# Patient Record
Sex: MALE | Race: WHITE | Employment: UNEMPLOYED | URBAN - METROPOLITAN AREA
[De-identification: names, ages, dates, MRNs, and addresses within clinical notes are randomized per-mention and may not be internally consistent; named-entity substitution may affect disease eponyms.]

---

## 2021-03-25 PROBLEM — G04.90 MENINGOENCEPHALITIS: Status: ACTIVE | Noted: 2021-03-25

## 2021-03-29 PROBLEM — F14.10 DRUG ABUSE, COCAINE TYPE (HCC): Status: ACTIVE | Noted: 2021-03-29

## 2021-03-29 PROBLEM — B95.61 ENDOCARDITIS DUE TO METHICILLIN SUSCEPTIBLE STAPHYLOCOCCUS AUREUS (MSSA): Status: ACTIVE | Noted: 2021-03-25

## 2021-03-29 PROBLEM — I33.0 ENDOCARDITIS DUE TO METHICILLIN SUSCEPTIBLE STAPHYLOCOCCUS AUREUS (MSSA): Status: ACTIVE | Noted: 2021-03-25

## 2021-03-29 PROBLEM — I21.A1 TYPE 2 MYOCARDIAL INFARCTION (HCC): Status: ACTIVE | Noted: 2021-03-24

## 2021-03-30 PROBLEM — G06.0: Status: ACTIVE | Noted: 2021-03-30

## 2021-05-06 ENCOUNTER — APPOINTMENT (OUTPATIENT)
Dept: CT IMAGING | Age: 26
DRG: 161 | End: 2021-05-06
Attending: EMERGENCY MEDICINE
Payer: MEDICAID

## 2021-05-06 ENCOUNTER — APPOINTMENT (OUTPATIENT)
Dept: CT IMAGING | Age: 26
DRG: 161 | End: 2021-05-06
Attending: NURSE PRACTITIONER
Payer: MEDICAID

## 2021-05-06 ENCOUNTER — APPOINTMENT (OUTPATIENT)
Dept: GENERAL RADIOLOGY | Age: 26
DRG: 161 | End: 2021-05-06
Attending: EMERGENCY MEDICINE
Payer: MEDICAID

## 2021-05-06 ENCOUNTER — HOSPITAL ENCOUNTER (INPATIENT)
Age: 26
LOS: 33 days | Discharge: NURSING, REHAB OR LONG-TERM CARE | DRG: 161 | End: 2021-06-08
Attending: EMERGENCY MEDICINE | Admitting: ANESTHESIOLOGY
Payer: MEDICAID

## 2021-05-06 DIAGNOSIS — I63.89 ACUTE ARTERIAL ISCHEMIC STROKE, MULTIFOCAL, MULTIPLE VASCULAR TERRITORIES (HCC): ICD-10-CM

## 2021-05-06 DIAGNOSIS — Z87.898 HISTORY OF SEIZURES: ICD-10-CM

## 2021-05-06 DIAGNOSIS — R09.02 HYPOXIA: ICD-10-CM

## 2021-05-06 DIAGNOSIS — I46.9 CARDIAC ARREST (HCC): Primary | ICD-10-CM

## 2021-05-06 DIAGNOSIS — I63.89 ACUTE ISCHEMIC MULTIFOCAL MULTIPLE VASCULAR TERRITORIES STROKE (HCC): ICD-10-CM

## 2021-05-06 DIAGNOSIS — S20.212S CONTUSION OF LEFT CHEST WALL, SEQUELA: ICD-10-CM

## 2021-05-06 DIAGNOSIS — I42.9 CARDIOMYOPATHY, UNSPECIFIED TYPE (HCC): ICD-10-CM

## 2021-05-06 LAB
ALBUMIN SERPL-MCNC: 2.6 G/DL (ref 3.5–5)
ALBUMIN/GLOB SERPL: 0.6 {RATIO} (ref 1.1–2.2)
ALP SERPL-CCNC: 127 U/L (ref 45–117)
ALT SERPL-CCNC: 36 U/L (ref 12–78)
AMORPH CRY URNS QL MICRO: ABNORMAL
AMPHET UR QL SCN: NEGATIVE
AMPHET UR QL SCN: NEGATIVE
ANION GAP SERPL CALC-SCNC: 11 MMOL/L (ref 5–15)
APPEARANCE UR: CLEAR
APTT PPP: 28 SEC (ref 22.1–31)
ARTERIAL PATENCY WRIST A: NO
ARTERIAL PATENCY WRIST A: YES
AST SERPL-CCNC: 39 U/L (ref 15–37)
BACTERIA URNS QL MICRO: NEGATIVE /HPF
BARBITURATES UR QL SCN: NEGATIVE
BARBITURATES UR QL SCN: NEGATIVE
BASE DEFICIT BLD-SCNC: 2 MMOL/L
BASE EXCESS BLD CALC-SCNC: 0 MMOL/L
BASOPHILS # BLD: 0 K/UL (ref 0–0.1)
BASOPHILS NFR BLD: 0 % (ref 0–1)
BDY SITE: ABNORMAL
BDY SITE: ABNORMAL
BENZODIAZ UR QL: POSITIVE
BENZODIAZ UR QL: POSITIVE
BILIRUB SERPL-MCNC: 0.7 MG/DL (ref 0.2–1)
BILIRUB UR QL: NEGATIVE
BNP SERPL-MCNC: ABNORMAL PG/ML
BUN SERPL-MCNC: 18 MG/DL (ref 6–20)
BUN/CREAT SERPL: 24 (ref 12–20)
CA-I BLD-SCNC: 1.19 MMOL/L (ref 1.12–1.32)
CA-I BLD-SCNC: 1.27 MMOL/L (ref 1.12–1.32)
CALCIUM SERPL-MCNC: 9.5 MG/DL (ref 8.5–10.1)
CANNABINOIDS UR QL SCN: NEGATIVE
CANNABINOIDS UR QL SCN: NEGATIVE
CHLORIDE SERPL-SCNC: 103 MMOL/L (ref 97–108)
CO2 SERPL-SCNC: 24 MMOL/L (ref 21–32)
COCAINE UR QL SCN: NEGATIVE
COCAINE UR QL SCN: NEGATIVE
COLOR UR: ABNORMAL
COMMENT, HOLDF: NORMAL
COMMENT, HOLDF: NORMAL
COVID-19 RAPID TEST, COVR: NOT DETECTED
CREAT SERPL-MCNC: 0.76 MG/DL (ref 0.7–1.3)
D DIMER PPP FEU-MCNC: 14.03 MG/L FEU (ref 0–0.65)
DIFFERENTIAL METHOD BLD: ABNORMAL
DRUG SCRN COMMENT,DRGCM: ABNORMAL
DRUG SCRN COMMENT,DRGCM: ABNORMAL
EOSINOPHIL # BLD: 0 K/UL (ref 0–0.4)
EOSINOPHIL NFR BLD: 0 % (ref 0–7)
EPITH CASTS URNS QL MICRO: ABNORMAL /LPF
ERYTHROCYTE [DISTWIDTH] IN BLOOD BY AUTOMATED COUNT: 15.7 % (ref 11.5–14.5)
GAS FLOW.O2 O2 DELIVERY SYS: ABNORMAL L/MIN
GAS FLOW.O2 O2 DELIVERY SYS: ABNORMAL L/MIN
GAS FLOW.O2 SETTING OXYMISER: 16 BPM
GAS FLOW.O2 SETTING OXYMISER: 16 BPM
GLOBULIN SER CALC-MCNC: 4.6 G/DL (ref 2–4)
GLUCOSE SERPL-MCNC: 135 MG/DL (ref 65–100)
GLUCOSE UR STRIP.AUTO-MCNC: NEGATIVE MG/DL
HCO3 BLD-SCNC: 23 MMOL/L (ref 22–26)
HCO3 BLD-SCNC: 26 MMOL/L (ref 22–26)
HCT VFR BLD AUTO: 34.5 % (ref 36.6–50.3)
HGB BLD-MCNC: 10.6 G/DL (ref 12.1–17)
HGB UR QL STRIP: NEGATIVE
IMM GRANULOCYTES # BLD AUTO: 0.3 K/UL (ref 0–0.04)
IMM GRANULOCYTES NFR BLD AUTO: 1 % (ref 0–0.5)
INR PPP: 1.3 (ref 0.9–1.1)
KETONES UR QL STRIP.AUTO: NEGATIVE MG/DL
LACTATE SERPL-SCNC: 3.4 MMOL/L (ref 0.4–2)
LEUKOCYTE ESTERASE UR QL STRIP.AUTO: NEGATIVE
LYMPHOCYTES # BLD: 0.6 K/UL (ref 0.8–3.5)
LYMPHOCYTES NFR BLD: 2 % (ref 12–49)
MCH RBC QN AUTO: 27.3 PG (ref 26–34)
MCHC RBC AUTO-ENTMCNC: 30.7 G/DL (ref 30–36.5)
MCV RBC AUTO: 88.9 FL (ref 80–99)
METHADONE UR QL: NEGATIVE
METHADONE UR QL: NEGATIVE
MONOCYTES # BLD: 1.6 K/UL (ref 0–1)
MONOCYTES NFR BLD: 5 % (ref 5–13)
NEUTS SEG # BLD: 29.6 K/UL (ref 1.8–8)
NEUTS SEG NFR BLD: 92 % (ref 32–75)
NITRITE UR QL STRIP.AUTO: NEGATIVE
NRBC # BLD: 0 K/UL (ref 0–0.01)
NRBC BLD-RTO: 0 PER 100 WBC
O2/TOTAL GAS SETTING VFR VENT: 100 %
O2/TOTAL GAS SETTING VFR VENT: 100 %
OPIATES UR QL: NEGATIVE
OPIATES UR QL: NEGATIVE
PCO2 BLD: 36.1 MMHG (ref 35–45)
PCO2 BLD: 51.8 MMHG (ref 35–45)
PCP UR QL: NEGATIVE
PCP UR QL: NEGATIVE
PEEP RESPIRATORY: 10 CMH2O
PEEP RESPIRATORY: 10 CMH2O
PH BLD: 7.31 [PH] (ref 7.35–7.45)
PH BLD: 7.41 [PH] (ref 7.35–7.45)
PH UR STRIP: 5 [PH] (ref 5–8)
PLATELET # BLD AUTO: 490 K/UL (ref 150–400)
PMV BLD AUTO: 10.5 FL (ref 8.9–12.9)
PO2 BLD: 44 MMHG (ref 80–100)
PO2 BLD: 78 MMHG (ref 80–100)
POTASSIUM SERPL-SCNC: 4.8 MMOL/L (ref 3.5–5.1)
PROT SERPL-MCNC: 7.2 G/DL (ref 6.4–8.2)
PROT UR STRIP-MCNC: 30 MG/DL
PROTHROMBIN TIME: 13.3 SEC (ref 9–11.1)
RBC # BLD AUTO: 3.88 M/UL (ref 4.1–5.7)
RBC #/AREA URNS HPF: ABNORMAL /HPF (ref 0–5)
RBC MORPH BLD: ABNORMAL
SAMPLES BEING HELD,HOLD: NORMAL
SAMPLES BEING HELD,HOLD: NORMAL
SAO2 % BLD: 75 % (ref 92–97)
SAO2 % BLD: 96 % (ref 92–97)
SODIUM SERPL-SCNC: 138 MMOL/L (ref 136–145)
SOURCE, COVRS: NORMAL
SP GR UR REFRACTOMETRY: 1.01 (ref 1–1.03)
SPECIMEN TYPE: ABNORMAL
SPECIMEN TYPE: ABNORMAL
THERAPEUTIC RANGE,PTTT: NORMAL SECS (ref 58–77)
TROPONIN I SERPL-MCNC: <0.05 NG/ML
UR CULT HOLD, URHOLD: NORMAL
UROBILINOGEN UR QL STRIP.AUTO: 0.2 EU/DL (ref 0.2–1)
VENTILATION MODE VENT: ABNORMAL
VENTILATION MODE VENT: ABNORMAL
VT SETTING VENT: 450 ML
VT SETTING VENT: 450 ML
WBC # BLD AUTO: 32.1 K/UL (ref 4.1–11.1)
WBC URNS QL MICRO: ABNORMAL /HPF (ref 0–4)

## 2021-05-06 PROCEDURE — 36600 WITHDRAWAL OF ARTERIAL BLOOD: CPT

## 2021-05-06 PROCEDURE — 96375 TX/PRO/DX INJ NEW DRUG ADDON: CPT

## 2021-05-06 PROCEDURE — 81001 URINALYSIS AUTO W/SCOPE: CPT

## 2021-05-06 PROCEDURE — 74011250637 HC RX REV CODE- 250/637: Performed by: EMERGENCY MEDICINE

## 2021-05-06 PROCEDURE — 74011250636 HC RX REV CODE- 250/636

## 2021-05-06 PROCEDURE — 74011000258 HC RX REV CODE- 258: Performed by: EMERGENCY MEDICINE

## 2021-05-06 PROCEDURE — 74011000250 HC RX REV CODE- 250: Performed by: NURSE PRACTITIONER

## 2021-05-06 PROCEDURE — 71275 CT ANGIOGRAPHY CHEST: CPT

## 2021-05-06 PROCEDURE — 80177 DRUG SCRN QUAN LEVETIRACETAM: CPT

## 2021-05-06 PROCEDURE — 36415 COLL VENOUS BLD VENIPUNCTURE: CPT

## 2021-05-06 PROCEDURE — 71045 X-RAY EXAM CHEST 1 VIEW: CPT

## 2021-05-06 PROCEDURE — 74011250636 HC RX REV CODE- 250/636: Performed by: ANESTHESIOLOGY

## 2021-05-06 PROCEDURE — 84484 ASSAY OF TROPONIN QUANT: CPT

## 2021-05-06 PROCEDURE — 83880 ASSAY OF NATRIURETIC PEPTIDE: CPT

## 2021-05-06 PROCEDURE — 74011000250 HC RX REV CODE- 250: Performed by: EMERGENCY MEDICINE

## 2021-05-06 PROCEDURE — 87635 SARS-COV-2 COVID-19 AMP PRB: CPT

## 2021-05-06 PROCEDURE — 80164 ASSAY DIPROPYLACETIC ACD TOT: CPT

## 2021-05-06 PROCEDURE — 83605 ASSAY OF LACTIC ACID: CPT

## 2021-05-06 PROCEDURE — 70450 CT HEAD/BRAIN W/O DYE: CPT

## 2021-05-06 PROCEDURE — 99285 EMERGENCY DEPT VISIT HI MDM: CPT

## 2021-05-06 PROCEDURE — 74011000636 HC RX REV CODE- 636: Performed by: RADIOLOGY

## 2021-05-06 PROCEDURE — 85730 THROMBOPLASTIN TIME PARTIAL: CPT

## 2021-05-06 PROCEDURE — 74011250636 HC RX REV CODE- 250/636: Performed by: NURSE PRACTITIONER

## 2021-05-06 PROCEDURE — 85379 FIBRIN DEGRADATION QUANT: CPT

## 2021-05-06 PROCEDURE — 85025 COMPLETE CBC W/AUTO DIFF WBC: CPT

## 2021-05-06 PROCEDURE — 94760 N-INVAS EAR/PLS OXIMETRY 1: CPT

## 2021-05-06 PROCEDURE — 93005 ELECTROCARDIOGRAM TRACING: CPT

## 2021-05-06 PROCEDURE — 94640 AIRWAY INHALATION TREATMENT: CPT

## 2021-05-06 PROCEDURE — 82803 BLOOD GASES ANY COMBINATION: CPT

## 2021-05-06 PROCEDURE — 75810000455 HC PLCMT CENT VENOUS CATH LVL 2 5182

## 2021-05-06 PROCEDURE — 74011250636 HC RX REV CODE- 250/636: Performed by: EMERGENCY MEDICINE

## 2021-05-06 PROCEDURE — 80307 DRUG TEST PRSMV CHEM ANLYZR: CPT

## 2021-05-06 PROCEDURE — 99291 CRITICAL CARE FIRST HOUR: CPT

## 2021-05-06 PROCEDURE — 87040 BLOOD CULTURE FOR BACTERIA: CPT

## 2021-05-06 PROCEDURE — 0202U NFCT DS 22 TRGT SARS-COV-2: CPT

## 2021-05-06 PROCEDURE — C1751 CATH, INF, PER/CENT/MIDLINE: HCPCS

## 2021-05-06 PROCEDURE — 94002 VENT MGMT INPAT INIT DAY: CPT

## 2021-05-06 PROCEDURE — 80053 COMPREHEN METABOLIC PANEL: CPT

## 2021-05-06 PROCEDURE — 51701 INSERT BLADDER CATHETER: CPT

## 2021-05-06 PROCEDURE — 85610 PROTHROMBIN TIME: CPT

## 2021-05-06 PROCEDURE — 96365 THER/PROPH/DIAG IV INF INIT: CPT

## 2021-05-06 PROCEDURE — 65610000006 HC RM INTENSIVE CARE

## 2021-05-06 RX ORDER — DIVALPROEX SODIUM 500 MG/1
500 TABLET, DELAYED RELEASE ORAL 3 TIMES DAILY
Status: DISCONTINUED | OUTPATIENT
Start: 2021-05-07 | End: 2021-05-06

## 2021-05-06 RX ORDER — PROPOFOL 10 MG/ML
0-50 VIAL (ML) INTRAVENOUS
Status: DISCONTINUED | OUTPATIENT
Start: 2021-05-06 | End: 2021-05-08

## 2021-05-06 RX ORDER — INSULIN LISPRO 100 [IU]/ML
INJECTION, SOLUTION INTRAVENOUS; SUBCUTANEOUS EVERY 6 HOURS
Status: DISCONTINUED | OUTPATIENT
Start: 2021-05-07 | End: 2021-05-11

## 2021-05-06 RX ORDER — VALPROIC ACID 250 MG/5ML
500 SOLUTION ORAL EVERY 8 HOURS
Status: DISCONTINUED | OUTPATIENT
Start: 2021-05-06 | End: 2021-05-12

## 2021-05-06 RX ORDER — VANCOMYCIN HYDROCHLORIDE
1250 EVERY 8 HOURS
Status: DISCONTINUED | OUTPATIENT
Start: 2021-05-07 | End: 2021-05-08 | Stop reason: DRUGHIGH

## 2021-05-06 RX ORDER — VALPROIC ACID 250 MG/5ML
500 SOLUTION ORAL 3 TIMES DAILY
Status: DISCONTINUED | OUTPATIENT
Start: 2021-05-07 | End: 2021-05-06

## 2021-05-06 RX ORDER — ENOXAPARIN SODIUM 100 MG/ML
40 INJECTION SUBCUTANEOUS DAILY
Status: DISCONTINUED | OUTPATIENT
Start: 2021-05-07 | End: 2021-05-09

## 2021-05-06 RX ORDER — ONDANSETRON 2 MG/ML
4 INJECTION INTRAMUSCULAR; INTRAVENOUS
Status: DISCONTINUED | OUTPATIENT
Start: 2021-05-06 | End: 2021-06-08 | Stop reason: HOSPADM

## 2021-05-06 RX ORDER — DEXTROSE 50 % IN WATER (D50W) INTRAVENOUS SYRINGE
25-50 AS NEEDED
Status: DISCONTINUED | OUTPATIENT
Start: 2021-05-06 | End: 2021-06-08 | Stop reason: HOSPADM

## 2021-05-06 RX ORDER — ACETAMINOPHEN 650 MG/1
650 SUPPOSITORY RECTAL
Status: COMPLETED | OUTPATIENT
Start: 2021-05-06 | End: 2021-05-06

## 2021-05-06 RX ORDER — LEVOFLOXACIN 5 MG/ML
750 INJECTION, SOLUTION INTRAVENOUS
Status: COMPLETED | OUTPATIENT
Start: 2021-05-06 | End: 2021-05-06

## 2021-05-06 RX ORDER — CHLORHEXIDINE GLUCONATE 0.12 MG/ML
15 RINSE ORAL EVERY 12 HOURS
Status: DISCONTINUED | OUTPATIENT
Start: 2021-05-06 | End: 2021-05-17 | Stop reason: ALTCHOICE

## 2021-05-06 RX ORDER — MAGNESIUM SULFATE 100 %
4 CRYSTALS MISCELLANEOUS AS NEEDED
Status: DISCONTINUED | OUTPATIENT
Start: 2021-05-06 | End: 2021-06-08 | Stop reason: HOSPADM

## 2021-05-06 RX ORDER — FENTANYL CITRATE 50 UG/ML
INJECTION, SOLUTION INTRAMUSCULAR; INTRAVENOUS
Status: COMPLETED
Start: 2021-05-06 | End: 2021-05-06

## 2021-05-06 RX ORDER — SODIUM CHLORIDE 0.9 % (FLUSH) 0.9 %
5-40 SYRINGE (ML) INJECTION AS NEEDED
Status: DISCONTINUED | OUTPATIENT
Start: 2021-05-06 | End: 2021-06-08 | Stop reason: HOSPADM

## 2021-05-06 RX ORDER — SODIUM CHLORIDE, SODIUM LACTATE, POTASSIUM CHLORIDE, CALCIUM CHLORIDE 600; 310; 30; 20 MG/100ML; MG/100ML; MG/100ML; MG/100ML
1000 INJECTION, SOLUTION INTRAVENOUS ONCE
Status: COMPLETED | OUTPATIENT
Start: 2021-05-06 | End: 2021-05-06

## 2021-05-06 RX ORDER — FENTANYL CITRATE 50 UG/ML
100 INJECTION, SOLUTION INTRAMUSCULAR; INTRAVENOUS ONCE
Status: COMPLETED | OUTPATIENT
Start: 2021-05-06 | End: 2021-05-06

## 2021-05-06 RX ORDER — SODIUM CHLORIDE 0.9 % (FLUSH) 0.9 %
5-40 SYRINGE (ML) INJECTION EVERY 8 HOURS
Status: DISCONTINUED | OUTPATIENT
Start: 2021-05-06 | End: 2021-06-08 | Stop reason: HOSPADM

## 2021-05-06 RX ORDER — ACETAMINOPHEN 325 MG/1
650 TABLET ORAL
Status: DISCONTINUED | OUTPATIENT
Start: 2021-05-06 | End: 2021-06-08 | Stop reason: HOSPADM

## 2021-05-06 RX ORDER — POLYETHYLENE GLYCOL 3350 17 G/17G
17 POWDER, FOR SOLUTION ORAL DAILY PRN
Status: DISCONTINUED | OUTPATIENT
Start: 2021-05-06 | End: 2021-06-08 | Stop reason: HOSPADM

## 2021-05-06 RX ORDER — FENTANYL CITRATE 50 UG/ML
25 INJECTION, SOLUTION INTRAMUSCULAR; INTRAVENOUS
Status: DISCONTINUED | OUTPATIENT
Start: 2021-05-06 | End: 2021-05-15

## 2021-05-06 RX ORDER — VANCOMYCIN/0.9 % SOD CHLORIDE 1.5G/250ML
1500 PLASTIC BAG, INJECTION (ML) INTRAVENOUS ONCE
Status: COMPLETED | OUTPATIENT
Start: 2021-05-06 | End: 2021-05-07

## 2021-05-06 RX ORDER — ACETAMINOPHEN 650 MG/1
650 SUPPOSITORY RECTAL
Status: DISCONTINUED | OUTPATIENT
Start: 2021-05-06 | End: 2021-06-08 | Stop reason: HOSPADM

## 2021-05-06 RX ADMIN — FAMOTIDINE 20 MG: 10 INJECTION, SOLUTION INTRAVENOUS at 22:18

## 2021-05-06 RX ADMIN — FENTANYL CITRATE 100 MCG: 50 INJECTION, SOLUTION INTRAMUSCULAR; INTRAVENOUS at 22:00

## 2021-05-06 RX ADMIN — PROPOFOL 20 MCG/KG/MIN: 10 INJECTION, EMULSION INTRAVENOUS at 19:14

## 2021-05-06 RX ADMIN — IOPAMIDOL 65 ML: 755 INJECTION, SOLUTION INTRAVENOUS at 21:10

## 2021-05-06 RX ADMIN — LEVOFLOXACIN 750 MG: 5 INJECTION, SOLUTION INTRAVENOUS at 18:52

## 2021-05-06 RX ADMIN — CHLORHEXIDINE GLUCONATE 15 ML: 0.12 RINSE ORAL at 22:18

## 2021-05-06 RX ADMIN — Medication 10 ML: at 22:18

## 2021-05-06 RX ADMIN — SODIUM CHLORIDE 1000 ML: 9 INJECTION, SOLUTION INTRAVENOUS at 18:53

## 2021-05-06 RX ADMIN — PIPERACILLIN AND TAZOBACTAM 3.38 G: 3; .375 INJECTION, POWDER, LYOPHILIZED, FOR SOLUTION INTRAVENOUS at 18:16

## 2021-05-06 RX ADMIN — VANCOMYCIN HYDROCHLORIDE 1500 MG: 10 INJECTION, POWDER, LYOPHILIZED, FOR SOLUTION INTRAVENOUS at 22:18

## 2021-05-06 RX ADMIN — Medication 100 MCG/HR: at 21:51

## 2021-05-06 RX ADMIN — PROPOFOL 50 MCG/KG/MIN: 10 INJECTION, EMULSION INTRAVENOUS at 23:57

## 2021-05-06 RX ADMIN — ACETAMINOPHEN 650 MG: 650 SUPPOSITORY RECTAL at 18:53

## 2021-05-06 RX ADMIN — SODIUM CHLORIDE, POTASSIUM CHLORIDE, SODIUM LACTATE AND CALCIUM CHLORIDE 1000 ML: 600; 310; 30; 20 INJECTION, SOLUTION INTRAVENOUS at 22:17

## 2021-05-06 RX ADMIN — EPINEPHRINE 1 MCG/MIN: 1 INJECTION INTRAMUSCULAR; INTRAVENOUS; SUBCUTANEOUS at 20:30

## 2021-05-06 RX ADMIN — SODIUM CHLORIDE 1000 ML: 9 INJECTION, SOLUTION INTRAVENOUS at 20:13

## 2021-05-06 RX ADMIN — ALBUTEROL SULFATE: 2.5 SOLUTION RESPIRATORY (INHALATION) at 17:50

## 2021-05-06 NOTE — Clinical Note
Dressed using bacteriostatic and topical skin adhesive dressing. Site: clean, dry, & intact, no bleeding and no hematoma.

## 2021-05-06 NOTE — Clinical Note
Left chest clipped prepped with ChloraPrep Wet prep solution applied at: 728. Wet prep solution dried at: 731. Wet prep elapsed drying time: 3 mins.

## 2021-05-06 NOTE — ED NOTES
Arrival from [facility]. There to be weaned off of trach/vent. Recent aortic surgery. Went into cardiac arrest @ 1500. Two rounds of meds/compressions. Defibrillated. ROSC achieved but desating. AMS.

## 2021-05-06 NOTE — ED NOTES
Difficulty obtaining blood cultures. Patient's PICC line not drawing back sufficiently. MD Norris aware of need for better access and blood cultures prior to abx. Plan for central line.

## 2021-05-06 NOTE — Clinical Note
A Bovie was used. Blend setting: pure. Mode: bipolar. Coagulation Settin.  Cut Settin. Site (pad location): lateral thigh. Laterality: left.

## 2021-05-06 NOTE — ED NOTES
Central line confirmed by xray per MD Brian Huston. Second set of blood cultures collected and zosyn restarted.

## 2021-05-06 NOTE — Clinical Note
Left chest and draped. Wet prep solution applied at: 735. Wet prep solution dried at: 738. Wet prep elapsed drying time: 3 mins.

## 2021-05-06 NOTE — ED PROVIDER NOTES
The history is provided by the EMS personnel and the nursing home. The history is limited by the condition of the patient. Cardiac arrest   This is a new problem. The current episode started less than 1 hour ago. The problem has been resolved. The problem occurs rarely. Past Medical History:   Diagnosis Date    Anxiety     Seizure [facility])        Past Surgical History:   Procedure Laterality Date    HX TONSILLECTOMY           No family history on file.     Social History     Socioeconomic History    Marital status: SINGLE     Spouse name: Not on file    Number of children: Not on file    Years of education: Not on file    Highest education level: Not on file   Occupational History    Not on file   Social Needs    Financial resource strain: Not on file    Food insecurity     Worry: Not on file     Inability: Not on file    Transportation needs     Medical: Not on file     Non-medical: Not on file   Tobacco Use    Smoking status: Current Every Day Smoker     Packs/day: 1.00    Smokeless tobacco: Never Used   Substance and Sexual Activity    Alcohol use: Yes     Comment: socially    Drug use: Not Currently     Comment: denies drug use    Sexual activity: Not on file   Lifestyle    Physical activity     Days per week: Not on file     Minutes per session: Not on file    Stress: Not on file   Relationships    Social connections     Talks on phone: Not on file     Gets together: Not on file     Attends [employer] service: Not on file     Active member of club or organization: Not on file     Attends meetings of clubs or organizations: Not on file     Relationship status: Not on file    Intimate partner violence     Fear of current or ex partner: Not on file     Emotionally abused: Not on file     Physically abused: Not on file     Forced sexual activity: Not on file   Other Topics Concern    Not on file   Social History Narrative    Not on file         ALLERGIES: Codeine    Review of Systems Unable to perform ROS: Patient unresponsive       Vitals:    05/06/21 1825 05/06/21 1826 05/06/21 1827 05/06/21 1835   BP: 117/67  (!) 110/30 113/75   Pulse: (!) 124 (!) 124 (!) 123 (!) 122   Resp: 30 27 (!) 31 26   Temp:       SpO2: (!) 79% (!) 79% (!) 77% (!) 73%   Weight:                Physical Exam  Vitals signs and nursing note reviewed. Constitutional:       General: He is in acute distress. Appearance: He is toxic-appearing and diaphoretic. Comments: Vented via trach   Eyes:      Pupils:      Right eye: Pupil is not reactive. Left eye: Pupil is not reactive. Cardiovascular:      Rate and Rhythm: Tachycardia present. Pulmonary:      Effort: Tachypnea and respiratory distress present. Breath sounds: Decreased breath sounds present. Chest:       Abdominal:      General: Abdomen is flat. Palpations: Abdomen is soft. Skin:     General: Skin is warm. Findings: No rash. Neurological:      Mental Status: He is unresponsive. GCS: GCS eye subscore is 1. GCS verbal subscore is 1. GCS motor subscore is 1. MDM     This is a 49-year-old male with past medical history, review of systems, physical exam as above, presenting via EMS status post cardiac arrest.  Per report, patient found down in cardiac arrest at his LTAC, history notable for history of substance abuse, status post aortic vegetation, replacement via median sternotomy, history of MRSA sepsis, and embolic CVA. Per report was noted to be agitated earlier in the day, before being discovered in arrest, unknown downtime. Per report EMS performed CPR, defibrillated the patient after administration of epinephrine, he presents unconscious and unresponsive, in obvious distress, diaphoretic, tachypneic and tachycardic. Saturations on 100% oxygen via vent initially in the 50s, improving to 60s only with diminished breath sounds throughout.   He has soft abdomen, warm diaphoretic skin, pupils are fixed, unreactive to light. Bedside echocardiogram, without obvious cardiogenic dysfunction. Differential includes metabolic abnormality leading to his cardiac arrest, stroke, pulmonary findings favor pulmonary edema following cardiac arrest given acute presentation however infectious process remains on the differential.  Plan to proceed with resuscitation, including suctioning, stacked nebulizers, obtain CMP, CBC, chest x-ray, likely central line placement. Will consult with the ICU for admission. Bedside US    Date/Time: 5/6/2021 6:00 PM  Performed by: Soheila Moffett MD  Authorized by: Soheila Moffett MD     Written consent obtained: No    Verbal consent obtained: No    Performed by: Attending  Type of procedure: Focused cardiac (Echo)  Left leg: Indications:  Cardiac arrest    Parasternal long axis:  Adequate    Parasternal short axis:  Adequate    Pericardial effusion:  Absent    Global Ventricular Function:  Hyperdynamic    Right Ventricular Size:  Normal    Interpretation:  No sonographic evidence of significant cardiac dysfunction    Confirmation study:  A confirmatory study was obtained while the patient was in the Emergency Department. Central Line    Date/Time: 5/6/2021 6:00 PM  Performed by: Soheila Moffett MD  Authorized by: Soheila Moffett MD     Consent:     Consent obtained:  Emergent situation    Alternatives discussed:  No treatment  Pre-procedure details:     Hand hygiene: Hand hygiene performed prior to insertion      Sterile barrier technique: All elements of maximal sterile technique followed      Skin preparation:  2% chlorhexidine  Anesthesia (see MAR for exact dosages): Anesthesia method:  None  Procedure details:     Location:  R internal jugular    Patient position:  Flat    Procedural supplies: quad.     Catheter size:  7.5 Fr    Landmarks identified: yes      Ultrasound guidance: yes      Sterile ultrasound techniques: Sterile gel and sterile probe covers were used      Number of attempts:  1    Successful placement: yes    Post-procedure details:     Post-procedure:  Dressing applied and line sutured    Assessment:  Blood return through all ports, free fluid flow, no pneumothorax on x-ray and placement verified by x-ray    Patient tolerance of procedure: Tolerated well, no immediate complications  Critical Care  Performed by: Jane Roth MD  Authorized by: Jane Roth MD     Critical care provider statement:     Critical care time (minutes):  72    Critical care was necessary to treat or prevent imminent or life-threatening deterioration of the following conditions:  Cardiac failure, circulatory failure and shock    Critical care was time spent personally by me on the following activities:  Development of treatment plan with patient or surrogate, discussions with consultants, evaluation of patient's response to treatment, examination of patient, ordering and performing treatments and interventions, ordering and review of laboratory studies, ordering and review of radiographic studies, pulse oximetry, re-evaluation of patient's condition, review of old charts, vascular access procedures and ventilator management          6:49 PM  Patient discussed with Dr. Fariha Hernandez (cardiology), who will expedite bedside echo. Patient discussed with Dr. Jossue Arguelles (cardiothoracic surgery), who when discussing ECMO, deferred to intensivist for further evaluation. Patient discussed with Dr. uGero Oliva (Intensivist), who will discuss possibility of ECMO with Dr. Jossue Arguelles, and make arrangements for admission. 7:11 PM  Patient d/w his Mother Nicki Choi [phone]).

## 2021-05-06 NOTE — PROGRESS NOTES
ICU Progress Note (Brief)    Consult received. Discussed ECMO possibility with ER attending (Daniel Watkins) & CTS attending (Robin Ricci) - patient not a candidate given prior (CVA, embolic history) & current (fixed, dilated pupils) neurological history. Full note to follow.     Funmilayo Canseco DO   Staff Intensivist/Anesthesiologist  Critical Care Medicine

## 2021-05-07 ENCOUNTER — APPOINTMENT (OUTPATIENT)
Dept: GENERAL RADIOLOGY | Age: 26
DRG: 161 | End: 2021-05-07
Attending: EMERGENCY MEDICINE
Payer: MEDICAID

## 2021-05-07 ENCOUNTER — APPOINTMENT (OUTPATIENT)
Dept: GENERAL RADIOLOGY | Age: 26
DRG: 161 | End: 2021-05-07
Payer: MEDICAID

## 2021-05-07 ENCOUNTER — APPOINTMENT (OUTPATIENT)
Dept: NON INVASIVE DIAGNOSTICS | Age: 26
DRG: 161 | End: 2021-05-07
Attending: EMERGENCY MEDICINE
Payer: MEDICAID

## 2021-05-07 LAB
ALBUMIN SERPL-MCNC: 2.2 G/DL (ref 3.5–5)
ALBUMIN/GLOB SERPL: 0.6 {RATIO} (ref 1.1–2.2)
ALP SERPL-CCNC: 107 U/L (ref 45–117)
ALT SERPL-CCNC: 29 U/L (ref 12–78)
ANION GAP SERPL CALC-SCNC: 6 MMOL/L (ref 5–15)
ANION GAP SERPL CALC-SCNC: 8 MMOL/L (ref 5–15)
ARTERIAL PATENCY WRIST A: ABNORMAL
ARTERIAL PATENCY WRIST A: YES
AST SERPL-CCNC: 28 U/L (ref 15–37)
ATRIAL RATE: 118 BPM
B PERT DNA SPEC QL NAA+PROBE: NOT DETECTED
BASE DEFICIT BLDA-SCNC: 0.2 MMOL/L
BASE DEFICIT BLDA-SCNC: 3.3 MMOL/L
BASE DEFICIT BLDA-SCNC: 3.7 MMOL/L
BASE DEFICIT BLDA-SCNC: 4 MMOL/L
BASOPHILS # BLD: 0 K/UL (ref 0–0.1)
BASOPHILS NFR BLD: 0 % (ref 0–1)
BDY SITE: ABNORMAL
BILIRUB SERPL-MCNC: 0.6 MG/DL (ref 0.2–1)
BORDETELLA PARAPERTUSSIS PCR, BORPAR: NOT DETECTED
BUN SERPL-MCNC: 21 MG/DL (ref 6–20)
BUN SERPL-MCNC: 21 MG/DL (ref 6–20)
BUN/CREAT SERPL: 30 (ref 12–20)
BUN/CREAT SERPL: 50 (ref 12–20)
C PNEUM DNA SPEC QL NAA+PROBE: NOT DETECTED
CA-I BLD-SCNC: 1.21 MMOL/L (ref 1.13–1.32)
CALCIUM SERPL-MCNC: 9.1 MG/DL (ref 8.5–10.1)
CALCIUM SERPL-MCNC: 9.4 MG/DL (ref 8.5–10.1)
CALCULATED P AXIS, ECG09: 55 DEGREES
CALCULATED R AXIS, ECG10: 117 DEGREES
CALCULATED T AXIS, ECG11: 25 DEGREES
CHLORIDE SERPL-SCNC: 107 MMOL/L (ref 97–108)
CHLORIDE SERPL-SCNC: 109 MMOL/L (ref 97–108)
CO2 SERPL-SCNC: 24 MMOL/L (ref 21–32)
CO2 SERPL-SCNC: 24 MMOL/L (ref 21–32)
COHGB MFR BLD: 0.6 % (ref 1–2)
CREAT SERPL-MCNC: 0.42 MG/DL (ref 0.7–1.3)
CREAT SERPL-MCNC: 0.71 MG/DL (ref 0.7–1.3)
DIAGNOSIS, 93000: NORMAL
DIFFERENTIAL METHOD BLD: ABNORMAL
ECHO AO ROOT DIAM: 2.62 CM
ECHO AV AREA PEAK VELOCITY: 0.88 CM2
ECHO AV AREA VTI: 0.9 CM2
ECHO AV AREA/BSA PEAK VELOCITY: 0.5 CM2/M2
ECHO AV AREA/BSA VTI: 0.5 CM2/M2
ECHO AV MEAN GRADIENT: 17.87 MMHG
ECHO AV PEAK GRADIENT: 26.84 MMHG
ECHO AV PEAK VELOCITY: 259.04 CM/S
ECHO AV VTI: 40.01 CM
ECHO EST RA PRESSURE: 15 MMHG
ECHO LA AREA 4C: 25.7 CM2
ECHO LA VOL 2C: 111.67 ML (ref 18–58)
ECHO LA VOL 4C: 75.88 ML (ref 18–58)
ECHO LA VOL BP: 99.4 ML (ref 18–58)
ECHO LA VOL/BSA BIPLANE: 56.6 ML/M2 (ref 16–28)
ECHO LA VOLUME INDEX A2C: 63.59 ML/M2 (ref 16–28)
ECHO LA VOLUME INDEX A4C: 43.21 ML/M2 (ref 16–28)
ECHO LV EDV A2C: 229.74 ML
ECHO LV EDV A4C: 172.82 ML
ECHO LV EDV BP: 203.6 ML (ref 67–155)
ECHO LV EDV INDEX A4C: 98.4 ML/M2
ECHO LV EDV INDEX BP: 115.9 ML/M2
ECHO LV EDV NDEX A2C: 130.8 ML/M2
ECHO LV EJECTION FRACTION A2C: 20 PERCENT
ECHO LV EJECTION FRACTION A4C: 25 PERCENT
ECHO LV EJECTION FRACTION BIPLANE: 23.8 PERCENT (ref 55–100)
ECHO LV ESV A2C: 183.12 ML
ECHO LV ESV A4C: 130.04 ML
ECHO LV ESV BP: 155.1 ML (ref 22–58)
ECHO LV ESV INDEX A2C: 104.3 ML/M2
ECHO LV ESV INDEX A4C: 74.1 ML/M2
ECHO LV ESV INDEX BP: 88.3 ML/M2
ECHO LV INTERNAL DIMENSION DIASTOLIC: 6.42 CM (ref 4.2–5.9)
ECHO LV INTERNAL DIMENSION SYSTOLIC: 5.72 CM
ECHO LV IVSD: 0.65 CM (ref 0.6–1)
ECHO LV MASS 2D: 212.9 G (ref 88–224)
ECHO LV MASS INDEX 2D: 121.2 G/M2 (ref 49–115)
ECHO LV POSTERIOR WALL DIASTOLIC: 0.97 CM (ref 0.6–1)
ECHO LVOT DIAM: 2.04 CM
ECHO LVOT PEAK GRADIENT: 1.96 MMHG
ECHO LVOT PEAK VELOCITY: 69.93 CM/S
ECHO LVOT SV: 35.8 ML
ECHO LVOT VTI: 10.94 CM
ECHO MV A VELOCITY: 38.67 CM/S
ECHO MV AREA PHT: 6.88 CM2
ECHO MV E DECELERATION TIME (DT): 110.23 MS
ECHO MV E VELOCITY: 106.18 CM/S
ECHO MV E/A RATIO: 2.75
ECHO MV PRESSURE HALF TIME (PHT): 31.97 MS
ECHO PV PEAK INSTANTANEOUS GRADIENT SYSTOLIC: 18.41 MMHG
ECHO RIGHT VENTRICULAR SYSTOLIC PRESSURE (RVSP): 53.5 MMHG
ECHO RV INTERNAL DIMENSION: 4.6 CM
ECHO RV TAPSE: 1.23 CM (ref 1.5–2)
ECHO TV REGURGITANT MAX VELOCITY: 310.24 CM/S
ECHO TV REGURGITANT PEAK GRADIENT: 38.5 MMHG
EOSINOPHIL # BLD: 0 K/UL (ref 0–0.4)
EOSINOPHIL NFR BLD: 0 % (ref 0–7)
ERYTHROCYTE [DISTWIDTH] IN BLOOD BY AUTOMATED COUNT: 15.9 % (ref 11.5–14.5)
ERYTHROCYTE [DISTWIDTH] IN BLOOD BY AUTOMATED COUNT: 16 % (ref 11.5–14.5)
EST. AVERAGE GLUCOSE BLD GHB EST-MCNC: 108 MG/DL
FIO2 ON VENT: 45 %
FLUAV H1 2009 PAND RNA SPEC QL NAA+PROBE: NOT DETECTED
FLUAV H1 RNA SPEC QL NAA+PROBE: NOT DETECTED
FLUAV H3 RNA SPEC QL NAA+PROBE: NOT DETECTED
FLUAV SUBTYP SPEC NAA+PROBE: NOT DETECTED
FLUBV RNA SPEC QL NAA+PROBE: NOT DETECTED
GLOBULIN SER CALC-MCNC: 4 G/DL (ref 2–4)
GLUCOSE BLD STRIP.AUTO-MCNC: 129 MG/DL (ref 65–100)
GLUCOSE BLD STRIP.AUTO-MCNC: 131 MG/DL (ref 65–100)
GLUCOSE BLD STRIP.AUTO-MCNC: 155 MG/DL (ref 65–100)
GLUCOSE BLD STRIP.AUTO-MCNC: 168 MG/DL (ref 65–100)
GLUCOSE BLD STRIP.AUTO-MCNC: 226 MG/DL (ref 65–100)
GLUCOSE SERPL-MCNC: 160 MG/DL (ref 65–100)
GLUCOSE SERPL-MCNC: 231 MG/DL (ref 65–100)
HADV DNA SPEC QL NAA+PROBE: NOT DETECTED
HBA1C MFR BLD: 5.4 % (ref 4–5.6)
HCO3 BLDA-SCNC: 22 MMOL/L (ref 22–26)
HCO3 BLDA-SCNC: 23 MMOL/L (ref 22–26)
HCOV 229E RNA SPEC QL NAA+PROBE: NOT DETECTED
HCOV HKU1 RNA SPEC QL NAA+PROBE: NOT DETECTED
HCOV NL63 RNA SPEC QL NAA+PROBE: NOT DETECTED
HCOV OC43 RNA SPEC QL NAA+PROBE: NOT DETECTED
HCT VFR BLD AUTO: 27 % (ref 36.6–50.3)
HCT VFR BLD AUTO: 28.2 % (ref 36.6–50.3)
HGB BLD OXIMETRY-MCNC: 9.7 G/DL (ref 14–17)
HGB BLD-MCNC: 8.1 G/DL (ref 12.1–17)
HGB BLD-MCNC: 8.3 G/DL (ref 12.1–17)
HMPV RNA SPEC QL NAA+PROBE: NOT DETECTED
HPIV1 RNA SPEC QL NAA+PROBE: NOT DETECTED
HPIV2 RNA SPEC QL NAA+PROBE: NOT DETECTED
HPIV3 RNA SPEC QL NAA+PROBE: NOT DETECTED
HPIV4 RNA SPEC QL NAA+PROBE: NOT DETECTED
IMM GRANULOCYTES # BLD AUTO: 0.3 K/UL (ref 0–0.04)
IMM GRANULOCYTES NFR BLD AUTO: 1 % (ref 0–0.5)
INR PPP: 1.4 (ref 0.9–1.1)
LACTATE SERPL-SCNC: 1.5 MMOL/L (ref 0.4–2)
LACTATE SERPL-SCNC: 2.3 MMOL/L (ref 0.4–2)
LYMPHOCYTES # BLD: 1 K/UL (ref 0.8–3.5)
LYMPHOCYTES NFR BLD: 4 % (ref 12–49)
M PNEUMO DNA SPEC QL NAA+PROBE: NOT DETECTED
MAGNESIUM SERPL-MCNC: 1.9 MG/DL (ref 1.6–2.4)
MCH RBC QN AUTO: 27.1 PG (ref 26–34)
MCH RBC QN AUTO: 27.5 PG (ref 26–34)
MCHC RBC AUTO-ENTMCNC: 29.4 G/DL (ref 30–36.5)
MCHC RBC AUTO-ENTMCNC: 30 G/DL (ref 30–36.5)
MCV RBC AUTO: 91.5 FL (ref 80–99)
MCV RBC AUTO: 92.2 FL (ref 80–99)
METHGB MFR BLD: 0.5 % (ref 0–1.4)
MONOCYTES # BLD: 1.8 K/UL (ref 0–1)
MONOCYTES NFR BLD: 7 % (ref 5–13)
NEUTS SEG # BLD: 22.7 K/UL (ref 1.8–8)
NEUTS SEG NFR BLD: 88 % (ref 32–75)
NRBC # BLD: 0 K/UL (ref 0–0.01)
NRBC # BLD: 0 K/UL (ref 0–0.01)
NRBC BLD-RTO: 0 PER 100 WBC
NRBC BLD-RTO: 0 PER 100 WBC
OXYHGB MFR BLD: 98.3 % (ref 94–97)
P-R INTERVAL, ECG05: 166 MS
PCO2 BLDA: 35 MMHG (ref 35–45)
PCO2 BLDA: 44 MMHG (ref 35–45)
PCO2 BLDA: 46 MMHG (ref 35–45)
PCO2 BLDA: 50 MMHG (ref 35–45)
PH BLDA: 7.29 [PH] (ref 7.35–7.45)
PH BLDA: 7.32 [PH] (ref 7.35–7.45)
PH BLDA: 7.32 [PH] (ref 7.35–7.45)
PH BLDA: 7.44 [PH] (ref 7.35–7.45)
PHOSPHATE SERPL-MCNC: 4.6 MG/DL (ref 2.6–4.7)
PLATELET # BLD AUTO: 373 K/UL (ref 150–400)
PLATELET # BLD AUTO: 432 K/UL (ref 150–400)
PMV BLD AUTO: 10.5 FL (ref 8.9–12.9)
PMV BLD AUTO: 11 FL (ref 8.9–12.9)
PO2 BLDA: 112 MMHG (ref 80–100)
PO2 BLDA: 117 MMHG (ref 80–100)
PO2 BLDA: 129 MMHG (ref 80–100)
PO2 BLDA: 215 MMHG (ref 80–100)
POTASSIUM SERPL-SCNC: 3.9 MMOL/L (ref 3.5–5.1)
POTASSIUM SERPL-SCNC: 4.7 MMOL/L (ref 3.5–5.1)
PROCALCITONIN SERPL-MCNC: 14.31 NG/ML
PROT SERPL-MCNC: 6.2 G/DL (ref 6.4–8.2)
PROTHROMBIN TIME: 14 SEC (ref 9–11.1)
Q-T INTERVAL, ECG07: 320 MS
QRS DURATION, ECG06: 126 MS
QTC CALCULATION (BEZET), ECG08: 448 MS
RBC # BLD AUTO: 2.95 M/UL (ref 4.1–5.7)
RBC # BLD AUTO: 3.06 M/UL (ref 4.1–5.7)
RBC MORPH BLD: ABNORMAL
RSV RNA SPEC QL NAA+PROBE: NOT DETECTED
RV+EV RNA SPEC QL NAA+PROBE: NOT DETECTED
SAO2 % BLD: 98 % (ref 92–97)
SAO2 % BLD: 99 % (ref 95–99)
SAO2% DEVICE SAO2% SENSOR NAME: ABNORMAL
SARS-COV-2 PCR, COVPCR: NOT DETECTED
SERVICE CMNT-IMP: ABNORMAL
SODIUM SERPL-SCNC: 139 MMOL/L (ref 136–145)
SODIUM SERPL-SCNC: 139 MMOL/L (ref 136–145)
SPECIMEN SITE: ABNORMAL
VALPROATE SERPL-MCNC: 25 UG/ML (ref 50–100)
VALPROATE SERPL-MCNC: <3 UG/ML (ref 50–100)
VENTRICULAR RATE, ECG03: 118 BPM
WBC # BLD AUTO: 23.5 K/UL (ref 4.1–11.1)
WBC # BLD AUTO: 25.8 K/UL (ref 4.1–11.1)

## 2021-05-07 PROCEDURE — 80053 COMPREHEN METABOLIC PANEL: CPT

## 2021-05-07 PROCEDURE — 94003 VENT MGMT INPAT SUBQ DAY: CPT

## 2021-05-07 PROCEDURE — 84100 ASSAY OF PHOSPHORUS: CPT

## 2021-05-07 PROCEDURE — 82803 BLOOD GASES ANY COMBINATION: CPT

## 2021-05-07 PROCEDURE — 74011250636 HC RX REV CODE- 250/636: Performed by: NURSE PRACTITIONER

## 2021-05-07 PROCEDURE — 71045 X-RAY EXAM CHEST 1 VIEW: CPT

## 2021-05-07 PROCEDURE — 77030040361 HC SLV COMPR DVT MDII -B

## 2021-05-07 PROCEDURE — 77030005402 HC CATH RAD ART LN KT TELE -B

## 2021-05-07 PROCEDURE — 87070 CULTURE OTHR SPECIMN AEROBIC: CPT

## 2021-05-07 PROCEDURE — 36591 DRAW BLOOD OFF VENOUS DEVICE: CPT

## 2021-05-07 PROCEDURE — 77030013797 HC KT TRNSDUC PRSSR EDWD -A

## 2021-05-07 PROCEDURE — 87186 SC STD MICRODIL/AGAR DIL: CPT

## 2021-05-07 PROCEDURE — 74011000258 HC RX REV CODE- 258: Performed by: ANESTHESIOLOGY

## 2021-05-07 PROCEDURE — 74011250636 HC RX REV CODE- 250/636: Performed by: ANESTHESIOLOGY

## 2021-05-07 PROCEDURE — 93306 TTE W/DOPPLER COMPLETE: CPT

## 2021-05-07 PROCEDURE — 65610000006 HC RM INTENSIVE CARE

## 2021-05-07 PROCEDURE — 83036 HEMOGLOBIN GLYCOSYLATED A1C: CPT

## 2021-05-07 PROCEDURE — 74011000250 HC RX REV CODE- 250: Performed by: ANESTHESIOLOGY

## 2021-05-07 PROCEDURE — 36592 COLLECT BLOOD FROM PICC: CPT

## 2021-05-07 PROCEDURE — 83605 ASSAY OF LACTIC ACID: CPT

## 2021-05-07 PROCEDURE — 85610 PROTHROMBIN TIME: CPT

## 2021-05-07 PROCEDURE — 77030018861

## 2021-05-07 PROCEDURE — 84145 PROCALCITONIN (PCT): CPT

## 2021-05-07 PROCEDURE — 77030037878 HC DRSG MEPILEX >48IN BORD MOLN -B

## 2021-05-07 PROCEDURE — 87077 CULTURE AEROBIC IDENTIFY: CPT

## 2021-05-07 PROCEDURE — 87205 SMEAR GRAM STAIN: CPT

## 2021-05-07 PROCEDURE — 85025 COMPLETE CBC W/AUTO DIFF WBC: CPT

## 2021-05-07 PROCEDURE — 74011250637 HC RX REV CODE- 250/637: Performed by: NURSE PRACTITIONER

## 2021-05-07 PROCEDURE — 74011000250 HC RX REV CODE- 250: Performed by: NURSE PRACTITIONER

## 2021-05-07 PROCEDURE — 74011000258 HC RX REV CODE- 258: Performed by: NURSE PRACTITIONER

## 2021-05-07 PROCEDURE — 11730 AVULSION NAIL PLATE SIMPLE 1: CPT | Performed by: PODIATRIST

## 2021-05-07 PROCEDURE — 87449 NOS EACH ORGANISM AG IA: CPT

## 2021-05-07 PROCEDURE — P9047 ALBUMIN (HUMAN), 25%, 50ML: HCPCS | Performed by: ANESTHESIOLOGY

## 2021-05-07 PROCEDURE — 99223 1ST HOSP IP/OBS HIGH 75: CPT | Performed by: PSYCHIATRY & NEUROLOGY

## 2021-05-07 PROCEDURE — 74018 RADEX ABDOMEN 1 VIEW: CPT

## 2021-05-07 PROCEDURE — 74011250636 HC RX REV CODE- 250/636: Performed by: INTERNAL MEDICINE

## 2021-05-07 PROCEDURE — 85027 COMPLETE CBC AUTOMATED: CPT

## 2021-05-07 PROCEDURE — 80164 ASSAY DIPROPYLACETIC ACD TOT: CPT

## 2021-05-07 PROCEDURE — 74011636637 HC RX REV CODE- 636/637: Performed by: NURSE PRACTITIONER

## 2021-05-07 PROCEDURE — 80051 ELECTROLYTE PANEL: CPT

## 2021-05-07 PROCEDURE — 82962 GLUCOSE BLOOD TEST: CPT

## 2021-05-07 PROCEDURE — 77030006998

## 2021-05-07 PROCEDURE — 74011250637 HC RX REV CODE- 250/637: Performed by: PSYCHIATRY & NEUROLOGY

## 2021-05-07 PROCEDURE — 36600 WITHDRAWAL OF ARTERIAL BLOOD: CPT

## 2021-05-07 PROCEDURE — 99222 1ST HOSP IP/OBS MODERATE 55: CPT | Performed by: PODIATRIST

## 2021-05-07 PROCEDURE — 73610000005 HC INO THERAPY INITIAL

## 2021-05-07 PROCEDURE — 2709999900 HC NON-CHARGEABLE SUPPLY

## 2021-05-07 PROCEDURE — 83735 ASSAY OF MAGNESIUM: CPT

## 2021-05-07 PROCEDURE — 73610000026 HC INO THERAPY EACH HOUR

## 2021-05-07 RX ORDER — BALSAM PERU/CASTOR OIL
OINTMENT (GRAM) TOPICAL 2 TIMES DAILY
Status: DISCONTINUED | OUTPATIENT
Start: 2021-05-07 | End: 2021-05-28

## 2021-05-07 RX ORDER — HYDROMORPHONE HYDROCHLORIDE 2 MG/ML
2 INJECTION, SOLUTION INTRAMUSCULAR; INTRAVENOUS; SUBCUTANEOUS ONCE
Status: COMPLETED | OUTPATIENT
Start: 2021-05-07 | End: 2021-05-07

## 2021-05-07 RX ORDER — NOREPINEPHRINE BITARTRATE/D5W 8 MG/250ML
.5-3 PLASTIC BAG, INJECTION (ML) INTRAVENOUS
Status: DISCONTINUED | OUTPATIENT
Start: 2021-05-07 | End: 2021-05-08

## 2021-05-07 RX ORDER — POTASSIUM CHLORIDE 29.8 MG/ML
20 INJECTION INTRAVENOUS ONCE
Status: COMPLETED | OUTPATIENT
Start: 2021-05-07 | End: 2021-05-07

## 2021-05-07 RX ORDER — MIDAZOLAM HYDROCHLORIDE 1 MG/ML
2 INJECTION, SOLUTION INTRAMUSCULAR; INTRAVENOUS ONCE
Status: DISPENSED | OUTPATIENT
Start: 2021-05-07 | End: 2021-05-07

## 2021-05-07 RX ORDER — DOBUTAMINE HYDROCHLORIDE 200 MG/100ML
0-10 INJECTION INTRAVENOUS
Status: DISCONTINUED | OUTPATIENT
Start: 2021-05-07 | End: 2021-05-10

## 2021-05-07 RX ORDER — HYDROCORTISONE SODIUM SUCCINATE 100 MG/2ML
100 INJECTION, POWDER, FOR SOLUTION INTRAMUSCULAR; INTRAVENOUS EVERY 8 HOURS
Status: DISCONTINUED | OUTPATIENT
Start: 2021-05-07 | End: 2021-05-08

## 2021-05-07 RX ORDER — MAGNESIUM SULFATE 1 G/100ML
1 INJECTION INTRAVENOUS ONCE
Status: COMPLETED | OUTPATIENT
Start: 2021-05-07 | End: 2021-05-07

## 2021-05-07 RX ORDER — ALBUMIN HUMAN 250 G/1000ML
12.5 SOLUTION INTRAVENOUS EVERY 6 HOURS
Status: DISCONTINUED | OUTPATIENT
Start: 2021-05-07 | End: 2021-05-09

## 2021-05-07 RX ORDER — FUROSEMIDE 10 MG/ML
40 INJECTION INTRAMUSCULAR; INTRAVENOUS ONCE
Status: COMPLETED | OUTPATIENT
Start: 2021-05-07 | End: 2021-05-07

## 2021-05-07 RX ADMIN — EPINEPHRINE 10 MCG/MIN: 1 INJECTION INTRAMUSCULAR; INTRAVENOUS; SUBCUTANEOUS at 05:47

## 2021-05-07 RX ADMIN — Medication 10 ML: at 05:55

## 2021-05-07 RX ADMIN — FUROSEMIDE 40 MG: 10 INJECTION, SOLUTION INTRAMUSCULAR; INTRAVENOUS at 16:30

## 2021-05-07 RX ADMIN — PROPOFOL 50 MCG/KG/MIN: 10 INJECTION, EMULSION INTRAVENOUS at 18:22

## 2021-05-07 RX ADMIN — VALPROIC ACID 500 MG: 250 SOLUTION ORAL at 20:29

## 2021-05-07 RX ADMIN — Medication 200 MCG/HR: at 13:25

## 2021-05-07 RX ADMIN — Medication 10 ML: at 23:48

## 2021-05-07 RX ADMIN — INSULIN LISPRO 3 UNITS: 100 INJECTION, SOLUTION INTRAVENOUS; SUBCUTANEOUS at 05:55

## 2021-05-07 RX ADMIN — VANCOMYCIN HYDROCHLORIDE 1250 MG: 10 INJECTION, POWDER, LYOPHILIZED, FOR SOLUTION INTRAVENOUS at 06:25

## 2021-05-07 RX ADMIN — VANCOMYCIN HYDROCHLORIDE 1250 MG: 10 INJECTION, POWDER, LYOPHILIZED, FOR SOLUTION INTRAVENOUS at 14:07

## 2021-05-07 RX ADMIN — PROPOFOL 50 MCG/KG/MIN: 10 INJECTION, EMULSION INTRAVENOUS at 23:48

## 2021-05-07 RX ADMIN — Medication 200 MCG/HR: at 05:35

## 2021-05-07 RX ADMIN — FAMOTIDINE 20 MG: 10 INJECTION, SOLUTION INTRAVENOUS at 20:45

## 2021-05-07 RX ADMIN — LEVETIRACETAM 1000 MG: 100 SOLUTION ORAL at 16:31

## 2021-05-07 RX ADMIN — DOBUTAMINE HYDROCHLORIDE 2 MCG/KG/MIN: 200 INJECTION INTRAVENOUS at 14:09

## 2021-05-07 RX ADMIN — MAGNESIUM SULFATE HEPTAHYDRATE 1 G: 1 INJECTION, SOLUTION INTRAVENOUS at 06:42

## 2021-05-07 RX ADMIN — ALBUMIN (HUMAN) 12.5 G: 0.25 INJECTION, SOLUTION INTRAVENOUS at 22:03

## 2021-05-07 RX ADMIN — CHLORHEXIDINE GLUCONATE 15 ML: 0.12 RINSE ORAL at 08:46

## 2021-05-07 RX ADMIN — VASOPRESSIN 0.04 UNITS/MIN: 20 INJECTION INTRAVENOUS at 17:11

## 2021-05-07 RX ADMIN — VASOPRESSIN 0.04 UNITS/MIN: 20 INJECTION INTRAVENOUS at 19:53

## 2021-05-07 RX ADMIN — HYDROCORTISONE SODIUM SUCCINATE 100 MG: 100 INJECTION, POWDER, FOR SOLUTION INTRAMUSCULAR; INTRAVENOUS at 15:09

## 2021-05-07 RX ADMIN — ENOXAPARIN SODIUM 40 MG: 40 INJECTION SUBCUTANEOUS at 08:46

## 2021-05-07 RX ADMIN — VASOPRESSIN 0.04 UNITS/MIN: 20 INJECTION INTRAVENOUS at 01:41

## 2021-05-07 RX ADMIN — HYDROCORTISONE SODIUM SUCCINATE 100 MG: 100 INJECTION, POWDER, FOR SOLUTION INTRAMUSCULAR; INTRAVENOUS at 08:46

## 2021-05-07 RX ADMIN — INSULIN LISPRO 2 UNITS: 100 INJECTION, SOLUTION INTRAVENOUS; SUBCUTANEOUS at 11:36

## 2021-05-07 RX ADMIN — PIPERACILLIN AND TAZOBACTAM 3.38 G: 3; .375 INJECTION, POWDER, LYOPHILIZED, FOR SOLUTION INTRAVENOUS at 16:31

## 2021-05-07 RX ADMIN — CASTOR OIL AND BALSAM, PERU: 788; 87 OINTMENT TOPICAL at 17:56

## 2021-05-07 RX ADMIN — HYDROMORPHONE HYDROCHLORIDE 2 MG: 2 INJECTION INTRAMUSCULAR; INTRAVENOUS; SUBCUTANEOUS at 10:10

## 2021-05-07 RX ADMIN — CHLORHEXIDINE GLUCONATE 15 ML: 0.12 RINSE ORAL at 20:30

## 2021-05-07 RX ADMIN — PIPERACILLIN AND TAZOBACTAM 3.38 G: 3; .375 INJECTION, POWDER, LYOPHILIZED, FOR SOLUTION INTRAVENOUS at 00:55

## 2021-05-07 RX ADMIN — ALBUMIN (HUMAN) 12.5 G: 0.25 INJECTION, SOLUTION INTRAVENOUS at 16:30

## 2021-05-07 RX ADMIN — POTASSIUM CHLORIDE 20 MEQ: 400 INJECTION, SOLUTION INTRAVENOUS at 07:54

## 2021-05-07 RX ADMIN — Medication 10 ML: at 14:09

## 2021-05-07 RX ADMIN — LEVETIRACETAM 750 MG: 100 SOLUTION ORAL at 04:28

## 2021-05-07 RX ADMIN — PROPOFOL 50 MCG/KG/MIN: 10 INJECTION, EMULSION INTRAVENOUS at 09:28

## 2021-05-07 RX ADMIN — INSULIN LISPRO 2 UNITS: 100 INJECTION, SOLUTION INTRAVENOUS; SUBCUTANEOUS at 00:17

## 2021-05-07 RX ADMIN — Medication 200 MCG/HR: at 21:18

## 2021-05-07 RX ADMIN — PIPERACILLIN AND TAZOBACTAM 3.38 G: 3; .375 INJECTION, POWDER, LYOPHILIZED, FOR SOLUTION INTRAVENOUS at 08:45

## 2021-05-07 RX ADMIN — FAMOTIDINE 20 MG: 10 INJECTION, SOLUTION INTRAVENOUS at 08:46

## 2021-05-07 RX ADMIN — PROPOFOL 50 MCG/KG/MIN: 10 INJECTION, EMULSION INTRAVENOUS at 04:59

## 2021-05-07 RX ADMIN — VANCOMYCIN HYDROCHLORIDE 1250 MG: 10 INJECTION, POWDER, LYOPHILIZED, FOR SOLUTION INTRAVENOUS at 22:03

## 2021-05-07 RX ADMIN — PROPOFOL 50 MCG/KG/MIN: 10 INJECTION, EMULSION INTRAVENOUS at 13:58

## 2021-05-07 RX ADMIN — VALPROIC ACID 500 MG: 250 SOLUTION ORAL at 14:07

## 2021-05-07 RX ADMIN — VASOPRESSIN 0.04 UNITS/MIN: 20 INJECTION INTRAVENOUS at 08:35

## 2021-05-07 RX ADMIN — VALPROIC ACID 500 MG: 250 SOLUTION ORAL at 04:28

## 2021-05-07 RX ADMIN — NOREPINEPHRINE BITARTRATE 4 MCG/MIN: 1 INJECTION, SOLUTION, CONCENTRATE INTRAVENOUS at 09:35

## 2021-05-07 RX ADMIN — HYDROCORTISONE SODIUM SUCCINATE 100 MG: 100 INJECTION, POWDER, FOR SOLUTION INTRAMUSCULAR; INTRAVENOUS at 23:49

## 2021-05-07 RX ADMIN — CASTOR OIL AND BALSAM, PERU: 788; 87 OINTMENT TOPICAL at 08:45

## 2021-05-07 NOTE — PROGRESS NOTES
Bedside shift change report given to Summer Rosas RN (oncoming nurse) by Stephanie Abdul RN (offgoing nurse). Report included the following information SBAR, Kardex, ED Summary, Procedure Summary, Intake/Output, MAR, Recent Results, Med Rec Status, Cardiac Rhythm NSR, Alarm Parameters , Quality Measures and Dual Neuro Assessment. 0830: Dr. Federico Fernandez at bedside, orders received to start norepinephrine and attempt to wean epinephrine to 5 mcg/min as tolerated. ICU multidisciplinary rounds lead by Dr. Federico Fernandez (Intensivist): The following were reviewed and discussed: current labs,  recent imaging, lines/drains, review of body systems, nutrition, cultures, mobility, length of ICU stay. The plan of care for the day is as follows cardiology and podiatry consults. Pressor requirements and decreased urine output discussed, plan for arterial line placement today and to keep pt's sedation at current rate to maintain O2 saturations. Plan to wean nitric as tolerated per RT.     1900: Spoke with Dr. Federico Fernandez regarding pt's BP, orders received to keep dobutamine running and if pt's BP increases overnight to add milrinone for BP control but do not wean dobutamine. Bedside shift change report given to Tuan Roman (oncoming nurse) by Summer Rosas RN (offgoing nurse). Report included the following information SBAR, Kardex, ED Summary, Procedure Summary, Intake/Output, MAR, Recent Results, Med Rec Status, Cardiac Rhythm NSR, Alarm Parameters , Quality Measures and Dual Neuro Assessment.

## 2021-05-07 NOTE — H&P
[facility]    ICU TEAM History and Physical    Name: Morgan Soto   : 1995   MRN: [id number]   Date: 2021      Assessment:     ICU Problems:  1. Cardiac arrest, unknown time to ROSC. (2 rounds of meds and compressions, Defibrilated x 1)  2. Acute on chronic respiratory failure, Hypoxia - S/P Tracheostomy PTA  3. Shock, Likely cardiogenic vs septic  4. Leukocytosis  5. Right RV failure on cardiac POCUS - pending CTA chest/TTE. 6. Large territory acute infarctions of the right. occipital lobe and right frontal lobe. 7. Chronic Seizure D/o.  8. Hx Systolic heart failure. 9. 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 10. Hx Drug abuse, cocaine type  11. Recent Aortic Surgery at [address] [place] [address] on 2021    ICU Comprehensive Plan of Care:     Plans for this Shift:     1. Admit to ICU  2. Start on Epi gtt for MAP > 70 mmHg  3. Stat CTA chest and CT head  4. Echo in am   5. Consult cardiology. 6. Acute infarcts on CT Head - consult neurology  7. May need MRI head  8. Restart Keppra/  9. CTA chest pending. 10. COVID rapid negative  11. Send respiratory viral panel with COVID PCR -Place on Droplet plus precautions  12. SBP Goal of: > 100 mmHg  13. MAP Goal of: > 70 mmHg  14. Epinephrine - For above SBP/MAP goals. 15. IVFs: 1 bag LR @ 75 ml/hr after CTA then none. 16. Transfusion Trigger (Hgb): <7 g/dL  17. Respiratory Goals:  a. Chlorhexidine   b. Optimize PEEP/Ventilation/Oxygenation  c. Goal Tidal Volume 6 cc/kg based on IBW  d. Aim for lung protective ventilation  e. Head of bed > 30 degrees  18. Pulmonary toilet: Albuterol   19. SpO2 Goal: > 92%   20. Keep K>4; Mg>2   21. PT/OT: NA    22. Discussed Plan of Care/Code Status: Full Code  23. Appreciate Consultants Input Cardiology  24. Discussed Care Plan with Bedside RN  25. Documentation of Current Medications  26.  Rest of Plan Below:    F - Feeding:  Pending   A - Analgesia: Fentanyl   S - Sedation: Propofol  T - DVT Prophylaxis: Lovenox   H - Head of Bed: > 30 Degrees  U - Ulcer Prophylaxis: Pepcid (famotidine)   G - Glycemic Control: Insulin SSI Q 6hr  S - Spontaneous Breathing Trial: No  B - Bowel Regimen: MiraLax  I - Indwelling Catheter:   Tubes: ETT and Orogastric Tube  Lines: Central Line  Drains: Howe Catheter  D - De-escalation of Antibiotics: Vancomycin  Zosyn    Subjective:   Progress Note: 5/7/2021      Reason for ICU Admission: Post cardiac arrest    HPI:  Tad Dan is a 22 y.o. male with h/o Seizures and anxiety who presented to [facility] ED after a cardiac arrest at [facility] around 1500 today. Hx from chart and speaking with patient's sister via phone. Patient had recent hospitalization at the [facility] in March of this year. Initial hospitalization admission was at [facility] on 3/24 with AMS in setting of polysubstance and IVD use (cocaine, heroine, methamphetamines). He was found to have septic MRSA bacteremia, MSSA endocarditis. Imaging and MRI also found R PCA infarct and several abscesses and right temporal and parietal lobes. Infarcts thought to be due to septic emboli and patient had left sided residual weakness. Patient was transferred to [facility] on 4/1/2021 for evaluation for valve surgery. After transfer he was found to have a New R MCA infarct Also had a type 2 NSTEMI and tamponade with pericardiocentesis done prior to surgery. Did have cardiac arrest on day of surgery. Had a bioprosthetic Aortic Valve Replacement and Aortic Root Abscess Debridement done on 4/16/2021. Trached and sent to [facility] on 4/29/2021. Was undergoing PT and vent wean. Sister stated that patient was able to be off the vent for several hours over the last few days. He was sitting up and able to talk with valve over trach and could follow commands. Stated that he had severe weakness of the left upper ext, but was starting to gain some mobility in the lower left ext.  Patient's mother visited patient today prior to arrest. She said the staff told her that the patient was getting very anxious earlier in the day around 1 pm and had to be placed back on the ventilator, and then they had to sedate him while he was on the ventilator. The mother stated \" he did not look good\" and \" his eye were rolled back in his head\". About 30 minutes after she left she got call about arrest. Per ED note patient was found unresponsive around the 1500 hour and was pulseless. Two rounds of CPR and meds were given and patient was defibrillated x 1 before ROSC. Patient was hypoxic post arrest. Unknown down time prior to arrest. Patient was then sent to [facility] ED. Awaiting CTA chest and CT head at this time. Critical care consulted for admit to ICU. Overnight Events:   5/7/2021      POD:  * No surgery found *    S/P:       Active Problem List:     Problem List  Never Reviewed          Codes Class    Cardiac arrest [facility]) ICD-10-CM: I46.9  ICD-9-CM: 427.5         Cerebral abscess (embolic) BOY-26-LG: U74.7  ICD-9-CM: 324.0     Overview Signed 3/30/2021  4:51 PM by Beatriz Land MD     CT and MRI revealed evidence of a right posterior cerebellar artery infarct, 9 x 2.6 cm. He also had several areas of cerebral abscesses in the right temporal and parietal lobes measuring 2 x 1 cm, 1.3 x 0.9 cm and 0.6 x 5.5 mm with associated 3 mm midline shift. CSF showed no organisms to date, but elevated WBC.     3/24/21 CT Normal noncontrast head CT  3/29/21 MRI Extensive multiple acute infarcts throughout the bilateral cerebral hemispheres with a large hemorrhagic right PCA territory infarction measuring up to 7 cm. Some of the additional smaller infarcts also demonstrated hemorrhage. Findings are highly suspicious for septic emboli  3/30/21 CTA Occlusion of the right P2/P3. Infarction within the right occipital and temporal lobes.  Edema related to the right-sided small abscesses, better appreciated on MRI             Drug abuse, cocaine type [facility]) ICD-10-CM: F14.10  ICD-9-CM: 305.60     Overview Signed 3/29/2021  8:34 PM by Grupo Lucas MD     3/24/21 UDS + methamphetamines and cocaine             Endocarditis due to methicillin susceptible Staphylococcus aureus (MSSA) ICD-10-CM: I33.0, B95.61  ICD-9-CM: 421.0, 041.11     Overview Addendum 3/30/2021  4:51 PM by Grupo Lucas MD     3/24/21 ER Patient with a history of drug use anxiety and seizure not on medications for the past 4 months according to the sister presents for evaluation of altered mental status;  BC x2 + MRSA, WBC 11.6 P-67%, K 4.0 BUN 45, Creat 1.1, Trop I 3.89-> 13.60-> 10.52; UDS + cocaine and amphetamines; Lactic acid 4.2  3/24/21 Intubated      CT head negative      CT chest, neg PE, ? LV mass  3/25/21 ECHO EF 36%, LV 44/45, S/PW 8/9,  Mod AR  3/2/621 RAISSA EF 55%, Vegetations on AV 0.9cm LCC, small perforation 0.4cm LCC. No feg on MV/TV/PV, No thrombus in MELLY             Type 2 myocardial infarction [facility]) ICD-10-CM: I21. A1  ICD-9-CM: 410.90     Overview Signed 3/29/2021  8:37 PM by Grupo Lucas MD     Trop I 3.89-> 13.60-> 10.52; Past Medical History:      has a past medical history of Anxiety and Seizure ([facility] [address] Past Surgical History:      has a past surgical history that includes hx tonsillectomy. Home Medications:     Prior to Admission medications    Medication Sig Start Date End Date Taking? Authorizing Provider   divalproex DR (Depakote) 500 mg tablet Take 500 mg by mouth three (3) times daily. Other, MD Maddy   levETIRAcetam (KEPPRA) 750 mg tablet Take 1 Tab by mouth two (2) times a day. 11/7/18   Astrid Easton PA-C       Allergies/Social/Family History:      Allergies   Allergen Reactions    Codeine Unknown (comments)     Pt denies       Social History     Tobacco Use    Smoking status: Current Every Day Smoker     Packs/day: 1.00    Smokeless tobacco: Never Used   Substance Use Topics    Alcohol use: Yes     Comment: socially No family history on file. Review of Systems:     Review of systems not obtained due to patient factors. Objective:   Vital Signs:  Visit Vitals  BP (!) 87/46   Pulse 99   Temp 98.2 °F (36.8 °C)   Resp 21   Wt 56.8 kg (125 lb 3.5 oz)   SpO2 100%   BMI 17.46 kg/m²      O2 Device: Tracheostomy, Ventilator Temp (24hrs), Av.5 °F (37.5 °C), Min:98.1 °F (36.7 °C), Max:101.6 °F (38.7 °C)           Intake/Output:     Intake/Output Summary (Last 24 hours) at 2021 0116  Last data filed at 2021 0100  Gross per 24 hour   Intake 1250 ml   Output 510 ml   Net 740 ml       Physical Exam:  General: mild distress, appears stated age,Trached on vent and sedated  Eye:  conjunctivae/corneas clear. Pupils are round and reactive equally  Neurologic: Limited, withdraws to pain in upper and lower right ext, does not withdraw in upper left ext. Weakness in lower left ext. + cough/gag. Lymphatic:  Cervical, supraclavicular, and axillary nodes normal.   Neck:  normal and no erythema or exudates noted. Lungs:  Coarse lung sounds bilaterally, irregular breathing on vent. Heart:  Tachycardiac rate and rhythm, S1, S2  Abdomen:  soft, non-tender.  Bowel sounds normal. No masses,  no organomegaly  Cardiovascular:  S1S2 present, pedal pulses normal and no edema  Skin:  Normal. and no rash or abnormalities    LABS AND  DATA: Personally reviewed  Recent Labs     21  1800   WBC 32.1*   HGB 10.6*   HCT 34.5*   *     Recent Labs     21  1800      K 4.8      CO2 24   BUN 18   CREA 0.76   *   CA 9.5     Recent Labs     21  1800   *   TP 7.2   ALB 2.6*   GLOB 4.6*     Recent Labs     21  1800   INR 1.3*   PTP 13.3*   APTT 28.0      Recent Labs     21  1738   PHI 7.41 7.31*   PCO2I 36.1 51.8*   PO2I 78* 44*   FIO2I 100 100     Recent Labs     21  1800   TROIQ <0.05       Hemodynamics:   PAP:   CO:     Wedge:   CI:     CVP:    SVR:       PVR: Ventilator Settings:  Mode Rate Tidal Volume Pressure FiO2 PEEP   Volume control, Assist control   450 ml    100 % 10 cm H20     Peak airway pressure: 34 cm H2O    Minute ventilation: 18.9 l/min        MEDS: Reviewed    Chest X-Ray:  CXR Results  (Last 48 hours)               05/06/21 1846  XR CHEST PORT Final result    Impression:  Right internal jugular central venous catheter appears to be in   satisfactory position. No evidence of placement related complication. Otherwise   stable appearance of the chest.           Narrative:  INDICATION: Central line placement       COMPARISON: 5/6/2021       FINDINGS: Single AP portable view of the chest obtained at 6:40 PM demonstrates   a new right internal jugular central venous catheter, which has its tip at the   level of the cavoatrial junction. There is otherwise no change in position of   the lines and tubes. The cardiomediastinal silhouette is stable. There is   unchanged diffuse bilateral airspace disease. No pneumothorax is seen. There is   no evidence of pleural effusion. 05/06/21 1739  XR CHEST PORT Final result    Impression:  Diffuse bilateral airspace disease, compatible with pneumonia. Narrative:  INDICATION: Hypoxia       FINDINGS: AP portable imaging of the chest performed at 5:37 PM demonstrates   heart size at the upper limits of normal. The patient is status post median   sternotomy and aortic valve replacement. Tracheostomy tube terminates at the   level of the clavicular heads. Left arm PICC line tip overlies the superior vena   cava. There is diffuse bilateral airspace disease. . No significant osseous   abnormalities are seen. CT Results  (Last 48 hours)               05/06/21 2123  CT HEAD WO CONT Final result    Impression:      1. Large territory acute infarctions of the right occipital lobe and right   frontal lobe. 2. No intracranial hemorrhage. 3. No hydrocephalus.                Narrative:  EXAM: CT HEAD WO CONT       INDICATION: unresponsive post arrest       COMPARISON: None. CONTRAST: None. TECHNIQUE: Unenhanced CT of the head was performed using 5 mm images. Brain and   bone windows were generated. Coronal and sagittal reformats. CT dose reduction   was achieved through use of a standardized protocol tailored for this   examination and automatic exposure control for dose modulation. FINDINGS:   There is no hydrocephalus. There are multiple acute infarctions throughout the   right cerebral hemisphere, involving much of the occipital lobe, as well as much   of the frontal lobe. There is an additional superior right frontoparietal   infarct. There is no intracranial hemorrhage or midline shift. The basilar   cisterns are open. The bone windows demonstrate no abnormalities. The visualized portions of the   paranasal sinuses and mastoid air cells are clear. 05/06/21 2123  CTA [facility] CONT Final result    Impression:  1. No evidence of pulmonary embolus. 2.  Severe multilevel consolidation throughout both lungs, consistent with   severe multilobar pneumonia. 3. Cardiomegaly with small pericardial effusion. 4. Moderate bilateral pleural effusions. Narrative:  INDICATION:   cardiac arrest eval for PE        COMPARISON:  None       TECHNIQUE:  Following the uneventful intravenous administration of 100 cc Isovue   897, thin helical axial images were obtained through the chest. Postprocessing   was performed. 3D image postprocessing was performed. CT dose reduction was achieved through the use of a standardized protocol   tailored for this examination and automatic exposure control for dose   modulation. FINDINGS: Study is limited by patient motion. A tracheostomy tube is in   satisfactory position. There are lines present in appropriate position. There is mild mediastinal lymphadenopathy. The heart is enlarged.   There is a   small pericardial effusion. No filling defect is seen within the pulmonary arterial system to suggest   pulmonary embolus. The aorta is normal in caliber. There is severe multilevel groundglass opacification throughout both lungs, as   well as severe multilobar bilateral consolidation. There is no pneumothorax. There are moderate bilateral pleural effusions. Limited evaluation of the upper abdomen demonstrates no abnormality. There are   median sternotomy wires. ECHO: Pending    Multidisciplinary Rounds Completed:  No    ABCDEF Bundle/Checklist Completed:  Yes    SPECIAL EQUIPMENT  Ventilator    DISPOSITION  Stay in ICU    CRITICAL CARE CONSULTANT NOTE  I had a face to face encounter with the patient, reviewed and interpreted patient data including clinical events, labs, images, vital signs, I/O's, and examined patient. I have discussed the case and the plan and management of the patient's care with the consulting services, the bedside nurses and the respiratory therapist.      NOTE OF PERSONAL INVOLVEMENT IN CARE   This patient has a high probability of imminent, clinically significant deterioration, which requires the highest level of preparedness to intervene urgently. I participated in the decision-making and personally managed or directed the management of the following life and organ supporting interventions that required my frequent assessment to treat or prevent imminent deterioration. I personally spent 90 minutes of critical care time. This is time spent at this critically ill patient's bedside actively involved in patient care as well as the coordination of care and discussions with the patient's family. This does not include any procedural time which has been billed separately.     Tiffanie Payton AGAP-BC     [address] [facility]

## 2021-05-07 NOTE — WOUND CARE
WOCN Note:  
 
New consult placed by RN for left great toe black Chart shows: 
Patient admitted on 5/6/21 with Cardiac Arrest 
 Past medical history of anxiety, seizures, polysubstance abuse, endocarditis, history of MRSA sepsis, and embolic CVA Admitted from [facility] Wound Culture obtained on 5/7/21 from left great toe WBC = 25.8 Hgb + 8.3 Assessment:  
Patient in ICU, sedated and on a ventilator. He has a tracheostomy Mobility: Toal assistance with repositioning. Continence: has a Howe Last Salvador Score: 10 
Surface: Alfa in-touch mattress Diet: NPO at this time Bilateral heel, buttocks, and sacral skin intact and without erythema Heels offloaded with pillows Lower Extremity Assessment: 
Pulses: Bilateral pedal and posterior pulses present 2+ Cap refill: <3 sec Temperature: warm Edema: none Skin color: appropriate for ethnicity 1. POA Left distal great toe with dark purple/black discoloration Peripheral vascular assessment to lower extremity/foot WDL. Under toenail is also with purple discoloration suggestive of trauma to area. There is yellow purulent drainage draining from under toenail. Medial toe discoration is firm to touch. No bogginess, erythema, or increased warmth Wound Recommendations:   
 
Cleanse left great toe wound (next to toenail) with normal saline, apply SIlvasorb gel, cover with curad oil emulsion non adherent dressing, cover with 2x2 gauze, and secure with roll gauze daily Recommend podiatry consult to eval area Venelex oint BID to sacrum Apply sacral foam border dressing to sacral area for pressure injury prevention. Change every 3 days and prn if becomes soiled. Lift dressing every shift to assess skin integrity and reapply same dressing. PI Prevention: 
Turn/reposition approximately every 2 hours Offload heels with pillows at all times in bed.  
 Hydraguard Barrier Cream to buttocks and sacrum  TID  and as needed with incontinence care Pad bony prominences Keep HOB 30 degrees or less to decrease shearing and pressure unless medically contraindicated. If HOB is to be over 30 degrees, raise knees first then [facility] to prevent sliding Minimize layers of linen/pads under patient to optimize support surface to one flat sheet and one incontinence pad Discussed with Dr. Femi Brady and RN, Caio Romero Transition of Care: Plan to follow weekly and as needed while admitted to hospital.   
 
Shelbie GODINEZ, RN, [facility] Certified Wound and Ostomy Nurse 
office [phone] Can be reached through [address] [place] [address] 
pager [phone] or call  to page

## 2021-05-07 NOTE — PROGRESS NOTES
Spiritual Care Assessment/Progress Note  [address]      NAME: Morgan Soto      MRN: [id number]  AGE: 22 y.o.  SEX: male  Mormon Affiliation: No preference   Language: English     5/7/2021     Total Time (in minutes): 9     Spiritual Assessment begun in [address] [facility] through conversation with:         []Patient        [] Family    [] Friend(s)        Reason for Consult: Initial/Spiritual assessment, critical care     Spiritual beliefs: (Please include comment if needed)     [] Identifies with a chloé tradition:         [] Supported by a chloé community:            [] Claims no spiritual orientation:           [] Seeking spiritual identity:                [] Adheres to an individual form of spirituality:           [x] Not able to assess:                           Identified resources for coping:      [] Prayer                               [] Music                  [] Guided Imagery     [] Family/friends                 [] Pet visits     [] Devotional reading                         [] Unknown     [] Other:                                               Interventions offered during this visit: (See comments for more details)    Patient Interventions: Initial visit, Other (comment)(Note left at bedside)     Family/Friend(s): Other (comment)(Note left at bedside)     Plan of Care:     [] Support spiritual and/or cultural needs    [] Support AMD and/or advance care planning process      [] Support grieving process   [] Coordinate Rites and/or Rituals    [] Coordination with community clergy   [] No spiritual needs identified at this time   [] Detailed Plan of Care below (See Comments)  [] Make referral to Music Therapy  [] Make referral to Pet Therapy     [] Make referral to Addiction services  [] Make referral to [facility]  [] Make referral to Spiritual Care Partner  [] No future visits requested        [] Follow up upon further referrals     Comments: Visited Mr Jenna Byers in [place] for initial spiritual assessment. Mr Caryle Hemming was lying quietly in bed with eyes closed and on vent support. No family was present. Updated by nurse and MD on patient status. Left note for patient and family assuring them of ongoing  availability for support. : Rev. Isaias Blue.  Desi Coburn; [facility], to contact [phone] call: [phone]

## 2021-05-07 NOTE — PROGRESS NOTES
Pharmacist Note - Vancomycin Dosing  Consult provided for this 22 y.o. male for indication of sepsis. -tx from [place]; COVID r/o; hx of MRSA  -s/p cardiac arrest    Antibiotic regimen(s): Vanc + Zosyn     Recent Labs     21  1800   WBC 32.1*   CREA 0.76   BUN 18     Frequency of BMP: daily x3  Height: 71 in  Weight: 63 kg  Est CrCl: >50 ml/min   Temp (24hrs), Av.1 °F (37.8 °C), Min:98.5 °F (36.9 °C), Max:101.6 °F (38.7 °C)    Goal trough = 15 - 20 mcg/mL    Therapy will be initiated with a loading dose of 1500 mg IV x 1 to be followed by a maintenance dose of 1250 mg IV every 8 hours. Pharmacy to follow patient daily and order levels / make dose adjustments as appropriate.

## 2021-05-07 NOTE — CONSULTS
CARDIOLOGY CONSULT                  Subjective:    Date of  Admission:   Admission type:Emergency    This is a 22 yom here after cardiac arrest. He had been at [facility] after a prolonged course at home in [place] and then at [facility]. He had a bioprosthetic AVR and root abscess debridement. He also had a pericardiocentesis due to tamponade. He had been making some improvement with intensive therapy but then seemed to be worsening yesterday and then had a cardiac arrest. He was found unresponsive and pulseless. He received CPR and was defibrillated. CT scan showed concerns for acute infarct. 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 A1    Cerebral abscess (embolic) C49.0    Cardiac arrest (HCC) I46.9        Past Medical History:   Diagnosis Date    Anxiety     Seizure [facility])       Past Surgical History:   Procedure Laterality Date    HX TONSILLECTOMY        [facility]    Social History     Socioeconomic History    Marital status: SINGLE     Spouse name: Not on file    Number of children: Not on file    Years of education: Not on file    Highest education level: Not on file   Occupational History    Not on file   Social Needs    Financial resource strain: Not on file    Food insecurity     Worry: Not on file     Inability: Not on file    Transportation needs     Medical: Not on file     Non-medical: Not on file   Tobacco Use    Smoking status: Current Every Day Smoker     Packs/day: 1.00    Smokeless tobacco: Never Used   Substance and Sexual Activity    Alcohol use: Yes     Comment: socially    Drug use: Not Currently     Comment: denies drug use    Sexual activity: Not on file   Lifestyle    Physical activity     Days per week: Not on file     Minutes per session: Not on file    Stress: Not on file Relationships    Social connections     Talks on phone: Not on file     Gets together: Not on file     Attends Taoist service: Not on file     Active member of club or organization: Not on file     Attends meetings of clubs or organizations: Not on file     Relationship status: Not on file    Intimate partner violence     Fear of current or ex partner: Not on file     Emotionally abused: Not on file     Physically abused: Not on file     Forced sexual activity: Not on file   Other Topics Concern    Not on file   Social History Narrative    Not on file        Current Facility-Administered Medications   Medication Dose Route Frequency    vasopressin (VASOSTRICT) 20 Units in 0.9% sodium chloride 100 mL infusion  0.04 Units/min IntraVENous CONTINUOUS    balsam peru-castor oiL (VENELEX) ointment   Topical BID    hydrocortisone Sod Succ (PF) (SOLU-CORTEF) injection 100 mg  100 mg IntraVENous Q8H    NOREPINephrine (LEVOPHED) 8 mg in 5% dextrose 250mL (32 mcg/mL) infusion  0.5-30 mcg/min IntraVENous TITRATE    [START ON 5/8/2021] Vancomycin Trough before 6am dose on 5/8   Other ONCE    levETIRAcetam (KEPPRA) oral solution 1,000 mg  1,000 mg Oral Q12H    DOBUTamine (DOBUTREX) 500 mg/250 mL (2,000 mcg/mL) infusion  0-10 mcg/kg/min IntraVENous TITRATE    propofol (DIPRIVAN) 10 mg/mL infusion  0-50 mcg/kg/min IntraVENous TITRATE    EPINEPHrine (ADRENALIN) 5 mg in 0.9% sodium chloride 250 mL infusion  1-10 mcg/min IntraVENous TITRATE    sodium chloride (NS) flush 5-40 mL  5-40 mL IntraVENous Q8H    sodium chloride (NS) flush 5-40 mL  5-40 mL IntraVENous PRN    acetaminophen (TYLENOL) tablet 650 mg  650 mg Oral Q6H PRN    Or    acetaminophen (TYLENOL) suppository 650 mg  650 mg Rectal Q6H PRN    polyethylene glycol (MIRALAX) packet 17 g  17 g Oral DAILY PRN    ondansetron (ZOFRAN) injection 4 mg  4 mg IntraVENous Q6H PRN    famotidine (PF) (PEPCID) 20 mg in 0.9% sodium chloride 10 mL injection  20 mg IntraVENous BID    enoxaparin (LOVENOX) injection 40 mg  40 mg SubCUTAneous DAILY    chlorhexidine (ORAL CARE KIT) 0.12 % mouthwash 15 mL  15 mL Oral Q12H    fentaNYL citrate (PF) injection 25 mcg  25 mcg IntraVENous Q4H PRN    piperacillin-tazobactam (ZOSYN) 3.375 g in 0.9% sodium chloride (MBP/ADV) 100 mL MBP  3.375 g IntraVENous Q8H    glucose chewable tablet 16 g  4 Tab Oral PRN    dextrose (D50W) injection syrg 12.5-25 g  25-50 mL IntraVENous PRN    glucagon (GLUCAGEN) injection 1 mg  1 mg IntraMUSCular PRN    insulin lispro (HUMALOG) injection   SubCUTAneous Q6H    fentaNYL (PF) 1,500 mcg/30 mL (50 mcg/mL) infusion  0-200 mcg/hr IntraVENous TITRATE    Vancomycin- pharmacy to dose   Other Rx Dosing/Monitoring    vancomycin (VANCOCIN) 1250 mg in  ml infusion  1,250 mg IntraVENous Q8H    valproic acid (as sodium salt) (DEPAKENE) 250 mg/5 mL (5 mL) oral solution 500 mg  500 mg Oral Q8H             Review of Symptoms:    Unable to obtain as unresponsive      Physical Exam    /81   Pulse 100   Temp 100 °F (37.8 °C)   Resp 20   Ht 5' 11\" (1.803 m)   Wt 59 kg (130 lb)   SpO2 94%   BMI 18.13 kg/m²      NAD, trached, sedated  Skin warm and dry  Nl conjunctiva  Oropharynx without exudate. Neck supple  Lungs coarse  Tachy  Abdomen soft and non tender  Pulses 2+ radials  No TOOTIE  Neuro:  Grossly intact  Appropriate       Cardiographics    Telemetry: normal sinus rhythm  ECG: normal sinus rhythm  Echocardiogram: reviewed        Assessment:     This is a 22 yom with MMP here with acute cardiac arrest. Echo obtained showed a decreased LVEF which is new from most previous study obtainable    Plan:    Echo showed decreased function, normal valve function  Start dobutamine to facilitate vasopressor wean  Add GDMT as able  No need to check further troponins  Daily labs    CCT 30-74 minutes

## 2021-05-07 NOTE — PROGRESS NOTES
TRANSFER - IN REPORT:    Verbal report received from [facility]) on Alok Burger  being received from ED(unit) for routine progression of care      Report consisted of patients Situation, Background, Assessment and   Recommendations(SBAR). Information from the following report(s) SBAR, Kardex, ED Summary, Intake/Output, MAR, Recent Results, Cardiac Rhythm ST and Alarm Parameters  was reviewed with the receiving nurse. Opportunity for questions and clarification was provided. Assessment completed upon patients arrival to unit and care assumed. 2135: Pt arrival on unit. Grimaces to pain, no withdraw or movement in extremities, on propofol 50mcg, O2 sats 60-70%. 100mg fentanyl given and fentanyl gtt started per Petty Chambers NP. O2 sats up to 90-93%, but pt desats to 80s with stimulation. Petty Chambers NP aware. Maxed on propofol and fentanyl gtt. Primary Nurse Josefina Mata RN performed a dual skin assessment on this patient Impairment noted- see wound doc flow sheet  Salvador score is 10    0045: Pt's BPs decreasing to SBPs in low 80s, MAPs in 50s. Epinephrine gtt titrated to 10 per MAR. Willy Eason MD notified. 0130: BPs continue to remain soft, MAPs in 50s and 60s. Petty Chambers NP notified. Vasopressin gtt ordered to maintain MAP >70.    0200: Dobhoff placed. Stat KUB ordered for confirmation. 0730: Bedside and Verbal shift change report given to Pattie Harmon RN (oncoming nurse) by Aruna Oviedo (offgoing nurse). Report included the following information SBAR, Kardex, ED Summary, Intake/Output, MAR, Recent Results, Cardiac Rhythm SR/ST, Alarm Parameters  and Dual Neuro Assessment.

## 2021-05-07 NOTE — PROGRESS NOTES
[facility]    ICU TEAM History and Physical    Name: Sheila Dobbs   : 1995   MRN: [id number]   Date: 2021      Assessment:     ICU Problems:  1. Cardiac arrest, unknown time to ROSC. (2 rounds of meds and compressions, Defibrilated x 1)  2. Acute on chronic respiratory failure, Hypoxia  3. S/p Tracheostomy PTA  4. Shock, Likely cardiogenic vs septic  5. Leukocytosis  6. Right RV failure on cardiac POCUS - pending CTA chest/TTE.  a. Formal ECHO pending  7. Large territory acute infarctions of the right. occipital lobe and right frontal lobe. 8. Chronic Seizure D/o.  9. Hx Systolic heart failure. 10. 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 11. Hx Drug abuse, cocaine type  12. Recent Aortic Surgery at [facility] on 2021    ICU Comprehensive Plan of Care:     Plans for this Shift:     1. NE/Vaso/Epi  2. Trend lactates  3. Trend Procal  4. Stress Steroids  5. Georges  6. Acute infarcts on CT Head - consult neurology  7. May need MRI head  8. Restart Keppra/  9. CTA chest pending. 10. COVID rapid negative  11. Send respiratory viral panel with COVID PCR -Place on Droplet plus precautions  12. SBP Goal of: > 100 mmHg  13. MAP Goal of: > 70 mmHg  14. Norepinephrine, Vasopressin and Epinephrine - For above SBP/MAP goals. 15. IVFs: 1 bag LR @ 75 ml/hr after CTA then none. 16. Transfusion Trigger (Hgb): <7 g/dL  17. Respiratory Goals:  a. Chlorhexidine   b. Optimize PEEP/Ventilation/Oxygenation  c. Goal Tidal Volume 6 cc/kg based on IBW  d. Aim for lung protective ventilation  e. Head of bed > 30 degrees  18. Pulmonary toilet: Albuterol   19. SpO2 Goal: > 92%   20. Keep K>4; Mg>2   21. PT/OT: NA    22. Discussed Plan of Care/Code Status: Full Code  23. Appreciate [facility]  24. Discussed Care Plan with Bedside RN  25. Documentation of Current Medications  26.  Rest of Plan Below:    F - Feeding:  Pending   A - Analgesia: Fentanyl   S - Sedation: Propofol  T - DVT Prophylaxis: Lovenox   H - Head of Bed: > 30 Degrees  U - Ulcer Prophylaxis: Pepcid (famotidine)   G - Glycemic Control: Insulin SSI Q 6hr  S - Spontaneous Breathing Trial: No  B - Bowel Regimen: MiraLax  I - Indwelling Catheter:   Tubes: ETT and Orogastric Tube  Lines: Central Line  Drains: Howe Catheter  D - De-escalation of Antibiotics: Vancomycin  Zosyn    Subjective:   Progress Note: 5/7/2021      Reason for ICU Admission: Post cardiac arrest    HPI:  Oralia Pavon is a 22 y.o. male with h/o Seizures and anxiety who presented to [facility] ED after a cardiac arrest at [facility] around 1500 today. Hx from chart and speaking with patient's sister via phone. Patient had recent hospitalization at the in [facility] in March of this year. Initial hospitalization admission was at [facility] on 3/24 with AMS in setting of polysubstance and IVD use (cocaine, heroine, methamphetamines). He was found to have septic MRSA bacteremia, MSSA endocarditis. Imaging and MRI also found R PCA infarct and several abscesses and right temporal and parietal lobes. Infarcts thought to be due to septic emboli and patient had left sided residual weakness. Patient was transferred to [facility] on 4/1/2021 for evaluation for valve surgery. After transfer he was found to have a New R MCA infarct Also had a type 2 NSTEMI and tamponade with pericardiocentesis done prior to surgery. Did have cardiac arrest on day of surgery. Had a bioprosthetic Aortic Valve Replacement and Aortic Root Abscess Debridement done on 4/16/2021. Trached and sent to [facility] on 4/29/2021. Was undergoing PT and vent wean. Sister stated that patient was able to be off the vent for several hours over the last few days. He was sitting up and able to talk with valve over trach and could follow commands. Stated that he had severe weakness of the left upper ext, but was starting to gain some mobility in the lower left ext.  Patient's mother visited patient today prior to arrest. She said the staff told her that the patient was getting very anxious earlier in the day around 1 pm and had to be placed back on the ventilator, and then they had to sedate him while he was on the ventilator. The mother stated \" he did not look good\" and \" his eye were rolled back in his head\". About 30 minutes after she left she got call about arrest. Per ED note patient was found unresponsive around the 1500 hour and was pulseless. Two rounds of CPR and meds were given and patient was defibrillated x 1 before ROSC. Patient was hypoxic post arrest. Unknown down time prior to arrest. Patient was then sent to [facility] ED. Awaiting CTA chest and CT head at this time. Critical care consulted for admit to ICU. Overnight Events:   5/7/21 -- Georges, EPI, MV      Active Problem List:     Problem List  Never Reviewed          Codes Class    Cardiac arrest [facility]) ICD-10-CM: I46.9  ICD-9-CM: 427.5         Cerebral abscess (embolic) AQS-88-FF: T25.0  ICD-9-CM: 324.0     Overview Signed 3/30/2021  4:51 PM by Yoly Pacheco MD     CT and MRI revealed evidence of a right posterior cerebellar artery infarct, 9 x 2.6 cm. He also had several areas of cerebral abscesses in the right temporal and parietal lobes measuring 2 x 1 cm, 1.3 x 0.9 cm and 0.6 x 5.5 mm with associated 3 mm midline shift. CSF showed no organisms to date, but elevated WBC.     3/24/21 CT Normal noncontrast head CT  3/29/21 MRI Extensive multiple acute infarcts throughout the bilateral cerebral hemispheres with a large hemorrhagic right PCA territory infarction measuring up to 7 cm. Some of the additional smaller infarcts also demonstrated hemorrhage. Findings are highly suspicious for septic emboli  3/30/21 CTA Occlusion of the right P2/P3. Infarction within the right occipital and temporal lobes.  Edema related to the right-sided small abscesses, better appreciated on MRI             Drug abuse, cocaine type [facility]) ICD-10-CM: F14.10  ICD-9-CM: 305.60     Overview Signed 3/29/2021  8:34 PM by Atiya Bradford MD     3/24/21 UDS + methamphetamines and cocaine             Endocarditis due to methicillin susceptible Staphylococcus aureus (MSSA) ICD-10-CM: I33.0, B95.61  ICD-9-CM: 421.0, 041.11     Overview Addendum 3/30/2021  4:51 PM by Atiya Bradford MD     3/24/21 ER Patient with a history of drug use anxiety and seizure not on medications for the past 4 months according to the sister presents for evaluation of altered mental status;  BC x2 + MRSA, WBC 11.6 P-67%, K 4.0 BUN 45, Creat 1.1, Trop I 3.89-> 13.60-> 10.52; UDS + cocaine and amphetamines; Lactic acid 4.2  3/24/21 Intubated      CT head negative      CT chest, neg PE, ? LV mass  3/25/21 ECHO EF 36%, LV 44/45, S/PW 8/9,  Mod AR  3/2/621 RAISSA EF 55%, Vegetations on AV 0.9cm LCC, small perforation 0.4cm LCC. No feg on MV/TV/PV, No thrombus in MELLY             Type 2 myocardial infarction [facility]) ICD-10-CM: I21. A1  ICD-9-CM: 410.90     Overview Signed 3/29/2021  8:37 PM by Atiya Bradford MD     Trop I 3.89-> 13.60-> 10.52; Past Medical History:      has a past medical history of Anxiety and Seizure ([facility] [address] Past Surgical History:      has a past surgical history that includes hx tonsillectomy. Home Medications:     Prior to Admission medications    Medication Sig Start Date End Date Taking? Authorizing Provider   divalproex DR (Depakote) 500 mg tablet Take 500 mg by mouth three (3) times daily. Other, MD Maddy   levETIRAcetam (KEPPRA) 750 mg tablet Take 1 Tab by mouth two (2) times a day. 11/7/18   Jacek Pearson PA-C       Allergies/Social/Family History:      Allergies   Allergen Reactions    Codeine Unknown (comments)     Pt denies       Social History     Tobacco Use    Smoking status: Current Every Day Smoker     Packs/day: 1.00    Smokeless tobacco: Never Used   Substance Use Topics    Alcohol use: Yes     Comment: socially No family history on file. Review of Systems:     Review of systems not obtained due to patient factors. Objective:   Vital Signs:  Visit Vitals  /71   Pulse 96   Temp 97.2 °F (36.2 °C)   Resp 20   Wt 59.3 kg (130 lb 11.7 oz)   SpO2 100%   BMI 18.23 kg/m²      O2 Device: Tracheostomy, Ventilator Temp (24hrs), Av.1 °F (37.3 °C), Min:97.2 °F (36.2 °C), Max:101.6 °F (38.7 °C)           Intake/Output:     Intake/Output Summary (Last 24 hours) at 2021 0181  Last data filed at 2021 0700  Gross per 24 hour   Intake 3804.35 ml   Output 705 ml   Net 3099.35 ml       Physical Exam:  General: mild distress, appears stated age,Trached on vent and sedated  Eye:  conjunctivae/corneas clear. Pupils are round and reactive equally  Neurologic: Limited, withdraws to pain in upper and lower right ext, does not withdraw in upper left ext. Weakness in lower left ext. + cough/gag. Lymphatic:  Cervical, supraclavicular, and axillary nodes normal.   Neck:  normal and no erythema or exudates noted. Lungs:  Coarse lung sounds bilaterally, irregular breathing on vent. Heart:  Tachycardiac rate and rhythm, S1, S2  Abdomen:  soft, non-tender.  Bowel sounds normal. No masses,  no organomegaly  Cardiovascular:  S1S2 present, pedal pulses normal and no edema  Skin:  Normal. and no rash or abnormalities    LABS AND  DATA: Personally reviewed  Recent Labs     21  0448 21  1800   WBC 25.8* 32.1*   HGB 8.3* 10.6*   HCT 28.2* 34.5*   * 490*     Recent Labs     21  0448 21  1800    138   K 3.9 4.8    103   CO2 24 24   BUN 21* 18   CREA 0.71 0.76   * 135*   CA 9.1 9.5   MG 1.9  --    PHOS 4.6  --      Recent Labs     21  0448 21  1800    127*   TP 6.2* 7.2   ALB 2.2* 2.6*   GLOB 4.0 4.6*     Recent Labs     21  0448 21  1800   INR 1.4* 1.3*   PTP 14.0* 13.3*   APTT  --  28.0      Recent Labs     21  1738   PHI 7.41 7.31* PCO2I 36.1 51.8*   PO2I 78* 44*   FIO2I 100 100     Recent Labs     05/06/21  1800   TROIQ <0.05       Hemodynamics:   PAP:   CO:     Wedge:   CI:     CVP:    SVR:       PVR:       Ventilator Settings:  Mode Rate Tidal Volume Pressure FiO2 PEEP   Assist control, Volume control   450 ml    60 % 10 cm H20     Peak airway pressure: 24 cm H2O    Minute ventilation: 8.27 l/min        MEDS: Reviewed    Chest X-Ray:  CXR Results  (Last 48 hours)               05/07/21 0236  XR CHEST PORT Final result    Impression:  ET tube in satisfactory position. No other significant change. Narrative:  INDICATION:    ETT eval        EXAMINATION:  AP CHEST, PORTABLE       COMPARISON: 5/6/2021       FINDINGS: Single AP portable view of the chest at 209 hours demonstrates a   tracheostomy tube in satisfactory position. NG tube enters the stomach. Right IJ   line is unchanged. There is severe diffuse bilateral airspace disease, similar   to prior study. There is no pneumothorax. The cardiomediastinal silhouette is   stable. There is no new airspace disease. 05/06/21 1846  XR CHEST PORT Final result    Impression:  Right internal jugular central venous catheter appears to be in   satisfactory position. No evidence of placement related complication. Otherwise   stable appearance of the chest.           Narrative:  INDICATION: Central line placement       COMPARISON: 5/6/2021       FINDINGS: Single AP portable view of the chest obtained at 6:40 PM demonstrates   a new right internal jugular central venous catheter, which has its tip at the   level of the cavoatrial junction. There is otherwise no change in position of   the lines and tubes. The cardiomediastinal silhouette is stable. There is   unchanged diffuse bilateral airspace disease. No pneumothorax is seen. There is   no evidence of pleural effusion.            05/06/21 1739  XR CHEST PORT Final result    Impression:  Diffuse bilateral airspace disease, compatible with pneumonia. Narrative:  INDICATION: Hypoxia       FINDINGS: AP portable imaging of the chest performed at 5:37 PM demonstrates   heart size at the upper limits of normal. The patient is status post median   sternotomy and aortic valve replacement. Tracheostomy tube terminates at the   level of the clavicular heads. Left arm PICC line tip overlies the superior vena   cava. There is diffuse bilateral airspace disease. . No significant osseous   abnormalities are seen. CT Results  (Last 48 hours)               05/06/21 2123  CT HEAD WO CONT Final result    Impression:      1. Large territory acute infarctions of the right occipital lobe and right   frontal lobe. 2. No intracranial hemorrhage. 3. No hydrocephalus. Narrative:  EXAM: CT HEAD WO CONT       INDICATION: unresponsive post arrest       COMPARISON: None. CONTRAST: None. TECHNIQUE: Unenhanced CT of the head was performed using 5 mm images. Brain and   bone windows were generated. Coronal and sagittal reformats. CT dose reduction   was achieved through use of a standardized protocol tailored for this   examination and automatic exposure control for dose modulation. FINDINGS:   There is no hydrocephalus. There are multiple acute infarctions throughout the   right cerebral hemisphere, involving much of the occipital lobe, as well as much   of the frontal lobe. There is an additional superior right frontoparietal   infarct. There is no intracranial hemorrhage or midline shift. The basilar   cisterns are open. The bone windows demonstrate no abnormalities. The visualized portions of the   paranasal sinuses and mastoid air cells are clear. 05/06/21 2123  CTA [facility] CONT Final result    Impression:  1. No evidence of pulmonary embolus. 2.  Severe multilevel consolidation throughout both lungs, consistent with   severe multilobar pneumonia.    3. Cardiomegaly with small pericardial effusion. 4. Moderate bilateral pleural effusions. Narrative:  INDICATION:   cardiac arrest eval for PE        COMPARISON:  None       TECHNIQUE:  Following the uneventful intravenous administration of 100 cc Isovue   800, thin helical axial images were obtained through the chest. Postprocessing   was performed. 3D image postprocessing was performed. CT dose reduction was achieved through the use of a standardized protocol   tailored for this examination and automatic exposure control for dose   modulation. FINDINGS: Study is limited by patient motion. A tracheostomy tube is in   satisfactory position. There are lines present in appropriate position. There is mild mediastinal lymphadenopathy. The heart is enlarged. There is a   small pericardial effusion. No filling defect is seen within the pulmonary arterial system to suggest   pulmonary embolus. The aorta is normal in caliber. There is severe multilevel groundglass opacification throughout both lungs, as   well as severe multilobar bilateral consolidation. There is no pneumothorax. There are moderate bilateral pleural effusions. Limited evaluation of the upper abdomen demonstrates no abnormality. There are   median sternotomy wires. ECHO: Pending    Multidisciplinary Rounds Completed:  No    ABCDEF Bundle/Checklist Completed:  Yes    SPECIAL EQUIPMENT  Ventilator    DISPOSITION  Stay in ICU    CRITICAL CARE CONSULTANT NOTE  I had a face to face encounter with the patient, reviewed and interpreted patient data including clinical events, labs, images, vital signs, I/O's, and examined patient.   I have discussed the case and the plan and management of the patient's care with the consulting services, the bedside nurses and the respiratory therapist.      NOTE OF PERSONAL INVOLVEMENT IN CARE   This patient has a high probability of imminent, clinically significant deterioration, which requires the highest level of preparedness to intervene urgently. I participated in the decision-making and personally managed or directed the management of the following life and organ supporting interventions that required my frequent assessment to treat or prevent imminent deterioration. I personally spent 115 minutes of critical care time. This is time spent at this critically ill patient's bedside actively involved in patient care as well as the coordination of care and discussions with the patient's family. This does not include any procedural time which has been billed separately.     [address], DO   Staff Intensivist/Anesthesiologist  Critical Care Medicine

## 2021-05-07 NOTE — ROUTINE PROCESS
TRANSFER - OUT REPORT: 
 
Verbal report given to Judie on Juana Cox  being transferred to [facility] (unit) for urgent transfer Report consisted of patients Situation, Background, Assessment and  
Recommendations(SBAR). Information from the following report(s) SBAR, Kardex, ED Summary, Intake/Output, MAR and Recent Results was reviewed with the receiving nurse. Lines: PICC Double Lumen 05/06/21 Left (Active) Quad Lumen 05/06/21 Right Internal jugular (Active) Opportunity for questions and clarification was provided. Patient transported with: 
 Monitor O2 @ ventilator with 100% fio2 liters Registered Nurse Respiratory.

## 2021-05-07 NOTE — CONSULTS
[place] [facility] CONSULT NOTE    Subjective:         Date of Consultation: May 7, 2021     Referring Physician: Tarun Ruth NP    Patient is a 22 y.o. male who is being seen for left big toe wound. Patient has a hx of seizures, anxiety and chronic drug abuse. Pt is currently admitted to the ICU intubated/sedated, thus he is unable to provide the HPI. Per the admit H&P, pt presented to the [facility] ED after a cardiac arrest at [facility] around 1500 on 05/06/2021. Patient had recent hospitalization at the [facility] in March of this year. Initial hospitalization admission was at [facility] on 3/24 with AMS in setting of polysubstance and IVD use (cocaine, heroine, methamphetamines). Podiatry was consulted for a purulent wound to the left hallux     Patient Active Problem List    Diagnosis Date Noted    Cardiac arrest 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) 03/24/2021     Past Medical History:   Diagnosis Date    Anxiety     Seizure [facility])       Past Surgical History:   Procedure Laterality Date    HX TONSILLECTOMY        No family history on file.    Social History     Tobacco Use    Smoking status: Current Every Day Smoker     Packs/day: 1.00    Smokeless tobacco: Never Used   Substance Use Topics    Alcohol use: Yes     Comment: socially     Current Facility-Administered Medications   Medication Dose Route Frequency Provider Last Rate Last Admin    balsam peru-castor oiL (VENELEX) ointment   Topical BID JASON Mcgill   Given at 05/07/21 0845    hydrocortisone Sod Succ (PF) (SOLU-CORTEF) injection 100 mg  100 mg IntraVENous Q8H Pravin Uribe DO   100 mg at 05/07/21 1509    NOREPINephrine (LEVOPHED) 8 mg in 5% dextrose 250mL (32 mcg/mL) infusion  0.5-30 mcg/min IntraVENous TITRATE Pravin Uribe DO   Stopped at 05/07/21 1640    [START ON 5/8/2021] Vancomycin Trough before 6am dose on 5/8   Other ONCE Pravin Uribe DO        levETIRAcetam (KEPPRA) oral solution 1,000 mg  1,000 mg Oral Q12H Doe Grajeda DO   1,000 mg at 05/07/21 1631    DOBUTamine (DOBUTREX) 500 mg/250 mL (2,000 mcg/mL) infusion  0-10 mcg/kg/min IntraVENous TITRATE Kiara Waldron MD 3.5 mL/hr at 05/07/21 1409 2 mcg/kg/min at 05/07/21 1409    albumin human 25% (BUMINATE) solution 12.5 g  12.5 g IntraVENous Q6H rPavin Uribe DO   12.5 g at 05/07/21 1630    vasopressin (VASOSTRICT) 20 Units in 0.9% sodium chloride 100 mL infusion  0.04 Units/min IntraVENous TITRATE Pravin Uribe DO 12 mL/hr at 05/07/21 1711 0.04 Units/min at 05/07/21 1711    propofol (DIPRIVAN) 10 mg/mL infusion  0-50 mcg/kg/min IntraVENous TITRATE Pravin Uribe DO 18.9 mL/hr at 05/07/21 1358 50 mcg/kg/min at 05/07/21 1358    EPINEPHrine (ADRENALIN) 5 mg in 0.9% sodium chloride 250 mL infusion  1-10 mcg/min IntraVENous TITRATE Sol Montgomery NP-C   Stopped at 05/07/21 1516    sodium chloride (NS) flush 5-40 mL  5-40 mL IntraVENous Q8H Harry Daisy R NP-C   10 mL at 05/07/21 1409    sodium chloride (NS) flush 5-40 mL  5-40 mL IntraVENous PRN Sol Montgomery NP-C        acetaminophen (TYLENOL) tablet 650 mg  650 mg Oral Q6H PRN Sol Montgomery NP-C        Or    acetaminophen (TYLENOL) suppository 650 mg  650 mg Rectal Q6H PRN Sol Montgomery, NP-C        polyethylene glycol (MIRALAX) packet 17 g  17 g Oral DAILY PRN JASON Schaefer        ondansetron [facility]) injection 4 mg  4 mg IntraVENous Q6H PRN JASON Schaefer        famotidine (PF) (PEPCID) 20 mg in 0.9% sodium chloride 10 mL injection  20 mg IntraVENous BID JASON Villareal   20 mg at 05/07/21 0846    enoxaparin (LOVENOX) injection 40 mg  40 mg SubCUTAneous DAILY JASON Schaefer 40 mg at 05/07/21 0846    chlorhexidine (ORAL CARE KIT) 0.12 % mouthwash 15 mL  15 mL Oral Q12H Anabel Redo R, NP-C   15 mL at 05/07/21 0846    fentaNYL citrate (PF) injection 25 mcg  25 mcg IntraVENous Q4H PRN Jimmy Echevarria NP-ASIYA        piperacillin-tazobactam (ZOSYN) 3.375 g in 0.9% sodium chloride (MBP/ADV) 100 mL MBP  3.375 g IntraVENous Q8H Anabel Redo R, NP-C 25 mL/hr at 05/07/21 1631 3.375 g at 05/07/21 1631    glucose chewable tablet 16 g  4 Tab Oral PRN JASON Hunt        dextrose (D50W) injection syrg 12.5-25 g  25-50 mL IntraVENous PRN Jimmy Echevarria NP-ASIYA        glucagon (GLUCAGEN) injection 1 mg  1 mg IntraMUSCular PRN Jimmy Echevarria NP-ASIYA        insulin lispro (HUMALOG) injection   SubCUTAneous Q6H Anabel Redo R, NP-C   2 Units at 05/07/21 1136    fentaNYL (PF) 1,500 mcg/30 mL (50 mcg/mL) infusion  0-200 mcg/hr IntraVENous TITRATE Anabel Redo R, NP-C 4 mL/hr at 05/07/21 1325 200 mcg/hr at 05/07/21 1325    Vancomycin- pharmacy to dose   Other Rx Dosing/Monitoring JASON Hunt        vancomycin (VANCOCIN) 1250 mg in  ml infusion  1,250 mg IntraVENous Q8H Anabel Redo R, NP-C 125 mL/hr at 05/07/21 1407 1,250 mg at 05/07/21 1407    valproic acid (as sodium salt) (DEPAKENE) 250 mg/5 mL (5 mL) oral solution 500 mg  500 mg Oral Q8H Anabel Redo R, NP-C   500 mg at 05/07/21 1407      Allergies   Allergen Reactions    Codeine Unknown (comments)     Pt denies         Review of Systems:    Unable to accurately obtain due to the acuity of pts medical condition    Objective:     Patient Vitals for the past 8 hrs:   BP Temp Pulse Resp SpO2   05/07/21 1658   96 20 96 %   05/07/21 1630 105/79  94 19 97 %   05/07/21 1615 97/69  84 20 96 %   05/07/21 1600 97/68 99.1 °F (37.3 °C) 87 20 96 %   05/07/21 1545 96/66  90 20 96 %   05/07/21 1530 97/63  91 20 96 %   05/07/21 1515 107/72  97 20 95 %   05/07/21 1500 111/76  100 23 95 %   05/07/21 [address]  94 %   21 1400 115/81  100 20 96 %   21 1330 115/81  100 20 96 %   21 1300 112/79  100 20 96 %   21 1230 113/79  100 20 96 %   21 1210   (!) 101 20 96 %   21 1200 112/78 100 °F (37.8 °C) (!) 101 20 96 %   21 1130 113/80  (!) 102 16 95 %   21 1100 115/82  99 20 95 %   21 1030 118/83  (!) 101 20 92 %   21 1000 114/84  98 20 94 %   21 0945 113/83  (!) 101 20 97 %   21 0930 107/77  98 20 97 %     Temp (24hrs), Av.2 °F (37.3 °C), Min:97.2 °F (36.2 °C), Max:101.6 °F (38.7 °C)      Physical Exam:    GEN: Pt is intubated/sedated and in NAD. Dressing noted to the left hallux is C/D/I. No family noted at [place]  DERM: Wound noted to the distal aspect of the left hallux with eschar and lifting of the toenail plate. No malodor noted. Serosanguinous drainage noted. No periwound erythema or proximal lymphatic streaking noted  VASC: Pedal pulses (DP/PT) palpable to B/L LE. CFT<3sec to all digits of B/L LE. Pedal hair growth noted to the level of the digits for B/L LE. Skin temp is warm to warm from proximal to distal for B/L LE. No varicosities or telangectasias noted to B/L LE.  NEURO: Protective and epicritic sensations unable to fully assess but pt does respond to painful stimuli  MSK: No gross deformities.  Good muscle tone and bulk noted to B/L LE.  PSYCH: Intubated/sedated    Lab Review:   Recent Results (from the past 24 hour(s))   EKG, 12 LEAD, INITIAL    Collection Time: 21  5:30 PM   Result Value Ref Range    Ventricular Rate 118 BPM    Atrial Rate 118 BPM    P-R Interval 166 ms    QRS Duration 126 ms    Q-T Interval 320 ms    QTC Calculation (Bezet) 448 ms    Calculated P Axis 55 degrees    Calculated R Axis 117 degrees    Calculated T Axis 25 degrees    Diagnosis       Sinus tachycardia  Right bundle branch block  No previous ECGs available  Confirmed by Remy Brenner ([id number]) on 2021 11:37:17 AM     LACTIC ACID Collection Time: 05/06/21  5:35 PM   Result Value Ref Range    Lactic acid 3.4 (HH) 0.4 - 2.0 MMOL/L   POC EG7    Collection Time: 05/06/21  5:38 PM   Result Value Ref Range    Calcium, ionized (POC) 1.27 1.12 - 1.32 mmol/L    FIO2 (POC) 100 %    pH (POC) 7.31 (L) 7.35 - 7.45      pCO2 (POC) 51.8 (H) 35.0 - 45.0 MMHG    pO2 (POC) 44 (LL) 80 - 100 MMHG    HCO3 (POC) 26.0 22 - 26 MMOL/L    Base excess (POC) 0 mmol/L    sO2 (POC) 75 (L) 92 - 97 %    Site LEFT RADIAL      Device: VENT      Mode ASSIST CONTROL      Tidal volume 450 ml    Set Rate 16 bpm    PEEP/CPAP (POC) 10 cmH2O    Allens test (POC) NO      Specimen type (POC) ARTERIAL     URINALYSIS W/MICROSCOPIC    Collection Time: 05/06/21  5:42 PM   Result Value Ref Range    Color YELLOW/STRAW      Appearance CLEAR CLEAR      Specific gravity 1.008 1.003 - 1.030      pH (UA) 5.0 5.0 - 8.0      Protein 30 (A) NEG mg/dL    Glucose Negative NEG mg/dL    Ketone Negative NEG mg/dL    Bilirubin Negative NEG      Blood Negative NEG      Urobilinogen 0.2 0.2 - 1.0 EU/dL    Nitrites Negative NEG      Leukocyte Esterase Negative NEG      WBC 0-4 0 - 4 /hpf    RBC 0-5 0 - 5 /hpf    Epithelial cells FEW FEW /lpf    Bacteria Negative NEG /hpf    Amorphous Crystals FEW (A) NEG     URINE CULTURE HOLD SAMPLE    Collection Time: 05/06/21  5:42 PM    Specimen: Serum; Urine   Result Value Ref Range    Urine culture hold        Urine on hold in Microbiology dept for 2 days. If unpreserved urine is submitted, it cannot be used for addtional testing after 24 hours, recollection will be required.    DRUG SCREEN, URINE    Collection Time: 05/06/21  5:42 PM   Result Value Ref Range    AMPHETAMINES Negative NEG      BARBITURATES Negative NEG      BENZODIAZEPINES Positive (A) NEG      COCAINE Negative NEG      METHADONE Negative NEG      OPIATES Negative NEG      PCP(PHENCYCLIDINE) Negative NEG      THC (TH-CANNABINOL) Negative NEG      Drug screen comment (NOTE)    SAMPLES BEING HELD Collection Time: 05/06/21  5:42 PM   Result Value Ref Range    SAMPLES BEING HELD 1 UA     COMMENT        Add-on orders for these samples will be processed based on acceptable specimen integrity and analyte stability, which may vary by analyte. DRUG SCREEN, URINE    Collection Time: 05/06/21  5:42 PM   Result Value Ref Range    AMPHETAMINES Negative NEG      BARBITURATES Negative NEG      BENZODIAZEPINES Positive (A) NEG      COCAINE Negative NEG      METHADONE Negative NEG      OPIATES Negative NEG      PCP(PHENCYCLIDINE) Negative NEG      THC (TH-CANNABINOL) Negative NEG      Drug screen comment (NOTE)    CBC WITH AUTOMATED DIFF    Collection Time: 05/06/21  6:00 PM   Result Value Ref Range    WBC 32.1 (H) 4.1 - 11.1 K/uL    RBC 3.88 (L) 4.10 - 5.70 M/uL    HGB 10.6 (L) 12.1 - 17.0 g/dL    HCT 34.5 (L) 36.6 - 50.3 %    MCV 88.9 80.0 - 99.0 FL    MCH 27.3 26.0 - 34.0 PG    MCHC 30.7 30.0 - 36.5 g/dL    RDW 15.7 (H) 11.5 - 14.5 %    PLATELET 635 (H) 150 - 400 K/uL    MPV 10.5 8.9 - 12.9 FL    NRBC 0.0 0  WBC    ABSOLUTE NRBC 0.00 0.00 - 0.01 K/uL    NEUTROPHILS 92 (H) 32 - 75 %    LYMPHOCYTES 2 (L) 12 - 49 %    MONOCYTES 5 5 - 13 %    EOSINOPHILS 0 0 - 7 %    BASOPHILS 0 0 - 1 %    IMMATURE GRANULOCYTES 1 (H) 0.0 - 0.5 %    ABS. NEUTROPHILS 29.6 (H) 1.8 - 8.0 K/UL    ABS. LYMPHOCYTES 0.6 (L) 0.8 - 3.5 K/UL    ABS. MONOCYTES 1.6 (H) 0.0 - 1.0 K/UL    ABS. EOSINOPHILS 0.0 0.0 - 0.4 K/UL    ABS. BASOPHILS 0.0 0.0 - 0.1 K/UL    ABS. IMM.  GRANS. 0.3 (H) 0.00 - 0.04 K/UL    DF SMEAR SCANNED      RBC COMMENTS ANISOCYTOSIS  1+       D DIMER    Collection Time: 05/06/21  6:00 PM   Result Value Ref Range    D-dimer 14.03 (H) 0.00 - 0.65 mg/L FEU   METABOLIC PANEL, COMPREHENSIVE    Collection Time: 05/06/21  6:00 PM   Result Value Ref Range    Sodium 138 136 - 145 mmol/L    Potassium 4.8 3.5 - 5.1 mmol/L    Chloride 103 97 - 108 mmol/L    CO2 24 21 - 32 mmol/L    Anion gap 11 5 - 15 mmol/L    Glucose 135 (H) 65 - 100 mg/dL    BUN 18 6 - 20 MG/DL    Creatinine 0.76 0.70 - 1.30 MG/DL    BUN/Creatinine ratio 24 (H) 12 - 20      GFR est AA >60 >60 ml/min/1.73m2    GFR est non-AA >60 >60 ml/min/1.73m2    Calcium 9.5 8.5 - 10.1 MG/DL    Bilirubin, total 0.7 0.2 - 1.0 MG/DL    ALT (SGPT) 36 12 - 78 U/L    AST (SGOT) 39 (H) 15 - 37 U/L    Alk. phosphatase 127 (H) 45 - 117 U/L    Protein, total 7.2 6.4 - 8.2 g/dL    Albumin 2.6 (L) 3.5 - 5.0 g/dL    Globulin 4.6 (H) 2.0 - 4.0 g/dL    A-G Ratio 0.6 (L) 1.1 - 2.2     NT-PRO BNP    Collection Time: 05/06/21  6:00 PM   Result Value Ref Range    NT pro-BNP >35,000 (H) <125 PG/ML   PTT    Collection Time: 05/06/21  6:00 PM   Result Value Ref Range    aPTT 28.0 22.1 - 31.0 sec    aPTT, therapeutic range     58.0 - 77.0 SECS   PROTHROMBIN TIME + INR    Collection Time: 05/06/21  6:00 PM   Result Value Ref Range    INR 1.3 (H) 0.9 - 1.1      Prothrombin time 13.3 (H) 9.0 - 11.1 sec   TROPONIN I    Collection Time: 05/06/21  6:00 PM   Result Value Ref Range    Troponin-I, Qt. <0.05 <0.05 ng/mL   SAMPLES BEING HELD    Collection Time: 05/06/21  6:00 PM   Result Value Ref Range    SAMPLES BEING HELD 1 RED, 1 MAGALYS     COMMENT        Add-on orders for these samples will be processed based on acceptable specimen integrity and analyte stability, which may vary by analyte.    VALPROIC ACID    Collection Time: 05/06/21  6:00 PM   Result Value Ref Range    Valproic acid <3 (L) 50 - 100 ug/ml   CULTURE, BLOOD, PAIRED    Collection Time: 05/06/21  6:46 PM    Specimen: Blood   Result Value Ref Range    Special Requests: NO SPECIAL REQUESTS      Culture result: NO GROWTH AFTER 8 HOURS     COVID-19 RAPID TEST    Collection Time: 05/06/21  7:09 PM   Result Value Ref Range    Specimen source Nasopharyngeal      COVID-19 rapid test Not detected NOTD     POC EG7    Collection Time: 05/06/21  8:51 PM   Result Value Ref Range    Calcium, ionized (POC) 1.19 1.12 - 1.32 mmol/L    FIO2 (POC) 100 %    pH (POC) 7.41 7.35 - 7.45      pCO2 (POC) 36.1 35.0 - 45.0 MMHG    pO2 (POC) 78 (L) 80 - 100 MMHG    HCO3 (POC) 23.0 22 - 26 MMOL/L    Base deficit (POC) 2 mmol/L    sO2 (POC) 96 92 - 97 %    Site RIGHT RADIAL      Device: VENT      Mode ASSIST CONTROL      Tidal volume 450 ml    Set Rate 16 bpm    PEEP/CPAP (POC) 10 cmH2O    Allens test (POC) YES      Specimen type (POC) ARTERIAL     RESPIRATORY VIRUS PANEL W/COVID-19, PCR    Collection Time: 05/06/21 10:38 PM    Specimen: Nasopharyngeal   Result Value Ref Range    Adenovirus Not detected NOTD      Coronavirus 229E Not detected NOTD      Coronavirus HKU1 Not detected NOTD      Coronavirus CVNL63 Not detected NOTD      Coronavirus OC43 Not detected NOTD      Metapneumovirus Not detected NOTD      Rhinovirus and Enterovirus Not detected NOTD      Influenza A Not detected NOTD      Influenza A, subtype H1 Not detected NOTD      Influenza A, subtype H3 Not detected NOTD      INFLUENZA A H1N1 PCR Not detected NOTD      Influenza B Not detected NOTD      Parainfluenza 1 Not detected NOTD      Parainfluenza 2 Not detected NOTD      Parainfluenza 3 Not detected NOTD      Parainfluenza virus 4 Not detected NOTD      RSV by PCR Not detected NOTD      B. parapertussis, PCR Not detected NOTD      Bordetella pertussis - PCR Not detected NOTD      Chlamydophila pneumoniae DNA, QL, PCR Not detected NOTD      Mycoplasma pneumoniae DNA, QL, PCR Not detected NOTD      SARS-CoV-2, PCR Not detected NOTD     GLUCOSE, POC    Collection Time: 05/07/21 12:01 AM   Result Value Ref Range    Glucose (POC) 168 (H) 65 - 100 mg/dL    Performed by [facility]    BLOOD GAS, ARTERIAL    Collection Time: 05/07/21 12:34 AM   Result Value Ref Range    pH 7.29 (L) 7.35 - 7.45      PCO2 50 (H) 35 - 45 mmHg    PO2 112 (H) 80 - 100 mmHg    O2 SAT 98 (H) 92 - 97 %    BICARBONATE 23 22 - 26 mmol/L    BASE DEFICIT 3.7 mmol/L    O2 METHOD VENT      Sample source ARTERIAL      SITE RIGHT RADIAL      CANDELARIA'S TEST YES     BLOOD GAS W/ COOX    Collection Time: 05/07/21  1:59 AM   Result Value Ref Range    pH 7.32 (L) 7.35 - 7.45      PCO2 46 (H) 35 - 45 mmHg    PO2 215 (H) 80 - 100 mmHg    BICARBONATE 23 22 - 26 mmol/L    BASE DEFICIT 3.3 mmol/L    Carboxy-Hgb 0.6 (L) 1 - 2 %    Methemoglobin 0.5 0 - 1.4 %    tHb 9.7 (L) 14 - 17 g/dL    Oxyhemoglobin 98.3 (H) 94 - 97 %    O2 SATURATION 99 95 - 99 %    O2 METHOD VENT      Sample source ARTERIAL      SITE RIGHT RADIAL      CANDELARIA'S TEST YES     BLOOD GAS, ARTERIAL    Collection Time: 05/07/21  3:53 AM   Result Value Ref Range    pH 7.32 (L) 7.35 - 7.45      PCO2 44 35 - 45 mmHg    PO2 129 (H) 80 - 100 mmHg    O2 SAT 98 (H) 92 - 97 %    BICARBONATE 22 22 - 26 mmol/L    BASE DEFICIT 4.0 mmol/L    O2 METHOD VENT      Sample source ARTERIAL      SITE RIGHT RADIAL      CANDELARIA'S TEST YES     METABOLIC PANEL, COMPREHENSIVE    Collection Time: 05/07/21  4:48 AM   Result Value Ref Range    Sodium 139 136 - 145 mmol/L    Potassium 3.9 3.5 - 5.1 mmol/L    Chloride 107 97 - 108 mmol/L    CO2 24 21 - 32 mmol/L    Anion gap 8 5 - 15 mmol/L    Glucose 231 (H) 65 - 100 mg/dL    BUN 21 (H) 6 - 20 MG/DL    Creatinine 0.71 0.70 - 1.30 MG/DL    BUN/Creatinine ratio 30 (H) 12 - 20      GFR est AA >60 >60 ml/min/1.73m2    GFR est non-AA >60 >60 ml/min/1.73m2    Calcium 9.1 8.5 - 10.1 MG/DL    Bilirubin, total 0.6 0.2 - 1.0 MG/DL    ALT (SGPT) 29 12 - 78 U/L    AST (SGOT) 28 15 - 37 U/L    Alk.  phosphatase 107 45 - 117 U/L    Protein, total 6.2 (L) 6.4 - 8.2 g/dL    Albumin 2.2 (L) 3.5 - 5.0 g/dL    Globulin 4.0 2.0 - 4.0 g/dL    A-G Ratio 0.6 (L) 1.1 - 2.2     CBC WITH AUTOMATED DIFF    Collection Time: 05/07/21  4:48 AM   Result Value Ref Range    WBC 25.8 (H) 4.1 - 11.1 K/uL    RBC 3.06 (L) 4.10 - 5.70 M/uL    HGB 8.3 (L) 12.1 - 17.0 g/dL    HCT 28.2 (L) 36.6 - 50.3 %    MCV 92.2 80.0 - 99.0 FL    MCH 27.1 26.0 - 34.0 PG    MCHC 29.4 (L) 30.0 - 36.5 g/dL    RDW 16.0 (H) 11.5 - 14.5 %    PLATELET 669 (H) 625 - 400 K/uL    MPV 11.0 8.9 - 12.9 FL    NRBC 0.0 0  WBC    ABSOLUTE NRBC 0.00 0.00 - 0.01 K/uL    NEUTROPHILS 88 (H) 32 - 75 %    LYMPHOCYTES 4 (L) 12 - 49 %    MONOCYTES 7 5 - 13 %    EOSINOPHILS 0 0 - 7 %    BASOPHILS 0 0 - 1 %    IMMATURE GRANULOCYTES 1 (H) 0.0 - 0.5 %    ABS. NEUTROPHILS 22.7 (H) 1.8 - 8.0 K/UL    ABS. LYMPHOCYTES 1.0 0.8 - 3.5 K/UL    ABS. MONOCYTES 1.8 (H) 0.0 - 1.0 K/UL    ABS. EOSINOPHILS 0.0 0.0 - 0.4 K/UL    ABS. BASOPHILS 0.0 0.0 - 0.1 K/UL    ABS. IMM.  GRANS. 0.3 (H) 0.00 - 0.04 K/UL    DF SMEAR SCANNED      RBC COMMENTS ANISOCYTOSIS  1+        RBC COMMENTS MICROCYTOSIS  1+        RBC COMMENTS SCHISTOCYTES  1+       PROTHROMBIN TIME + INR    Collection Time: 05/07/21  4:48 AM   Result Value Ref Range    INR 1.4 (H) 0.9 - 1.1      Prothrombin time 14.0 (H) 9.0 - 11.1 sec   MAGNESIUM    Collection Time: 05/07/21  4:48 AM   Result Value Ref Range    Magnesium 1.9 1.6 - 2.4 mg/dL   PHOSPHORUS    Collection Time: 05/07/21  4:48 AM   Result Value Ref Range    Phosphorus 4.6 2.6 - 4.7 MG/DL   HEMOGLOBIN A1C WITH EAG    Collection Time: 05/07/21  4:48 AM   Result Value Ref Range    Hemoglobin A1c 5.4 4.0 - 5.6 %    Est. average glucose 108 mg/dL   GLUCOSE, POC    Collection Time: 05/07/21  5:46 AM   Result Value Ref Range    Glucose (POC) 226 (H) 65 - 100 mg/dL    Performed by [facility]    CULTURE, RESPIRATORY/SPUTUM/BRONCH Aden Francis STAIN    Collection Time: 05/07/21  6:33 AM    Specimen: Sputum,ET Suction   Result Value Ref Range    Special Requests: NO SPECIAL REQUESTS      GRAM STAIN 1+ WBCS SEEN      GRAM STAIN FEW EPITHELIAL CELLS SEEN      GRAM STAIN OCCASIONAL BUDDING YEAST      Culture result: PENDING    LACTIC ACID    Collection Time: 05/07/21  8:27 AM   Result Value Ref Range    Lactic acid 2.3 (HH) 0.4 - 2.0 MMOL/L   PROCALCITONIN    Collection Time: 05/07/21  8:27 AM   Result Value Ref Range    Procalcitonin 14.31 ng/mL   ECHO ADULT COMPLETE    Collection Time: 05/07/21  9:32 AM Result Value Ref Range    IVSd 0.65 0.60 - 1.00 cm    LVIDd 6.42 (A) 4.20 - 5.90 cm    LVIDs 5.72 cm    LVOT d 2.04 cm    LVPWd 0.97 0.60 - 1.00 cm    BP EF 23.8 (A) 55.0 - 100.0 percent    LV Ejection Fraction MOD 2C 20 percent    LV Ejection Fraction MOD 4C 25 percent    LV ED Vol A2C 229.74 mL    LV ED Vol A4C 172.82 mL    LV ED Vol .60 (A) 67.0 - 155.0 mL    LV ES Vol A2C 183.12 mL    LV ES Vol A4C 130.04 mL    LV ES Vol .10 (A) 22.0 - 58.0 mL    LVOT Peak Gradient 1.96 mmHg    LVOT SV 35.8 mL    LVOT Peak Velocity 69.93 cm/s    LVOT VTI 10.94 cm    RVIDd 4.60 cm    RVSP 53.50 mmHg    LA Volume 99.40 18.0 - 58.0 mL    LA Area 4C 25.70 cm2    LA Vol 2C 111.67 (A) 18.00 - 58.00 mL    LA Vol 4C 75.88 (A) 18.00 - 58.00 mL    Est. RA Pressure 15.00 mmHg    Aortic Valve Area by Continuity of Peak Velocity 0.88 cm2    Aortic Valve Area by Continuity of VTI 0.90 cm2    AoV PG 26.84 mmHg    Aortic Valve Systolic Mean Gradient 85.80 mmHg    Aortic Valve Systolic Peak Velocity 194.99 cm/s    AoV VTI 40.01 cm    MV A Miquel 38.67 cm/s    Mitral Valve E Wave Deceleration Time 110.23 ms    MV E Miquel 106.18 cm/s    Mitral Valve Pressure Half-time 31.97 ms    MVA (PHT) 6.88 cm2    Pulmonic Valve Systolic Peak Instantaneous Gradient 18.41 mmHg    Tapse 1.23 (A) 1.50 - 2.00 cm    Triscuspid Valve Regurgitation Peak Gradient 38.50 mmHg    TR Max Velocity 310.24 cm/s    Ao Root D 2.62 cm    MV E/A 2.75     LV Mass .9 88.0 - 224.0 g    LV Mass AL Index 121.2 49.0 - 115.0 g/m2    LVES Vol Index BP 88.3 mL/m2    LVED Vol Index .9 mL/m2    LA Vol Index 56.60 16.00 - 28.00 ml/m2    LA Vol Index 63.59 16.00 - 28.00 ml/m2    LA Vol Index 43.21 16.00 - 28.00 ml/m2    LVED Vol Index A4C 98.4 mL/m2    LVED Vol Index A2C 130.8 mL/m2    LVES Vol Index A4C 74.1 mL/m2    LVES Vol Index A2C 104.3 mL/m2    TALA/BSA Pk Miquel 0.5 cm2/m2    TALA/BSA VTI 0.5 cm2/m2   CULTURE, WOUND W GRAM STAIN    Collection Time: 05/07/21 10:53 AM Specimen: Toe; Wound   Result Value Ref Range    Special Requests: NO SPECIAL REQUESTS      GRAM STAIN NO DEFINITE ORGANISM SEEN      Culture result: PENDING    GLUCOSE, POC    Collection Time: 05/07/21 11:33 AM   Result Value Ref Range    Glucose (POC) 155 (H) 65 - 100 mg/dL    Performed by Alessia Galarza    LACTIC ACID    Collection Time: 05/07/21  2:17 PM   Result Value Ref Range    Lactic acid 1.5 0.4 - 2.0 MMOL/L   CBC W/O DIFF    Collection Time: 05/07/21  2:17 PM   Result Value Ref Range    WBC 23.5 (H) 4.1 - 11.1 K/uL    RBC 2.95 (L) 4.10 - 5.70 M/uL    HGB 8.1 (L) 12.1 - 17.0 g/dL    HCT 27.0 (L) 36.6 - 50.3 %    MCV 91.5 80.0 - 99.0 FL    MCH 27.5 26.0 - 34.0 PG    MCHC 30.0 30.0 - 36.5 g/dL    RDW 15.9 (H) 11.5 - 14.5 %    PLATELET 193 114 - 994 K/uL    MPV 10.5 8.9 - 12.9 FL    NRBC 0.0 0  WBC    ABSOLUTE NRBC 0.00 0.00 - 0.08 K/uL   METABOLIC PANEL, BASIC    Collection Time: 05/07/21  2:17 PM   Result Value Ref Range    Sodium 139 136 - 145 mmol/L    Potassium 4.7 3.5 - 5.1 mmol/L    Chloride 109 (H) 97 - 108 mmol/L    CO2 24 21 - 32 mmol/L    Anion gap 6 5 - 15 mmol/L    Glucose 160 (H) 65 - 100 mg/dL    BUN 21 (H) 6 - 20 MG/DL    Creatinine 0.42 (L) 0.70 - 1.30 MG/DL    BUN/Creatinine ratio 50 (H) 12 - 20      GFR est AA >60 >60 ml/min/1.73m2    GFR est non-AA >60 >60 ml/min/1.73m2    Calcium 9.4 8.5 - 10.1 MG/DL   BLOOD GAS + IONIZED CALCIUM    Collection Time: 05/07/21  4:19 PM   Result Value Ref Range    pH 7.44 7.35 - 7.45      PCO2 35 35 - 45 mmHg    PO2 117 (H) 80 - 100 mmHg    BICARBONATE 23 22 - 26 mmol/L    BASE DEFICIT 0.2 mmol/L    O2 SAT 98 (H) 92 - 97 %    Calcium, ionized 1.21 1.13 - 1.32 mmol/L    O2 METHOD VENT      FIO2 45 %    Sample source ARTERIAL      SITE DRAWN FROM ARTERIAL LINE      CANDELARIA'S TEST NOT APPLICABLE         Impression:     Paronychia, Left Hallux    Recommendation:     Patient seen and evaluated at bedside  - Current labs personally reviewed  - All previous provider notation reviewed  - After assessing the left hallux and vascular status of the pt, determined that a total toenail avulsion was warranted. No anesthesia was needed. The left hallux was scrubbed and draped in sterile fashion and the entire toenail plate was avulsed without incident. Appropriate wound care was performed. Orders placed for daily wound care per nursing staff  - Strict pressure offloading for ulcer prevention  - Cont current pain/medical management  - I will follow periodically while pt still in house and update recs as needed    Thank you for the consult! George Delvalle DPM, [address] [place] [address] [facility]  [address] [place] [address]  Haris Cart:  [phone]  F:  [phone]  C:  [phone] Yes

## 2021-05-07 NOTE — PROCEDURES
[facility]      Procedure Note - Arterial Access:   Performed by Shi Blue NP. Immediately prior to the procedure, the patient was reevaluated and found suitable for the planned procedure and any planned medications. Immediately prior to the procedure a time out was called to verify the correct patient, procedure, equipment, staff, and marking as appropriate. Central line Bundle:  Full sterile barrier precautions used. 5 mL 1% Lidocaine placed at insertion site. Insertion Date: 5/7/2021 Time:1433  Procedure Location:  ICU. Condition: Emergency. Consent:  NO.     Method: Seldinger technique. Site Prep: ChloraPrep. Procedure: Arterial Catheter Insertion in Left, Brachial Artery   Catheter inserted into a new site. Number of Attempts:  1 Indication: Monitoring and Blood Drawing. There was bright red, pulsatile blood return. Femoral Site? no. If Yes, reason femoral site was chosen: N/A  Catheter secured. Biopatch in place? yes. Sterile Bio-occlusive dressing placed. Complication None. The procedure was tolerated well.     Shi Blue NP   Critical Care Medicine  [facility]

## 2021-05-07 NOTE — CONSULTS
Neurology Consult  Candance Croft, NP    Patient: Renetta Lopez MRN: [id number]  SSN: [id number]    YOB: 1995  Age: 22 y.o. Sex: male      Chief Complaint: Cardiac arrest    Subjective:      Renetta Lopez is a 22 y.o. male who presented to the [facility] ED after cardiac arrest at [facility] at approximately 1500 on 5/6/21. He has a past medical history of seizures, anxiety, polysubstance abuse with IVD use. He was admitted to [facility] on 3/24/21 with AMS in the setting of polysubstance abuse positive for cocaine, heroine, and methamphetamines. He was found to have septic MRSA bacteremia, MSSA endocarditis. Imaging and MRI found Right PCA infarct and several abscesses in the right temporal and parietal lobes. Infarcts were thought to be due to septic emboli. He had left sided residual weakness. He was transferred to [address] [place] [address] on 4/1 for aortic valve surgery. After transfer he was found to have a new right MCA infarct and also had a type 2 NSTEMI and tamponade with pericardiocentesis done prior to surgery. He had bioprosthetic Aortic Valve Replacement and Aortic Root Abscess Debridement done on 4/16/2021 and suffered cardiac arrest the day of the surgery. He was trached and sent to [facility] on 4/29/21 and has been weaning off of the vent. He has been speaking, following commands and has begun moving his left foot. Still with left arm weakness. His last known well time on 5/6 was just before 1300. He was doing well off of the vent but he was getting anxious and they put him back on and sedated him. His mother stated that around 1500 his eyes were rolled back in his head. She got a call soon after she left saying that he had arrested. Per ER note the patient arrested around 1500 and was pulseless. He had 2 rounds of CPR and meds. He was defibrillated x 1 before ROSC was achieved.  He was hypoxic post arrest. Patient was sent to [facility] ED and admitted to the ICU for further evaluation and treatment. He was sent down for [facility] and CTA chest. CTA chest showed no PE. There was severe multilevel consolidation throughout both lungs consistent with severe multilobar PNA. He has cardiomegaly with small pericardial effusion and moderate bilateral pleural effusions. His CTH showed large territory acute infarctions of the right occipital lobe and right frontal lobe. No ICH. No hydrocephalus. Results discussed with Radiologist Dr. Francis Whittington and Dr. Pilar Ruiz with [facility]. Dr. Pilar Ruiz felt the right frontal infarct was more chronic and the right PCA was late subacute 3-4 days. The basilar artery did not look dense nor the venous sinuses. Dr. Pilar Ruiz indicated that CTA H/N and CTP was not necessary at this time as patient would not be a candidate for thrombectomy due to his instability and state of his lungs. Past Medical History:   Diagnosis Date    Anxiety     Seizure [facility])      No family history on file. Social History     Tobacco Use    Smoking status: Current Every Day Smoker     Packs/day: 1.00    Smokeless tobacco: Never Used   Substance Use Topics    Alcohol use: Yes     Comment: socially      Prior to Admission Medications   Prescriptions Last Dose Informant Patient Reported? Taking?   divalproex DR (Depakote) 500 mg tablet   Yes No   Sig: Take 500 mg by mouth three (3) times daily. levETIRAcetam (KEPPRA) 750 mg tablet   No No   Sig: Take 1 Tab by mouth two (2) times a day. Facility-Administered Medications: None       Allergies   Allergen Reactions    Codeine Unknown (comments)     Pt denies        Review of Systems:  Review of systems not obtained due to patient factors.         Objective:     Vitals:    05/07/21 0200 05/07/21 0300 05/07/21 0400 05/07/21 0500   BP: 98/60 109/68 107/66 108/69   Pulse: 95 93 94 97   Resp: 20 19 18 22   Temp:   97.2 °F (36.2 °C)    SpO2: 98% 96% 100% 100%   Weight:   59.3 kg (130 lb 11.7 oz)       Physical Exam:  GENERAL: Labored breathing trached, vented,  SKIN: Warm, dry, color appropriate for ethnicity. Neurologic Exam:  Mental Status:  No command following or eye opening. Language:    Aphasic. Cranial Nerves:   Pupils 3 mm, equal, round and reactive to light. Hearing grossly intact bilaterally. Motor:    No spontaneous or involuntary movements noted. Bulk and tone normal.        Sensation:    Withdraws to noxious stimuli in RUE and BLE. Reflexes:    Positive Babinskis    Coordination & Gait: Not assessed due to condition. NIHSS:      1a-LOC:3    1b-Month/Age:2    1c-Open/Close Hand:2    2-Best Gaze:0    3-Visual Fields:3    4-Facial Palsy:0    5a-Left Arm:4    5b-Right Arm:3    6a-Left Leg:3    6b-Right Leg:3    7-Limb Ataxia:0    8-Sensory:0    9-Best Language:3    10-Dysarthria:2    11-Extinction/Inattention:2  TOTAL SCORE:30 (difficult exam as patient sedated on ventilator)  Labs:  Lab Results   Component Value Date/Time    WBC 32.1 (H) 05/06/2021 06:00 PM    HGB 10.6 (L) 05/06/2021 06:00 PM    HCT 34.5 (L) 05/06/2021 06:00 PM    PLATELET 173 (H) 69/24/6690 06:00 PM    MCV 88.9 05/06/2021 06:00 PM      Lab Results   Component Value Date/Time    Sodium 138 05/06/2021 06:00 PM    Potassium 4.8 05/06/2021 06:00 PM    Chloride 103 05/06/2021 06:00 PM    CO2 24 05/06/2021 06:00 PM    Anion gap 11 05/06/2021 06:00 PM    Glucose 135 (H) 05/06/2021 06:00 PM    BUN 18 05/06/2021 06:00 PM    Creatinine 0.76 05/06/2021 06:00 PM    BUN/Creatinine ratio 24 (H) 05/06/2021 06:00 PM    GFR est AA >60 05/06/2021 06:00 PM    GFR est non-AA >60 05/06/2021 06:00 PM    Calcium 9.5 05/06/2021 06:00 PM     Lab Results   Component Value Date/Time     (H) 03/24/2021 08:15 PM    Troponin-I 10.500 ([place]) 03/25/2021 06:25 AM    Troponin-I, Qt. <0.05 05/06/2021 06:00 PM       Imaging:  CT Results (maximum last 3):   Results from [place] encounter on 05/06/21   CTA CHEST W OR W WO CONT    Narrative INDICATION:   cardiac arrest eval for PE     COMPARISON: None    TECHNIQUE:  Following the uneventful intravenous administration of 100 cc Isovue  190, thin helical axial images were obtained through the chest. Postprocessing  was performed. 3D image postprocessing was performed. CT dose reduction was achieved through the use of a standardized protocol  tailored for this examination and automatic exposure control for dose  modulation. FINDINGS: Study is limited by patient motion. A tracheostomy tube is in  satisfactory position. There are lines present in appropriate position. There is mild mediastinal lymphadenopathy. The heart is enlarged. There is a  small pericardial effusion. No filling defect is seen within the pulmonary arterial system to suggest  pulmonary embolus. The aorta is normal in caliber. There is severe multilevel groundglass opacification throughout both lungs, as  well as severe multilobar bilateral consolidation. There is no pneumothorax. There are moderate bilateral pleural effusions. Limited evaluation of the upper abdomen demonstrates no abnormality. There are  median sternotomy wires. Impression 1. No evidence of pulmonary embolus. 2.  Severe multilevel consolidation throughout both lungs, consistent with  severe multilobar pneumonia. 3. Cardiomegaly with small pericardial effusion. 4. Moderate bilateral pleural effusions. CT HEAD WO CONT    Narrative EXAM: CT HEAD WO CONT    INDICATION: unresponsive post arrest    COMPARISON: None. CONTRAST: None. TECHNIQUE: Unenhanced CT of the head was performed using 5 mm images. Brain and  bone windows were generated. Coronal and sagittal reformats. CT dose reduction  was achieved through use of a standardized protocol tailored for this  examination and automatic exposure control for dose modulation. FINDINGS:  There is no hydrocephalus.  There are multiple acute infarctions throughout the  right cerebral hemisphere, involving much of the occipital lobe, as well as much  of the frontal lobe. There is an additional superior right frontoparietal  infarct. There is no intracranial hemorrhage or midline shift. The basilar  cisterns are open. The bone windows demonstrate no abnormalities. The visualized portions of the  paranasal sinuses and mastoid air cells are clear. Impression 1. Large territory acute infarctions of the right occipital lobe and right  frontal lobe. 2. No intracranial hemorrhage. 3. No hydrocephalus. Results from Hospital Encounter encounter on 03/24/21   CTA HEAD    Narrative CTA OF THE HEAD     INDICATION: septic emboli, evaluate for septic aneurysms    COMPARISON: No relevant studies. TECHNIQUE: Noncontrast CT the head performed followed by CTA of the head with   intravenous contrast. Multiplanar two-dimensional and three-dimensional maximum  intensity projection reformats performed and reviewed. All CT exams at this facility use one or more dose reduction techniques  including automatic exposure control, mA/kV adjustment per patient's size, or  iterative reconstruction technique. FINDINGS:    Noncontrast CT demonstrates edema related to the patient's right occipital lobe  infarction. Superimposed vasogenic edema related to patient's microabscesses  disease. There is effacement of the posterior horn of the right lateral  ventricle with asymmetric dilatation of the temporal horn, similar to prior MRI. Postcontrast images demonstrate diffuse leptomeningeal enhancement, better  appreciated on MRI. Multiple areas of early developing abscesses and cerebritis  better appreciated on MRI. There is occlusion of the P2 MP3 segments of the right posterior cerebral artery  seen best on the 3-D reconstructed images. Visualized portions of the left  posterior cerebral artery, basilar artery, and superior cerebellar arteries  appear patent.     Bilateral intracranial segments of the internal carotid arteries, bilateral  middle cerebral arteries, and bilateral anterior cerebral arteries are patent. Anterior communicating artery appears patent. Left posterior commuting artery  appears patent. There is no mycotic aneurysm. Hyperdensity along the right  anterior falx seen best on image 13 series 11 corresponds to calcification on  noncontrast study. Impression IMPRESSION:     1. No evidence of mycotic aneurysm. 2.  Occlusion of the right P2/P3. 3. Infarction within the right occipital and temporal lobes. Edema related to  the right-sided small abscesses, better appreciated on MRI. No definite new  areas of enhancement. 4.  Diffuse leptomeningeal enhancement compatible with patient's history of  meningitis. Assessment:     Hospital Problems  Never Reviewed          Codes Class Noted POA    Cardiac arrest [facility]) ICD-10-CM: I46.9  ICD-9-CM: 427.5  5/6/2021 Unknown            Plan:   1.) CVA. Large territory acute infarctions of the right occiptal lobe and right frontal lobe on CTH. Reviewed by Dr. Shantelle Husain with [facility] who felt right frontal infarct was more chronic and the right PCA was late subacute 3-4 days. Most likely caused by septic emboli.              - NIHSS of 30 on exam              - q1 neuro checks              - A1C and lipid panel pending, LDL goal <70              - Order MRI brain if COVID negative. - ECHO ordered              - PT/OT/SLP evals              - Stroke education              - SBP goal 100-160              - Home meds per hospitalist      3. )Hx of seizure disorder.    -Continue home doses of AEDs Keppra and Depakene.    -Seizure precautions    I have discussed the diagnosis and the intended plan as seen in the above orders with Dr. Zainab Chavez, Dr. Shantelle Husain, Dr. Loni Pennington NP and the primary RN. Further recommendations to follow. Thank you for this consult and participating in the care of this patient.   Signed By: Will Jimenez NP     May 7, 2021 ==============================================================    Neurology staff:    I examined the patient the bedside. I agree with the plans and documentation above from NP Boy Mullen. Matt Higuera is a 70-year-old gentleman who was transferred here from a long-term care. He is a very significant neurologic history consisting of embolic infarcts and intracranial abscesses in the setting of septic endocarditis presumably from drug abuse. He went from [facility] to [facility] where he underwent aortic valve replacement debridement. At the second hospital he unfortunately had a cardiac arrest. Ultimately he was discharged to long-term care from there with a reported level of function of being able to produce language and movement limited to the left leg. It sounds like yesterday he had an acute change manifesting as possible seizure activity and then he went into cardiac arrest requiring CPR and defibrillation. He was hypoxic. He was found to have a pneumonia. Neuroimaging showing new right-sided infarcts that may be subacute in anterior posterior circulation. He is now on anticonvulsants however Depakote level is essentially undetectable. He is in the ICU sedated. On exam he is sedated. Eyes closed. He does not follow commands. When I examined his eyes they seem to be disconjugate with the left eye up slightly out. Pupils are small right briskly reactive left side less so. No response to pain throughout. No abnormal movements. 70-year-old gentleman who has had a very complicated course of events since mid March who neurologically has suffered strokes in multiple territories. He has again suffered another cardiac arrest event. New subacute stroke seen. I am concerned he may have had anoxic injury as well. I think an MRI brain is appropriate to complete to look at the degree of acute or subacute ischemia were dealing with. Stay on Keppra and Depakote; I increase Keppra to 1 g, check Depakote level today. Wean from sedation as possible per intensivist.  Following, high risk to deteriorate.   [address] [facility],   Neurologist  Diplomate, American Board of Psychiatry and Neurology  Board Certified, Adult Neurology and Brain Injury Medicine

## 2021-05-07 NOTE — PROCEDURES
[facility]      Procedure Note - Arterial Access:   Performed by Robinson Arredondo DO. Immediately prior to the procedure, the patient was reevaluated and found suitable for the planned procedure and any planned medications. Immediately prior to the procedure a time out was called to verify the correct patient, procedure, equipment, staff, and marking as appropriate. Line Bundle:  Full sterile barrier precautions used. Procedure Location:  ICU. Condition: Emergency. Consent:  NO.     Method: Seldinger technique. Site Prep: ChloraPrep. Procedure: Arterial Catheter Insertion in Right, Femoral Artery   Catheter inserted into a new site. Number of Attempts:  1 Indication: Monitoring and Blood Drawing. There was bright red, pulsatile blood return. Femoral Site? yes. If Yes, reason femoral site was chosen: inability to cannulate brachial arteries  Catheter secured. Biopatch in place? yes. Sterile Bio-occlusive dressing placed. Complication None. The procedure was tolerated well.     Robinson Arredondo DO   Staff Anesthesiologist/Intensivist  Critical Care Medicine  [facility]

## 2021-05-08 ENCOUNTER — APPOINTMENT (OUTPATIENT)
Dept: GENERAL RADIOLOGY | Age: 26
DRG: 161 | End: 2021-05-08
Attending: EMERGENCY MEDICINE
Payer: MEDICAID

## 2021-05-08 LAB
ANION GAP SERPL CALC-SCNC: 6 MMOL/L (ref 5–15)
ARTERIAL PATENCY WRIST A: ABNORMAL
BACTERIA SPEC CULT: NORMAL
BACTERIA SPEC CULT: NORMAL
BASE EXCESS BLDA CALC-SCNC: 3.1 MMOL/L
BASOPHILS # BLD: 0 K/UL (ref 0–0.1)
BASOPHILS # BLD: 0 K/UL (ref 0–0.1)
BASOPHILS NFR BLD: 0 % (ref 0–1)
BASOPHILS NFR BLD: 0 % (ref 0–1)
BDY SITE: ABNORMAL
BNP SERPL-MCNC: ABNORMAL PG/ML
BUN SERPL-MCNC: 23 MG/DL (ref 6–20)
BUN/CREAT SERPL: 56 (ref 12–20)
CALCIUM SERPL-MCNC: 9.1 MG/DL (ref 8.5–10.1)
CHLORIDE SERPL-SCNC: 106 MMOL/L (ref 97–108)
CO2 SERPL-SCNC: 27 MMOL/L (ref 21–32)
CREAT SERPL-MCNC: 0.41 MG/DL (ref 0.7–1.3)
DATE LAST DOSE: ABNORMAL
DATE LAST DOSE: ABNORMAL
DIFFERENTIAL METHOD BLD: ABNORMAL
DIFFERENTIAL METHOD BLD: ABNORMAL
EOSINOPHIL # BLD: 0 K/UL (ref 0–0.4)
EOSINOPHIL # BLD: 0 K/UL (ref 0–0.4)
EOSINOPHIL NFR BLD: 0 % (ref 0–7)
EOSINOPHIL NFR BLD: 0 % (ref 0–7)
ERYTHROCYTE [DISTWIDTH] IN BLOOD BY AUTOMATED COUNT: 15.8 % (ref 11.5–14.5)
ERYTHROCYTE [DISTWIDTH] IN BLOOD BY AUTOMATED COUNT: 15.8 % (ref 11.5–14.5)
ERYTHROCYTE [DISTWIDTH] IN BLOOD BY AUTOMATED COUNT: 16.6 % (ref 11.5–14.5)
GLUCOSE BLD STRIP.AUTO-MCNC: 111 MG/DL (ref 65–100)
GLUCOSE BLD STRIP.AUTO-MCNC: 119 MG/DL (ref 65–100)
GLUCOSE BLD STRIP.AUTO-MCNC: 124 MG/DL (ref 65–100)
GLUCOSE SERPL-MCNC: 125 MG/DL (ref 65–100)
HCO3 BLDA-SCNC: 26 MMOL/L (ref 22–26)
HCT VFR BLD AUTO: 21.3 % (ref 36.6–50.3)
HCT VFR BLD AUTO: 21.7 % (ref 36.6–50.3)
HCT VFR BLD AUTO: 25.5 % (ref 36.6–50.3)
HGB BLD-MCNC: 6.3 G/DL (ref 12.1–17)
HGB BLD-MCNC: 6.4 G/DL (ref 12.1–17)
HGB BLD-MCNC: 7.8 G/DL (ref 12.1–17)
HISTORY CHECKED?,CKHIST: NORMAL
IMM GRANULOCYTES # BLD AUTO: 0.1 K/UL (ref 0–0.04)
IMM GRANULOCYTES # BLD AUTO: 0.1 K/UL (ref 0–0.04)
IMM GRANULOCYTES NFR BLD AUTO: 1 % (ref 0–0.5)
IMM GRANULOCYTES NFR BLD AUTO: 1 % (ref 0–0.5)
LACTATE SERPL-SCNC: 1 MMOL/L (ref 0.4–2)
LYMPHOCYTES # BLD: 0.7 K/UL (ref 0.8–3.5)
LYMPHOCYTES # BLD: 0.7 K/UL (ref 0.8–3.5)
LYMPHOCYTES NFR BLD: 8 % (ref 12–49)
LYMPHOCYTES NFR BLD: 8 % (ref 12–49)
MAGNESIUM SERPL-MCNC: 1.9 MG/DL (ref 1.6–2.4)
MCH RBC QN AUTO: 26.6 PG (ref 26–34)
MCH RBC QN AUTO: 27 PG (ref 26–34)
MCH RBC QN AUTO: 27.4 PG (ref 26–34)
MCHC RBC AUTO-ENTMCNC: 29 G/DL (ref 30–36.5)
MCHC RBC AUTO-ENTMCNC: 30 G/DL (ref 30–36.5)
MCHC RBC AUTO-ENTMCNC: 30.6 G/DL (ref 30–36.5)
MCV RBC AUTO: 88.2 FL (ref 80–99)
MCV RBC AUTO: 91 FL (ref 80–99)
MCV RBC AUTO: 91.6 FL (ref 80–99)
MONOCYTES # BLD: 0.3 K/UL (ref 0–1)
MONOCYTES # BLD: 0.4 K/UL (ref 0–1)
MONOCYTES NFR BLD: 4 % (ref 5–13)
MONOCYTES NFR BLD: 5 % (ref 5–13)
NEUTS SEG # BLD: 7.2 K/UL (ref 1.8–8)
NEUTS SEG # BLD: 7.2 K/UL (ref 1.8–8)
NEUTS SEG NFR BLD: 86 % (ref 32–75)
NEUTS SEG NFR BLD: 87 % (ref 32–75)
NRBC # BLD: 0 K/UL (ref 0–0.01)
NRBC BLD-RTO: 0 PER 100 WBC
PCO2 BLDA: 34 MMHG (ref 35–45)
PH BLDA: 7.5 [PH] (ref 7.35–7.45)
PHOSPHATE SERPL-MCNC: 2.8 MG/DL (ref 2.6–4.7)
PLATELET # BLD AUTO: 200 K/UL (ref 150–400)
PLATELET # BLD AUTO: 221 K/UL (ref 150–400)
PLATELET # BLD AUTO: 264 K/UL (ref 150–400)
PLATELET COMMENTS,PCOM: ABNORMAL
PMV BLD AUTO: 10.9 FL (ref 8.9–12.9)
PMV BLD AUTO: 11 FL (ref 8.9–12.9)
PMV BLD AUTO: 11 FL (ref 8.9–12.9)
PO2 BLDA: 116 MMHG (ref 80–100)
POTASSIUM SERPL-SCNC: 3.5 MMOL/L (ref 3.5–5.1)
PROCALCITONIN SERPL-MCNC: 11.14 NG/ML
RBC # BLD AUTO: 2.34 M/UL (ref 4.1–5.7)
RBC # BLD AUTO: 2.37 M/UL (ref 4.1–5.7)
RBC # BLD AUTO: 2.89 M/UL (ref 4.1–5.7)
RBC MORPH BLD: ABNORMAL
REPORTED DOSE,DOSE: ABNORMAL UNITS
REPORTED DOSE,DOSE: ABNORMAL UNITS
REPORTED DOSE/TIME,TMG: ABNORMAL
REPORTED DOSE/TIME,TMG: ABNORMAL
SAO2 % BLD: 99 % (ref 92–97)
SAO2% DEVICE SAO2% SENSOR NAME: ABNORMAL
SERVICE CMNT-IMP: ABNORMAL
SERVICE CMNT-IMP: NORMAL
SODIUM SERPL-SCNC: 139 MMOL/L (ref 136–145)
SPECIMEN SITE: ABNORMAL
VANCOMYCIN TROUGH SERPL-MCNC: 20.3 UG/ML (ref 5–10)
VANCOMYCIN TROUGH SERPL-MCNC: 27.5 UG/ML (ref 5–10)
WBC # BLD AUTO: 8.3 K/UL (ref 4.1–11.1)
WBC # BLD AUTO: 8.4 K/UL (ref 4.1–11.1)
WBC # BLD AUTO: 9.2 K/UL (ref 4.1–11.1)

## 2021-05-08 PROCEDURE — 74011250636 HC RX REV CODE- 250/636: Performed by: INTERNAL MEDICINE

## 2021-05-08 PROCEDURE — 74011250636 HC RX REV CODE- 250/636: Performed by: NURSE PRACTITIONER

## 2021-05-08 PROCEDURE — 80202 ASSAY OF VANCOMYCIN: CPT

## 2021-05-08 PROCEDURE — 84100 ASSAY OF PHOSPHORUS: CPT

## 2021-05-08 PROCEDURE — 83605 ASSAY OF LACTIC ACID: CPT

## 2021-05-08 PROCEDURE — 95822 EEG COMA OR SLEEP ONLY: CPT | Performed by: PSYCHIATRY & NEUROLOGY

## 2021-05-08 PROCEDURE — 95816 EEG AWAKE AND DROWSY: CPT | Performed by: NURSE PRACTITIONER

## 2021-05-08 PROCEDURE — 2709999900 HC NON-CHARGEABLE SUPPLY

## 2021-05-08 PROCEDURE — 36592 COLLECT BLOOD FROM PICC: CPT

## 2021-05-08 PROCEDURE — 65610000006 HC RM INTENSIVE CARE

## 2021-05-08 PROCEDURE — 84145 PROCALCITONIN (PCT): CPT

## 2021-05-08 PROCEDURE — 94003 VENT MGMT INPAT SUBQ DAY: CPT

## 2021-05-08 PROCEDURE — 94760 N-INVAS EAR/PLS OXIMETRY 1: CPT

## 2021-05-08 PROCEDURE — 85027 COMPLETE CBC AUTOMATED: CPT

## 2021-05-08 PROCEDURE — 80048 BASIC METABOLIC PNL TOTAL CA: CPT

## 2021-05-08 PROCEDURE — 83880 ASSAY OF NATRIURETIC PEPTIDE: CPT

## 2021-05-08 PROCEDURE — 85025 COMPLETE CBC W/AUTO DIFF WBC: CPT

## 2021-05-08 PROCEDURE — 74011000250 HC RX REV CODE- 250: Performed by: NURSE PRACTITIONER

## 2021-05-08 PROCEDURE — 71045 X-RAY EXAM CHEST 1 VIEW: CPT

## 2021-05-08 PROCEDURE — 74011250637 HC RX REV CODE- 250/637: Performed by: NURSE PRACTITIONER

## 2021-05-08 PROCEDURE — 99233 SBSQ HOSP IP/OBS HIGH 50: CPT | Performed by: PSYCHIATRY & NEUROLOGY

## 2021-05-08 PROCEDURE — P9047 ALBUMIN (HUMAN), 25%, 50ML: HCPCS | Performed by: ANESTHESIOLOGY

## 2021-05-08 PROCEDURE — 74011250636 HC RX REV CODE- 250/636: Performed by: ANESTHESIOLOGY

## 2021-05-08 PROCEDURE — 36430 TRANSFUSION BLD/BLD COMPNT: CPT

## 2021-05-08 PROCEDURE — 77030006998

## 2021-05-08 PROCEDURE — 83735 ASSAY OF MAGNESIUM: CPT

## 2021-05-08 PROCEDURE — 36600 WITHDRAWAL OF ARTERIAL BLOOD: CPT

## 2021-05-08 PROCEDURE — 74011250637 HC RX REV CODE- 250/637: Performed by: PSYCHIATRY & NEUROLOGY

## 2021-05-08 PROCEDURE — 82803 BLOOD GASES ANY COMBINATION: CPT

## 2021-05-08 PROCEDURE — 74011000258 HC RX REV CODE- 258: Performed by: NURSE PRACTITIONER

## 2021-05-08 PROCEDURE — 36415 COLL VENOUS BLD VENIPUNCTURE: CPT

## 2021-05-08 PROCEDURE — 86901 BLOOD TYPING SEROLOGIC RH(D): CPT

## 2021-05-08 PROCEDURE — 86923 COMPATIBILITY TEST ELECTRIC: CPT

## 2021-05-08 PROCEDURE — 82962 GLUCOSE BLOOD TEST: CPT

## 2021-05-08 PROCEDURE — P9016 RBC LEUKOCYTES REDUCED: HCPCS

## 2021-05-08 RX ORDER — POTASSIUM CHLORIDE 29.8 MG/ML
20 INJECTION INTRAVENOUS
Status: COMPLETED | OUTPATIENT
Start: 2021-05-08 | End: 2021-05-08

## 2021-05-08 RX ORDER — DEXMEDETOMIDINE HYDROCHLORIDE 4 UG/ML
.1-1.5 INJECTION, SOLUTION INTRAVENOUS
Status: DISCONTINUED | OUTPATIENT
Start: 2021-05-08 | End: 2021-05-10

## 2021-05-08 RX ORDER — HYDROCORTISONE SODIUM SUCCINATE 100 MG/2ML
50 INJECTION, POWDER, FOR SOLUTION INTRAMUSCULAR; INTRAVENOUS EVERY 8 HOURS
Status: DISCONTINUED | OUTPATIENT
Start: 2021-05-08 | End: 2021-05-09

## 2021-05-08 RX ORDER — FUROSEMIDE 10 MG/ML
40 INJECTION INTRAMUSCULAR; INTRAVENOUS ONCE
Status: COMPLETED | OUTPATIENT
Start: 2021-05-08 | End: 2021-05-08

## 2021-05-08 RX ORDER — SODIUM CHLORIDE 9 MG/ML
250 INJECTION, SOLUTION INTRAVENOUS AS NEEDED
Status: DISCONTINUED | OUTPATIENT
Start: 2021-05-08 | End: 2021-06-08 | Stop reason: HOSPADM

## 2021-05-08 RX ORDER — MAGNESIUM SULFATE 1 G/100ML
1 INJECTION INTRAVENOUS ONCE
Status: COMPLETED | OUTPATIENT
Start: 2021-05-08 | End: 2021-05-08

## 2021-05-08 RX ADMIN — FUROSEMIDE 40 MG: 10 INJECTION, SOLUTION INTRAMUSCULAR; INTRAVENOUS at 16:46

## 2021-05-08 RX ADMIN — DEXMEDETOMIDINE HYDROCHLORIDE 0.4 MCG/KG/HR: 400 INJECTION INTRAVENOUS at 22:40

## 2021-05-08 RX ADMIN — CASTOR OIL AND BALSAM, PERU: 788; 87 OINTMENT TOPICAL at 08:04

## 2021-05-08 RX ADMIN — PIPERACILLIN AND TAZOBACTAM 3.38 G: 3; .375 INJECTION, POWDER, LYOPHILIZED, FOR SOLUTION INTRAVENOUS at 00:41

## 2021-05-08 RX ADMIN — POTASSIUM CHLORIDE 20 MEQ: 400 INJECTION, SOLUTION INTRAVENOUS at 06:33

## 2021-05-08 RX ADMIN — MAGNESIUM SULFATE HEPTAHYDRATE 1 G: 1 INJECTION, SOLUTION INTRAVENOUS at 05:14

## 2021-05-08 RX ADMIN — Medication 175 MCG/HR: at 05:12

## 2021-05-08 RX ADMIN — ALBUMIN (HUMAN) 12.5 G: 0.25 INJECTION, SOLUTION INTRAVENOUS at 11:48

## 2021-05-08 RX ADMIN — VALPROIC ACID 500 MG: 250 SOLUTION ORAL at 21:14

## 2021-05-08 RX ADMIN — VALPROIC ACID 500 MG: 250 SOLUTION ORAL at 05:17

## 2021-05-08 RX ADMIN — Medication 10 ML: at 21:06

## 2021-05-08 RX ADMIN — CASTOR OIL AND BALSAM, PERU: 788; 87 OINTMENT TOPICAL at 17:42

## 2021-05-08 RX ADMIN — VANCOMYCIN HYDROCHLORIDE 1000 MG: 1 INJECTION, POWDER, LYOPHILIZED, FOR SOLUTION INTRAVENOUS at 20:54

## 2021-05-08 RX ADMIN — ALBUMIN (HUMAN) 12.5 G: 0.25 INJECTION, SOLUTION INTRAVENOUS at 04:12

## 2021-05-08 RX ADMIN — CHLORHEXIDINE GLUCONATE 15 ML: 0.12 RINSE ORAL at 21:06

## 2021-05-08 RX ADMIN — PROPOFOL 50 MCG/KG/MIN: 10 INJECTION, EMULSION INTRAVENOUS at 04:11

## 2021-05-08 RX ADMIN — LEVETIRACETAM 1000 MG: 100 SOLUTION ORAL at 16:34

## 2021-05-08 RX ADMIN — FENTANYL CITRATE 25 MCG: 50 INJECTION, SOLUTION INTRAMUSCULAR; INTRAVENOUS at 22:43

## 2021-05-08 RX ADMIN — ALBUMIN (HUMAN) 12.5 G: 0.25 INJECTION, SOLUTION INTRAVENOUS at 16:33

## 2021-05-08 RX ADMIN — FAMOTIDINE 20 MG: 10 INJECTION, SOLUTION INTRAVENOUS at 08:07

## 2021-05-08 RX ADMIN — Medication 10 ML: at 13:59

## 2021-05-08 RX ADMIN — PIPERACILLIN AND TAZOBACTAM 3.38 G: 3; .375 INJECTION, POWDER, LYOPHILIZED, FOR SOLUTION INTRAVENOUS at 08:06

## 2021-05-08 RX ADMIN — LEVETIRACETAM 1000 MG: 100 SOLUTION ORAL at 05:31

## 2021-05-08 RX ADMIN — PROPOFOL 20 MCG/KG/MIN: 10 INJECTION, EMULSION INTRAVENOUS at 15:15

## 2021-05-08 RX ADMIN — HYDROCORTISONE SODIUM SUCCINATE 50 MG: 100 INJECTION, POWDER, FOR SOLUTION INTRAMUSCULAR; INTRAVENOUS at 15:16

## 2021-05-08 RX ADMIN — VANCOMYCIN HYDROCHLORIDE 1250 MG: 10 INJECTION, POWDER, LYOPHILIZED, FOR SOLUTION INTRAVENOUS at 08:06

## 2021-05-08 RX ADMIN — CHLORHEXIDINE GLUCONATE 15 ML: 0.12 RINSE ORAL at 08:04

## 2021-05-08 RX ADMIN — Medication 10 ML: at 05:31

## 2021-05-08 RX ADMIN — FAMOTIDINE 20 MG: 10 INJECTION, SOLUTION INTRAVENOUS at 20:54

## 2021-05-08 RX ADMIN — Medication 50 MCG/HR: at 15:54

## 2021-05-08 RX ADMIN — POTASSIUM CHLORIDE 20 MEQ: 400 INJECTION, SOLUTION INTRAVENOUS at 05:18

## 2021-05-08 RX ADMIN — VALPROIC ACID 500 MG: 250 SOLUTION ORAL at 13:53

## 2021-05-08 RX ADMIN — PIPERACILLIN AND TAZOBACTAM 3.38 G: 3; .375 INJECTION, POWDER, LYOPHILIZED, FOR SOLUTION INTRAVENOUS at 16:34

## 2021-05-08 RX ADMIN — PROPOFOL 45 MCG/KG/MIN: 10 INJECTION, EMULSION INTRAVENOUS at 09:21

## 2021-05-08 RX ADMIN — HYDROCORTISONE SODIUM SUCCINATE 100 MG: 100 INJECTION, POWDER, FOR SOLUTION INTRAMUSCULAR; INTRAVENOUS at 08:08

## 2021-05-08 RX ADMIN — FUROSEMIDE 40 MG: 10 INJECTION, SOLUTION INTRAMUSCULAR; INTRAVENOUS at 05:17

## 2021-05-08 NOTE — CONSULTS
Neurology Progress Note  Daphne Dunn NP    Patient: Jessica Yu MRN: [id number]  SSN: [id number]    YOB: 1995  Age: 22 y.o. Sex: male      Chief Complaint: Cardiac arrest    HPI:  Jessica Yu is a 22 y.o. male who presented to the [facility] ED after cardiac arrest at [facility] at approximately 1500 on 5/6/21. He has a past medical history of seizures, anxiety, polysubstance abuse with IVD use. He was admitted to [facility] on 3/24/21 with AMS in the setting of polysubstance abuse positive for cocaine, heroine, and methamphetamines. He was found to have septic MRSA bacteremia, MSSA endocarditis. Imaging and MRI found Right PCA infarct and several abscesses in the right temporal and parietal lobes. Infarcts were thought to be due to septic emboli. He had left sided residual weakness. He was transferred to [facility] on 4/1 for aortic valve surgery. After transfer he was found to have a new right MCA infarct and also had a type 2 NSTEMI and tamponade with pericardiocentesis done prior to surgery. He had bioprosthetic Aortic Valve Replacement and Aortic Root Abscess Debridement done on 4/16/2021 and suffered cardiac arrest the day of the surgery. He was trached and sent to [facility] on 4/29/21 and has been weaning off of the vent. He has been speaking, following commands and has begun moving his left foot. Still with left arm weakness. His last known well time on 5/6 was just before 1300. He was doing well off of the vent but he was getting anxious and they put him back on and sedated him. His mother stated that around 1500 his eyes were rolled back in his head. She got a call soon after she left saying that he had arrested. Per ER note the patient arrested around 1500 and was pulseless. He had 2 rounds of CPR and meds. He was defibrillated x 1 before ROSC was achieved.  He was hypoxic post arrest. Patient was sent to [facility] ED and admitted to the ICU for further evaluation and treatment. He was sent down for [facility] and CTA chest. CTA chest showed no PE. There was severe multilevel consolidation throughout both lungs consistent with severe multilobar PNA. He has cardiomegaly with small pericardial effusion and moderate bilateral pleural effusions. His CTH showed large territory acute infarctions of the right occipital lobe and right frontal lobe. No ICH. No hydrocephalus. Results discussed with Radiologist Dr. Peter Ruiz and Dr. Aaliyah Jackson with [facility]. Dr. Aaliyah Jackson felt the right frontal infarct was more chronic and the right PCA was late subacute 3-4 days. The basilar artery did not look dense nor the venous sinuses. Dr. Aaliyah Jackson indicated that CTA H/N and CTP was not necessary at this time as patient would not be a candidate for thrombectomy due to his instability and state of his lungs. Subjective:   No acute changes overnight. Past Medical History:   Diagnosis Date    Anxiety     Seizure [facility])      No family history on file. Social History     Tobacco Use    Smoking status: Current Every Day Smoker     Packs/day: 1.00    Smokeless tobacco: Never Used   Substance Use Topics    Alcohol use: Yes     Comment: socially      Prior to Admission Medications   Prescriptions Last Dose Informant Patient Reported? Taking?   divalproex DR (Depakote) 500 mg tablet   Yes No   Sig: Take 500 mg by mouth three (3) times daily. levETIRAcetam (KEPPRA) 750 mg tablet   No No   Sig: Take 1 Tab by mouth two (2) times a day. Facility-Administered Medications: None       Allergies   Allergen Reactions    Codeine Unknown (comments)     Pt denies        Review of Systems:  Review of systems not obtained due to patient factors.         Objective:     Vitals:    05/07/21 2302 05/08/21 0000 05/08/21 0100 05/08/21 0200   BP:  109/75 108/75 107/74   Pulse: 89 90 89 85   Resp: 20 20 20 20   Temp:  98.9 °F (37.2 °C)     SpO2: 97% 97% 98% 97%   Weight:       Height:          Physical Exam:  GENERAL: Labored breathing trached, vented,  SKIN: Warm, dry, color appropriate for ethnicity. Neurologic Exam:  Mental Status:  No command following or eye opening. Language:    Aphasic. Cranial Nerves:   Pupils 3 mm, equal, round and reactive to light. Upward gaze           Motor:    No spontaneous or involuntary movements noted. Bulk and tone flaccid        Sensation:    Withdraws to noxious stimuli in RUE and BLE. No withdrawal in LUE. Reflexes:    Positive Babinskis    Coordination & Gait: Not assessed due to condition. Labs:  Lab Results   Component Value Date/Time    WBC 23.5 (H) 05/07/2021 02:17 PM    HGB 8.1 (L) 05/07/2021 02:17 PM    HCT 27.0 (L) 05/07/2021 02:17 PM    PLATELET 234 22/51/4001 02:17 PM    MCV 91.5 05/07/2021 02:17 PM      Lab Results   Component Value Date/Time    Sodium 139 05/07/2021 02:17 PM    Potassium 4.7 05/07/2021 02:17 PM    Chloride 109 (H) 05/07/2021 02:17 PM    CO2 24 05/07/2021 02:17 PM    Anion gap 6 05/07/2021 02:17 PM    Glucose 160 (H) 05/07/2021 02:17 PM    BUN 21 (H) 05/07/2021 02:17 PM    Creatinine 0.42 (L) 05/07/2021 02:17 PM    BUN/Creatinine ratio 50 (H) 05/07/2021 02:17 PM    GFR est AA >60 05/07/2021 02:17 PM    GFR est non-AA >60 05/07/2021 02:17 PM    Calcium 9.4 05/07/2021 02:17 PM     Lab Results   Component Value Date/Time     (H) 03/24/2021 08:15 PM    Troponin-I 10.500 ([place]) 03/25/2021 06:25 AM    Troponin-I, Qt. <0.05 05/06/2021 06:00 PM       Imaging:  CT Results (maximum last 3): Results from [place] encounter on 05/06/21   CTA CHEST W OR W WO CONT    Narrative INDICATION:   cardiac arrest eval for PE     COMPARISON:  None    TECHNIQUE:  Following the uneventful intravenous administration of 100 cc Isovue  897, thin helical axial images were obtained through the chest. Postprocessing  was performed. 3D image postprocessing was performed.     CT dose reduction was achieved through the use of a standardized protocol  tailored for this examination and automatic exposure control for dose  modulation. FINDINGS: Study is limited by patient motion. A tracheostomy tube is in  satisfactory position. There are lines present in appropriate position. There is mild mediastinal lymphadenopathy. The heart is enlarged. There is a  small pericardial effusion. No filling defect is seen within the pulmonary arterial system to suggest  pulmonary embolus. The aorta is normal in caliber. There is severe multilevel groundglass opacification throughout both lungs, as  well as severe multilobar bilateral consolidation. There is no pneumothorax. There are moderate bilateral pleural effusions. Limited evaluation of the upper abdomen demonstrates no abnormality. There are  median sternotomy wires. Impression 1. No evidence of pulmonary embolus. 2.  Severe multilevel consolidation throughout both lungs, consistent with  severe multilobar pneumonia. 3. Cardiomegaly with small pericardial effusion. 4. Moderate bilateral pleural effusions. CT HEAD WO CONT    Narrative EXAM: CT HEAD WO CONT    INDICATION: unresponsive post arrest    COMPARISON: None. CONTRAST: None. TECHNIQUE: Unenhanced CT of the head was performed using 5 mm images. Brain and  bone windows were generated. Coronal and sagittal reformats. CT dose reduction  was achieved through use of a standardized protocol tailored for this  examination and automatic exposure control for dose modulation. FINDINGS:  There is no hydrocephalus. There are multiple acute infarctions throughout the  right cerebral hemisphere, involving much of the occipital lobe, as well as much  of the frontal lobe. There is an additional superior right frontoparietal  infarct. There is no intracranial hemorrhage or midline shift. The basilar  cisterns are open. The bone windows demonstrate no abnormalities. The visualized portions of the  paranasal sinuses and mastoid air cells are clear. Impression 1.  Large territory acute infarctions of the right occipital lobe and right  frontal lobe. 2. No intracranial hemorrhage. 3. No hydrocephalus. Results from Hospital Encounter encounter on 03/24/21   CTA HEAD    Narrative CTA OF THE HEAD     INDICATION: septic emboli, evaluate for septic aneurysms    COMPARISON: No relevant studies. TECHNIQUE: Noncontrast CT the head performed followed by CTA of the head with   intravenous contrast. Multiplanar two-dimensional and three-dimensional maximum  intensity projection reformats performed and reviewed. All CT exams at this facility use one or more dose reduction techniques  including automatic exposure control, mA/kV adjustment per patient's size, or  iterative reconstruction technique. FINDINGS:    Noncontrast CT demonstrates edema related to the patient's right occipital lobe  infarction. Superimposed vasogenic edema related to patient's microabscesses  disease. There is effacement of the posterior horn of the right lateral  ventricle with asymmetric dilatation of the temporal horn, similar to prior MRI. Postcontrast images demonstrate diffuse leptomeningeal enhancement, better  appreciated on MRI. Multiple areas of early developing abscesses and cerebritis  better appreciated on MRI. There is occlusion of the P2 MP3 segments of the right posterior cerebral artery  seen best on the 3-D reconstructed images. Visualized portions of the left  posterior cerebral artery, basilar artery, and superior cerebellar arteries  appear patent. Bilateral intracranial segments of the internal carotid arteries, bilateral  middle cerebral arteries, and bilateral anterior cerebral arteries are patent. Anterior communicating artery appears patent. Left posterior commuting artery  appears patent. There is no mycotic aneurysm. Hyperdensity along the right  anterior falx seen best on image 13 series 11 corresponds to calcification on  noncontrast study. Impression IMPRESSION:     1. No evidence of mycotic aneurysm. 2.  Occlusion of the right P2/P3. 3. Infarction within the right occipital and temporal lobes. Edema related to  the right-sided small abscesses, better appreciated on MRI. No definite new  areas of enhancement. 4.  Diffuse leptomeningeal enhancement compatible with patient's history of  meningitis. Assessment:     Hospital Problems  Never Reviewed          Codes Class Noted POA    Cardiac arrest [facility]) ICD-10-CM: I46.9  ICD-9-CM: 427.5  5/6/2021 Unknown            Plan:   1.) CVA. Large territory subacute infarctions of the right occiptal lobe and right frontal lobe on CTH. Most likely caused by septic emboli.              - NIHSS of 30 on initial exam              - q1 neuro checks              - A1C and lipid panel pending, LDL goal <70              - MRI brain ordered              - ECHO LVEF 20-25%. Dilated left ventricle. Severely reduced systolic function. Mild tricuspid valve regurg. Mild to moderate pulmonary HTN,. Reduced systolic function. Mild to moderate mitral valve regurg and  non-specific thickening. Mild aortic stenosis. No evidence of pericardial effusion.               - PT/OT/SLP evals              - Stroke education              - SBP goal 100-160              - Home meds per hospitalist      2. )Hx of seizure disorder.    -Continue Keppra 1000 through tube bid. Depakene 500 mg q 8 hours. -Valproic acid level 25 on 5/7. Repeat level on 5/8.    -EEG to assess for non-convulsive status epilepticus    -Seizure precautions    3.) S/P cardiac arrest. Concern for anoxic injury.    -Wean sedation as possible. -Supportive care per Intensivist.    -Will perform MRI and EEG as above. I have discussed the diagnosis and the intended plan as seen in the above orders with Dr. Bridget Candelario, NP and the primary RN. Further recommendations to follow. Thank you for this consult and participating in the care of this patient.   Signed By: Calixto Parekh NP     May 8, 2021        ==============================================================    Neurology staff:    I examined the patient at the bedside. I discussed the case and agree with the plans and documentation above from SUNIL Richards. In brief, Karissa Meyer is a 41-year-old gentleman who has had a significant neurologic history consisting of infarcts, abscesses in the setting of endocarditis and NSTEMI and heart failure. Since last night no acute changes. He remains sedated. On exam he is sedated not following commands. Pupils small equal minimally reactive. Intubated. Atrophied limbs. Loose tone, no abnormal movements. 41-year-old gentleman with a complicated medical history who has had new subacute infarcts in the right anterior posterior circulation. MRI brain pending to evaluate extent of ischemia acute and chronic. EEG ordered to rule out nonconvulsive status today. Repeat Depakote level pending.   Following  [address], DO  Neurologist  Diplomate, American Board of Psychiatry and Neurology  Board Certified, Adult Neurology and Brain Injury Medicine

## 2021-05-08 NOTE — PROGRESS NOTES
Pharmacist Note - Vancomycin Dosing  Therapy day 3  Indication: Sepsis  Current regimen: 1250mg IV [id number]    Recent Labs     05/08/21  0328 05/08/21  0327 05/07/21  1417 05/07/21  0448   WBC 8.3  --  23.5* 25.8*   CREA  --  0.41* 0.42* 0.71   BUN  --  23* 21* 21*       A Trough Level resulted at 27.5 mcg/mL which was obtained ~5.5 hrs post-dose. The extrapolated \"true\" trough is approximately 19-20 mcg/mL based on the patient's known kinetics. Goal trough: 15 - 20 mcg/mL       Plan: Continue current regimen at this time. While the resulted level itself is high, it was drawn early and the true 8 hour trough would allow additional clearance. Recommend rechecking a trough in the next 24-36 hours to ensure a continued appropriately level of clearance and to help avoid accumulation. Pharmacy will continue to monitor this patient daily for changes in clinical status and renal function.

## 2021-05-08 NOTE — PROCEDURES
ELECTROENCEPHALOGRAM REPORT     Patient Name: Mariella Blas  : 1995  Age: 22 y.o. Ordering physician: SUNIL BONILLA  Date of EE2021  Interpreting physician: Mindy lAva DO      PROCEDURE: EEG. CLINICAL INDICATION: The patient is a 22 y.o. male who is being evaluated for baseline electro cerebral activities and to rule out seizure focus. DESCRIPTION OF THE RECORD:   \    Throughout the recording, there was global slowing and loss of voltage. There were no clear areas of focal spike or spike-and-wave discharges seen. Hyperventilation was not performed. Photic stimulation did not produce a minimal driving response in the posterior head regions. INTERPRETATION    This is an abnormal electroencephalogram showing diffuse slowing suggestive for global cortical dysfunction which is nonspecific and may be seen in underlying metabolic/infectious/inflammatory/structural processes. No clear focal abnormalities or epileptiform activity was seen. NO seizures. Clinical correlation recommended.       Clifford Garcia DO  Diplomate, American Board of Psychiatry & Neurology (Neurology)

## 2021-05-08 NOTE — PROGRESS NOTES
Cardiology Progress Note                                        Admit Date: 5/6/2021    Assessment/Plan:     1. Sp cardiac arrest post op 4/17/21,   cardiac arrest Vfib 5/6/21  ekg rbbb qt 460 msec. 2. Hx:  4/16/21 at [facility] bioprosthetic AVR and root abscess debridement. He also had a pericardiocentesis due to tamponade prior to surgery  3. Recent endocardititis 3/24/21 Staph  4. CM  Ef 25%   5.  right MCA infarct and several abscesses in the right temporal and parietal lobes. Infarcts were thought to be due to septic emboli. He had left sided residual weakness. 6.  severe multilevel consolidation throughout both lungs consistent with severe multilobar PNA. Continue supportive care   Neurology following         Faiza Corea is a 22 y.o. male with     PROBLEM LIST:  Patient Active Problem List    Diagnosis Date Noted    Cardiac arrest ([facility] 05/06/2021    Cerebral abscess (embolic) 40/33/7656    Drug abuse, cocaine type ([facility] 03/29/2021    Endocarditis due to methicillin susceptible Staphylococcus aureus (MSSA) 03/25/2021    Type 2 myocardial infarction ([facility] 03/24/2021         Subjective:     Faiza Corea does not respond. Visit Vitals  BP (!) 102/59 (BP 1 Location: Right upper arm, BP Patient Position: At rest)   Pulse 88   Temp 98.7 °F (37.1 °C)   Resp 18   Ht 5' 11\" (1.803 m)   Wt 59 kg (130 lb)   SpO2 99%   BMI 18.13 kg/m²       Intake/Output Summary (Last 24 hours) at 5/8/2021 0852  Last data filed at 5/8/2021 0806  Gross per 24 hour   Intake 2717.24 ml   Output 3549 ml   Net -831.76 ml       Objective:      Physical Exam:  HEENT: Perrla, EOMI  Neck: No JVD,  No thyroidmegaly  Resp: CTA bilaterally;  No wheezes or rales  CV: RRR s1s2 No murmur no s3  Abd:Soft, Nontender  Ext: No edema  Neuro: unresponsive on vent   Skin: Warm, Dry, Intact  Pulses: 2+ DP/PT/Rad      Telemetry: normal sinus rhythm    Current Facility-Administered Medications   Medication Dose Route Frequency    0.9% sodium chloride infusion 250 mL  250 mL IntraVENous PRN    balsam peru-castor oiL (VENELEX) ointment   Topical BID    hydrocortisone Sod Succ (PF) (SOLU-CORTEF) injection 100 mg  100 mg IntraVENous Q8H    levETIRAcetam (KEPPRA) oral solution 1,000 mg  1,000 mg Oral Q12H    DOBUTamine (DOBUTREX) 500 mg/250 mL (2,000 mcg/mL) infusion  0-10 mcg/kg/min IntraVENous TITRATE    albumin human 25% (BUMINATE) solution 12.5 g  12.5 g IntraVENous Q6H    vasopressin (VASOSTRICT) 20 Units in 0.9% sodium chloride 100 mL infusion  0-0.04 Units/min IntraVENous TITRATE    propofol (DIPRIVAN) 10 mg/mL infusion  0-50 mcg/kg/min IntraVENous TITRATE    sodium chloride (NS) flush 5-40 mL  5-40 mL IntraVENous Q8H    sodium chloride (NS) flush 5-40 mL  5-40 mL IntraVENous PRN    acetaminophen (TYLENOL) tablet 650 mg  650 mg Oral Q6H PRN    Or    acetaminophen (TYLENOL) suppository 650 mg  650 mg Rectal Q6H PRN    polyethylene glycol (MIRALAX) packet 17 g  17 g Oral DAILY PRN    ondansetron (ZOFRAN) injection 4 mg  4 mg IntraVENous Q6H PRN    famotidine (PF) (PEPCID) 20 mg in 0.9% sodium chloride 10 mL injection  20 mg IntraVENous BID    [Held by provider] enoxaparin (LOVENOX) injection 40 mg  40 mg SubCUTAneous DAILY    chlorhexidine (ORAL CARE KIT) 0.12 % mouthwash 15 mL  15 mL Oral Q12H    fentaNYL citrate (PF) injection 25 mcg  25 mcg IntraVENous Q4H PRN    piperacillin-tazobactam (ZOSYN) 3.375 g in 0.9% sodium chloride (MBP/ADV) 100 mL MBP  3.375 g IntraVENous Q8H    glucose chewable tablet 16 g  4 Tab Oral PRN    dextrose (D50W) injection syrg 12.5-25 g  25-50 mL IntraVENous PRN    glucagon (GLUCAGEN) injection 1 mg  1 mg IntraMUSCular PRN    insulin lispro (HUMALOG) injection   SubCUTAneous Q6H    fentaNYL (PF) 1,500 mcg/30 mL (50 mcg/mL) infusion  0-200 mcg/hr IntraVENous TITRATE    Vancomycin- pharmacy to dose   Other Rx Dosing/Monitoring    vancomycin (VANCOCIN) 1250 mg in  ml infusion  1,250 mg IntraVENous Q8H    valproic acid (as sodium salt) (DEPAKENE) 250 mg/5 mL (5 mL) oral solution 500 mg  500 mg Oral Q8H         Data Review:   Labs:    Recent Results (from the past 24 hour(s))   ECHO ADULT COMPLETE    Collection Time: 05/07/21  9:32 AM   Result Value Ref Range    IVSd 0.65 0.60 - 1.00 cm    LVIDd 6.42 (A) 4.20 - 5.90 cm    LVIDs 5.72 cm    LVOT d 2.04 cm    LVPWd 0.97 0.60 - 1.00 cm    BP EF 23.8 (A) 55.0 - 100.0 percent    LV Ejection Fraction MOD 2C 20 percent    LV Ejection Fraction MOD 4C 25 percent    LV ED Vol A2C 229.74 mL    LV ED Vol A4C 172.82 mL    LV ED Vol .60 (A) 67.0 - 155.0 mL    LV ES Vol A2C 183.12 mL    LV ES Vol A4C 130.04 mL    LV ES Vol .10 (A) 22.0 - 58.0 mL    LVOT Peak Gradient 1.96 mmHg    LVOT SV 35.8 mL    LVOT Peak Velocity 69.93 cm/s    LVOT VTI 10.94 cm    RVIDd 4.60 cm    RVSP 53.50 mmHg    LA Volume 99.40 18.0 - 58.0 mL    LA Area 4C 25.70 cm2    LA Vol 2C 111.67 (A) 18.00 - 58.00 mL    LA Vol 4C 75.88 (A) 18.00 - 58.00 mL    Est. RA Pressure 15.00 mmHg    Aortic Valve Area by Continuity of Peak Velocity 0.88 cm2    Aortic Valve Area by Continuity of VTI 0.90 cm2    AoV PG 26.84 mmHg    Aortic Valve Systolic Mean Gradient 02.09 mmHg    Aortic Valve Systolic Peak Velocity 080.97 cm/s    AoV VTI 40.01 cm    MV A Miquel 38.67 cm/s    Mitral Valve E Wave Deceleration Time 110.23 ms    MV E Miquel 106.18 cm/s    Mitral Valve Pressure Half-time 31.97 ms    MVA (PHT) 6.88 cm2    Pulmonic Valve Systolic Peak Instantaneous Gradient 18.41 mmHg    Tapse 1.23 (A) 1.50 - 2.00 cm    Triscuspid Valve Regurgitation Peak Gradient 38.50 mmHg    TR Max Velocity 310.24 cm/s    Ao Root D 2.62 cm    MV E/A 2.75     LV Mass .9 88.0 - 224.0 g    LV Mass AL Index 121.2 49.0 - 115.0 g/m2    LVES Vol Index BP 88.3 mL/m2    LVED Vol Index .9 mL/m2    LA Vol Index 56.60 16.00 - 28.00 ml/m2    LA Vol Index 63.59 16.00 - 28.00 ml/m2    LA Vol Index 43.21 16.00 - 28.00 ml/m2 LVED Vol Index A4C 98.4 mL/m2    LVED Vol Index A2C 130.8 mL/m2    LVES Vol Index A4C 74.1 mL/m2    LVES Vol Index A2C 104.3 mL/m2    TALA/BSA Pk Miquel 0.5 cm2/m2    TALA/BSA VTI 0.5 cm2/m2   VALPROIC ACID    Collection Time: 05/07/21 10:52 AM   Result Value Ref Range    Valproic acid 25 (L) 50 - 100 ug/ml   CULTURE, WOUND W GRAM STAIN    Collection Time: 05/07/21 10:53 AM    Specimen: Toe; Wound   Result Value Ref Range    Special Requests: NO SPECIAL REQUESTS      GRAM STAIN NO DEFINITE ORGANISM SEEN      Culture result: PENDING    GLUCOSE, POC    Collection Time: 05/07/21 11:33 AM   Result Value Ref Range    Glucose (POC) 155 (H) 65 - 100 mg/dL    Performed by Alida Cruz    LACTIC ACID    Collection Time: 05/07/21  2:17 PM   Result Value Ref Range    Lactic acid 1.5 0.4 - 2.0 MMOL/L   CBC W/O DIFF    Collection Time: 05/07/21  2:17 PM   Result Value Ref Range    WBC 23.5 (H) 4.1 - 11.1 K/uL    RBC 2.95 (L) 4.10 - 5.70 M/uL    HGB 8.1 (L) 12.1 - 17.0 g/dL    HCT 27.0 (L) 36.6 - 50.3 %    MCV 91.5 80.0 - 99.0 FL    MCH 27.5 26.0 - 34.0 PG    MCHC 30.0 30.0 - 36.5 g/dL    RDW 15.9 (H) 11.5 - 14.5 %    PLATELET 869 946 - 479 K/uL    MPV 10.5 8.9 - 12.9 FL    NRBC 0.0 0  WBC    ABSOLUTE NRBC 0.00 0.00 - 7.04 K/uL   METABOLIC PANEL, BASIC    Collection Time: 05/07/21  2:17 PM   Result Value Ref Range    Sodium 139 136 - 145 mmol/L    Potassium 4.7 3.5 - 5.1 mmol/L    Chloride 109 (H) 97 - 108 mmol/L    CO2 24 21 - 32 mmol/L    Anion gap 6 5 - 15 mmol/L    Glucose 160 (H) 65 - 100 mg/dL    BUN 21 (H) 6 - 20 MG/DL    Creatinine 0.42 (L) 0.70 - 1.30 MG/DL    BUN/Creatinine ratio 50 (H) 12 - 20      GFR est AA >60 >60 ml/min/1.73m2    GFR est non-AA >60 >60 ml/min/1.73m2    Calcium 9.4 8.5 - 10.1 MG/DL   BLOOD GAS + IONIZED CALCIUM    Collection Time: 05/07/21  4:19 PM   Result Value Ref Range    pH 7.44 7.35 - 7.45      PCO2 35 35 - 45 mmHg    PO2 117 (H) 80 - 100 mmHg    BICARBONATE 23 22 - 26 mmol/L    BASE DEFICIT 0.2 mmol/L    O2 SAT 98 (H) 92 - 97 %    Calcium, ionized 1.21 1.13 - 1.32 mmol/L    O2 METHOD VENT      FIO2 45 %    Sample source ARTERIAL      SITE DRAWN FROM ARTERIAL LINE      CANDELARIA'S TEST NOT APPLICABLE     GLUCOSE, POC    Collection Time: 05/07/21  6:01 PM   Result Value Ref Range    Glucose (POC) 129 (H) 65 - 100 mg/dL    Performed by [address] [place] [address], POC    Collection Time: 05/07/21 11:51 PM   Result Value Ref Range    Glucose (POC) 131 (H) 65 - 100 mg/dL    Performed by Maikel Elizondo    BLOOD GAS, ARTERIAL    Collection Time: 05/08/21  3:01 AM   Result Value Ref Range    pH 7.50 (H) 7.35 - 7.45      PCO2 34 (L) 35 - 45 mmHg    PO2 116 (H) 80 - 100 mmHg    O2 SAT 99 (H) 92 - 97 %    BICARBONATE 26 22 - 26 mmol/L    BASE EXCESS 3.1 mmol/L    O2 METHOD VENT      Sample source ARTERIAL      SITE DRAWN FROM ARTERIAL LINE      CANDELARIA'S TEST NOT APPLICABLE     LACTIC ACID    Collection Time: 05/08/21  3:27 AM   Result Value Ref Range    Lactic acid 1.0 0.4 - 2.0 MMOL/L   MAGNESIUM    Collection Time: 05/08/21  3:27 AM   Result Value Ref Range    Magnesium 1.9 1.6 - 2.4 mg/dL   PHOSPHORUS    Collection Time: 05/08/21  3:27 AM   Result Value Ref Range    Phosphorus 2.8 2.6 - 4.7 MG/DL   NT-PRO BNP    Collection Time: 05/08/21  3:27 AM   Result Value Ref Range    NT pro-BNP 20,709 (H) <706 PG/ML   METABOLIC PANEL, BASIC    Collection Time: 05/08/21  3:27 AM   Result Value Ref Range    Sodium 139 136 - 145 mmol/L    Potassium 3.5 3.5 - 5.1 mmol/L    Chloride 106 97 - 108 mmol/L    CO2 27 21 - 32 mmol/L    Anion gap 6 5 - 15 mmol/L    Glucose 125 (H) 65 - 100 mg/dL    BUN 23 (H) 6 - 20 MG/DL    Creatinine 0.41 (L) 0.70 - 1.30 MG/DL    BUN/Creatinine ratio 56 (H) 12 - 20      GFR est AA >60 >60 ml/min/1.73m2    GFR est non-AA >60 >60 ml/min/1.73m2    Calcium 9.1 8.5 - 10.1 MG/DL   PROCALCITONIN    Collection Time: 05/08/21  3:27 AM   Result Value Ref Range    Procalcitonin 11.14 ng/mL   VANCOMYCIN, TROUGH Collection Time: 05/08/21  3:27 AM   Result Value Ref Range    Vancomycin,trough 27.5 (HH) 5.0 - 10.0 ug/mL    Reported dose date NOT PROVIDED      Reported dose time: NOT PROVIDED      Reported dose: NOT PROVIDED UNITS   CBC WITH AUTOMATED DIFF    Collection Time: 05/08/21  3:28 AM   Result Value Ref Range    WBC 8.3 4.1 - 11.1 K/uL    RBC 2.34 (L) 4.10 - 5.70 M/uL    HGB 6.4 (L) 12.1 - 17.0 g/dL    HCT 21.3 (L) 36.6 - 50.3 %    MCV 91.0 80.0 - 99.0 FL    MCH 27.4 26.0 - 34.0 PG    MCHC 30.0 30.0 - 36.5 g/dL    RDW 15.8 (H) 11.5 - 14.5 %    PLATELET 980 677 - 861 K/uL    MPV 11.0 8.9 - 12.9 FL    NRBC 0.0 0  WBC    ABSOLUTE NRBC 0.00 0.00 - 0.01 K/uL    NEUTROPHILS 87 (H) 32 - 75 %    LYMPHOCYTES 8 (L) 12 - 49 %    MONOCYTES 4 (L) 5 - 13 %    EOSINOPHILS 0 0 - 7 %    BASOPHILS 0 0 - 1 %    IMMATURE GRANULOCYTES 1 (H) 0.0 - 0.5 %    ABS. NEUTROPHILS 7.2 1.8 - 8.0 K/UL    ABS. LYMPHOCYTES 0.7 (L) 0.8 - 3.5 K/UL    ABS. MONOCYTES 0.3 0.0 - 1.0 K/UL    ABS. EOSINOPHILS 0.0 0.0 - 0.4 K/UL    ABS. BASOPHILS 0.0 0.0 - 0.1 K/UL    ABS. IMM. GRANS. 0.1 (H) 0.00 - 0.04 K/UL    DF SMEAR SCANNED      PLATELET COMMENTS Large Platelets      RBC COMMENTS HYPOCHROMIA  1+        RBC COMMENTS TEARDROP CELLS  PRESENT        RBC COMMENTS ANISOCYTOSIS  1+       RBC, ALLOCATE    Collection Time: 05/08/21  5:00 AM   Result Value Ref Range    HISTORY CHECKED?  Historical check performed    CBC WITH AUTOMATED DIFF    Collection Time: 05/08/21  5:26 AM   Result Value Ref Range    WBC 8.4 4.1 - 11.1 K/uL    RBC 2.37 (L) 4.10 - 5.70 M/uL    HGB 6.3 (L) 12.1 - 17.0 g/dL    HCT 21.7 (L) 36.6 - 50.3 %    MCV 91.6 80.0 - 99.0 FL    MCH 26.6 26.0 - 34.0 PG    MCHC 29.0 (L) 30.0 - 36.5 g/dL    RDW 15.8 (H) 11.5 - 14.5 %    PLATELET 570 453 - 938 K/uL    MPV 11.0 8.9 - 12.9 FL    NRBC 0.0 0  WBC    ABSOLUTE NRBC 0.00 0.00 - 0.01 K/uL    NEUTROPHILS 86 (H) 32 - 75 %    LYMPHOCYTES 8 (L) 12 - 49 %    MONOCYTES 5 5 - 13 %    EOSINOPHILS 0 0 - 7 %    BASOPHILS 0 0 - 1 %    IMMATURE GRANULOCYTES 1 (H) 0.0 - 0.5 %    ABS. NEUTROPHILS 7.2 1.8 - 8.0 K/UL    ABS. LYMPHOCYTES 0.7 (L) 0.8 - 3.5 K/UL    ABS. MONOCYTES 0.4 0.0 - 1.0 K/UL    ABS. EOSINOPHILS 0.0 0.0 - 0.4 K/UL    ABS. BASOPHILS 0.0 0.0 - 0.1 K/UL    ABS. IMM.  GRANS. 0.1 (H) 0.00 - 0.04 K/UL    DF SMEAR SCANNED      RBC COMMENTS ANISOCYTOSIS  1+        RBC COMMENTS OVALOCYTES  1+        RBC COMMENTS POLYCHROMASIA  PRESENT        RBC COMMENTS MICROCYTOSIS  1+       TYPE & SCREEN    Collection Time: 05/08/21  5:26 AM   Result Value Ref Range    Crossmatch Expiration 05/11/2021,2359     ABO/Rh(D) A POSITIVE     Antibody screen NEG     Unit number [id number]     Blood component type RC LR     Unit division 00     Status of unit ISSUED     Crossmatch result Compatible    GLUCOSE, POC    Collection Time: 05/08/21  5:43 AM   Result Value Ref Range    Glucose (POC) 124 (H) 65 - 100 mg/dL    Performed by Kem Wong

## 2021-05-08 NOTE — PROGRESS NOTES
194: Bedside and Verbal shift change report given to Rm Colbert RN (oncoming nurse) by SONJA Rosales (offgoing nurse). Report included the following information SBAR, Kardex, ED Summary, Procedure Summary, Intake/Output, MAR, Recent Results, Cardiac Rhythm NSR/ST, Alarm Parameters , Quality Measures and Dual Neuro Assessment.    ~: Shift assessment completed; see flowsheet for details. Pt trached and sedated at this time opn ventilator on AC 20, 450 TV, 40% FiO2, and 10 PEEP. Weak withdraws noted in RUE and BLE. No movement noted in LUE. VSS stable on Vaso at 0.04 U and Dobutamine 2 mcg at this time. Afebrile. No signs of distress. Will continue to monitor. 2350Drchristal Sheppard NP at bedside to assess pt. Will continue to monitor. 261Symone Ferrer NP at bedside.  AC on vent to 18 due to recent ABG. 26: Rounds completed with Nadine Livingston NP. Hgb 6.4; 1 U RBCs ordered. NP ordered RN to give 40 mg lasix to increase UOP, decrease Fentanyl per order to 150 mcg and then start decreasing prop as tolerated. Continue to decrease pressors as tolerated. 18: RN called pharmacist to discuss vanc trough level of 27.5 and next vanc dose. No new orders received at this time. 2380: I U PRBCs started at this time. VSS. Will continue to monitor. 8568: Vaso paused. VSS.     0730: Bedside and Verbal shift change report given to SONJA Rosales (oncoming nurse) by Rm Colbert RN (offgoing nurse). Report included the following information SBAR, Kardex, ED Summary, Procedure Summary, Intake/Output, MAR, Recent Results, Cardiac Rhythm NSR/ST, Alarm Parameters , Quality Measures and Dual Neuro Assessment.

## 2021-05-08 NOTE — PROGRESS NOTES
Pharmacist Note - Vancomycin Dosing    A Trough Level resulted at 20.3 mcg/mL which was obtained 8 hrs post-dose. The extrapolated \"true\" trough is approximately 20.3 mcg/mL based on the patient's known kinetics. Goal trough: 15 - 20 mcg/mL     Plan: Change to 1000mgm q8h . Pharmacy will continue to monitor this patient daily for changes in clinical status and renal function.

## 2021-05-08 NOTE — PROGRESS NOTES
05/08/21 1725   Weaning Parameters   Spontaneous Breathing Trial Complete Yes  (Placed on SBT by Dr. Trip Huston.)   Resp Rate Observed 18   Ve 9.3      RSBI 38     Cuff deflated. Patient placed on Trach collar per Dr. Trip Huston order. 10L 60%. Suctioned copious gao secretions. MARIA LUISA Alanis at bedside.

## 2021-05-08 NOTE — PROGRESS NOTES
[facility]    ICU TEAM Progress Note    Name: Tad Dan   : 1995   MRN: [id number]   Date: 2021      I  Subjective:   Progress Note: 2021      Reason for ICU Admission: Transferred from [facility] post cardiac arrest on May 6    Interval history: From [facility]  Tad Dan is a 22 y.o. male with h/o Seizures and anxiety who presented to [facility] ED after a cardiac arrest at [facility] around 1500 today. Hx from chart and speaking with patient's sister via phone. Patient had recent hospitalization at the [facility] in March of this year. Initial hospitalization admission was at [facility] on 3/24 with AMS in setting of polysubstance and IVD use (cocaine, heroine, methamphetamines). He was found to have septic MRSA bacteremia, MSSA endocarditis. Imaging and MRI also found R PCA infarct and several abscesses and right temporal and parietal lobes. Infarcts thought to be due to septic emboli and patient had left sided residual weakness. Patient was transferred to [facility] on 2021 for evaluation for valve surgery. After transfer he was found to have a New R MCA infarct Also had a type 2 NSTEMI and tamponade with pericardiocentesis done prior to surgery. Did have cardiac arrest on day of surgery. Had a bioprosthetic Aortic Valve Replacement and Aortic Root Abscess Debridement done on 2021. Trached and sent to [facility] on 2021. Was undergoing PT and vent wean. Sister stated that patient was able to be off the vent for several hours over the last few days. He was sitting up and able to talk with valve over trach and could follow commands. Stated that he had severe weakness of the left upper ext, but was starting to gain some mobility in the lower left ext.  Patient's mother visited patient today prior to arrest. She said the staff told her that the patient was getting very anxious earlier in the day around 1 pm and had to be placed back on the ventilator, and then they had to sedate him while he was on the ventilator. The mother stated \" he did not look good\" and \" his eye were rolled back in his head\". About 30 minutes after she left she got call about arrest. Per ED note patient was found unresponsive around the 1500 hour and was pulseless. Two rounds of CPR and meds were given and patient was defibrillated x 1 before ROSC. Patient was hypoxic post arrest. Unknown down time prior to arrest. Patient was then sent to [facility] ED  Overnight Events:   5/8: Able to wean ventilator setting, oxygen and discontinue nitric oxide. Weaning sedation as tolerated. Pressors weaned off. On 2 mart dobutamine  5/7/21 -- Georges, EPI, MV  Active Problem List:     Problem List  Never Reviewed          Codes Class    Cardiac arrest ([facility] [address] ICD-10-CM: I46.9  ICD-9-CM: 427.5         Cerebral abscess (embolic) [facility]-XG: A11.1  ICD-9-CM: 324.0     Overview Signed 3/30/2021  4:51 PM by Francisco Golden MD     CT and MRI revealed evidence of a right posterior cerebellar artery infarct, 9 x 2.6 cm. He also had several areas of cerebral abscesses in the right temporal and parietal lobes measuring 2 x 1 cm, 1.3 x 0.9 cm and 0.6 x 5.5 mm with associated 3 mm midline shift. CSF showed no organisms to date, but elevated WBC.     3/24/21 CT Normal noncontrast head CT  3/29/21 MRI Extensive multiple acute infarcts throughout the bilateral cerebral hemispheres with a large hemorrhagic right PCA territory infarction measuring up to 7 cm. Some of the additional smaller infarcts also demonstrated hemorrhage. Findings are highly suspicious for septic emboli  3/30/21 CTA Occlusion of the right P2/P3. Infarction within the right occipital and temporal lobes.  Edema related to the right-sided small abscesses, better appreciated on MRI             Drug abuse, cocaine type [facility]) ICD-10-CM: F14.10  ICD-9-CM: 305.60     Overview Signed 3/29/2021  8:34 PM by Francisco Golden MD     3/24/21 UDS + methamphetamines and cocaine             Endocarditis due to methicillin susceptible Staphylococcus aureus (MSSA) ICD-10-CM: I33.0, B95.61  ICD-9-CM: 421.0, 041.11     Overview Addendum 3/30/2021  4:51 PM by Ciara Cuenca MD     3/24/21 ER Patient with a history of drug use anxiety and seizure not on medications for the past 4 months according to the sister presents for evaluation of altered mental status;  BC x2 + MRSA, WBC 11.6 P-67%, K 4.0 BUN 45, Creat 1.1, Trop I 3.89-> 13.60-> 10.52; UDS + cocaine and amphetamines; Lactic acid 4.2  3/24/21 Intubated      CT head negative      CT chest, neg PE, ? LV mass  3/25/21 ECHO EF 36%, LV 44/45, S/PW 8/9,  Mod AR  3/2/621 RAISSA EF 55%, Vegetations on AV 0.9cm LCC, small perforation 0.4cm LCC. No feg on MV/TV/PV, No thrombus in MELLY             Type 2 myocardial infarction [facility]) ICD-10-CM: I21. A1  ICD-9-CM: 410.90     Overview Signed 3/29/2021  8:37 PM by Ciara Cuenca MD     Trop I 3.89-> 13.60-> 10.52; Past Medical History:      has a past medical history of Anxiety and Seizure ([facility] [address] Past Surgical History:      has a past surgical history that includes hx tonsillectomy. Home Medications:     Prior to Admission medications    Medication Sig Start Date End Date Taking? Authorizing Provider   divalproex DR (Depakote) 500 mg tablet Take 500 mg by mouth three (3) times daily. Other, MD Maddy   levETIRAcetam (KEPPRA) 750 mg tablet Take 1 Tab by mouth two (2) times a day. 11/7/18   Codey Chavez PA-C       Allergies/Social/Family History: Allergies   Allergen Reactions    Codeine Unknown (comments)     Pt denies       Social History     Tobacco Use    Smoking status: Current Every Day Smoker     Packs/day: 1.00    Smokeless tobacco: Never Used   Substance Use Topics    Alcohol use: Yes     Comment: socially      No family history on file.     Review of Systems:     Not able to obtain due to patient medical condition    Objective: Vital Signs:  Visit Vitals  /74   Pulse 84   Temp 96.8 °F (36 °C)   Resp 21   Ht 5' 11\" (1.803 m)   Wt 59 kg (130 lb)   SpO2 98%   BMI 18.13 kg/m²      O2 Device: Ventilator, Tracheostomy Temp (24hrs), Av.5 °F (36.9 °C), Min:96.8 °F (36 °C), Max:99.1 °F (37.3 °C)           Intake/Output:     Intake/Output Summary (Last 24 hours) at 2021 1413  Last data filed at 2021 1352  Gross per 24 hour   Intake 2382.35 ml   Output 4042 ml   Net -1659.65 ml       Physical Exam:    General: No distress, appears stated age,Trached on vent and sedated  Eye:  conjunctivae/corneas clear. Pupils are round and reactive equally  Neurologic: Limited, withdraws to pain in upper and lower right ext, does not withdraw in upper left ext. Weakness in lower left ext. + cough/gag.,  Open his eyes to sternal rub  Lymphatic:  Cervical, supraclavicular, and axillary nodes normal.   Neck:  normal and no erythema or exudates noted. Lungs:  Coarse lung sounds bilaterally, irregular breathing on vent. Heart:  Tachycardiac rate and rhythm, S1, S2  Abdomen:  soft, non-tender.  Bowel sounds normal. No masses,  no organomegaly  Cardiovascular:  S1S2 present, pedal pulses normal and no edema  Skin:  Normal. and no rash or abnormalities    LABS AND  DATA: Personally reviewed  Recent Labs     21  1150 21  0526   WBC 9.2 8.4   HGB 7.8* 6.3*   HCT 25.5* 21.7*    221     Recent Labs     21  0327 21  1417 21  0448    139 139   K 3.5 4.7 3.9    109* 107   CO2 27 24 24   BUN 23* 21* 21*   CREA 0.41* 0.42* 0.71   * 160* 231*   CA 9.1 9.4 9.1   MG 1.9  --  1.9   PHOS 2.8  --  4.6     Recent Labs     21  0448 21  1800    127*   TP 6.2* 7.2   ALB 2.2* 2.6*   GLOB 4.0 4.6*     Recent Labs     21  0448 21  1800   INR 1.4* 1.3*   PTP 14.0* 13.3*   APTT  --  28.0      Recent Labs     21  1738   PHI 7.41 7.31*   PCO2I 36.1 51.8*   PO2I 78* 44* FIO2I 100 100     Recent Labs     05/06/21  1800   TROIQ <0.05       Hemodynamics:   PAP:   CO:     Wedge:   CI:     CVP:    SVR:       PVR:       Ventilator Settings:  Mode Rate Tidal Volume Pressure FiO2 PEEP   Assist control, Volume control   450 ml    40 % 10 cm H20     Peak airway pressure: 28 cm H2O    Minute ventilation: 10.8 l/min        MEDS: Reviewed    Chest X-Ray:  CXR Results  (Last 48 hours)               05/08/21 0822  XR CHEST PORT Final result    Impression:  Slight interval improvement of nonspecific diffuse bilateral   pulmonary opacification with other findings unchanged. Narrative:  EXAM: XR CHEST PORT       INDICATION: ett eval       COMPARISON: 5/7/2021       FINDINGS: A portable AP radiograph of the chest was obtained at 0812 hours. Tracheostomy tube in addition is at the thoracic inlet. Transesophageal tube   extends to stomach. Right IJ line extends to the cavoatrial junction. Median   sternotomy wires are again shown. An aortic valve artifact is again   demonstrated. Diffuse bilateral pulmonary opacification is slightly improved in the interval.   Small bilateral pleural effusions are shown. There is no pneumothorax. Cardiac   and mediastinal contours are stable. Rosalinda Abdul 05/07/21 0236  XR CHEST PORT Final result    Impression:  ET tube in satisfactory position. No other significant change. Narrative:  INDICATION:    ETT eval        EXAMINATION:  AP CHEST, PORTABLE       COMPARISON: 5/6/2021       FINDINGS: Single AP portable view of the chest at 209 hours demonstrates a   tracheostomy tube in satisfactory position. NG tube enters the stomach. Right IJ   line is unchanged. There is severe diffuse bilateral airspace disease, similar   to prior study. There is no pneumothorax. The cardiomediastinal silhouette is   stable. There is no new airspace disease.            05/06/21 1846  XR CHEST PORT Final result    Impression:  Right internal jugular central venous catheter appears to be in   satisfactory position. No evidence of placement related complication. Otherwise   stable appearance of the chest.           Narrative:  INDICATION: Central line placement       COMPARISON: 5/6/2021       FINDINGS: Single AP portable view of the chest obtained at 6:40 PM demonstrates   a new right internal jugular central venous catheter, which has its tip at the   level of the cavoatrial junction. There is otherwise no change in position of   the lines and tubes. The cardiomediastinal silhouette is stable. There is   unchanged diffuse bilateral airspace disease. No pneumothorax is seen. There is   no evidence of pleural effusion. 05/06/21 1739  XR CHEST PORT Final result    Impression:  Diffuse bilateral airspace disease, compatible with pneumonia. Narrative:  INDICATION: Hypoxia       FINDINGS: AP portable imaging of the chest performed at 5:37 PM demonstrates   heart size at the upper limits of normal. The patient is status post median   sternotomy and aortic valve replacement. Tracheostomy tube terminates at the   level of the clavicular heads. Left arm PICC line tip overlies the superior vena   cava. There is diffuse bilateral airspace disease. . No significant osseous   abnormalities are seen. · Echo May 7, 2021  · LV: Estimated LVEF is 20 - 25%. Normal wall thickness. Dilated left ventricle. Severely reduced systolic function. Inconclusive left ventricular diastolic function. · TV: Mild tricuspid valve regurgitation is present. · PA: Mild to moderate pulmonary hypertension. · RV: Reduced systolic function. · MV: Mitral valve non-specific thickening. Mild to moderate mitral valve regurgitation is present. Assessment and Plan:   1. Cardiac arrest, unknown time to ROSC. (2 rounds of meds and compressions, Defibrilated x 1)  2. Acute on chronic hypoxic respiratory failure,   3. Bilateral multilobar pneumonia  4. Cardiogenic shock:  On dobutamine  5. Acute anemia: No clear source of bleeding  6. Large territory sub-acute infarctions of the right. occipital lobe and right frontal lobe. 7. Chronic Seizure D/o.  8. 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 9. Hx Drug abuse, cocaine type  10. Recent Aortic Surgery at [facility] on April 16, 2021    Sedation vacation to assess neurological recovery. At baseline patient have left paraplegia. Wean mechanical ventilation as tolerated, significant improvement. Continue with broad-spectrum antibiotic. Start weaning stress dose steroid  On dobutamine drip. Appreciate cardiology. Keep Lovenox on hold. Transfer loops to keep hemoglobin above 7  Cruciate neurology. MRI ordered    SCD, GI prophylaxis and ventilator bundle  I met with the patient sister at bedside. I discussed the current condition with her. I answered all her questions to the best of my ability  DISPOSITION  Stay in ICU    CRITICAL CARE CONSULTANT NOTE  I had a face to face encounter with the patient, reviewed and interpreted patient data including clinical events, labs, images, vital signs, I/O's, and examined patient. I have discussed the case and the plan and management of the patient's care with the consulting services, the bedside nurses and the respiratory therapist.      NOTE OF PERSONAL INVOLVEMENT IN CARE   This patient has a high probability of imminent, clinically significant deterioration, which requires the highest level of preparedness to intervene urgently. I participated in the decision-making and personally managed or directed the management of the following life and organ supporting interventions that required my frequent assessment to treat or prevent imminent deterioration. I personally spent 90 minutes of critical care time. This is time spent at this critically ill patient's bedside actively involved in patient care as well as the coordination of care and discussions with the patient's family. This does not include any procedural time which has been billed separately. Storm Aamral M.D.   Staff Intensivist/Pulmonologist  [facility] Critical Care  5/8/2021

## 2021-05-09 ENCOUNTER — APPOINTMENT (OUTPATIENT)
Dept: MRI IMAGING | Age: 26
DRG: 161 | End: 2021-05-09
Attending: NURSE PRACTITIONER
Payer: MEDICAID

## 2021-05-09 LAB
ABO + RH BLD: NORMAL
ALBUMIN SERPL-MCNC: 2.7 G/DL (ref 3.5–5)
ALBUMIN/GLOB SERPL: 0.9 {RATIO} (ref 1.1–2.2)
ALP SERPL-CCNC: 66 U/L (ref 45–117)
ALT SERPL-CCNC: 19 U/L (ref 12–78)
ANION GAP SERPL CALC-SCNC: 3 MMOL/L (ref 5–15)
ANION GAP SERPL CALC-SCNC: 6 MMOL/L (ref 5–15)
ANION GAP SERPL CALC-SCNC: 7 MMOL/L (ref 5–15)
AST SERPL-CCNC: 14 U/L (ref 15–37)
BACTERIA SPEC CULT: NORMAL
BASOPHILS # BLD: 0 K/UL (ref 0–0.1)
BASOPHILS NFR BLD: 0 % (ref 0–1)
BILIRUB SERPL-MCNC: 0.8 MG/DL (ref 0.2–1)
BLD PROD TYP BPU: NORMAL
BLOOD GROUP ANTIBODIES SERPL: NORMAL
BPU ID: NORMAL
BUN SERPL-MCNC: 17 MG/DL (ref 6–20)
BUN SERPL-MCNC: 19 MG/DL (ref 6–20)
BUN SERPL-MCNC: 21 MG/DL (ref 6–20)
BUN/CREAT SERPL: 46 (ref 12–20)
BUN/CREAT SERPL: 49 (ref 12–20)
BUN/CREAT SERPL: 66 (ref 12–20)
CALCIUM SERPL-MCNC: 9.2 MG/DL (ref 8.5–10.1)
CALCIUM SERPL-MCNC: 9.3 MG/DL (ref 8.5–10.1)
CALCIUM SERPL-MCNC: 9.8 MG/DL (ref 8.5–10.1)
CHLORIDE SERPL-SCNC: 107 MMOL/L (ref 97–108)
CHLORIDE SERPL-SCNC: 110 MMOL/L (ref 97–108)
CHLORIDE SERPL-SCNC: 113 MMOL/L (ref 97–108)
CO2 SERPL-SCNC: 30 MMOL/L (ref 21–32)
CO2 SERPL-SCNC: 31 MMOL/L (ref 21–32)
CO2 SERPL-SCNC: 31 MMOL/L (ref 21–32)
CREAT SERPL-MCNC: 0.29 MG/DL (ref 0.7–1.3)
CREAT SERPL-MCNC: 0.35 MG/DL (ref 0.7–1.3)
CREAT SERPL-MCNC: 0.46 MG/DL (ref 0.7–1.3)
CROSSMATCH RESULT,%XM: NORMAL
DIFFERENTIAL METHOD BLD: ABNORMAL
EOSINOPHIL # BLD: 0 K/UL (ref 0–0.4)
EOSINOPHIL NFR BLD: 0 % (ref 0–7)
ERYTHROCYTE [DISTWIDTH] IN BLOOD BY AUTOMATED COUNT: 16.6 % (ref 11.5–14.5)
GLOBULIN SER CALC-MCNC: 3.1 G/DL (ref 2–4)
GLUCOSE BLD STRIP.AUTO-MCNC: 100 MG/DL (ref 65–100)
GLUCOSE BLD STRIP.AUTO-MCNC: 103 MG/DL (ref 65–100)
GLUCOSE BLD STRIP.AUTO-MCNC: 104 MG/DL (ref 65–100)
GLUCOSE BLD STRIP.AUTO-MCNC: 91 MG/DL (ref 65–100)
GLUCOSE BLD STRIP.AUTO-MCNC: 93 MG/DL (ref 65–100)
GLUCOSE SERPL-MCNC: 103 MG/DL (ref 65–100)
GLUCOSE SERPL-MCNC: 111 MG/DL (ref 65–100)
GLUCOSE SERPL-MCNC: 88 MG/DL (ref 65–100)
GRAM STN SPEC: NORMAL
HCT VFR BLD AUTO: 27.4 % (ref 36.6–50.3)
HGB BLD-MCNC: 8.3 G/DL (ref 12.1–17)
IMM GRANULOCYTES # BLD AUTO: 0.1 K/UL (ref 0–0.04)
IMM GRANULOCYTES NFR BLD AUTO: 1 % (ref 0–0.5)
LACTATE SERPL-SCNC: 0.8 MMOL/L (ref 0.4–2)
LYMPHOCYTES # BLD: 0.6 K/UL (ref 0.8–3.5)
LYMPHOCYTES NFR BLD: 5 % (ref 12–49)
MAGNESIUM SERPL-MCNC: 1.8 MG/DL (ref 1.6–2.4)
MAGNESIUM SERPL-MCNC: 1.9 MG/DL (ref 1.6–2.4)
MCH RBC QN AUTO: 26.5 PG (ref 26–34)
MCHC RBC AUTO-ENTMCNC: 30.3 G/DL (ref 30–36.5)
MCV RBC AUTO: 87.5 FL (ref 80–99)
MONOCYTES # BLD: 0.7 K/UL (ref 0–1)
MONOCYTES NFR BLD: 6 % (ref 5–13)
NEUTS SEG # BLD: 10.4 K/UL (ref 1.8–8)
NEUTS SEG NFR BLD: 88 % (ref 32–75)
NRBC # BLD: 0 K/UL (ref 0–0.01)
NRBC BLD-RTO: 0 PER 100 WBC
PHOSPHATE SERPL-MCNC: 1.9 MG/DL (ref 2.6–4.7)
PHOSPHATE SERPL-MCNC: 2.7 MG/DL (ref 2.6–4.7)
PLATELET # BLD AUTO: 271 K/UL (ref 150–400)
PMV BLD AUTO: 10.3 FL (ref 8.9–12.9)
POTASSIUM SERPL-SCNC: 2.2 MMOL/L (ref 3.5–5.1)
POTASSIUM SERPL-SCNC: 2.7 MMOL/L (ref 3.5–5.1)
POTASSIUM SERPL-SCNC: 3.3 MMOL/L (ref 3.5–5.1)
PROCALCITONIN SERPL-MCNC: 6.55 NG/ML
PROT SERPL-MCNC: 5.8 G/DL (ref 6.4–8.2)
RBC # BLD AUTO: 3.13 M/UL (ref 4.1–5.7)
RBC MORPH BLD: ABNORMAL
SERVICE CMNT-IMP: ABNORMAL
SERVICE CMNT-IMP: ABNORMAL
SERVICE CMNT-IMP: NORMAL
SODIUM SERPL-SCNC: 144 MMOL/L (ref 136–145)
SODIUM SERPL-SCNC: 147 MMOL/L (ref 136–145)
SODIUM SERPL-SCNC: 147 MMOL/L (ref 136–145)
SPECIMEN EXP DATE BLD: NORMAL
STATUS OF UNIT,%ST: NORMAL
UNIT DIVISION, %UDIV: 0
VALPROATE SERPL-MCNC: 82 UG/ML (ref 50–100)
WBC # BLD AUTO: 11.8 K/UL (ref 4.1–11.1)

## 2021-05-09 PROCEDURE — 83605 ASSAY OF LACTIC ACID: CPT

## 2021-05-09 PROCEDURE — 84100 ASSAY OF PHOSPHORUS: CPT

## 2021-05-09 PROCEDURE — 83735 ASSAY OF MAGNESIUM: CPT

## 2021-05-09 PROCEDURE — 82962 GLUCOSE BLOOD TEST: CPT

## 2021-05-09 PROCEDURE — 85025 COMPLETE CBC W/AUTO DIFF WBC: CPT

## 2021-05-09 PROCEDURE — 77030020291 HC FLEXSEAL FMS BMS -C

## 2021-05-09 PROCEDURE — 65610000006 HC RM INTENSIVE CARE

## 2021-05-09 PROCEDURE — 74011000258 HC RX REV CODE- 258: Performed by: NURSE PRACTITIONER

## 2021-05-09 PROCEDURE — 80164 ASSAY DIPROPYLACETIC ACD TOT: CPT

## 2021-05-09 PROCEDURE — 99232 SBSQ HOSP IP/OBS MODERATE 35: CPT | Performed by: PSYCHIATRY & NEUROLOGY

## 2021-05-09 PROCEDURE — 74011250637 HC RX REV CODE- 250/637: Performed by: PSYCHIATRY & NEUROLOGY

## 2021-05-09 PROCEDURE — 74011250636 HC RX REV CODE- 250/636: Performed by: ANESTHESIOLOGY

## 2021-05-09 PROCEDURE — 74011250636 HC RX REV CODE- 250/636

## 2021-05-09 PROCEDURE — 97161 PT EVAL LOW COMPLEX 20 MIN: CPT

## 2021-05-09 PROCEDURE — 74011250637 HC RX REV CODE- 250/637: Performed by: INTERNAL MEDICINE

## 2021-05-09 PROCEDURE — 84145 PROCALCITONIN (PCT): CPT

## 2021-05-09 PROCEDURE — 36592 COLLECT BLOOD FROM PICC: CPT

## 2021-05-09 PROCEDURE — 80053 COMPREHEN METABOLIC PANEL: CPT

## 2021-05-09 PROCEDURE — 80048 BASIC METABOLIC PNL TOTAL CA: CPT

## 2021-05-09 PROCEDURE — 74011000250 HC RX REV CODE- 250: Performed by: NURSE PRACTITIONER

## 2021-05-09 PROCEDURE — 94003 VENT MGMT INPAT SUBQ DAY: CPT

## 2021-05-09 PROCEDURE — 97530 THERAPEUTIC ACTIVITIES: CPT

## 2021-05-09 PROCEDURE — 77030018798 HC PMP KT ENTRL FED COVD -A

## 2021-05-09 PROCEDURE — 70551 MRI BRAIN STEM W/O DYE: CPT

## 2021-05-09 PROCEDURE — 74011250636 HC RX REV CODE- 250/636: Performed by: INTERNAL MEDICINE

## 2021-05-09 PROCEDURE — P9047 ALBUMIN (HUMAN), 25%, 50ML: HCPCS | Performed by: ANESTHESIOLOGY

## 2021-05-09 PROCEDURE — 36415 COLL VENOUS BLD VENIPUNCTURE: CPT

## 2021-05-09 PROCEDURE — 74011250636 HC RX REV CODE- 250/636: Performed by: NURSE PRACTITIONER

## 2021-05-09 PROCEDURE — 74011250637 HC RX REV CODE- 250/637: Performed by: NURSE PRACTITIONER

## 2021-05-09 RX ORDER — HYDRALAZINE HYDROCHLORIDE 20 MG/ML
10 INJECTION INTRAMUSCULAR; INTRAVENOUS
Status: DISCONTINUED | OUTPATIENT
Start: 2021-05-09 | End: 2021-06-08 | Stop reason: HOSPADM

## 2021-05-09 RX ORDER — MAGNESIUM SULFATE 1 G/100ML
1 INJECTION INTRAVENOUS ONCE
Status: COMPLETED | OUTPATIENT
Start: 2021-05-09 | End: 2021-05-09

## 2021-05-09 RX ORDER — MIDAZOLAM HYDROCHLORIDE 1 MG/ML
4 INJECTION, SOLUTION INTRAMUSCULAR; INTRAVENOUS ONCE
Status: COMPLETED | OUTPATIENT
Start: 2021-05-09 | End: 2021-05-09

## 2021-05-09 RX ORDER — LANOLIN ALCOHOL/MO/W.PET/CERES
3 CREAM (GRAM) TOPICAL
Status: DISCONTINUED | OUTPATIENT
Start: 2021-05-09 | End: 2021-06-08 | Stop reason: HOSPADM

## 2021-05-09 RX ORDER — POTASSIUM CHLORIDE 29.8 MG/ML
20 INJECTION INTRAVENOUS
Status: COMPLETED | OUTPATIENT
Start: 2021-05-10 | End: 2021-05-10

## 2021-05-09 RX ORDER — POTASSIUM CHLORIDE 29.8 MG/ML
20 INJECTION INTRAVENOUS
Status: COMPLETED | OUTPATIENT
Start: 2021-05-09 | End: 2021-05-09

## 2021-05-09 RX ORDER — MIDAZOLAM HYDROCHLORIDE 1 MG/ML
INJECTION, SOLUTION INTRAMUSCULAR; INTRAVENOUS
Status: COMPLETED
Start: 2021-05-09 | End: 2021-05-09

## 2021-05-09 RX ORDER — HEPARIN SODIUM 5000 [USP'U]/ML
5000 INJECTION, SOLUTION INTRAVENOUS; SUBCUTANEOUS EVERY 8 HOURS
Status: DISCONTINUED | OUTPATIENT
Start: 2021-05-09 | End: 2021-06-08 | Stop reason: HOSPADM

## 2021-05-09 RX ORDER — CARVEDILOL 6.25 MG/1
6.25 TABLET ORAL 2 TIMES DAILY WITH MEALS
Status: DISCONTINUED | OUTPATIENT
Start: 2021-05-09 | End: 2021-05-12

## 2021-05-09 RX ORDER — MAGNESIUM SULFATE 1 G/100ML
1 INJECTION INTRAVENOUS ONCE
Status: COMPLETED | OUTPATIENT
Start: 2021-05-10 | End: 2021-05-10

## 2021-05-09 RX ADMIN — POTASSIUM CHLORIDE 20 MEQ: 400 INJECTION, SOLUTION INTRAVENOUS at 18:32

## 2021-05-09 RX ADMIN — VANCOMYCIN HYDROCHLORIDE 1000 MG: 1 INJECTION, POWDER, LYOPHILIZED, FOR SOLUTION INTRAVENOUS at 03:27

## 2021-05-09 RX ADMIN — POTASSIUM CHLORIDE 20 MEQ: 400 INJECTION, SOLUTION INTRAVENOUS at 23:42

## 2021-05-09 RX ADMIN — LEVETIRACETAM 1000 MG: 100 SOLUTION ORAL at 16:17

## 2021-05-09 RX ADMIN — POTASSIUM CHLORIDE 20 MEQ: 400 INJECTION, SOLUTION INTRAVENOUS at 16:32

## 2021-05-09 RX ADMIN — VALPROIC ACID 500 MG: 250 SOLUTION ORAL at 12:36

## 2021-05-09 RX ADMIN — MAGNESIUM SULFATE HEPTAHYDRATE 1 G: 1 INJECTION, SOLUTION INTRAVENOUS at 23:42

## 2021-05-09 RX ADMIN — POTASSIUM CHLORIDE 20 MEQ: 400 INJECTION, SOLUTION INTRAVENOUS at 06:27

## 2021-05-09 RX ADMIN — Medication 10 ML: at 14:58

## 2021-05-09 RX ADMIN — Medication 10 ML: at 05:24

## 2021-05-09 RX ADMIN — HEPARIN SODIUM 5000 UNITS: 5000 INJECTION INTRAVENOUS; SUBCUTANEOUS at 12:37

## 2021-05-09 RX ADMIN — MIDAZOLAM 4 MG: 1 INJECTION INTRAMUSCULAR; INTRAVENOUS at 09:30

## 2021-05-09 RX ADMIN — POTASSIUM CHLORIDE 20 MEQ: 400 INJECTION, SOLUTION INTRAVENOUS at 05:18

## 2021-05-09 RX ADMIN — HEPARIN SODIUM 5000 UNITS: 5000 INJECTION INTRAVENOUS; SUBCUTANEOUS at 20:51

## 2021-05-09 RX ADMIN — POTASSIUM CHLORIDE 20 MEQ: 400 INJECTION, SOLUTION INTRAVENOUS at 19:32

## 2021-05-09 RX ADMIN — HYDROCORTISONE SODIUM SUCCINATE 50 MG: 100 INJECTION, POWDER, FOR SOLUTION INTRAMUSCULAR; INTRAVENOUS at 00:35

## 2021-05-09 RX ADMIN — SACUBITRIL AND VALSARTAN 1 TABLET: 24; 26 TABLET, FILM COATED ORAL at 12:37

## 2021-05-09 RX ADMIN — ONDANSETRON 4 MG: 2 INJECTION INTRAMUSCULAR; INTRAVENOUS at 23:42

## 2021-05-09 RX ADMIN — VALPROIC ACID 500 MG: 250 SOLUTION ORAL at 05:23

## 2021-05-09 RX ADMIN — POTASSIUM CHLORIDE 20 MEQ: 400 INJECTION, SOLUTION INTRAVENOUS at 17:36

## 2021-05-09 RX ADMIN — POTASSIUM PHOSPHATE, MONOBASIC AND POTASSIUM PHOSPHATE, DIBASIC: 224; 236 INJECTION, SOLUTION, CONCENTRATE INTRAVENOUS at 06:27

## 2021-05-09 RX ADMIN — VANCOMYCIN HYDROCHLORIDE 1000 MG: 1 INJECTION, POWDER, LYOPHILIZED, FOR SOLUTION INTRAVENOUS at 11:14

## 2021-05-09 RX ADMIN — PIPERACILLIN AND TAZOBACTAM 3.38 G: 3; .375 INJECTION, POWDER, LYOPHILIZED, FOR SOLUTION INTRAVENOUS at 00:35

## 2021-05-09 RX ADMIN — ACETAMINOPHEN 650 MG: 325 TABLET ORAL at 00:35

## 2021-05-09 RX ADMIN — ALBUMIN (HUMAN) 12.5 G: 0.25 INJECTION, SOLUTION INTRAVENOUS at 00:35

## 2021-05-09 RX ADMIN — HYDROCORTISONE SODIUM SUCCINATE 50 MG: 100 INJECTION, POWDER, FOR SOLUTION INTRAMUSCULAR; INTRAVENOUS at 08:12

## 2021-05-09 RX ADMIN — PIPERACILLIN AND TAZOBACTAM 3.38 G: 3; .375 INJECTION, POWDER, LYOPHILIZED, FOR SOLUTION INTRAVENOUS at 08:12

## 2021-05-09 RX ADMIN — SACUBITRIL AND VALSARTAN 1 TABLET: 24; 26 TABLET, FILM COATED ORAL at 22:53

## 2021-05-09 RX ADMIN — ALBUMIN (HUMAN) 12.5 G: 0.25 INJECTION, SOLUTION INTRAVENOUS at 10:34

## 2021-05-09 RX ADMIN — VANCOMYCIN HYDROCHLORIDE 1000 MG: 1 INJECTION, POWDER, LYOPHILIZED, FOR SOLUTION INTRAVENOUS at 20:51

## 2021-05-09 RX ADMIN — VALPROIC ACID 500 MG: 250 SOLUTION ORAL at 22:53

## 2021-05-09 RX ADMIN — MIDAZOLAM HYDROCHLORIDE 4 MG: 1 INJECTION, SOLUTION INTRAMUSCULAR; INTRAVENOUS at 09:30

## 2021-05-09 RX ADMIN — FAMOTIDINE 20 MG: 10 INJECTION, SOLUTION INTRAVENOUS at 08:12

## 2021-05-09 RX ADMIN — CHLORHEXIDINE GLUCONATE 15 ML: 0.12 RINSE ORAL at 22:55

## 2021-05-09 RX ADMIN — LEVETIRACETAM 1000 MG: 100 SOLUTION ORAL at 05:20

## 2021-05-09 RX ADMIN — MAGNESIUM SULFATE HEPTAHYDRATE 1 G: 1 INJECTION, SOLUTION INTRAVENOUS at 05:07

## 2021-05-09 RX ADMIN — FAMOTIDINE 20 MG: 10 INJECTION, SOLUTION INTRAVENOUS at 20:51

## 2021-05-09 RX ADMIN — ALBUMIN (HUMAN) 12.5 G: 0.25 INJECTION, SOLUTION INTRAVENOUS at 05:31

## 2021-05-09 RX ADMIN — CHLORHEXIDINE GLUCONATE 15 ML: 0.12 RINSE ORAL at 08:08

## 2021-05-09 RX ADMIN — PIPERACILLIN AND TAZOBACTAM 3.38 G: 3; .375 INJECTION, POWDER, LYOPHILIZED, FOR SOLUTION INTRAVENOUS at 16:17

## 2021-05-09 RX ADMIN — CARVEDILOL 6.25 MG: 6.25 TABLET, FILM COATED ORAL at 16:18

## 2021-05-09 RX ADMIN — CASTOR OIL AND BALSAM, PERU: 788; 87 OINTMENT TOPICAL at 17:08

## 2021-05-09 RX ADMIN — Medication 3 MG: at 21:07

## 2021-05-09 RX ADMIN — CASTOR OIL AND BALSAM, PERU: 788; 87 OINTMENT TOPICAL at 08:12

## 2021-05-09 RX ADMIN — POTASSIUM BICARBONATE 40 MEQ: 782 TABLET, EFFERVESCENT ORAL at 05:01

## 2021-05-09 NOTE — PROGRESS NOTES
Transition of Care Plan:       RUR: 28% GLOS:   2 LOS:  3   Core M  Disposition:  Return to [facility] - vent weaning   Transportation at Discharge:  ALS   IM Medicare letter:  N/A  Caregiver Contact: Bg Gomez [phone]     Shelbie Poole [phone]      3/24/21-4/1/21  [facility] Septic MRSA bacteremia, MSSA endocarditis, Right PCA Infarct, septic emboli    4/1/21-4/29/21  [facility]     Aortic valve surgery, developed tamponade, 4/16  Aortic Root Abscess Debridement, cardiac arrest, trached  4/29/21-5/6/21  [facility]     Vent weaning  5/6/21   [facility]     Reason for Admission:    Cardiac Arrest at LTAC                  RUR Score:    28% Moderate Risk                  **Followed by Physician/Treatment team at [facility]**    PCP: First and Last name:   None     Name of Practice:    Are you a current patient: Yes/No:    Approximate date of last visit:    Can you participate in a virtual visit if needed:     Do you (patient/family) have any concerns for transition/discharge? Mother, Bg Harkins asked that he not go back to [facility], explained that options are limited due to vent weaning. Straight Medicaid - no SNF benefit, Inpatient Rehab - would need firm disposition plan and insurance would need to approve. Plan for utilizing home health:   TBD    Current Advanced Directive/Advance Care Plan:  Full Code    Healthcare Decision Maker:   Click here to complete [address] including selection of the Healthcare Decision Maker Relationship (ie \"Primary\")            Primary Decision Maker: Cuauhtemoc Galicia - Mother - [phone]    Secondary Decision Maker: Farideh Alcarazgena - Sister - [phone]    Transition of Care Plan:          Abbe Joel is a 22year old male to [facility] ED from [facility] after cardiac arrest.  He has lengthy hospital course, [facility] from [facility] to [facility], discharged to [facility] 4/29 for vent weaning.   Mother reports he had been speaking and able to follow commands. CT of head - large territory acute infarctions of the right occipital lobe and right frontal lobe. Verified face sheet with mother, Breann Mo, she is tearful about his decline. Care Management Interventions  PCP Verified by CM: No  Transition of Care Consult (CM Consult): Other, Long Term Care([facility] sent from [place] [id number], cardiac arrest 5/6/21)  MyChart Signup: No  Discharge Durable Medical Equipment: No  Health Maintenance Reviewed: Yes  Physical Therapy Consult: Yes  Occupational Therapy Consult: Yes  Speech Therapy Consult: Yes  Current Support Network:  Other(Mother/Fiona Aguilar [phone], sister Dieter Paulino [phone], lived in [place] )    Tita Blanton, [address] [facility], [address] [place] [address]  ED Care Management  [phone]

## 2021-05-09 NOTE — PROGRESS NOTES
2245 Pt placed back on ventilator due to increasing resp distress    Vent settings:  Spontaneous   PS 8  PEEP 8  60%    Vitals:    RR 30  Sats 96%

## 2021-05-09 NOTE — PROGRESS NOTES
Problem: Mobility Impaired (Adult and Pediatric)  Goal: *Acute Goals and Plan of Care (Insert Text)  Description: FUNCTIONAL STATUS PRIOR TO ADMISSION: Patient was independent prior to March 2021. Pt has been hospitalized and recently at [facility]. Per mother, pt was ambulating with therapy at [facility]. HOME SUPPORT PRIOR TO ADMISSION: The patient lived alone prior to hospitalization. Physical Therapy Goals  Initiated 5/9/2021  1. Patient will move from supine to sit and sit to supine , scoot up and down, and roll side to side in bed with moderate assistance  within 7 day(s). 2.  Patient will tolerate sitting EOB with moderate assistance for approx 3-5 minutes within 7 day(s). 2.  Patient will transfer from bed to chair and chair to bed with maximal assistance using the least restrictive device within 7 day(s). 3.  Patient will perform sit to stand with maximal assistance within 7 day(s). 4.  Patient will ambulate with maximal assistance for 5 feet with the least restrictive device within 7 day(s). Outcome: Progressing Towards Goal   PHYSICAL THERAPY EVALUATION  Patient: Alessandra Patel (36 y.o. male)  Date: 5/9/2021  Primary Diagnosis: Cardiac arrest [facility]) [I46.9]        Precautions:   Fall      ASSESSMENT  Based on the objective data described below, the patient presents with decreased activity tolerance, balance deficits, and weakness. Pt with extensive h/o hospital admissions and admitted from [facility]. Pt with h/o R PCA infarct and recent R MCA infarct presenting with L sided weakness. Pt was weaned to trach collar at [facility] and currently on ventilator s/p cardiac arrest. Per chart, pt was on trach collar for several hours yesterday. Pt's mother present and supportive. Pt tolerated sitting EOB with max A 2/2 posterior trunk leaning toward R side. Pt able to rotate head yet tended to laterally flex toward R. Vitals stable throughout, pt assisted back to supine after approximately 3-5 minutes. Transitioned bed into modified chair position to promote upright sitting tolerance. Current Level of Function Impacting Discharge (mobility/balance): max A x2 for bed mobility    Functional Outcome Measure: The patient scored Total: 0/100 on the Barthel Index which is indicative of 100% impaired ability to care for basic self needs/dependency on others. Other factors to consider for discharge: fall risk, 2 assist, from [facility], independent prior to march      Patient will benefit from skilled therapy intervention to address the above noted impairments. PLAN :  Recommendations and Planned Interventions: bed mobility training, transfer training, gait training, therapeutic exercises, neuromuscular re-education, patient and family training/education, and therapeutic activities      Frequency/Duration: Patient will be followed by physical therapy:  5 times a week to address goals. Recommendation for discharge: (in order for the patient to meet his/her long term goals)  Therapy 3 hours per day 5-7 days per week pending progress. If not, then SNF    This discharge recommendation:  Has not yet been discussed the attending provider and/or case management    IF patient discharges home will need the following DME: hospital bed, mechanical lift, and wheelchair         SUBJECTIVE:   Patient did not verbally communicate. Pt able to shake head yes/no and alert during session.      OBJECTIVE DATA SUMMARY:   HISTORY:    Past Medical History:   Diagnosis Date    Anxiety     Seizure [facility])      Past Surgical History:   Procedure Laterality Date    HX TONSILLECTOMY         Personal factors and/or comorbidities impacting plan of care: PMH, h/o CVA    Home Situation  Home Environment: Other (comment)([place])    EXAMINATION/PRESENTATION/DECISION MAKING:   Critical Behavior:  Neurologic State: Eyes open spontaneously, Alert, Restless, Drowsy  Orientation Level: Unable to verbalize  Cognition: Decreased command following Hearing:     Skin:  intact  Edema: none noted  Range Of Motion:  AROM: Grossly decreased, non-functional           PROM: Grossly decreased, non-functional           Strength:    Strength: Grossly decreased, non-functional    Tone & Sensation:   Tone: Abnormal    Sensation: Impaired    Coordination:  Coordination: Grossly decreased, non-functional  Vision:      Functional Mobility:  Bed Mobility:     Supine to Sit: Maximum assistance;Assist x2  Sit to Supine: Maximum assistance;Assist x2     Balance:   Sitting: Impaired; With support  Sitting - Static: Poor (constant support)    Functional Measure:  Barthel Index:    Bathin  Bladder: 0  Bowels: 0  Groomin  Dressin  Feedin  Mobility: 0  Stairs: 0  Toilet Use: 0  Transfer (Bed to Chair and Back): 0  Total: 0/100       The Barthel ADL Index: Guidelines  1. The index should be used as a record of what a patient does, not as a record of what a patient could do. 2. The main aim is to establish degree of independence from any help, physical or verbal, however minor and for whatever reason. 3. The need for supervision renders the patient not independent. 4. A patient's performance should be established using the best available evidence. Asking the patient, friends/relatives and nurses are the usual sources, but direct observation and common sense are also important. However direct testing is not needed. 5. Usually the patient's performance over the preceding 24-48 hours is important, but occasionally longer periods will be relevant. 6. Middle categories imply that the patient supplies over 50 per cent of the effort. 7. Use of aids to be independent is allowed. Grzegorz Reza., Barthel, DMariettaW. (3894). Functional evaluation: the Barthel Index. [facility] (14)2. CHIQUIS Dukes, Devin Troy., Silvino Mar, [facility] ().  Measuring the change indisability after inpatient rehabilitation; comparison of the responsiveness of the Barthel Index and Functional [place] Measure. Journal of Neurology, Neurosurgery, and Psychiatry, 66(4), 141-363. [place] RAMILA Lovett, ALLIE Foley, & Berenice Pandey M.A. (2004.) Assessment of post-stroke quality of life in cost-effectiveness studies: The usefulness of the Barthel Index and the EuroQoL-5D. Quality of Life Research, 15, 360-81        Physical Therapy Evaluation Charge Determination   History Examination Presentation Decision-Making   HIGH Complexity :3+ comorbidities / personal factors will impact the outcome/ POC  LOW Complexity : 1-2 Standardized tests and measures addressing body structure, function, activity limitation and / or participation in recreation  MEDIUM Complexity : Evolving with changing characteristics  Other outcome measures barthel index  HIGH       Based on the above components, the patient evaluation is determined to be of the following complexity level: LOW     Activity Tolerance:   Fair   Pt on ventilator via trach (FiO2 60%, PEEP 5)  Vitals stable throughout    After treatment patient left in no apparent distress:   Supine in bed, Call bell within reach, and Side rails x 3    COMMUNICATION/EDUCATION:   The patients plan of care was discussed with: Registered nurse and Rehabilitation technician. Fall prevention education was provided and the patient/caregiver indicated understanding., Patient/family have participated as able in goal setting and plan of care. , and Patient/family agree to work toward stated goals and plan of care.     Thank you for this referral.  Julianne Madrid, PT, DPT   Time Calculation: 19 mins

## 2021-05-09 NOTE — PROGRESS NOTES
Cardiology Progress Note                                        Admit Date: 5/6/2021    Assessment/Plan:     1. Sp cardiac arrest post op 4/17/21,   cardiac arrest Vfib 5/6/21  ekg rbbb qt 460 msec. ICD rec for secondary prevention if life expectancy > 1yr.    2. Hx:  4/16/21 at [facility] bioprosthetic AVR and root abscess debridement. He also had a pericardiocentesis due to tamponade prior to surgery  3. Recent endocardititis 3/24/21 Staph  4. CM  Ef 25%   5.  right MCA infarct and several abscesses in the right temporal and parietal lobes. Infarcts were thought to be due to septic emboli. He had left sided residual weakness. cta 5/6/21:  multiple acute infarctions throughout the  right cerebral hemisphere, involving much of the occipital lobe, as well as much  of the frontal lobe. There is an additional superior right frontoparietal  infarct  6.  severe multilevel consolidation throughout both lungs consistent with severe multilobar PNA. 7. PNA:  Severe multilevel consolidation throughout both lungs, consistent with  severe multilobar pneumonia. Continue supportive care stop dobutamine  Will begin GDMT   State entresto and coreg  Add aldactone , add dapagliflozin on dc as not on formulary     Neurology following         Giancarlo Bolden is a 22 y.o. male with     PROBLEM LIST:  Patient Active Problem List    Diagnosis Date Noted    Cardiac arrest ([facility] 05/06/2021    Cerebral abscess (embolic) 36/50/8866    Drug abuse, cocaine type ([facility] 03/29/2021    Endocarditis due to methicillin susceptible Staphylococcus aureus (MSSA) 03/25/2021    [facility] 03/24/2021         Subjective:     Giancarlo Bolden does not respond.     Visit Vitals  BP (!) 149/97   Pulse 79   Temp 99.1 °F (37.3 °C)   Resp 22   Ht 5' 11\" (1.803 m)   Wt 59 kg (130 lb)   SpO2 100%   BMI 18.13 kg/m²       Intake/Output Summary (Last 24 hours) at 5/9/2021 1057  Last data filed at 5/9/2021 1000  Gross per 24 hour Intake 1962.1 ml   Output 2637 ml   Net -674.9 ml       Objective:      Physical Exam:  HEENT: Perrla, EOMI  Neck: No JVD,  No thyroidmegaly  Resp: CTA bilaterally;  No wheezes or rales  CV: RRR s1s2 No murmur no s3  Abd:Soft, Nontender  Ext: No edema  Neuro: unresponsive on vent   Skin: Warm, Dry, Intact  Pulses: 2+ DP/PT/Rad      Telemetry: normal sinus rhythm    Current Facility-Administered Medications   Medication Dose Route Frequency    hydrALAZINE (APRESOLINE) 20 mg/mL injection 10 mg  10 mg IntraVENous Q6H PRN    0.9% sodium chloride infusion 250 mL  250 mL IntraVENous PRN    hydrocortisone Sod Succ (PF) (SOLU-CORTEF) injection 50 mg  50 mg IntraVENous Q8H    vancomycin (VANCOCIN) 1,000 mg in 0.9% sodium chloride 250 mL (VIAL-MATE)  1,000 mg IntraVENous Q8H    dexmedeTOMidine in 0.9 % NaCl (PRECEDEX) 400 mcg/100 mL (4 mcg/mL) infusion soln  0.1-1.5 mcg/kg/hr IntraVENous TITRATE    balsam peru-castor oiL (VENELEX) ointment   Topical BID    levETIRAcetam (KEPPRA) oral solution 1,000 mg  1,000 mg Oral Q12H    DOBUTamine (DOBUTREX) 500 mg/250 mL (2,000 mcg/mL) infusion  0-10 mcg/kg/min IntraVENous TITRATE    albumin human 25% (BUMINATE) solution 12.5 g  12.5 g IntraVENous Q6H    sodium chloride (NS) flush 5-40 mL  5-40 mL IntraVENous Q8H    sodium chloride (NS) flush 5-40 mL  5-40 mL IntraVENous PRN    acetaminophen (TYLENOL) tablet 650 mg  650 mg Oral Q6H PRN    Or    acetaminophen (TYLENOL) suppository 650 mg  650 mg Rectal Q6H PRN    polyethylene glycol (MIRALAX) packet 17 g  17 g Oral DAILY PRN    ondansetron (ZOFRAN) injection 4 mg  4 mg IntraVENous Q6H PRN    famotidine (PF) (PEPCID) 20 mg in 0.9% sodium chloride 10 mL injection  20 mg IntraVENous BID    [Held by provider] enoxaparin (LOVENOX) injection 40 mg  40 mg SubCUTAneous DAILY    chlorhexidine (ORAL CARE KIT) 0.12 % mouthwash 15 mL  15 mL Oral Q12H    fentaNYL citrate (PF) injection 25 mcg  25 mcg IntraVENous Q4H PRN    piperacillin-tazobactam (ZOSYN) 3.375 g in 0.9% sodium chloride (MBP/ADV) 100 mL MBP  3.375 g IntraVENous Q8H    glucose chewable tablet 16 g  4 Tab Oral PRN    dextrose (D50W) injection syrg 12.5-25 g  25-50 mL IntraVENous PRN    glucagon (GLUCAGEN) injection 1 mg  1 mg IntraMUSCular PRN    insulin lispro (HUMALOG) injection   SubCUTAneous Q6H    Vancomycin- pharmacy to dose   Other Rx Dosing/Monitoring    valproic acid (as sodium salt) (DEPAKENE) 250 mg/5 mL (5 mL) oral solution 500 mg  500 mg Oral Q8H         Data Review:   Labs:    Recent Results (from the past 24 hour(s))   CBC W/O DIFF    Collection Time: 05/08/21 11:50 AM   Result Value Ref Range    WBC 9.2 4.1 - 11.1 K/uL    RBC 2.89 (L) 4.10 - 5.70 M/uL    HGB 7.8 (L) 12.1 - 17.0 g/dL    HCT 25.5 (L) 36.6 - 50.3 %    MCV 88.2 80.0 - 99.0 FL    MCH 27.0 26.0 - 34.0 PG    MCHC 30.6 30.0 - 36.5 g/dL    RDW 16.6 (H) 11.5 - 14.5 %    PLATELET 441 179 - 947 K/uL    MPV 10.9 8.9 - 12.9 FL    NRBC 0.0 0  WBC    ABSOLUTE NRBC 0.00 0.00 - 0.01 K/uL   GLUCOSE, POC    Collection Time: 05/08/21  1:57 PM   Result Value Ref Range    Glucose (POC) 111 (H) 65 - 100 mg/dL    Performed by Irma Almaguer, TROUGH    Collection Time: 05/08/21  4:35 PM   Result Value Ref Range    Vancomycin,trough 20.3 (HH) 5.0 - 10.0 ug/mL    Reported dose date NOT PROVIDED      Reported dose time: NOT PROVIDED      Reported dose: NOT PROVIDED UNITS   GLUCOSE, POC    Collection Time: 05/08/21  5:41 PM   Result Value Ref Range    Glucose (POC) 119 (H) 65 - 100 mg/dL    Performed by [address] [place] [address], POC    Collection Time: 05/09/21 12:44 AM   Result Value Ref Range    Glucose (POC) 93 65 - 100 mg/dL    Performed by Shane Baptiste    MAGNESIUM    Collection Time: 05/09/21  3:16 AM   Result Value Ref Range    Magnesium 1.8 1.6 - 2.4 mg/dL   PHOSPHORUS    Collection Time: 05/09/21  3:16 AM   Result Value Ref Range    Phosphorus 1.9 (L) 2.6 - 4.7 MG/DL   LACTIC ACID Collection Time: 05/09/21  3:16 AM   Result Value Ref Range    Lactic acid 0.8 0.4 - 2.0 MMOL/L   PROCALCITONIN    Collection Time: 05/09/21  3:16 AM   Result Value Ref Range    Procalcitonin 6.55 ng/mL   CBC WITH AUTOMATED DIFF    Collection Time: 05/09/21  3:16 AM   Result Value Ref Range    WBC 11.8 (H) 4.1 - 11.1 K/uL    RBC 3.13 (L) 4.10 - 5.70 M/uL    HGB 8.3 (L) 12.1 - 17.0 g/dL    HCT 27.4 (L) 36.6 - 50.3 %    MCV 87.5 80.0 - 99.0 FL    MCH 26.5 26.0 - 34.0 PG    MCHC 30.3 30.0 - 36.5 g/dL    RDW 16.6 (H) 11.5 - 14.5 %    PLATELET 170 432 - 101 K/uL    MPV 10.3 8.9 - 12.9 FL    NRBC 0.0 0  WBC    ABSOLUTE NRBC 0.00 0.00 - 0.01 K/uL    NEUTROPHILS 88 (H) 32 - 75 %    LYMPHOCYTES 5 (L) 12 - 49 %    MONOCYTES 6 5 - 13 %    EOSINOPHILS 0 0 - 7 %    BASOPHILS 0 0 - 1 %    IMMATURE GRANULOCYTES 1 (H) 0.0 - 0.5 %    ABS. NEUTROPHILS 10.4 (H) 1.8 - 8.0 K/UL    ABS. LYMPHOCYTES 0.6 (L) 0.8 - 3.5 K/UL    ABS. MONOCYTES 0.7 0.0 - 1.0 K/UL    ABS. EOSINOPHILS 0.0 0.0 - 0.4 K/UL    ABS. BASOPHILS 0.0 0.0 - 0.1 K/UL    ABS. IMM.  GRANS. 0.1 (H) 0.00 - 0.04 K/UL    DF SMEAR SCANNED      RBC COMMENTS ANISOCYTOSIS  1+       METABOLIC PANEL, BASIC    Collection Time: 05/09/21  3:16 AM   Result Value Ref Range    Sodium 144 136 - 145 mmol/L    Potassium 2.2 (LL) 3.5 - 5.1 mmol/L    Chloride 107 97 - 108 mmol/L    CO2 31 21 - 32 mmol/L    Anion gap 6 5 - 15 mmol/L    Glucose 103 (H) 65 - 100 mg/dL    BUN 21 (H) 6 - 20 MG/DL    Creatinine 0.46 (L) 0.70 - 1.30 MG/DL    BUN/Creatinine ratio 46 (H) 12 - 20      GFR est AA >60 >60 ml/min/1.73m2    GFR est non-AA >60 >60 ml/min/1.73m2    Calcium 9.8 8.5 - 10.1 MG/DL   VALPROIC ACID    Collection Time: 05/09/21  3:16 AM   Result Value Ref Range    Valproic acid 82 50 - 100 ug/ml   GLUCOSE, POC    Collection Time: 05/09/21  5:30 AM   Result Value Ref Range    Glucose (POC) 104 (H) 65 - 100 mg/dL    Performed by Charla Ortega

## 2021-05-09 NOTE — PROGRESS NOTES
Bedside shift change report given to Graylon Prader RN (oncoming nurse) by Jose Caldwell RN (offgoing nurse). Report included the following information SBAR, Kardex, ED Summary, Procedure Summary, Intake/Output, MAR, Recent Results, Med Rec Status, Cardiac Rhythm NSR, Alarm Parameters , Quality Measures and Dual Neuro Assessment. 2879: Pt taken to MRI with RN,  RT and transport. 1010: Pt returned to ICU 20 without complications. 1154: Arterial line removed per Dr. Naresh Calles. Bedside shift change report given to Isis Pena (oncoming nurse) by Graylon Prader RN (offgoing nurse). Report included the following information SBAR, Kardex, ED Summary, Procedure Summary, Intake/Output, MAR, Recent Results, Med Rec Status, Cardiac Rhythm NSR w/ BBB, Alarm Parameters , Quality Measures and Dual Neuro Assessment.

## 2021-05-09 NOTE — PROGRESS NOTES
[facility]    ICU TEAM Progress Note    Name: Chantal Presley   : 1995   MRN: [id number]   Date: 2021      I  Subjective:   Progress Note: 2021      Reason for ICU Admission: [facility] on May 6    Interval history:From [facility] a 22 y. o. male with h/o Seizures and anxiety who presented to [facility] ED after a cardiac arrest at [facility] around 1500 today. Hx from chart and speaking with patient's sister via phone. Patient had recent hospitalization at the [facility] in March of this year. Initial hospitalization admission was at [facility] on 3/24 with AMS in setting of polysubstance and IVD use (cocaine, heroine, methamphetamines). He was found to have septic MRSA bacteremia, MSSA endocarditis. Imaging and MRI also found R PCA infarct and several abscesses and right temporal and parietal lobes. Infarcts thought to be due to septic emboli and patient had left sided residual weakness. Patient was transferred to [facility] on 2021 for evaluation for valve surgery. After transfer he was found to have a New R MCA infarct Also had a type 2 NSTEMI and tamponade with pericardiocentesis done prior to surgery. Did have cardiac arrest on day of surgery. Had a bioprosthetic Aortic Valve Replacement and Aortic Root Abscess Debridement done on 2021.  Trached and sent to [facility] on 2021. Was undergoing PT and vent wean. Sister stated that patient was able to be off the vent for several hours over the last few days. He was sitting up and able to talk with valve over trach and could follow commands. Stated that he had severe weakness of the left upper ext, but was starting to gain some mobility in the lower left ext.  Patient's mother visited patient today prior to arrest. She said the staff told her that the patient was getting very anxious earlier in the day around 1 pm and had to be placed back on the ventilator, and then they had to sedate him while he was on the ventilator. The mother stated \" he did not look good\" and \" his eye were rolled back in his head\". About 30 minutes after she left she got call about arrest. Per ED note patient was found unresponsive around the 1500 hour and was pulseless. Two rounds of CPR and meds were given and patient was defibrillated x 1 before ROSC. Patient was hypoxic post arrest. Unknown down time prior to arrest. Patient was then sent to [facility] ED    Overnight Events:   5/9: Was able to be on trach collar for few hours yesterday, had to go back on ventilator due to increased work of breathing. Required Precedex for agitation. The morning of May 9 he went to MRI/MRA. Off pressors. Dobutamine challenge of the morning of 9 9.  5/8: Able to wean ventilator setting, oxygen and discontinue nitric oxide. Weaning sedation as tolerated. Pressors weaned off. On 2 mart dobutamine  5/7/21 -- Georges, EPI, MV  Active Problem List:     Problem List  Never Reviewed          Codes Class    Cardiac arrest ([facility] [address] ICD-10-CM: I46.9  ICD-9-CM: 427.5         Cerebral abscess (embolic) TSS-30-UY: S65.1  ICD-9-CM: 324.0     Overview Signed 3/30/2021  4:51 PM by Beatriz Land MD     CT and MRI revealed evidence of a right posterior cerebellar artery infarct, 9 x 2.6 cm. He also had several areas of cerebral abscesses in the right temporal and parietal lobes measuring 2 x 1 cm, 1.3 x 0.9 cm and 0.6 x 5.5 mm with associated 3 mm midline shift. CSF showed no organisms to date, but elevated WBC.     3/24/21 CT Normal noncontrast head CT  3/29/21 MRI Extensive multiple acute infarcts throughout the bilateral cerebral hemispheres with a large hemorrhagic right PCA territory infarction measuring up to 7 cm. Some of the additional smaller infarcts also demonstrated hemorrhage. Findings are highly suspicious for septic emboli  3/30/21 CTA Occlusion of the right P2/P3.   Infarction within the right occipital and temporal lobes. Edema related to the right-sided small abscesses, better appreciated on MRI             Drug abuse, cocaine type [facility]) ICD-10-CM: F14.10  ICD-9-CM: 305.60     Overview Signed 3/29/2021  8:34 PM by Isaac Bhatt MD     3/24/21 UDS + methamphetamines and cocaine             Endocarditis due to methicillin susceptible Staphylococcus aureus (MSSA) ICD-10-CM: I33.0, B95.61  ICD-9-CM: 421.0, 041.11     Overview Addendum 3/30/2021  4:51 PM by Isaac Bhatt MD     3/24/21 ER Patient with a history of drug use anxiety and seizure not on medications for the past 4 months according to the sister presents for evaluation of altered mental status;  BC x2 + MRSA, WBC 11.6 P-67%, K 4.0 BUN 45, Creat 1.1, Trop I 3.89-> 13.60-> 10.52; UDS + cocaine and amphetamines; Lactic acid 4.2  3/24/21 Intubated      CT head negative      CT chest, neg PE, ? LV mass  3/25/21 ECHO EF 36%, LV 44/45, S/PW 8/9,  Mod AR  3/2/621 RAISSA EF 55%, Vegetations on AV 0.9cm LCC, small perforation 0.4cm LCC. No feg on MV/TV/PV, No thrombus in MELLY             Type 2 myocardial infarction [facility]) ICD-10-CM: I21. A1  ICD-9-CM: 410.90     Overview Signed 3/29/2021  8:37 PM by Isaac Bhatt MD     Trop I 3.89-> 13.60-> 10.52; Past Medical History:      has a past medical history of Anxiety and Seizure ([facility] [address] Past Surgical History:      has a past surgical history that includes hx tonsillectomy. Home Medications:     Prior to Admission medications    Medication Sig Start Date End Date Taking? Authorizing Provider   divalproex DR (Depakote) 500 mg tablet Take 500 mg by mouth three (3) times daily. Other, MD Maddy   levETIRAcetam (KEPPRA) 750 mg tablet Take 1 Tab by mouth two (2) times a day. 11/7/18   Aloma Human, PA-C       Allergies/Social/Family History:      Allergies   Allergen Reactions    Codeine Unknown (comments)     Pt denies       Social History     Tobacco Use    Smoking status: Current Every Day Smoker     Packs/day: 1.00    Smokeless tobacco: Never Used   Substance Use Topics    Alcohol use: Yes     Comment: socially      No family history on file. Review of Systems:     Not able to obtain due to patient medical condition    Objective:   Vital Signs:  Visit Vitals  BP (!) 138/110   Pulse 60   Temp 99.1 °F (37.3 °C)   Resp 16   Ht 5' 11\" (1.803 m)   Wt 59 kg (130 lb)   SpO2 100%   BMI 18.13 kg/m²    O2 Flow Rate (L/min): 10 l/min O2 Device: Tracheostomy, Ventilator Temp (24hrs), Av.8 °F (37.1 °C), Min:96.8 °F (36 °C), Max:100 °F (37.8 °C)           Intake/Output:     Intake/Output Summary (Last 24 hours) at 2021 1150  Last data filed at 2021 1114  Gross per 24 hour   Intake 2278.75 ml   Output 2674 ml   Net -395.25 ml       Physical Exam:    General: No distress, appears stated age,Trached on vent and sedated  Eye:  conjunctivae/corneas clear. Pupils are round and reactive equally  Neurologic: Limited, withdraws to pain in upper and lower right and left ext, good strength on right side with purposeful movement at times, nod his head to command + cough/gag.,  Lymphatic:  Cervical, supraclavicular, and axillary nodes normal.   Neck:  normal and no erythema or exudates noted. Lungs:  Coarse lung sounds bilaterally, irregular breathing on vent. Heart:  Tachycardiac rate and rhythm, S1, S2  Abdomen:  soft, non-tender.  Bowel sounds normal. No masses,  no organomegaly  Cardiovascular:  S1S2 present, pedal pulses normal and no edema  Skin:  Normal. and no rash or abnormalities    LABS AND  DATA: Personally reviewed  Recent Labs     21  03121  1150   WBC 11.8* 9.2   HGB 8.3* 7.8*   HCT 27.4* 25.5*    264     Recent Labs     21  0316 21  0327    139   K 2.2* 3.5    106   CO2 31 27   BUN 21* 23*   CREA 0.46* 0.41*   * 125*   CA 9.8 9.1   MG 1.8 1.9   PHOS 1.9* 2.8     Recent Labs     21  0448 21  1800    127*   TP 6.2* 7.2 ALB 2.2* 2.6*   GLOB 4.0 4.6*     Recent Labs     05/07/21  0448 05/06/21  1800   INR 1.4* 1.3*   PTP 14.0* 13.3*   APTT  --  28.0      Recent Labs     05/06/21 2051 05/06/21  1738   PHI 7.41 7.31*   PCO2I 36.1 51.8*   PO2I 78* 44*   FIO2I 100 100     Recent Labs     05/06/21  1800   TROIQ <0.05       Hemodynamics:   PAP:   CO:     Wedge:   CI:     CVP:    SVR:       PVR:       Ventilator Settings:  Mode Rate Tidal Volume Pressure FiO2 PEEP   Assist control, Volume control   450 ml  8 cm H2O 60 % 5 cm H20     Peak airway pressure: 26 cm H2O    Minute ventilation: 8.91 l/min        MEDS: Reviewed    Chest X-Ray:  CXR Results  (Last 48 hours)               05/08/21 0822  XR CHEST PORT Final result    Impression:  Slight interval improvement of nonspecific diffuse bilateral   pulmonary opacification with other findings unchanged. Narrative:  EXAM: XR CHEST PORT       INDICATION: ett eval       COMPARISON: 5/7/2021       FINDINGS: A portable AP radiograph of the chest was obtained at 0812 hours. Tracheostomy tube in addition is at the thoracic inlet. Transesophageal tube   extends to stomach. Right IJ line extends to the cavoatrial junction. Median   sternotomy wires are again shown. An aortic valve artifact is again   demonstrated. Diffuse bilateral pulmonary opacification is slightly improved in the interval.   Small bilateral pleural effusions are shown. There is no pneumothorax. Cardiac   and mediastinal contours are stable. .                  Assessment and Plan:   1. Cardiac arrest, unknown time to ROSC. (2 rounds of meds and compressions, Defibrilated x 1)  2. Acute on chronic hypoxic respiratory failure,   3. Bilateral multilobar pneumonia  4. Cardiogenic shock: On dobutamine  5. Acute anemia: No clear source of bleeding  6. Large territory sub-acute infarctions of the right. occipital lobe and right frontal lobe.   7. Chronic Seizure D/o.  8. 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 9. Hx Drug abuse, cocaine type  10. Recent Aortic Surgery at [facility] on April 16, 2021     Wean sedation to the minimum. No on Precedex, showing good neurological recovery. Wean mechanical ventilation as tolerated, significant improvement. Continue with broad-spectrum antibiotic. Discontinue stress dose steroid on the ninth  Dobutamine off. Appreciate cardiology. No further of GI bleed, no further drop in hemoglobin. I will switch prophylactic Lovenox to heparin subcu. Cruciate neurology. Pending MRI results     SCD, GI prophylaxis and ventilator bundle      DISPOSITION  Stay in ICU    CRITICAL CARE CONSULTANT NOTE  I had a face to face encounter with the patient, reviewed and interpreted patient data including clinical events, labs, images, vital signs, I/O's, and examined patient. I have discussed the case and the plan and management of the patient's care with the consulting services, the bedside nurses and the respiratory therapist.      NOTE OF PERSONAL INVOLVEMENT IN CARE   This patient has a high probability of imminent, clinically significant deterioration, which requires the highest level of preparedness to intervene urgently. I participated in the decision-making and personally managed or directed the management of the following life and organ supporting interventions that required my frequent assessment to treat or prevent imminent deterioration. I personally spent 40 minutes of critical care time. This is time spent at this critically ill patient's bedside actively involved in patient care as well as the coordination of care and discussions with the patient's family. This does not include any procedural time which has been billed separately. Margarito Massey M.D.   Staff Intensivist/Pulmonologist  [facility]  5/9/2021

## 2021-05-09 NOTE — PROGRESS NOTES
1945: Bedside and Verbal shift change report given to Estee Sanches RN (oncoming nurse) by SONJA Cruz (offgoing nurse). Report included the following information SBAR, Kardex, ED Summary, Procedure Summary, Intake/Output, MAR, Recent Results, Cardiac Rhythm NSR, Alarm Parameters , Quality Measures and Dual Neuro Assessment.    ~2000: Shift assessment completed; pt alert in bed on tracheal collar on 10 L and 60% FiO2. VSS. Moves all extremities spon; decreased command following, nods appropriately, PERRLA. Currently on Dobutamine at 2 mcg. Afebrile. No signs of distress at this time. Will continue too monitor. 2225: Pt coughing up copious amts of secretions. Increased WOB; sats in the 80's. SUNIL Dumont notified; ordered RN to start precedex, give dose of PRN Fentanyl and place pt back on vent. RRT called. 2235: RRT at bedside. Pt placed on vent on 60% FiO2. Will continue to monitor. 0410Arty Oar, NP notified of decreased UOP and increasing BP. NP ordered RN to drop dobutamine to 1 mcg if SBP>160. Continue to monitor for now. 0730: Bedside and Verbal shift change report given to SONJA Cruz (oncoming nurse) by Estee Sanches RN (offgoing nurse).  Report included the following information SBAR, Kardex, ED Summary, Procedure Summary, Intake/Output, MAR, Recent Results, Cardiac Rhythm NSR, Alarm Parameters , Quality Measures and Dual Neuro Assessment. '

## 2021-05-09 NOTE — CONSULTS
Neurology Progress Note  Sana Talbert NP    Patient: Yeny Jacome MRN: [id number]  SSN: [id number]    YOB: 1995  Age: 22 y.o. Sex: male      Chief Complaint: Cardiac arrest    HPI:  Yeny Jacome is a 22 y.o. male who presented to the [facility] ED after cardiac arrest at [facility] at approximately 1500 on 5/6/21. He has a past medical history of seizures, anxiety, polysubstance abuse with IVD use. He was admitted to [facility] on 3/24/21 with AMS in the setting of polysubstance abuse positive for cocaine, heroine, and methamphetamines. He was found to have septic MRSA bacteremia, MSSA endocarditis. Imaging and MRI found Right PCA infarct and several abscesses in the right temporal and parietal lobes. Infarcts were thought to be due to septic emboli. He had left sided residual weakness. He was transferred to [address] [place] [address] on 4/1 for aortic valve surgery. After transfer he was found to have a new right MCA infarct and also had a type 2 NSTEMI and tamponade with pericardiocentesis done prior to surgery. He had bioprosthetic Aortic Valve Replacement and Aortic Root Abscess Debridement done on 4/16/2021 and suffered cardiac arrest the day of the surgery. He was trached and sent to [facility] on 4/29/21 and has been weaning off of the vent. He has been speaking, following commands and has begun moving his left foot. Still with left arm weakness. His last known well time on 5/6 was just before 1300. He was doing well off of the vent but he was getting anxious and they put him back on and sedated him. His mother stated that around 1500 his eyes were rolled back in his head. She got a call soon after she left saying that he had arrested. Per ER note the patient arrested around 1500 and was pulseless. He had 2 rounds of CPR and meds. He was defibrillated x 1 before ROSC was achieved.  He was hypoxic post arrest. Patient was sent to [facility] ED and admitted to the ICU for further evaluation and treatment. He was sent down for [facility] and CTA chest. CTA chest showed no PE. There was severe multilevel consolidation throughout both lungs consistent with severe multilobar PNA. He has cardiomegaly with small pericardial effusion and moderate bilateral pleural effusions. His CTH showed large territory acute infarctions of the right occipital lobe and right frontal lobe. No ICH. No hydrocephalus. Results discussed with Radiologist Dr. Jacki Martinez and Dr. Latoya Geller with [facility]. Dr. Latoya Geller felt the right frontal infarct was more chronic and the right PCA was late subacute 3-4 days. The basilar artery did not look dense nor the venous sinuses. Dr. Latoya Geller indicated that CTA H/N and CTP was not necessary at this time as patient would not be a candidate for thrombectomy due to his instability and state of his lungs. Subjective:   Patient now awake, alert and following commands. Past Medical History:   Diagnosis Date    Anxiety     Seizure [facility])      No family history on file. Social History     Tobacco Use    Smoking status: Current Every Day Smoker     Packs/day: 1.00    Smokeless tobacco: Never Used   Substance Use Topics    Alcohol use: Yes     Comment: socially      Prior to Admission Medications   Prescriptions Last Dose Informant Patient Reported? Taking?   divalproex DR (Depakote) 500 mg tablet   Yes No   Sig: Take 500 mg by mouth three (3) times daily. levETIRAcetam (KEPPRA) 750 mg tablet   No No   Sig: Take 1 Tab by mouth two (2) times a day. Facility-Administered Medications: None       Allergies   Allergen Reactions    Codeine Unknown (comments)     Pt denies        Review of Systems:  Review of systems not obtained due to patient factors.         Objective:     Vitals:    05/09/21 0000 05/09/21 0100 05/09/21 0200 05/09/21 0206   BP: 125/79 128/71 108/84    Pulse: 96 90 92 60   Resp: 24 30 29 19   Temp: 100 °F (37.8 °C)      SpO2: 99% 100% 100% 100%   Weight:       Height:          Physical Exam:  GENERAL: NAD, trached on vent. SKIN: Warm, dry, color appropriate for ethnicity. Bandage on L great toe noted. Neurologic Exam:  Mental Status:  Awake, alert, nodding yes/no,     Language:    Aphasic. Cranial Nerves:   Pupils 3 mm, equal, round and reactive to light. Blinks to threat bilaterally. Motor:    Spontaneous movement against gravity noted in RUE and RLE      Left arm flaccid. Generalized atrophy noted in limbs       Sensation:    Withdraws to noxious stimuli in RUE and BLE. No withdrawal in LUE. Reflexes:    Positive Babinskis    Coordination & Gait: Not assessed due to condition. Labs:  Lab Results   Component Value Date/Time    WBC 9.2 05/08/2021 11:50 AM    HGB 7.8 (L) 05/08/2021 11:50 AM    HCT 25.5 (L) 05/08/2021 11:50 AM    PLATELET 941 74/68/9571 11:50 AM    MCV 88.2 05/08/2021 11:50 AM      Lab Results   Component Value Date/Time    Sodium 139 05/08/2021 03:27 AM    Potassium 3.5 05/08/2021 03:27 AM    Chloride 106 05/08/2021 03:27 AM    CO2 27 05/08/2021 03:27 AM    Anion gap 6 05/08/2021 03:27 AM    Glucose 125 (H) 05/08/2021 03:27 AM    BUN 23 (H) 05/08/2021 03:27 AM    Creatinine 0.41 (L) 05/08/2021 03:27 AM    BUN/Creatinine ratio 56 (H) 05/08/2021 03:27 AM    GFR est AA >60 05/08/2021 03:27 AM    GFR est non-AA >60 05/08/2021 03:27 AM    Calcium 9.1 05/08/2021 03:27 AM     Lab Results   Component Value Date/Time     (H) 03/24/2021 08:15 PM    Troponin-I 10.500 ([facility]) 03/25/2021 06:25 AM    Troponin-I, Qt. <0.05 05/06/2021 06:00 PM       Imaging:  CT Results (maximum last 3): Results from [place] encounter on 05/06/21   CTA CHEST W OR W WO CONT    Narrative INDICATION:   cardiac arrest eval for PE     COMPARISON:  None    TECHNIQUE:  Following the uneventful intravenous administration of 100 cc Isovue  292, thin helical axial images were obtained through the chest. Postprocessing  was performed. 3D image postprocessing was performed.     CT dose reduction was achieved through the use of a standardized protocol  tailored for this examination and automatic exposure control for dose  modulation. FINDINGS: Study is limited by patient motion. A tracheostomy tube is in  satisfactory position. There are lines present in appropriate position. There is mild mediastinal lymphadenopathy. The heart is enlarged. There is a  small pericardial effusion. No filling defect is seen within the pulmonary arterial system to suggest  pulmonary embolus. The aorta is normal in caliber. There is severe multilevel groundglass opacification throughout both lungs, as  well as severe multilobar bilateral consolidation. There is no pneumothorax. There are moderate bilateral pleural effusions. Limited evaluation of the upper abdomen demonstrates no abnormality. There are  median sternotomy wires. Impression 1. No evidence of pulmonary embolus. 2.  Severe multilevel consolidation throughout both lungs, consistent with  severe multilobar pneumonia. 3. Cardiomegaly with small pericardial effusion. 4. Moderate bilateral pleural effusions. CT HEAD WO CONT    Narrative EXAM: CT HEAD WO CONT    INDICATION: unresponsive post arrest    COMPARISON: None. CONTRAST: None. TECHNIQUE: Unenhanced CT of the head was performed using 5 mm images. Brain and  bone windows were generated. Coronal and sagittal reformats. CT dose reduction  was achieved through use of a standardized protocol tailored for this  examination and automatic exposure control for dose modulation. FINDINGS:  There is no hydrocephalus. There are multiple acute infarctions throughout the  right cerebral hemisphere, involving much of the occipital lobe, as well as much  of the frontal lobe. There is an additional superior right frontoparietal  infarct. There is no intracranial hemorrhage or midline shift. The basilar  cisterns are open. The bone windows demonstrate no abnormalities.  The visualized portions of the  paranasal sinuses and mastoid air cells are clear. Impression 1. Large territory acute infarctions of the right occipital lobe and right  frontal lobe. 2. No intracranial hemorrhage. 3. No hydrocephalus. Results from Hospital Encounter encounter on 03/24/21   CTA HEAD    Narrative CTA OF THE HEAD     INDICATION: septic emboli, evaluate for septic aneurysms    COMPARISON: No relevant studies. TECHNIQUE: Noncontrast CT the head performed followed by CTA of the head with   intravenous contrast. Multiplanar two-dimensional and three-dimensional maximum  intensity projection reformats performed and reviewed. All CT exams at this facility use one or more dose reduction techniques  including automatic exposure control, mA/kV adjustment per patient's size, or  iterative reconstruction technique. FINDINGS:    Noncontrast CT demonstrates edema related to the patient's right occipital lobe  infarction. Superimposed vasogenic edema related to patient's microabscesses  disease. There is effacement of the posterior horn of the right lateral  ventricle with asymmetric dilatation of the temporal horn, similar to prior MRI. Postcontrast images demonstrate diffuse leptomeningeal enhancement, better  appreciated on MRI. Multiple areas of early developing abscesses and cerebritis  better appreciated on MRI. There is occlusion of the P2 MP3 segments of the right posterior cerebral artery  seen best on the 3-D reconstructed images. Visualized portions of the left  posterior cerebral artery, basilar artery, and superior cerebellar arteries  appear patent. Bilateral intracranial segments of the internal carotid arteries, bilateral  middle cerebral arteries, and bilateral anterior cerebral arteries are patent. Anterior communicating artery appears patent. Left posterior commuting artery  appears patent. There is no mycotic aneurysm.  Hyperdensity along the right  anterior falx seen best on image 13 series 11 corresponds to calcification on  noncontrast study. Impression IMPRESSION:     1. No evidence of mycotic aneurysm. 2.  Occlusion of the right P2/P3. 3. Infarction within the right occipital and temporal lobes. Edema related to  the right-sided small abscesses, better appreciated on MRI. No definite new  areas of enhancement. 4.  Diffuse leptomeningeal enhancement compatible with patient's history of  meningitis. Assessment:     Hospital Problems  Never Reviewed          Codes Class Noted POA    Cardiac arrest [facility]) ICD-10-CM: I46.9  ICD-9-CM: 427.5  5/6/2021 Unknown            Plan:   1.) CVA. Large territory subacute infarctions of the right occiptal lobe and right frontal lobe on CTH. Most likely caused by septic emboli.              - NIHSS of 30 on initial exam              - q1 neuro checks              - A1C and lipid panel pending, LDL goal <70              - MRI brain ordered              - ECHO LVEF 20-25%. Dilated left ventricle. Severely reduced systolic function. Mild tricuspid valve regurg. Mild to moderate pulmonary HTN,. Reduced systolic function. Mild to moderate mitral valve regurg and non-specific thickening. Mild aortic stenosis. No evidence of pericardial effusion.               - PT/OT/SLP evals              - Stroke education              - SBP goal 100-160              - Home meds per hospitalist      2. )Hx of seizure disorder.    -Continue Keppra 1000 through tube bid. Depakene 500 mg q 8 hours. -Valproic acid level 25 on 5/7. Repeat level on 5/9 pending. -EEG performed 5/8 showed no epileptiform activity or seizures.    -Seizure precautions    3.) S/P cardiac arrest.    -Sedation weaned and patient alert and following commands.    -Supportive care per Intensivist.        I have discussed the diagnosis and the intended plan as seen in the above orders with Dr. Genaro Bunn, NP and the primary RN. Further recommendations to follow.           Thank you for this consult and participating in the care of this patient. Signed By: Lula Graham NP     May 9, 2021        ==============================================================      Neurology staff:    I examined the patient at the bedside. I agree with the plans and documentation above from NP Mandeep Jiménez. As of yesterday, Hazel Flores is now following commands. Sedation has been weaned. EEG yesterday without seizures. MRI still pending. On exam he is awake and alert. He can follow commands squeezing with his right hand easily. He can move the right leg easily. Left side trace movement at best.  28-year-old gentleman who has had a very complicated neurologic history who has had new subacute right-sided infarcts. MRI to be done to see the extent of ischemic burden were dealing with. He probably needs anticoagulation for stroke prevention moving forward but need to see extent of ischemia first.  Aspirin TN is appropriate if no contraindications. Continue the current anticonvulsants. No seizures.   Following  [address], DO  Neurologist  Diplomate, American Board of Psychiatry and Neurology  Board Certified, Adult Neurology and Brain Injury Medicine

## 2021-05-10 ENCOUNTER — APPOINTMENT (OUTPATIENT)
Dept: GENERAL RADIOLOGY | Age: 26
DRG: 161 | End: 2021-05-10
Attending: INTERNAL MEDICINE
Payer: MEDICAID

## 2021-05-10 LAB
ANION GAP SERPL CALC-SCNC: 5 MMOL/L (ref 5–15)
BACTERIA SPEC CULT: ABNORMAL
BACTERIA SPEC CULT: ABNORMAL
BASOPHILS # BLD: 0 K/UL (ref 0–0.1)
BASOPHILS NFR BLD: 0 % (ref 0–1)
BUN SERPL-MCNC: 16 MG/DL (ref 6–20)
BUN/CREAT SERPL: 50 (ref 12–20)
CALCIUM SERPL-MCNC: 9 MG/DL (ref 8.5–10.1)
CHLORIDE SERPL-SCNC: 111 MMOL/L (ref 97–108)
CO2 SERPL-SCNC: 30 MMOL/L (ref 21–32)
CREAT SERPL-MCNC: 0.32 MG/DL (ref 0.7–1.3)
DATE LAST DOSE: ABNORMAL
DIFFERENTIAL METHOD BLD: ABNORMAL
EOSINOPHIL # BLD: 0 K/UL (ref 0–0.4)
EOSINOPHIL NFR BLD: 0 % (ref 0–7)
ERYTHROCYTE [DISTWIDTH] IN BLOOD BY AUTOMATED COUNT: 16 % (ref 11.5–14.5)
GLUCOSE BLD STRIP.AUTO-MCNC: 83 MG/DL (ref 65–100)
GLUCOSE BLD STRIP.AUTO-MCNC: 84 MG/DL (ref 65–100)
GLUCOSE BLD STRIP.AUTO-MCNC: 93 MG/DL (ref 65–100)
GLUCOSE SERPL-MCNC: 92 MG/DL (ref 65–100)
GRAM STN SPEC: ABNORMAL
HCT VFR BLD AUTO: 31.5 % (ref 36.6–50.3)
HGB BLD-MCNC: 9.5 G/DL (ref 12.1–17)
IMM GRANULOCYTES # BLD AUTO: 0 K/UL (ref 0–0.04)
IMM GRANULOCYTES NFR BLD AUTO: 1 % (ref 0–0.5)
L PNEUMO1 AG UR QL IA: NEGATIVE
LYMPHOCYTES # BLD: 0.8 K/UL (ref 0.8–3.5)
LYMPHOCYTES NFR BLD: 10 % (ref 12–49)
MAGNESIUM SERPL-MCNC: 2.1 MG/DL (ref 1.6–2.4)
MCH RBC QN AUTO: 26.8 PG (ref 26–34)
MCHC RBC AUTO-ENTMCNC: 30.2 G/DL (ref 30–36.5)
MCV RBC AUTO: 88.7 FL (ref 80–99)
MONOCYTES # BLD: 0.9 K/UL (ref 0–1)
MONOCYTES NFR BLD: 10 % (ref 5–13)
NEUTS SEG # BLD: 6.8 K/UL (ref 1.8–8)
NEUTS SEG NFR BLD: 79 % (ref 32–75)
NRBC # BLD: 0 K/UL (ref 0–0.01)
NRBC BLD-RTO: 0 PER 100 WBC
PHOSPHATE SERPL-MCNC: 2.2 MG/DL (ref 2.6–4.7)
PLATELET # BLD AUTO: 314 K/UL (ref 150–400)
PMV BLD AUTO: 10.7 FL (ref 8.9–12.9)
POTASSIUM SERPL-SCNC: 3.4 MMOL/L (ref 3.5–5.1)
PROCALCITONIN SERPL-MCNC: 3.1 NG/ML
RBC # BLD AUTO: 3.55 M/UL (ref 4.1–5.7)
REPORTED DOSE,DOSE: ABNORMAL UNITS
REPORTED DOSE/TIME,TMG: ABNORMAL
SERVICE CMNT-IMP: ABNORMAL
SERVICE CMNT-IMP: NORMAL
SODIUM SERPL-SCNC: 146 MMOL/L (ref 136–145)
SPECIMEN SOURCE: NORMAL
VANCOMYCIN TROUGH SERPL-MCNC: 11.8 UG/ML (ref 5–10)
WBC # BLD AUTO: 8.5 K/UL (ref 4.1–11.1)

## 2021-05-10 PROCEDURE — 84100 ASSAY OF PHOSPHORUS: CPT

## 2021-05-10 PROCEDURE — 83735 ASSAY OF MAGNESIUM: CPT

## 2021-05-10 PROCEDURE — 94003 VENT MGMT INPAT SUBQ DAY: CPT

## 2021-05-10 PROCEDURE — 65610000006 HC RM INTENSIVE CARE

## 2021-05-10 PROCEDURE — 99233 SBSQ HOSP IP/OBS HIGH 50: CPT | Performed by: PSYCHIATRY & NEUROLOGY

## 2021-05-10 PROCEDURE — 74011250636 HC RX REV CODE- 250/636: Performed by: PSYCHIATRY & NEUROLOGY

## 2021-05-10 PROCEDURE — 74011000258 HC RX REV CODE- 258: Performed by: NURSE PRACTITIONER

## 2021-05-10 PROCEDURE — 74011250637 HC RX REV CODE- 250/637: Performed by: INTERNAL MEDICINE

## 2021-05-10 PROCEDURE — 74011000258 HC RX REV CODE- 258: Performed by: PSYCHIATRY & NEUROLOGY

## 2021-05-10 PROCEDURE — 80202 ASSAY OF VANCOMYCIN: CPT

## 2021-05-10 PROCEDURE — 84145 PROCALCITONIN (PCT): CPT

## 2021-05-10 PROCEDURE — 85025 COMPLETE CBC W/AUTO DIFF WBC: CPT

## 2021-05-10 PROCEDURE — 82962 GLUCOSE BLOOD TEST: CPT

## 2021-05-10 PROCEDURE — 74018 RADEX ABDOMEN 1 VIEW: CPT

## 2021-05-10 PROCEDURE — 74011250636 HC RX REV CODE- 250/636: Performed by: INTERNAL MEDICINE

## 2021-05-10 PROCEDURE — 74011250636 HC RX REV CODE- 250/636: Performed by: NURSE PRACTITIONER

## 2021-05-10 PROCEDURE — 74011250637 HC RX REV CODE- 250/637: Performed by: NURSE PRACTITIONER

## 2021-05-10 PROCEDURE — 80048 BASIC METABOLIC PNL TOTAL CA: CPT

## 2021-05-10 PROCEDURE — 74011000250 HC RX REV CODE- 250: Performed by: NURSE PRACTITIONER

## 2021-05-10 PROCEDURE — 36415 COLL VENOUS BLD VENIPUNCTURE: CPT

## 2021-05-10 RX ORDER — POTASSIUM CHLORIDE 29.8 MG/ML
20 INJECTION INTRAVENOUS ONCE
Status: COMPLETED | OUTPATIENT
Start: 2021-05-10 | End: 2021-05-10

## 2021-05-10 RX ADMIN — CARVEDILOL 6.25 MG: 6.25 TABLET, FILM COATED ORAL at 09:14

## 2021-05-10 RX ADMIN — VALPROIC ACID 500 MG: 250 SOLUTION ORAL at 21:19

## 2021-05-10 RX ADMIN — HEPARIN SODIUM 5000 UNITS: 5000 INJECTION INTRAVENOUS; SUBCUTANEOUS at 04:47

## 2021-05-10 RX ADMIN — Medication 3 MG: at 21:02

## 2021-05-10 RX ADMIN — HEPARIN SODIUM 5000 UNITS: 5000 INJECTION INTRAVENOUS; SUBCUTANEOUS at 21:02

## 2021-05-10 RX ADMIN — CHLORHEXIDINE GLUCONATE 15 ML: 0.12 RINSE ORAL at 09:20

## 2021-05-10 RX ADMIN — FAMOTIDINE 20 MG: 10 INJECTION, SOLUTION INTRAVENOUS at 09:14

## 2021-05-10 RX ADMIN — SACUBITRIL AND VALSARTAN 1 TABLET: 24; 26 TABLET, FILM COATED ORAL at 09:14

## 2021-05-10 RX ADMIN — FAMOTIDINE 20 MG: 10 INJECTION, SOLUTION INTRAVENOUS at 21:01

## 2021-05-10 RX ADMIN — Medication 10 ML: at 15:11

## 2021-05-10 RX ADMIN — LEVETIRACETAM 1000 MG: 100 INJECTION, SOLUTION INTRAVENOUS at 05:17

## 2021-05-10 RX ADMIN — CARVEDILOL 6.25 MG: 6.25 TABLET, FILM COATED ORAL at 18:47

## 2021-05-10 RX ADMIN — CHLORHEXIDINE GLUCONATE 15 ML: 0.12 RINSE ORAL at 21:00

## 2021-05-10 RX ADMIN — Medication 1 CAPSULE: at 09:14

## 2021-05-10 RX ADMIN — POTASSIUM CHLORIDE 20 MEQ: 400 INJECTION, SOLUTION INTRAVENOUS at 01:13

## 2021-05-10 RX ADMIN — LEVETIRACETAM 1000 MG: 100 INJECTION, SOLUTION INTRAVENOUS at 18:48

## 2021-05-10 RX ADMIN — PIPERACILLIN AND TAZOBACTAM 3.38 G: 3; .375 INJECTION, POWDER, LYOPHILIZED, FOR SOLUTION INTRAVENOUS at 18:47

## 2021-05-10 RX ADMIN — SACUBITRIL AND VALSARTAN 1 TABLET: 24; 26 TABLET, FILM COATED ORAL at 21:19

## 2021-05-10 RX ADMIN — POTASSIUM PHOSPHATE, MONOBASIC AND POTASSIUM PHOSPHATE, DIBASIC: 224; 236 INJECTION, SOLUTION, CONCENTRATE INTRAVENOUS at 09:13

## 2021-05-10 RX ADMIN — Medication 10 ML: at 21:19

## 2021-05-10 RX ADMIN — PIPERACILLIN AND TAZOBACTAM 3.38 G: 3; .375 INJECTION, POWDER, LYOPHILIZED, FOR SOLUTION INTRAVENOUS at 01:13

## 2021-05-10 RX ADMIN — VANCOMYCIN HYDROCHLORIDE 1000 MG: 1 INJECTION, POWDER, LYOPHILIZED, FOR SOLUTION INTRAVENOUS at 15:08

## 2021-05-10 RX ADMIN — PROMETHAZINE HYDROCHLORIDE 12.5 MG: 25 INJECTION INTRAMUSCULAR; INTRAVENOUS at 02:29

## 2021-05-10 RX ADMIN — POTASSIUM CHLORIDE 20 MEQ: 400 INJECTION, SOLUTION INTRAVENOUS at 07:15

## 2021-05-10 RX ADMIN — VALPROIC ACID 500 MG: 250 SOLUTION ORAL at 15:11

## 2021-05-10 RX ADMIN — PIPERACILLIN AND TAZOBACTAM 3.38 G: 3; .375 INJECTION, POWDER, LYOPHILIZED, FOR SOLUTION INTRAVENOUS at 09:14

## 2021-05-10 RX ADMIN — HEPARIN SODIUM 5000 UNITS: 5000 INJECTION INTRAVENOUS; SUBCUTANEOUS at 14:08

## 2021-05-10 RX ADMIN — CASTOR OIL AND BALSAM, PERU: 788; 87 OINTMENT TOPICAL at 18:55

## 2021-05-10 RX ADMIN — CASTOR OIL AND BALSAM, PERU: 788; 87 OINTMENT TOPICAL at 09:20

## 2021-05-10 RX ADMIN — VANCOMYCIN HYDROCHLORIDE 1000 MG: 1 INJECTION, POWDER, LYOPHILIZED, FOR SOLUTION INTRAVENOUS at 23:19

## 2021-05-10 RX ADMIN — VANCOMYCIN HYDROCHLORIDE 1000 MG: 1 INJECTION, POWDER, LYOPHILIZED, FOR SOLUTION INTRAVENOUS at 04:47

## 2021-05-10 NOTE — PROGRESS NOTES
Neurology Progress Note  Spencer Silva NP    Admit Date: 2021   LOS: 4 days      Daily Progress Note: 5/10/2021    HPI: Thuy Armenta is a 22 y.o. male who presented to the [facility] ED after cardiac arrest at [facility] at approximately 1500 on 21. He has a past medical history of seizures, anxiety, polysubstance abuse with IVD use. He was admitted to [facility] on 3/24/21 with AMS in the setting of polysubstance abuse. He was found to have septic MRSA bacteremia, MSSA endocarditis. Imaging and MRI found Right PCA infarct and several abscesses in the right temporal and parietal lobes. Infarcts were thought to be due to septic emboli. He had left sided residual weakness. He was transferred to [facility] on  for aortic valve surgery. After transfer he was found to have a new right MCA infarct and also had a type 2 NSTEMI and tamponade with pericardiocentesis done prior to surgery. He had bioprosthetic aortic valve replacement and aortic root abscess debridement done on 2021, but suffered cardiac arrest the day of the surgery. He was trached and sent to [facility] on 21 and has undergone vent weaning since then. He has been speaking, following commands and has begun moving his left foot. Still with left arm weakness. On  he experienced another cardiac arrest at the facility with ROSC after 2 rounds of CPR. Patient was sent to [facility] ED and admitted to the ICU for further evaluation and treatment. Subjective:   No neuro events overnight. Some vomiting with subsequent malposition of NGT late last night. Continues to be alert with good command following. Allergies   Allergen Reactions    Codeine Unknown (comments)     Pt denies        Review of Systems:  Review of systems not obtained due to patient factors.     Objective:   Vital signs  Temp (24hrs), Av.8 °F (37.1 °C), Min:98.3 °F (36.8 °C), Max:99.1 °F (37.3 °C)    1901 - 05/10 0700  In: 920 [I.V.:600]  Out: 465 [Urine:290; Drains:175]  05/08 0701 - 05/09 1900  In: 3334.8 [I.V.:2764.8]  Out: 3951 [Urine:3477; Drains:25]  Visit Vitals  BP (!) 124/99   Pulse 73   Temp 98.6 °F (37 °C)   Resp 26   Ht 5' 11\" (1.803 m)   Wt 59 kg (130 lb)   SpO2 95%   BMI 18.13 kg/m²    O2 Flow Rate (L/min): 10 l/min O2 Device: Ventilator, Tracheostomy   Vitals:    05/10/21 0100 05/10/21 0200 05/10/21 0300 05/10/21 0324   BP: 120/78 (!) 123/95 (!) 124/99    Pulse: 70 82 89 73   Resp: 21 23 22 26   Temp:       SpO2: 97% 98% 94% 95%   Weight:       Height:            Physical Exam:  GENERAL: Calm, cooperative, NAD, trached and on vent   SKIN: Warm, dry, color appropriate for ethnicity. Neurologic Exam:  Mental Status:  Alert, nods yes/no. Unable to verbalize orientation. Language:    No speech. Cranial Nerves:   Pupils 3 mm, equal, round and reactive to light. Blinks to threat. Extraocular movements intact. Full facial strength, no asymmetry. Hearing grossly intact bilaterally. Motor:     Atrophy noted in limbs. 4+/5 power in RU/RLE, trace movement on GRETCHEN/LLE. No involuntary movements. Sensation:    Sensation intact throughout to light touch. Coordination & Gait: Deferred.       Labs:  Lab Results   Component Value Date/Time    WBC 11.8 (H) 05/09/2021 03:16 AM    HGB 8.3 (L) 05/09/2021 03:16 AM    HCT 27.4 (L) 05/09/2021 03:16 AM    PLATELET 028 48/82/8107 03:16 AM    MCV 87.5 05/09/2021 03:16 AM     Lab Results   Component Value Date/Time    Sodium 147 (H) 05/09/2021 09:05 PM    Potassium 3.3 (L) 05/09/2021 09:05 PM    Chloride 113 (H) 05/09/2021 09:05 PM    CO2 31 05/09/2021 09:05 PM    Anion gap 3 (L) 05/09/2021 09:05 PM    Glucose 88 05/09/2021 09:05 PM    BUN 17 05/09/2021 09:05 PM    Creatinine 0.35 (L) 05/09/2021 09:05 PM    BUN/Creatinine ratio 49 (H) 05/09/2021 09:05 PM    GFR est AA >60 05/09/2021 09:05 PM    GFR est non-AA >60 05/09/2021 09:05 PM    Calcium 9.3 05/09/2021 09:05 PM     Imaging:  CT Results (maximum last 3): Results from [place] encounter on 05/06/21   CTA CHEST W OR W WO CONT    Narrative INDICATION:   cardiac arrest eval for PE     COMPARISON:  None    TECHNIQUE:  Following the uneventful intravenous administration of 100 cc Isovue  025, thin helical axial images were obtained through the chest. Postprocessing  was performed. 3D image postprocessing was performed. CT dose reduction was achieved through the use of a standardized protocol  tailored for this examination and automatic exposure control for dose  modulation. FINDINGS: Study is limited by patient motion. A tracheostomy tube is in  satisfactory position. There are lines present in appropriate position. There is mild mediastinal lymphadenopathy. The heart is enlarged. There is a  small pericardial effusion. No filling defect is seen within the pulmonary arterial system to suggest  pulmonary embolus. The aorta is normal in caliber. There is severe multilevel groundglass opacification throughout both lungs, as  well as severe multilobar bilateral consolidation. There is no pneumothorax. There are moderate bilateral pleural effusions. Limited evaluation of the upper abdomen demonstrates no abnormality. There are  median sternotomy wires. Impression 1. No evidence of pulmonary embolus. 2.  Severe multilevel consolidation throughout both lungs, consistent with  severe multilobar pneumonia. 3. Cardiomegaly with small pericardial effusion. 4. Moderate bilateral pleural effusions. CT HEAD WO CONT    Narrative EXAM: CT HEAD WO CONT    INDICATION: unresponsive post arrest    COMPARISON: None. CONTRAST: None. TECHNIQUE: Unenhanced CT of the head was performed using 5 mm images. Brain and  bone windows were generated. Coronal and sagittal reformats.  CT dose reduction  was achieved through use of a standardized protocol tailored for this  examination and automatic exposure control for dose modulation. FINDINGS:  There is no hydrocephalus. There are multiple acute infarctions throughout the  right cerebral hemisphere, involving much of the occipital lobe, as well as much  of the frontal lobe. There is an additional superior right frontoparietal  infarct. There is no intracranial hemorrhage or midline shift. The basilar  cisterns are open. The bone windows demonstrate no abnormalities. The visualized portions of the  paranasal sinuses and mastoid air cells are clear. Impression 1. Large territory acute infarctions of the right occipital lobe and right  frontal lobe. 2. No intracranial hemorrhage. 3. No hydrocephalus. Results from Hospital Encounter encounter on 03/24/21   CTA HEAD    Narrative CTA OF THE HEAD     INDICATION: septic emboli, evaluate for septic aneurysms    COMPARISON: No relevant studies. TECHNIQUE: Noncontrast CT the head performed followed by CTA of the head with   intravenous contrast. Multiplanar two-dimensional and three-dimensional maximum  intensity projection reformats performed and reviewed. All CT exams at this facility use one or more dose reduction techniques  including automatic exposure control, mA/kV adjustment per patient's size, or  iterative reconstruction technique. FINDINGS:    Noncontrast CT demonstrates edema related to the patient's right occipital lobe  infarction. Superimposed vasogenic edema related to patient's microabscesses  disease. There is effacement of the posterior horn of the right lateral  ventricle with asymmetric dilatation of the temporal horn, similar to prior MRI. Postcontrast images demonstrate diffuse leptomeningeal enhancement, better  appreciated on MRI. Multiple areas of early developing abscesses and cerebritis  better appreciated on MRI. There is occlusion of the P2 MP3 segments of the right posterior cerebral artery  seen best on the 3-D reconstructed images.  Visualized portions of the left  posterior cerebral artery, basilar artery, and superior cerebellar arteries  appear patent. Bilateral intracranial segments of the internal carotid arteries, bilateral  middle cerebral arteries, and bilateral anterior cerebral arteries are patent. Anterior communicating artery appears patent. Left posterior commuting artery  appears patent. There is no mycotic aneurysm. Hyperdensity along the right  anterior falx seen best on image 13 series 11 corresponds to calcification on  noncontrast study. Impression IMPRESSION:     1. No evidence of mycotic aneurysm. 2.  Occlusion of the right P2/P3. 3. Infarction within the right occipital and temporal lobes. Edema related to  the right-sided small abscesses, better appreciated on MRI. No definite new  areas of enhancement. 4.  Diffuse leptomeningeal enhancement compatible with patient's history of  meningitis. Assessment:   Active Problems:    Cardiac arrest ([address] (5/6/2021)      Plan:   1.) CVA    - CTH 5/6 large territory subacute infarctions of the right occiptal lobe and right frontal lobe most likely caused by septic emboli.              - NIHSS of 30 on initial exam              - q1 neuro checks              - A1C ok at 5.4   - Lipid panel pending, LDL goal <70              - MRI brain 5/9 large subacute/chronic right PCA infarct. Moderate subacute/chronic right MCA infarct. Small chronic infarcts right cerebellum. Microhemorrhages throughout suspicious for septic emboli. Suspected cortical restricted diffusion in left hemisphere and right posterior hemisphere favored to represent postictal MR changes.             - ECHO LVEF 20-25%. Dilated left ventricle. Severely reduced systolic function. Mild tricuspid valve regurg. Mild to moderate pulmonary HTN,. Reduced systolic function. Mild to moderate mitral valve regurg and non-specific thickening. Mild aortic stenosis.  No evidence of pericardial effusion.               - PT/OT/SLP evals              - Stroke education              - SBP goal 100-160   - Will likely need AC in the future, but will hold at this time                    2.) Hx of seizure disorder.               - Continue Keppra 1000 BID   - Depakene 500 mg q 8 hours. - Valproic acid level on 5/9 therapeutic at 82               - EEG performed 5/8 showed no epileptiform activity or seizures. - Seizure precautions     3.) S/P cardiac arrest              - Sedation weaned and patient alert and following commands.               - Supportive care per Intensivist.       Plan discussed with Dr. Elva Blue, further recommendations to follow.      Ronit Salomon NP  Neurocritical Care Nurse Practitioner

## 2021-05-10 NOTE — PROGRESS NOTES
Pharmacist Note - Vancomycin Dosing  Therapy day 5  Indication: Possible sepsis of unclear etiology  - S/P cardiac arrest, acute on chronic respiratory failure  Current regimen:  1000 mg IV Q 8hr    Recent Labs     05/10/21  0512 05/09/21  2105 05/09/21  1441 05/09/21  0316 05/08/21  1150   WBC 8.5  --   --  11.8* 9.2   CREA 0.32* 0.35* 0.29* 0.46*  --    BUN 16 17 19 21*  --        A Trough Level resulted at 11.8 mcg/mL which was obtained 10.5 hrs post-dose. The extrapolated \"true\" trough is approximately 16.3 mcg/mL based on the patient's known kinetics. Goal trough: 15 - 20 mcg/mL     Plan: Continue current regimen. Pharmacy will continue to monitor this patient daily for changes in clinical status and renal function.

## 2021-05-10 NOTE — PROGRESS NOTES
0730: Bedside, Verbal and Written shift change report given to Yogesh Rivero RN (oncoming nurse) by Michaela Soria RN (offgoing nurse). Report included the following information SBAR, Kardex, Intake/Output, MAR, Med Rec Status, Cardiac Rhythm NSR and Dual Neuro Assessment. 0930: Pt belly breathing, Tachypneic at 42/min. O2 Sats 90%. RT and MD notified. No orders received. 1100: Pt O2 sat's in low 70's. Deep Trach suction performed w/o improvement. RT and MD notified. Pt placed back on Vent. 1930: Bedside, Verbal and Written shift change report given to Fatemeh Vance RN (oncoming nurse) by Yogesh Rivero RN (offgoing nurse). Report included the following information SBAR, Kardex, Intake/Output, MAR, Recent Results, Cardiac Rhythm NSR and Dual Neuro Assessment.

## 2021-05-10 NOTE — PROGRESS NOTES
Physical Therapy Note  05/10/2021    Chart reviewed in prep for PT tx session. Received pt supine in bed on trach collar, noted to be tachycardic in the 100s, tachypneic in the 40s, with SpO2 in the 70s and a good pleath. RN notified and in to provide deep suction with no improvement noted in sats. Will defer at this time and follow back up as able/appropriate.     Thank you,  Tramaine Montero, PT, DPT

## 2021-05-10 NOTE — PROGRESS NOTES
Transition of Care Plan   RUR-   Medium    DISPOSITION: LTAC   F/U with PCP/Specialist     Transport: Will need ALS transport  Patient presented to the ED s/p cardiac arrest. Patient remains vented via trach. Care manager met with patient's mother to discuss transitions of care. Per mother she would like information on additional LTAC's. CM provided her with the list of LTAC's, will follow up in a couple of days for choice. Dunia Crook RN,Care Management.

## 2021-05-10 NOTE — PROGRESS NOTES
Day #5 of Vancomycin  Indication: Possible sepsis of unclear etiology  - S/P cardiac arrest, acute on chronic respiratory failure  Current regimen:  1000 mg IV Q 8hr  Abx regimen: Vanc + Zosyn  ID Following ?: NO  Concomitant nephrotoxic drugs (requires more frequent monitoring): Vasopressors, Loop diuretics  Frequency of BMP?: Daily through     Recent Labs     05/10/21  0512 21  2105 21  1441 21  0316 21  1150   WBC 8.5  --   --  11.8* 9.2   CREA 0.32* 0.35* 0.29* 0.46*  --    BUN 16 17 19 21*  --      Est CrCl: >100 ml/min; UO: >1 ml/kg/hr  Temp (24hrs), Av.8 °F (37.1 °C), Min:98.3 °F (36.8 °C), Max:99.1 °F (37.3 °C)    Cultures:    Blood: NGTD   MRSA: (-)   Sputum: few GNRs + light yeast, pending   Wound [toe]: light possible CoNS, pending    Goal trough = 15 - 20 mcg/mL    Recent trough history (date/time/level/dose/action taken):   @ 0327 = 27.5 mcg/ml (drawn ~5.5 hrs post-dose, extrapolated to ~19-20 mcg/ml), no change   @ 1635 = 20.3 mcg/ml (drawn ~8 hrs post-dose), dose adjusted from 1.25 gm to 1 gm IV Q8h    Plan: Continue current regimen. Trough before noon dose today.

## 2021-05-10 NOTE — PROGRESS NOTES
1930: Bedside shift change report given to [facility] (oncoming nurse) by Kai Gerber (offgoing nurse). Report included the following information SBAR, Kardex, Intake/Output, MAR, Accordion, Recent Results, Med Rec Status, Cardiac Rhythm SR/BBB and Dual Neuro Assessment.        2335: PRN Zofran given for pt vomiting green emesis, pt also pulled out NGT at this time, TF held    0230: PT vomited again for third time this shift, NP notified, Phenergan ordered and given

## 2021-05-10 NOTE — PROGRESS NOTES
[facility]    ICU TEAM Progress Note    Name: Lester Motley   : 1995   MRN: [id number]   Date: 5/10/2021      I  Subjective:   Progress Note: 5/10/2021      Reason for ICU Admission: [facility]    Interval history:From [facility] a 22 y. o. male with h/o Seizures and anxiety who presented to [facility] ED after a cardiac arrest at [facility] around 1500 today. Hx from chart and speaking with patient's sister via phone. Patient had recent hospitalization at the [facility] in March of this year. Initial hospitalization admission was at [facility] on 3/24 with AMS in setting of polysubstance and IVD use (cocaine, heroine, methamphetamines). He was found to have septic MRSA bacteremia, MSSA endocarditis. Imaging and MRI also found R PCA infarct and several abscesses and right temporal and parietal lobes. Infarcts thought to be due to septic emboli and patient had left sided residual weakness. Patient was transferred to [address] [place] [address] on 2021 for evaluation for valve surgery. After transfer he was found to have a New R MCA infarct Also had a type 2 NSTEMI and tamponade with pericardiocentesis done prior to surgery. Did have cardiac arrest on day of surgery. Had a bioprosthetic Aortic Valve Replacement and Aortic Root Abscess Debridement done on 2021.  Trached and sent to [facility] on 2021. Was undergoing PT and vent wean. Sister stated that patient was able to be off the vent for several hours over the last few days. He was sitting up and able to talk with valve over trach and could follow commands. Stated that he had severe weakness of the left upper ext, but was starting to gain some mobility in the lower left ext.  Patient's mother visited patient today prior to arrest. She said the staff told her that the patient was getting very anxious earlier in the day around 1 pm and had to be placed back on the ventilator, and then they had to sedate him while he was on the ventilator. The mother stated \" he did not look good\" and \" his eye were rolled back in his head\". About 30 minutes after she left she got call about arrest. Per ED note patient was found unresponsive around the 1500 hour and was pulseless. Two rounds of CPR and meds were given and patient was defibrillated x 1 before ROSC. Patient was hypoxic post arrest. Unknown down time prior to arrest. Patient was then sent to [facility] ED    Overnight Events:   5/10: No acute event, failed spontaneous breathing trial on trach collar due to tachypnea and increased work of breathing after about 2 hours. More alert and cooperative following command. Pulled his Dobbhoff tube. 5/9: Was able to be on trach collar for few hours yesterday, had to go back on ventilator due to increased work of breathing. Required Precedex for agitation. The morning of May 9 he went to MRI/MRA. Off pressors. Dobutamine challenge of the morning of 9 9.  5/8: Able to wean ventilator setting, oxygen and discontinue nitric oxide.  Weaning sedation as tolerated.  Pressors weaned off.  On 2 mart dobutamine  5/7/21 -- Georges, EPI, MV  Active Problem List:     Problem List  Never Reviewed          Codes Class    Cardiac arrest ([facility] [address] ICD-10-CM: I46.9  ICD-9-CM: 427.5         Cerebral abscess (embolic) [id number]: V12.9  ICD-9-CM: 324.0     Overview Signed 3/30/2021  4:51 PM by Gil Pineda MD     CT and MRI revealed evidence of a right posterior cerebellar artery infarct, 9 x 2.6 cm. He also had several areas of cerebral abscesses in the right temporal and parietal lobes measuring 2 x 1 cm, 1.3 x 0.9 cm and 0.6 x 5.5 mm with associated 3 mm midline shift.  CSF showed no organisms to date, but elevated WBC.     3/24/21 CT Normal noncontrast head CT  3/29/21 MRI Extensive multiple acute infarcts throughout the bilateral cerebral hemispheres with a large hemorrhagic right PCA territory infarction measuring up to 7 cm. Some of the additional smaller infarcts also demonstrated hemorrhage. Findings are highly suspicious for septic emboli  3/30/21 CTA Occlusion of the right P2/P3. Infarction within the right occipital and temporal lobes. Edema related to the right-sided small abscesses, better appreciated on MRI             Drug abuse, cocaine type [facility]) ICD-10-CM: F14.10  ICD-9-CM: 305.60     Overview Signed 3/29/2021  8:34 PM by Zoe Arredondo MD     3/24/21 UDS + methamphetamines and cocaine             Endocarditis due to methicillin susceptible Staphylococcus aureus (MSSA) ICD-10-CM: I33.0, B95.61  ICD-9-CM: 421.0, 041.11     Overview Addendum 3/30/2021  4:51 PM by Zoe Arredondo MD     3/24/21 ER Patient with a history of drug use anxiety and seizure not on medications for the past 4 months according to the sister presents for evaluation of altered mental status;  BC x2 + MRSA, WBC 11.6 P-67%, K 4.0 BUN 45, Creat 1.1, Trop I 3.89-> 13.60-> 10.52; UDS + cocaine and amphetamines; Lactic acid 4.2  3/24/21 Intubated      CT head negative      CT chest, neg PE, ? LV mass  3/25/21 ECHO EF 36%, LV 44/45, S/PW 8/9,  Mod AR  3/2/621 RAISSA EF 55%, Vegetations on AV 0.9cm LCC, small perforation 0.4cm LCC. No feg on MV/TV/PV, No thrombus in MELLY             Type 2 myocardial infarction [facility]) ICD-10-CM: I21. A1  ICD-9-CM: 410.90     Overview Signed 3/29/2021  8:37 PM by Zoe Arredondo MD     Trop I 3.89-> 13.60-> 10.52; Past Medical History:      has a past medical history of Anxiety and Seizure ([facility] [address] Past Surgical History:      has a past surgical history that includes hx tonsillectomy. Home Medications:     Prior to Admission medications    Medication Sig Start Date End Date Taking? Authorizing Provider   divalproex DR (Depakote) 500 mg tablet Take 500 mg by mouth three (3) times daily. Other, MD Maddy   levETIRAcetam (KEPPRA) 750 mg tablet Take 1 Tab by mouth two (2) times a day. 18   Ga Connell PA-C       Allergies/Social/Family History: Allergies   Allergen Reactions    Codeine Unknown (comments)     Pt denies       Social History     Tobacco Use    Smoking status: Current Every Day Smoker     Packs/day: 1.00    Smokeless tobacco: Never Used   Substance Use Topics    Alcohol use: Yes     Comment: socially      No family history on file. Review of Systems:   Not able to obtain due to patient medical condition    Objective:   Vital Signs:  Visit Vitals  BP (!) 127/94 (BP 1 Location: Right upper arm, BP Patient Position: At rest)   Pulse 95   Temp 99.4 °F (37.4 °C)   Resp 20   Ht 5' 11\" (1.803 m)   Wt 59 kg (130 lb)   SpO2 97%   BMI 18.13 kg/m²    O2 Flow Rate (L/min): 10 l/min O2 Device: Tracheal collar Temp (24hrs), Av.9 °F (37.2 °C), Min:98.6 °F (37 °C), Max:99.4 °F (37.4 °C)           Intake/Output:     Intake/Output Summary (Last 24 hours) at 5/10/2021 1236  Last data filed at 5/10/2021 0900  Gross per 24 hour   Intake 1885.18 ml   Output 1386 ml   Net 499.18 ml       Physical Exam:    General: No distress, appears stated age,Trached on vent and sedated  Eye:  conjunctivae/corneas clear. Pupils are round and reactive equally  Neurologic:  withdraws to pain in upper and lower right and left ext, good strength on right side with purposeful movement , nod his head to command + cough/gag.,    Lymphatic:  Cervical, supraclavicular, and axillary nodes normal.   Neck:  normal and no erythema or exudates noted. Lungs:  Coarse lung sounds bilaterally, irregular breathing on vent. Heart:  Tachycardiac rate and rhythm, S1, S2  Abdomen:  soft, non-tender.  Bowel sounds normal. No masses,  no organomegaly  Cardiovascular:  S1S2 present, pedal pulses normal and no edema  Skin:  Normal. and no rash or abnormalities    LABS AND  DATA: Personally reviewed  Recent Labs     05/10/21  0512 21  0316   WBC 8.5 11.8*   HGB 9.5* 8.3*   HCT 31.5* 27.4*    271     Recent Labs     05/10/21  0512 05/09/21 2105 05/09/21  1441   * 147* 147*   K 3.4* 3.3* 2.7*   * 113* 110*   CO2 30 31 30   BUN 16 17 19   CREA 0.32* 0.35* 0.29*   GLU 92 88 111*   CA 9.0 9.3 9.2   MG 2.1 1.9  --    PHOS 2.2*  --  2.7     Recent Labs     05/09/21 2105   AP 66   TP 5.8*   ALB 2.7*   GLOB 3.1     No results for input(s): INR, PTP, APTT, INREXT in the last 72 hours. No results for input(s): PHI, PCO2I, PO2I, FIO2I in the last 72 hours. No results for input(s): CPK, CKMB, TROIQ, BNPP in the last 72 hours. Hemodynamics:   PAP:   CO:     Wedge:   CI:     CVP:    SVR:       PVR:       Ventilator Settings:  Mode Rate Tidal Volume Pressure FiO2 PEEP   Assist control   450 ml  0 cm H2O 60 % 5 cm H20     Peak airway pressure: 28 cm H2O    Minute ventilation: 10.5 l/min        MEDS: Reviewed    Chest X-Ray:  CXR Results  (Last 48 hours)    None          ECHO:  · LV: Estimated LVEF is 20 - 25%. Normal wall thickness. Dilated left ventricle. Severely reduced systolic function. Inconclusive left ventricular diastolic function. · TV: Mild tricuspid valve regurgitation is present. · PA: Mild to moderate pulmonary hypertension. · RV: Reduced systolic function. · MV: Mitral valve non-specific thickening. Mild to moderate mitral valve regurgitation is present. Assessment and Plan:     1. Cardiac arrest, unknown time to ROSC. (2 rounds of meds and compressions, Defibrilated x 1) with good neurological recovery  2. Acute on chronic hypoxic respiratory failure, sputum positive for ESBL Klebsiella  3. Bilateral multilobar pneumonia  4. Cardiogenic shock: On dobutamine  5. Acute anemia: No clear source of bleeding  6. Large territory sub-acute infarctions of the right. occipital lobe and right frontal lobe. 7. Chronic Seizure D/o.  8. 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 9. Hx Drug abuse, cocaine type  10.  Recent Aortic Surgery at [facility] on April 16, 2021     Off sedation, back to baseline mental status. Melatonin nightly  Wean mechanical ventilation as tolerated, significant improvement.  Continue with broad-spectrum antibiotic.  Sputum now growing ESBL Klebsiella. Procalcitonin trending down. Discontinue stress dose steroid on the ninth  Dobutamine off.  Was started on Entresto and beta-blocker. Appreciate cardiology input  No further of GI bleed, no further drop in hemoglobin. I will switch prophylactic Lovenox to heparin subcu. Appreciate neurology. MRI brain reviewed.     DVT prophylaxis, GI prophylaxis and ventilator bundle    DISPOSITION  Stay in ICU    CRITICAL CARE CONSULTANT NOTE  I had a face to face encounter with the patient, reviewed and interpreted patient data including clinical events, labs, images, vital signs, I/O's, and examined patient. I have discussed the case and the plan and management of the patient's care with the consulting services, the bedside nurses and the respiratory therapist.      NOTE OF PERSONAL INVOLVEMENT IN CARE   This patient has a high probability of imminent, clinically significant deterioration, which requires the highest level of preparedness to intervene urgently. I participated in the decision-making and personally managed or directed the management of the following life and organ supporting interventions that required my frequent assessment to treat or prevent imminent deterioration. I personally spent 40 minutes of critical care time. This is time spent at this critically ill patient's bedside actively involved in patient care as well as the coordination of care and discussions with the patient's family. This does not include any procedural time which has been billed separately. Margarito Massey M.D.   Staff Intensivist/Pulmonologist  [facility]  5/10/2021

## 2021-05-10 NOTE — PROGRESS NOTES
Cardiology Progress Note                                        Admit Date: 5/6/2021    Assessment/Plan:     1. Sp cardiac arrest post op 4/17/21,   cardiac arrest Vfib 5/6/21  ekg rbbb qt 460 msec. ICD rec for secondary prevention if life expectancy > 1yr.    2. Hx:  4/16/21 at [facility] bioprosthetic AVR and root abscess debridement. He also had a pericardiocentesis due to tamponade prior to surgery  3. Recent endocardititis 3/24/21 Staph  4. CM  Ef 25%   5.  right MCA infarct and several abscesses in the right temporal and parietal lobes. Infarcts were thought to be due to septic emboli. He had left sided residual weakness. cta 5/6/21:  multiple acute infarctions throughout the  right cerebral hemisphere, involving much of the occipital lobe, as well as much  of the frontal lobe. There is an additional superior right frontoparietal  infarct  6.  severe multilevel consolidation throughout both lungs consistent with severe multilobar PNA. 7. PNA:  Severe multilevel consolidation throughout both lungs, consistent with  severe multilobar pneumonia. Cont current regimen  Attempt to add spironolactone as BP tolerates, add dapagliflozin on dc as not on formulary       Giancarlo Bolden is a 22 y.o. male with     PROBLEM LIST:  Patient Active Problem List    Diagnosis Date Noted    Cardiac arrest ([facility] 05/06/2021    Cerebral abscess (embolic) 92/92/1649    Drug abuse, cocaine type ([facility] 03/29/2021    Endocarditis due to methicillin susceptible Staphylococcus aureus (MSSA) 03/25/2021    Type 2 myocardial infarction ([facility] 03/24/2021         Subjective:     Giancarlo Bolden does not respond.     Visit Vitals  /85 (BP 1 Location: Right upper arm, BP Patient Position: At rest)   Pulse 91   Temp 99 °F (37.2 °C)   Resp (!) 45   Ht 5' 11\" (1.803 m)   Wt 59 kg (130 lb)   SpO2 94%   BMI 18.13 kg/m²       Intake/Output Summary (Last 24 hours) at 5/10/2021 1023  Last data filed at 5/10/2021 0900  Gross per 24 hour   Intake 2225.33 ml   Output 1190 ml   Net 1035.33 ml       Objective:      Physical Exam:  HEENT: Perrla, EOMI  Neck: No JVD,  No thyroidmegaly  Resp: CTA bilaterally;  No wheezes or rales  CV: RRR s1s2 No murmur no s3  Abd:Soft, Nontender  Ext: No edema  Neuro: unresponsive on vent   Skin: Warm, Dry, Intact  Pulses: 2+ DP/PT/Rad      Telemetry: normal sinus rhythm    Current Facility-Administered Medications   Medication Dose Route Frequency    levETIRAcetam (KEPPRA) 1,000 mg in 0.9% sodium chloride 100 mL IVPB  1,000 mg IntraVENous Q12H    potassium phosphate 15 mmol in 0.9% sodium chloride 250 mL infusion   IntraVENous ONCE    Vancomycin trough 5/10 @ 1200   Other ONCE    hydrALAZINE (APRESOLINE) 20 mg/mL injection 10 mg  10 mg IntraVENous Q6H PRN    sacubitriL-valsartan (ENTRESTO) 24-26 mg tablet 1 Tab  1 Tab Oral Q12H    carvediloL (COREG) tablet 6.25 mg  6.25 mg Oral BID WITH MEALS    L.acidophilus-paracasei-S.thermophil-bifidobacter (RISAQUAD) 8 billion cell capsule  1 Cap Nasogastric DAILY    heparin (porcine) injection 5,000 Units  5,000 Units SubCUTAneous Q8H    melatonin tablet 3 mg  3 mg Oral QHS    0.9% sodium chloride infusion 250 mL  250 mL IntraVENous PRN    vancomycin (VANCOCIN) 1,000 mg in 0.9% sodium chloride 250 mL (VIAL-MATE)  1,000 mg IntraVENous Q8H    dexmedeTOMidine in 0.9 % NaCl (PRECEDEX) 400 mcg/100 mL (4 mcg/mL) infusion soln  0.1-1.5 mcg/kg/hr IntraVENous TITRATE    balsam peru-castor oiL (VENELEX) ointment   Topical BID    DOBUTamine (DOBUTREX) 500 mg/250 mL (2,000 mcg/mL) infusion  0-10 mcg/kg/min IntraVENous TITRATE    sodium chloride (NS) flush 5-40 mL  5-40 mL IntraVENous Q8H    sodium chloride (NS) flush 5-40 mL  5-40 mL IntraVENous PRN    acetaminophen (TYLENOL) tablet 650 mg  650 mg Oral Q6H PRN    Or    acetaminophen (TYLENOL) suppository 650 mg  650 mg Rectal Q6H PRN    polyethylene glycol (MIRALAX) packet 17 g  17 g Oral DAILY PRN    ondansetron [facility]) injection 4 mg  4 mg IntraVENous Q6H PRN    famotidine (PF) (PEPCID) 20 mg in 0.9% sodium chloride 10 mL injection  20 mg IntraVENous BID    chlorhexidine (ORAL CARE KIT) 0.12 % mouthwash 15 mL  15 mL Oral Q12H    fentaNYL citrate (PF) injection 25 mcg  25 mcg IntraVENous Q4H PRN    piperacillin-tazobactam (ZOSYN) 3.375 g in 0.9% sodium chloride (MBP/ADV) 100 mL MBP  3.375 g IntraVENous Q8H    glucose chewable tablet 16 g  4 Tab Oral PRN    dextrose (D50W) injection syrg 12.5-25 g  25-50 mL IntraVENous PRN    glucagon (GLUCAGEN) injection 1 mg  1 mg IntraMUSCular PRN    insulin lispro (HUMALOG) injection   SubCUTAneous Q6H    Vancomycin- pharmacy to dose   Other Rx Dosing/Monitoring    valproic acid (as sodium salt) (DEPAKENE) 250 mg/5 mL (5 mL) oral solution 500 mg  500 mg Oral Q8H         Data Review:   Labs:    Recent Results (from the past 24 hour(s))   GLUCOSE, POC    Collection Time: 05/09/21 12:05 PM   Result Value Ref Range    Glucose (POC) 103 (H) 65 - 100 mg/dL    Performed by [facility]    METABOLIC PANEL, BASIC    Collection Time: 05/09/21  2:41 PM   Result Value Ref Range    Sodium 147 (H) 136 - 145 mmol/L    Potassium 2.7 (LL) 3.5 - 5.1 mmol/L    Chloride 110 (H) 97 - 108 mmol/L    CO2 30 21 - 32 mmol/L    Anion gap 7 5 - 15 mmol/L    Glucose 111 (H) 65 - 100 mg/dL    BUN 19 6 - 20 MG/DL    Creatinine 0.29 (L) 0.70 - 1.30 MG/DL    BUN/Creatinine ratio 66 (H) 12 - 20      GFR est AA >60 >60 ml/min/1.73m2    GFR est non-AA >60 >60 ml/min/1.73m2    Calcium 9.2 8.5 - 10.1 MG/DL   PHOSPHORUS    Collection Time: 05/09/21  2:41 PM   Result Value Ref Range    Phosphorus 2.7 2.6 - 4.7 MG/DL   GLUCOSE, POC    Collection Time: 05/09/21  5:06 PM   Result Value Ref Range    Glucose (POC) 91 65 - 100 mg/dL    Performed by [facility]    METABOLIC PANEL, COMPREHENSIVE    Collection Time: 05/09/21  9:05 PM   Result Value Ref Range    Sodium 147 (H) 136 - 145 mmol/L    Potassium 3.3 (L) 3.5 - 5.1 mmol/L    Chloride 113 (H) 97 - 108 mmol/L    CO2 31 21 - 32 mmol/L    Anion gap 3 (L) 5 - 15 mmol/L    Glucose 88 65 - 100 mg/dL    BUN 17 6 - 20 MG/DL    Creatinine 0.35 (L) 0.70 - 1.30 MG/DL    BUN/Creatinine ratio 49 (H) 12 - 20      GFR est AA >60 >60 ml/min/1.73m2    GFR est non-AA >60 >60 ml/min/1.73m2    Calcium 9.3 8.5 - 10.1 MG/DL    Bilirubin, total 0.8 0.2 - 1.0 MG/DL    ALT (SGPT) 19 12 - 78 U/L    AST (SGOT) 14 (L) 15 - 37 U/L    Alk.  phosphatase 66 45 - 117 U/L    Protein, total 5.8 (L) 6.4 - 8.2 g/dL    Albumin 2.7 (L) 3.5 - 5.0 g/dL    Globulin 3.1 2.0 - 4.0 g/dL    A-G Ratio 0.9 (L) 1.1 - 2.2     MAGNESIUM    Collection Time: 05/09/21  9:05 PM   Result Value Ref Range    Magnesium 1.9 1.6 - 2.4 mg/dL   GLUCOSE, POC    Collection Time: 05/09/21 11:52 PM   Result Value Ref Range    Glucose (POC) 100 65 - 100 mg/dL    Performed by [address] [place] [address]    Collection Time: 05/10/21  5:12 AM   Result Value Ref Range    Magnesium 2.1 1.6 - 2.4 mg/dL   PHOSPHORUS    Collection Time: 05/10/21  5:12 AM   Result Value Ref Range    Phosphorus 2.2 (L) 2.6 - 4.7 MG/DL   PROCALCITONIN    Collection Time: 05/10/21  5:12 AM   Result Value Ref Range    Procalcitonin 2.97 ng/mL   METABOLIC PANEL, BASIC    Collection Time: 05/10/21  5:12 AM   Result Value Ref Range    Sodium 146 (H) 136 - 145 mmol/L    Potassium 3.4 (L) 3.5 - 5.1 mmol/L    Chloride 111 (H) 97 - 108 mmol/L    CO2 30 21 - 32 mmol/L    Anion gap 5 5 - 15 mmol/L    Glucose 92 65 - 100 mg/dL    BUN 16 6 - 20 MG/DL    Creatinine 0.32 (L) 0.70 - 1.30 MG/DL    BUN/Creatinine ratio 50 (H) 12 - 20      GFR est AA >60 >60 ml/min/1.73m2    GFR est non-AA >60 >60 ml/min/1.73m2    Calcium 9.0 8.5 - 10.1 MG/DL   CBC WITH AUTOMATED DIFF    Collection Time: 05/10/21  5:12 AM   Result Value Ref Range    WBC 8.5 4.1 - 11.1 K/uL    RBC 3.55 (L) 4.10 - 5.70 M/uL    HGB 9.5 (L) 12.1 - 17.0 g/dL    HCT 31.5 (L) 36.6 - 50.3 %    MCV 88.7 80.0 - 99.0 FL    MCH 26.8 26.0 - 34.0 PG    MCHC 30.2 30.0 - 36.5 g/dL    RDW 16.0 (H) 11.5 - 14.5 %    PLATELET 982 394 - 885 K/uL    MPV 10.7 8.9 - 12.9 FL    NRBC 0.0 0  WBC    ABSOLUTE NRBC 0.00 0.00 - 0.01 K/uL    NEUTROPHILS 79 (H) 32 - 75 %    LYMPHOCYTES 10 (L) 12 - 49 %    MONOCYTES 10 5 - 13 %    EOSINOPHILS 0 0 - 7 %    BASOPHILS 0 0 - 1 %    IMMATURE GRANULOCYTES 1 (H) 0.0 - 0.5 %    ABS. NEUTROPHILS 6.8 1.8 - 8.0 K/UL    ABS. LYMPHOCYTES 0.8 0.8 - 3.5 K/UL    ABS. MONOCYTES 0.9 0.0 - 1.0 K/UL    ABS. EOSINOPHILS 0.0 0.0 - 0.4 K/UL    ABS. BASOPHILS 0.0 0.0 - 0.1 K/UL    ABS. IMM.  GRANS. 0.0 0.00 - 0.04 K/UL    DF AUTOMATED     GLUCOSE, POC    Collection Time: 05/10/21  5:16 AM   Result Value Ref Range    Glucose (POC) 93 65 - 100 mg/dL    Performed by [facility] AND [facility]

## 2021-05-10 NOTE — ROUTINE PROCESS
Occupational Therapy 1147 -  
[phone] Orders acknowledged and chart reviewed in prep for OT. Patient on trach collar, however RR in the 40s and patient desatting with lowest reading seen at 79% with a good pleth - RN present to address. Will defer and f/u as able and appropriate. Thank you. Ventura Farah MS, OTR/L

## 2021-05-10 NOTE — PROGRESS NOTES
SLP Contact Note    Update 1152:  Patient on trach collar, however RR in the 40s and patient desatting with lowest reading seen at 79% with a good pleth. Pt placed back on ventilator. Update 3306: Noted patient on TCT. Will follow-up if patient tolerates. Noted pt on ventilator and therefore will hold SLP for now. Failed SBT this morning.        Thank you,  ANEUDY LiuEd, [address] [facility]  Speech-Language Pathologist

## 2021-05-11 PROBLEM — E43 SEVERE PROTEIN-CALORIE MALNUTRITION (HCC): Status: ACTIVE | Noted: 2021-05-11

## 2021-05-11 LAB
ANION GAP SERPL CALC-SCNC: 6 MMOL/L (ref 5–15)
BACTERIA SPEC CULT: NORMAL
BASOPHILS # BLD: 0 K/UL (ref 0–0.1)
BASOPHILS NFR BLD: 0 % (ref 0–1)
BUN SERPL-MCNC: 13 MG/DL (ref 6–20)
BUN/CREAT SERPL: 43 (ref 12–20)
CALCIUM SERPL-MCNC: 8.9 MG/DL (ref 8.5–10.1)
CHLORIDE SERPL-SCNC: 112 MMOL/L (ref 97–108)
CHOLEST SERPL-MCNC: 140 MG/DL
CO2 SERPL-SCNC: 28 MMOL/L (ref 21–32)
CREAT SERPL-MCNC: 0.3 MG/DL (ref 0.7–1.3)
DIFFERENTIAL METHOD BLD: ABNORMAL
EOSINOPHIL # BLD: 0 K/UL (ref 0–0.4)
EOSINOPHIL NFR BLD: 1 % (ref 0–7)
ERYTHROCYTE [DISTWIDTH] IN BLOOD BY AUTOMATED COUNT: 15.9 % (ref 11.5–14.5)
GLUCOSE BLD STRIP.AUTO-MCNC: 100 MG/DL (ref 65–117)
GLUCOSE BLD STRIP.AUTO-MCNC: 120 MG/DL (ref 65–117)
GLUCOSE BLD STRIP.AUTO-MCNC: 87 MG/DL (ref 65–117)
GLUCOSE SERPL-MCNC: 96 MG/DL (ref 65–100)
HCT VFR BLD AUTO: 32.6 % (ref 36.6–50.3)
HDLC SERPL-MCNC: 21 MG/DL
HDLC SERPL: 6.7 {RATIO} (ref 0–5)
HGB BLD-MCNC: 9.8 G/DL (ref 12.1–17)
IMM GRANULOCYTES # BLD AUTO: 0 K/UL (ref 0–0.04)
IMM GRANULOCYTES NFR BLD AUTO: 0 % (ref 0–0.5)
LDLC SERPL CALC-MCNC: 77.4 MG/DL (ref 0–100)
LEVETIRACETAM SERPL-MCNC: 20.3 UG/ML (ref 10–40)
LIPID PROFILE,FLP: ABNORMAL
LYMPHOCYTES # BLD: 0.8 K/UL (ref 0.8–3.5)
LYMPHOCYTES NFR BLD: 12 % (ref 12–49)
MAGNESIUM SERPL-MCNC: 2 MG/DL (ref 1.6–2.4)
MCH RBC QN AUTO: 26.8 PG (ref 26–34)
MCHC RBC AUTO-ENTMCNC: 30.1 G/DL (ref 30–36.5)
MCV RBC AUTO: 89.3 FL (ref 80–99)
MONOCYTES # BLD: 0.9 K/UL (ref 0–1)
MONOCYTES NFR BLD: 13 % (ref 5–13)
NEUTS SEG # BLD: 5.5 K/UL (ref 1.8–8)
NEUTS SEG NFR BLD: 74 % (ref 32–75)
NRBC # BLD: 0 K/UL (ref 0–0.01)
NRBC BLD-RTO: 0 PER 100 WBC
PHOSPHATE SERPL-MCNC: 2.8 MG/DL (ref 2.6–4.7)
PLATELET # BLD AUTO: 303 K/UL (ref 150–400)
PMV BLD AUTO: 10.7 FL (ref 8.9–12.9)
POTASSIUM SERPL-SCNC: 3.1 MMOL/L (ref 3.5–5.1)
RBC # BLD AUTO: 3.65 M/UL (ref 4.1–5.7)
SERVICE CMNT-IMP: ABNORMAL
SERVICE CMNT-IMP: NORMAL
SODIUM SERPL-SCNC: 146 MMOL/L (ref 136–145)
TRIGL SERPL-MCNC: 208 MG/DL (ref ?–150)
VLDLC SERPL CALC-MCNC: 41.6 MG/DL
WBC # BLD AUTO: 7.3 K/UL (ref 4.1–11.1)

## 2021-05-11 PROCEDURE — 74011250636 HC RX REV CODE- 250/636: Performed by: PSYCHIATRY & NEUROLOGY

## 2021-05-11 PROCEDURE — 84100 ASSAY OF PHOSPHORUS: CPT

## 2021-05-11 PROCEDURE — 74011250637 HC RX REV CODE- 250/637: Performed by: INTERNAL MEDICINE

## 2021-05-11 PROCEDURE — 97165 OT EVAL LOW COMPLEX 30 MIN: CPT

## 2021-05-11 PROCEDURE — 74011250636 HC RX REV CODE- 250/636: Performed by: NURSE PRACTITIONER

## 2021-05-11 PROCEDURE — 82962 GLUCOSE BLOOD TEST: CPT

## 2021-05-11 PROCEDURE — 92610 EVALUATE SWALLOWING FUNCTION: CPT

## 2021-05-11 PROCEDURE — 74011000258 HC RX REV CODE- 258: Performed by: NURSE PRACTITIONER

## 2021-05-11 PROCEDURE — 74011000250 HC RX REV CODE- 250: Performed by: NURSE PRACTITIONER

## 2021-05-11 PROCEDURE — 99232 SBSQ HOSP IP/OBS MODERATE 35: CPT | Performed by: PSYCHIATRY & NEUROLOGY

## 2021-05-11 PROCEDURE — 83735 ASSAY OF MAGNESIUM: CPT

## 2021-05-11 PROCEDURE — 74011250637 HC RX REV CODE- 250/637: Performed by: NURSE PRACTITIONER

## 2021-05-11 PROCEDURE — 65620000000 HC RM CCU GENERAL

## 2021-05-11 PROCEDURE — 80048 BASIC METABOLIC PNL TOTAL CA: CPT

## 2021-05-11 PROCEDURE — 80061 LIPID PANEL: CPT

## 2021-05-11 PROCEDURE — 74011250636 HC RX REV CODE- 250/636: Performed by: INTERNAL MEDICINE

## 2021-05-11 PROCEDURE — 92524 BEHAVRAL QUALIT ANALYS VOICE: CPT

## 2021-05-11 PROCEDURE — 94003 VENT MGMT INPAT SUBQ DAY: CPT

## 2021-05-11 PROCEDURE — 74011000258 HC RX REV CODE- 258: Performed by: PSYCHIATRY & NEUROLOGY

## 2021-05-11 PROCEDURE — 85025 COMPLETE CBC W/AUTO DIFF WBC: CPT

## 2021-05-11 PROCEDURE — 36415 COLL VENOUS BLD VENIPUNCTURE: CPT

## 2021-05-11 RX ORDER — SPIRONOLACTONE 25 MG/1
12.5 TABLET ORAL DAILY
Status: DISCONTINUED | OUTPATIENT
Start: 2021-05-11 | End: 2021-06-08 | Stop reason: HOSPADM

## 2021-05-11 RX ADMIN — VANCOMYCIN HYDROCHLORIDE 1000 MG: 1 INJECTION, POWDER, LYOPHILIZED, FOR SOLUTION INTRAVENOUS at 06:05

## 2021-05-11 RX ADMIN — VALPROIC ACID 500 MG: 250 SOLUTION ORAL at 12:05

## 2021-05-11 RX ADMIN — MEROPENEM 500 MG: 500 INJECTION, POWDER, FOR SOLUTION INTRAVENOUS at 14:00

## 2021-05-11 RX ADMIN — PIPERACILLIN AND TAZOBACTAM 3.38 G: 3; .375 INJECTION, POWDER, LYOPHILIZED, FOR SOLUTION INTRAVENOUS at 01:16

## 2021-05-11 RX ADMIN — Medication 1 CAPSULE: at 09:34

## 2021-05-11 RX ADMIN — Medication 10 ML: at 21:34

## 2021-05-11 RX ADMIN — FAMOTIDINE 20 MG: 10 INJECTION, SOLUTION INTRAVENOUS at 20:05

## 2021-05-11 RX ADMIN — CASTOR OIL AND BALSAM, PERU: 788; 87 OINTMENT TOPICAL at 17:16

## 2021-05-11 RX ADMIN — Medication 10 ML: at 05:48

## 2021-05-11 RX ADMIN — FENTANYL CITRATE 25 MCG: 50 INJECTION, SOLUTION INTRAMUSCULAR; INTRAVENOUS at 20:00

## 2021-05-11 RX ADMIN — Medication 3 MG: at 21:33

## 2021-05-11 RX ADMIN — VALPROIC ACID 500 MG: 250 SOLUTION ORAL at 20:06

## 2021-05-11 RX ADMIN — MEROPENEM 500 MG: 500 INJECTION, POWDER, FOR SOLUTION INTRAVENOUS at 09:35

## 2021-05-11 RX ADMIN — HEPARIN SODIUM 5000 UNITS: 5000 INJECTION INTRAVENOUS; SUBCUTANEOUS at 20:06

## 2021-05-11 RX ADMIN — HEPARIN SODIUM 5000 UNITS: 5000 INJECTION INTRAVENOUS; SUBCUTANEOUS at 12:03

## 2021-05-11 RX ADMIN — MEROPENEM 500 MG: 500 INJECTION, POWDER, FOR SOLUTION INTRAVENOUS at 20:05

## 2021-05-11 RX ADMIN — CARVEDILOL 6.25 MG: 6.25 TABLET, FILM COATED ORAL at 17:15

## 2021-05-11 RX ADMIN — LEVETIRACETAM 1000 MG: 100 INJECTION, SOLUTION INTRAVENOUS at 17:15

## 2021-05-11 RX ADMIN — SPIRONOLACTONE 12.5 MG: 25 TABLET ORAL at 10:30

## 2021-05-11 RX ADMIN — SACUBITRIL AND VALSARTAN 1 TABLET: 24; 26 TABLET, FILM COATED ORAL at 20:06

## 2021-05-11 RX ADMIN — SACUBITRIL AND VALSARTAN 1 TABLET: 24; 26 TABLET, FILM COATED ORAL at 09:34

## 2021-05-11 RX ADMIN — LEVETIRACETAM 1000 MG: 100 INJECTION, SOLUTION INTRAVENOUS at 05:46

## 2021-05-11 RX ADMIN — HEPARIN SODIUM 5000 UNITS: 5000 INJECTION INTRAVENOUS; SUBCUTANEOUS at 05:48

## 2021-05-11 RX ADMIN — CHLORHEXIDINE GLUCONATE 15 ML: 0.12 RINSE ORAL at 21:33

## 2021-05-11 RX ADMIN — FAMOTIDINE 20 MG: 10 INJECTION, SOLUTION INTRAVENOUS at 09:35

## 2021-05-11 RX ADMIN — VALPROIC ACID 500 MG: 250 SOLUTION ORAL at 05:48

## 2021-05-11 RX ADMIN — CARVEDILOL 6.25 MG: 6.25 TABLET, FILM COATED ORAL at 09:34

## 2021-05-11 RX ADMIN — CASTOR OIL AND BALSAM, PERU: 788; 87 OINTMENT TOPICAL at 09:35

## 2021-05-11 NOTE — PROGRESS NOTES
1600: Bedside and Verbal shift change report given to Cholo Aguirre RN (oncoming nurse) by Jennie RN (offgoing nurse). Report included the following information SBAR, Kardex, Intake/Output, MAR, Accordion, Recent Results, Med Rec Status, Cardiac Rhythm NSR, Alarm Parameters  and Dual Neuro Assessment.

## 2021-05-11 NOTE — PROGRESS NOTES
SLP Contact Note    SLP evaluation complete. Pt briefly able to tolerate PMV and vocalize before his respiratory rate chao to 39. Suspect size of trach is a factor. Therefore, would consider a trach downsize to a #6 (can be cuffed if concerned that he may still require vent support). Can place PMV briefly for communication. Additionally, recommend ice chips 5x/hr to further prevent swallow muscle atrophy. If pt remains on trach collar trials without difficulty, will plan to complete a Fiberoptic Endoscopic Evaluation of Swallow (FEES) at bedside tomorrow vs Thursday. Full note to follow.       Thank you,  ERNESTO Jay.Ed, [id number] [facility] [place], [facility]  Speech-Language Pathologist

## 2021-05-11 NOTE — PROGRESS NOTES
1930: Bedside and Verbal shift change report given to [address] (oncoming nurse) by Isabelle Pat RN (offgoing nurse). Report included the following information SBAR, Kardex, ED Summary, Procedure Summary, Intake/Output, MAR, Recent Results, Cardiac Rhythm NSR and Alarm Parameters . 0730: Bedside and Verbal shift change report given to Mary Alice GLASS (oncoming nurse) by Valere Mcburney RN (offgoing nurse). Report included the following information SBAR, Kardex, Intake/Output, MAR, Recent Results, Cardiac Rhythm NSR and Alarm Parameters .

## 2021-05-11 NOTE — PROGRESS NOTES
Problem: Self Care Deficits Care Plan (Adult)  Goal: *Acute Goals and Plan of Care (Insert Text)  Description:   FUNCTIONAL STATUS PRIOR TO ADMISSION: patient was IND prior to recent hospital admissions and has had a complicated medical course including NG tube and trach. Patient recently at [facility] for vent weaning. HOME SUPPORT: The patient lived alone with parents in area to provide assistance. Occupational Therapy Goals  Initiated 5/11/2021  1. Patient will perform 2 simple grooming tasks in supported sitting with minimal assistance/contact guard assist within 7 day(s). 2.  Patient will perform anterior neck to thigh bathing in supported sitting with moderate assistance within 7 day(s). 3.  Patient will tolerate sitting EOB in prep for ADLs with max A within 7 days. 4.  Patient will track to L of midline during ADLs/therapeutic activities with moderate cues within 7 days. 5.  Patient will participate in upper extremity therapeutic exercise/activities with moderate assistance  for 5 minutes within 7 day(s). Outcome: Not Met    OCCUPATIONAL THERAPY EVALUATION  Patient: Faiza Corea (78 y.o. male)  Date: 5/11/2021  Primary Diagnosis: Cardiac arrest [facility]) [I46.9]        Precautions:  Fall, Skin    ASSESSMENT  Based on the objective data described below, the patient presents with limited ADL performance s/p admission for cardiac arrest. Patient has had a complicated hospital course including intubation and CVA. Patient recently at [facility] for vent weaning. Imaging positive for \"large subacute to chronic right PCA territory infarct as above. Moderate size subacute to chronic right MCA territory infarct as above. Small chronic infarcts in the right cerebellum\". At baseline, prior to recent events, patient was IND. Today, patient received in bed on trach collar. Patient required cues to attend to L side of body and L visual field patient noted to have L hemiparesis.  Patient able to wash face with up to min A for thoroughness using RUE - VSS throughout session, however, noted RR increased to 37 with minimal exertion. Patient being moved to 4th floor, continued mobility/ADL performance deferred. Anticipate patient will need to go back to [facility] for vent weaning. Will continue to follow. Current Level of Function Impacting Discharge (ADLs/self-care): up to max A for ADLs    Functional Outcome Measure: The patient scored Total: 0/100 on the Barthel Index outcome measure which is indicative of 100% impaired ability to care for basic self needs/dependency on others; inferred 100% dependency on others for instrumental ADLs. Other factors to consider for discharge: was IND PTA, trach     Patient will benefit from skilled therapy intervention to address the above noted impairments. PLAN :  Recommendations and Planned Interventions: self care training, functional mobility training, therapeutic exercise, balance training, therapeutic activities, endurance activities, neuromuscular re-education, patient education, home safety training, and family training/education    Frequency/Duration: Patient will be followed by occupational therapy 5 times a week to address goals.     Recommendation for discharge: (in order for the patient to meet his/her long term goals)  To be determined: LTAc    This discharge recommendation:  Has been made in collaboration with the attending provider and/or case management    IF patient discharges home will need the following DME: bedside commode, hospital bed, mechanical lift, transfer bench, walker: rolling, and wheelchair       SUBJECTIVE:   Patient did not verbalize    OBJECTIVE DATA SUMMARY:   HISTORY:   Past Medical History:   Diagnosis Date    Anxiety     Seizure ([facility]      Past Surgical History:   Procedure Laterality Date    HX TONSILLECTOMY         Expanded or extensive additional review of patient history:     Home Situation  Home Environment: Other (comment)(LTAC)    Hand dominance: Right    EXAMINATION OF PERFORMANCE DEFICITS:  Cognitive/Behavioral Status:  Neurologic State: Alert  Orientation Level: Unable to verbalize  Cognition: Decreased attention/concentration  Perception: Cues to attend to left side of body;Cues to attend left visual field; Tactile;Verbal;Visual  Perseveration: No perseveration noted  Safety/Judgement: Decreased awareness of environment;Decreased awareness of need for assistance;Decreased awareness of need for safety;Decreased insight into deficits; Fall prevention    Skin: appears intact    Edema: none noted in BUEs    Hearing:       Vision/Perceptual:    Tracking: Requires cues, head turns, or add eye shifts to track                                Range of Motion:  In BUEs  AROM: Grossly decreased, non-functional                         Strength: In BUEs  Strength: Grossly decreased, non-functional                Coordination:  Coordination: Generally decreased, functional  Fine Motor Skills-Upper: Left Impaired;Right Impaired    Gross Motor Skills-Upper: Left Impaired;Right Impaired    Tone & Sensation:  In BUEs  Tone: Abnormal  Sensation: Impaired                      Balance:  Sitting: Impaired    Functional Mobility and Transfers for ADLs:  Bed Mobility:   NT this session    Transfers:   NT this session    ADL Assessment:  Feeding: Total assistance(NG tube)    Oral Facial Hygiene/Grooming: Moderate assistance    Bathing: Maximum assistance    Upper Body Dressing: Maximum assistance    Lower Body Dressing: Maximum assistance    Toileting: Maximum assistance    ADL Intervention and task modifications:  Grooming  Position Performed: Long sitting on bed  Washing Face: Minimum assistance  Cues: Verbal cues provided    Cognitive Retraining  Safety/Judgement: Decreased awareness of environment;Decreased awareness of need for assistance;Decreased awareness of need for safety;Decreased insight into deficits; Fall prevention    Functional Measure:  Barthel Index:    Bathin  Bladder: 0  Bowels: 0  Groomin  Dressin  Feedin  Mobility: 0  Stairs: 0  Toilet Use: 0  Transfer (Bed to Chair and Back): 0  Total: 0/100        The Barthel ADL Index: Guidelines  1. The index should be used as a record of what a patient does, not as a record of what a patient could do. 2. The main aim is to establish degree of independence from any help, physical or verbal, however minor and for whatever reason. 3. The need for supervision renders the patient not independent. 4. A patient's performance should be established using the best available evidence. Asking the patient, friends/relatives and nurses are the usual sources, but direct observation and common sense are also important. However direct testing is not needed. 5. Usually the patient's performance over the preceding 24-48 hours is important, but occasionally longer periods will be relevant. 6. Middle categories imply that the patient supplies over 50 per cent of the effort. 7. Use of aids to be independent is allowed. Min De Oliveira., Barthel, D.W. ([id number]). Functional evaluation: the Barthel Index. [address] [facility] (14)2. GIL HarperF, Issac Garcia., Sammi Lim., [place], [address] [place] [address] (). Measuring the change indisability after inpatient rehabilitation; comparison of the responsiveness of the Barthel Index and Functional [place] Measure. Journal of Neurology, Neurosurgery, and Psychiatry, 66(4), 568-264. Shree Miranda, N.J.A, ERNESTO FoleyJ.ERNESTO, & Marisol De Leon M.A. (2004.) Assessment of post-stroke quality of life in cost-effectiveness studies: The usefulness of the Barthel Index and the EuroQoL-5D.  Quality of Life Research, 15, 793-41     Occupational Therapy Evaluation Charge Determination   History Examination Decision-Making   LOW Complexity : Brief history review  HIGH Complexity : 5 or more performance deficits relating to physical, cognitive , or psychosocial skils that result in activity limitations and / or participation restrictions HIGH Complexity : Patient presents with comorbidities that affect occupational performance. Signifigant modification of tasks or assistance (eg, physical or verbal) with assessment (s) is necessary to enable patient to complete evaluation       Based on the above components, the patient evaluation is determined to be of the following complexity level: HIGH   Pain Rating:  Reporting pain in chest - Rn aware    Activity Tolerance:   Fair and Poor    After treatment patient left in no apparent distress:    Supine in bed, Call bell within reach, Side rails x 3, and Restraints    COMMUNICATION/EDUCATION:   The patients plan of care was discussed with: Physical therapist and Registered nurse. Home safety education was provided and the patient/caregiver indicated understanding. and Patient/family have participated as able in goal setting and plan of care. This patients plan of care is appropriate for delegation to [facility].     Thank you for this referral.  Donn Dueñas OT  Time Calculation: 8 mins

## 2021-05-11 NOTE — PROGRESS NOTES
[facility]    ICU TEAM Progress Note    Name: Tad Dan   : 1995   MRN: [id number]   Date: 2021      I  Subjective:   Progress Note: 2021      Reason for ICU Admission: Transferred from [facility] 134 post cardiac arrest on May 6    Interval history:From [facility]  Maverick Wallis [facility] a 22 y. o. male with h/o Seizures and anxiety who presented to [facility] ED after a cardiac arrest at [facility] around 1500 today. Hx from chart and speaking with patient's sister via phone. Patient had recent hospitalization at the in [facility] in March of this year. Initial hospitalization admission was at [facility] on 3/24 with AMS in setting of polysubstance and IVD use (cocaine, heroine, methamphetamines). He was found to have septic MRSA bacteremia, MSSA endocarditis. Imaging and MRI also found R PCA infarct and several abscesses and right temporal and parietal lobes. Infarcts thought to be due to septic emboli and patient had left sided residual weakness. Patient was transferred to [address] [place] [address] on 2021 for evaluation for valve surgery. After transfer he was found to have a New R MCA infarct Also had a type 2 NSTEMI and tamponade with pericardiocentesis done prior to surgery. Did have cardiac arrest on day of surgery. Had a bioprosthetic Aortic Valve Replacement and Aortic Root Abscess Debridement done on 2021.  Trached and sent to [facility] on 2021. Was undergoing PT and vent wean. Sister stated that patient was able to be off the vent for several hours over the last few days. He was sitting up and able to talk with valve over trach and could follow commands. Stated that he had severe weakness of the left upper ext, but was starting to gain some mobility in the lower left ext.  Patient's mother visited patient today prior to arrest. She said the staff told her that the patient was getting very anxious earlier in the day around 1 pm and had to be placed back on the ventilator, and then they had to sedate him while he was on the ventilator. The mother stated \" he did not look good\" and \" his eye were rolled back in his head\". About 30 minutes after she left she got call about arrest. Per ED note patient was found unresponsive around the 1500 hour and was pulseless. Two rounds of CPR and meds were given and patient was defibrillated x 1 before ROSC. Patient was hypoxic post arrest. Unknown down time prior to arrest. Patient was then sent to [facility] ED    Overnight Events:   5/11: Placed on trach collar this morning  5/10: No acute event, failed spontaneous breathing trial on trach collar due to tachypnea and increased work of breathing after about 2 hours. More alert and cooperative following command. Pulled his Dobbhoff tube. 5/9: Was able to be on trach collar for few hours yesterday, had to go back on ventilator due to increased work of breathing. Required Precedex for agitation. The morning of May 9 he went to MRI/MRA. Off pressors. Dobutamine challenge of the morning of 9 9.  5/8: Able to wean ventilator setting, oxygen and discontinue nitric oxide.  Weaning sedation as tolerated.  Pressors weaned off.  On 2 mart dobutamine  5/7/21 -- Georges, EPI, MV  Active Problem List:     Problem List  Never Reviewed          Codes Class    Cardiac arrest ([facility] [address] ICD-10-CM: I46.9  ICD-9-CM: 427.5         Cerebral abscess (embolic) AZA-13-ME: L70.8  ICD-9-CM: 324.0     Overview Signed 3/30/2021  4:51 PM by Jackeline Seay MD     CT and MRI revealed evidence of a right posterior cerebellar artery infarct, 9 x 2.6 cm. He also had several areas of cerebral abscesses in the right temporal and parietal lobes measuring 2 x 1 cm, 1.3 x 0.9 cm and 0.6 x 5.5 mm with associated 3 mm midline shift.  CSF showed no organisms to date, but elevated WBC.     3/24/21 CT Normal noncontrast head CT  3/29/21 MRI Extensive multiple acute infarcts throughout the bilateral cerebral hemispheres with a large hemorrhagic right PCA territory infarction measuring up to 7 cm. Some of the additional smaller infarcts also demonstrated hemorrhage. Findings are highly suspicious for septic emboli  3/30/21 CTA Occlusion of the right P2/P3. Infarction within the right occipital and temporal lobes. Edema related to the right-sided small abscesses, better appreciated on MRI             Drug abuse, cocaine type [facility]) ICD-10-CM: F14.10  ICD-9-CM: 305.60     Overview Signed 3/29/2021  8:34 PM by Ammon Cuenca MD     3/24/21 UDS + methamphetamines and cocaine             Endocarditis due to methicillin susceptible Staphylococcus aureus (MSSA) ICD-10-CM: I33.0, B95.61  ICD-9-CM: 421.0, 041.11     Overview Addendum 3/30/2021  4:51 PM by Ammon Cuenca MD     3/24/21 ER Patient with a history of drug use anxiety and seizure not on medications for the past 4 months according to the sister presents for evaluation of altered mental status;  BC x2 + MRSA, WBC 11.6 P-67%, K 4.0 BUN 45, Creat 1.1, Trop I 3.89-> 13.60-> 10.52; UDS + cocaine and amphetamines; Lactic acid 4.2  3/24/21 Intubated      CT head negative      CT chest, neg PE, ? LV mass  3/25/21 ECHO EF 36%, LV 44/45, S/PW 8/9,  Mod AR  3/2/621 RAISSA EF 55%, Vegetations on AV 0.9cm LCC, small perforation 0.4cm LCC. No feg on MV/TV/PV, No thrombus in MELLY             Type 2 myocardial infarction [facility]) ICD-10-CM: I21. A1  ICD-9-CM: 410.90     Overview Signed 3/29/2021  8:37 PM by Ammon Cuenca MD     Trop I 3.89-> 13.60-> 10.52;                    Objective:   Vital Signs:  Visit Vitals  /83   Pulse 80   Temp 99.3 °F (37.4 °C)   Resp 17   Ht 5' 11\" (1.803 m)   Wt 59 kg (130 lb)   SpO2 94%   BMI 18.13 kg/m²    O2 Flow Rate (L/min): 10 l/min O2 Device: Tracheal collar Temp (24hrs), Av.2 °F (37.3 °C), Min:98.9 °F (37.2 °C), Max:99.4 °F (37.4 °C)           Intake/Output:     Intake/Output Summary (Last 24 hours) at 2021 0846  Last data filed at 2021 0700  Gross per 24 hour   Intake 2635.18 ml   Output 1375 ml   Net 1260.18 ml       Physical Exam:    General: No distress, appears stated age,Trached on collar  Eye:  conjunctivae/corneas clear. Pupils are round and reactive equally  Neurologic:  Follows commands in BLE and RUE, LUE flaccid, nodding yes/no,  + cough/gag.,    Lymphatic:  Cervical, supraclavicular, and axillary nodes normal.   Neck:  normal and no erythema or exudates noted. Lungs:  Coarse lung sounds bilaterally,   Heart:  Regular rate and rhythm, S1, S2  Abdomen:  soft, non-tender. Bowel sounds normal. No masses,  no organomegaly  Cardiovascular:  S1S2 present, pedal pulses normal and no edema  Skin:  Normal. and no rash or abnormalities    LABS AND  DATA: Personally reviewed  Recent Labs     05/11/21  0435 05/10/21  0512   WBC 7.3 8.5   HGB 9.8* 9.5*   HCT 32.6* 31.5*    314     Recent Labs     05/11/21  0435 05/10/21  0512   * 146*   K 3.1* 3.4*   * 111*   CO2 28 30   BUN 13 16   CREA 0.30* 0.32*   GLU 96 92   CA 8.9 9.0   MG 2.0 2.1   PHOS 2.8 2.2*     Recent Labs     05/09/21  2105   AP 66   TP 5.8*   ALB 2.7*   GLOB 3.1     No results for input(s): INR, PTP, APTT, INREXT, INREXT in the last 72 hours. No results for input(s): PHI, PCO2I, PO2I, FIO2I in the last 72 hours. No results for input(s): CPK, CKMB, TROIQ, BNPP in the last 72 hours. Ventilator Settings:  Mode Rate Tidal Volume Pressure FiO2 PEEP   Volume control, Assist control   450 ml  0 cm H2O 100 % 5 cm H20     Peak airway pressure: 28 cm H2O    Minute ventilation: 11 l/min        MEDS: Reviewed    Chest X-Ray:  CXR Results  (Last 48 hours)    None          ECHO:  · LV: Estimated LVEF is 20 - 25%. Normal wall thickness. Dilated left ventricle. Severely reduced systolic function. Inconclusive left ventricular diastolic function. · TV: Mild tricuspid valve regurgitation is present. · PA: Mild to moderate pulmonary hypertension. · RV: Reduced systolic function.   · MV: Mitral valve non-specific thickening. Mild to moderate mitral valve regurgitation is present. Assessment and Plan:     1. Cardiac arrest, unknown time to ROSC. (2 rounds of meds and compressions, Defibrilated x 1) with good neurological recovery  2. Acute on chronic hypoxic respiratory failure, sputum positive for ESBL Klebsiella  3. Bilateral multilobar pneumonia  4. Cardiogenic shock: On dobutamine  5. Acute anemia: No clear source of bleeding  6. Large territory sub-acute infarctions of the right. occipital lobe and right frontal lobe. 7. Chronic Seizure D/o.  8. 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 9. Hx Drug abuse, cocaine type  10. Recent Aortic Surgery at [facility] on April 16, 2021     Off sedation, back to baseline mental status. Melatonin nightly  Wean mechanical ventilation as tolerated, significant improvement. Sputum now growing ESBL Klebsiella. Switched Zosyn to meropenem- will continue for 5 days. Procalcitonin trending down. Discontinued stress dose steroid on 5/9  Dobutamine off.  Was started on Entresto and Carvedilol- tolerating well. Appreciate cardiology input  No further of GI bleed, no further drop in hemoglobin. I will switch prophylactic Lovenox to heparin subcu. Appreciate neurology. MRI brain reviewed.     DVT prophylaxis, GI prophylaxis and ventilator bundle    DISPOSITION  Stay in ICU    CRITICAL CARE CONSULTANT NOTE  I had a face to face encounter with the patient, reviewed and interpreted patient data including clinical events, labs, images, vital signs, I/O's, and examined patient. I have discussed the case and the plan and management of the patient's care with the consulting services, the bedside nurses and the respiratory therapist.      NOTE OF PERSONAL INVOLVEMENT IN CARE   This patient has a high probability of imminent, clinically significant deterioration, which requires the highest level of preparedness to intervene urgently.  I participated in the decision-making and personally managed or directed the management of the following life and organ supporting interventions that required my frequent assessment to treat or prevent imminent deterioration. I personally spent 40 minutes of critical care time. This is time spent at this critically ill patient's bedside actively involved in patient care as well as the coordination of care and discussions with the patient's family. This does not include any procedural time which has been billed separately.     MARIO Ramos  [facility]  5/11/2021

## 2021-05-11 NOTE — PROGRESS NOTES
1145 TRANSFER - IN REPORT:    Verbal report received from Mary Alice RN(name) on Anabela Luo  being received from ICU(unit) for routine progression of care      Report consisted of patients Situation, Background, Assessment and   Recommendations(SBAR). Information from the following report(s) SBAR was reviewed with the receiving nurse. Opportunity for questions and clarification was provided. Assessment completed upon patients arrival to unit and care assumed. 3 Castro Lula at bedside. No orders received. 1500 Nutrition at bedside. TF orders changed. 1530 Bedside and Verbal shift change report given to 7 Ghada Sinha (oncoming nurse) by Jennie RN (offgoing nurse). Report included the following information SBAR.

## 2021-05-11 NOTE — PROGRESS NOTES
0730: Bedside and Verbal shift change report given to Mary Alice RN  (oncoming nurse) by Brandy Bolivar RN  (offgoing nurse). Report included the following information SBAR, Kardex, Intake/Output, MAR, Recent Results and Cardiac Rhythm NSR.     1145: TRANSFER - OUT REPORT:    Verbal report given to Cat RN (name) on Faiza Corea  being transferred to CCU (unit) for routine progression of care       Report consisted of patients Situation, Background, Assessment and   Recommendations(SBAR). Information from the following report(s) SBAR, Kardex, Intake/Output, MAR, Recent Results and Cardiac Rhythm NSR was reviewed with the receiving nurse. Lines:   Quad Lumen 05/06/21 Right Internal jugular (Active)   Central Line Being Utilized Yes 05/11/21 1145   Criteria for Appropriate Use Hemodynamically unstable, requiring monitoring lines, vasopressors, or volume resuscitation 05/11/21 1145   Site Assessment Clean, dry, & intact 05/11/21 1145   Infiltration Assessment 0 05/11/21 1145   Affected Extremity/Extremities Color distal to insertion site pink (or appropriate for race) 05/11/21 1145   Date of Last Dressing Change 05/07/21 05/11/21 1145   Dressing Status Clean, dry, & intact 05/11/21 1145   Dressing Type Disk with Chlorhexadine gluconate (CHG); Transparent 05/11/21 1145   Action Taken Open ports on tubing capped 05/11/21 1145   Proximal Hub Color/Line Status White; Infusing 05/11/21 1145   Positive Blood Return (Medial Site) Yes 05/11/21 1145   Medial 1 Hub Color/Line Status Gray;Flushed 05/11/21 1145   Positive Blood Return (Lateral Site) Yes 05/11/21 1145   Medial 2 Hub Color/Line Status Blue;Flushed 05/11/21 1145   Positive Blood Return (Site #3) Yes 05/11/21 1145   Distal Hub Color/Line Status Brown;Flushed 05/11/21 1145   Positive Blood Return (Site #4) Yes 05/11/21 1145   Alcohol Cap Used Yes 05/11/21 1145        Opportunity for questions and clarification was provided.       Patient transported with:   Monitor  O2 @ [facility] liters  Registered Nurse  [employer]

## 2021-05-11 NOTE — PROGRESS NOTES
Cardiology Progress Note                                        Admit Date: 5/6/2021    Assessment/Plan:     1. Sp cardiac arrest post op 4/17/21,   cardiac arrest Vfib 5/6/21  ekg rbbb qt 460 msec. ICD rec for secondary prevention if life expectancy > 1yr.    2. Hx:  4/16/21 at [facility] bioprosthetic AVR and root abscess debridement. He also had a pericardiocentesis due to tamponade prior to surgery  3. Recent endocardititis 3/24/21 Staph  4. CM  Ef 25%   5.  right MCA infarct and several abscesses in the right temporal and parietal lobes. Infarcts were thought to be due to septic emboli. He had left sided residual weakness. cta 5/6/21:  multiple acute infarctions throughout the  right cerebral hemisphere, involving much of the occipital lobe, as well as much  of the frontal lobe. There is an additional superior right frontoparietal  infarct  6.  severe multilevel consolidation throughout both lungs consistent with severe multilobar PNA. 7. PNA:  Severe multilevel consolidation throughout both lungs, consistent with  severe multilobar pneumonia. Added spironolactone  Add dapagliflozin on dc as not on formulary       Giancarlo Bolden is a 22 y.o. male with     PROBLEM LIST:  Patient Active Problem List    Diagnosis Date Noted    Cardiac arrest ([facility] 05/06/2021    Cerebral abscess (embolic) 26/55/5816    Drug abuse, cocaine type ([facility] [address] 03/29/2021    Endocarditis due to methicillin susceptible Staphylococcus aureus (MSSA) 03/25/2021    Type 2 myocardial infarction ([facility] 03/24/2021         Subjective:     Giancarlo Bolden does not respond.     Visit Vitals  /83   Pulse 80   Temp 97.8 °F (36.6 °C)   Resp 17   Ht 5' 11\" (1.803 m)   Wt 59 kg (130 lb)   SpO2 94%   BMI 18.13 kg/m²       Intake/Output Summary (Last 24 hours) at 5/11/2021 1004  Last data filed at 5/11/2021 0700  Gross per 24 hour   Intake 2480 ml   Output 1375 ml   Net 1105 ml       Objective:      Physical Exam:  HEENT: Lyric EOMI  Neck: No JVD,  No thyroidmegaly  Resp: CTA bilaterally;  No wheezes or rales  CV: RRR s1s2 No murmur no s3  Abd:Soft, Nontender  Ext: No edema  Neuro: unresponsive on vent   Skin: Warm, Dry, Intact  Pulses: 2+ DP/PT/Rad      Telemetry: normal sinus rhythm    Current Facility-Administered Medications   Medication Dose Route Frequency    meropenem (MERREM) 500 mg in 0.9% sodium chloride (MBP/ADV) 50 mL MBP  0.5 g IntraVENous Q6H    spironolactone (ALDACTONE) tablet 12.5 mg  12.5 mg Oral DAILY    levETIRAcetam (KEPPRA) 1,000 mg in 0.9% sodium chloride 100 mL IVPB  1,000 mg IntraVENous Q12H    hydrALAZINE (APRESOLINE) 20 mg/mL injection 10 mg  10 mg IntraVENous Q6H PRN    sacubitriL-valsartan (ENTRESTO) 24-26 mg tablet 1 Tab  1 Tab Oral Q12H    carvediloL (COREG) tablet 6.25 mg  6.25 mg Oral BID WITH MEALS    L.acidophilus-paracasei-S.thermophil-bifidobacter (RISAQUAD) 8 billion cell capsule  1 Cap Nasogastric DAILY    heparin (porcine) injection 5,000 Units  5,000 Units SubCUTAneous Q8H    melatonin tablet 3 mg  3 mg Oral QHS    0.9% sodium chloride infusion 250 mL  250 mL IntraVENous PRN    balsam peru-castor oiL (VENELEX) ointment   Topical BID    sodium chloride (NS) flush 5-40 mL  5-40 mL IntraVENous Q8H    sodium chloride (NS) flush 5-40 mL  5-40 mL IntraVENous PRN    acetaminophen (TYLENOL) tablet 650 mg  650 mg Oral Q6H PRN    Or    acetaminophen (TYLENOL) suppository 650 mg  650 mg Rectal Q6H PRN    polyethylene glycol (MIRALAX) packet 17 g  17 g Oral DAILY PRN    ondansetron (ZOFRAN) injection 4 mg  4 mg IntraVENous Q6H PRN    famotidine (PF) (PEPCID) 20 mg in 0.9% sodium chloride 10 mL injection  20 mg IntraVENous BID    chlorhexidine (ORAL CARE KIT) 0.12 % mouthwash 15 mL  15 mL Oral Q12H    fentaNYL citrate (PF) injection 25 mcg  25 mcg IntraVENous Q4H PRN    glucose chewable tablet 16 g  4 Tab Oral PRN    dextrose (D50W) injection syrg 12.5-25 g  25-50 mL IntraVENous PRN    glucagon (GLUCAGEN) injection 1 mg  1 mg IntraMUSCular PRN    insulin lispro (HUMALOG) injection   SubCUTAneous Q6H    valproic acid (as sodium salt) (DEPAKENE) 250 mg/5 mL (5 mL) oral solution 500 mg  500 mg Oral Q8H         Data Review:   Labs:    Recent Results (from the past 24 hour(s))   GLUCOSE, POC    Collection Time: 05/10/21  2:07 PM   Result Value Ref Range    Glucose (POC) 84 65 - 100 mg/dL    Performed by Lars Pate, TROUGH    Collection Time: 05/10/21  3:21 PM   Result Value Ref Range    Vancomycin,trough 11.8 (H) 5.0 - 10.0 ug/mL    Reported dose date NOT PROVIDED      Reported dose time: NOT PROVIDED      Reported dose: NOT PROVIDED UNITS   GLUCOSE, POC    Collection Time: 05/10/21  6:53 PM   Result Value Ref Range    Glucose (POC) 83 65 - 100 mg/dL    Performed by Kiara Ham    GLUCOSE, POC    Collection Time: 05/11/21 12:19 AM   Result Value Ref Range    Glucose (POC) 87 65 - 117 mg/dL    Performed by Mandy John    MAGNESIUM    Collection Time: 05/11/21  4:35 AM   Result Value Ref Range    Magnesium 2.0 1.6 - 2.4 mg/dL   PHOSPHORUS    Collection Time: 05/11/21  4:35 AM   Result Value Ref Range    Phosphorus 2.8 2.6 - 4.7 MG/DL   CBC WITH AUTOMATED DIFF    Collection Time: 05/11/21  4:35 AM   Result Value Ref Range    WBC 7.3 4.1 - 11.1 K/uL    RBC 3.65 (L) 4.10 - 5.70 M/uL    HGB 9.8 (L) 12.1 - 17.0 g/dL    HCT 32.6 (L) 36.6 - 50.3 %    MCV 89.3 80.0 - 99.0 FL    MCH 26.8 26.0 - 34.0 PG    MCHC 30.1 30.0 - 36.5 g/dL    RDW 15.9 (H) 11.5 - 14.5 %    PLATELET 784 671 - 912 K/uL    MPV 10.7 8.9 - 12.9 FL    NRBC 0.0 0  WBC    ABSOLUTE NRBC 0.00 0.00 - 0.01 K/uL    NEUTROPHILS 74 32 - 75 %    LYMPHOCYTES 12 12 - 49 %    MONOCYTES 13 5 - 13 %    EOSINOPHILS 1 0 - 7 %    BASOPHILS 0 0 - 1 %    IMMATURE GRANULOCYTES 0 0.0 - 0.5 %    ABS. NEUTROPHILS 5.5 1.8 - 8.0 K/UL    ABS. LYMPHOCYTES 0.8 0.8 - 3.5 K/UL    ABS. MONOCYTES 0.9 0.0 - 1.0 K/UL    ABS.  EOSINOPHILS 0.0 0.0 - 0.4 K/UL ABS. BASOPHILS 0.0 0.0 - 0.1 K/UL    ABS. IMM.  GRANS. 0.0 0.00 - 0.04 K/UL    DF AUTOMATED     LIPID PANEL    Collection Time: 05/11/21  4:35 AM   Result Value Ref Range    LIPID PROFILE          Cholesterol, total 140 <200 MG/DL    Triglyceride 208 (H) <150 MG/DL    HDL Cholesterol 21 MG/DL    LDL, calculated 77.4 0 - 100 MG/DL    VLDL, calculated 41.6 MG/DL    CHOL/HDL Ratio 6.7 (H) 0.0 - 5.0     METABOLIC PANEL, BASIC    Collection Time: 05/11/21  4:35 AM   Result Value Ref Range    Sodium 146 (H) 136 - 145 mmol/L    Potassium 3.1 (L) 3.5 - 5.1 mmol/L    Chloride 112 (H) 97 - 108 mmol/L    CO2 28 21 - 32 mmol/L    Anion gap 6 5 - 15 mmol/L    Glucose 96 65 - 100 mg/dL    BUN 13 6 - 20 MG/DL    Creatinine 0.30 (L) 0.70 - 1.30 MG/DL    BUN/Creatinine ratio 43 (H) 12 - 20      GFR est AA >60 >60 ml/min/1.73m2    GFR est non-AA >60 >60 ml/min/1.73m2    Calcium 8.9 8.5 - 10.1 MG/DL   GLUCOSE, POC    Collection Time: 05/11/21  6:04 AM   Result Value Ref Range    Glucose (POC) 100 65 - 117 mg/dL    Performed by Donna Zhong

## 2021-05-11 NOTE — PROGRESS NOTES
Comprehensive Nutrition Assessment    Type and Reason for Visit: Initial, Consult    Nutrition Recommendations/Plan:      Replace potassium    Modify tube feeding: Osmolite 1.5 @ 55 ml/hr; continue with 200 ml water flush q 4 hr    Advance diet per SLP     -Suggest transitioning to nocturnal tube feeding until adequacy of PO intake determined    Nutrition Assessment:    Pt admitted from [facility] d/t Cardiac arrest. PMHx: Polysubstance abuse,  MRSA bacteremia, MSSA endocarditis s/[p AVR and Aortic root abscess debridement, cerebral abscess (embolic), VDRF-s/p trach and PEG placement. B/L multilobar PNA. Cardiogenic shock on admisison-off pressors. CVA-results of MRI noted; neurology following. Now following commands, more alert. SLP following-for FEES 5/12 or 5/13. Pt meets criteria for severe malnutrition. Per weight trends in EHR, Mr James Ni has lost 35# in the past couple of months. Appears very thin with obvious loss of body fat and muscle mass. Potassium 3.1 this morning-may wish to replace. Sodium mildly elevated-FWF via NGT should help. Mr James Ni was eating PTA. NGT placed d/t pt being back on the vent. Pt vomited ON 5/9 and removed NGT. DHT placed yesterday and was bridled; tube feedings resumed in the evening. Today-tolerating enteral feeding. Also taking ice chips per SLP. Vital formula ordered since pt was on a Vital formula during initial hospitalization. Suspect a peptide based formula no longer indicated. Recommend the following: Osmolite 1.5 @ 55 ml/hr; continue with 200 ml water flush q 4 hr. This will provide 1320 ml, 1980 calories, 83 gm protein and 2200 ml free water (tube feeding/flush) per day to meet estimated needs. Adjust flush prn.     Malnutrition Assessment:  Malnutrition Status:  Severe malnutrition    Context:  Acute illness     Findings of the 6 clinical characteristics of malnutrition:   Energy Intake:  Mild decrease in energy intake (specify)  Weight Loss:  7.00 - Greater than 7.5% over 3 months     Body Fat Loss:  7 - Moderate body fat loss, Fat overlying ribs, Buccal region   Muscle Mass Loss:  7 - Moderate muscle mass loss, Thigh (quadraceps), Clavicles (pectoralis & deltoids), Calf  Fluid Accumulation:  No significant fluid accumulation,     Strength:  Not performed       Nutritionally Significant Medications:   Pepcid, Humalog, Risaquad, Aldactone, Miralax prn    Estimated Daily Nutrient Needs:  Energy (kcal): 9829-7614 (30-35 kcal/kg); Weight Used for Energy Requirements: Current(59 kg)  Protein (g): 77-89 (1.3-1.5g/kg);  Weight Used for Protein Requirements: Current(59 kg)  Fluid (ml/day):  ; Method Used for Fluid Requirements: 1 ml/kcal    Nutrition Related Findings:       BM: FMS 5/11  Edema: None  Wounds:  None       Current Nutrition Therapies:   Diet: NPO  Tube Feeding: Vital 1.5 @ 50 ml/hr with 200 ml water flush q 4 hr  Additional Caloric Sources:  None    Anthropometric Measures:  · Height:  5' 11\" (180.3 cm)  · Current Body Wt:  59 kg (130 lb 1.1 oz)   · Admission Body Wt:  125 lb 3.5 oz    · Usual Body Wt:    167#   · Ideal Body Wt:  172:  75.6 %   · BMI Categories:  Underweight (BMI less than 18.5)     Wt Readings from Last 10 Encounters:   05/07/21 59 kg (130 lb)   03/31/21 75 kg (165 lb 5.5 oz)   10/30/20 77.1 kg (169 lb 15.6 oz)   07/15/20 76.2 kg (168 lb)   08/18/19 81.6 kg (180 lb)   11/07/18 72.6 kg (160 lb)   08/04/18 72.6 kg (160 lb)   06/20/18 72.6 kg (160 lb)   11/20/17 72.6 kg (160 lb)       Nutrition Diagnosis:   · Inadequate protein-energy intake related to altered GI function as evidenced by NPO or clear liquid status due to medical condition, vomiting(temporary cessation of enteral feeding.)    Nutrition Interventions:   Food and/or Nutrient Delivery: Modify tube feeding  Nutrition Education and Counseling: No recommendations at this time  Coordination of Nutrition Care: Swallow evaluation, Interdisciplinary rounds, Continue to monitor while inpatient    Goals: Tolerate tube feeding at goal in next 1-2 days. Nutrition Monitoring and Evaluation:   Behavioral-Environmental Outcomes:    Food/Nutrient Intake Outcomes: Enteral nutrition intake/tolerance  Physical Signs/Symptoms Outcomes: Biochemical data, GI status, Hemodynamic status, Weight, Nutrition focused physical findings    Discharge Planning:     Too soon to determine     Camachosummer Alegre RD CNSC  Contact: [employer]

## 2021-05-11 NOTE — ROUTINE PROCESS
Orders received, chart reviewed and patient evaluated by occupational therapy. Pending progression with skilled acute occupational therapy, recommend: To be determined: likely LTAC for vent weaning Recommend with nursing ADLs with supervision/setup, bed to chair position 3x/day. Thank you for completing as able in order to maintain patient strength, endurance and independence. Full evaluation to follow.

## 2021-05-11 NOTE — PROGRESS NOTES
Problem: Dysphagia (Adult)  Goal: *Acute Goals and Plan of Care (Insert Text)  Description: Speech Therapy Goals  Initiated 5/11/2021    1. Patient will participate in swallow re-evaluation within 7 days. Outcome: Progressing Towards Goal     Problem: Voice Impaired (Adult)  Goal: *Acute Goals and Plan of Care (Insert Text)  Description: Speech Therapy Goals  Initiated 5/11/2021    1. Patient will tolerate PMV to communicate medical wants, needs, and opinions within 7 days. Outcome: Progressing Towards Goal     SPEECH LANGUAGE PATHOLOGY BEDSIDE SWALLOW/PMV EVALUATION  Patient: Arden Mirza (58 y.o. male)  Date: 5/11/2021  Primary Diagnosis: Cardiac arrest [facility]) [I46.9]        Precautions:   Fall, Skin    ASSESSMENT :  Based on the objective data described below, the patient presents with elevated risk for prandial aspiration given trach placement with open system likely impacting pharyngeal sensations and pressures, as well as hx of stroke and cardiac arrest.  Pt participated well with ice chip trials. Recommend pt continue ice chips when on TCT. Will plan to complete a FEES tomorrow vs Thursday if patient can remain on trach collar trial.    Additionally, pt able to tolerate PMV without any signs of back pressure/breath stacking nor changes in O2. However, pt respiratory rate increased to 42 during PMV trials and thus PMV doffed. Recommend PMV with SLP only. May need to consider a #6 trach downsize (can have it be cuffed if pt continues to require ventilator support. Patient will benefit from skilled intervention to address the above impairments.   Patients rehabilitation potential is considered to be Guarded     PLAN :  Recommendations and Planned Interventions:  --NPO with ice chips after oral care  --Ice chips only when pt on TCT  --FEES tomorrow vs Thursday pending respiratory status    Speaking Valve Placement:  SLP only    Frequency/Duration: Patient will be followed by speech-language pathology 4 times a week to address goals. Discharge Recommendations: To Be Determined     SUBJECTIVE:   Patient stated, [facility]! . OBJECTIVE:   Cognitive and Communication Status:  Neurologic State: Alert  Orientation Level: Unable to verbalize  Cognition: Decreased attention/concentration  Perception: Cues to attend left visual field, Cues to attend to left side of body  Perseveration: No perseveration noted  Safety/Judgement: Decreased awareness of environment  Tracheostomy:  #8 Shiley Cuffed Trach      Vital Signs Prior to [facility] Placement:  O2: 98  RR: 33-38      Vital Signs During PMV Placement  O2: 97-98  RR: 33-42       Airway Clearance  Suction: Trach  Suction Device: Inline suction catheter  Suction Catheter Size: 14 Fr  Sputum Method Obtained: Tracheal  Sputum Amount: Small  Sputum Color/Odor: White  Sputum Consistency: Thick     PMV Trial   Vocal Quality: Hoarse  Oxygen Therapy  O2 Sat (%): 99 %  Pulse via Oximetry: 84 beats per minute  O2 Device: Ventilator  Skin Assessment: Clean, dry, & intact  Skin Protection for O2 Device: Yes  Orientation: Anterior  Location: Neck  Interventions: Mouth Care  O2 Flow Rate (L/min): 10 l/min  FIO2 (%): 50 %  Airway Clearance  Suction: Trach  Suction Device: Inline suction catheter  Suction Catheter Size: 14 Fr  Sputum Method Obtained: Tracheal  Sputum Amount: Small  Sputum Color/Odor: White  Sputum Consistency: Thick  Oral Assessment:  Oral Assessment  Labial: (did not follow commands consistently)  P. O. Trials:  Patient Position: upright in bed  Vocal quality prior to P.O.: Hoarse  Consistency Presented: Ice chips  How Presented: SLP-fed/presented;Spoon     Bolus Acceptance: No impairment  Bolus Formation/Control: No impairment     Propulsion: No impairment  Oral Residue: None  Initiation of Swallow:  Other (comment)(seemed timely)  Laryngeal Elevation: Functional  Aspiration Signs/Symptoms: None                Oral Phase Severity: No impairment  Pharyngeal Phase Severity : (certainly at risk)    NOMS:  The NOMS functional outcome measure was used to quantify this patient's level of swallowing impairment. Based on the NOMS, the patient was determined to be at level 2 for swallow function         NOMS Swallowing Levels:  Level 1 (CN): NPO  Level 2 (CM): NPO but takes consistency in therapy  Level 3 (CL): Takes less than 50% of nutrition p.o. and continues with nonoral feedings; and/or safe with mod cues; and/or max diet restriction  Level 4 (CK): Safe swallow but needs mod cues; and/or mod diet restriction; and/or still requires some nonoral feeding/supplements  Level 5 (CJ): Safe swallow with min diet restriction; and/or needs min cues  Level 6 (CI): Independent with p.o.; rare cues; usually self cues; may need to avoid some foods or needs extra time  Level 7 ([address] [place] [address]): Independent for all p.o.  NORM. (2003). National Outcomes Measurement System (NOMS): Adult Speech-Language Pathology User's Guide. The NOMS functional outcome measure was used to quantify this patient's level of voice  impairment. Based on the NOMS, the patient was determined to be at level 3 for voice function. NOMS Voice:  Level 1 (CN): Unable to use voice to communicate. Needs AAC. Level 2 (CM): Voice not functional most of time. Level 3 (CL): Voice functional but distracting & interferes with communication. Participation in activities is limited most of time. Level 4 (CK): Voice is functional and sometimes distracting. High vocal demand consistently impacted. Level 5 (CJ): Voice occasionally sounds normal with self monitoring; high vocal demand occasionally impacted. Level 6 (CI): Voice sounds normal across most situations. High vocal demand rarely impacted. Level 7 ([address] [place] [address] Street): Voice is normal and self monitoring is effective but only occasionally needed. NORM. (2003). National Outcomes Measurement System (NOMS): Adult Speech-Language Pathology User's Guide.          Pain:  Pain Scale 1: Adult Nonverbal Pain Scale  Pain Intensity 1: 0       After treatment:   Call bell within reach and Nursing notified    COMMUNICATION/EDUCATION:     The patient's plan of care including recommendations, planned interventions, and recommended diet changes were discussed with: Registered nurse, RT. Education was provided to patient, family, and staff regarding speaking valve placement, wearing schedule, safety precautions including cuff deflation and removal when sleeping, and care/cleaning guidelines. Patient is unable to participate in goal setting and plan of care.     Thank you for this referral.  IMELDA Bowles  Time Calculation: 18 mins

## 2021-05-11 NOTE — PROGRESS NOTES
Physical Therapy Note: Abort  05/11/2021    Chart reviewed and attempted to see pt for PT tx session. Received pt on high flow O2 through trach. Initiated HOANG SAWANT and pt became tachypneic to the 40s but SpO2 did remain in the 90s. RN and RT arrived to transport pt to CCU (lateral tx) and session aborted. Will follow. Anticipate discharge back to LTAC setting for rehab and continued vent weaning.     Harrington Draft, PT, DPT  Time spent: 6min

## 2021-05-12 LAB
ANION GAP SERPL CALC-SCNC: 6 MMOL/L (ref 5–15)
BASOPHILS # BLD: 0 K/UL (ref 0–0.1)
BASOPHILS NFR BLD: 0 % (ref 0–1)
BUN SERPL-MCNC: 14 MG/DL (ref 6–20)
BUN/CREAT SERPL: 52 (ref 12–20)
CALCIUM SERPL-MCNC: 8.4 MG/DL (ref 8.5–10.1)
CHLORIDE SERPL-SCNC: 108 MMOL/L (ref 97–108)
CO2 SERPL-SCNC: 28 MMOL/L (ref 21–32)
CREAT SERPL-MCNC: 0.27 MG/DL (ref 0.7–1.3)
DIFFERENTIAL METHOD BLD: ABNORMAL
EOSINOPHIL # BLD: 0.1 K/UL (ref 0–0.4)
EOSINOPHIL NFR BLD: 2 % (ref 0–7)
ERYTHROCYTE [DISTWIDTH] IN BLOOD BY AUTOMATED COUNT: 16.1 % (ref 11.5–14.5)
GLUCOSE SERPL-MCNC: 115 MG/DL (ref 65–100)
HCT VFR BLD AUTO: 32.9 % (ref 36.6–50.3)
HGB BLD-MCNC: 10.1 G/DL (ref 12.1–17)
IMM GRANULOCYTES # BLD AUTO: 0.1 K/UL (ref 0–0.04)
IMM GRANULOCYTES NFR BLD AUTO: 1 % (ref 0–0.5)
LYMPHOCYTES # BLD: 1.3 K/UL (ref 0.8–3.5)
LYMPHOCYTES NFR BLD: 19 % (ref 12–49)
MAGNESIUM SERPL-MCNC: 1.9 MG/DL (ref 1.6–2.4)
MCH RBC QN AUTO: 26.9 PG (ref 26–34)
MCHC RBC AUTO-ENTMCNC: 30.7 G/DL (ref 30–36.5)
MCV RBC AUTO: 87.7 FL (ref 80–99)
MONOCYTES # BLD: 0.9 K/UL (ref 0–1)
MONOCYTES NFR BLD: 14 % (ref 5–13)
NEUTS SEG # BLD: 4.4 K/UL (ref 1.8–8)
NEUTS SEG NFR BLD: 64 % (ref 32–75)
NRBC # BLD: 0 K/UL (ref 0–0.01)
NRBC BLD-RTO: 0 PER 100 WBC
PHOSPHATE SERPL-MCNC: 3.1 MG/DL (ref 2.6–4.7)
PLATELET # BLD AUTO: 295 K/UL (ref 150–400)
PMV BLD AUTO: 10.4 FL (ref 8.9–12.9)
POTASSIUM SERPL-SCNC: 3.5 MMOL/L (ref 3.5–5.1)
PROCALCITONIN SERPL-MCNC: 0.72 NG/ML
RBC # BLD AUTO: 3.75 M/UL (ref 4.1–5.7)
SODIUM SERPL-SCNC: 142 MMOL/L (ref 136–145)
VALPROATE SERPL-MCNC: 28 UG/ML (ref 50–100)
WBC # BLD AUTO: 6.8 K/UL (ref 4.1–11.1)

## 2021-05-12 PROCEDURE — 65620000000 HC RM CCU GENERAL

## 2021-05-12 PROCEDURE — 74011250637 HC RX REV CODE- 250/637: Performed by: INTERNAL MEDICINE

## 2021-05-12 PROCEDURE — 74011000258 HC RX REV CODE- 258: Performed by: NURSE PRACTITIONER

## 2021-05-12 PROCEDURE — 74011250637 HC RX REV CODE- 250/637: Performed by: STUDENT IN AN ORGANIZED HEALTH CARE EDUCATION/TRAINING PROGRAM

## 2021-05-12 PROCEDURE — 84100 ASSAY OF PHOSPHORUS: CPT

## 2021-05-12 PROCEDURE — 74011250636 HC RX REV CODE- 250/636: Performed by: NURSE PRACTITIONER

## 2021-05-12 PROCEDURE — 97535 SELF CARE MNGMENT TRAINING: CPT

## 2021-05-12 PROCEDURE — 74011000250 HC RX REV CODE- 250: Performed by: NURSE PRACTITIONER

## 2021-05-12 PROCEDURE — 94003 VENT MGMT INPAT SUBQ DAY: CPT

## 2021-05-12 PROCEDURE — 77010033711 HC HIGH FLOW OXYGEN

## 2021-05-12 PROCEDURE — 97530 THERAPEUTIC ACTIVITIES: CPT

## 2021-05-12 PROCEDURE — 74011250636 HC RX REV CODE- 250/636: Performed by: INTERNAL MEDICINE

## 2021-05-12 PROCEDURE — 74011250636 HC RX REV CODE- 250/636: Performed by: PSYCHIATRY & NEUROLOGY

## 2021-05-12 PROCEDURE — 92507 TX SP LANG VOICE COMM INDIV: CPT

## 2021-05-12 PROCEDURE — 84145 PROCALCITONIN (PCT): CPT

## 2021-05-12 PROCEDURE — 36415 COLL VENOUS BLD VENIPUNCTURE: CPT

## 2021-05-12 PROCEDURE — 83735 ASSAY OF MAGNESIUM: CPT

## 2021-05-12 PROCEDURE — 92526 ORAL FUNCTION THERAPY: CPT

## 2021-05-12 PROCEDURE — 74011000258 HC RX REV CODE- 258: Performed by: PSYCHIATRY & NEUROLOGY

## 2021-05-12 PROCEDURE — 80164 ASSAY DIPROPYLACETIC ACD TOT: CPT

## 2021-05-12 PROCEDURE — 77030006998

## 2021-05-12 PROCEDURE — 74011250637 HC RX REV CODE- 250/637

## 2021-05-12 PROCEDURE — 85025 COMPLETE CBC W/AUTO DIFF WBC: CPT

## 2021-05-12 PROCEDURE — 74011250637 HC RX REV CODE- 250/637: Performed by: NURSE PRACTITIONER

## 2021-05-12 PROCEDURE — 80048 BASIC METABOLIC PNL TOTAL CA: CPT

## 2021-05-12 RX ORDER — IPRATROPIUM BROMIDE AND ALBUTEROL SULFATE 2.5; .5 MG/3ML; MG/3ML
3 SOLUTION RESPIRATORY (INHALATION)
Status: DISCONTINUED | OUTPATIENT
Start: 2021-05-12 | End: 2021-06-08 | Stop reason: HOSPADM

## 2021-05-12 RX ORDER — CARVEDILOL 3.12 MG/1
3.12 TABLET ORAL 2 TIMES DAILY WITH MEALS
Status: DISCONTINUED | OUTPATIENT
Start: 2021-05-12 | End: 2021-05-22

## 2021-05-12 RX ORDER — VALPROIC ACID 250 MG/5ML
750 SOLUTION ORAL EVERY 6 HOURS
Status: DISCONTINUED | OUTPATIENT
Start: 2021-05-12 | End: 2021-05-15

## 2021-05-12 RX ADMIN — FENTANYL CITRATE 25 MCG: 50 INJECTION, SOLUTION INTRAMUSCULAR; INTRAVENOUS at 12:44

## 2021-05-12 RX ADMIN — MEROPENEM 500 MG: 500 INJECTION, POWDER, FOR SOLUTION INTRAVENOUS at 03:18

## 2021-05-12 RX ADMIN — Medication 10 ML: at 22:06

## 2021-05-12 RX ADMIN — MEROPENEM 500 MG: 500 INJECTION, POWDER, FOR SOLUTION INTRAVENOUS at 22:19

## 2021-05-12 RX ADMIN — LEVETIRACETAM 1000 MG: 100 INJECTION, SOLUTION INTRAVENOUS at 17:41

## 2021-05-12 RX ADMIN — MEROPENEM 500 MG: 500 INJECTION, POWDER, FOR SOLUTION INTRAVENOUS at 08:35

## 2021-05-12 RX ADMIN — Medication 1 CAPSULE: at 08:35

## 2021-05-12 RX ADMIN — Medication 10 ML: at 05:11

## 2021-05-12 RX ADMIN — Medication 3 MG: at 22:06

## 2021-05-12 RX ADMIN — CHLORHEXIDINE GLUCONATE 15 ML: 0.12 RINSE ORAL at 20:45

## 2021-05-12 RX ADMIN — FAMOTIDINE 20 MG: 10 INJECTION, SOLUTION INTRAVENOUS at 20:41

## 2021-05-12 RX ADMIN — FENTANYL CITRATE 25 MCG: 50 INJECTION, SOLUTION INTRAMUSCULAR; INTRAVENOUS at 22:47

## 2021-05-12 RX ADMIN — FENTANYL CITRATE 25 MCG: 50 INJECTION, SOLUTION INTRAMUSCULAR; INTRAVENOUS at 05:28

## 2021-05-12 RX ADMIN — VALPROIC ACID 750 MG: 250 SOLUTION ORAL at 17:41

## 2021-05-12 RX ADMIN — HEPARIN SODIUM 5000 UNITS: 5000 INJECTION INTRAVENOUS; SUBCUTANEOUS at 12:44

## 2021-05-12 RX ADMIN — POTASSIUM BICARBONATE 40 MEQ: 782 TABLET, EFFERVESCENT ORAL at 05:10

## 2021-05-12 RX ADMIN — ACETAMINOPHEN 650 MG: 325 TABLET ORAL at 20:40

## 2021-05-12 RX ADMIN — SPIRONOLACTONE 12.5 MG: 25 TABLET ORAL at 08:36

## 2021-05-12 RX ADMIN — LEVETIRACETAM 1000 MG: 100 INJECTION, SOLUTION INTRAVENOUS at 05:10

## 2021-05-12 RX ADMIN — FAMOTIDINE 20 MG: 10 INJECTION, SOLUTION INTRAVENOUS at 08:35

## 2021-05-12 RX ADMIN — ACETAMINOPHEN 650 MG: 325 TABLET ORAL at 00:49

## 2021-05-12 RX ADMIN — CARVEDILOL 3.12 MG: 3.12 TABLET, FILM COATED ORAL at 17:41

## 2021-05-12 RX ADMIN — FENTANYL CITRATE 25 MCG: 50 INJECTION, SOLUTION INTRAMUSCULAR; INTRAVENOUS at 18:04

## 2021-05-12 RX ADMIN — VALPROIC ACID 750 MG: 250 SOLUTION ORAL at 23:50

## 2021-05-12 RX ADMIN — SACUBITRIL AND VALSARTAN 1 TABLET: 24; 26 TABLET, FILM COATED ORAL at 20:40

## 2021-05-12 RX ADMIN — CARVEDILOL 6.25 MG: 6.25 TABLET, FILM COATED ORAL at 08:35

## 2021-05-12 RX ADMIN — HEPARIN SODIUM 5000 UNITS: 5000 INJECTION INTRAVENOUS; SUBCUTANEOUS at 20:40

## 2021-05-12 RX ADMIN — MEROPENEM 500 MG: 500 INJECTION, POWDER, FOR SOLUTION INTRAVENOUS at 17:39

## 2021-05-12 RX ADMIN — VALPROIC ACID 750 MG: 250 SOLUTION ORAL at 12:44

## 2021-05-12 RX ADMIN — SACUBITRIL AND VALSARTAN 1 TABLET: 24; 26 TABLET, FILM COATED ORAL at 08:36

## 2021-05-12 RX ADMIN — CASTOR OIL AND BALSAM, PERU: 788; 87 OINTMENT TOPICAL at 08:36

## 2021-05-12 RX ADMIN — HEPARIN SODIUM 5000 UNITS: 5000 INJECTION INTRAVENOUS; SUBCUTANEOUS at 05:10

## 2021-05-12 RX ADMIN — VALPROIC ACID 500 MG: 250 SOLUTION ORAL at 05:10

## 2021-05-12 RX ADMIN — CASTOR OIL AND BALSAM, PERU: 788; 87 OINTMENT TOPICAL at 20:41

## 2021-05-12 RX ADMIN — CHLORHEXIDINE GLUCONATE 15 ML: 0.12 RINSE ORAL at 08:36

## 2021-05-12 NOTE — PROGRESS NOTES
0730 Bedside and Verbal shift change report given to Faith RN (oncoming nurse) by [facility], RN (offgoing nurse). Report included the following information SBAR, Kardex, Procedure Summary, Intake/Output, MAR, Recent Results and Cardiac Rhythm NSR.   0920 RT at bedside - pt placed on trach collar  0945 SpO2 85% RR 30's - RT notified  7334 RT at bedside - pt switched back to vent  1130 FMS pulled out - re-inserted  [address] [facility] care complete - pt tolerated well  1930 Bedside and Verbal shift change report given to [facility], RN (oncoming nurse) by Julianne Schafer RN (offgoing nurse). Report included the following information SBAR, Kardex, Procedure Summary, Intake/Output, MAR, Recent Results and Cardiac Rhythm NSR.

## 2021-05-12 NOTE — PROGRESS NOTES
SLP Contact Note    SLP treatment complete. Pt tolerating small amounts of ice chips without difficulty when on trach collar. Pt able to verbalize on PMV, however, O2 desatted during trials (appeared unrelated to PMV placement as pt has remained with O2 desaturation after PMV doffed). Recommend only placing PMV for brief periods of communication when O2 and RR is stable. Can do ice chips when pt is on trach collar trial.  However, would not provide when on the ventilator as cuff inflation increases likelihood of aspiration and changes swallow physiology. Full note to follow.       Thank you,  ERNESTO Granger.Ed, [address] [facility]  Speech-Language Pathologist

## 2021-05-12 NOTE — PROGRESS NOTES
[facility]    ICU TEAM Progress Note    Name: Alessandra Patel   : 1995   MRN: [id number]   Date: 2021      I  Subjective:   Progress Note: 2021      Reason for ICU Admission: [facility]    Interval history:From [facility] a 22 y. o. male with h/o Seizures and anxiety who presented to [facility] ED after a cardiac arrest at [facility]. Initial hospitalization was at [facility] on 3/24 with AMS in setting of polysubstance and IVD use (cocaine, heroine, methamphetamines). He had septic MRSA bacteremia, MSSA endocarditis, R PCA infarct and several abscesses and right temporal and parietal lobes. Patient was transferred to [address] [place] [address] on 2021. After transfer he was found to have a New R MCA infarct. Also had a type 2 NSTEMI and tamponade/cardiac arrest with pericardiocentesis prior to surgery. Had a bioprosthetic Aortic Valve Replacement and Aortic Root Abscess Debridement done on 2021.  Trached and sent to [facility] on 2021. Was undergoing PT and vent wean. Sister stated that patient was able to be off the vent for several hours over the last few days. Per ED note patient was found unresponsive. Two rounds of CPR and meds were given and patient was defibrillated x 1 before ROSC. Patient was hypoxic post arrest. Unknown down time prior to arrest. Patient was then sent to [facility] ED    Overnight Events:   : VELMA overnight. Back on rate overnight.   : Placed on trach collar this morning  5/10: No acute event, failed spontaneous breathing trial on trach collar due to tachypnea and increased work of breathing after about 2 hours. More alert and cooperative following command. Pulled his Dobbhoff tube. : Was able to be on trach collar for few hours yesterday, had to go back on ventilator due to increased work of breathing. Required Precedex for agitation. The morning of May 9 he went to MRI/MRA. Off pressors. Dobutamine challenge of the morning of 9 9.  5/8: Able to wean ventilator setting, oxygen and discontinue nitric oxide.  Weaning sedation as tolerated.  Pressors weaned off.  On 2 mart dobutamine  5/7/21 -- Georges, EPI, MV  Active Problem List:     Problem List  Never Reviewed          Codes Class    Severe protein-calorie malnutrition ([facility] [address] ICD-10-CM: E43  ICD-9-CM: 262         Cardiac arrest ([facility] [address] ICD-10-CM: I46.9  ICD-9-CM: 427.5         Cerebral abscess (embolic) [address]: Q81.8  ICD-9-CM: 324.0     Overview Signed 3/30/2021  4:51 PM by Ciara Cuenca MD     CT and MRI revealed evidence of a right posterior cerebellar artery infarct, 9 x 2.6 cm. He also had several areas of cerebral abscesses in the right temporal and parietal lobes measuring 2 x 1 cm, 1.3 x 0.9 cm and 0.6 x 5.5 mm with associated 3 mm midline shift. CSF showed no organisms to date, but elevated WBC.     3/24/21 CT Normal noncontrast head CT  3/29/21 MRI Extensive multiple acute infarcts throughout the bilateral cerebral hemispheres with a large hemorrhagic right PCA territory infarction measuring up to 7 cm. Some of the additional smaller infarcts also demonstrated hemorrhage. Findings are highly suspicious for septic emboli  3/30/21 CTA Occlusion of the right P2/P3. Infarction within the right occipital and temporal lobes.  Edema related to the right-sided small abscesses, better appreciated on MRI             Drug abuse, cocaine type [facility]) ICD-10-CM: F14.10  ICD-9-CM: 305.60     Overview Signed 3/29/2021  8:34 PM by Ciara Cuenca MD     3/24/21 UDS + methamphetamines and cocaine             Endocarditis due to methicillin susceptible Staphylococcus aureus (MSSA) ICD-10-CM: I33.0, B95.61  ICD-9-CM: 421.0, 041.11     Overview Addendum 3/30/2021  4:51 PM by Ciara Cuenca MD     3/24/21 ER Patient with a history of drug use anxiety and seizure not on medications for the past 4 months according to the sister presents for evaluation of altered mental status;  BC x2 + MRSA, WBC 11.6 P-67%, K 4.0 BUN 45, Creat 1.1, Trop I 3.89-> 13.60-> 10.52; UDS + cocaine and amphetamines; Lactic acid 4.2  3/24/21 Intubated      CT head negative      CT chest, neg PE, ? LV mass  3/25/21 ECHO EF 36%, LV 44/45, S/PW 8/9,  Mod AR  3/2/621 RAISSA EF 55%, Vegetations on AV 0.9cm LCC, small perforation 0.4cm LCC. No feg on MV/TV/PV, No thrombus in MELLY             Type 2 myocardial infarction [facility]) ICD-10-CM: I21. A1  ICD-9-CM: 410.90     Overview Signed 3/29/2021  8:37 PM by Jn Nielson MD     Trop I 3.89-> 13.60-> 10.52; Objective:   Visit Vitals  /84   Pulse 98   Temp 97.3 °F (36.3 °C)   Resp 29   Ht 5' 11\" (1.803 m)   Wt 59.1 kg (130 lb 4.7 oz)   SpO2 99%   BMI 18.17 kg/m²    O2 Flow Rate (L/min): 10 l/min O2 Device: Ventilator, Tracheostomy Temp (24hrs), Av.8 °F (36.6 °C), Min:97.3 °F (36.3 °C), Max:98.1 °F (36.7 °C)    Intake/Output:     Intake/Output Summary (Last 24 hours) at 2021 0750  Last data filed at 2021 0630  Gross per 24 hour   Intake 2965 ml   Output 1065 ml   Net 1900 ml     Ventilator Settings:  Mode Rate Tidal Volume Pressure FiO2 PEEP   Assist control, Volume control   450 ml  0 cm H2O 50 % 5 cm H20     Peak airway pressure: 27 cm H2O    Minute ventilation: 11.3 l/min      Physical Exam:  General: No distress, appears stated age,Trached on collar  Eye:  conjunctivae/corneas clear. Pupils are round and reactive equally  Neurologic:  Follows commands in BLE and RUE, LUE flaccid, nodding yes/no,  + cough/gag. Lymphatic:  Cervical, supraclavicular, and axillary nodes normal.   Neck:  normal and no erythema or exudates noted. Lungs:  Coarse lung sounds bilaterally,   Heart:  Regular rate and rhythm, S1, S2  Abdomen:  soft, non-tender.  Bowel sounds normal. No masses,  no organomegaly  Cardiovascular:  S1S2 present, pedal pulses normal and no edema  Skin:  Normal. and no rash or abnormalities    Labs/Data: Reviewed    MEDS: Reviewed    ECHO:  · LV: Estimated LVEF is 20 - 25%. Normal wall thickness. Dilated left ventricle. Severely reduced systolic function. Inconclusive left ventricular diastolic function. · TV: Mild tricuspid valve regurgitation is present. · PA: Mild to moderate pulmonary hypertension. · RV: Reduced systolic function. · MV: Mitral valve non-specific thickening. Mild to moderate mitral valve regurgitation is present. Assessment and Plan:     1. Cardiac arrest, unknown time to ROSC. (2 rounds of meds and compressions, Defibrilated x 1) with good neurological recovery  2. Acute on chronic hypoxic respiratory failure, sputum positive for ESBL Klebsiella  3. Bilateral multilobar pneumonia  4. Cardiogenic shock: On dobutamine  5. Acute anemia: No clear source of bleeding  6. Large territory sub-acute infarctions of the right. occipital lobe and right frontal lobe. 7. Chronic Seizure D/o.  8. 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 9. Hx Drug abuse, cocaine type  10. Recent Aortic Surgery at [facility] on April 16, 2021     -Off sedation, back to baseline mental status. Melatonin nightly  -Wean mechanical ventilation as tolerated, significant improvement. Put back on TC today. Sputum now growing ESBL Klebsiella. Meropenem - will continue for 5 days. Procalcitonin trending down.  -Dobutamine off.  Was started on Entresto and Carvedilol- tolerating well. Appreciate cardiology input  -No further of GI bleed, no further drop in hemoglobin. [address] [facility] neurology. MRI brain reviewed.     DVT prophylaxis, GI prophylaxis and ventilator bundle    DISPOSITION: Stay in ICU    I personally spent 40 minutes of critical care time. This is time spent at this critically ill patient's bedside actively involved in patient care as well as the coordination of care and discussions with the patient's family. This does not include any procedural time which has been billed separately.     [employer] Tyrese Celaya, [address] [facility]  5/12/2021

## 2021-05-12 NOTE — PROGRESS NOTES
Problem: Self Care Deficits Care Plan (Adult)  Goal: *Acute Goals and Plan of Care (Insert Text)  Description:   FUNCTIONAL STATUS PRIOR TO ADMISSION: patient was IND prior to recent hospital admissions and has had a complicated medical course including NG tube and trach. Patient recently at [facility] for vent weaning. HOME SUPPORT: The patient lived alone with parents in area to provide assistance. Occupational Therapy Goals  Initiated 5/11/2021  1. Patient will perform 2 simple grooming tasks in supported sitting with minimal assistance/contact guard assist within 7 day(s). 2.  Patient will perform anterior neck to thigh bathing in supported sitting with moderate assistance within 7 day(s). 3.  Patient will tolerate sitting EOB in prep for ADLs with max A within 7 days. 4.  Patient will track to L of midline during ADLs/therapeutic activities with moderate cues within 7 days. 5.  Patient will participate in upper extremity therapeutic exercise/activities with moderate assistance  for 5 minutes within 7 day(s). Outcome: Progressing Towards Goal   OCCUPATIONAL THERAPY TREATMENT  Patient: Letitia Jain (60 y.o. male)  Date: 5/12/2021  Diagnosis: Cardiac arrest ([facility] [address] [I46.9] <principal problem not specified>       Precautions: Fall, Skin  Chart, occupational therapy assessment, plan of care, and goals were reviewed. ASSESSMENT  Patient continues with skilled OT services and is slowly progressing towards goals. Patient received with FES removed from sacrum and required MAX A x 2-3 for pericare and clothing management rolling side to side in bed due to the following limitations: trach on vent (FiO2 50%), RR in 30s, HR in 90s, condom catheter, NG tube, IVs, L sided weakness, and with R wrist restraint.  Patient then required MAX A x 2-3 for sitting EOB however patient unable to maintain upright seated position due to c/o dizziness and diaphoretic (likely with orthostatic hypotension; patient also with generalized weakness and impaired sitting balance s/p R occipital/R frontal lobe CVAs). When returned to supine, VSS. Continue to recommend return to LTAC vs IPR pending patient medical progression. Current Level of Function Impacting Discharge (ADLs): up to TOTAL A    Other factors to consider for discharge: NPO and vent dependent at this time (will attempt to wean per chart)         PLAN :  Patient continues to benefit from skilled intervention to address the above impairments. Continue treatment per established plan of care to address goals. Recommend with staff: bed in chair position as tolerated    Recommend next OT session: seated grooming tasks; self-ROM for LUE    Recommendation for discharge: (in order for the patient to meet his/her long term goals)  LTAC vs Therapy 3 hours per day 5-7 days per week (pending medical and functional progression)    This discharge recommendation:  Has been made in collaboration with the attending provider and/or case management    IF patient discharges home will need the following DME: TBD       SUBJECTIVE:   Patient stated Please (patient begging OT to give him ice chips throughout session).     OBJECTIVE DATA SUMMARY:   Cognitive/Behavioral Status:  Neurologic State: Alert  Orientation Level: Oriented X4(mouthing to OT however not audible)  Cognition: Decreased attention/concentration  Perception: Cues to attend left visual field;Cues to attend to left side of body  Perseveration: No perseveration noted  Safety/Judgement: Decreased insight into deficits; Decreased awareness of need for safety;Decreased awareness of need for assistance    Functional Mobility and Transfers for ADLs:  Bed Mobility:  Rolling: Maximum assistance;Assist x2(2-3)  Supine to Sit: Maximum assistance; Other (comment)(x3)  Sit to Supine: Total assistance    Balance:  Sitting: Impaired; With support  Sitting - Static: Poor (constant support)  Sitting - Dynamic: Poor (constant support)    ADL Intervention:  Feeding  Feeding Assistance: Total assistance (dependent)                   Upper Body Dressing Assistance  Hospital Gown: Maximum assistance; Compensatory technique training         Toileting  Toileting Assistance: Total assistance(dependent)  Bowel Hygiene: Total assistance (dependent)  Clothing Management: Total assistance (dependent)    Cognitive Retraining  Safety/Judgement: Decreased insight into deficits; Decreased awareness of need for safety;Decreased awareness of need for assistance    Pain:  8/10 FACES scale with re-insertion of fecal management system    Activity Tolerance:   Fair, desaturates with exertion and requires oxygen, and requires rest breaks    After treatment patient left in no apparent distress:   Supine in bed, Heels elevated for pressure relief, Call bell within reach, Side rails x 3, and Restraints (R wrist only)    COMMUNICATION/COLLABORATION:   The patients plan of care was discussed with: Physical therapist and Registered nurse.      Layla Rodriguez  Time Calculation: 49 mins

## 2021-05-12 NOTE — PROGRESS NOTES
Cardiology Progress Note                                        Admit Date: 5/6/2021    Assessment/Plan:     1. Sp cardiac arrest post op 4/17/21,   cardiac arrest Vfib 5/6/21  ekg rbbb qt 460 msec. ICD rec for secondary prevention if life expectancy > 1yr.    2. Hx:  4/16/21 at [facility] bioprosthetic AVR and root abscess debridement. He also had a pericardiocentesis due to tamponade prior to surgery  3. Recent endocardititis 3/24/21 Staph  4. CM  Ef 25%   5.  right MCA infarct and several abscesses in the right temporal and parietal lobes. Infarcts were thought to be due to septic emboli. He had left sided residual weakness. cta 5/6/21:  multiple acute infarctions throughout the  right cerebral hemisphere, involving much of the occipital lobe, as well as much  of the frontal lobe. There is an additional superior right frontoparietal  infarct  6.  severe multilevel consolidation throughout both lungs consistent with severe multilobar PNA. 7. PNA:  Severe multilevel consolidation throughout both lungs, consistent with  severe multilobar pneumonia. Halved dose of carvedilol  Add dapagliflozin on dc as not on formulary       Rudy Kawasaki is a 22 y.o. male with     PROBLEM LIST:  Patient Active Problem List    Diagnosis Date Noted    Severe protein-calorie malnutrition ([facility] [address] 05/11/2021    Cardiac arrest ([facility] [address] 05/06/2021    Cerebral abscess (embolic) 35/45/0431    Drug abuse, cocaine type ([facility] [address] 03/29/2021    Endocarditis due to methicillin susceptible Staphylococcus aureus (MSSA) 03/25/2021    Type 2 myocardial infarction ([facility] [address] 03/24/2021         Subjective:     Katrina Julius Holota gestures appropriately.  Likely orthostatic when sat on edge of bed with PT    Visit Vitals  /73 (BP 1 Location: Right arm, BP Patient Position: At rest)   Pulse 92   Temp 98.2 °F (36.8 °C)   Resp 27   Ht 5' 11\" (1.803 m)   Wt 59.1 kg (130 lb 4.7 oz)   SpO2 93%   BMI 18.17 kg/m²       Intake/Output Summary (Last 24 hours) at 5/12/2021 1350  Last data filed at 5/12/2021 1300  Gross per 24 hour   Intake 3325 ml   Output 1565 ml   Net 1760 ml       Objective:      Physical Exam:  HEENT: Perrla, EOMI  Neck: No JVD,  No thyroidmegaly  Resp: CTA bilaterally;  No wheezes or rales  CV: RRR s1s2 No murmur no s3  Abd:Soft, Nontender  Ext: No edema  Neuro: unresponsive on vent   Skin: Warm, Dry, Intact  Pulses: 2+ DP/PT/Rad      Telemetry: normal sinus rhythm    Current Facility-Administered Medications   Medication Dose Route Frequency    valproic acid (as sodium salt) (DEPAKENE) 250 mg/5 mL (5 mL) oral solution 750 mg  750 mg Oral Q6H    meropenem (MERREM) 500 mg in 0.9% sodium chloride (MBP/ADV) 50 mL MBP  0.5 g IntraVENous Q6H    spironolactone (ALDACTONE) tablet 12.5 mg  12.5 mg Oral DAILY    levETIRAcetam (KEPPRA) 1,000 mg in 0.9% sodium chloride 100 mL IVPB  1,000 mg IntraVENous Q12H    hydrALAZINE (APRESOLINE) 20 mg/mL injection 10 mg  10 mg IntraVENous Q6H PRN    sacubitriL-valsartan (ENTRESTO) 24-26 mg tablet 1 Tab  1 Tab Oral Q12H    carvediloL (COREG) tablet 6.25 mg  6.25 mg Oral BID WITH MEALS    L.acidophilus-paracasei-S.thermophil-bifidobacter (RISAQUAD) 8 billion cell capsule  1 Cap Nasogastric DAILY    heparin (porcine) injection 5,000 Units  5,000 Units SubCUTAneous Q8H    melatonin tablet 3 mg  3 mg Oral QHS    0.9% sodium chloride infusion 250 mL  250 mL IntraVENous PRN    balsam peru-castor oiL (VENELEX) ointment   Topical BID    sodium chloride (NS) flush 5-40 mL  5-40 mL IntraVENous Q8H    sodium chloride (NS) flush 5-40 mL  5-40 mL IntraVENous PRN    acetaminophen (TYLENOL) tablet 650 mg  650 mg Oral Q6H PRN    Or    acetaminophen (TYLENOL) suppository 650 mg  650 mg Rectal Q6H PRN    polyethylene glycol (MIRALAX) packet 17 g  17 g Oral DAILY PRN    ondansetron (ZOFRAN) injection 4 mg  4 mg IntraVENous Q6H PRN    famotidine (PF) (PEPCID) 20 mg in 0.9% sodium chloride 10 mL injection  20 mg IntraVENous BID    chlorhexidine (ORAL CARE KIT) 0.12 % mouthwash 15 mL  15 mL Oral Q12H    fentaNYL citrate (PF) injection 25 mcg  25 mcg IntraVENous Q4H PRN    glucose chewable tablet 16 g  4 Tab Oral PRN    dextrose (D50W) injection syrg 12.5-25 g  25-50 mL IntraVENous PRN    glucagon (GLUCAGEN) injection 1 mg  1 mg IntraMUSCular PRN         Data Review:   Labs:    Recent Results (from the past 24 hour(s))   PROCALCITONIN    Collection Time: 05/12/21  3:23 AM   Result Value Ref Range    Procalcitonin 0.72 ng/mL   CBC WITH AUTOMATED DIFF    Collection Time: 05/12/21  3:23 AM   Result Value Ref Range    WBC 6.8 4.1 - 11.1 K/uL    RBC 3.75 (L) 4.10 - 5.70 M/uL    HGB 10.1 (L) 12.1 - 17.0 g/dL    HCT 32.9 (L) 36.6 - 50.3 %    MCV 87.7 80.0 - 99.0 FL    MCH 26.9 26.0 - 34.0 PG    MCHC 30.7 30.0 - 36.5 g/dL    RDW 16.1 (H) 11.5 - 14.5 %    PLATELET 270 276 - 735 K/uL    MPV 10.4 8.9 - 12.9 FL    NRBC 0.0 0  WBC    ABSOLUTE NRBC 0.00 0.00 - 0.01 K/uL    NEUTROPHILS 64 32 - 75 %    LYMPHOCYTES 19 12 - 49 %    MONOCYTES 14 (H) 5 - 13 %    EOSINOPHILS 2 0 - 7 %    BASOPHILS 0 0 - 1 %    IMMATURE GRANULOCYTES 1 (H) 0.0 - 0.5 %    ABS. NEUTROPHILS 4.4 1.8 - 8.0 K/UL    ABS. LYMPHOCYTES 1.3 0.8 - 3.5 K/UL    ABS. MONOCYTES 0.9 0.0 - 1.0 K/UL    ABS. EOSINOPHILS 0.1 0.0 - 0.4 K/UL    ABS. BASOPHILS 0.0 0.0 - 0.1 K/UL    ABS. IMM.  GRANS. 0.1 (H) 0.00 - 0.04 K/UL    DF AUTOMATED     METABOLIC PANEL, BASIC    Collection Time: 05/12/21  3:23 AM   Result Value Ref Range    Sodium 142 136 - 145 mmol/L    Potassium 3.5 3.5 - 5.1 mmol/L    Chloride 108 97 - 108 mmol/L    CO2 28 21 - 32 mmol/L    Anion gap 6 5 - 15 mmol/L    Glucose 115 (H) 65 - 100 mg/dL    BUN 14 6 - 20 MG/DL    Creatinine 0.27 (L) 0.70 - 1.30 MG/DL    BUN/Creatinine ratio 52 (H) 12 - 20      GFR est AA >60 >60 ml/min/1.73m2    GFR est non-AA >60 >60 ml/min/1.73m2    Calcium 8.4 (L) 8.5 - 10.1 MG/DL   VALPROIC ACID    Collection Time: 05/12/21  3:23 AM Result Value Ref Range    Valproic acid 28 (L) 50 - 100 ug/ml   MAGNESIUM    Collection Time: 05/12/21  3:23 AM   Result Value Ref Range    Magnesium 1.9 1.6 - 2.4 mg/dL   PHOSPHORUS    Collection Time: 05/12/21  3:23 AM   Result Value Ref Range    Phosphorus 3.1 2.6 - 4.7 MG/DL

## 2021-05-12 NOTE — PROGRESS NOTES
Problem: Mobility Impaired (Adult and Pediatric)  Goal: *Acute Goals and Plan of Care (Insert Text)  Description: FUNCTIONAL STATUS PRIOR TO ADMISSION: Patient was independent prior to March 2021. Pt has been hospitalized and recently at [facility]. Per mother, pt was ambulating with therapy at [facility]. HOME SUPPORT PRIOR TO ADMISSION: The patient lived alone prior to hospitalization. Physical Therapy Goals  Initiated 5/9/2021  1. Patient will move from supine to sit and sit to supine , scoot up and down, and roll side to side in bed with moderate assistance  within 7 day(s). 2.  Patient will tolerate sitting EOB with moderate assistance for approx 3-5 minutes within 7 day(s). 2.  Patient will transfer from bed to chair and chair to bed with maximal assistance using the least restrictive device within 7 day(s). 3.  Patient will perform sit to stand with maximal assistance within 7 day(s). 4.  Patient will ambulate with maximal assistance for 5 feet with the least restrictive device within 7 day(s). Outcome: Progressing Towards Goal     PHYSICAL THERAPY TREATMENT  Patient: Chanda White (89 y.o. male)  Date: 5/12/2021  Diagnosis: Cardiac arrest [facility]) [I46.9] <principal problem not specified>       Precautions: Fall, Skin  Chart, physical therapy assessment, plan of care and goals were reviewed. ASSESSMENT  Patient continues with skilled PT services and is progressing towards goals. Received covered in stool after dislodging his flexi-seal. Initially focused on rolling to clean. Patient required maximum assist x2 for rolling side to side and to maintain positioning while cleaning. Patient with trace strength on left UE and LE limiting his ability to assist with this side. Once clean, facilitated transfer to left EOB  requiring maximum assist x3 for line and patient management. Sitting balance impaired requiring maximum assist d/t posterior LOB.  Noted increasing retropulsion and decreased head control over time. Patient c/o dizziness and concerned for orthostatic hypotension. Returned to supine requiring total assist x2-3. BP negative for orthostatic hypotension but with an extended time between returning supine and capturing BP. Patient also started with a soft BP ~395 mmHg systolic. Will continue to progress activity as patient's medical status allows. Recommend return to [facility] for rehab when medically stable. Current Level of Function Impacting Discharge (mobility/balance): total assist for sit to supine, max x2-3 for supine to sit    Other factors to consider for discharge: vent dependent         PLAN :  Patient continues to benefit from skilled intervention to address the above impairments. Continue treatment per established plan of care. to address goals. Recommendation for discharge: (in order for the patient to meet his/her long term goals)  LTAC with rehab    This discharge recommendation:  Has not yet been discussed the attending provider and/or case management    IF patient discharges home will need the following DME: to be determined (TBD)       SUBJECTIVE:       OBJECTIVE DATA SUMMARY:   Critical Behavior:  Neurologic State: Alert  Orientation Level: Oriented X4(mouthing to OT however not audible)  Cognition: Decreased attention/concentration  Safety/Judgement: Decreased insight into deficits, Decreased awareness of need for safety, Decreased awareness of need for assistance  Functional Mobility Training:  Bed Mobility:  Rolling: Maximum assistance;Assist x2(2-3)  Supine to Sit: Maximum assistance; Other (comment)(x3)  Sit to Supine: Total assistance  Scooting: Total assistance        Transfers:                                   Balance:  Sitting: Impaired; With support  Sitting - Static: Poor (constant support)  Sitting - Dynamic: Poor (constant support)           Therapeutic Exercises:     Pain Rating:      Activity Tolerance:   Fair and signs and symptoms of orthostatic hypotension    After treatment patient left in no apparent distress:   Supine in bed and Call bell within reach    COMMUNICATION/COLLABORATION:   The patients plan of care was discussed with: Registered nurse.      Lamin Lenz PT, DPT   Time Calculation: 40 mins

## 2021-05-12 NOTE — PROGRESS NOTES
Transitions of Care Plan:  RUR:  24 - high  Clinical Plan:  Consults:  Baseline: admitted from [facility] s/p cardiac arrest  Disposition: LTAC    CM received SBAR from ICU:  Patient admitted from [facility] s/p cardiac arrest  Disposition - LTAC - mother is reviewing LTAC list for patient; does not want to return to  [facility]:  3/24/21-4/1/21: [facility]  · Septic MRSA bacetermia; MSSA endocarditis; right PCA infarct; septic emboli  4/1/21-4/29/21: [facility]: AVR  · Tamponade; Aortic abscess debridement; cardiac arrest; tracheostomy  4/29/21-5/6/21: [facility]  · Vent wean - LTAC      CM will discuss alternate LTAC choice with mother today; need updated PT notes for eventual insurance authorization need (aborted session yesterday due to patient medically not stable)    UPDATE:  CM spoke with family. Family would like patient referrals to be sent to [facility] in [place], [place]. Referral sent to [facility] will continue to follow.     Kelsey Christina, MPH  Care Manager l [employer]  Available via [employer] or

## 2021-05-12 NOTE — PROGRESS NOTES
05/12/21 0921   Oxygen Therapy   O2 Sat (%) 94 %   Pulse via Oximetry 91 beats per minute   O2 Device Tracheal collar   O2 Flow Rate (L/min) 10 l/min   FIO2 (%) 50 %   Pre-Treatment   Breathing Pattern Regular   Respirations 23       Patient suctioned, cuff deflated and placed on Trach collar per MD order.

## 2021-05-12 NOTE — PROGRESS NOTES
Problem: Dysphagia (Adult)  Goal: *Acute Goals and Plan of Care (Insert Text)  Description: Speech Therapy Goals  Initiated 5/11/2021    1. Patient will participate in swallow re-evaluation within 7 days. Outcome: Progressing Towards Goal     Problem: Voice Impaired (Adult)  Goal: *Acute Goals and Plan of Care (Insert Text)  Description: Speech Therapy Goals  Initiated 5/11/2021    1. Patient will tolerate PMV to communicate medical wants, needs, and opinions within 7 days. Outcome: Progressing Towards Goal     SPEECH LANGUAGE PATHOLOGY DYSPHAGIA TREATMENT  Patient: Alok Burger (1 y.o. male)  Date: 5/12/2021  Diagnosis: Cardiac arrest ([facility] [address] [I46.9] <principal problem not specified>       Precautions: Fall, Skin    ASSESSMENT:  Pt tolerating small amounts of ice chips without difficulty when on trach collar. Pt able to verbalize on PMV, however, O2 desatted during trials (appeared unrelated to PMV placement as pt has remained with O2 desaturation after PMV doffed). Recommend only placing PMV for brief periods of communication when O2 and RR is stable. Can do ice chips when pt is on trach collar trial.  However, would not provide when on the ventilator as cuff inflation increases likelihood of aspiration and changes swallow physiology. PLAN:  Recommendations and Planned Interventions:  --NPO with ice chips when cuff deflated  Patient continues to benefit from skilled intervention to address the above impairments. Continue treatment per established plan of care. Speaking Valve Placement:  SLP / RT / RN only    Discharge Recommendations: To Be Determined     SUBJECTIVE:   Patient stated, \"Ice.     OBJECTIVE:   Cognitive and Communication Status:  Neurologic State: Alert  Orientation Level: Unable to verbalize  Cognition: Poor safety awareness  Perception: Cues to attend left visual field, Cues to attend to left side of body  Perseveration: No perseveration noted  Safety/Judgement: Decreased awareness of environment  Tracheostomy:  #8 cuffed Trach       Oxygen Therapy  O2 Sat (%): (!) 88 %  Pulse via Oximetry: 91 beats per minute  O2 Device: Tracheostomy; Ventilator  O2 Flow Rate (L/min): 10 l/min  FIO2 (%): 50 %  Dysphagia Treatment:  Tracheostomy:  Airway Clearance  Suction: Trach  Suction Device: Inline suction catheter  Suction Catheter Size: 14 Fr  Sputum Method Obtained: Tracheal  Sputum Amount: Small  Sputum Color/Odor: Clear     PMV Trial   Vocal Quality: Hoarse  Oral Assessment:  Oral Assessment  Labial: No impairment  P.O. Trials:  Patient Position: upright in bed  Vocal quality prior to P.O.: Hoarse  Consistency Presented: Ice chips  How Presented: SLP-fed/presented;Spoon     Bolus Acceptance: No impairment  Bolus Formation/Control: No impairment     Propulsion: No impairment  Oral Residue: None  Initiation of Swallow: (unclear; high risk)                    Oral Phase Severity: No impairment     Pain:  Pain Scale 1: Numeric (0 - 10)  Pain Intensity 1: 0       After treatment:   Call bell within reach and Nursing notified    COMMUNICATION/EDUCATION:     The patient's plan of care including recommendations, planned interventions, and recommended diet changes were discussed with: Registered nurse. Education was provided to patient, family, and staff regarding speaking valve placement, wearing schedule, safety precautions including cuff deflation and removal when sleeping, and care/cleaning guidelines.       IMELDA Connell  Time Calculation: 17 mins

## 2021-05-12 NOTE — ROUTINE PROCESS
1930: Bedside shift change report given to Charlotte Valencia (oncoming nurse) by Jennie (offgoing nurse). Report included the following information SBAR, Kardex, MAR and Cardiac Rhythm NSR 
 
2000: Bedside assessment. Patient expresses pain. 2030: Bathed. Ice chips. Repositioned. Barbara care. Evening meds. 2200: QHS melatonin given. Ice chips per patient . Updated patient sister via phone. 0001: Patient repositioned. Reinforced need for right wrist restraint for safety of vent 
 
0100: Patient c/o discomfort. Unable to identify site. APAP given via NG tube. Patient wants to call sister - communicates via writing. RN assured patient that sister is asleep currently, but will be visiting in AM. 0300: AM labs drawn 
 
0500: 40mEq potassium replaced with Effer-K tablets. AM keppra and depakene given. Tracheostomy inner cannula changed 0530: IV fentanyl given. Patient motioning for abdominal discomfort. Tolerating tube feed per residual. 
 
0630: Finished with redressing left toe wound 
 
0700: Inline  And oral suction - patient coughing. Repositioned in bed. 0730: Bedside shift change report given to Silver (oncoming nurse) by Charlotte Valencia (offgoing nurse). Report included the following information SBAR, Kardex, MAR and Cardiac Rhythm NSR.

## 2021-05-13 PROBLEM — Z92.89 SUCCESSFUL CARDIOPULMONARY RESUSCITATION: Status: ACTIVE | Noted: 2021-04-14

## 2021-05-13 PROBLEM — Z95.2 S/P AVR (AORTIC VALVE REPLACEMENT) AND AORTOPLASTY: Status: ACTIVE | Noted: 2021-04-16

## 2021-05-13 PROBLEM — S20.219A CONTUSION OF CHEST WALL: Status: ACTIVE | Noted: 2021-04-16

## 2021-05-13 PROBLEM — R29.898 SEVERE MUSCLE DECONDITIONING: Status: ACTIVE | Noted: 2021-05-01

## 2021-05-13 PROBLEM — I33.0 ABSCESS OF AORTIC ROOT: Status: ACTIVE | Noted: 2021-04-16

## 2021-05-13 PROBLEM — J95.821 POSTOPERATIVE ACUTE RESPIRATORY FAILURE (HCC): Status: ACTIVE | Noted: 2021-04-01

## 2021-05-13 PROBLEM — G89.11 ACUTE TRAUMATIC PAIN: Status: ACTIVE | Noted: 2021-04-14

## 2021-05-13 LAB
ANION GAP SERPL CALC-SCNC: 6 MMOL/L (ref 5–15)
BASOPHILS # BLD: 0 K/UL (ref 0–0.1)
BASOPHILS NFR BLD: 0 % (ref 0–1)
BUN SERPL-MCNC: 12 MG/DL (ref 6–20)
BUN/CREAT SERPL: 46 (ref 12–20)
CALCIUM SERPL-MCNC: 8.4 MG/DL (ref 8.5–10.1)
CHLORIDE SERPL-SCNC: 103 MMOL/L (ref 97–108)
CO2 SERPL-SCNC: 27 MMOL/L (ref 21–32)
CREAT SERPL-MCNC: 0.26 MG/DL (ref 0.7–1.3)
DIFFERENTIAL METHOD BLD: ABNORMAL
EOSINOPHIL # BLD: 0.3 K/UL (ref 0–0.4)
EOSINOPHIL NFR BLD: 4 % (ref 0–7)
ERYTHROCYTE [DISTWIDTH] IN BLOOD BY AUTOMATED COUNT: 16.4 % (ref 11.5–14.5)
GLUCOSE SERPL-MCNC: 100 MG/DL (ref 65–100)
HCT VFR BLD AUTO: 34.1 % (ref 36.6–50.3)
HGB BLD-MCNC: 10.3 G/DL (ref 12.1–17)
IMM GRANULOCYTES # BLD AUTO: 0.1 K/UL (ref 0–0.04)
IMM GRANULOCYTES NFR BLD AUTO: 1 % (ref 0–0.5)
LYMPHOCYTES # BLD: 1.8 K/UL (ref 0.8–3.5)
LYMPHOCYTES NFR BLD: 21 % (ref 12–49)
MAGNESIUM SERPL-MCNC: 1.9 MG/DL (ref 1.6–2.4)
MCH RBC QN AUTO: 26.8 PG (ref 26–34)
MCHC RBC AUTO-ENTMCNC: 30.2 G/DL (ref 30–36.5)
MCV RBC AUTO: 88.8 FL (ref 80–99)
MONOCYTES # BLD: 1.1 K/UL (ref 0–1)
MONOCYTES NFR BLD: 13 % (ref 5–13)
NEUTS SEG # BLD: 5.3 K/UL (ref 1.8–8)
NEUTS SEG NFR BLD: 61 % (ref 32–75)
NRBC # BLD: 0 K/UL (ref 0–0.01)
NRBC BLD-RTO: 0 PER 100 WBC
PHOSPHATE SERPL-MCNC: 2.7 MG/DL (ref 2.6–4.7)
PLATELET # BLD AUTO: 292 K/UL (ref 150–400)
PMV BLD AUTO: 10.9 FL (ref 8.9–12.9)
POTASSIUM SERPL-SCNC: 4.2 MMOL/L (ref 3.5–5.1)
PROCALCITONIN SERPL-MCNC: 0.41 NG/ML
RBC # BLD AUTO: 3.84 M/UL (ref 4.1–5.7)
SODIUM SERPL-SCNC: 136 MMOL/L (ref 136–145)
WBC # BLD AUTO: 8.7 K/UL (ref 4.1–11.1)

## 2021-05-13 PROCEDURE — 85025 COMPLETE CBC W/AUTO DIFF WBC: CPT

## 2021-05-13 PROCEDURE — 74011250636 HC RX REV CODE- 250/636: Performed by: NURSE PRACTITIONER

## 2021-05-13 PROCEDURE — 77030018861

## 2021-05-13 PROCEDURE — 77010033711 HC HIGH FLOW OXYGEN

## 2021-05-13 PROCEDURE — 65620000000 HC RM CCU GENERAL

## 2021-05-13 PROCEDURE — 74011250637 HC RX REV CODE- 250/637: Performed by: INTERNAL MEDICINE

## 2021-05-13 PROCEDURE — 92507 TX SP LANG VOICE COMM INDIV: CPT

## 2021-05-13 PROCEDURE — 84145 PROCALCITONIN (PCT): CPT

## 2021-05-13 PROCEDURE — 77030018798 HC PMP KT ENTRL FED COVD -A

## 2021-05-13 PROCEDURE — 92526 ORAL FUNCTION THERAPY: CPT

## 2021-05-13 PROCEDURE — 97530 THERAPEUTIC ACTIVITIES: CPT

## 2021-05-13 PROCEDURE — 36415 COLL VENOUS BLD VENIPUNCTURE: CPT

## 2021-05-13 PROCEDURE — 74011000250 HC RX REV CODE- 250: Performed by: SURGERY

## 2021-05-13 PROCEDURE — 74011250637 HC RX REV CODE- 250/637: Performed by: STUDENT IN AN ORGANIZED HEALTH CARE EDUCATION/TRAINING PROGRAM

## 2021-05-13 PROCEDURE — 74011000258 HC RX REV CODE- 258: Performed by: PSYCHIATRY & NEUROLOGY

## 2021-05-13 PROCEDURE — 74011250636 HC RX REV CODE- 250/636: Performed by: INTERNAL MEDICINE

## 2021-05-13 PROCEDURE — 84100 ASSAY OF PHOSPHORUS: CPT

## 2021-05-13 PROCEDURE — 74011250637 HC RX REV CODE- 250/637: Performed by: SURGERY

## 2021-05-13 PROCEDURE — 94003 VENT MGMT INPAT SUBQ DAY: CPT

## 2021-05-13 PROCEDURE — 74011250636 HC RX REV CODE- 250/636: Performed by: PSYCHIATRY & NEUROLOGY

## 2021-05-13 PROCEDURE — 74011000250 HC RX REV CODE- 250: Performed by: NURSE PRACTITIONER

## 2021-05-13 PROCEDURE — 77030006998

## 2021-05-13 PROCEDURE — 74011250637 HC RX REV CODE- 250/637: Performed by: NURSE PRACTITIONER

## 2021-05-13 PROCEDURE — 80048 BASIC METABOLIC PNL TOTAL CA: CPT

## 2021-05-13 PROCEDURE — 83735 ASSAY OF MAGNESIUM: CPT

## 2021-05-13 PROCEDURE — 2709999900 HC NON-CHARGEABLE SUPPLY

## 2021-05-13 PROCEDURE — 74011000258 HC RX REV CODE- 258: Performed by: NURSE PRACTITIONER

## 2021-05-13 RX ORDER — OXYCODONE HCL 5 MG/5 ML
10 SOLUTION, ORAL ORAL
Status: DISCONTINUED | OUTPATIENT
Start: 2021-05-13 | End: 2021-05-14

## 2021-05-13 RX ORDER — BACITRACIN 500 UNIT/G
1 PACKET (EA) TOPICAL ONCE
Status: COMPLETED | OUTPATIENT
Start: 2021-05-13 | End: 2021-05-13

## 2021-05-13 RX ORDER — DOXYLAMINE SUCCINATE 25 MG
TABLET ORAL 2 TIMES DAILY
Status: DISCONTINUED | OUTPATIENT
Start: 2021-05-13 | End: 2021-06-08 | Stop reason: HOSPADM

## 2021-05-13 RX ORDER — OXYCODONE AND ACETAMINOPHEN 5; 325 MG/1; MG/1
1 TABLET ORAL
Status: DISCONTINUED | OUTPATIENT
Start: 2021-05-13 | End: 2021-05-16

## 2021-05-13 RX ORDER — TRAZODONE HYDROCHLORIDE 50 MG/1
50 TABLET ORAL
Status: DISCONTINUED | OUTPATIENT
Start: 2021-05-13 | End: 2021-06-08 | Stop reason: HOSPADM

## 2021-05-13 RX ADMIN — HEPARIN SODIUM 5000 UNITS: 5000 INJECTION INTRAVENOUS; SUBCUTANEOUS at 13:24

## 2021-05-13 RX ADMIN — CARVEDILOL 3.12 MG: 3.12 TABLET, FILM COATED ORAL at 09:05

## 2021-05-13 RX ADMIN — FENTANYL CITRATE 25 MCG: 50 INJECTION, SOLUTION INTRAMUSCULAR; INTRAVENOUS at 11:10

## 2021-05-13 RX ADMIN — FENTANYL CITRATE 25 MCG: 50 INJECTION, SOLUTION INTRAMUSCULAR; INTRAVENOUS at 07:08

## 2021-05-13 RX ADMIN — Medication 10 ML: at 21:25

## 2021-05-13 RX ADMIN — VALPROIC ACID 750 MG: 250 SOLUTION ORAL at 13:24

## 2021-05-13 RX ADMIN — MICONAZOLE NITRATE: 20 CREAM TOPICAL at 13:24

## 2021-05-13 RX ADMIN — MEROPENEM 500 MG: 500 INJECTION, POWDER, FOR SOLUTION INTRAVENOUS at 09:05

## 2021-05-13 RX ADMIN — ACETAMINOPHEN 650 MG: 325 TABLET ORAL at 10:27

## 2021-05-13 RX ADMIN — ACETAMINOPHEN 650 MG: 325 TABLET ORAL at 03:09

## 2021-05-13 RX ADMIN — HEPARIN SODIUM 5000 UNITS: 5000 INJECTION INTRAVENOUS; SUBCUTANEOUS at 05:39

## 2021-05-13 RX ADMIN — CARVEDILOL 3.12 MG: 3.12 TABLET, FILM COATED ORAL at 17:33

## 2021-05-13 RX ADMIN — VALPROIC ACID 750 MG: 250 SOLUTION ORAL at 05:39

## 2021-05-13 RX ADMIN — FENTANYL CITRATE 25 MCG: 50 INJECTION, SOLUTION INTRAMUSCULAR; INTRAVENOUS at 17:33

## 2021-05-13 RX ADMIN — FAMOTIDINE 20 MG: 10 INJECTION, SOLUTION INTRAVENOUS at 09:05

## 2021-05-13 RX ADMIN — FAMOTIDINE 20 MG: 10 INJECTION, SOLUTION INTRAVENOUS at 20:02

## 2021-05-13 RX ADMIN — MEROPENEM 500 MG: 500 INJECTION, POWDER, FOR SOLUTION INTRAVENOUS at 20:01

## 2021-05-13 RX ADMIN — SACUBITRIL AND VALSARTAN 1 TABLET: 24; 26 TABLET, FILM COATED ORAL at 20:02

## 2021-05-13 RX ADMIN — MEROPENEM 500 MG: 500 INJECTION, POWDER, FOR SOLUTION INTRAVENOUS at 16:44

## 2021-05-13 RX ADMIN — CASTOR OIL AND BALSAM, PERU: 788; 87 OINTMENT TOPICAL at 09:06

## 2021-05-13 RX ADMIN — LEVETIRACETAM 1000 MG: 100 INJECTION, SOLUTION INTRAVENOUS at 05:39

## 2021-05-13 RX ADMIN — Medication 10 ML: at 05:40

## 2021-05-13 RX ADMIN — OXYCODONE HYDROCHLORIDE 10 MG: 5 SOLUTION ORAL at 20:02

## 2021-05-13 RX ADMIN — SACUBITRIL AND VALSARTAN 1 TABLET: 24; 26 TABLET, FILM COATED ORAL at 09:05

## 2021-05-13 RX ADMIN — Medication 3 MG: at 20:03

## 2021-05-13 RX ADMIN — OXYCODONE AND ACETAMINOPHEN 1 TABLET: 5; 325 TABLET ORAL at 15:21

## 2021-05-13 RX ADMIN — LEVETIRACETAM 1000 MG: 100 INJECTION, SOLUTION INTRAVENOUS at 16:44

## 2021-05-13 RX ADMIN — MEROPENEM 500 MG: 500 INJECTION, POWDER, FOR SOLUTION INTRAVENOUS at 02:56

## 2021-05-13 RX ADMIN — HEPARIN SODIUM 5000 UNITS: 5000 INJECTION INTRAVENOUS; SUBCUTANEOUS at 20:02

## 2021-05-13 RX ADMIN — BACITRACIN 1 PACKET: 500 OINTMENT TOPICAL at 16:44

## 2021-05-13 RX ADMIN — CHLORHEXIDINE GLUCONATE 15 ML: 0.12 RINSE ORAL at 09:06

## 2021-05-13 RX ADMIN — MICONAZOLE NITRATE: 20 CREAM TOPICAL at 20:04

## 2021-05-13 RX ADMIN — CASTOR OIL AND BALSAM, PERU: 788; 87 OINTMENT TOPICAL at 20:04

## 2021-05-13 RX ADMIN — FENTANYL CITRATE 25 MCG: 50 INJECTION, SOLUTION INTRAMUSCULAR; INTRAVENOUS at 21:23

## 2021-05-13 RX ADMIN — CHLORHEXIDINE GLUCONATE 15 ML: 0.12 RINSE ORAL at 20:02

## 2021-05-13 RX ADMIN — Medication 1 CAPSULE: at 09:05

## 2021-05-13 RX ADMIN — VALPROIC ACID 750 MG: 250 SOLUTION ORAL at 17:33

## 2021-05-13 RX ADMIN — SPIRONOLACTONE 12.5 MG: 25 TABLET ORAL at 09:05

## 2021-05-13 NOTE — PROGRESS NOTES
[facility]    ICU Intensivist- Critical Care Progress Note    Name: Yeny Jacome   : 1995   MRN: [id number]   Admit: 2021  5:22 PM      Diagnosis:     Principal Problem:    Postoperative acute respiratory failure    Successful cardiopulmonary resuscitation    Cardiac arrest with pulseless electrical activity    Problem List:    Acute traumatic pain    Abscess of aortic root    Contusion of chest wall    Severe muscle deconditioning    Severe protein-calorie malnutrition    Endocarditis due to methicillin susceptible Staphylococcus aureus (MSSA)    S/P AVR (aortic valve replacement) and aortoplasty    Type 2 myocardial infarction     Cerebral abscess (embolic)    Drug abuse, cocaine type    ICU Comprehensive Plan of Care:     Plans for this Shift:   1. Postoperative acute respiratory failure- spontaneous breathing trials tolerated with increasing lengths ot tracheal collar time. 2. Severe muscle deconditioning- gradually improving tolerance of SBT's.    3. Successful cardiopulmonary resuscitation with acute traumatic pain- will add Percocet for acute chest wall discomfort. Subjective:     Progress Note:   2021   3:28 PM     Reason for ICU Admission:   Cardiac arrest with pulseless electrical activity      Overnight Events:   Gradually improving spontaneous breathing trials    Past Medical History:      has a past medical history of Anxiety and Seizure ([facility] [address] Past Surgical History:      has a past surgical history that includes hx tonsillectomy.     Current Medications:     Current Facility-Administered Medications   Medication Dose Route Frequency    miconazole (MICOTIN) 2 % cream   Topical BID    oxyCODONE-acetaminophen (PERCOCET) 5-325 mg per tablet 1 Tab  1 Tab Oral Q4H PRN    oxyCODONE (ROXICODONE) 5 mg/5 mL oral solution 10 mg  10 mg Oral Q4H PRN    valproic acid (as sodium salt) (DEPAKENE) 250 mg/5 mL (5 mL) oral solution 750 mg  750 mg Oral Q6H    carvediloL (COREG) tablet 3.125 mg  3.125 mg Oral BID WITH MEALS    albuterol-ipratropium (DUO-NEB) 2.5 MG-0.5 MG/3 ML  3 mL Nebulization Q4H PRN    meropenem (MERREM) 500 mg in 0.9% sodium chloride (MBP/ADV) 50 mL MBP  0.5 g IntraVENous Q6H    spironolactone (ALDACTONE) tablet 12.5 mg  12.5 mg Oral DAILY    levETIRAcetam (KEPPRA) 1,000 mg in 0.9% sodium chloride 100 mL IVPB  1,000 mg IntraVENous Q12H    hydrALAZINE (APRESOLINE) 20 mg/mL injection 10 mg  10 mg IntraVENous Q6H PRN    sacubitriL-valsartan (ENTRESTO) 24-26 mg tablet 1 Tab  1 Tab Oral Q12H    L.acidophilus-paracasei-S.thermophil-bifidobacter (RISAQUAD) 8 billion cell capsule  1 Cap Nasogastric DAILY    heparin (porcine) injection 5,000 Units  5,000 Units SubCUTAneous Q8H    melatonin tablet 3 mg  3 mg Oral QHS    0.9% sodium chloride infusion 250 mL  250 mL IntraVENous PRN    balsam peru-castor oiL (VENELEX) ointment   Topical BID    sodium chloride (NS) flush 5-40 mL  5-40 mL IntraVENous Q8H    sodium chloride (NS) flush 5-40 mL  5-40 mL IntraVENous PRN    acetaminophen (TYLENOL) tablet 650 mg  650 mg Oral Q6H PRN    Or    acetaminophen (TYLENOL) suppository 650 mg  650 mg Rectal Q6H PRN    polyethylene glycol (MIRALAX) packet 17 g  17 g Oral DAILY PRN    ondansetron (ZOFRAN) injection 4 mg  4 mg IntraVENous Q6H PRN    famotidine (PF) (PEPCID) 20 mg in 0.9% sodium chloride 10 mL injection  20 mg IntraVENous BID    chlorhexidine (ORAL CARE KIT) 0.12 % mouthwash 15 mL  15 mL Oral Q12H    fentaNYL citrate (PF) injection 25 mcg  25 mcg IntraVENous Q4H PRN    glucose chewable tablet 16 g  4 Tab Oral PRN    dextrose (D50W) injection syrg 12.5-25 g  25-50 mL IntraVENous PRN    glucagon (GLUCAGEN) injection 1 mg  1 mg IntraMUSCular PRN       Allergies/Social/Family History:      Allergies   Allergen Reactions    Codeine Unknown (comments)     Pt denies       Social History     Tobacco Use    Smoking status: Current Every Day Smoker Packs/day: 1.00    Smokeless tobacco: Never Used   Substance Use Topics    Alcohol use: Yes     Comment: socially      No family history on file. Review of Systems:     A comprehensive review of systems was negative. Objective:   Vital Signs:  Visit Vitals  /78   Pulse (!) 107   Temp 98 °F (36.7 °C)   Resp (!) 31   Ht 5' 11\" (1.803 m)   Wt 60.6 kg (133 lb 9.6 oz)   SpO2 92%   BMI 18.63 kg/m²    O2 Flow Rate (L/min): 10 l/min O2 Device: Tracheal collar Temp (24hrs), Av.7 °F (36.5 °C), Min:97.4 °F (36.3 °C), Max:98 °F (36.7 °C)           Intake/Output:     Intake/Output Summary (Last 24 hours) at 2021 1828  Last data filed at 2021 1700  Gross per 24 hour   Intake 2885 ml   Output 1875 ml   Net 1010 ml       Physical Exam:  General:  Sedated and on the ventilator. No acute distress. Eyes:  Sclera anicteric. Pupils equal, round, reactive to light. Mouth/Throat: Orotracheal tube in place. Neck: Supple. Lungs:   Clear to auscultation bilaterally, good effort. Cardiovascular:  Regular rate and rhythm, no murmur, click, rub, or gallop. Abdomen:   Soft, non-tender, bowel sounds normal, non-distended. Extremities: No cyanosis or edema. Skin: No acute rash or lesions. Lymph Nodes: Cervical and supraclavicular normal.   Musculoskeletal:  No swelling or deformity. Lines/Devices:  Intact, no erythema, drainage, or tenderness. Psychiatric: Sedated and appears comfortable on ventilator. LABS AND  DATA: Personally reviewed  Recent Labs     21  0303 21  0323   WBC 8.7 6.8   HGB 10.3* 10.1*   HCT 34.1* 32.9*    295     Recent Labs     21  0303 21  0323    142   K 4.2 3.5    108   CO2 27 28   BUN 12 14   CREA 0.26* 0.27*    115*   CA 8.4* 8.4*   MG 1.9 1.9   PHOS 2.7 3.1     No results for input(s): AP, TBIL, TP, ALB, GLOB, AML, LPSE in the last 72 hours.     No lab exists for component: SGOT, GPT, AMYP  No results for input(s): INR, PTP, APTT, INREXT in the last 72 hours. No results for input(s): PHI, PCO2I, PO2I, FIO2I in the last 72 hours. No results for input(s): CPK, CKMB, TROIQ, BNPP in the last 72 hours. Ventilator Settings:  Mode Rate Tidal Volume Pressure FiO2 PEEP   Spontaneous   450 ml  8 cm H2O 50 % 5 cm H20     Peak airway pressure: 15 cm H2O    Minute ventilation: 10.8 l/min        MEDS: Reviewed    RADIOLOGY:  No results found. Assessment:     Hospital Problems  Never Reviewed          Codes Class Noted POA    Severe protein-calorie malnutrition ([facility] [address] ICD-10-CM: E43  ICD-9-CM: 337  5/11/2021 Unknown        Cardiac arrest [facility]) ICD-10-CM: I46.9  ICD-9-CM: 427.5  5/6/2021 Unknown            Multidisciplinary Rounds Completed: Yes    ABCDEF Bundle/Checklist  Pain Medications: Oxycodone  Target RASS: 0 - Alert & Calm - Spontaneously pays attention to caregiver  Sedation Medications: None  CAM-ICU:  Negative  Discussed Plan of Care (goals of care): Yes  Addressed Code Status: Full Code    CARDIOVASCULAR  Cardiac Gtts: None  SBP Goal of: > 90 mmHg  MAP Goal of: > 65 mmHg  Transfusion Trigger (Hgb): <7 g/dL    RESPIRATORY  Vent Goals:   Head of bed > 30 degrees  Aggressive bronchopulmonary hygiene  DVT Prophylaxis (if no, list reason): SCD's or Sequential Compression Device   SPO2 Goal: > 92%  Pulmonary toilet: routine tracheostomy care. T/L/D  Tubes: Tracheostomy, Nasogastric Tube and Flexi (FMS)  Lines: Peripheral IV and Central Line  Drains: Howe Catheter    SPECIAL EQUIPMENT  None    DISPOSITION  Stay in ICU    CRITICAL CARE CONSULTANT NOTE  I provided a face-to-face bedside physician/patient encounter, greater than the usual and customary amount normally needed, due to the high complexity of medical decision-making required. I reviewed and interpreted patient data including clinical events, labs, images, vital signs, I/O's, and examined patient.       I have actively participated in multi-disciplinary discussions (Respiratory Therapy, Radiology, Clinical Pharmacist, CCU Nursing Staff) regarding the case in formulating an optimal therapeutic plan, and effecting a management strategy for this patient. NOTE OF PERSONAL INVOLVEMENT IN CARE   This patient has a high probability of imminent, clinically significant deterioration, which requires the highest level of preparedness to intervene urgently. I participated in the decision-making and personally managed, or directed the management of, a myriad of life and organ supporting interventions which required my frequent-interval clinical reassessments, in order to treat or prevent imminent deterioration. I personally spent 35 minutes of critical care time. This is time spent at this critically ill patient's bedside actively involved in patient care as well as the coordination of care and discussions with the patient's family. This does not include any procedural time which has been billed separately.     Kory Tenorio MD, [address]  5/13/2021

## 2021-05-13 NOTE — PROGRESS NOTES
Problem: Mobility Impaired (Adult and Pediatric)  Goal: *Acute Goals and Plan of Care (Insert Text)  Description: FUNCTIONAL STATUS PRIOR TO ADMISSION: Patient was independent prior to March 2021. Pt has been hospitalized and recently at [facility]. Per mother, pt was ambulating with therapy at [facility]. HOME SUPPORT PRIOR TO ADMISSION: The patient lived alone prior to hospitalization. Physical Therapy Goals  Initiated 5/9/2021  1. Patient will move from supine to sit and sit to supine , scoot up and down, and roll side to side in bed with moderate assistance  within 7 day(s). 2.  Patient will tolerate sitting EOB with moderate assistance for approx 3-5 minutes within 7 day(s). 2.  Patient will transfer from bed to chair and chair to bed with maximal assistance using the least restrictive device within 7 day(s). 3.  Patient will perform sit to stand with maximal assistance within 7 day(s). 4.  Patient will ambulate with maximal assistance for 5 feet with the least restrictive device within 7 day(s). Outcome: Progressing Towards Goal     PHYSICAL THERAPY TREATMENT  Patient: Alessandra Patel (88 y.o. male)  Date: 5/13/2021  Diagnosis: Cardiac arrest [facility]) [I46.9] <principal problem not specified>       Precautions: Fall, Skin  Chart, physical therapy assessment, plan of care and goals were reviewed. ASSESSMENT  Patient continues with skilled PT services and is progressing towards goals. Patient received on trach collar with PMV in place and able to voice during session. Noted decreased attention/concentration with multiple questions regarding how/why his current status developed and required redirection to task. Maximum assist x2 required for rolling and sidelying to sit at EOB. Patient flaccid on left side and unable to assist with LUE. Total assist required to scoot forward and place bilateral feet on the floor. Sitting limited by retropulsion and left LOB.  Patient aware but unable to maintain midline sitting despite left UE prop sitting and maximum cues. He c/o LBP after sitting ~5 min, suspected d/t continued retropulsion and difficulty maintaining BLE on the floor. Returned supine and placed left UE on pillow for GH support d/t c/o pain. Education provided regarding positioning and joint protection. Improved ability to participate today and BP remained stable on EOB. He continues to require high levels of physical assist for mobility. Discharge recommendations between a rehab setting if he can be weaned from vent vs LTAC if unable. Current Level of Function Impacting Discharge (mobility/balance): maximum assist x2 supine to sit    Other factors to consider for discharge: Vent dependent? PLAN :  Patient continues to benefit from skilled intervention to address the above impairments. Continue treatment per established plan of care. to address goals. Recommendation for discharge: (in order for the patient to meet his/her long term goals)  Rehab vs LTAC    This discharge recommendation:  Has not yet been discussed the attending provider and/or case management    IF patient discharges home will need the following DME: to be determined (TBD)       SUBJECTIVE:   Patient stated Why did I have a stroke? Isn't that for old people?     OBJECTIVE DATA SUMMARY:   Critical Behavior:  Neurologic State: Alert  Orientation Level: Oriented X4  Cognition: Follows commands, Impulsive  Safety/Judgement: Lack of insight into deficits  Functional Mobility Training:  Bed Mobility:  Rolling: Assist x2;Maximum assistance  Supine to Sit: Maximum assistance;Assist x2  Sit to Supine: Total assistance;Assist x2  Scooting: Total assistance        Transfers:                                   Balance:  Sitting: Impaired; With support  Sitting - Static: Poor (constant support)  Sitting - Dynamic: Poor (constant support)    Activity Tolerance:   Fair    After treatment patient left in no apparent distress:   Supine in bed and Call bell within reach    COMMUNICATION/COLLABORATION:   The patients plan of care was discussed with: Registered nurse.      Logan Ferreira PT, DPT   Time Calculation: 28 mins

## 2021-05-13 NOTE — WOUND CARE
WOCN Note: Follow up consult to re-assess left great toe and to assess rash to inner thighs 
  
Chart shows: 
Patient admitted on 5/6/21 with Cardiac Arrest 
Past medical history of anxiety, seizures, polysubstance abuse, endocarditis, history of MRSA sepsis, and embolic CVA Admitted from [facility] Wound Culture obtained on 5/7/21 from left great toe that shows no growth On 5/13/21: 
WBC = 8.7 Hgb = 10.3 Assessment:  
Patient alert non-verbal due to tracheostomy. Answers \"yes/no\" questions by nodding head. Mobility: Toal assistance with repositioning. Continence: has a condom cath in place for urine containment and flexiseal placed 5/9/21 Last Salvador Score: 15 
Surface: [place] in-touch mattress Diet: NPO at this time, receiving Osmolite 1.5 through NG tube 
  
Bilateral heel, buttocks, and sacral skin intact and without erythema Heels offloaded with pillows Lower Extremity Assessment: 
Pulses: Bilateral pedal and posterior pulses present 2+ Cap refill: <3 sec Temperature: warm Edema: none Skin color: appropriate for ethnicity 1. POA Left distal great toe, had total toenail avulsion on 5/7/21 by podiatrist 
There is a 1.3 x 1.1 x 0.2 wound to distal left great toe with 90% pink tissue and 10% yellow slough, small amount serosang drainage with no odor, edges attached and defined. Periwound intact with toenail avulsion site pink 2. Red rash with satellite lesions to upper medial thighs, groin, and scrotum Symptoms consistent with candidiasis No drainage or odor Wound Recommendations:   
  
Miconazole cream to upper medial thighs, groin, and scrotum BID Continue current treatment to left great toe  
  
Z-guard cream to buttocks and sacrum TID and as needed with incontinence care 
  
Apply sacral foam border dressing to sacral area for pressure injury prevention. Change every 3 days and prn if becomes soiled.  Lift dressing every shift to assess skin integrity and reapply same dressing. PI Prevention: 
Turn/reposition approximately every 2 hours Offload heels with pillows at all times in bed. Pad bony prominences Keep HOB 30 degrees or less to decrease shearing and pressure unless medically contraindicated. If HOB is to be over 30 degrees, raise knees first then [place] to prevent sliding Minimize layers of linen/pads under patient to optimize support surface to one flat sheet and one incontinence pad Assess under trach flange each shift to assess skin integrity 
  
Discussed with RNFaith 
  
Transition of Care: Plan to follow weekly and as needed while admitted to hospital.   
  
Jason NIEVESN, RN, [facility] Certified Wound and Ostomy Nurse 
office [phone] Can be reached through [address] [place] [address] 
pager [phone] or call  to page

## 2021-05-13 NOTE — ROUTINE PROCESS
1930: Bedside shift change report given to Diila Rodriguez (oncoming nurse) by Mini Jordan (offgoing nurse). Report included the following information SBAR, Kardex, MAR and Cardiac Rhythm NSR.  
 
2000: Bedside assessment. Mouth swabs 2030: Bathed, suctioned. APAP given for general discomfort. 2200: Patient resting currently. Abx infusing. 2245: Patient requests pain meds for epigastric discomfort. Flexiseal bag changed. Mouth swabs provided. 0001: Repositioned. Patient denies pain. 0200: Repositioned, suctioned. 0300:Changed tube feeding set. APAP given for discomfort. 0400: Inner cannula changed on tracheostomy. Suction for thin pink-tinged sputum. 0600:  IV keppra, NG depakene, subQ heparin. Left foot wound redressed. 0700: Flexiseal repositioned. New condom cath placed. 0730: Bedside shift change report given to  (oncoming nurse) by Dilia Rodriguez (offgoing nurse). Report included the following information SBAR, Kardex, MAR and Cardiac Rhythm NSR.

## 2021-05-13 NOTE — PROGRESS NOTES
SLP Contact note    Pt with significant improvement on this date with O2 sats improving from 92 to 98 with PMV placement. No evidence of intolerance and excellent tolerance of ice chip trials at bedside. Recommend PMV as tolerated. Will plan to complete FEES tomorrow if pt continues on this path with PMV. Full note to follow.       Thank you,  ANEUDY ByrneEd, [phone] [facility]  Speech-Language Pathologist

## 2021-05-13 NOTE — PROGRESS NOTES
05/13/21 0416 05/13/21 0423   Vent Settings   FIO2 (%) 40 %  (weaned per protocol) 40 %   SpO2/FIO2 Ratio 245 242.5   SIMV Rate Set 12  --    Back-Up Rate 12 12   Vt Set (ml) 450 ml  --    Pressure Support (cm H2O) 8 cm H2O  (weaned per protocol) 8 cm H2O   PEEP/VENT (cm H2O) 5 cm H20 5 cm H20   I:E Ratio 1:3  --    Insp Flow (l/min) 39 l/min  --    Insp Rise Time % 50 % 50 %   Flow Trigger 3 3   Expiratory Sensitivity 25 % 25 %   Ventilator Measurements   Resp Rate Observed 20 22   Vt Exhaled (Machine Breath) (ml) 452 ml  --    Vt Spont (ml) 501 ml 513 ml   Ve Observed (l/min) 9.76 l/min 10.8 l/min   PIP Observed (cm H2O) 20 cm H2O 15 cm H2O   MAP (cm H2O) 9.7 9.4   I:E Ratio Actual 1:1.8 1:1.8   Vent Method/Mode   Ventilator Mode SIMV;Volume control Pressure support;Spontaneous  (mode change to wean towards TC trial this AM, RN notified)

## 2021-05-13 NOTE — PROGRESS NOTES
Problem: Dysphagia (Adult)  Goal: *Acute Goals and Plan of Care (Insert Text)  Description: Speech Therapy Goals  Initiated 5/11/2021    1. Patient will participate in swallow re-evaluation within 7 days. Outcome: Progressing Towards Goal     Problem: Voice Impaired (Adult)  Goal: *Acute Goals and Plan of Care (Insert Text)  Description: Speech Therapy Goals  Initiated 5/11/2021    1. Patient will tolerate PMV to communicate medical wants, needs, and opinions within 7 days. Outcome: Progressing Towards Goal     SPEECH LANGUAGE PATHOLOGY DYSPHAGIA/PMV TREATMENT  Patient: Griffin Bravo (37 y.o. male)  Date: 5/13/2021  Diagnosis: Cardiac arrest ([facility] [address] [I46.9] <principal problem not specified>       Precautions:  Fall, Skin    ASSESSMENT:  Pt with excellent tolerance of PMV on this date actually with improvement in O2 sats and stable RR. No evidence of back pressure nor breath stacking upon doffing. Therefore, recommend PMV as tolerated when on TCT during waking times. Additionally, pt continues with excellent participation in PO trials, but is at elevated risk for silent aspiration given R MCA, and trach placement. Will plan to complete a FEES tomorrow to objectively assess safety of swallow physiology if pt able to continue to tolerate. PLAN:  Recommendations and Planned Interventions:  --NPO with ice chips  --good oral care  -FEES tomorrow    Patient continues to benefit from skilled intervention to address the above impairments. Continue treatment per established plan of care. Speaking Valve Placement:  SLP / RT / RN only  Recommended Speaking Valve Wearing Schedule:  [x]    As tolerated  Discharge Recommendations:   To Be Determined     SUBJECTIVE:   Patient stated, \"It hurts re: chest.  RN already aware    OBJECTIVE:   Cognitive and Communication Status:  Neurologic State: Alert  Orientation Level: Oriented X4  Cognition: Follows commands, Impulsive  Perception: Cues to attend left visual field, Cues to attend to left side of body  Perseveration: No perseveration noted  Safety/Judgement: Lack of insight into deficits  Tracheostomy:  #8 Shiley Cuffed Trach      Vital Signs Prior to [facility] CARE [facility] Placement:  O2: 92  RR: 24    Vital Signs During PMV Placement  O2: 98-99  RR:  25-32         Oxygen Therapy  O2 Sat (%): 99 %  Pulse via Oximetry: 100 beats per minute  O2 Device: Tracheal collar  O2 Flow Rate (L/min): 10 l/min  O2 Temperature: 98.1 °F (36.7 °C)  FIO2 (%): 50 %  Dysphagia Treatment:  Tracheostomy:  Airway Clearance  Suction: Trach  Suction Device: Inline suction catheter  Suction Catheter Size: 14 Fr  Sputum Method Obtained: Tracheal  Sputum Amount: Scant  Sputum Color/Odor: Clear  Sputum Consistency: Thin     PMV Trial   Vocal Quality: No impairment  Oral Assessment:  Oral Assessment  Labial: No impairment  P.O. Trials:  Patient Position: upright in bed  Vocal quality prior to P.O.: No impairment  Consistency Presented: Ice chips; Thin liquid  How Presented: SLP-fed/presented;Spoon     Bolus Acceptance: No impairment  Bolus Formation/Control: No impairment     Propulsion: No impairment  Oral Residue: None  Initiation of Swallow: Delayed (# of seconds)  Laryngeal Elevation: Functional  Aspiration Signs/Symptoms: Clear throat              Oral Phase Severity: No impairment  Pharyngeal Phase Severity : (at risk; FEES to clarify)    Pain:  Pain Scale 1: Numeric (0 - 10)  Pain Intensity 1: 0  Pain Location 1: Chest    After treatment:   Call bell within reach and Nursing notified    COMMUNICATION/EDUCATION:     The patient's plan of care including recommendations, planned interventions, and recommended diet changes were discussed with: Registered nurse. Education was provided to patient, family, and staff regarding speaking valve placement, wearing schedule, safety precautions including cuff deflation and removal when sleeping, and care/cleaning guidelines.       IMELDA Hodgson  Time Calculation: 20 mins

## 2021-05-13 NOTE — PROGRESS NOTES
4436: Patient placed on Trach collar 10L 50%. Been tolerating well this far. Will continue to monitor.

## 2021-05-13 NOTE — PROGRESS NOTES
1930: Bedside and Verbal shift change report given to Dalton Thomas RN (oncoming nurse) by Channing Rachel RN (offgoing nurse). Report included the following information SBAR, Kardex, ED Summary, OR Summary, Procedure Summary, Intake/Output, MAR, Recent Results, Med Rec Status, Cardiac Rhythm NSR/ST, Alarm Parameters  and Dual Neuro Assessment. 2000: Resumed pt care, VSS, 7/10 generalized pain, PRN blayne given. Pt attempted to use urinal and was unsuccessful, incontinence care provided & primo-fit placed on pt.     2100: 8/10 toe/generalized pain. PRN fentanyl given. L great toe dressing changed    0000: Pt restless and complaining of lack of sleep & pain. Orders for PRN trazodone & PRN oxy given for 5/10 pain. 0100: BP 80's/40's with MAPs in 50's. MD notified. Orders for 5% 250cc albumin x1.     0130: BP 70/40's after fluid resuscitation, pt responds to voice but falls asleep quickly. Orders for 0.1mg narcan. RT @ bedside to place patient back on vent. 0145: Pt ripped off primo-fit external catheter, incontinence care provided and condom cath placed on pt     0300: Entered pt room to find pt with flexi & condom catheter in hand and ripped out. Educated pt on why these are present, nodded head in understanding. Flexi and condom catheter replaced, bed pad changed. 0400: AM labs drawn and sent. Trach care complete, inner cannula changed    0500: K+ 3.9, Mag 1.7. Orders to replace     0730: Bedside and Verbal shift change report given to [facility], RN (oncoming nurse) by Dalton Thomas RN (offgoing nurse). Report included the following information SBAR, Kardex, ED Summary, Procedure Summary, Intake/Output, MAR, Recent Results, Med Rec Status, Cardiac Rhythm NSR/ST/BBB, Alarm Parameters  and Dual Neuro Assessment.

## 2021-05-14 LAB
ALBUMIN SERPL-MCNC: 2.7 G/DL (ref 3.5–5)
ALBUMIN/GLOB SERPL: 0.9 {RATIO} (ref 1.1–2.2)
ALP SERPL-CCNC: 66 U/L (ref 45–117)
ALT SERPL-CCNC: 18 U/L (ref 12–78)
ANION GAP SERPL CALC-SCNC: 6 MMOL/L (ref 5–15)
AST SERPL-CCNC: 11 U/L (ref 15–37)
BASOPHILS # BLD: 0.1 K/UL (ref 0–0.1)
BASOPHILS NFR BLD: 1 % (ref 0–1)
BILIRUB SERPL-MCNC: 0.5 MG/DL (ref 0.2–1)
BUN SERPL-MCNC: 11 MG/DL (ref 6–20)
BUN/CREAT SERPL: 37 (ref 12–20)
CALCIUM SERPL-MCNC: 8.8 MG/DL (ref 8.5–10.1)
CHLORIDE SERPL-SCNC: 100 MMOL/L (ref 97–108)
CO2 SERPL-SCNC: 26 MMOL/L (ref 21–32)
CREAT SERPL-MCNC: 0.3 MG/DL (ref 0.7–1.3)
DIFFERENTIAL METHOD BLD: ABNORMAL
EOSINOPHIL # BLD: 0.3 K/UL (ref 0–0.4)
EOSINOPHIL NFR BLD: 4 % (ref 0–7)
ERYTHROCYTE [DISTWIDTH] IN BLOOD BY AUTOMATED COUNT: 16.4 % (ref 11.5–14.5)
GLOBULIN SER CALC-MCNC: 3.1 G/DL (ref 2–4)
GLUCOSE SERPL-MCNC: 119 MG/DL (ref 65–100)
HCT VFR BLD AUTO: 35.1 % (ref 36.6–50.3)
HGB BLD-MCNC: 10.5 G/DL (ref 12.1–17)
IMM GRANULOCYTES # BLD AUTO: 0.1 K/UL (ref 0–0.04)
IMM GRANULOCYTES NFR BLD AUTO: 1 % (ref 0–0.5)
LYMPHOCYTES # BLD: 1.3 K/UL (ref 0.8–3.5)
LYMPHOCYTES NFR BLD: 17 % (ref 12–49)
MAGNESIUM SERPL-MCNC: 1.7 MG/DL (ref 1.6–2.4)
MCH RBC QN AUTO: 26.6 PG (ref 26–34)
MCHC RBC AUTO-ENTMCNC: 29.9 G/DL (ref 30–36.5)
MCV RBC AUTO: 89.1 FL (ref 80–99)
MONOCYTES # BLD: 1.1 K/UL (ref 0–1)
MONOCYTES NFR BLD: 14 % (ref 5–13)
NEUTS SEG # BLD: 4.8 K/UL (ref 1.8–8)
NEUTS SEG NFR BLD: 63 % (ref 32–75)
NRBC # BLD: 0 K/UL (ref 0–0.01)
NRBC BLD-RTO: 0 PER 100 WBC
PHOSPHATE SERPL-MCNC: 2.9 MG/DL (ref 2.6–4.7)
PLATELET # BLD AUTO: 268 K/UL (ref 150–400)
PMV BLD AUTO: 10.8 FL (ref 8.9–12.9)
POTASSIUM SERPL-SCNC: 3.9 MMOL/L (ref 3.5–5.1)
PROCALCITONIN SERPL-MCNC: 0.2 NG/ML
PROT SERPL-MCNC: 5.8 G/DL (ref 6.4–8.2)
RBC # BLD AUTO: 3.94 M/UL (ref 4.1–5.7)
SODIUM SERPL-SCNC: 132 MMOL/L (ref 136–145)
WBC # BLD AUTO: 7.6 K/UL (ref 4.1–11.1)

## 2021-05-14 PROCEDURE — 74011250636 HC RX REV CODE- 250/636: Performed by: PSYCHIATRY & NEUROLOGY

## 2021-05-14 PROCEDURE — 77010033711 HC HIGH FLOW OXYGEN

## 2021-05-14 PROCEDURE — 84100 ASSAY OF PHOSPHORUS: CPT

## 2021-05-14 PROCEDURE — P9045 ALBUMIN (HUMAN), 5%, 250 ML: HCPCS | Performed by: INTERNAL MEDICINE

## 2021-05-14 PROCEDURE — 74011250637 HC RX REV CODE- 250/637: Performed by: INTERNAL MEDICINE

## 2021-05-14 PROCEDURE — 36415 COLL VENOUS BLD VENIPUNCTURE: CPT

## 2021-05-14 PROCEDURE — 85025 COMPLETE CBC W/AUTO DIFF WBC: CPT

## 2021-05-14 PROCEDURE — 74011250636 HC RX REV CODE- 250/636: Performed by: INTERNAL MEDICINE

## 2021-05-14 PROCEDURE — 74011000258 HC RX REV CODE- 258: Performed by: PSYCHIATRY & NEUROLOGY

## 2021-05-14 PROCEDURE — 83735 ASSAY OF MAGNESIUM: CPT

## 2021-05-14 PROCEDURE — 74011250637 HC RX REV CODE- 250/637: Performed by: SURGERY

## 2021-05-14 PROCEDURE — 94003 VENT MGMT INPAT SUBQ DAY: CPT

## 2021-05-14 PROCEDURE — 77030040831 HC BAG URINE DRNG MDII -A

## 2021-05-14 PROCEDURE — 80053 COMPREHEN METABOLIC PANEL: CPT

## 2021-05-14 PROCEDURE — 94664 DEMO&/EVAL PT USE INHALER: CPT

## 2021-05-14 PROCEDURE — 65620000000 HC RM CCU GENERAL

## 2021-05-14 PROCEDURE — 74011000250 HC RX REV CODE- 250: Performed by: STUDENT IN AN ORGANIZED HEALTH CARE EDUCATION/TRAINING PROGRAM

## 2021-05-14 PROCEDURE — 2709999900 HC NON-CHARGEABLE SUPPLY

## 2021-05-14 PROCEDURE — 74011250637 HC RX REV CODE- 250/637: Performed by: NURSE PRACTITIONER

## 2021-05-14 PROCEDURE — 74011000250 HC RX REV CODE- 250: Performed by: NURSE PRACTITIONER

## 2021-05-14 PROCEDURE — 74011250637 HC RX REV CODE- 250/637: Performed by: STUDENT IN AN ORGANIZED HEALTH CARE EDUCATION/TRAINING PROGRAM

## 2021-05-14 PROCEDURE — 74011250636 HC RX REV CODE- 250/636: Performed by: NURSE PRACTITIONER

## 2021-05-14 PROCEDURE — 84145 PROCALCITONIN (PCT): CPT

## 2021-05-14 PROCEDURE — 74011000258 HC RX REV CODE- 258: Performed by: NURSE PRACTITIONER

## 2021-05-14 PROCEDURE — 92526 ORAL FUNCTION THERAPY: CPT

## 2021-05-14 PROCEDURE — 92612 ENDOSCOPY SWALLOW (FEES) VID: CPT | Performed by: SPEECH-LANGUAGE PATHOLOGIST

## 2021-05-14 PROCEDURE — 92507 TX SP LANG VOICE COMM INDIV: CPT

## 2021-05-14 PROCEDURE — 94640 AIRWAY INHALATION TREATMENT: CPT

## 2021-05-14 RX ORDER — ALBUMIN HUMAN 50 G/1000ML
12.5 SOLUTION INTRAVENOUS ONCE
Status: COMPLETED | OUTPATIENT
Start: 2021-05-14 | End: 2021-05-14

## 2021-05-14 RX ORDER — OXYCODONE HCL 5 MG/5 ML
5 SOLUTION, ORAL ORAL
Status: DISCONTINUED | OUTPATIENT
Start: 2021-05-14 | End: 2021-05-16

## 2021-05-14 RX ORDER — ALPRAZOLAM 1 MG/1
1 TABLET ORAL ONCE
Status: COMPLETED | OUTPATIENT
Start: 2021-05-15 | End: 2021-05-14

## 2021-05-14 RX ORDER — NALOXONE HYDROCHLORIDE 0.4 MG/ML
0.1 INJECTION, SOLUTION INTRAMUSCULAR; INTRAVENOUS; SUBCUTANEOUS AS NEEDED
Status: DISCONTINUED | OUTPATIENT
Start: 2021-05-14 | End: 2021-06-08 | Stop reason: HOSPADM

## 2021-05-14 RX ORDER — MAGNESIUM SULFATE 1 G/100ML
1 INJECTION INTRAVENOUS ONCE
Status: COMPLETED | OUTPATIENT
Start: 2021-05-14 | End: 2021-05-14

## 2021-05-14 RX ADMIN — OXYCODONE HYDROCHLORIDE 5 MG: 5 SOLUTION ORAL at 10:00

## 2021-05-14 RX ADMIN — VALPROIC ACID 750 MG: 250 SOLUTION ORAL at 00:09

## 2021-05-14 RX ADMIN — VALPROIC ACID 750 MG: 250 SOLUTION ORAL at 17:58

## 2021-05-14 RX ADMIN — SACUBITRIL AND VALSARTAN 1 TABLET: 24; 26 TABLET, FILM COATED ORAL at 21:12

## 2021-05-14 RX ADMIN — VALPROIC ACID 750 MG: 250 SOLUTION ORAL at 12:30

## 2021-05-14 RX ADMIN — FENTANYL CITRATE 25 MCG: 50 INJECTION, SOLUTION INTRAMUSCULAR; INTRAVENOUS at 14:01

## 2021-05-14 RX ADMIN — LEVETIRACETAM 1000 MG: 100 INJECTION, SOLUTION INTRAVENOUS at 17:30

## 2021-05-14 RX ADMIN — MEROPENEM 500 MG: 500 INJECTION, POWDER, FOR SOLUTION INTRAVENOUS at 09:28

## 2021-05-14 RX ADMIN — IPRATROPIUM BROMIDE AND ALBUTEROL SULFATE 3 ML: .5; 3 SOLUTION RESPIRATORY (INHALATION) at 11:47

## 2021-05-14 RX ADMIN — TRAZODONE HYDROCHLORIDE 50 MG: 50 TABLET ORAL at 00:09

## 2021-05-14 RX ADMIN — CHLORHEXIDINE GLUCONATE 15 ML: 0.12 RINSE ORAL at 21:11

## 2021-05-14 RX ADMIN — MICONAZOLE NITRATE: 20 CREAM TOPICAL at 21:10

## 2021-05-14 RX ADMIN — SPIRONOLACTONE 12.5 MG: 25 TABLET ORAL at 09:28

## 2021-05-14 RX ADMIN — CHLORHEXIDINE GLUCONATE 15 ML: 0.12 RINSE ORAL at 09:27

## 2021-05-14 RX ADMIN — VALPROIC ACID 750 MG: 250 SOLUTION ORAL at 05:00

## 2021-05-14 RX ADMIN — SACUBITRIL AND VALSARTAN 1 TABLET: 24; 26 TABLET, FILM COATED ORAL at 09:28

## 2021-05-14 RX ADMIN — MEROPENEM 500 MG: 500 INJECTION, POWDER, FOR SOLUTION INTRAVENOUS at 02:58

## 2021-05-14 RX ADMIN — MAGNESIUM SULFATE HEPTAHYDRATE 1 G: 1 INJECTION, SOLUTION INTRAVENOUS at 04:55

## 2021-05-14 RX ADMIN — Medication 10 ML: at 14:05

## 2021-05-14 RX ADMIN — FENTANYL CITRATE 25 MCG: 50 INJECTION, SOLUTION INTRAMUSCULAR; INTRAVENOUS at 22:23

## 2021-05-14 RX ADMIN — VALPROIC ACID 750 MG: 250 SOLUTION ORAL at 23:55

## 2021-05-14 RX ADMIN — HEPARIN SODIUM 5000 UNITS: 5000 INJECTION INTRAVENOUS; SUBCUTANEOUS at 04:55

## 2021-05-14 RX ADMIN — OXYCODONE AND ACETAMINOPHEN 1 TABLET: 5; 325 TABLET ORAL at 00:09

## 2021-05-14 RX ADMIN — Medication 10 ML: at 05:00

## 2021-05-14 RX ADMIN — ACETAMINOPHEN 650 MG: 325 TABLET ORAL at 12:54

## 2021-05-14 RX ADMIN — MEROPENEM 500 MG: 500 INJECTION, POWDER, FOR SOLUTION INTRAVENOUS at 21:12

## 2021-05-14 RX ADMIN — HEPARIN SODIUM 5000 UNITS: 5000 INJECTION INTRAVENOUS; SUBCUTANEOUS at 12:55

## 2021-05-14 RX ADMIN — HEPARIN SODIUM 5000 UNITS: 5000 INJECTION INTRAVENOUS; SUBCUTANEOUS at 21:11

## 2021-05-14 RX ADMIN — CASTOR OIL AND BALSAM, PERU: 788; 87 OINTMENT TOPICAL at 09:29

## 2021-05-14 RX ADMIN — CARVEDILOL 3.12 MG: 3.12 TABLET, FILM COATED ORAL at 17:58

## 2021-05-14 RX ADMIN — MICONAZOLE NITRATE: 20 CREAM TOPICAL at 09:29

## 2021-05-14 RX ADMIN — LEVETIRACETAM 1000 MG: 100 INJECTION, SOLUTION INTRAVENOUS at 04:54

## 2021-05-14 RX ADMIN — Medication 3 MG: at 21:12

## 2021-05-14 RX ADMIN — MEROPENEM 500 MG: 500 INJECTION, POWDER, FOR SOLUTION INTRAVENOUS at 14:01

## 2021-05-14 RX ADMIN — FAMOTIDINE 20 MG: 10 INJECTION, SOLUTION INTRAVENOUS at 09:28

## 2021-05-14 RX ADMIN — CASTOR OIL AND BALSAM, PERU: 788; 87 OINTMENT TOPICAL at 17:58

## 2021-05-14 RX ADMIN — POTASSIUM BICARBONATE 20 MEQ: 782 TABLET, EFFERVESCENT ORAL at 09:26

## 2021-05-14 RX ADMIN — NALXONE HYDROCHLORIDE 0.1 MG: 0.4 INJECTION INTRAMUSCULAR; INTRAVENOUS; SUBCUTANEOUS at 01:40

## 2021-05-14 RX ADMIN — Medication 10 ML: at 21:12

## 2021-05-14 RX ADMIN — FENTANYL CITRATE 25 MCG: 50 INJECTION, SOLUTION INTRAMUSCULAR; INTRAVENOUS at 18:02

## 2021-05-14 RX ADMIN — Medication 1 CAPSULE: at 09:28

## 2021-05-14 RX ADMIN — ALBUMIN (HUMAN) 12.5 G: 12.5 INJECTION, SOLUTION INTRAVENOUS at 01:18

## 2021-05-14 RX ADMIN — FAMOTIDINE 20 MG: 10 INJECTION, SOLUTION INTRAVENOUS at 21:11

## 2021-05-14 RX ADMIN — ALPRAZOLAM 1 MG: 1 TABLET ORAL at 23:55

## 2021-05-14 NOTE — PROGRESS NOTES
[facility]    ICU Intensivist- Critical Care Progress Note    Name: Sheila Dobbs   : 1995   MRN: [id number]   Admit: 2021  5:22 PM      Diagnosis:     Principal Problem:    Postoperative acute respiratory failure    Problem List:    Acute traumatic pain    Abscess of aortic root    Contusion of chest wall    Severe muscle deconditioning    Severe protein-calorie malnutrition    Successful cardiopulmonary resuscitation    Cardiac arrest with pulseless electrical activity    Endocarditis due to methicillin susceptible Staphylococcus aureus (MSSA)    S/P AVR (aortic valve replacement) and aortoplasty    Type 2 myocardial infarction     Cerebral abscess (embolic)    Drug abuse, cocaine type    ICU Comprehensive Plan of Care:     Plans for this Shift:   1. Postoperative acute respiratory failure- spontaneous breathing trials tolerated with increasing lengths ot tracheal collar time. 2. Severe muscle deconditioning- gradually improving tolerance of SBT's.     3. Successful cardiopulmonary resuscitation with acute traumatic pain- will add Percocet for acute chest wall discomfort.       Subjective:     Progress Note:   2021   6:17 PM     Reason for ICU Admission:   Cardiac arrest with pulseless electrical activity ([address]     Overnight Events:   Narcosis after Percocet 10 mg    Past Medical History:      has a past medical history of Abscess of aortic root (2021), Acute traumatic pain (2021), Anxiety, Postoperative acute respiratory failure ([address] (2021), S/P AVR (aortic valve replacement) and aortoplasty (2021), Seizure ([address] Severe muscle deconditioning (2021), and Successful cardiopulmonary resuscitation (2021). Past Surgical History:      has a past surgical history that includes hx tonsillectomy.     Current Medications:     Current Facility-Administered Medications   Medication Dose Route Frequency    naloxone (NARCAN) injection 0.1 mg  0.1 mg IntraVENous PRN    oxyCODONE (ROXICODONE) 5 mg/5 mL oral solution 5 mg  5 mg Oral Q4H PRN    miconazole (MICOTIN) 2 % cream   Topical BID    oxyCODONE-acetaminophen (PERCOCET) 5-325 mg per tablet 1 Tab  1 Tab Oral Q4H PRN    traZODone (DESYREL) tablet 50 mg  50 mg Oral QHS PRN    valproic acid (as sodium salt) (DEPAKENE) 250 mg/5 mL (5 mL) oral solution 750 mg  750 mg Oral Q6H    carvediloL (COREG) tablet 3.125 mg  3.125 mg Oral BID WITH MEALS    albuterol-ipratropium (DUO-NEB) 2.5 MG-0.5 MG/3 ML  3 mL Nebulization Q4H PRN    meropenem (MERREM) 500 mg in 0.9% sodium chloride (MBP/ADV) 50 mL MBP  0.5 g IntraVENous Q6H    spironolactone (ALDACTONE) tablet 12.5 mg  12.5 mg Oral DAILY    levETIRAcetam (KEPPRA) 1,000 mg in 0.9% sodium chloride 100 mL IVPB  1,000 mg IntraVENous Q12H    hydrALAZINE (APRESOLINE) 20 mg/mL injection 10 mg  10 mg IntraVENous Q6H PRN    sacubitriL-valsartan (ENTRESTO) 24-26 mg tablet 1 Tab  1 Tab Oral Q12H    L.acidophilus-paracasei-S.thermophil-bifidobacter (RISAQUAD) 8 billion cell capsule  1 Cap Nasogastric DAILY    heparin (porcine) injection 5,000 Units  5,000 Units SubCUTAneous Q8H    melatonin tablet 3 mg  3 mg Oral QHS    0.9% sodium chloride infusion 250 mL  250 mL IntraVENous PRN    balsam peru-castor oiL (VENELEX) ointment   Topical BID    sodium chloride (NS) flush 5-40 mL  5-40 mL IntraVENous Q8H    sodium chloride (NS) flush 5-40 mL  5-40 mL IntraVENous PRN    acetaminophen (TYLENOL) tablet 650 mg  650 mg Oral Q6H PRN    Or    acetaminophen (TYLENOL) suppository 650 mg  650 mg Rectal Q6H PRN    polyethylene glycol (MIRALAX) packet 17 g  17 g Oral DAILY PRN    ondansetron (ZOFRAN) injection 4 mg  4 mg IntraVENous Q6H PRN    famotidine (PF) (PEPCID) 20 mg in 0.9% sodium chloride 10 mL injection  20 mg IntraVENous BID    chlorhexidine (ORAL CARE KIT) 0.12 % mouthwash 15 mL  15 mL Oral Q12H    fentaNYL citrate (PF) injection 25 mcg  25 mcg IntraVENous Q4H PRN  glucose chewable tablet 16 g  4 Tab Oral PRN    dextrose (D50W) injection syrg 12.5-25 g  25-50 mL IntraVENous PRN    glucagon (GLUCAGEN) injection 1 mg  1 mg IntraMUSCular PRN       Allergies/Social/Family History: Allergies   Allergen Reactions    Codeine Unknown (comments)     Pt denies       Social History     Tobacco Use    Smoking status: Current Every Day Smoker     Packs/day: 1.00    Smokeless tobacco: Never Used   Substance Use Topics    Alcohol use: Yes     Comment: socially      No family history on file. Review of Systems:     A comprehensive review of systems was negative. Objective:   Vital Signs:  Visit Vitals  /64   Pulse (!) 107   Temp 98 °F (36.7 °C)   Resp 27   Ht 5' 11\" (1.803 m)   Wt 58.2 kg (128 lb 4.9 oz)   SpO2 (!) 88%   BMI 17.90 kg/m²    O2 Flow Rate (L/min): 10 l/min O2 Device: Tracheal collar Temp (24hrs), Av.3 °F (36.8 °C), Min:98 °F (36.7 °C), Max:98.8 °F (37.1 °C)           Intake/Output:     Intake/Output Summary (Last 24 hours) at 2021 1817  Last data filed at 2021 1400  Gross per 24 hour   Intake 4285 ml   Output 1420 ml   Net 2865 ml       Physical Exam:  General:  Sedated and on the ventilator. No acute distress. Eyes:  Sclera anicteric. Pupils equal, round, reactive to light. Mouth/Throat: Orotracheal tube in place. Neck: Supple. Lungs:   Clear to auscultation bilaterally, good effort. Cardiovascular:  Regular rate and rhythm, no murmur, click, rub, or gallop. Abdomen:   Soft, non-tender, bowel sounds normal, non-distended. Extremities: No cyanosis or edema. Skin: No acute rash or lesions. Lymph Nodes: Cervical and supraclavicular normal.   Musculoskeletal:  No swelling or deformity. Lines/Devices:  Intact, no erythema, drainage, or tenderness.    Psychiatric: Sedated and appears comfortable on ventilator.          LABS AND  DATA: Personally reviewed  Recent Labs     21  0357 21  0303   WBC 7.6 8.7   HGB 10.5* 10.3*   HCT 35.1* 34.1*    292     Recent Labs     05/14/21  0357 05/13/21  0303   * 136   K 3.9 4.2    103   CO2 26 27   BUN 11 12   CREA 0.30* 0.26*   * 100   CA 8.8 8.4*   MG 1.7 1.9   PHOS 2.9 2.7     Recent Labs     05/14/21  0357   AP 66   TP 5.8*   ALB 2.7*   GLOB 3.1     No results for input(s): INR, PTP, APTT, INREXT in the last 72 hours. No results for input(s): PHI, PCO2I, PO2I, FIO2I in the last 72 hours. No results for input(s): CPK, CKMB, TROIQ, BNPP in the last 72 hours. Ventilator Settings:  Mode Rate Tidal Volume Pressure FiO2 PEEP   Spontaneous   450 ml  5 cm H2O 50 % 5 cm H20     Peak airway pressure: 13 cm H2O    Minute ventilation: 10.8 l/min        MEDS: Reviewed    RADIOLOGY:  No results found. Assessment:     Hospital Problems  Never Reviewed          Codes Class Noted POA    * (Principal) Cardiac arrest with pulseless electrical activity ([facility] [address] ICD-10-CM: I46.9  ICD-9-CM: 427.5  5/6/2021 Yes        Severe muscle deconditioning ICD-10-CM: R29.898  ICD-9-CM: 781.99 Acute 5/1/2021 Yes        S/P AVR (aortic valve replacement) and aortoplasty ICD-10-CM: Z95.2  ICD-9-CM: V43.3 Acute 4/16/2021 Yes        Abscess of aortic root ICD-10-CM: I51.89  ICD-9-CM: 429.89 Acute 4/16/2021 Yes        Contusion of chest wall ICD-10-CM: G06.544K  ICD-9-CM: 922.1 Acute 4/16/2021 Yes        Successful cardiopulmonary resuscitation ICD-10-CM: Z92.89  ICD-9-CM: V12.53 Acute 4/14/2021 Yes        Acute traumatic pain ICD-10-CM: G89.11  ICD-9-CM: 338.11 Acute 4/14/2021 Yes        Postoperative acute respiratory failure ([facility] [address] ICD-10-CM: P35.746  ICD-9-CM: 518.51 Acute 4/1/2021 Yes        Severe protein-calorie malnutrition ([facility] [address] ICD-10-CM: C37  ICD-9-CM: 262  5/11/2021 Yes            Multidisciplinary Rounds Completed:   Yes     ABCDEF Bundle/Checklist  Pain Medications: Oxycodone  Target RASS: 0 - Alert & Calm - Spontaneously pays attention to caregiver  Sedation Medications: None  CAM-ICU:  Negative  Discussed Plan of Care (goals of care): Yes  Addressed Code Status: Full Code     CARDIOVASCULAR  Cardiac Gtts: None  SBP Goal of: > 90 mmHg  MAP Goal of: > 65 mmHg  Transfusion Trigger (Hgb): <7 g/dL     RESPIRATORY  Vent Goals:   Head of bed > 30 degrees  Aggressive bronchopulmonary hygiene  DVT Prophylaxis (if no, list reason): SCD's or Sequential Compression Device   SPO2 Goal: > 92%  Pulmonary toilet: routine tracheostomy care.     T/L/D  Tubes: Tracheostomy, Nasogastric Tube and Flexi (FMS)  Lines: Peripheral IV and Central Line  Drains: Howe Catheter     SPECIAL EQUIPMENT  None     DISPOSITION  Stay in ICU     CRITICAL CARE CONSULTANT NOTE  I provided a face-to-face bedside physician/patient encounter, greater than the usual and customary amount normally needed, due to the high complexity of medical decision-making required.     I reviewed and interpreted patient data including clinical events, labs, images, vital signs, I/O's, and examined patient.       I have actively participated in multi-disciplinary discussions (Respiratory Therapy, Radiology, Clinical Pharmacist, CCU Nursing Staff) regarding the case in formulating an optimal therapeutic plan, and effecting a management strategy for this patient.     NOTE OF PERSONAL INVOLVEMENT IN CARE   This patient has a high probability of imminent, clinically significant deterioration, which requires the highest level of preparedness to intervene urgently. I participated in the decision-making and personally managed, or directed the management of, a myriad of life and organ supporting interventions which required my frequent-interval clinical reassessments, in order to treat or prevent imminent deterioration.     I personally spent 35 minutes of critical care time. This is time spent at this critically ill patient's bedside actively involved in patient care as well as the coordination of care and discussions with the patient's family. This does not include any procedural time which has been billed separately.     Priya Darnell MD, [address]  5/14/2021

## 2021-05-14 NOTE — PROGRESS NOTES
Problem: Dysphagia (Adult)  Goal: *Acute Goals and Plan of Care (Insert Text)  Description: Speech Therapy Goals  Initiated 5/11/2021    1. Patient will participate in swallow re-evaluation within 7 days. Outcome: Progressing Towards Goal     Problem: Voice Impaired (Adult)  Goal: *Acute Goals and Plan of Care (Insert Text)  Description: Speech Therapy Goals  Initiated 5/11/2021    1. Patient will tolerate PMV to communicate medical wants, needs, and opinions within 7 days. Outcome: Progressing Towards Goal     SPEECH LANGUAGE PATHOLOGY DYSPHAGIA TREATMENT  Patient: Sheila Dobbs (89 y.o. male)  Date: 5/14/2021  Diagnosis: Cardiac arrest [facility]) [I46.9] Cardiac arrest with pulseless electrical activity ([facility] [address]       Precautions:  Fall, Skin    ASSESSMENT:  Pt at first very drowsy, however, upon SLP arrival, pt awake and alert. PMV donned and pt maintained O2 sats 99 throughout 20 minute trial.  No evidence of back pressure/breath stacking upon doffing PMV. Recommend continued PMV placement. Additionally, pt continues with excellent tolerance of ice chips but remains at elevated risk for prandial aspiration given trach placement with potential impact on laryngeal sensations/pressure and recent R MCA. Will complete FEES this afternoon. PLAN:  Recommendations and Planned Interventions:  -FEES this afternoon    Patient continues to benefit from skilled intervention to address the above impairments. Continue treatment per established plan of care. Speaking Valve Placement:  SLP / RT / RN only  Recommended Speaking Valve Wearing Schedule:  [x]    As tolerated  Discharge Recommendations: To Be Determined     SUBJECTIVE:   Patient stated, \"Sounds good.     OBJECTIVE:   Cognitive and Communication Status:  Neurologic State: Alert  Orientation Level: Oriented X4  Cognition: Follows commands, Impulsive  Perception: Cues to attend left visual field, Cues to attend to left side of body  Perseveration: No perseveration noted  Safety/Judgement: Decreased insight into deficits  Tracheostomy:  #8 Shilm Cuffed Trach with cuff deflated prior to SLP arrival      Vital Signs Prior to PMV Placement:  O2: 98  RR: 23      Vital Signs During PMV Placement of 20 minutes  O2: 98-99  RR:  25-26       Oxygen Therapy  O2 Sat (%): 99 %  Pulse via Oximetry: 98 beats per minute  O2 Device: Tracheal collar  Skin Assessment: Clean, dry, & intact  Skin Protection for O2 Device: Yes  Orientation: Anterior  Location: Neck  Interventions: Skin Barrier;Mouth Care  O2 Flow Rate (L/min): 10 l/min  O2 Temperature: 98.6 °F (37 °C)  FIO2 (%): 50 %  Dysphagia Treatment:  Tracheostomy:  Airway Clearance  Suction: Trach  Suction Device: Inline suction catheter  Suction Catheter Size: 14 Fr  Sputum Method Obtained: Tracheal  Sputum Amount: Small  Sputum Color/Odor: Clear  Sputum Consistency: Thin     PMV Trial   Vocal Quality: Hoarse  Oral Assessment:  Oral Assessment  Labial: No impairment  Dentition: Natural  Oral Hygiene: oral mucosa with thick secretions  Lingual: No impairment  Velum: No impairment  Mandible: No impairment  P.O.  Trials:  Patient Position: upright in bed  Vocal quality prior to P.O.: Hoarse  Consistency Presented: Ice chips  How Presented: SLP-fed/presented;Spoon     Bolus Acceptance: No impairment  Bolus Formation/Control: No impairment     Propulsion: No impairment  Oral Residue: None  Initiation of Swallow: No impairment  Laryngeal Elevation: Functional  Aspiration Signs/Symptoms: None  Pharyngeal Phase Characteristics: (at risk)           Oral Phase Severity: No impairment  Pharyngeal Phase Severity : (at risk for prandial aspiration)    Pain:  Pain Scale 1: Numeric (0 - 10)  Pain Intensity 1: 10  Pain Location 1: Incisional;Mediastinum    After treatment:   Call bell within reach and Nursing notified    COMMUNICATION/EDUCATION:     The patient's plan of care including recommendations, planned interventions, and recommended diet changes were discussed with: Registered nurse. Education was provided to patient, family, and staff regarding speaking valve placement, wearing schedule, safety precautions including cuff deflation and removal when sleeping, and care/cleaning guidelines.       IMELDA Brambila  Time Calculation: 15 mins

## 2021-05-14 NOTE — PROGRESS NOTES
Notified by RN patient hypotensive and unresponsive, on trach collar, patient received 50mg of trazodone, also opioid medication for pain control was increased. Given 250 cc bolus 5% albumin with minimal response, given 0.1mg of naloxone in 0.01mg increment, patient now more responsive and able to interact, BP improved. Decreased oxycodone dose to 5mg Q4h PRN.      CCT 30 minutes    Prasanna Jones MD

## 2021-05-14 NOTE — PROGRESS NOTES
Physical Therapy - defer  Chart reviewed in prep for therapy session. RN requested therapy defer this morning due to hypotension. Will check back this afternoon as able.

## 2021-05-14 NOTE — PROGRESS NOTES
Problem: Ventilator Management  Goal: *Adequate oxygenation and ventilation  Outcome: Progressing Towards Goal  Goal: *Patient maintains clear airway/free of aspiration  Outcome: Progressing Towards Goal  Goal: *Absence of infection signs and symptoms  Outcome: Progressing Towards Goal  Goal: *Normal spontaneous ventilation  Outcome: Progressing Towards Goal     Problem: Patient Education: Go to Patient Education Activity  Goal: Patient/Family Education  Outcome: Progressing Towards Goal     Problem: Falls - Risk of  Goal: *Absence of Falls  Description: Document Elilott Fall Risk and appropriate interventions in the flowsheet. Outcome: Progressing Towards Goal  Note: Fall Risk Interventions:       Mentation Interventions: Adequate sleep, hydration, pain control, Bed/chair exit alarm, Door open when patient unattended, Evaluate medications/consider consulting pharmacy, Increase mobility, More frequent rounding, Reorient patient, Room close to nurse's station, Self-releasing belt, Toileting rounds, Update white board    Medication Interventions: Assess postural VS orthostatic hypotension, Bed/chair exit alarm, Evaluate medications/consider consulting pharmacy, Utilize gait belt for transfers/ambulation    Elimination Interventions: Bed/chair exit alarm, Call light in reach, Patient to call for help with toileting needs, Stay With Me (per policy), Toileting schedule/hourly rounds, Urinal in reach              Problem: Patient Education: Go to Patient Education Activity  Goal: Patient/Family Education  Outcome: Progressing Towards Goal     Problem: Risk for Spread of Infection  Goal: Prevent transmission of infectious organism to others  Description: Prevent the transmission of infectious organisms to other patients, staff members, and visitors.   Outcome: Progressing Towards Goal     Problem: Patient Education:  Go to Education Activity  Goal: Patient/Family Education  Outcome: Progressing Towards Goal     Problem: Breathing Pattern - Ineffective  Goal: *Absence of hypoxia  Outcome: Progressing Towards Goal  Goal: *Use of effective breathing techniques  Outcome: Progressing Towards Goal  Goal: *PALLIATIVE CARE:  Alleviation of Dyspnea  Outcome: Progressing Towards Goal     Problem: Patient Education: Go to Patient Education Activity  Goal: Patient/Family Education  Outcome: Progressing Towards Goal     Problem: Pressure Injury - Risk of  Goal: *Prevention of pressure injury  Description: Document Salvador Scale and appropriate interventions in the flowsheet.   Outcome: Progressing Towards Goal     Problem: Patient Education: Go to Patient Education Activity  Goal: Patient/Family Education  Outcome: Progressing Towards Goal     Problem: Patient Education: Go to Patient Education Activity  Goal: Patient/Family Education  Outcome: Progressing Towards Goal     Problem: Patient Education: Go to Patient Education Activity  Goal: Patient/Family Education  Outcome: Progressing Towards Goal     Problem: Patient Education: Go to Patient Education Activity  Goal: Patient/Family Education  Outcome: Progressing Towards Goal     Problem: Patient Education: Go to Patient Education Activity  Goal: Patient/Family Education  Outcome: Progressing Towards Goal     Problem: Nutrition Deficit  Goal: *Optimize nutritional status  Outcome: Progressing Towards Goal     Problem: Airway Clearance - Ineffective  Goal: *Patent airway  Outcome: Progressing Towards Goal  Goal: *Absence of airway secretions  Outcome: Progressing Towards Goal  Goal: *Able to cough effectively  Outcome: Progressing Towards Goal  Goal: *PALLIATIVE CARE:  Alleviation of secretions, cough and/or nasal congestion  Outcome: Progressing Towards Goal     Problem: Patient Education: Go to Patient Education Activity  Goal: Patient/Family Education  Outcome: Progressing Towards Goal     Problem: Pain  Goal: *Control of Pain  Outcome: Progressing Towards Goal  Goal: *PALLIATIVE CARE: Alleviation of Pain  Outcome: Progressing Towards Goal     Problem: Patient Education: Go to Patient Education Activity  Goal: Patient/Family Education  Outcome: Progressing Towards Goal     Problem: Tobacco Use  Goal: *Tobacco use abstinence  Outcome: Progressing Towards Goal  Goal: *Knowledge of tobacco use cessation methods  Outcome: Progressing Towards Goal     Problem: Patient Education: Go to Patient Education Activity  Goal: Patient/Family Education  Outcome: Progressing Towards Goal     Problem: General Wound Care  Goal: *Non-infected wound: Improvement of existing wound, absence of infection, and maintenance of skin integrity  Outcome: Progressing Towards Goal  Goal: *Infected Wound: Prevention of further infection and promotion of healing  Description: Infection control procedures (eg: clean dressings, clean gloves, hand washing, precautions to isolate wound from contamination, sterile instruments used for wound debridement) should be implemented.   Outcome: Progressing Towards Goal  Goal: Interventions  Outcome: Progressing Towards Goal     Problem: Patient Education: Go to Patient Education Activity  Goal: Patient/Family Education  Outcome: Progressing Towards Goal     Problem: Anxiety  Goal: *Alleviation of anxiety  Outcome: Progressing Towards Goal  Goal: *Alleviation of anxiety (Palliative Care)  Outcome: Progressing Towards Goal     Problem: Patient Education: Go to Patient Education Activity  Goal: Patient/Family Education  Outcome: Progressing Towards Goal

## 2021-05-14 NOTE — PROGRESS NOTES
Cardiology Progress Note                                        Admit Date: 5/6/2021    Assessment/Plan:     DELAYED NOTE FROM 5/13     1. Sp cardiac arrest post op 4/17/21,   cardiac arrest Vfib 5/6/21  ekg rbbb qt 460 msec. ICD rec for secondary prevention if life expectancy > 1yr.    2. Hx:  4/16/21 at [facility] bioprosthetic AVR and root abscess debridement. He also had a pericardiocentesis due to tamponade prior to surgery  3. Recent endocardititis 3/24/21 Staph  4. CM  Ef 25%   5.  right MCA infarct and several abscesses in the right temporal and parietal lobes. Infarcts were thought to be due to septic emboli. He had left sided residual weakness. cta 5/6/21:  multiple acute infarctions throughout the  right cerebral hemisphere, involving much of the occipital lobe, as well as much  of the frontal lobe. There is an additional superior right frontoparietal  infarct  6.  severe multilevel consolidation throughout both lungs consistent with severe multilobar PNA. 7. PNA:  Severe multilevel consolidation throughout both lungs, consistent with  severe multilobar pneumonia.     Cont current regimen  Add dapagliflozin on dc as not on formulary       Jessica Yu is a 22 y.o. male with     PROBLEM LIST:  Patient Active Problem List    Diagnosis Date Noted    Severe muscle deconditioning 05/01/2021     Priority: 3 - Three     Class: Acute    S/P AVR (aortic valve replacement) and aortoplasty 04/16/2021     Priority: 3 - Three     Class: Acute    Abscess of aortic root 04/16/2021     Priority: 3 - Three     Class: Acute    Contusion of chest wall 04/16/2021     Priority: 3 - Three     Class: Acute    Successful cardiopulmonary resuscitation 04/14/2021     Priority: 3 - Three     Class: Acute    Acute traumatic pain 04/14/2021     Priority: 3 - Three     Class: Acute    Postoperative acute respiratory failure ([facility] 04/01/2021     Priority: 3 - Three     Class: Acute    Severe protein-calorie malnutrition ([facility] 05/11/2021    Cardiac arrest with pulseless electrical activity ([facility] 05/06/2021    Cerebral abscess (embolic) 52/50/7437    Drug abuse, cocaine type ([facility] 03/29/2021    Endocarditis due to methicillin susceptible Staphylococcus aureus (MSSA) 03/25/2021    Type 2 myocardial infarction ([facility] 03/24/2021         Subjective:     Mac Roland [place] gestures appropriately. Likely orthostatic when sat on edge of bed with PT    Visit Vitals  /64   Pulse (!) 107   Temp 98 °F (36.7 °C)   Resp 27   Ht 5' 11\" (1.803 m)   Wt 58.2 kg (128 lb 4.9 oz)   SpO2 (!) 88%   BMI 17.90 kg/m²       Intake/Output Summary (Last 24 hours) at 5/14/2021 1537  Last data filed at 5/14/2021 1400  Gross per 24 hour   Intake 4800 ml   Output 2270 ml   Net 2530 ml       Objective:      Physical Exam:  HEENT: Perrla, EOMI  Neck: No JVD,  No thyroidmegaly  Resp: CTA bilaterally;  No wheezes or rales  CV: RRR s1s2 No murmur no s3  Abd:Soft, Nontender  Ext: No edema  Neuro: unresponsive on vent   Skin: Warm, Dry, Intact  Pulses: 2+ DP/PT/Rad      Telemetry: normal sinus rhythm    Current Facility-Administered Medications   Medication Dose Route Frequency    naloxone (NARCAN) injection 0.1 mg  0.1 mg IntraVENous PRN    oxyCODONE (ROXICODONE) 5 mg/5 mL oral solution 5 mg  5 mg Oral Q4H PRN    miconazole (MICOTIN) 2 % cream   Topical BID    oxyCODONE-acetaminophen (PERCOCET) 5-325 mg per tablet 1 Tab  1 Tab Oral Q4H PRN    traZODone (DESYREL) tablet 50 mg  50 mg Oral QHS PRN    valproic acid (as sodium salt) (DEPAKENE) 250 mg/5 mL (5 mL) oral solution 750 mg  750 mg Oral Q6H    carvediloL (COREG) tablet 3.125 mg  3.125 mg Oral BID WITH MEALS    albuterol-ipratropium (DUO-NEB) 2.5 MG-0.5 MG/3 ML  3 mL Nebulization Q4H PRN    meropenem (MERREM) 500 mg in 0.9% sodium chloride (MBP/ADV) 50 mL MBP  0.5 g IntraVENous Q6H    spironolactone (ALDACTONE) tablet 12.5 mg  12.5 mg Oral DAILY    levETIRAcetam (KEPPRA) 1,000 mg in 0.9% sodium chloride 100 mL IVPB  1,000 mg IntraVENous Q12H    hydrALAZINE (APRESOLINE) 20 mg/mL injection 10 mg  10 mg IntraVENous Q6H PRN    sacubitriL-valsartan (ENTRESTO) 24-26 mg tablet 1 Tab  1 Tab Oral Q12H    L.acidophilus-paracasei-S.thermophil-bifidobacter (RISAQUAD) 8 billion cell capsule  1 Cap Nasogastric DAILY    heparin (porcine) injection 5,000 Units  5,000 Units SubCUTAneous Q8H    melatonin tablet 3 mg  3 mg Oral QHS    0.9% sodium chloride infusion 250 mL  250 mL IntraVENous PRN    balsam peru-castor oiL (VENELEX) ointment   Topical BID    sodium chloride (NS) flush 5-40 mL  5-40 mL IntraVENous Q8H    sodium chloride (NS) flush 5-40 mL  5-40 mL IntraVENous PRN    acetaminophen (TYLENOL) tablet 650 mg  650 mg Oral Q6H PRN    Or    acetaminophen (TYLENOL) suppository 650 mg  650 mg Rectal Q6H PRN    polyethylene glycol (MIRALAX) packet 17 g  17 g Oral DAILY PRN    ondansetron (ZOFRAN) injection 4 mg  4 mg IntraVENous Q6H PRN    famotidine (PF) (PEPCID) 20 mg in 0.9% sodium chloride 10 mL injection  20 mg IntraVENous BID    chlorhexidine (ORAL CARE KIT) 0.12 % mouthwash 15 mL  15 mL Oral Q12H    fentaNYL citrate (PF) injection 25 mcg  25 mcg IntraVENous Q4H PRN    glucose chewable tablet 16 g  4 Tab Oral PRN    dextrose (D50W) injection syrg 12.5-25 g  25-50 mL IntraVENous PRN    glucagon (GLUCAGEN) injection 1 mg  1 mg IntraMUSCular PRN         Data Review:   Labs:    Recent Results (from the past 24 hour(s))   PROCALCITONIN    Collection Time: 05/14/21  3:57 AM   Result Value Ref Range    Procalcitonin 0.20 ng/mL   CBC WITH AUTOMATED DIFF    Collection Time: 05/14/21  3:57 AM   Result Value Ref Range    WBC 7.6 4.1 - 11.1 K/uL    RBC 3.94 (L) 4.10 - 5.70 M/uL    HGB 10.5 (L) 12.1 - 17.0 g/dL    HCT 35.1 (L) 36.6 - 50.3 %    MCV 89.1 80.0 - 99.0 FL    MCH 26.6 26.0 - 34.0 PG    MCHC 29.9 (L) 30.0 - 36.5 g/dL    RDW 16.4 (H) 11.5 - 14.5 %    PLATELET 200 852 - 310 K/uL    MPV 10.8 8.9 - 12.9 FL    NRBC 0.0 0  WBC    ABSOLUTE NRBC 0.00 0.00 - 0.01 K/uL    NEUTROPHILS 63 32 - 75 %    LYMPHOCYTES 17 12 - 49 %    MONOCYTES 14 (H) 5 - 13 %    EOSINOPHILS 4 0 - 7 %    BASOPHILS 1 0 - 1 %    IMMATURE GRANULOCYTES 1 (H) 0.0 - 0.5 %    ABS. NEUTROPHILS 4.8 1.8 - 8.0 K/UL    ABS. LYMPHOCYTES 1.3 0.8 - 3.5 K/UL    ABS. MONOCYTES 1.1 (H) 0.0 - 1.0 K/UL    ABS. EOSINOPHILS 0.3 0.0 - 0.4 K/UL    ABS. BASOPHILS 0.1 0.0 - 0.1 K/UL    ABS. IMM. GRANS. 0.1 (H) 0.00 - 0.04 K/UL    DF AUTOMATED     METABOLIC PANEL, COMPREHENSIVE    Collection Time: 05/14/21  3:57 AM   Result Value Ref Range    Sodium 132 (L) 136 - 145 mmol/L    Potassium 3.9 3.5 - 5.1 mmol/L    Chloride 100 97 - 108 mmol/L    CO2 26 21 - 32 mmol/L    Anion gap 6 5 - 15 mmol/L    Glucose 119 (H) 65 - 100 mg/dL    BUN 11 6 - 20 MG/DL    Creatinine 0.30 (L) 0.70 - 1.30 MG/DL    BUN/Creatinine ratio 37 (H) 12 - 20      GFR est AA >60 >60 ml/min/1.73m2    GFR est non-AA >60 >60 ml/min/1.73m2    Calcium 8.8 8.5 - 10.1 MG/DL    Bilirubin, total 0.5 0.2 - 1.0 MG/DL    ALT (SGPT) 18 12 - 78 U/L    AST (SGOT) 11 (L) 15 - 37 U/L    Alk.  phosphatase 66 45 - 117 U/L    Protein, total 5.8 (L) 6.4 - 8.2 g/dL    Albumin 2.7 (L) 3.5 - 5.0 g/dL    Globulin 3.1 2.0 - 4.0 g/dL    A-G Ratio 0.9 (L) 1.1 - 2.2     MAGNESIUM    Collection Time: 05/14/21  3:57 AM   Result Value Ref Range    Magnesium 1.7 1.6 - 2.4 mg/dL   PHOSPHORUS    Collection Time: 05/14/21  3:57 AM   Result Value Ref Range    Phosphorus 2.9 2.6 - 4.7 MG/DL

## 2021-05-14 NOTE — PROGRESS NOTES
Problem: Dysphagia (Adult)  Goal: *Acute Goals and Plan of Care (Insert Text)  Description: Speech Therapy Goals  Initiated 5/14/2021   1. Patient will tolerate regular/thin liquid diet without s/s of aspiration within 7 days     Initiated 5/11/2021  1. Patient will participate in swallow re-evaluation within 7 days. MET 5/14/2021   Outcome: Progressing Towards Goal     Speech Language Pathology  Fiberoptic Endoscopic Evaluation of Swallowing-FEES  Patient: Roxana Pitts (61 y.o. male)  Date: 5/14/2021  Primary Diagnosis: Cardiac arrest [facility]) [I46.9]        Precautions: aspiration  Fall, Skin    ASSESSMENT :  Based on the objective data described below, the patient presents with functional oropharyngeal swallow characterized by timely swallow initiation, adequate laryngeal closure as evidenced by complete white-out phase of the swallow, and no penetration, aspiration or pharyngeal residue. There was a small amount of retrograde flow noted intermittently suggestive of reflux. Overall, swallow is functional for PO intake while patient is on trach collar with PMV in place. Would not recommend feeding when on ventilator due to increased aspiration risks. Patient will benefit from skilled intervention to address the above impairments. Patient's rehabilitation potential is considered to be Good     PLAN :  Recommendations and Planned Interventions:  --recommend regular/thin liquid diet. Straws ok. Meds as tolerated. Only feed when patient is on trach collar with PMV in place. Do not feed when on the vent. --will follow at least x1 for diet tolerance and for any additional educational needs regarding safe feeding as well as for PMV management  Frequency/Duration: Patient will be followed by speech-language pathology 1 time a week to address swallowing goals. Discharge Recommendations: To Be Determined     SUBJECTIVE:   Patient stated Ashley Arnold was totally worth it.  After FEES study completed and patient cleared to eat/drink. Patient reports he had significant anxiety leading up to FEES study. OBJECTIVE:     Past Medical History:   Diagnosis Date    Abscess of aortic root 4/16/2021    Acute traumatic pain 4/14/2021    Anxiety     Postoperative acute respiratory failure ([address] 4/1/2021    S/P AVR (aortic valve replacement) and aortoplasty 4/16/2021    Seizure (HCC)     Severe muscle deconditioning 5/1/2021    Successful cardiopulmonary resuscitation 4/14/2021     Past Surgical History:   Procedure Laterality Date    HX TONSILLECTOMY       Prior Level of Function/Home Situation:   Home Situation  Home Environment: [address] [place] [address] Name: [address] [place] [address]  One/Two Story Residence: One story  Living Alone: No  Support Systems: Parent, Skilled nursing facility  Patient Expects to be Discharged to[de-identified] Skilled nursing facility  Current DME Used/Available at Home: None  Diet prior to admission: regular  Current Diet:  NPO      Patient Position: upright in bed   Scope used: [id number]  Respiratory status: stable on trach collar with PMV in place      Part I:   Anatomic-Physiologic Assessment:  Oral Assessment  Labial: No impairment  Dentition: Natural  Oral Hygiene: oral mucosa with thick secretions  Lingual: No impairment  Velum: No impairment  Mandible: No impairment     Movement:  n/a  Base of Tongue Movement:  n/a  Secretions:  no significant secretions in pharynx  Appearance of Secretions:  clear  Effectiveness of Swallow in Clearing Secretions:  appropriate  VF Symmetry and Appearance: WNL  Phonation:  WNL  Other Structural Remarks:  n/a    Trial 1:   Consistency Presented: Thin liquid;Puree; Solid; Ice chips   How Presented: Straw;Successive swallows;Spoon(rehab tech fed)       Bolus Acceptance: No impairment   Bolus Formation/Control: No impairment:     Propulsion: No impairment   Oral Residue: None   Initiation of Swallow: No impairment   Timing: No impairment   Penetration: None Aspiration/Timing: No evidence of aspiration   Pharyngeal Clearance: No residue           Decreased Tongue Base Retraction?: (n/a)  Laryngeal Elevation: WFL (within functional limits)  Aspiration/Penetration Score: 1 (No penetration or aspiration-Contrast does not enter the airway)  Pharyngeal Symmetry: Symmetrical  Pharyngeal-Esophageal Segment: Other (comment)(mild retrograde flow )  Pharyngeal Dysfunction: None    Oral Phase Severity: No impairment  Pharyngeal Phase Severity: N/A  NOMS:   The NOMS functional outcome measure was used to quantify this patient's level of swallowing impairment. Based on the NOMS, the patient was determined to be at level 7 for swallow function         NOMS Swallowing Levels:  Level 1 (CN): NPO  Level 2 (CM): NPO but takes consistency in therapy  Level 3 (CL): Takes less than 50% of nutrition p.o. and continues with nonoral feedings; and/or safe with mod cues; and/or max diet restriction  Level 4 (CK): Safe swallow but needs mod cues; and/or mod diet restriction; and/or still requires some nonoral feeding/supplements  Level 5 (CJ): Safe swallow with min diet restriction; and/or needs min cues  Level 6 (CI): Independent with p.o.; rare cues; usually self cues; may need to avoid some foods or needs extra time  Level 7 ([address] [place] [address]): Independent for all p.o.  NORM. (2003). National Outcomes Measurement System (NOMS): Adult Speech-Language Pathology User's Guide. COMMUNICATION/EDUCATION:   The patient's plan of care including findings from FEES, recommendations, planned interventions, and recommended diet changes were discussed with: Speech therapist and Registered nurse. Patient/family have participated as able in goal setting and plan of care. Patient/family agree to work toward stated goals and plan of care. Thank you for this referral.  Lamar Ruvalcaba M.CD. CCC-SLP   Time Calculation: 27 mins     Was present with SLP for the entirety of FEES in supervisory capacity.   Agree with assessment and recommendations as stated above.     Thank you,  ANEUDY MorenoEd, [id number] [facility], [place], operated [facility]  Speech-Language Pathologist

## 2021-05-14 NOTE — PROGRESS NOTES
SLP Contact Note    Discussed with RN Yoshi Whiteehad. Goal is to be back on TCT today. Therefore, will plan to complete FEES this afternoon around 1300.        Thank you,  ANEUDY HightowerEd, [id number] [facility]  Speech-Language Pathologist

## 2021-05-14 NOTE — PROGRESS NOTES
Occupational Therapy    Completed chart review and nursing recommend hold secondary to hypotension. Will continue to follow for OT.        Thank you,    Yusuf Dawkins, OT

## 2021-05-14 NOTE — PROGRESS NOTES
SLP Contact Note    FEES complete. Recommend regular diet/thin liquids when on TCT with PMV.     Thank you,  ANEUDY IglseiasEd, [id number] [facility]  Speech-Language Pathologist

## 2021-05-14 NOTE — PROGRESS NOTES
Cardiology Progress Note                                        Admit Date: 5/6/2021    Assessment/Plan:       1. Sp cardiac arrest post op 4/17/21,   cardiac arrest Vfib 5/6/21  ekg rbbb qt 460 msec. ICD rec for secondary prevention if life expectancy > 1yr.    2. Hx:  4/16/21 at [facility] bioprosthetic AVR and root abscess debridement. He also had a pericardiocentesis due to tamponade prior to surgery  3. Recent endocardititis 3/24/21 Staph  4. CM  Ef 25%   5.  right MCA infarct and several abscesses in the right temporal and parietal lobes. Infarcts were thought to be due to septic emboli. He had left sided residual weakness. cta 5/6/21:  multiple acute infarctions throughout the  right cerebral hemisphere, involving much of the occipital lobe, as well as much  of the frontal lobe. There is an additional superior right frontoparietal  infarct  6.  severe multilevel consolidation throughout both lungs consistent with severe multilobar PNA. 7. PNA:  Severe multilevel consolidation throughout both lungs, consistent with  severe multilobar pneumonia.     No change to current regimen  Add dapagliflozin on dc as not on formulary       Sheila Dobbs is a 22 y.o. male with     PROBLEM LIST:  Patient Active Problem List    Diagnosis Date Noted    Severe muscle deconditioning 05/01/2021     Priority: 3 - Three     Class: Acute    S/P AVR (aortic valve replacement) and aortoplasty 04/16/2021     Priority: 3 - Three     Class: Acute    Abscess of aortic root 04/16/2021     Priority: 3 - Three     Class: Acute    Contusion of chest wall 04/16/2021     Priority: 3 - Three     Class: Acute    Successful cardiopulmonary resuscitation 04/14/2021     Priority: 3 - Three     Class: Acute    Acute traumatic pain 04/14/2021     Priority: 3 - Three     Class: Acute    Postoperative acute respiratory failure ([facility] 04/01/2021     Priority: 3 - Three     Class: Acute    Severe protein-calorie malnutrition ([address] Patient will come to urology office to : prescription.   Patient was advised of location and hours: Yes.   Patient was advised to bring photo identification: Yes.   Patient elects another party to  item: no.     05/11/2021    [facility]ess [facility] 05/06/2021    Cerebral abscess (embolic) 97/24/5695    Drug abuse, cocaine type ([facility] 03/29/2021    Endocarditis due to methicillin susceptible Staphylococcus aureus (MSSA) 03/25/2021    Type 2 myocardial infarction ([facility] 03/24/2021         Subjective:     Sol Rhodes gestures appropriately. Likely orthostatic when sat on edge of bed with PT    Visit Vitals  /64   Pulse (!) 107   Temp 98 °F (36.7 °C)   Resp 27   Ht 5' 11\" (1.803 m)   Wt 58.2 kg (128 lb 4.9 oz)   SpO2 (!) 88%   BMI 17.90 kg/m²       Intake/Output Summary (Last 24 hours) at 5/14/2021 1543  Last data filed at 5/14/2021 1400  Gross per 24 hour   Intake 4800 ml   Output 2270 ml   Net 2530 ml       Objective:      Physical Exam:  HEENT: Perrla, EOMI  Neck: No JVD,  No thyroidmegaly  Resp: CTA bilaterally;  No wheezes or rales  CV: RRR s1s2 No murmur no s3  Abd:Soft, Nontender  Ext: No edema  Neuro: unresponsive on vent   Skin: Warm, Dry, Intact  Pulses: 2+ DP/PT/Rad      Telemetry: normal sinus rhythm    Current Facility-Administered Medications   Medication Dose Route Frequency    naloxone (NARCAN) injection 0.1 mg  0.1 mg IntraVENous PRN    oxyCODONE (ROXICODONE) 5 mg/5 mL oral solution 5 mg  5 mg Oral Q4H PRN    miconazole (MICOTIN) 2 % cream   Topical BID    oxyCODONE-acetaminophen (PERCOCET) 5-325 mg per tablet 1 Tab  1 Tab Oral Q4H PRN    traZODone (DESYREL) tablet 50 mg  50 mg Oral QHS PRN    valproic acid (as sodium salt) (DEPAKENE) 250 mg/5 mL (5 mL) oral solution 750 mg  750 mg Oral Q6H    carvediloL (COREG) tablet 3.125 mg  3.125 mg Oral BID WITH MEALS    albuterol-ipratropium (DUO-NEB) 2.5 MG-0.5 MG/3 ML  3 mL Nebulization Q4H PRN    meropenem (MERREM) 500 mg in 0.9% sodium chloride (MBP/ADV) 50 mL MBP  0.5 g IntraVENous Q6H    spironolactone (ALDACTONE) tablet 12.5 mg  12.5 mg Oral DAILY    levETIRAcetam (KEPPRA) 1,000 mg in 0.9% sodium chloride 100 mL IVPB  1,000 mg IntraVENous Q12H    hydrALAZINE (APRESOLINE) 20 mg/mL injection 10 mg  10 mg IntraVENous Q6H PRN    sacubitriL-valsartan (ENTRESTO) 24-26 mg tablet 1 Tab  1 Tab Oral Q12H    L.acidophilus-paracasei-S.thermophil-bifidobacter (RISAQUAD) 8 billion cell capsule  1 Cap Nasogastric DAILY    heparin (porcine) injection 5,000 Units  5,000 Units SubCUTAneous Q8H    melatonin tablet 3 mg  3 mg Oral QHS    0.9% sodium chloride infusion 250 mL  250 mL IntraVENous PRN    balsam peru-castor oiL (VENELEX) ointment   Topical BID    sodium chloride (NS) flush 5-40 mL  5-40 mL IntraVENous Q8H    sodium chloride (NS) flush 5-40 mL  5-40 mL IntraVENous PRN    acetaminophen (TYLENOL) tablet 650 mg  650 mg Oral Q6H PRN    Or    acetaminophen (TYLENOL) suppository 650 mg  650 mg Rectal Q6H PRN    polyethylene glycol (MIRALAX) packet 17 g  17 g Oral DAILY PRN    ondansetron (ZOFRAN) injection 4 mg  4 mg IntraVENous Q6H PRN    famotidine (PF) (PEPCID) 20 mg in 0.9% sodium chloride 10 mL injection  20 mg IntraVENous BID    chlorhexidine (ORAL CARE KIT) 0.12 % mouthwash 15 mL  15 mL Oral Q12H    fentaNYL citrate (PF) injection 25 mcg  25 mcg IntraVENous Q4H PRN    glucose chewable tablet 16 g  4 Tab Oral PRN    dextrose (D50W) injection syrg 12.5-25 g  25-50 mL IntraVENous PRN    glucagon (GLUCAGEN) injection 1 mg  1 mg IntraMUSCular PRN         Data Review:   Labs:    Recent Results (from the past 24 hour(s))   PROCALCITONIN    Collection Time: 05/14/21  3:57 AM   Result Value Ref Range    Procalcitonin 0.20 ng/mL   CBC WITH AUTOMATED DIFF    Collection Time: 05/14/21  3:57 AM   Result Value Ref Range    WBC 7.6 4.1 - 11.1 K/uL    RBC 3.94 (L) 4.10 - 5.70 M/uL    HGB 10.5 (L) 12.1 - 17.0 g/dL    HCT 35.1 (L) 36.6 - 50.3 %    MCV 89.1 80.0 - 99.0 FL    MCH 26.6 26.0 - 34.0 PG    MCHC 29.9 (L) 30.0 - 36.5 g/dL    RDW 16.4 (H) 11.5 - 14.5 %    PLATELET 759 291 - 605 K/uL    MPV 10.8 8.9 - 12.9 FL    NRBC 0.0 0  WBC    ABSOLUTE NRBC 0.00 0.00 - 0.01 K/uL    NEUTROPHILS 63 32 - 75 %    LYMPHOCYTES 17 12 - 49 %    MONOCYTES 14 (H) 5 - 13 %    EOSINOPHILS 4 0 - 7 %    BASOPHILS 1 0 - 1 %    IMMATURE GRANULOCYTES 1 (H) 0.0 - 0.5 %    ABS. NEUTROPHILS 4.8 1.8 - 8.0 K/UL    ABS. LYMPHOCYTES 1.3 0.8 - 3.5 K/UL    ABS. MONOCYTES 1.1 (H) 0.0 - 1.0 K/UL    ABS. EOSINOPHILS 0.3 0.0 - 0.4 K/UL    ABS. BASOPHILS 0.1 0.0 - 0.1 K/UL    ABS. IMM. GRANS. 0.1 (H) 0.00 - 0.04 K/UL    DF AUTOMATED     METABOLIC PANEL, COMPREHENSIVE    Collection Time: 05/14/21  3:57 AM   Result Value Ref Range    Sodium 132 (L) 136 - 145 mmol/L    Potassium 3.9 3.5 - 5.1 mmol/L    Chloride 100 97 - 108 mmol/L    CO2 26 21 - 32 mmol/L    Anion gap 6 5 - 15 mmol/L    Glucose 119 (H) 65 - 100 mg/dL    BUN 11 6 - 20 MG/DL    Creatinine 0.30 (L) 0.70 - 1.30 MG/DL    BUN/Creatinine ratio 37 (H) 12 - 20      GFR est AA >60 >60 ml/min/1.73m2    GFR est non-AA >60 >60 ml/min/1.73m2    Calcium 8.8 8.5 - 10.1 MG/DL    Bilirubin, total 0.5 0.2 - 1.0 MG/DL    ALT (SGPT) 18 12 - 78 U/L    AST (SGOT) 11 (L) 15 - 37 U/L    Alk.  phosphatase 66 45 - 117 U/L    Protein, total 5.8 (L) 6.4 - 8.2 g/dL    Albumin 2.7 (L) 3.5 - 5.0 g/dL    Globulin 3.1 2.0 - 4.0 g/dL    A-G Ratio 0.9 (L) 1.1 - 2.2     MAGNESIUM    Collection Time: 05/14/21  3:57 AM   Result Value Ref Range    Magnesium 1.7 1.6 - 2.4 mg/dL   PHOSPHORUS    Collection Time: 05/14/21  3:57 AM   Result Value Ref Range    Phosphorus 2.9 2.6 - 4.7 MG/DL none

## 2021-05-14 NOTE — INTERDISCIPLINARY ROUNDS
Multidisciplinary rounds were held Friday May 14, 2021. Today's plan/goal includes (but not limited to): Trach collar, speech eval, work with PT/OT

## 2021-05-14 NOTE — PROGRESS NOTES
05/14/21 0152   Vent Settings   FIO2 (%) 40 %   SpO2/FIO2 Ratio 245   Back-Up Rate 12   Pressure Support (cm H2O) 5 cm H2O   PEEP/VENT (cm H2O) 5 cm H20   Insp Rise Time % 50 %   Flow Trigger 3   Expiratory Sensitivity 25 %   Ventilator Measurements   Resp Rate Observed 23   Vt Spont (ml) 612 ml   Ve Observed (l/min) 10.1 l/min   PIP Observed (cm H2O) 16 cm H2O   MAP (cm H2O) 8.7   I:E Ratio Actual 1:2.8   Vent Method/Mode   Ventilation Method Conventional   Ventilator Mode Pressure support;Spontaneous   Ventilator Mode ID [id number]   $$ Ventilator Subsequent Subsequent Vent Day     Patient tolerated Trach collar for 16hrs. VS stable T/O, NARD noted. Patient placed on Vent d/t need for Narcan. RT noted patient had been left on PMV. Patient responded to the Narcan therefore RT able to remain on PSV.  Patient to remain on vent till AM to rest. patient

## 2021-05-14 NOTE — PROGRESS NOTES
0745:Bedside shift change report given to Rosendo Nicholson (oncoming nurse) by Moi Hernandez (offgoing nurse). Report included the following information SBAR, Kardex, Procedure Summary, Intake/Output, MAR, Accordion, Recent Results, Med Rec Status, Cardiac Rhythm NSR, Alarm Parameters , Quality Measures and Dual Neuro Assessment. 0800: Pt was resting. VSS. Overview assessment performed. RR equal, unlabored. Vent checked by RT. Hands on physical nursing assessment performed 0930    1015: D/t critical staffing needs, Rosendo Nicholson pulled to different unit and care to be assumed by Yasmin Guerrero.   Bedside shift change report given to Siva (oncoming nurse) by Rosendo Nicholson (offgoing nurse). Report included the following information SBAR, Kardex, ED Summary, Procedure Summary, Intake/Output, MAR, Accordion, Recent Results, Med Rec Status, Cardiac Rhythm NSR, Alarm Parameters , Quality Measures and Dual Neuro Assessment. Reported off that lung assessment with exp wheezing - to follow up to see if breathing treatment/nebs to be given/ordered.

## 2021-05-15 LAB
ALBUMIN SERPL-MCNC: 2.7 G/DL (ref 3.5–5)
ALBUMIN/GLOB SERPL: 0.7 {RATIO} (ref 1.1–2.2)
ALP SERPL-CCNC: 77 U/L (ref 45–117)
ALT SERPL-CCNC: 20 U/L (ref 12–78)
ANION GAP SERPL CALC-SCNC: 4 MMOL/L (ref 5–15)
AST SERPL-CCNC: 43 U/L (ref 15–37)
BASOPHILS # BLD: 0 K/UL (ref 0–0.1)
BASOPHILS NFR BLD: 1 % (ref 0–1)
BILIRUB SERPL-MCNC: 0.6 MG/DL (ref 0.2–1)
BUN SERPL-MCNC: 6 MG/DL (ref 6–20)
BUN/CREAT SERPL: 15 (ref 12–20)
CALCIUM SERPL-MCNC: 9 MG/DL (ref 8.5–10.1)
CHLORIDE SERPL-SCNC: 101 MMOL/L (ref 97–108)
CO2 SERPL-SCNC: 28 MMOL/L (ref 21–32)
CREAT SERPL-MCNC: 0.4 MG/DL (ref 0.7–1.3)
DIFFERENTIAL METHOD BLD: ABNORMAL
EOSINOPHIL # BLD: 0.2 K/UL (ref 0–0.4)
EOSINOPHIL NFR BLD: 3 % (ref 0–7)
ERYTHROCYTE [DISTWIDTH] IN BLOOD BY AUTOMATED COUNT: 16.9 % (ref 11.5–14.5)
GLOBULIN SER CALC-MCNC: 4.1 G/DL (ref 2–4)
GLUCOSE SERPL-MCNC: 98 MG/DL (ref 65–100)
HCT VFR BLD AUTO: 36.4 % (ref 36.6–50.3)
HGB BLD-MCNC: 11 G/DL (ref 12.1–17)
IMM GRANULOCYTES # BLD AUTO: 0.1 K/UL (ref 0–0.04)
IMM GRANULOCYTES NFR BLD AUTO: 1 % (ref 0–0.5)
LYMPHOCYTES # BLD: 1.4 K/UL (ref 0.8–3.5)
LYMPHOCYTES NFR BLD: 23 % (ref 12–49)
MAGNESIUM SERPL-MCNC: 1.8 MG/DL (ref 1.6–2.4)
MCH RBC QN AUTO: 27.2 PG (ref 26–34)
MCHC RBC AUTO-ENTMCNC: 30.2 G/DL (ref 30–36.5)
MCV RBC AUTO: 90.1 FL (ref 80–99)
MONOCYTES # BLD: 1.1 K/UL (ref 0–1)
MONOCYTES NFR BLD: 19 % (ref 5–13)
NEUTS SEG # BLD: 3.1 K/UL (ref 1.8–8)
NEUTS SEG NFR BLD: 53 % (ref 32–75)
NRBC # BLD: 0 K/UL (ref 0–0.01)
NRBC BLD-RTO: 0 PER 100 WBC
PHOSPHATE SERPL-MCNC: 3.6 MG/DL (ref 2.6–4.7)
PLATELET # BLD AUTO: 252 K/UL (ref 150–400)
PMV BLD AUTO: 11.8 FL (ref 8.9–12.9)
POTASSIUM SERPL-SCNC: 4.8 MMOL/L (ref 3.5–5.1)
PROT SERPL-MCNC: 6.8 G/DL (ref 6.4–8.2)
RBC # BLD AUTO: 4.04 M/UL (ref 4.1–5.7)
SODIUM SERPL-SCNC: 133 MMOL/L (ref 136–145)
VALPROATE SERPL-MCNC: 16 UG/ML (ref 50–100)
WBC # BLD AUTO: 5.8 K/UL (ref 4.1–11.1)

## 2021-05-15 PROCEDURE — 74011250636 HC RX REV CODE- 250/636: Performed by: PSYCHIATRY & NEUROLOGY

## 2021-05-15 PROCEDURE — 74011250636 HC RX REV CODE- 250/636: Performed by: HOSPITALIST

## 2021-05-15 PROCEDURE — 74011000250 HC RX REV CODE- 250: Performed by: NURSE PRACTITIONER

## 2021-05-15 PROCEDURE — 74011250637 HC RX REV CODE- 250/637: Performed by: INTERNAL MEDICINE

## 2021-05-15 PROCEDURE — 74011000258 HC RX REV CODE- 258: Performed by: HOSPITALIST

## 2021-05-15 PROCEDURE — 74011250637 HC RX REV CODE- 250/637: Performed by: SURGERY

## 2021-05-15 PROCEDURE — 74011250636 HC RX REV CODE- 250/636: Performed by: INTERNAL MEDICINE

## 2021-05-15 PROCEDURE — 85025 COMPLETE CBC W/AUTO DIFF WBC: CPT

## 2021-05-15 PROCEDURE — 84100 ASSAY OF PHOSPHORUS: CPT

## 2021-05-15 PROCEDURE — 80164 ASSAY DIPROPYLACETIC ACD TOT: CPT

## 2021-05-15 PROCEDURE — 74011250637 HC RX REV CODE- 250/637: Performed by: STUDENT IN AN ORGANIZED HEALTH CARE EDUCATION/TRAINING PROGRAM

## 2021-05-15 PROCEDURE — 83735 ASSAY OF MAGNESIUM: CPT

## 2021-05-15 PROCEDURE — 74011250636 HC RX REV CODE- 250/636: Performed by: NURSE PRACTITIONER

## 2021-05-15 PROCEDURE — 74011000258 HC RX REV CODE- 258: Performed by: PSYCHIATRY & NEUROLOGY

## 2021-05-15 PROCEDURE — 80053 COMPREHEN METABOLIC PANEL: CPT

## 2021-05-15 PROCEDURE — 74011000258 HC RX REV CODE- 258: Performed by: NURSE PRACTITIONER

## 2021-05-15 PROCEDURE — 65660000001 HC RM ICU INTERMED STEPDOWN

## 2021-05-15 PROCEDURE — 36415 COLL VENOUS BLD VENIPUNCTURE: CPT

## 2021-05-15 PROCEDURE — 2709999900 HC NON-CHARGEABLE SUPPLY

## 2021-05-15 RX ORDER — FENTANYL CITRATE 50 UG/ML
50 INJECTION, SOLUTION INTRAMUSCULAR; INTRAVENOUS
Status: DISCONTINUED | OUTPATIENT
Start: 2021-05-15 | End: 2021-05-19

## 2021-05-15 RX ORDER — FENTANYL CITRATE 50 UG/ML
25 INJECTION, SOLUTION INTRAMUSCULAR; INTRAVENOUS ONCE
Status: COMPLETED | OUTPATIENT
Start: 2021-05-15 | End: 2021-05-15

## 2021-05-15 RX ORDER — VANCOMYCIN HYDROCHLORIDE
1250 EVERY 8 HOURS
Status: DISCONTINUED | OUTPATIENT
Start: 2021-05-16 | End: 2021-05-17

## 2021-05-15 RX ORDER — VALPROIC ACID 250 MG/5ML
1000 SOLUTION ORAL EVERY 6 HOURS
Status: DISCONTINUED | OUTPATIENT
Start: 2021-05-16 | End: 2021-05-16

## 2021-05-15 RX ORDER — VANCOMYCIN HYDROCHLORIDE
1250 ONCE
Status: COMPLETED | OUTPATIENT
Start: 2021-05-15 | End: 2021-05-15

## 2021-05-15 RX ORDER — VALPROIC ACID 250 MG/5ML
1000 SOLUTION ORAL EVERY 6 HOURS
Status: COMPLETED | OUTPATIENT
Start: 2021-05-16 | End: 2021-05-16

## 2021-05-15 RX ORDER — FENTANYL CITRATE 50 UG/ML
25 INJECTION, SOLUTION INTRAMUSCULAR; INTRAVENOUS
Status: DISCONTINUED | OUTPATIENT
Start: 2021-05-15 | End: 2021-05-16

## 2021-05-15 RX ADMIN — FENTANYL CITRATE 25 MCG: 50 INJECTION, SOLUTION INTRAMUSCULAR; INTRAVENOUS at 02:57

## 2021-05-15 RX ADMIN — FAMOTIDINE 20 MG: 10 INJECTION, SOLUTION INTRAVENOUS at 20:31

## 2021-05-15 RX ADMIN — SACUBITRIL AND VALSARTAN 1 TABLET: 24; 26 TABLET, FILM COATED ORAL at 20:24

## 2021-05-15 RX ADMIN — CARVEDILOL 3.12 MG: 3.12 TABLET, FILM COATED ORAL at 18:25

## 2021-05-15 RX ADMIN — VALPROIC ACID 750 MG: 250 SOLUTION ORAL at 18:26

## 2021-05-15 RX ADMIN — SACUBITRIL AND VALSARTAN 1 TABLET: 24; 26 TABLET, FILM COATED ORAL at 08:04

## 2021-05-15 RX ADMIN — FENTANYL CITRATE 25 MCG: 50 INJECTION, SOLUTION INTRAMUSCULAR; INTRAVENOUS at 15:17

## 2021-05-15 RX ADMIN — CARVEDILOL 3.12 MG: 3.12 TABLET, FILM COATED ORAL at 08:04

## 2021-05-15 RX ADMIN — MEROPENEM 500 MG: 500 INJECTION, POWDER, FOR SOLUTION INTRAVENOUS at 08:04

## 2021-05-15 RX ADMIN — LEVETIRACETAM 1000 MG: 100 INJECTION, SOLUTION INTRAVENOUS at 19:18

## 2021-05-15 RX ADMIN — MEROPENEM 500 MG: 500 INJECTION, POWDER, FOR SOLUTION INTRAVENOUS at 15:17

## 2021-05-15 RX ADMIN — CHLORHEXIDINE GLUCONATE 15 ML: 0.12 RINSE ORAL at 20:41

## 2021-05-15 RX ADMIN — FENTANYL CITRATE 25 MCG: 50 INJECTION, SOLUTION INTRAMUSCULAR; INTRAVENOUS at 16:18

## 2021-05-15 RX ADMIN — CASTOR OIL AND BALSAM, PERU: 788; 87 OINTMENT TOPICAL at 18:27

## 2021-05-15 RX ADMIN — FENTANYL CITRATE 25 MCG: 50 INJECTION, SOLUTION INTRAMUSCULAR; INTRAVENOUS at 20:32

## 2021-05-15 RX ADMIN — CASTOR OIL AND BALSAM, PERU: 788; 87 OINTMENT TOPICAL at 08:05

## 2021-05-15 RX ADMIN — HEPARIN SODIUM 5000 UNITS: 5000 INJECTION INTRAVENOUS; SUBCUTANEOUS at 05:00

## 2021-05-15 RX ADMIN — MEROPENEM 500 MG: 500 INJECTION, POWDER, FOR SOLUTION INTRAVENOUS at 02:58

## 2021-05-15 RX ADMIN — FENTANYL CITRATE 25 MCG: 50 INJECTION, SOLUTION INTRAMUSCULAR; INTRAVENOUS at 07:07

## 2021-05-15 RX ADMIN — HEPARIN SODIUM 5000 UNITS: 5000 INJECTION INTRAVENOUS; SUBCUTANEOUS at 20:32

## 2021-05-15 RX ADMIN — VANCOMYCIN HYDROCHLORIDE 1250 MG: 10 INJECTION, POWDER, LYOPHILIZED, FOR SOLUTION INTRAVENOUS at 18:26

## 2021-05-15 RX ADMIN — HEPARIN SODIUM 5000 UNITS: 5000 INJECTION INTRAVENOUS; SUBCUTANEOUS at 12:37

## 2021-05-15 RX ADMIN — MICONAZOLE NITRATE: 20 CREAM TOPICAL at 19:18

## 2021-05-15 RX ADMIN — MEROPENEM 500 MG: 500 INJECTION, POWDER, FOR SOLUTION INTRAVENOUS at 20:29

## 2021-05-15 RX ADMIN — FENTANYL CITRATE 25 MCG: 50 INJECTION, SOLUTION INTRAMUSCULAR; INTRAVENOUS at 11:18

## 2021-05-15 RX ADMIN — FAMOTIDINE 20 MG: 10 INJECTION, SOLUTION INTRAVENOUS at 08:04

## 2021-05-15 RX ADMIN — CHLORHEXIDINE GLUCONATE 15 ML: 0.12 RINSE ORAL at 08:06

## 2021-05-15 RX ADMIN — Medication 1 CAPSULE: at 08:05

## 2021-05-15 RX ADMIN — VALPROIC ACID 750 MG: 250 SOLUTION ORAL at 05:53

## 2021-05-15 RX ADMIN — Medication 10 ML: at 05:54

## 2021-05-15 RX ADMIN — VALPROIC ACID 750 MG: 250 SOLUTION ORAL at 11:18

## 2021-05-15 RX ADMIN — MICONAZOLE NITRATE: 20 CREAM TOPICAL at 08:06

## 2021-05-15 RX ADMIN — Medication 10 ML: at 12:38

## 2021-05-15 RX ADMIN — LEVETIRACETAM 1000 MG: 100 INJECTION, SOLUTION INTRAVENOUS at 05:15

## 2021-05-15 RX ADMIN — SPIRONOLACTONE 12.5 MG: 25 TABLET ORAL at 08:04

## 2021-05-15 RX ADMIN — OXYCODONE AND ACETAMINOPHEN 1 TABLET: 5; 325 TABLET ORAL at 21:52

## 2021-05-15 NOTE — PROGRESS NOTES
Keppra and powder scheduled but not with pt or in pharmacy bin. Message sent to pharmacy to send to unit.

## 2021-05-15 NOTE — PROGRESS NOTES
Bedside shift change report given to Moe (oncoming nurse) by [facility] RN (offgoing nurse). Report included the following information SBAR, Intake/Output, MAR, Med Rec Status and Cardiac Rhythm sinus tach.

## 2021-05-15 NOTE — PROGRESS NOTES
Pt has asked for \"selin 30\" since he arrived on the unit. While reading through notes, charge RN and this RN saw a note about pt becoming lethargic and unresponsive and needing narcan, with dosage being decreased due to lethargy. Will discuss with hospitalist, called triage and spoke to Dr Afshan Alas who says she will be up soon to see pt. In the meantime, will give fentanyl at appropriate time. Pt states \"fentanyl doesn't last at all\".

## 2021-05-15 NOTE — PROGRESS NOTES
1930: Report received from [address]: Bedside shift change report given to MARIA LUISA Younger (oncoming nurse) by Helen Dumas (offgoing nurse). Report included the following information SBAR, Kardex, Intake/Output and MAR.

## 2021-05-15 NOTE — PROGRESS NOTES
0730 Bedside and Verbal shift change report given to MARIA LUISA Robin (oncoming nurse) by Tania Ashton RN (offgoing nurse). Report included the following information SBAR.     1100 Flexiseal removed. 1300 TRANSFER - OUT REPORT:    Verbal report given to Latia Yepez RN(name) on Juana Cox  being transferred to (unit) for routine progression of care       Report consisted of patients Situation, Background, Assessment and   Recommendations(SBAR). Information from the following report(s) SBAR was reviewed with the receiving nurse. Lines:   Peripheral IV 05/13/21 Right Forearm (Active)   Site Assessment Clean, dry, & intact 05/15/21 1200   Phlebitis Assessment 0 05/15/21 1200   Infiltration Assessment 0 05/15/21 1200   Dressing Status Clean, dry, & intact 05/15/21 1200   Dressing Type Transparent 05/15/21 1200   Hub Color/Line Status Blue;Flushed; Infusing 05/15/21 1200   Action Taken Open ports on tubing capped 05/15/21 1200   Alcohol Cap Used Yes 05/15/21 1200        Opportunity for questions and clarification was provided.       Patient transported with:   Registered Nurse

## 2021-05-15 NOTE — PROGRESS NOTES
Pharmacist Note - Vancomycin Dosing- Restart    Consult provided for this 22 y.o. male for indication of recent MRSA bacteremia with MSSA endocarditis, and multiple septic cerebral emboli from admission in March  -s/p AVR   -treated with vancomycin here  - for possible sepsis; [facility] paperwork notes 6 week course of vancomycin through    Antibiotic regimen(s): Vanc + Meropenem      Recent Labs     05/15/21  0559 21  0357 21  0303   WBC 5.8 7.6 8.7   CREA 0.40* 0.30* 0.26*   BUN 6 11 12     Frequency of BMP: daily  Height: 180.3 cm  Weight: 57.6 kg  Est CrCl: >100 ml/min; UO: 4.1 ml/kg/hr  Temp (24hrs), Av °F (36.7 °C), Min:97.3 °F (36.3 °C), Max:99 °F (37.2 °C)    Cultures:   sputum - ESBL (S- Meropenem)   toe wound - light mixed skin sunny, final    Goal trough = 15 - 20 mcg/mL    Therapy will be initiated with a loading dose of 1250 mg IV x 1 to be followed by a maintenance dose of 1250 mg IV every 8 hours. Pharmacy to follow patient daily and order levels / make dose adjustments as appropriate.

## 2021-05-15 NOTE — PROGRESS NOTES
[facility]  Hospitalist Group     ICU Transfer/Accept Summary     [place] [address] [place]  DATE OF TRANSFER: 5/15/2021       PATIENT ID: Faiza Corea  MRN: [id number]   YOB: 1995    PRIMARY CARE PROVIDER: None   DATE OF ADMISSION: 5/6/2021  5:22 PM    ATTENDING PHYSICIAN: Ruth Espinal MD  CONSULTATIONS:   IP CONSULT TO INTENSIVIST  IP CONSULT TO NEUROLOGY  IP CONSULT TO PODIATRY  IP CONSULT TO PODIATRY  IP CONSULT TO CARDIOLOGY    PROCEDURES/SURGERIES:   * No surgery found *    REASON FOR ADMISSION: Cardiac arrest with pulseless electrical activity [facility] PROBLEM LIST:  Patient Active Problem List   Diagnosis Code    Endocarditis due to methicillin susceptible Staphylococcus aureus (MSSA) I33.0, B95.61    Drug abuse, cocaine type ([facility] F14.10    Type 2 myocardial infarction ([facility] I21. A1    Cerebral abscess (embolic) D02.6    Cardiac arrest with pulseless electrical activity (HCC) I46.9    Severe protein-calorie malnutrition (HCC) E43    Postoperative acute respiratory failure (HCC) J95.821    Severe muscle deconditioning R29.898    Successful cardiopulmonary resuscitation Z92.89    Acute traumatic pain G89.11    S/P AVR (aortic valve replacement) and aortoplasty Z95.2    Abscess of aortic root I51.89    Contusion of chest wall S20.219A         Brief HPI and Hospital Course:      Faiza Corea is a 22 y.o. male with h/o Seizures and anxiety who presented to [facility] after a cardiac arrest at [facility] around 1500 5/6/2021. Hx from chart and speaking with patient's sister via phone. Patient had recent hospitalization at the [facility] in March of this year. Initial hospitalization admission was at [facility] on 3/24 with AMS in setting of polysubstance and IVD use (cocaine, heroine, methamphetamines). He was found to have septic MRSA bacteremia, MSSA endocarditis.  Imaging and MRI also found R PCA infarct and several abscesses and right temporal and parietal lobes. Infarcts thought to be due to septic emboli and patient had left sided residual weakness. Patient was transferred to [facility] on 4/1/2021 for evaluation for valve surgery. After transfer he was found to have a New R MCA infarct Also had a type 2 NSTEMI and tamponade with pericardiocentesis done prior to surgery. Did have cardiac arrest on day of surgery. Had a bioprosthetic Aortic Valve Replacement and Aortic Root Abscess Debridement done on 4/16/2021. Trached and sent to [facility] on 4/29/2021. Was undergoing PT and vent wean. Sister stated that patient was able to be off the vent for several hours over the last few days. He was sitting up and able to talk with valve over trach and could follow commands. Stated that he had severe weakness of the left upper ext, but was starting to gain some mobility in the lower left ext. Patient's mother visited patient today prior to arrest. She said the staff told her that the patient was getting very anxious earlier in the day around 1 pm and had to be placed back on the ventilator, and then they had to sedate him while he was on the ventilator. The mother stated \" he did not look good\" and \" his eye were rolled back in his head\". About 30 minutes after she left she got call about arrest. Per ED note patient was found unresponsive around the 1500 hour and was pulseless. Two rounds of CPR and meds were given and patient was defibrillated x 1 before ROSC. Patient was hypoxic post arrest. Unknown down time prior to arrest.    ICU courses:  Off vent  Tolerate trach collar   Now trach capped   Passed swallow eval 5/14   Transfer out ICU 5/15  Currently communicating well, tolerate diet complaining severe pain at chest wall, left side exts and back     Assessment and Plan:    1.  cardiac arrest Vfib 5/6/21 on admission:   ekg rbbb qt 460 msec.    Cardiologist on board,   ICD rec for secondary prevention if life expectancy > 1yr.  -per cardiologist :  Need ICD before DC (but with his recent endocarditis/recent bacteremia till under treatemtn, ? timeing)  Repeat echo in a few days  Add dapagliflozin on dc as not on formulary     2. Acute on chronic  respiratory failure: due to cardiac arrest:improved   Had trach last hospitalization at u   routine tracheostomy care. 3. Recent endocardititis 3/24/21 per record: Found to have MRSA bacteremia and MSSA endocarditis end of march. Reviewed [facility] record, he was on IV vanco at [facility] and planned to  have a 6 weeks of   tx and EOT 5/28/21 per record. Continue on  vanco since admission-  [facility] 5/7-5/11, then changed to  [facility] 5/11 due to sputum E. Coli with ESBL   -repeat blood culrure this admission 5/6 negative. Will be remain IV abx for a while  Will consult ID     5: Acute pain: contusion of chest wall, left side post-stroke pain, chronic back pain   Cautious of narcotics  Pt currently in pain noticed increasing HR  Increased fentanyl to 50mcg q4h prn     6 Hx:  4/16/21 at [facility] bioprosthetic AVR and root abscess debridement. He also had a pericardiocentesis due to tamponade prior to surgery    7. Systolic congestive heart failure/CM  Ef 25%   Continue coreg,  entresto   Aldactone   I&O, weight   Will need ICD in the near future   Cardiologist following     8. 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 Infarcts were thought to be due to septic emboli. He had left sided residual weakness. cta 5/6/21:  multiple acute infarctions throughout the  right cerebral hemisphere, involving much of the occipital lobe, as well as much  of the frontal lobe. There is an additional superior right frontoparietal  Infarct  Neurologist consulted                -PT/OT/SLP e              - Stroke education              - SBP goal 100-160          9.  PNA:  severe multilevel consolidation throughout both lungs consistent with severe multilobar PNA. -sputum culture E coli with ESBL   -abx changed to vanco and Merrem   -pulmonary toilet   -O2,  -trach care     10. Seizure DZ:  Continue keppra, valproic acid   Seizure precautions      11. Severe muscle deconditioning    12: Severe protein-calorie malnutrition    13.  Cerebral abscess (embolic)    26 Drug abuse, cocaine type  fulll code        PHYSICAL EXAMINATION:  Visit Vitals  BP 99/62 (BP 1 Location: Right arm, BP Patient Position: At rest)   Pulse (!) 105   Temp 99 °F (37.2 °C)   Resp 19   Ht 5' 11\" (1.803 m)   Wt 57.6 kg (126 lb 15.8 oz)   SpO2 99%   BMI 17.71 kg/m²       General:          Alert, cooperative,   HEENT:           Atraumatic,trach capped, ng tube. Neck:               Supple,   Lungs:            coarse bs bilat. No wheezing. Midline sternal surgical scar. Heart:              Regular  rhythm,  No  murmur   No edema  Abdomen:       Soft, non-tender. Not distended. Bowel sounds normal  Extremities:     No cyanosis. No clubbing,  +2 distal pulses  Skin:                Not pale. Not Jaundiced  No rashes   Psych:             Not anxious or agitated. Neurologic:      Alert, left hemiplegia     Labs:     Recent Labs     05/15/21  0559 05/14/21  0357   WBC 5.8 7.6   HGB 11.0* 10.5*   HCT 36.4* 35.1*    268     Recent Labs     05/15/21  0559 05/14/21  0357 05/13/21  0303   * 132* 136   K 4.8 3.9 4.2    100 103   CO2 28 26 27   BUN 6 11 12   CREA 0.40* 0.30* 0.26*   GLU 98 119* 100   CA 9.0 8.8 8.4*   MG 1.8 1.7 1.9   PHOS 3.6 2.9 2.7     Recent Labs     05/15/21  0559 05/14/21  0357   ALT 20 18   AP 77 66   TBILI 0.6 0.5   TP 6.8 5.8*   ALB 2.7* 2.7*   GLOB 4.1* 3.1     No results for input(s): INR, PTP, APTT, INREXT in the last 72 hours. No results for input(s): FE, TIBC, PSAT, FERR in the last 72 hours. No results found for: FOL, RBCF   No results for input(s): PH, PCO2, PO2 in the last 72 hours.   No results for input(s): CPK, CKNDX, TROIQ in the last 72 hours.     No lab exists for component: CPKMB  Lab Results   Component Value Date/Time    Cholesterol, total 140 05/11/2021 04:35 AM    HDL Cholesterol 21 05/11/2021 04:35 AM    LDL, calculated 77.4 05/11/2021 04:35 AM    Triglyceride 208 (H) 05/11/2021 04:35 AM    CHOL/HDL Ratio 6.7 (H) 05/11/2021 04:35 AM     Lab Results   Component Value Date/Time    Glucose (POC) 120 (H) 05/11/2021 12:00 PM    Glucose (POC) 100 05/11/2021 06:04 AM    Glucose (POC) 87 05/11/2021 12:19 AM    Glucose (POC) 83 05/10/2021 06:53 PM    Glucose (POC) 84 05/10/2021 02:07 PM     Lab Results   Component Value Date/Time    Color YELLOW/STRAW 05/06/2021 05:42 PM    Appearance CLEAR 05/06/2021 05:42 PM    Specific gravity 1.008 05/06/2021 05:42 PM    Specific gravity 1.020 03/24/2021 09:30 PM    pH (UA) 5.0 05/06/2021 05:42 PM    Protein 30 (A) 05/06/2021 05:42 PM    Glucose Negative 05/06/2021 05:42 PM    Ketone Negative 05/06/2021 05:42 PM    Bilirubin Negative 05/06/2021 05:42 PM    Urobilinogen 0.2 05/06/2021 05:42 PM    Nitrites Negative 05/06/2021 05:42 PM    Leukocyte Esterase Negative 05/06/2021 05:42 PM    Epithelial cells FEW 05/06/2021 05:42 PM    Bacteria Negative 05/06/2021 05:42 PM    WBC 0-4 05/06/2021 05:42 PM    RBC 0-5 05/06/2021 05:42 PM         CODE STATUS:  x Full Code    DNR    Partial    Comfort Care       Signed:   Beto Leon MD  Date of Service:  5/15/2021  3:38 PM

## 2021-05-15 NOTE — PROGRESS NOTES
Cardiology Progress Note                                        Admit Date: 5/6/2021    Assessment/Plan:       1. Sp cardiac arrest post op 4/17/21,   cardiac arrest Vfib 5/6/21  ekg rbbb qt 460 msec. ICD rec for secondary prevention if life expectancy > 1yr.    2. Hx:  4/16/21 at [facility] bioprosthetic AVR and root abscess debridement. He also had a pericardiocentesis due to tamponade prior to surgery  3. Recent endocardititis 3/24/21 Staph  4. CM  Ef 25%   5.  right MCA infarct and several abscesses in the right temporal and parietal lobes. Infarcts were thought to be due to septic emboli. He had left sided residual weakness. cta 5/6/21:  multiple acute infarctions throughout the  right cerebral hemisphere, involving much of the occipital lobe, as well as much  of the frontal lobe. There is an additional superior right frontoparietal  infarct  6.  severe multilevel consolidation throughout both lungs consistent with severe multilobar PNA. 7. PNA:  Severe multilevel consolidation throughout both lungs, consistent with  severe multilobar pneumonia.     Continue current regimen; no changes  ICD before DC  Repeat echo in a few days  Add dapagliflozin on dc as not on formulary       Giancarlo Bolden is a 22 y.o. male with     PROBLEM LIST:  Patient Active Problem List    Diagnosis Date Noted    Severe muscle deconditioning 05/01/2021     Priority: 3 - Three     Class: Acute    S/P AVR (aortic valve replacement) and aortoplasty 04/16/2021     Priority: 3 - Three     Class: Acute    Abscess of aortic root 04/16/2021     Priority: 3 - Three     Class: Acute    Contusion of chest wall 04/16/2021     Priority: 3 - Three     Class: Acute    Successful cardiopulmonary resuscitation 04/14/2021     Priority: 3 - Three     Class: Acute    Acute traumatic pain 04/14/2021     Priority: 3 - Three     Class: Acute    Postoperative acute respiratory failure ([address] 04/01/2021     Priority: 3 - Three     Class: Acute    Severe protein-calorie malnutrition ([facility] 05/11/2021    Cardiac arrest with pulseless electrical activity ([facility] 05/06/2021    Cerebral abscess (embolic) 99/69/8046    Drug abuse, cocaine type ([facility] 03/29/2021    Endocarditis due to methicillin susceptible Staphylococcus aureus (MSSA) 03/25/2021    Type 2 myocardial infarction ([facility] 03/24/2021         Subjective:     Krish Gana Carmelo gestures appropriately. Likely orthostatic when sat on edge of bed with PT    Visit Vitals  BP (!) 98/58   Pulse (!) 101   Temp 97.3 °F (36.3 °C)   Resp 25   Ht 5' 11\" (1.803 m)   Wt 57.6 kg (126 lb 15.8 oz)   SpO2 100%   BMI 17.71 kg/m²       Intake/Output Summary (Last 24 hours) at 5/15/2021 1108  Last data filed at 5/15/2021 1000  Gross per 24 hour   Intake 2600 ml   Output 5700 ml   Net -3100 ml       Objective:      Physical Exam:  HEENT: Perrla, EOMI  Neck: No JVD,  No thyroidmegaly  Resp: CTA bilaterally;  No wheezes or rales  CV: RRR s1s2 No murmur no s3  Abd:Soft, Nontender  Ext: No edema  Neuro: unresponsive on vent   Skin: Warm, Dry, Intact  Pulses: 2+ DP/PT/Rad      Telemetry: normal sinus rhythm    Current Facility-Administered Medications   Medication Dose Route Frequency    naloxone (NARCAN) injection 0.1 mg  0.1 mg IntraVENous PRN    oxyCODONE (ROXICODONE) 5 mg/5 mL oral solution 5 mg  5 mg Oral Q4H PRN    miconazole (MICOTIN) 2 % cream   Topical BID    oxyCODONE-acetaminophen (PERCOCET) 5-325 mg per tablet 1 Tab  1 Tab Oral Q4H PRN    traZODone (DESYREL) tablet 50 mg  50 mg Oral QHS PRN    valproic acid (as sodium salt) (DEPAKENE) 250 mg/5 mL (5 mL) oral solution 750 mg  750 mg Oral Q6H    carvediloL (COREG) tablet 3.125 mg  3.125 mg Oral BID WITH MEALS    albuterol-ipratropium (DUO-NEB) 2.5 MG-0.5 MG/3 ML  3 mL Nebulization Q4H PRN    meropenem (MERREM) 500 mg in 0.9% sodium chloride (MBP/ADV) 50 mL MBP  0.5 g IntraVENous Q6H    spironolactone (ALDACTONE) tablet 12.5 mg  12.5 mg Oral DAILY    levETIRAcetam (KEPPRA) 1,000 mg in 0.9% sodium chloride 100 mL IVPB  1,000 mg IntraVENous Q12H    hydrALAZINE (APRESOLINE) 20 mg/mL injection 10 mg  10 mg IntraVENous Q6H PRN    sacubitriL-valsartan (ENTRESTO) 24-26 mg tablet 1 Tab  1 Tab Oral Q12H    L.acidophilus-paracasei-S.thermophil-bifidobacter (RISAQUAD) 8 billion cell capsule  1 Cap Nasogastric DAILY    heparin (porcine) injection 5,000 Units  5,000 Units SubCUTAneous Q8H    melatonin tablet 3 mg  3 mg Oral QHS    0.9% sodium chloride infusion 250 mL  250 mL IntraVENous PRN    balsam peru-castor oiL (VENELEX) ointment   Topical BID    sodium chloride (NS) flush 5-40 mL  5-40 mL IntraVENous Q8H    sodium chloride (NS) flush 5-40 mL  5-40 mL IntraVENous PRN    acetaminophen (TYLENOL) tablet 650 mg  650 mg Oral Q6H PRN    Or    acetaminophen (TYLENOL) suppository 650 mg  650 mg Rectal Q6H PRN    polyethylene glycol (MIRALAX) packet 17 g  17 g Oral DAILY PRN    ondansetron (ZOFRAN) injection 4 mg  4 mg IntraVENous Q6H PRN    famotidine (PF) (PEPCID) 20 mg in 0.9% sodium chloride 10 mL injection  20 mg IntraVENous BID    chlorhexidine (ORAL CARE KIT) 0.12 % mouthwash 15 mL  15 mL Oral Q12H    fentaNYL citrate (PF) injection 25 mcg  25 mcg IntraVENous Q4H PRN    glucose chewable tablet 16 g  4 Tab Oral PRN    dextrose (D50W) injection syrg 12.5-25 g  25-50 mL IntraVENous PRN    glucagon (GLUCAGEN) injection 1 mg  1 mg IntraMUSCular PRN         Data Review:   Labs:    Recent Results (from the past 24 hour(s))   MAGNESIUM    Collection Time: 05/15/21  5:59 AM   Result Value Ref Range    Magnesium 1.8 1.6 - 2.4 mg/dL   PHOSPHORUS    Collection Time: 05/15/21  5:59 AM   Result Value Ref Range    Phosphorus 3.6 2.6 - 4.7 MG/DL   CBC WITH AUTOMATED DIFF    Collection Time: 05/15/21  5:59 AM   Result Value Ref Range    WBC 5.8 4.1 - 11.1 K/uL    RBC 4.04 (L) 4.10 - 5.70 M/uL    HGB 11.0 (L) 12.1 - 17.0 g/dL    HCT 36.4 (L) 36.6 - 50.3 %    MCV 90.1 80.0 - 99.0 FL    MCH 27.2 26.0 - 34.0 PG    MCHC 30.2 30.0 - 36.5 g/dL    RDW 16.9 (H) 11.5 - 14.5 %    PLATELET 416 351 - 068 K/uL    MPV 11.8 8.9 - 12.9 FL    NRBC 0.0 0  WBC    ABSOLUTE NRBC 0.00 0.00 - 0.01 K/uL    NEUTROPHILS 53 32 - 75 %    LYMPHOCYTES 23 12 - 49 %    MONOCYTES 19 (H) 5 - 13 %    EOSINOPHILS 3 0 - 7 %    BASOPHILS 1 0 - 1 %    IMMATURE GRANULOCYTES 1 (H) 0.0 - 0.5 %    ABS. NEUTROPHILS 3.1 1.8 - 8.0 K/UL    ABS. LYMPHOCYTES 1.4 0.8 - 3.5 K/UL    ABS. MONOCYTES 1.1 (H) 0.0 - 1.0 K/UL    ABS. EOSINOPHILS 0.2 0.0 - 0.4 K/UL    ABS. BASOPHILS 0.0 0.0 - 0.1 K/UL    ABS. IMM. GRANS. 0.1 (H) 0.00 - 0.04 K/UL    DF AUTOMATED     METABOLIC PANEL, COMPREHENSIVE    Collection Time: 05/15/21  5:59 AM   Result Value Ref Range    Sodium 133 (L) 136 - 145 mmol/L    Potassium 4.8 3.5 - 5.1 mmol/L    Chloride 101 97 - 108 mmol/L    CO2 28 21 - 32 mmol/L    Anion gap 4 (L) 5 - 15 mmol/L    Glucose 98 65 - 100 mg/dL    BUN 6 6 - 20 MG/DL    Creatinine 0.40 (L) 0.70 - 1.30 MG/DL    BUN/Creatinine ratio 15 12 - 20      GFR est AA >60 >60 ml/min/1.73m2    GFR est non-AA >60 >60 ml/min/1.73m2    Calcium 9.0 8.5 - 10.1 MG/DL    Bilirubin, total 0.6 0.2 - 1.0 MG/DL    ALT (SGPT) 20 12 - 78 U/L    AST (SGOT) 43 (H) 15 - 37 U/L    Alk.  phosphatase 77 45 - 117 U/L    Protein, total 6.8 6.4 - 8.2 g/dL    Albumin 2.7 (L) 3.5 - 5.0 g/dL    Globulin 4.1 (H) 2.0 - 4.0 g/dL    A-G Ratio 0.7 (L) 1.1 - 2.2

## 2021-05-15 NOTE — PROGRESS NOTES
Problem: Ventilator Management  Goal: *Adequate oxygenation and ventilation  Outcome: Progressing Towards Goal  Goal: *Patient maintains clear airway/free of aspiration  Outcome: Progressing Towards Goal  Goal: *Absence of infection signs and symptoms  Outcome: Progressing Towards Goal  Goal: *Normal spontaneous ventilation  Outcome: Progressing Towards Goal     Problem: Patient Education: Go to Patient Education Activity  Goal: Patient/Family Education  Outcome: Progressing Towards Goal     Problem: Falls - Risk of  Goal: *Absence of Falls  Description: Document Elliott Fall Risk and appropriate interventions in the flowsheet. Outcome: Progressing Towards Goal  Note: Fall Risk Interventions:       Mentation Interventions: Adequate sleep, hydration, pain control, Evaluate medications/consider consulting pharmacy, Room close to nurse's station    Medication Interventions: Evaluate medications/consider consulting pharmacy    Elimination Interventions: Call light in reach, Toileting schedule/hourly rounds              Problem: Patient Education: Go to Patient Education Activity  Goal: Patient/Family Education  Outcome: Progressing Towards Goal     Problem: Risk for Spread of Infection  Goal: Prevent transmission of infectious organism to others  Description: Prevent the transmission of infectious organisms to other patients, staff members, and visitors.   Outcome: Progressing Towards Goal     Problem: Patient Education:  Go to Education Activity  Goal: Patient/Family Education  Outcome: Progressing Towards Goal     Problem: Breathing Pattern - Ineffective  Goal: *Absence of hypoxia  Outcome: Progressing Towards Goal  Goal: *Use of effective breathing techniques  Outcome: Progressing Towards Goal  Goal: *PALLIATIVE CARE:  Alleviation of Dyspnea  Outcome: Progressing Towards Goal     Problem: Patient Education: Go to Patient Education Activity  Goal: Patient/Family Education  Outcome: Progressing Towards Goal Problem: Pressure Injury - Risk of  Goal: *Prevention of pressure injury  Description: Document Salvador Scale and appropriate interventions in the flowsheet.   Outcome: Progressing Towards Goal     Problem: Patient Education: Go to Patient Education Activity  Goal: Patient/Family Education  Outcome: Progressing Towards Goal     Problem: Patient Education: Go to Patient Education Activity  Goal: Patient/Family Education  Outcome: Progressing Towards Goal     Problem: Patient Education: Go to Patient Education Activity  Goal: Patient/Family Education  Outcome: Progressing Towards Goal     Problem: Patient Education: Go to Patient Education Activity  Goal: Patient/Family Education  Outcome: Progressing Towards Goal     Problem: Patient Education: Go to Patient Education Activity  Goal: Patient/Family Education  Outcome: Progressing Towards Goal     Problem: Nutrition Deficit  Goal: *Optimize nutritional status  Outcome: Progressing Towards Goal     Problem: Airway Clearance - Ineffective  Goal: *Patent airway  Outcome: Progressing Towards Goal  Goal: *Absence of airway secretions  Outcome: Progressing Towards Goal  Goal: *Able to cough effectively  Outcome: Progressing Towards Goal  Goal: *PALLIATIVE CARE:  Alleviation of secretions, cough and/or nasal congestion  Outcome: Progressing Towards Goal     Problem: Patient Education: Go to Patient Education Activity  Goal: Patient/Family Education  Outcome: Progressing Towards Goal     Problem: Pain  Goal: *Control of Pain  Outcome: Progressing Towards Goal  Goal: *PALLIATIVE CARE:  Alleviation of Pain  Outcome: Progressing Towards Goal     Problem: Patient Education: Go to Patient Education Activity  Goal: Patient/Family Education  Outcome: Progressing Towards Goal     Problem: Tobacco Use  Goal: *Tobacco use abstinence  Outcome: Progressing Towards Goal  Goal: *Knowledge of tobacco use cessation methods  Outcome: Progressing Towards Goal     Problem: Patient Education: Go to Patient Education Activity  Goal: Patient/Family Education  Outcome: Progressing Towards Goal     Problem: General Wound Care  Goal: *Non-infected wound: Improvement of existing wound, absence of infection, and maintenance of skin integrity  Outcome: Progressing Towards Goal  Goal: *Infected Wound: Prevention of further infection and promotion of healing  Description: Infection control procedures (eg: clean dressings, clean gloves, hand washing, precautions to isolate wound from contamination, sterile instruments used for wound debridement) should be implemented.   Outcome: Progressing Towards Goal  Goal: Interventions  Outcome: Progressing Towards Goal     Problem: Patient Education: Go to Patient Education Activity  Goal: Patient/Family Education  Outcome: Progressing Towards Goal     Problem: Anxiety  Goal: *Alleviation of anxiety  Outcome: Progressing Towards Goal  Goal: *Alleviation of anxiety (Palliative Care)  Outcome: Progressing Towards Goal     Problem: Patient Education: Go to Patient Education Activity  Goal: Patient/Family Education  Outcome: Progressing Towards Goal

## 2021-05-16 ENCOUNTER — APPOINTMENT (OUTPATIENT)
Dept: GENERAL RADIOLOGY | Age: 26
DRG: 161 | End: 2021-05-16
Attending: HOSPITALIST
Payer: MEDICAID

## 2021-05-16 LAB
ALBUMIN SERPL-MCNC: 3.1 G/DL (ref 3.5–5)
ALBUMIN/GLOB SERPL: 0.8 {RATIO} (ref 1.1–2.2)
ALP SERPL-CCNC: 96 U/L (ref 45–117)
ALT SERPL-CCNC: 21 U/L (ref 12–78)
ANION GAP SERPL CALC-SCNC: 9 MMOL/L (ref 5–15)
AST SERPL-CCNC: 16 U/L (ref 15–37)
BASOPHILS # BLD: 0.1 K/UL (ref 0–0.1)
BASOPHILS NFR BLD: 1 % (ref 0–1)
BILIRUB SERPL-MCNC: 0.6 MG/DL (ref 0.2–1)
BUN SERPL-MCNC: 7 MG/DL (ref 6–20)
BUN/CREAT SERPL: 14 (ref 12–20)
CALCIUM SERPL-MCNC: 9.4 MG/DL (ref 8.5–10.1)
CHLORIDE SERPL-SCNC: 101 MMOL/L (ref 97–108)
CO2 SERPL-SCNC: 27 MMOL/L (ref 21–32)
CREAT SERPL-MCNC: 0.49 MG/DL (ref 0.7–1.3)
DIFFERENTIAL METHOD BLD: ABNORMAL
EOSINOPHIL # BLD: 0.3 K/UL (ref 0–0.4)
EOSINOPHIL NFR BLD: 3 % (ref 0–7)
ERYTHROCYTE [DISTWIDTH] IN BLOOD BY AUTOMATED COUNT: 16.8 % (ref 11.5–14.5)
GLOBULIN SER CALC-MCNC: 4 G/DL (ref 2–4)
GLUCOSE SERPL-MCNC: 103 MG/DL (ref 65–100)
HCT VFR BLD AUTO: 38.7 % (ref 36.6–50.3)
HGB BLD-MCNC: 12.1 G/DL (ref 12.1–17)
IMM GRANULOCYTES # BLD AUTO: 0.2 K/UL (ref 0–0.04)
IMM GRANULOCYTES NFR BLD AUTO: 2 % (ref 0–0.5)
LYMPHOCYTES # BLD: 2.4 K/UL (ref 0.8–3.5)
LYMPHOCYTES NFR BLD: 28 % (ref 12–49)
MAGNESIUM SERPL-MCNC: 1.7 MG/DL (ref 1.6–2.4)
MCH RBC QN AUTO: 27.5 PG (ref 26–34)
MCHC RBC AUTO-ENTMCNC: 31.3 G/DL (ref 30–36.5)
MCV RBC AUTO: 88 FL (ref 80–99)
MONOCYTES # BLD: 2 K/UL (ref 0–1)
MONOCYTES NFR BLD: 23 % (ref 5–13)
NEUTS SEG # BLD: 3.5 K/UL (ref 1.8–8)
NEUTS SEG NFR BLD: 43 % (ref 32–75)
NRBC # BLD: 0 K/UL (ref 0–0.01)
NRBC BLD-RTO: 0 PER 100 WBC
PHOSPHATE SERPL-MCNC: 3.6 MG/DL (ref 2.6–4.7)
PLATELET # BLD AUTO: 302 K/UL (ref 150–400)
PMV BLD AUTO: 10.8 FL (ref 8.9–12.9)
POTASSIUM SERPL-SCNC: 4 MMOL/L (ref 3.5–5.1)
PROCALCITONIN SERPL-MCNC: 0.06 NG/ML
PROT SERPL-MCNC: 7.1 G/DL (ref 6.4–8.2)
RBC # BLD AUTO: 4.4 M/UL (ref 4.1–5.7)
RBC MORPH BLD: ABNORMAL
SODIUM SERPL-SCNC: 137 MMOL/L (ref 136–145)
VALPROATE SERPL-MCNC: 16 UG/ML (ref 50–100)
WBC # BLD AUTO: 8.5 K/UL (ref 4.1–11.1)

## 2021-05-16 PROCEDURE — 74011000258 HC RX REV CODE- 258: Performed by: PSYCHIATRY & NEUROLOGY

## 2021-05-16 PROCEDURE — 70360 X-RAY EXAM OF NECK: CPT

## 2021-05-16 PROCEDURE — 74011250637 HC RX REV CODE- 250/637: Performed by: INTERNAL MEDICINE

## 2021-05-16 PROCEDURE — 65660000001 HC RM ICU INTERMED STEPDOWN

## 2021-05-16 PROCEDURE — 80053 COMPREHEN METABOLIC PANEL: CPT

## 2021-05-16 PROCEDURE — 83735 ASSAY OF MAGNESIUM: CPT

## 2021-05-16 PROCEDURE — 74011000250 HC RX REV CODE- 250: Performed by: NURSE PRACTITIONER

## 2021-05-16 PROCEDURE — 74011250636 HC RX REV CODE- 250/636: Performed by: PSYCHIATRY & NEUROLOGY

## 2021-05-16 PROCEDURE — 77030021668 HC NEB PREFIL KT VYRM -A

## 2021-05-16 PROCEDURE — 74011250636 HC RX REV CODE- 250/636: Performed by: NURSE PRACTITIONER

## 2021-05-16 PROCEDURE — 74011000258 HC RX REV CODE- 258: Performed by: HOSPITALIST

## 2021-05-16 PROCEDURE — 80165 DIPROPYLACETIC ACID FREE: CPT

## 2021-05-16 PROCEDURE — 77010033711 HC HIGH FLOW OXYGEN

## 2021-05-16 PROCEDURE — 74011250637 HC RX REV CODE- 250/637: Performed by: NURSE PRACTITIONER

## 2021-05-16 PROCEDURE — 94762 N-INVAS EAR/PLS OXIMTRY CONT: CPT

## 2021-05-16 PROCEDURE — P9045 ALBUMIN (HUMAN), 5%, 250 ML: HCPCS | Performed by: NURSE PRACTITIONER

## 2021-05-16 PROCEDURE — 84145 PROCALCITONIN (PCT): CPT

## 2021-05-16 PROCEDURE — 84100 ASSAY OF PHOSPHORUS: CPT

## 2021-05-16 PROCEDURE — 74011250636 HC RX REV CODE- 250/636: Performed by: INTERNAL MEDICINE

## 2021-05-16 PROCEDURE — 74011250637 HC RX REV CODE- 250/637: Performed by: SURGERY

## 2021-05-16 PROCEDURE — 80164 ASSAY DIPROPYLACETIC ACD TOT: CPT

## 2021-05-16 PROCEDURE — 74011000250 HC RX REV CODE- 250: Performed by: HOSPITALIST

## 2021-05-16 PROCEDURE — 74011250637 HC RX REV CODE- 250/637: Performed by: HOSPITALIST

## 2021-05-16 PROCEDURE — 85025 COMPLETE CBC W/AUTO DIFF WBC: CPT

## 2021-05-16 PROCEDURE — 36591 DRAW BLOOD OFF VENOUS DEVICE: CPT

## 2021-05-16 PROCEDURE — 74011250636 HC RX REV CODE- 250/636: Performed by: HOSPITALIST

## 2021-05-16 RX ORDER — ALBUMIN HUMAN 50 G/1000ML
12.5 SOLUTION INTRAVENOUS ONCE
Status: COMPLETED | OUTPATIENT
Start: 2021-05-16 | End: 2021-05-16

## 2021-05-16 RX ORDER — LIDOCAINE 4 G/100G
1 PATCH TOPICAL EVERY 24 HOURS
Status: DISCONTINUED | OUTPATIENT
Start: 2021-05-16 | End: 2021-06-08 | Stop reason: HOSPADM

## 2021-05-16 RX ORDER — OXYCODONE HYDROCHLORIDE 5 MG/1
5 TABLET ORAL
Status: DISCONTINUED | OUTPATIENT
Start: 2021-05-16 | End: 2021-05-17

## 2021-05-16 RX ADMIN — VALPROIC ACID 1000 MG: 250 SOLUTION ORAL at 07:55

## 2021-05-16 RX ADMIN — Medication 10 ML: at 06:00

## 2021-05-16 RX ADMIN — FENTANYL CITRATE 50 MCG: 50 INJECTION, SOLUTION INTRAMUSCULAR; INTRAVENOUS at 02:38

## 2021-05-16 RX ADMIN — CARVEDILOL 3.12 MG: 3.12 TABLET, FILM COATED ORAL at 16:02

## 2021-05-16 RX ADMIN — VANCOMYCIN HYDROCHLORIDE 1250 MG: 10 INJECTION, POWDER, LYOPHILIZED, FOR SOLUTION INTRAVENOUS at 12:43

## 2021-05-16 RX ADMIN — SACUBITRIL AND VALSARTAN 1 TABLET: 24; 26 TABLET, FILM COATED ORAL at 09:09

## 2021-05-16 RX ADMIN — CASTOR OIL AND BALSAM, PERU: 788; 87 OINTMENT TOPICAL at 09:12

## 2021-05-16 RX ADMIN — HEPARIN SODIUM 5000 UNITS: 5000 INJECTION INTRAVENOUS; SUBCUTANEOUS at 12:43

## 2021-05-16 RX ADMIN — MEROPENEM 500 MG: 500 INJECTION, POWDER, FOR SOLUTION INTRAVENOUS at 09:04

## 2021-05-16 RX ADMIN — VANCOMYCIN HYDROCHLORIDE 1250 MG: 10 INJECTION, POWDER, LYOPHILIZED, FOR SOLUTION INTRAVENOUS at 20:37

## 2021-05-16 RX ADMIN — LEVETIRACETAM 1000 MG: 100 INJECTION, SOLUTION INTRAVENOUS at 09:04

## 2021-05-16 RX ADMIN — HEPARIN SODIUM 5000 UNITS: 5000 INJECTION INTRAVENOUS; SUBCUTANEOUS at 04:39

## 2021-05-16 RX ADMIN — FENTANYL CITRATE 50 MCG: 50 INJECTION, SOLUTION INTRAMUSCULAR; INTRAVENOUS at 23:14

## 2021-05-16 RX ADMIN — Medication 10 ML: at 02:38

## 2021-05-16 RX ADMIN — MICONAZOLE NITRATE: 20 CREAM TOPICAL at 17:52

## 2021-05-16 RX ADMIN — MEROPENEM 500 MG: 500 INJECTION, POWDER, FOR SOLUTION INTRAVENOUS at 16:02

## 2021-05-16 RX ADMIN — OXYCODONE HYDROCHLORIDE 5 MG: 5 TABLET ORAL at 14:31

## 2021-05-16 RX ADMIN — LEVETIRACETAM 1500 MG: 100 INJECTION, SOLUTION INTRAVENOUS at 21:58

## 2021-05-16 RX ADMIN — Medication 1 CAPSULE: at 09:09

## 2021-05-16 RX ADMIN — OXYCODONE AND ACETAMINOPHEN 1 TABLET: 5; 325 TABLET ORAL at 06:04

## 2021-05-16 RX ADMIN — VALPROIC ACID 1000 MG: 250 SOLUTION ORAL at 02:37

## 2021-05-16 RX ADMIN — MEROPENEM 500 MG: 500 INJECTION, POWDER, FOR SOLUTION INTRAVENOUS at 02:39

## 2021-05-16 RX ADMIN — CARVEDILOL 3.12 MG: 3.12 TABLET, FILM COATED ORAL at 09:09

## 2021-05-16 RX ADMIN — FENTANYL CITRATE 50 MCG: 50 INJECTION, SOLUTION INTRAMUSCULAR; INTRAVENOUS at 13:51

## 2021-05-16 RX ADMIN — VANCOMYCIN HYDROCHLORIDE 1250 MG: 10 INJECTION, POWDER, LYOPHILIZED, FOR SOLUTION INTRAVENOUS at 04:38

## 2021-05-16 RX ADMIN — FENTANYL CITRATE 50 MCG: 50 INJECTION, SOLUTION INTRAMUSCULAR; INTRAVENOUS at 17:51

## 2021-05-16 RX ADMIN — ALBUMIN (HUMAN) 12.5 G: 12.5 INJECTION, SOLUTION INTRAVENOUS at 21:58

## 2021-05-16 RX ADMIN — VALPROIC ACID 1000 MG: 250 SOLUTION ORAL at 12:43

## 2021-05-16 RX ADMIN — SPIRONOLACTONE 12.5 MG: 25 TABLET ORAL at 09:09

## 2021-05-16 RX ADMIN — FAMOTIDINE 20 MG: 10 INJECTION, SOLUTION INTRAVENOUS at 09:04

## 2021-05-16 RX ADMIN — FENTANYL CITRATE 50 MCG: 50 INJECTION, SOLUTION INTRAMUSCULAR; INTRAVENOUS at 09:10

## 2021-05-16 RX ADMIN — FAMOTIDINE 20 MG: 10 INJECTION, SOLUTION INTRAVENOUS at 21:58

## 2021-05-16 RX ADMIN — MEROPENEM 500 MG: 500 INJECTION, POWDER, FOR SOLUTION INTRAVENOUS at 21:30

## 2021-05-16 RX ADMIN — MICONAZOLE NITRATE: 20 CREAM TOPICAL at 09:11

## 2021-05-16 RX ADMIN — HEPARIN SODIUM 5000 UNITS: 5000 INJECTION INTRAVENOUS; SUBCUTANEOUS at 21:58

## 2021-05-16 RX ADMIN — CASTOR OIL AND BALSAM, PERU: 788; 87 OINTMENT TOPICAL at 17:52

## 2021-05-16 RX ADMIN — Medication 3 MG: at 02:38

## 2021-05-16 NOTE — PROGRESS NOTES
1400: attempted to deep suction pt as you can hear rattling from the trach area. Pt was reluctant but allowed it. After one suction, pt started coughing very deeply and pulled away from RN. Pt refused to allow any more deep suctioning, despite hearing rattling still. Claims he is able to cough it up himself. Charge RN notified. 1445: while attempting to pull out dobhoff, at 10cm it got stuck. Pt started tensing up and shaking horribly. Charge nurse notified and was also unable to remove tube fully. MD notified and advised by MD to ask CCU nurse to take a look. CCU nurse is occupied at the moment and will look when able. [phone]: CCU nurse came to assess the situation. Attempted to lube up the nose

## 2021-05-16 NOTE — PROGRESS NOTES
.  [facility] [facility] Adult  Hospitalist Group    PATIENT ID: Faiza Corea  MRN: [id number]   YOB: 1995    PRIMARY CARE PROVIDER: None   DATE OF ADMISSION: 5/6/2021  5:22 PM    ATTENDING PHYSICIAN: Lopez Hartman MD  CONSULTATIONS:   IP CONSULT TO INTENSIVIST  IP CONSULT TO NEUROLOGY  IP CONSULT TO PODIATRY  IP CONSULT TO PODIATRY  IP CONSULT TO INFECTIOUS DISEASES  IP CONSULT TO NEUROLOGY  IP CONSULT TO CARDIOLOGY    PROCEDURES/SURGERIES:   * No surgery found *    REASON FOR ADMISSION: Cardiac arrest with pulseless electrical activity [facility] PROBLEM LIST:  Patient Active Problem List   Diagnosis Code    Endocarditis due to methicillin susceptible Staphylococcus aureus (MSSA) I33.0, B95.61    Drug abuse, cocaine type ([facility] F14.10    Type 2 myocardial infarction ([facility] [address] I21. A1    Cerebral abscess (embolic) B87.6    Cardiac arrest with pulseless electrical activity (HCC) I46.9    Severe protein-calorie malnutrition (HCC) E43    Postoperative acute respiratory failure (HCC) J95.821    Severe muscle deconditioning R29.898    Successful cardiopulmonary resuscitation Z92.89    Acute traumatic pain G89.11    S/P AVR (aortic valve replacement) and aortoplasty Z95.2    Abscess of aortic root I51.89    Contusion of chest wall S20.219A         Brief HPI and Hospital Course:      Faiza Corea is a 22 y.o. male with h/o Seizures and anxiety who presented to [facility] [place] ED after a cardiac arrest at [facility] around 1500 5/6/2021. Hx from chart and speaking with patient's sister via phone. Patient had recent hospitalization at the [facility] in March of this year. Initial hospitalization admission was at [facility] on 3/24 with AMS in setting of polysubstance and IVD use (cocaine, heroine, methamphetamines). He was found to have septic MRSA bacteremia, MRSA endocarditis.  Imaging and MRI also found R PCA infarct and several abscesses and right temporal and parietal lobes. Infarcts thought to be due to septic emboli and patient had left sided residual weakness. Patient was transferred to [facility] on 4/1/2021 for evaluation for valve surgery. After transfer he was found to have a New R MCA infarct Also had a type 2 NSTEMI and tamponade with pericardiocentesis done prior to surgery. Did have cardiac arrest on day of surgery. Had a bioprosthetic Aortic Valve Replacement and Aortic Root Abscess Debridement done on 4/16/2021. Trached and sent to [facility] on 4/29/2021. Was undergoing PT and vent wean. Sister stated that patient was able to be off the vent for several hours over the last few days. He was sitting up and able to talk with valve over trach and could follow commands. Stated that he had severe weakness of the left upper ext, but was starting to gain some mobility in the lower left ext. Patient's mother visited patient today prior to arrest. She said the staff told her that the patient was getting very anxious earlier in the day around 1 pm and had to be placed back on the ventilator, and then they had to sedate him while he was on the ventilator. The mother stated \" he did not look good\" and \" his eye were rolled back in his head\". About 30 minutes after she left she got call about arrest. Per ED note patient was found unresponsive around the 1500 hour and was pulseless. Two rounds of CPR and meds were given and patient was defibrillated x 1 before ROSC.  Patient was hypoxic post arrest. Unknown down time prior to arrest.        Subjective/HPI      -he complaints of pain every where and states that fentanyl is not helping much  Asked if he could leave the hospital    Patient passed swallow eval and able to eat,discussed with RN to remove dubhoff tube -while attempting to remove the tube -they were unable to remove it as it got stuck   Ordered X rays to see the position and ENT consult for removal    Assessment and Plan:    1.  cardiac arrest - 5/6/21 on admission: .   Cardiologist following,   -per cardiologist :  Need ICD before DC (but with his recent endocarditis/recent bacteremia till under treatment, ? timing)      2. Acute on chronic  respiratory failure: Had [facility]ach [facility] at [facility] and was [facility]. He was on ventilator while in ICU and now transitioned to trach collar.  -currently on trach collar and PMV    3. MRSA aortic  Valve endocardititis   -Found to have MRSA bacteremia and MSSA endocarditis end of march. -Hx:  4/16/21 at [facility] bioprosthetic AVR and root abscess debridement. He also had a pericardiocentesis due to tamponade prior to surgery  - Reviewed [facility] record, he was on IV vanco at [facility] and planned to  have a 6 weeks of   tx and EOT 5/28/21 per record. - Continue vancomycin      Pain management : contusion of chest wall, left side post-stroke pain, chronic back pain   Cautious of narcotics  Pt currently in pain noticed increasing HR  On  fentanyl to 50mcg q4h prn and oxycodone 5 mg Q 6 hr prn    9. Bacterial pneumonia  severe multilevel consolidation throughout both lungs consistent with severe multilobar PNA. -sputum culture E coli with ESBL   -on Merrem   -pulmonary toilet   -[facility] care   -ID consulted      # Systolic congestive heart failure/CM  Ef 25%   Continue coreg,entresto ,Aldactone   Will need ICD in the near future   Cardiologist following   Echo tomorrow    8. 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 Infarcts were thought to be due to septic emboli. He had left sided residual weakness. cta 5/6/21:  multiple acute infarctions throughout the  right cerebral hemisphere, involving much of the occipital lobe, as well as much  of the frontal lobe. There is an additional superior right frontoparietal  Infarct  Neurologist consulted                -PT/OT/SLP e              - Stroke education              - SBP goal 100-160              10.  Seizure DZ:  Continue keppra, valproic acid subtherapeutic  Seizure precautions   Neurology cosnulted for eval     11. Severe muscle deconditioning    12: Severe protein-calorie malnutrition    13.  Cerebral abscess (embolic)    78 h/o substance abuse        fulll code        PHYSICAL EXAMINATION:  Visit Vitals  /83 (BP 1 Location: Right arm, BP Patient Position: At rest)   Pulse (!) 122   Temp 98.7 °F (37.1 °C)   Resp 24   Ht 5' 11\" (1.803 m)   Wt 57.6 kg (126 lb 15.8 oz)   SpO2 97%   BMI 17.71 kg/m²       General:          chronically ill,   HEENT:           Atraumatic,trach capped, ng tube. Neck:               Supple,   Lungs:            coarse bs bilat. No wheezing. Midline sternal surgical scar. Heart:              Regular  rhythm, tachycardia,   No edema  Abdomen:       Soft, non-tender. Not distended. Bowel sounds normal  Extremities:     No LE edema  Skin:                Not pale. Not Jaundiced  No rashes   Psych:             Not anxious or agitated. Neurologic:      Alert, left hemiplegia ,he was trying to bend rt knee and move RUE    Labs:     Recent Labs     05/16/21  0305 05/15/21  0559   WBC 8.5 5.8   HGB 12.1 11.0*   HCT 38.7 36.4*    252     Recent Labs     05/16/21  0305 05/15/21  0559 05/14/21  0357    133* 132*   K 4.0 4.8 3.9    101 100   CO2 27 28 26   BUN 7 6 11   CREA 0.49* 0.40* 0.30*   * 98 119*   CA 9.4 9.0 8.8   MG 1.7 1.8 1.7   PHOS 3.6 3.6 2.9     Recent Labs     05/16/21  0305 05/15/21  0559 05/14/21  0357   ALT 21 20 18   AP 96 77 66   TBILI 0.6 0.6 0.5   TP 7.1 6.8 5.8*   ALB 3.1* 2.7* 2.7*   GLOB 4.0 4.1* 3.1     No results for input(s): INR, PTP, APTT, INREXT, INREXT in the last 72 hours. No results for input(s): FE, TIBC, PSAT, FERR in the last 72 hours. No results found for: FOL, RBCF   No results for input(s): PH, PCO2, PO2 in the last 72 hours. No results for input(s): CPK, CKNDX, TROIQ in the last 72 hours.     No lab exists for component: CPKMB  Lab Results   Component Value Date/Time    Cholesterol, total 140 05/11/2021 04:35 AM    HDL Cholesterol 21 05/11/2021 04:35 AM    LDL, calculated 77.4 05/11/2021 04:35 AM    Triglyceride 208 (H) 05/11/2021 04:35 AM    CHOL/HDL Ratio 6.7 (H) 05/11/2021 04:35 AM     Lab Results   Component Value Date/Time    Glucose (POC) 120 (H) 05/11/2021 12:00 PM    Glucose (POC) 100 05/11/2021 06:04 AM    Glucose (POC) 87 05/11/2021 12:19 AM    Glucose (POC) 83 05/10/2021 06:53 PM    Glucose (POC) 84 05/10/2021 02:07 PM     Lab Results   Component Value Date/Time    Color YELLOW/STRAW 05/06/2021 05:42 PM    Appearance CLEAR 05/06/2021 05:42 PM    Specific gravity 1.008 05/06/2021 05:42 PM    Specific gravity 1.020 03/24/2021 09:30 PM    pH (UA) 5.0 05/06/2021 05:42 PM    Protein 30 (A) 05/06/2021 05:42 PM    Glucose Negative 05/06/2021 05:42 PM    Ketone Negative 05/06/2021 05:42 PM    Bilirubin Negative 05/06/2021 05:42 PM    Urobilinogen 0.2 05/06/2021 05:42 PM    Nitrites Negative 05/06/2021 05:42 PM    Leukocyte Esterase Negative 05/06/2021 05:42 PM    Epithelial cells FEW 05/06/2021 05:42 PM    Bacteria Negative 05/06/2021 05:42 PM    WBC 0-4 05/06/2021 05:42 PM    RBC 0-5 05/06/2021 05:42 PM         CODE STATUS:  x Full Code    DNR    Partial    Comfort Care       Signed:   Susana Blackman MD  Date of Service:  5/16/2021  3:38 PM

## 2021-05-16 NOTE — PROGRESS NOTES
Cardiology Progress Note                                        Admit Date: 5/6/2021    Assessment/Plan:       1. Sp cardiac arrest post op 4/17/21,   cardiac arrest Vfib 5/6/21  ekg rbbb qt 460 msec. ICD rec for secondary prevention if life expectancy > 1yr.    2. Hx:  4/16/21 at [facility] bioprosthetic AVR and root abscess debridement. He also had a pericardiocentesis due to tamponade prior to surgery  3. Recent endocardititis 3/24/21 Staph  4. CM  Ef 25%   5.  right MCA infarct and several abscesses in the right temporal and parietal lobes. Infarcts were thought to be due to septic emboli. He had left sided residual weakness. cta 5/6/21:  multiple acute infarctions throughout the  right cerebral hemisphere, involving much of the occipital lobe, as well as much  of the frontal lobe. There is an additional superior right frontoparietal  infarct  6.  severe multilevel consolidation throughout both lungs consistent with severe multilobar PNA. 7. PNA:  Severe multilevel consolidation throughout both lungs, consistent with  severe multilobar pneumonia.     Continue current regimen; no changes  ICD before DC  Will order echo tomorrow  Add dapagliflozin on dc as not on formulary       Alok Burger is a 22 y.o. male with     PROBLEM LIST:  Patient Active Problem List    Diagnosis Date Noted    Severe muscle deconditioning 05/01/2021     Priority: 3 - Three     Class: Acute    S/P AVR (aortic valve replacement) and aortoplasty 04/16/2021     Priority: 3 - Three     Class: Acute    Abscess of aortic root 04/16/2021     Priority: 3 - Three     Class: Acute    Contusion of chest wall 04/16/2021     Priority: 3 - Three     Class: Acute    Successful cardiopulmonary resuscitation 04/14/2021     Priority: 3 - Three     Class: Acute    Acute traumatic pain 04/14/2021     Priority: 3 - Three     Class: Acute    Postoperative acute respiratory failure ([address] 04/01/2021     Priority: 3 - Three     Class: Acute    Severe protein-calorie malnutrition ([facility] 05/11/2021    Cardiac arrest with pulseless electrical activity ([facility] 05/06/2021    Cerebral abscess (embolic) 24/39/5256    Drug abuse, cocaine type ([facility] 03/29/2021    Endocarditis due to methicillin susceptible Staphylococcus aureus (MSSA) 03/25/2021    Type 2 myocardial infarction ([facility] 03/24/2021         Subjective:     Anatoly Rhodes gestures appropriately. Likely orthostatic when sat on edge of bed with PT    Visit Vitals  /64 (BP 1 Location: Right arm, BP Patient Position: At rest)   Pulse 95   Temp 98.6 °F (37 °C)   Resp 16   Ht 5' 11\" (1.803 m)   Wt 57.6 kg (126 lb 15.8 oz)   SpO2 96%   BMI 17.71 kg/m²       Intake/Output Summary (Last 24 hours) at 5/16/2021 1120  Last data filed at 5/16/2021 0850  Gross per 24 hour   Intake    Output 1235 ml   Net -1235 ml       Objective:      Physical Exam:  HEENT: Perrla, EOMI  Neck: No JVD,  No thyroidmegaly  Resp: CTA bilaterally; No wheezes or rales  CV: RRR s1s2 No murmur no s3  Abd:Soft, Nontender  Ext: No edema  Neuro: unresponsive on vent   Skin: Warm, Dry, Intact  Pulses: 2+ DP/PT/Rad      Telemetry: normal sinus rhythm    Current Facility-Administered Medications   Medication Dose Route Frequency    [START ON 5/17/2021] Vancomycin, Trough - Please draw IMMEDIATELY PRIOR to starting 0400 dose on Monday, 5/17. Thanks!    Other ONCE    fentaNYL citrate (PF) injection 25 mcg  25 mcg IntraVENous Q4H PRN    fentaNYL citrate (PF) injection 50 mcg  50 mcg IntraVENous Q4H PRN    meropenem (MERREM) 500 mg in 0.9% sodium chloride (MBP/ADV) 50 mL MBP  0.5 g IntraVENous Q6H    Vancomycin - pharmacy to dose   Other Rx Dosing/Monitoring    vancomycin (VANCOCIN) 1250 mg in  ml infusion  1,250 mg IntraVENous Q8H    valproic acid (as sodium salt) (DEPAKENE) 250 mg/5 mL (5 mL) oral solution 1,000 mg  1,000 mg Oral Q6H    naloxone (NARCAN) injection 0.1 mg  0.1 mg IntraVENous PRN    oxyCODONE (ROXICODONE) 5 mg/5 mL oral solution 5 mg  5 mg Oral Q4H PRN    miconazole (MICOTIN) 2 % cream   Topical BID    oxyCODONE-acetaminophen (PERCOCET) 5-325 mg per tablet 1 Tab  1 Tab Oral Q4H PRN    traZODone (DESYREL) tablet 50 mg  50 mg Oral QHS PRN    carvediloL (COREG) tablet 3.125 mg  3.125 mg Oral BID WITH MEALS    albuterol-ipratropium (DUO-NEB) 2.5 MG-0.5 MG/3 ML  3 mL Nebulization Q4H PRN    spironolactone (ALDACTONE) tablet 12.5 mg  12.5 mg Oral DAILY    levETIRAcetam (KEPPRA) 1,000 mg in 0.9% sodium chloride 100 mL IVPB  1,000 mg IntraVENous Q12H    hydrALAZINE (APRESOLINE) 20 mg/mL injection 10 mg  10 mg IntraVENous Q6H PRN    sacubitriL-valsartan (ENTRESTO) 24-26 mg tablet 1 Tab  1 Tab Oral Q12H    L.acidophilus-paracasei-S.thermophil-bifidobacter (RISAQUAD) 8 billion cell capsule  1 Cap Nasogastric DAILY    heparin (porcine) injection 5,000 Units  5,000 Units SubCUTAneous Q8H    melatonin tablet 3 mg  3 mg Oral QHS    0.9% sodium chloride infusion 250 mL  250 mL IntraVENous PRN    balsam peru-castor oiL (VENELEX) ointment   Topical BID    sodium chloride (NS) flush 5-40 mL  5-40 mL IntraVENous Q8H    sodium chloride (NS) flush 5-40 mL  5-40 mL IntraVENous PRN    acetaminophen (TYLENOL) tablet 650 mg  650 mg Oral Q6H PRN    Or    acetaminophen (TYLENOL) suppository 650 mg  650 mg Rectal Q6H PRN    polyethylene glycol (MIRALAX) packet 17 g  17 g Oral DAILY PRN    ondansetron (ZOFRAN) injection 4 mg  4 mg IntraVENous Q6H PRN    famotidine (PF) (PEPCID) 20 mg in 0.9% sodium chloride 10 mL injection  20 mg IntraVENous BID    chlorhexidine (ORAL CARE KIT) 0.12 % mouthwash 15 mL  15 mL Oral Q12H    glucose chewable tablet 16 g  4 Tab Oral PRN    dextrose (D50W) injection syrg 12.5-25 g  25-50 mL IntraVENous PRN    glucagon (GLUCAGEN) injection 1 mg  1 mg IntraMUSCular PRN         Data Review:   Labs:    Recent Results (from the past 24 hour(s))   VALPROIC ACID    Collection Time: 05/15/21  5:53 PM Result Value Ref Range    Valproic acid 16 (L) 50 - 100 ug/ml   MAGNESIUM    Collection Time: 05/16/21  3:05 AM   Result Value Ref Range    Magnesium 1.7 1.6 - 2.4 mg/dL   PHOSPHORUS    Collection Time: 05/16/21  3:05 AM   Result Value Ref Range    Phosphorus 3.6 2.6 - 4.7 MG/DL   PROCALCITONIN    Collection Time: 05/16/21  3:05 AM   Result Value Ref Range    Procalcitonin 0.06 ng/mL   CBC WITH AUTOMATED DIFF    Collection Time: 05/16/21  3:05 AM   Result Value Ref Range    WBC 8.5 4.1 - 11.1 K/uL    RBC 4.40 4. 10 - 5.70 M/uL    HGB 12.1 12.1 - 17.0 g/dL    HCT 38.7 36.6 - 50.3 %    MCV 88.0 80.0 - 99.0 FL    MCH 27.5 26.0 - 34.0 PG    MCHC 31.3 30.0 - 36.5 g/dL    RDW 16.8 (H) 11.5 - 14.5 %    PLATELET 717 294 - 717 K/uL    MPV 10.8 8.9 - 12.9 FL    NRBC 0.0 0  WBC    ABSOLUTE NRBC 0.00 0.00 - 0.01 K/uL    NEUTROPHILS 43 32 - 75 %    LYMPHOCYTES 28 12 - 49 %    MONOCYTES 23 (H) 5 - 13 %    EOSINOPHILS 3 0 - 7 %    BASOPHILS 1 0 - 1 %    IMMATURE GRANULOCYTES 2 (H) 0.0 - 0.5 %    ABS. NEUTROPHILS 3.5 1.8 - 8.0 K/UL    ABS. LYMPHOCYTES 2.4 0.8 - 3.5 K/UL    ABS. MONOCYTES 2.0 (H) 0.0 - 1.0 K/UL    ABS. EOSINOPHILS 0.3 0.0 - 0.4 K/UL    ABS. BASOPHILS 0.1 0.0 - 0.1 K/UL    ABS. IMM. GRANS. 0.2 (H) 0.00 - 0.04 K/UL    DF SMEAR SCANNED      RBC COMMENTS ANISOCYTOSIS  1+       METABOLIC PANEL, COMPREHENSIVE    Collection Time: 05/16/21  3:05 AM   Result Value Ref Range    Sodium 137 136 - 145 mmol/L    Potassium 4.0 3.5 - 5.1 mmol/L    Chloride 101 97 - 108 mmol/L    CO2 27 21 - 32 mmol/L    Anion gap 9 5 - 15 mmol/L    Glucose 103 (H) 65 - 100 mg/dL    BUN 7 6 - 20 MG/DL    Creatinine 0.49 (L) 0.70 - 1.30 MG/DL    BUN/Creatinine ratio 14 12 - 20      GFR est AA >60 >60 ml/min/1.73m2    GFR est non-AA >60 >60 ml/min/1.73m2    Calcium 9.4 8.5 - 10.1 MG/DL    Bilirubin, total 0.6 0.2 - 1.0 MG/DL    ALT (SGPT) 21 12 - 78 U/L    AST (SGOT) 16 15 - 37 U/L    Alk.  phosphatase 96 45 - 117 U/L    Protein, total 7.1 6.4 - 8.2 g/dL    Albumin 3.1 (L) 3.5 - 5.0 g/dL    Globulin 4.0 2.0 - 4.0 g/dL    A-G Ratio 0.8 (L) 1.1 - 2.2
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
## 2021-05-16 NOTE — PROGRESS NOTES
Night shift nurse told me in report pt asked her to \"crush my percocet and put it through my IV\". Pt is also asking for IV fentanyl to be increased, and asking for it 15 minutes after his dose. He says he \"doesn't feel it anymore\".

## 2021-05-16 NOTE — CONSULTS
VPA levels reassessed today and remain subtherapeutic likely due to interaction with meropenem. Will therefore attempt to transition to LEV monotherapy 1500mg BID for seizure prophylaxis.      Elodia Ward,   05/16/21
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
## 2021-05-16 NOTE — PROGRESS NOTES
Hospitalist Night Cover     Name: Renetta Lopez  YOB: 1995      Overnight update:        Paged by Krishna Tineo regarding valproic acid dosing. Currently being managed by the pharmacy team, receiving meropenem which can decrease serum concentrations of valproic acid. Evening reading 16, increasing dose of valproic acid will take him to the maximum recommended daily dose.   - Increase dose of valproic acid overnight, may need alternative AED while on meropenem  - No documented seizure activity during this admission  - Consider neurology consultation in the morning for further recommendations     Tom GANT-C, PA-C  [phone] or Jason

## 2021-05-16 NOTE — PROGRESS NOTES
1400: attempted to deep suction pt as you can hear rattling from the trach area. Pt was reluctant but allowed it. After one suction, pt started coughing very deeply and pulled away from RN. Pt refused to allow any more deep suctioning, despite hearing rattling still. Claims he is able to cough it up himself. Charge RN notified. 1445: while attempting to pull out dobhoff, at 10cm it got stuck. Pt started tensing up and shaking horribly. Charge nurse notified and was also unable to remove tube fully. MD notified and advised by MD to ask CCU nurse to take a look. CCU nurse is occupied at the moment and will look when able.

## 2021-05-16 NOTE — PROGRESS NOTES
Problem: Ventilator Management  Goal: *Adequate oxygenation and ventilation  Outcome: Progressing Towards Goal  Goal: *Patient maintains clear airway/free of aspiration  Outcome: Progressing Towards Goal  Goal: *Absence of infection signs and symptoms  Outcome: Progressing Towards Goal  Goal: *Normal spontaneous ventilation  Outcome: Progressing Towards Goal     Problem: Patient Education: Go to Patient Education Activity  Goal: Patient/Family Education  Outcome: Progressing Towards Goal     Problem: Falls - Risk of  Goal: *Absence of Falls  Description: Document Elliott Fall Risk and appropriate interventions in the flowsheet. Outcome: Progressing Towards Goal  Note: Fall Risk Interventions:       Mentation Interventions: Adequate sleep, hydration, pain control, Evaluate medications/consider consulting pharmacy, Room close to nurse's station    Medication Interventions: Evaluate medications/consider consulting pharmacy    Elimination Interventions: Call light in reach              Problem: Patient Education: Go to Patient Education Activity  Goal: Patient/Family Education  Outcome: Progressing Towards Goal     Problem: Risk for Spread of Infection  Goal: Prevent transmission of infectious organism to others  Description: Prevent the transmission of infectious organisms to other patients, staff members, and visitors.   Outcome: Progressing Towards Goal     Problem: Patient Education:  Go to Education Activity  Goal: Patient/Family Education  Outcome: Progressing Towards Goal     Problem: Breathing Pattern - Ineffective  Goal: *Absence of hypoxia  Outcome: Progressing Towards Goal  Goal: *Use of effective breathing techniques  Outcome: Progressing Towards Goal  Goal: *PALLIATIVE CARE:  Alleviation of Dyspnea  Outcome: Progressing Towards Goal     Problem: Patient Education: Go to Patient Education Activity  Goal: Patient/Family Education  Outcome: Progressing Towards Goal     Problem: Pressure Injury - Risk of  Goal: *Prevention of pressure injury  Description: Document Salvador Scale and appropriate interventions in the flowsheet.   Outcome: Progressing Towards Goal  Note: Pressure Injury Interventions:  Sensory Interventions: Float heels    Moisture Interventions: Check for incontinence Q2 hours and as needed    Activity Interventions: Pressure redistribution bed/mattress(bed type)    Mobility Interventions: Pressure redistribution bed/mattress (bed type)    Nutrition Interventions: Document food/fluid/supplement intake    Friction and Shear Interventions: HOB 30 degrees or less                Problem: Patient Education: Go to Patient Education Activity  Goal: Patient/Family Education  Outcome: Progressing Towards Goal     Problem: Patient Education: Go to Patient Education Activity  Goal: Patient/Family Education  Outcome: Progressing Towards Goal     Problem: Patient Education: Go to Patient Education Activity  Goal: Patient/Family Education  Outcome: Progressing Towards Goal     Problem: Patient Education: Go to Patient Education Activity  Goal: Patient/Family Education  Outcome: Progressing Towards Goal     Problem: Patient Education: Go to Patient Education Activity  Goal: Patient/Family Education  Outcome: Progressing Towards Goal     Problem: Nutrition Deficit  Goal: *Optimize nutritional status  Outcome: Progressing Towards Goal     Problem: Airway Clearance - Ineffective  Goal: *Patent airway  Outcome: Progressing Towards Goal  Goal: *Absence of airway secretions  Outcome: Progressing Towards Goal  Goal: *Able to cough effectively  Outcome: Progressing Towards Goal  Goal: *PALLIATIVE CARE:  Alleviation of secretions, cough and/or nasal congestion  Outcome: Progressing Towards Goal     Problem: Patient Education: Go to Patient Education Activity  Goal: Patient/Family Education  Outcome: Progressing Towards Goal     Problem: Pain  Goal: *Control of Pain  Outcome: Progressing Towards Goal  Goal: *PALLIATIVE CARE: Alleviation of Pain  Outcome: Progressing Towards Goal     Problem: Patient Education: Go to Patient Education Activity  Goal: Patient/Family Education  Outcome: Progressing Towards Goal     Problem: Tobacco Use  Goal: *Tobacco use abstinence  Outcome: Progressing Towards Goal  Goal: *Knowledge of tobacco use cessation methods  Outcome: Progressing Towards Goal     Problem: Patient Education: Go to Patient Education Activity  Goal: Patient/Family Education  Outcome: Progressing Towards Goal     Problem: General Wound Care  Goal: *Non-infected wound: Improvement of existing wound, absence of infection, and maintenance of skin integrity  Outcome: Progressing Towards Goal  Goal: *Infected Wound: Prevention of further infection and promotion of healing  Description: Infection control procedures (eg: clean dressings, clean gloves, hand washing, precautions to isolate wound from contamination, sterile instruments used for wound debridement) should be implemented.   Outcome: Progressing Towards Goal  Goal: Interventions  Outcome: Progressing Towards Goal     Problem: Patient Education: Go to Patient Education Activity  Goal: Patient/Family Education  Outcome: Progressing Towards Goal     Problem: Anxiety  Goal: *Alleviation of anxiety  Outcome: Progressing Towards Goal  Goal: *Alleviation of anxiety (Palliative Care)  Outcome: Progressing Towards Goal     Problem: Patient Education: Go to Patient Education Activity  Goal: Patient/Family Education  Outcome: Progressing Towards Goal

## 2021-05-16 NOTE — PROGRESS NOTES
After talking with mom, pt has decided not to block her from information of his care. He says she did not talk with any nurse, and allows her to visit and get information and updates.

## 2021-05-17 ENCOUNTER — APPOINTMENT (OUTPATIENT)
Dept: GENERAL RADIOLOGY | Age: 26
DRG: 161 | End: 2021-05-17
Attending: HOSPITALIST
Payer: MEDICAID

## 2021-05-17 ENCOUNTER — APPOINTMENT (OUTPATIENT)
Dept: NON INVASIVE DIAGNOSTICS | Age: 26
DRG: 161 | End: 2021-05-17
Attending: INTERNAL MEDICINE
Payer: MEDICAID

## 2021-05-17 LAB
ALBUMIN SERPL-MCNC: 2.9 G/DL (ref 3.5–5)
ALBUMIN/GLOB SERPL: 0.8 {RATIO} (ref 1.1–2.2)
ALP SERPL-CCNC: 85 U/L (ref 45–117)
ALT SERPL-CCNC: 17 U/L (ref 12–78)
ANION GAP SERPL CALC-SCNC: 8 MMOL/L (ref 5–15)
AST SERPL-CCNC: 22 U/L (ref 15–37)
BASOPHILS # BLD: 0.1 K/UL (ref 0–0.1)
BASOPHILS NFR BLD: 1 % (ref 0–1)
BILIRUB SERPL-MCNC: 0.6 MG/DL (ref 0.2–1)
BUN SERPL-MCNC: 7 MG/DL (ref 6–20)
BUN/CREAT SERPL: 19 (ref 12–20)
CALCIUM SERPL-MCNC: 9.4 MG/DL (ref 8.5–10.1)
CHLORIDE SERPL-SCNC: 103 MMOL/L (ref 97–108)
CO2 SERPL-SCNC: 25 MMOL/L (ref 21–32)
CREAT SERPL-MCNC: 0.36 MG/DL (ref 0.7–1.3)
DATE LAST DOSE: ABNORMAL
DIFFERENTIAL METHOD BLD: ABNORMAL
ECHO LA MAJOR AXIS: 3.29 CM
ECHO LA MINOR AXIS: 1.93 CM
ECHO LV EDV A2C: 121.76 ML
ECHO LV EDV A4C: 150.72 ML
ECHO LV EDV BP: 140.61 ML (ref 67–155)
ECHO LV EDV INDEX A4C: 88.4 ML/M2
ECHO LV EDV INDEX BP: 82.5 ML/M2
ECHO LV EDV NDEX A2C: 71.4 ML/M2
ECHO LV EJECTION FRACTION A2C: 25 PERCENT
ECHO LV EJECTION FRACTION A4C: 30 PERCENT
ECHO LV EJECTION FRACTION BIPLANE: 27.9 PERCENT (ref 55–100)
ECHO LV ESV A2C: 91.88 ML
ECHO LV ESV A4C: 104.8 ML
ECHO LV ESV BP: 101.35 ML (ref 22–58)
ECHO LV ESV INDEX A2C: 53.9 ML/M2
ECHO LV ESV INDEX A4C: 61.5 ML/M2
ECHO LV ESV INDEX BP: 59.4 ML/M2
ECHO LV INTERNAL DIMENSION DIASTOLIC: 6.29 CM (ref 4.2–5.9)
ECHO LV INTERNAL DIMENSION SYSTOLIC: 5.63 CM
ECHO LV IVSD: 1.03 CM (ref 0.6–1)
ECHO LV MASS 2D: 350.9 G (ref 88–224)
ECHO LV MASS INDEX 2D: 205.8 G/M2 (ref 49–115)
ECHO LV POSTERIOR WALL DIASTOLIC: 1.43 CM (ref 0.6–1)
EOSINOPHIL # BLD: 0.2 K/UL (ref 0–0.4)
EOSINOPHIL NFR BLD: 3 % (ref 0–7)
ERYTHROCYTE [DISTWIDTH] IN BLOOD BY AUTOMATED COUNT: 16.6 % (ref 11.5–14.5)
GLOBULIN SER CALC-MCNC: 3.7 G/DL (ref 2–4)
GLUCOSE SERPL-MCNC: 90 MG/DL (ref 65–100)
HCT VFR BLD AUTO: 34.4 % (ref 36.6–50.3)
HGB BLD-MCNC: 10.4 G/DL (ref 12.1–17)
IMM GRANULOCYTES # BLD AUTO: 0.1 K/UL (ref 0–0.04)
IMM GRANULOCYTES NFR BLD AUTO: 1 % (ref 0–0.5)
LACTATE SERPL-SCNC: 0.8 MMOL/L (ref 0.4–2)
LYMPHOCYTES # BLD: 1.9 K/UL (ref 0.8–3.5)
LYMPHOCYTES NFR BLD: 24 % (ref 12–49)
MAGNESIUM SERPL-MCNC: 1.5 MG/DL (ref 1.6–2.4)
MCH RBC QN AUTO: 27 PG (ref 26–34)
MCHC RBC AUTO-ENTMCNC: 30.2 G/DL (ref 30–36.5)
MCV RBC AUTO: 89.4 FL (ref 80–99)
MONOCYTES # BLD: 1.7 K/UL (ref 0–1)
MONOCYTES NFR BLD: 21 % (ref 5–13)
NEUTS SEG # BLD: 4 K/UL (ref 1.8–8)
NEUTS SEG NFR BLD: 50 % (ref 32–75)
NRBC # BLD: 0 K/UL (ref 0–0.01)
NRBC BLD-RTO: 0 PER 100 WBC
PHOSPHATE SERPL-MCNC: 3.4 MG/DL (ref 2.6–4.7)
PLATELET # BLD AUTO: 289 K/UL (ref 150–400)
PMV BLD AUTO: 11.6 FL (ref 8.9–12.9)
POTASSIUM SERPL-SCNC: 3.9 MMOL/L (ref 3.5–5.1)
PROCALCITONIN SERPL-MCNC: 0.13 NG/ML
PROT SERPL-MCNC: 6.6 G/DL (ref 6.4–8.2)
RBC # BLD AUTO: 3.85 M/UL (ref 4.1–5.7)
RBC MORPH BLD: ABNORMAL
REPORTED DOSE,DOSE: ABNORMAL UNITS
REPORTED DOSE/TIME,TMG: ABNORMAL
SEE BELOW, 007000: ABNORMAL
SODIUM SERPL-SCNC: 136 MMOL/L (ref 136–145)
VALPROATE FREE SERPL-MCNC: 2.5 UG/ML (ref 6–22)
VANCOMYCIN TROUGH SERPL-MCNC: 21.9 UG/ML (ref 5–10)
WBC # BLD AUTO: 8 K/UL (ref 4.1–11.1)

## 2021-05-17 PROCEDURE — 92507 TX SP LANG VOICE COMM INDIV: CPT

## 2021-05-17 PROCEDURE — 74011250637 HC RX REV CODE- 250/637: Performed by: INTERNAL MEDICINE

## 2021-05-17 PROCEDURE — 74011250637 HC RX REV CODE- 250/637: Performed by: HOSPITALIST

## 2021-05-17 PROCEDURE — 99223 1ST HOSP IP/OBS HIGH 75: CPT | Performed by: INTERNAL MEDICINE

## 2021-05-17 PROCEDURE — 65660000001 HC RM ICU INTERMED STEPDOWN

## 2021-05-17 PROCEDURE — 74011000258 HC RX REV CODE- 258: Performed by: HOSPITALIST

## 2021-05-17 PROCEDURE — 99233 SBSQ HOSP IP/OBS HIGH 50: CPT | Performed by: NURSE PRACTITIONER

## 2021-05-17 PROCEDURE — 74011250636 HC RX REV CODE- 250/636: Performed by: INTERNAL MEDICINE

## 2021-05-17 PROCEDURE — 74011000250 HC RX REV CODE- 250: Performed by: HOSPITALIST

## 2021-05-17 PROCEDURE — 74011250637 HC RX REV CODE- 250/637: Performed by: STUDENT IN AN ORGANIZED HEALTH CARE EDUCATION/TRAINING PROGRAM

## 2021-05-17 PROCEDURE — 84145 PROCALCITONIN (PCT): CPT

## 2021-05-17 PROCEDURE — 84100 ASSAY OF PHOSPHORUS: CPT

## 2021-05-17 PROCEDURE — 74011250636 HC RX REV CODE- 250/636: Performed by: NURSE PRACTITIONER

## 2021-05-17 PROCEDURE — 92526 ORAL FUNCTION THERAPY: CPT

## 2021-05-17 PROCEDURE — 80053 COMPREHEN METABOLIC PANEL: CPT

## 2021-05-17 PROCEDURE — P9047 ALBUMIN (HUMAN), 25%, 50ML: HCPCS | Performed by: HOSPITALIST

## 2021-05-17 PROCEDURE — 80202 ASSAY OF VANCOMYCIN: CPT

## 2021-05-17 PROCEDURE — 83735 ASSAY OF MAGNESIUM: CPT

## 2021-05-17 PROCEDURE — 83605 ASSAY OF LACTIC ACID: CPT

## 2021-05-17 PROCEDURE — 85025 COMPLETE CBC W/AUTO DIFF WBC: CPT

## 2021-05-17 PROCEDURE — 71045 X-RAY EXAM CHEST 1 VIEW: CPT

## 2021-05-17 PROCEDURE — 36415 COLL VENOUS BLD VENIPUNCTURE: CPT

## 2021-05-17 PROCEDURE — 93308 TTE F-UP OR LMTD: CPT

## 2021-05-17 PROCEDURE — 97530 THERAPEUTIC ACTIVITIES: CPT

## 2021-05-17 PROCEDURE — 77010033711 HC HIGH FLOW OXYGEN

## 2021-05-17 PROCEDURE — 74011000250 HC RX REV CODE- 250: Performed by: NURSE PRACTITIONER

## 2021-05-17 PROCEDURE — 74011250636 HC RX REV CODE- 250/636: Performed by: HOSPITALIST

## 2021-05-17 RX ORDER — OXYMETAZOLINE HCL 0.05 %
3 SPRAY, NON-AEROSOL (ML) NASAL 2 TIMES DAILY
Status: DISCONTINUED | OUTPATIENT
Start: 2021-05-17 | End: 2021-05-17

## 2021-05-17 RX ORDER — OXYMETAZOLINE HCL 0.05 %
5 SPRAY, NON-AEROSOL (ML) NASAL 3 TIMES DAILY
Status: ACTIVE | OUTPATIENT
Start: 2021-05-17 | End: 2021-05-18

## 2021-05-17 RX ORDER — ALBUMIN HUMAN 250 G/1000ML
12.5 SOLUTION INTRAVENOUS EVERY 6 HOURS
Status: COMPLETED | OUTPATIENT
Start: 2021-05-18 | End: 2021-05-18

## 2021-05-17 RX ORDER — LEVETIRACETAM 500 MG/1
1500 TABLET ORAL 2 TIMES DAILY
Status: DISCONTINUED | OUTPATIENT
Start: 2021-05-17 | End: 2021-06-08 | Stop reason: HOSPADM

## 2021-05-17 RX ORDER — SODIUM CHLORIDE 9 MG/ML
75 INJECTION, SOLUTION INTRAVENOUS CONTINUOUS
Status: DISCONTINUED | OUTPATIENT
Start: 2021-05-17 | End: 2021-05-18

## 2021-05-17 RX ORDER — FAMOTIDINE 20 MG/1
20 TABLET, FILM COATED ORAL 2 TIMES DAILY
Status: DISCONTINUED | OUTPATIENT
Start: 2021-05-17 | End: 2021-05-17

## 2021-05-17 RX ORDER — ALBUMIN HUMAN 250 G/1000ML
12.5 SOLUTION INTRAVENOUS ONCE
Status: COMPLETED | OUTPATIENT
Start: 2021-05-17 | End: 2021-05-17

## 2021-05-17 RX ORDER — OXYCODONE HYDROCHLORIDE 5 MG/1
7.5 TABLET ORAL
Status: DISCONTINUED | OUTPATIENT
Start: 2021-05-17 | End: 2021-06-08 | Stop reason: HOSPADM

## 2021-05-17 RX ADMIN — HEPARIN SODIUM 5000 UNITS: 5000 INJECTION INTRAVENOUS; SUBCUTANEOUS at 04:45

## 2021-05-17 RX ADMIN — Medication 5 SPRAY: at 17:46

## 2021-05-17 RX ADMIN — SALINE NASAL SPRAY 2 SPRAY: 1.5 SOLUTION NASAL at 12:05

## 2021-05-17 RX ADMIN — ALBUMIN (HUMAN) 12.5 G: 0.25 INJECTION, SOLUTION INTRAVENOUS at 23:21

## 2021-05-17 RX ADMIN — LEVETIRACETAM 1500 MG: 500 TABLET ORAL at 11:21

## 2021-05-17 RX ADMIN — Medication 1 CAPSULE: at 09:12

## 2021-05-17 RX ADMIN — VANCOMYCIN HYDROCHLORIDE 1250 MG: 10 INJECTION, POWDER, LYOPHILIZED, FOR SOLUTION INTRAVENOUS at 03:55

## 2021-05-17 RX ADMIN — Medication 10 ML: at 21:17

## 2021-05-17 RX ADMIN — CARVEDILOL 3.12 MG: 3.12 TABLET, FILM COATED ORAL at 11:21

## 2021-05-17 RX ADMIN — MICONAZOLE NITRATE: 20 CREAM TOPICAL at 17:47

## 2021-05-17 RX ADMIN — ALBUMIN (HUMAN) 12.5 G: 0.25 INJECTION, SOLUTION INTRAVENOUS at 16:42

## 2021-05-17 RX ADMIN — OXYCODONE HYDROCHLORIDE 5 MG: 5 TABLET ORAL at 02:30

## 2021-05-17 RX ADMIN — SPIRONOLACTONE 12.5 MG: 25 TABLET ORAL at 11:21

## 2021-05-17 RX ADMIN — SODIUM CHLORIDE 75 ML/HR: 9 INJECTION, SOLUTION INTRAVENOUS at 18:28

## 2021-05-17 RX ADMIN — SALINE NASAL SPRAY 2 SPRAY: 1.5 SOLUTION NASAL at 13:26

## 2021-05-17 RX ADMIN — Medication 10 ML: at 13:27

## 2021-05-17 RX ADMIN — Medication 3 MG: at 21:17

## 2021-05-17 RX ADMIN — ONDANSETRON 4 MG: 2 INJECTION INTRAMUSCULAR; INTRAVENOUS at 04:45

## 2021-05-17 RX ADMIN — MEROPENEM 500 MG: 500 INJECTION, POWDER, FOR SOLUTION INTRAVENOUS at 09:12

## 2021-05-17 RX ADMIN — CASTOR OIL AND BALSAM, PERU: 788; 87 OINTMENT TOPICAL at 09:14

## 2021-05-17 RX ADMIN — FAMOTIDINE 20 MG: 10 INJECTION, SOLUTION INTRAVENOUS at 09:13

## 2021-05-17 RX ADMIN — CASTOR OIL AND BALSAM, PERU: 788; 87 OINTMENT TOPICAL at 17:47

## 2021-05-17 RX ADMIN — MEROPENEM 500 MG: 500 INJECTION, POWDER, FOR SOLUTION INTRAVENOUS at 14:33

## 2021-05-17 RX ADMIN — OXYCODONE HYDROCHLORIDE 7.5 MG: 5 TABLET ORAL at 18:29

## 2021-05-17 RX ADMIN — MICONAZOLE NITRATE: 20 CREAM TOPICAL at 09:00

## 2021-05-17 RX ADMIN — OXYCODONE HYDROCHLORIDE 5 MG: 5 TABLET ORAL at 09:13

## 2021-05-17 RX ADMIN — Medication 2 DROP: at 21:33

## 2021-05-17 RX ADMIN — SACUBITRIL AND VALSARTAN 1 TABLET: 24; 26 TABLET, FILM COATED ORAL at 09:12

## 2021-05-17 RX ADMIN — OXYCODONE HYDROCHLORIDE 7.5 MG: 5 TABLET ORAL at 23:32

## 2021-05-17 RX ADMIN — FENTANYL CITRATE 50 MCG: 50 INJECTION, SOLUTION INTRAMUSCULAR; INTRAVENOUS at 11:21

## 2021-05-17 RX ADMIN — HEPARIN SODIUM 5000 UNITS: 5000 INJECTION INTRAVENOUS; SUBCUTANEOUS at 12:26

## 2021-05-17 RX ADMIN — SALINE NASAL SPRAY 2 SPRAY: 1.5 SOLUTION NASAL at 11:21

## 2021-05-17 RX ADMIN — OXYCODONE HYDROCHLORIDE 5 MG: 5 TABLET ORAL at 14:31

## 2021-05-17 RX ADMIN — HEPARIN SODIUM 5000 UNITS: 5000 INJECTION INTRAVENOUS; SUBCUTANEOUS at 21:14

## 2021-05-17 RX ADMIN — LEVETIRACETAM 1500 MG: 500 TABLET ORAL at 17:46

## 2021-05-17 RX ADMIN — SALINE NASAL SPRAY 2 SPRAY: 1.5 SOLUTION NASAL at 12:29

## 2021-05-17 RX ADMIN — SALINE NASAL SPRAY 2 SPRAY: 1.5 SOLUTION NASAL at 13:00

## 2021-05-17 RX ADMIN — OXYMETAZOLINE HCL 3 SPRAY: 0.05 SPRAY NASAL at 11:21

## 2021-05-17 RX ADMIN — VANCOMYCIN HYDROCHLORIDE 1000 MG: 1 INJECTION, POWDER, LYOPHILIZED, FOR SOLUTION INTRAVENOUS at 14:43

## 2021-05-17 RX ADMIN — VANCOMYCIN HYDROCHLORIDE 1000 MG: 1 INJECTION, POWDER, LYOPHILIZED, FOR SOLUTION INTRAVENOUS at 21:16

## 2021-05-17 RX ADMIN — MEROPENEM 500 MG: 500 INJECTION, POWDER, FOR SOLUTION INTRAVENOUS at 21:13

## 2021-05-17 RX ADMIN — FENTANYL CITRATE 50 MCG: 50 INJECTION, SOLUTION INTRAMUSCULAR; INTRAVENOUS at 07:07

## 2021-05-17 RX ADMIN — MEROPENEM 500 MG: 500 INJECTION, POWDER, FOR SOLUTION INTRAVENOUS at 02:21

## 2021-05-17 NOTE — PROGRESS NOTES
ID.  Chart reviewed. Case discussed with unit secretary. Outside records were not available Sunday afternoon. Vancomycin restarted. Will need [facility] records to determine long-term antibiotic plans.   Full consult to follow

## 2021-05-17 NOTE — PROGRESS NOTES
1220: patient sleeping. Oxygen 87%. Increased patient to 10L trach collar. 1300: CCU RN and I both tried to get doubhoff out of patient. Able to advance tube but not pull it out. MD notified. 1330: MD and I on phone with ENT. They said to continue to moisture nose and try to remove. RN will continue to try to remove doubhoff. Doubhoff taped to patient face as much as will come out as possible. Doubhoff currently at 10 in left nare.

## 2021-05-17 NOTE — PROGRESS NOTES
Clinical Pharmacy Note: IV to PO Automatic Conversion  Please note: Alex Lopez medication  Famotidine  levetiracetam      has been changed from IV to PO based on the following critiera:    Patient is taking scheduled oral medications  Patient is tolerating tube feeds at goal rate or a full liquid, soft or regular diet    This IV to PO conversion is based on the [employer] approved automatic conversion policy for eligible patients. Please call with questions.

## 2021-05-17 NOTE — PROGRESS NOTES
Day #3 of Vancomycin (continued prior to arrival)  Indication:  recent MRSA bacteremia with MSSA endocarditis, and multiple septic cerebral emboli from admission in March  -s/p AVR   -treated with vancomycin here  - for possible sepsis; [facility] paperwork notes 6 week course of vancomycin through      Current regimen:  1250mg IV q8hrs   Abx regimen:  Vancomycin and Merrem  ID Following ?: YES  Concomitant nephrotoxic drugs (requires more frequent monitoring): None  Frequency of BMP?: Daily     Recent Labs     21  0349 21  0305 05/15/21  0559   WBC 8.0 8.5 5.8   CREA 0.36* 0.49* 0.40*   BUN 7 7 6   Est CrCl: >120 ml/min; UO: >1 ml/kg/hr  Temp (24hrs), Av.4 °F (36.9 °C), Min:98.1 °F (36.7 °C), Max:98.7 °F (37.1 °C)    Cultures:    sputum - ESBL (S- Meropenem)   toe wound - light mixed skin sunny, final    Goal trough = 15 - 20 mcg/mL    Recent trough history (date/time/level/dose/action taken):  : 21.9 mcg/ml @ ~7h post-dose, true trough extrapolated to ~19 mcg/ml; change to vanc 1g q8h. Plan: Change to vanc 1g q8h for calculated troughs ~15 mcg/ml . Pharmacy will assess new dosing strategy with a level after ~48h of new therapy.      Viji Pink, RAMOSD

## 2021-05-17 NOTE — PROGRESS NOTES
Problem: Dysphagia (Adult)  Goal: *Acute Goals and Plan of Care (Insert Text)  Description: Speech Therapy Goals  Initiated 5/14/2021   1. Patient will tolerate regular/thin liquid diet without s/s of aspiration within 7 days     Initiated 5/11/2021  1. Patient will participate in swallow re-evaluation within 7 days. MET 5/14/2021   Outcome: Resolved/Met     Problem: Voice Impaired (Adult)  Goal: *Acute Goals and Plan of Care (Insert Text)  Description: Speech Therapy Goals  Initiated 5/11/2021    1. Patient will tolerate PMV to communicate medical wants, needs, and opinions within 7 days. Outcome: Resolved/Met     SPEECH LANGUAGE PATHOLOGY DYSPHAGIA AND PMV TREATMENT/DISCHARGE  Patient: Charu Byrne (73 y.o. male)  Date: 5/17/2021  Diagnosis: Cardiac arrest [facility]) [I46.9] Cardiac arrest with pulseless electrical activity ([facility])       Precautions:  Fall, Skin    ASSESSMENT:  Pt without any overt difficulty with PO on this date, and has been tolerating diet over the weekend without any adverse effects. Recommend pt continue regular diet/thin liquids with general aspiration precautions. Additionally, pt tolerating PMV during all hours with no supplemental oxygen (trach collar is off of patient's trach). O2 and RR stable. Pt no longer requiring ventilator and has transferred out of the ICU. Therefore, would consider a trach downsize to a #4 cuffless trach with a red-capping trial.  If patient tolerates  would consider decannulation of trach. Given that pt tolerating diet and PMV, nothing further for SLP to offer. PLAN:  Recommendations and Planned Interventions:  --regular diet/thin liquids  --PMV with all PO  --consider downsize to a #4 trach and red-capping trial  --if patient tolerates red-cap for 24+ hours, consider decannulation of trahc    Patient will be discharged from SLP services.     Speaking Valve Placement:  SLP / RT / RN and patient/family  Recommended Speaking Valve Wearing Schedule:  [x]    As tolerated  Discharge Recommendations: To Be Determined     SUBJECTIVE:   Patient stated, \"It's going great. OBJECTIVE:   Cognitive and Communication Status:  Neurologic State: Alert  Orientation Level: Oriented X4  Cognition: Follows commands  Perception: Appears intact  Perseveration: No perseveration noted  Safety/Judgement: Awareness of environment  Tracheostomy:    Vital Signs During PMV Placement  O2: 98  RR:  23       Oxygen Therapy  O2 Sat (%): 91 %  Dysphagia Treatment:  Tracheostomy:        PMV Trial   Vocal Quality: No impairment  Oral Assessment:  Oral Assessment  Labial: No impairment  Dentition: Natural  Oral Hygiene: oral mucosa moist and clear of secretions  Lingual: No impairment  Velum: No impairment  Mandible: No impairment  P.O. Trials:  Patient Position: upright in bed  Vocal quality prior to P.O.: No impairment  Consistency Presented: Thin liquid; Solid  How Presented: Self-fed/presented;Cup/sip;Spoon;Straw;Successive swallows     Bolus Acceptance: No impairment  Bolus Formation/Control: No impairment     Propulsion: No impairment  Oral Residue: None  Initiation of Swallow: No impairment  Laryngeal Elevation: Functional  Aspiration Signs/Symptoms: None  Pharyngeal Phase Characteristics: No impairment, issues, or problems            Oral Phase Severity: No impairment  Pharyngeal Phase Severity : No impairment  Pain:  Pain Scale 1: Numeric (0 - 10)  Pain Intensity 1: 0       After treatment:   Call bell within reach and Nursing notified    COMMUNICATION/EDUCATION:     The patient's plan of care including recommendations, planned interventions, and recommended diet changes were discussed with: Registered nurse, jayjay EVANGELISTA.     Education was provided to patient, family, and staff regarding speaking valve placement, wearing schedule, safety precautions including cuff deflation and removal when sleeping, and care/cleaning guidelines.       IMELDA Ramirez  Time Calculation: 15 mins

## 2021-05-17 NOTE — PROGRESS NOTES
Clinical Pharmacy Note: Stress Ulcer Prophylaxis Protocol (Non-ICU patients)    Please note that stress ulcer prophylaxis is not indicated for patients outside of the ICU. Chanda White has an order for famotidine that has been ordered with an indication of stress ulcer prophylaxis.   No other indication for this medication has been noted in the patients chart and therefore it has been discontinued per the [facility] Stress Ulcer Prophylaxis Protocol for eligible patients. Please call with any questions.

## 2021-05-17 NOTE — ROUTINE PROCESS
Bedside and Verbal shift change report given to Kingston Vila (oncoming nurse) by Ravin Crocker (offgoing nurse). Report included the following information SBAR, Kardex, ED Summary, Intake/Output, MAR, Recent Results and Cardiac Rhythm NSR/ST.

## 2021-05-17 NOTE — PROGRESS NOTES
.  [address] [facility] Adult  Hospitalist Group    PATIENT ID: Khadijah Michaels  MRN: [id number]   YOB: 1995    PRIMARY CARE PROVIDER: None   DATE OF ADMISSION: 5/6/2021  5:22 PM    ATTENDING PHYSICIAN: Keyshawn Reyes MD  CONSULTATIONS:   IP CONSULT TO INTENSIVIST  IP CONSULT TO NEUROLOGY  IP CONSULT TO PODIATRY  IP CONSULT TO PODIATRY  IP CONSULT TO INFECTIOUS DISEASES  IP CONSULT TO NEUROLOGY  IP CONSULT TO OTOLARYNGOLOGY  IP CONSULT TO CARDIOLOGY    PROCEDURES/SURGERIES:   * No surgery found *    REASON FOR ADMISSION: Cardiac arrest with pulseless electrical activity [facility] PROBLEM LIST:  Patient Active Problem List   Diagnosis Code    Endocarditis due to methicillin susceptible Staphylococcus aureus (MSSA) I33.0, B95.61    Drug abuse, cocaine type ([facility] F14.10    Type 2 myocardial infarction ([facility] I21. 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 male with h/o Seizures and anxiety who presented to [facility] ED after a cardiac arrest at [place] [address] around 1500 5/6/2021. Hx from chart and speaking with patient's sister via phone. Patient had recent hospitalization at the [facility] in March of this year. Initial hospitalization admission was at [facility] on 3/24 with AMS in setting of polysubstance and IVD use (cocaine, heroine, methamphetamines). He was found to have septic MRSA bacteremia, MRSA endocarditis.  Imaging and MRI also found R PCA infarct and several abscesses and right temporal and parietal lobes. Infarcts thought to be due to septic emboli and patient had left sided residual weakness. Patient was transferred to [facility] on 4/1/2021 for evaluation for valve surgery. After transfer he was found to have a New R MCA infarct Also had a type 2 NSTEMI and tamponade with pericardiocentesis done prior to surgery. Did have cardiac arrest on day of surgery. Had a bioprosthetic Aortic Valve Replacement and Aortic Root Abscess Debridement done on 4/16/2021. Trached and sent to [facility] on 4/29/2021. Was undergoing PT and vent wean. Sister stated that patient was able to be off the vent for several hours over the last few days. He was sitting up and able to talk with valve over trach and could follow commands. Stated that he had severe weakness of the left upper ext, but was starting to gain some mobility in the lower left ext. Patient's mother visited patient today prior to arrest. She said the staff told her that the patient was getting very anxious earlier in the day around 1 pm and had to be placed back on the ventilator, and then they had to sedate him while he was on the ventilator. The mother stated \" he did not look good\" and \" his eye were rolled back in his head\". About 30 minutes after she left she got call about arrest. Per ED note patient was found unresponsive around the 1500 hour and was pulseless. Two rounds of CPR and meds were given and patient was defibrillated x 1 before ROSC. Patient was hypoxic post arrest. Unknown down time prior to arrest.        Subjective/HPI    Still complaints of pain-chest,back.   Neck X rays -justa in [facility]- discussed with ,recommended saline spray,afrin and see if helps,it not working contact her again    Assessment and Plan:    1.  cardiac arrest - 5/6/21 on admission:   .   Cardiologist following,   -per cardiologist :  ICD vs life vest before DC (but with his recent endocarditis/recent bacteremia till under treatment, ? Timing for ICD)      2. Acute on chronic  respiratory failure: [facility] trach [facility] and was sent to [facility] for further weaning. He was on ventilator while in ICU and now transitioned to trach collar.  -currently on trach collar and PMV  -on 50% fiO2 -wean as tolerated    3. MRSA aortic  Valve endocardititis   -Found to have MRSA bacteremia and MSSA endocarditis end of march. -Hx:  4/16/21 at [facility] bioprosthetic AVR and root abscess debridement. He also had a pericardiocentesis due to tamponade prior to surgery  - Reviewed [facility] record, he was on IV vanco at [address] [place] [address] and planned to  have a 6 weeks of   tx and EOT 5/28/21 per record. - Continue vancomycin      Pain management : contusion of chest wall, left side, chronic back pain   Cautious with narcotics  On  fentanyl to 50mcg q4h prn and oxycodone 5 mg Q 6 hr prn    9. Bacterial pneumonia  severe multilevel consolidation throughout both lungs consistent with severe multilobar PNA. -sputum culture E coli with ESBL   -on Merrem   -pulmonary toilet   -trach care   -ID consulted      # Systolic congestive heart failure/CM  Ef 25%   Continue coreg,entresto ,Aldactone   Cardiologist following   Echo today    8. 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 Infarcts were thought to be due to septic emboli. He had left sided residual weakness. cta 5/6/21:  multiple acute infarctions throughout the  right cerebral hemisphere, involving much of the occipital lobe, as well as much  of the frontal lobe. There is an additional superior right frontoparietal  Infarct  Neurologist consulted                -PT/OT/SLP e              - Stroke education              - SBP goal 100-160              10. Seizure DZ:  Continue keppra -1500 mg BID, off valproic acid as subtherapeutic due to meropenem interaction,so valproic acid discontinued  Seizure precautions        11.  Severe muscle deconditioning    12: Severe protein-calorie malnutrition    13.  Cerebral abscess (embolic)    98 h/o substance abuse        fulll code        PHYSICAL EXAMINATION:  Visit Vitals  BP (!) 90/51   Pulse 100   Temp 97.8 °F (36.6 °C)   Resp 18   Ht 5' 11\" (1.803 m)   Wt 55.7 kg (122 lb 12.8 oz)   SpO2 91%   BMI 17.13 kg/m²       General:          chronically ill,   HEENT:           Atraumatic,trach capped, dubhoff tube taped           Neck:               Supple,   Lungs:            coarse bs bilat. No wheezing. Midline sternal surgical scar. Heart:              Regular  rhythm, tachycardia,   No edema  Abdomen:       Soft, non-tender. Not distended. Bowel sounds normal  Extremities:     No LE edema  Skin:                Not pale. Not Jaundiced  No rashes   Psych:             Not anxious or agitated. Neurologic:      Alert, left hemiplegia ,he was trying to bend rt knee and move RUE    Labs:     Recent Labs     05/17/21 0349 05/16/21  0305   WBC 8.0 8.5   HGB 10.4* 12.1   HCT 34.4* 38.7    302     Recent Labs     05/17/21  0349 05/16/21  0305 05/15/21  0559    137 133*   K 3.9 4.0 4.8    101 101   CO2 25 27 28   BUN 7 7 6   CREA 0.36* 0.49* 0.40*   GLU 90 103* 98   CA 9.4 9.4 9.0   MG 1.5* 1.7 1.8   PHOS 3.4 3.6 3.6     Recent Labs     05/17/21  0349 05/16/21  0305 05/15/21  0559   ALT 17 21 20   AP 85 96 77   TBILI 0.6 0.6 0.6   TP 6.6 7.1 6.8   ALB 2.9* 3.1* 2.7*   GLOB 3.7 4.0 4.1*     No results for input(s): INR, PTP, APTT, INREXT, INREXT in the last 72 hours. No results for input(s): FE, TIBC, PSAT, FERR in the last 72 hours. No results found for: FOL, RBCF   No results for input(s): PH, PCO2, PO2 in the last 72 hours. No results for input(s): CPK, CKNDX, TROIQ in the last 72 hours.     No lab exists for component: CPKMB  Lab Results   Component Value Date/Time    Cholesterol, total 140 05/11/2021 04:35 AM    HDL Cholesterol 21 05/11/2021 04:35 AM    LDL, calculated 77.4 05/11/2021 04:35 AM    Triglyceride 208 (H) 05/11/2021 04:35 AM    CHOL/HDL Ratio 6.7 (H) 05/11/2021 04:35 AM     Lab Results   Component Value Date/Time    Glucose (POC) 120 (H) 05/11/2021 12:00 PM    Glucose (POC) 100 05/11/2021 06:04 AM    Glucose (POC) 87 05/11/2021 12:19 AM    Glucose (POC) 83 05/10/2021 06:53 PM    Glucose (POC) 84 05/10/2021 02:07 PM     Lab Results   Component Value Date/Time    Color YELLOW/STRAW 05/06/2021 05:42 PM    Appearance CLEAR 05/06/2021 05:42 PM    Specific gravity 1.008 05/06/2021 05:42 PM    Specific gravity 1.020 03/24/2021 09:30 PM    pH (UA) 5.0 05/06/2021 05:42 PM    Protein 30 (A) 05/06/2021 05:42 PM    Glucose Negative 05/06/2021 05:42 PM    Ketone Negative 05/06/2021 05:42 PM    Bilirubin Negative 05/06/2021 05:42 PM    Urobilinogen 0.2 05/06/2021 05:42 PM    Nitrites Negative 05/06/2021 05:42 PM    Leukocyte Esterase Negative 05/06/2021 05:42 PM    Epithelial cells FEW 05/06/2021 05:42 PM    Bacteria Negative 05/06/2021 05:42 PM    WBC 0-4 05/06/2021 05:42 PM    RBC 0-5 05/06/2021 05:42 PM         CODE STATUS:  x Full Code    DNR    Partial    Comfort Care       Signed:   Benji Vásquez MD  Date of Service:  5/17/2021  3:38 PM

## 2021-05-17 NOTE — PROGRESS NOTES
Problem: Mobility Impaired (Adult and Pediatric)  Goal: *Acute Goals and Plan of Care (Insert Text)  Description: FUNCTIONAL STATUS PRIOR TO ADMISSION: Patient was independent prior to March 2021. Pt has been hospitalized and recently at [facility]. Per mother, pt was ambulating with therapy at [facility]. HOME SUPPORT PRIOR TO ADMISSION: The patient lived alone prior to hospitalization. Physical Therapy Goals  Revised 5/17/2021  1. Patient will move from supine to sit and sit to supine , scoot up and down, and roll side to side in bed with minimal assistance/contact guard assist within 7 day(s). 2.  Patient will tolerate sitting EOB with contact guard assistance for approx 5 minutes within 7 day(s). 3.  Patient will transfer from bed to chair and chair to bed with maximal assistance using the least restrictive device within 7 day(s). 4.  Patient will perform sit to stand with maximal assistance within 7 day(s). 5.  Patient will ambulate with maximal assistance for 5 feet with the least restrictive device within 7 day(s). Initiated 5/9/2021  1. Patient will move from supine to sit and sit to supine , scoot up and down, and roll side to side in bed with moderate assistance  within 7 day(s). GOAL MET 5/17/21  2. Patient will tolerate sitting EOB with moderate assistance for approx 3-5 minutes within 7 day(s). GOAL MET 5/17/21  3. Patient will transfer from bed to chair and chair to bed with maximal assistance using the least restrictive device within 7 day(s). 4.  Patient will perform sit to stand with maximal assistance within 7 day(s). 5.  Patient will ambulate with maximal assistance for 5 feet with the least restrictive device within 7 day(s).          Outcome: Progressing Towards Goal   PHYSICAL THERAPY TREATMENT: WEEKLY REASSESSMENT  Patient: Tad Dan (34 y.o. male)  Date: 5/17/2021  Primary Diagnosis: Cardiac arrest [facility]) [I46.9]        Precautions:   Fall, Skin      ASSESSMENT  Patient continues with skilled PT services and is progressing towards goals. Patient received on trach collar (FiO2 35%). Pt demonstrates improvements in bed mobility and sitting tolerance requiring less physical assistance. Pt limited in mobility 2/2 back discomfort yet agreeable to attempt progressing time of sitting. Once pt returned to bed, transitioned into modified chair position to promote upright sitting tolerance. Will continue to progress sitting tolerance and balance next session. Patient's progression toward goals since last assessment: sitting EOB x min A, improved 20 points per Barthel Index    Current Level of Function Impacting Discharge (mobility/balance): mod A for bed mobility, unable to stand     Functional Outcome Measure: The patient scored 20/100 on the Barthel Index outcome measure which is indicative of patient is 80% impaired with self care and dependency on others. Other factors to consider for discharge: sitting tolerance progression, 2 assist         PLAN :  Goals have been updated based on progression since last assessment. Patient continues to benefit from skilled intervention to address the above impairments. Recommendations and Planned Interventions: bed mobility training, transfer training, gait training, therapeutic exercises, neuromuscular re-education, patient and family training/education, and therapeutic activities      Frequency/Duration: Patient will be followed by physical therapy:  5 times a week to address goals.     Recommendation for discharge: (in order for the patient to meet his/her long term goals)  Therapy 3 hours per day 5-7 days per week vs LTAC  If pt were to d/c home, would benefit from HHPT and require assistance/supervision for 2 assist for all mobility as pt is a fall risk      This discharge recommendation:  Has been made in collaboration with the attending provider and/or case management    IF patient discharges home will need the following DME: hospital bed, mechanical lift, and wheelchair         SUBJECTIVE:   Patient stated Rodolfo Hemphill I have some ginger ale.     OBJECTIVE DATA SUMMARY:   HISTORY:    Past Medical History:   Diagnosis Date    Abscess of aortic root 4/16/2021    Acute traumatic pain 4/14/2021    Anxiety     Postoperative acute respiratory failure ([address] 4/1/2021    S/P AVR (aortic valve replacement) and aortoplasty 4/16/2021    Seizure (HCC)     Severe muscle deconditioning 5/1/2021    Successful cardiopulmonary resuscitation 4/14/2021     Past Surgical History:   Procedure Laterality Date    HX TONSILLECTOMY         Personal factors and/or comorbidities impacting plan of care: [address]: [address] [place] [address] Name: Kae Red  One/Two Story Residence: One story  Living Alone: No  Support Systems: Parent, Skilled nursing facility  Patient Expects to be Discharged to[de-identified] Skilled nursing facility  Current DME Used/Available at Home: None    EXAMINATION/PRESENTATION/DECISION MAKING:   Critical Behavior:  Neurologic State: Alert  Orientation Level: Oriented X4  Cognition: Follows commands  Safety/Judgement: Awareness of environment  Hearing: Auditory  Auditory Impairment: None  Skin:  intact  Edema: none noted  Range Of Motion:  AROM: Generally decreased, functional(LLE)           PROM: Generally decreased, functional(LLE)           Strength:    Strength: Generally decreased, functional(LLE)    Tone & Sensation:   Tone: Abnormal     Sensation: Impaired    Coordination:  Coordination: Generally decreased, functional(LLE)  Vision:      Functional Mobility:  Bed Mobility:     Supine to Sit: Moderate assistance  Sit to Supine: Moderate assistance  Scooting: Maximum assistance;Assist x2   Pt able to assist legs off and on bed. Balance:   Sitting: Impaired; Without support  Sitting - Static: Fair (occasional)  Sitting - Dynamic: Fair (occasional)  Pt tolerated sitting EOB for 6 minutes.  Pt with initial impulsive trunk lean toward R side, able to maintain position to stretch L side. Pt with excessive trunk movement requiring min A to correct. Provided moderate cues to perform scapular retraction, pt with difficulty to maintain. Pt continued to state back pain, performed anterior trunk shift which provided minimal relief. Pt began to fatigue and required to return to supine. Functional Measure:  Barthel Index:    Bathin  Bladder: 5  Bowels: 5  Groomin  Dressin  Feedin  Mobility: 0  Stairs: 0  Toilet Use: 0  Transfer (Bed to Chair and Back): 5  Total: 20/100       The Barthel ADL Index: Guidelines  1. The index should be used as a record of what a patient does, not as a record of what a patient could do. 2. The main aim is to establish degree of independence from any help, physical or verbal, however minor and for whatever reason. 3. The need for supervision renders the patient not independent. 4. A patient's performance should be established using the best available evidence. Asking the patient, friends/relatives and nurses are the usual sources, but direct observation and common sense are also important. However direct testing is not needed. 5. Usually the patient's performance over the preceding 24-48 hours is important, but occasionally longer periods will be relevant. 6. Middle categories imply that the patient supplies over 50 per cent of the effort. 7. Use of aids to be independent is allowed. Salma Nair., Barthel, D.W. ([id number]). Functional evaluation: the Barthel Index. [facility] (14)2. Fransico Farley, CHIQUIS, Fiordaliza Smith, [facility] [place], [address] [place] [address] (). Measuring the change indisability after inpatient rehabilitation; comparison of the responsiveness of the Barthel Index and Functional [place] Measure. Journal of Neurology, Neurosurgery, and Psychiatry, 66(4), 063-041.   Leann Paredes, N.J.A, ALLIE Foley, & Marlene Teague, M.A. (2004.) Assessment of post-stroke quality of life in cost-effectiveness studies: The usefulness of the Barthel Index and the EuroQoL-5D. Quality of Life Research, 13, 899-42           Pain Rating:  Pt reported back discomfort at end of session, RN aware    Activity Tolerance:   Fair and requires rest breaks    After treatment patient left in no apparent distress:   Supine in bed, Call bell within reach, and Side rails x 3    COMMUNICATION/EDUCATION:   The patients plan of care was discussed with: Registered nurse and Rehabilitation technician. Fall prevention education was provided and the patient/caregiver indicated understanding., Patient/family have participated as able in goal setting and plan of care. , and Patient/family agree to work toward stated goals and plan of care.     Thank you for this referral.  Debbie Kimbrough, PT, DPT   Time Calculation: 25 mins

## 2021-05-17 NOTE — PROGRESS NOTES
1608-Entered room for assessment and vitals and patient asked for fentanyl, explained to patient that I would have to take a look at his medications as I have not had a chance to do so and that I needed to get his vitals. BP 86/41, best out of two. I informed the patient that I could not give him his fentanyl with his BP that low because it will drop it more. Patient stated \"Just give me my blood pressure medicine and then my fentanyl, that's what they did yesterday\" Informed patient that he did not have any blood pressure medication to increase his pressure and that I would inform the doctor. Also explained that he had his oxycodone at about 2:30, patient stating his pain is 10/10. When this RN walked out the room the patient called out for fentanyl again. MD informed of BP and Albumin ordered. When I entered room to hang albumin patient was sleeping. 1746-Entered room and patient was fast asleep, BP is 82/49. Upon awakening patient asked for his fentanyl, I informed him that his blood pressure was still too low to receive IV fentanyl and it was not time for his oxycodone. Patient stated his pain is 50/10, \"everywhere\" and told this RN to \"find out what they gave me yesterday to bring it up so I can get my fentanyl\". Patient refused his nasal drops at this time. 1855-Patient rang out for fentanyl and began to yell out from his room, BP 89/46, NS infusing @75ml/hr. Patient given 7.5mg of oxycodone @ 1829. Explained to patient that he cannot have fentanyl at this time due to his BP being so low. Patient refused nasal spray/drops, would not let me try to remove his dobhoff, or attempt to blow to help remove it. 1930-Bedside shift change report given to Jakob Estes RN (oncoming nurse) by MARIA LUISA Locke (offgoing nurse). Report included the following information SBAR, Kardex, Intake/Output, MAR, Accordion and Cardiac Rhythm NSR.

## 2021-05-17 NOTE — CONSULTS
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 Defibrillation done and patient was admitted to the ICU. Notes indicate patient was hypoxic postarrest and unknown downtime prior to arrest.    From review of records, he was found to have  MRSA bacteremia in March 2021 along with aortic valve endocarditis and imaging of her brain with several ring-enhancing lesions. Infarcts were thought to be septic emboli and patient also had left residual weakness per ICU notes. It appears that patient was initially at [facility] in March and then transferred to [facility] on 4/1/2021 for evaluation of cardiac surgery. Notes indicate that after transfer she was found to have a new R MCA infarct and non-ST elevation MI with tamponade and a pericardiocentesis was done prior to surgery. He also had a cardiac arrest on the day of surgery. A bioprosthetic aortic valve replacement was done and an aortic root abscess debridement done on 4/16/2021. Notes indicate patient was trached and sent to [facility] on 4/29/2021. During this admission blood cultures obtained on 5/6/2021 were negative.   Respiratory cultures obtained on 5/7/21 was positive for ESBL E. coli in light yeast.  A CT of her chest on admission showed severe multilevel consolidation throughout both lungs consistent with severe multilobar pneumonia, cardiomegaly with small pericardial effusion and bilateral pleural effusions. MRI brain showed large subacute to chronic right PCA territory infarct. Moderate-sized subacute to chronic right MCA territory infarct. Small chronic infarcts in the right cerebellum. numerous supratentorial and infratentorial microhemorrhages suspicious for either septic emboli or thrombotic micro angiopathic reported. Transthoracic echo 5/7/2021 reported as 20 to 25% ejection fraction with no regurgitation of the aortic valve, moderate regurgitation mitral valve, mild regurgitation tricuspid valve. Mitral valve also reported as nonspecific thickening. Antibiotics  Vancomycin IV and meropenem currently  Zosyn IV 5/6/21  Levaquin 5/6/2021      He has been seen by other consultants including neurology, cardiology and podiatry. Podiatry consult noted on 5/7/2021 with paronychia of the left hallux with wound care recommended. Patient was asking for pain medications. He says his  entire left side hurts from the stroke. He denies any fevers chills nausea vomiting. denies chest pain shortness of breath. He says he had a lot of diarrhea before but that has resolved. He is trying to eat. He denies any headache or visual symptoms      Procedures  Right IJ 5/6/2021  Arterial line 5/7/2021  EEG 5/8/2020  INTERPRETATION   This is an abnormal electroencephalogram showing diffuse slowing suggestive for global cortical dysfunction which is nonspecific and may be seen in underlying metabolic/infectious/inflammatory/structural processes. No clear focal abnormalities or epileptiform activity was seen. NO seizures.   Clinical correlation recommended.         Past Medical History:  Past Medical History:   Diagnosis Date    Abscess of aortic root 4/16/2021    Acute traumatic pain 4/14/2021    Anxiety     Postoperative acute respiratory failure ([address] 4/1/2021    S/P AVR (aortic valve replacement) and aortoplasty 4/16/2021    [facility] [address]     Severe muscle deconditioning 5/1/2021    Successful cardiopulmonary resuscitation 4/14/2021         Surgical History:  Past Surgical History:   Procedure Laterality Date    HX TONSILLECTOMY           Family History:   No family history on file. Social History:     · Living Situation: from LTAC  · History of substance abuse   Patient denied smoking cigarettes or alcohol      Allergies: Allergies   Allergen Reactions    Codeine Unknown (comments)     Pt denies          Review of Systems:     10 point review of systems per HPI. All others negative    Medications:  No current facility-administered medications on file prior to encounter. Current Outpatient Medications on File Prior to Encounter   Medication Sig Dispense Refill    divalproex DR (Depakote) 500 mg tablet Take 500 mg by mouth three (3) times daily.  levETIRAcetam (KEPPRA) 750 mg tablet Take 1 Tab by mouth two (2) times a day.  60 Tab 0           Physical Exam:    Vitals:   Patient Vitals for the past 24 hrs:   Temp Pulse Resp BP SpO2   05/17/21 1229    (!) 90/51    05/17/21 1117 97.8 °F (36.6 °C) 100 18 (!) 90/51 91 %   05/17/21 0850 98.4 °F (36.9 °C) 97 21 97/60 91 %   05/17/21 0348 98.5 °F (36.9 °C) 97 16 (!) 97/55 99 %   05/16/21 2254 98.2 °F (36.8 °C) (!) 104 24 99/62 98 %   05/16/21 2135    96/65    05/16/21 2038 98.1 °F (36.7 °C) (!) 107 22 (!) 83/59 99 %   05/16/21 1546 98.7 °F (37.1 °C) (!) 122 24 116/83 97 %   ·   · GEN: NAD  · HEENT: Trach collar, no scleral icterus, no thrush , NG  · CV: S1, S2 heard regularly, sternal incision site without any open wounds or discharge noted   · lungs: C coarse bilaterally  · Abdomen: soft, non distended, non tender,   · Extremities: no edema  · Neuro: Alert to self, situation moves right upper extremity bilateral lower extremities, does not move left upper extremity well  · Skin: no rash  · Psych: None tearful  · Musculoskeletal no edema or erythema noted of the knees or ankles      Labs:   Recent Results (from the past 24 hour(s))   MAGNESIUM    Collection Time: 05/17/21  3:49 AM   Result Value Ref Range    Magnesium 1.5 (L) 1.6 - 2.4 mg/dL   PHOSPHORUS    Collection Time: 05/17/21  3:49 AM   Result Value Ref Range    Phosphorus 3.4 2.6 - 4.7 MG/DL   PROCALCITONIN    Collection Time: 05/17/21  3:49 AM   Result Value Ref Range    Procalcitonin 0.13 ng/mL   CBC WITH AUTOMATED DIFF    Collection Time: 05/17/21  3:49 AM   Result Value Ref Range    WBC 8.0 4.1 - 11.1 K/uL    RBC 3.85 (L) 4.10 - 5.70 M/uL    HGB 10.4 (L) 12.1 - 17.0 g/dL    HCT 34.4 (L) 36.6 - 50.3 %    MCV 89.4 80.0 - 99.0 FL    MCH 27.0 26.0 - 34.0 PG    MCHC 30.2 30.0 - 36.5 g/dL    RDW 16.6 (H) 11.5 - 14.5 %    PLATELET 462 895 - 961 K/uL    MPV 11.6 8.9 - 12.9 FL    NRBC 0.0 0  WBC    ABSOLUTE NRBC 0.00 0.00 - 0.01 K/uL    NEUTROPHILS 50 32 - 75 %    LYMPHOCYTES 24 12 - 49 %    MONOCYTES 21 (H) 5 - 13 %    EOSINOPHILS 3 0 - 7 %    BASOPHILS 1 0 - 1 %    IMMATURE GRANULOCYTES 1 (H) 0.0 - 0.5 %    ABS. NEUTROPHILS 4.0 1.8 - 8.0 K/UL    ABS. LYMPHOCYTES 1.9 0.8 - 3.5 K/UL    ABS. MONOCYTES 1.7 (H) 0.0 - 1.0 K/UL    ABS. EOSINOPHILS 0.2 0.0 - 0.4 K/UL    ABS. BASOPHILS 0.1 0.0 - 0.1 K/UL    ABS. IMM.  GRANS. 0.1 (H) 0.00 - 0.04 K/UL    DF SMEAR SCANNED      RBC COMMENTS ANISOCYTOSIS  1+       METABOLIC PANEL, COMPREHENSIVE    Collection Time: 05/17/21  3:49 AM   Result Value Ref Range    Sodium 136 136 - 145 mmol/L    Potassium 3.9 3.5 - 5.1 mmol/L    Chloride 103 97 - 108 mmol/L    CO2 25 21 - 32 mmol/L    Anion gap 8 5 - 15 mmol/L    Glucose 90 65 - 100 mg/dL    BUN 7 6 - 20 MG/DL    Creatinine 0.36 (L) 0.70 - 1.30 MG/DL    BUN/Creatinine ratio 19 12 - 20      GFR est AA >60 >60 ml/min/1.73m2    GFR est non-AA >60 >60 ml/min/1.73m2    Calcium 9.4 8.5 - 10.1 MG/DL    Bilirubin, total 0.6 0.2 - 1.0 MG/DL    ALT (SGPT) 17 12 - 78 U/L    AST (SGOT) 22 15 - 37 U/L    Alk.  phosphatase 85 45 - 117 U/L    Protein, total 6.6 6.4 - 8.2 g/dL    Albumin 2.9 (L) 3.5 - 5.0 g/dL    Globulin 3.7 2.0 - 4.0 g/dL    A-G Ratio 0.8 (L) 1.1 - 2.2     VANCOMYCIN, TROUGH    Collection Time: 05/17/21  3:49 AM   Result Value Ref Range    Vancomycin,trough 21.9 (HH) 5.0 - 10.0 ug/mL    Reported dose date NOT PROVIDED      Reported dose time: NOT PROVIDED      Reported dose: NOT PROVIDED UNITS   ECHO ADULT FOLLOW-UP OR LIMITED    Collection Time: 05/17/21  2:07 PM   Result Value Ref Range    IVSd 1.03 (A) 0.60 - 1.00 cm    LVIDd 6.29 (A) 4.20 - 5.90 cm    LVIDs 5.63 cm    LVPWd 1.43 (A) 0.60 - 1.00 cm    BP EF 27.9 (A) 55.0 - 100.0 percent    LV Ejection Fraction MOD 2C 25 percent    LV Ejection Fraction MOD 4C 30 percent    LV ED Vol A2C 121.76 mL    LV ED Vol A4C 150.72 mL    LV ED Vol .61 67.0 - 155.0 mL    LV ES Vol A2C 91.88 mL    LV ES Vol A4C 104.80 mL    LV ES Vol .35 (A) 22.0 - 58.0 mL    Left Atrium Major Axis 3.29 cm    LV Mass .9 88.0 - 224.0 g    LV Mass AL Index 205.8 49.0 - 115.0 g/m2    LVES Vol Index BP 59.4 mL/m2    LVED Vol Index BP 82.5 mL/m2    Left Atrium Minor Axis 1.93 cm    LVED Vol Index A4C 88.4 mL/m2    LVED Vol Index A2C 71.4 mL/m2    LVES Vol Index A4C 61.5 mL/m2    LVES Vol Index A2C 53.9 mL/m2       Microbiology Data:      Wound 5/6/21  Specimen Information: Toe; Wound        Component Value Ref Range & Units Status   Special Requests: NO SPECIAL REQUESTS    Final   GRAM STAIN NO DEFINITE ORGANISM SEEN    Final   Culture result:    Final   LIGHT MIXED SKIN LENORA ISOLATED    Result History        Sputum ET suction 5/7/21  Specimen Information: Sputum,ET Suction        Component Value Ref Range & Units Status   Special Requests: NO SPECIAL REQUESTS    Final   GRAM STAIN 1+ WBCS SEEN    Final   GRAM STAIN FEW EPITHELIAL CELLS SEEN    Final   GRAM STAIN OCCASIONAL BUDDING YEAST    Final   Culture result: Abnormal     Final   LIGHT ESCHERICHIA COLI ** (EXTENDED SPECTRUM BETA LACTAMASE ) **    Culture result: Abnormal     Final   LIGHT YEAST, (APPARENT CANDIDA ALBICANS)    Susceptibility     Escherichia coli     ALYCIA    Amikacin ($) <=2 ug/mL S    Ampicillin ($) >=32 ug/mL R    Ampicillin/sulbactam ($) >=32 ug/mL R    Cefazolin ($) >=64 ug/mL R    Cefepime ($$)  R    Cefoxitin 16 ug/mL I    Ceftazidime ($)  R    Ceftriaxone ($) >=64 ug/mL R    Ciprofloxacin ($) <=0.25 ug/mL S    Gentamicin ($) <=1 ug/mL S    Levofloxacin ($) <=0.12 ug/mL S    Meropenem ($$) <=0.25 ug/mL S    Piperacillin/Tazobac ($) 8 ug/mL S    Tobramycin ($) <=1 ug/mL S    Trimeth/Sulfa <=20 ug/mL S        5/6/21 blod  Component Value Ref Range & Units Status   Special Requests: NO SPECIAL REQUESTS    Final   Culture result: NO GROWTH 5 DAYS    Final   Result           Blood 3/24-3/30/21  Blood Culture Result    Final   Gram stain Gram Positive Cocci In Clusters   Called to/read back MARIA LUISA TOLEDO, ICU4   on 3/25/21   at 0925   by DM    Isolate Abnormal     Final   This isolate is presumed to be clindamycin resistant based on inducible clindamycin resistance. Clindamycin may still be effective in some patients. Per CLIS guidelines. **Methicillin Resistant Staphylococcus aureus**    Susceptibility     Staphylococcus aureus Methcillin Resistant     ALYCIA    Cefoxitin screen +       Clindamycin ($) 0.25  R    Erythromycin ($$$$) 4  R    Inducible Clindamycin Resistance +       Levofloxacin ($) 0.25  S    Linezolid ($$$$$) 2  S    Oxacillin >=4  R    Penicillin >=0.5  R    Tetracycline <=1  S    Trimeth/Sulfa <=10  S    Vancomycin ($) 1  S        Imaging:   TTE 5/7/21  Left Ventricle Normal wall thickness. Dilated left ventricle. The estimated EF is 20 - 25%. Severely reduced systolic function. There is inconclusive left ventricular diastolic function. Left Atrium Normal cavity size. Right Ventricle Normal cavity size. Reduced systolic function.    Right Atrium Normal cavity size. Aortic Valve Trileaflet valve structure and no regurgitation. There is mild aortic stenosis. Mitral Valve No stenosis. Mitral valve non-specific thickening. Mild to moderate regurgitation. Tricuspid Valve Normal valve structure and no stenosis. Mild regurgitation. Pulmonic Valve Pulmonic valve not well visualized. Aorta Normal aortic root. Pulmonary Artery Pulmonary hypertension found to be mild to moderate. Pericardium No evidence of pericardial effusion. MRI Brain  FINDINGS:  Large subacute to chronic right PCA territory infarct involving the right  temporo-occipital lobe and thalamus with prominent areas of cortical laminar  necrosis as demonstrated by T1 shortening. Moderate size subacute to chronic  right MCA territory infarct involving the right frontal lobe and right basal  ganglia, also with associated cortical laminar necrosis. There are small chronic  infarcts noted in the right cerebellum. There is petechial hemorrhage associated  with the right MCA and right PCA territory infarcts.     There are numerous microhemorrhages scattered throughout the cerebral  hemispheres and infratentorially within the cerebellum, including located  peripherally in the cerebral hemispheres, but also in the deep gray nuclei.     There is suspected mild cortical restricted diffusion seen throughout the left  cerebral hemisphere and in the right parieto-occipital and temporal lobes.     The ventricles are normal in size and position. Few scattered periventricular  and deep white matter T2/FLAIR hyperintensities, suggestive of mild chronic  microvascular ischemic disease. There is no extra-axial fluid collection or mass  effect. There is no cerebellar tonsillar herniation. Expected arterial  flow-voids are present.     The paranasal sinuses are clear. Large bilateral mastoid effusions. The orbital  contents are within normal limits.  No significant osseous or scalp lesions are  identified.     IMPRESSION     1. Large subacute to chronic right PCA territory infarct as above. Moderate size  subacute to chronic right MCA territory infarct as above. Small chronic infarcts  in the right cerebellum. 2. Numerous supratentorial and infratentorial microhemorrhages as above,  suspicious for either septic emboli or thrombotic microangiopathy (as seen with  sepsis, TTP, etc.). 3. Suspected mild cortical restricted diffusion throughout the left cerebral  hemisphere and in the right posterior cerebral hemisphere, favored to represent  postictal MR changes. 4. Large bilateral mastoid effusions. CTA chest   modulation.     FINDINGS: Study is limited by patient motion. A tracheostomy tube is in  satisfactory position. There are lines present in appropriate position.     There is mild mediastinal lymphadenopathy. The heart is enlarged. There is a  small pericardial effusion.     No filling defect is seen within the pulmonary arterial system to suggest  pulmonary embolus. The aorta is normal in caliber.     There is severe multilevel groundglass opacification throughout both lungs, as  well as severe multilobar bilateral consolidation. There is no pneumothorax. There are moderate bilateral pleural effusions.     Limited evaluation of the upper abdomen demonstrates no abnormality. There are  median sternotomy wires.     IMPRESSION  1. No evidence of pulmonary embolus. 2.  Severe multilevel consolidation throughout both lungs, consistent with  severe multilobar pneumonia. 3. Cardiomegaly with small pericardial effusion. 4. Moderate bilateral pleural effusions. CT head      IMPRESSION     1. Large territory acute infarctions of the right occipital lobe and right  frontal lobe. 2. No intracranial hemorrhage.   3. No hydrocephalus.     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        1) cardiac arrest  2) history of MRSA bacteremia, endocarditis of the aortic valve with perforation/abscess status post aortic valve replacement and aortic root abscess debridement 4/16/2021 at [facility]  3) septic emboli, CNS infarcts,   4) CNS abscess, ring-enhancing lesions, meningitis history  5) history of probable splenic and renal infarcts from septic emboli  6) systolic dysfunction   7) 3/24- 3/30/2021 MRSA bacteremia  8) ESBL E. coli pneumonia  ( CX + 5/7/21) , CT with multilobar pneumonia severe  9) polysubstance abuse        Plan  Continue meropenem with plan for 7 to 10-day course  Continue IV vancomycin renally dosed for trough of 15-20  Per review of external records, vancomycin plan till 5/28/2021  From an infectious disease standpoint, patient's blood cultures are negative during this admission and will plan with the same duration of treatment unless new findings of worsening  TTE/new abscess or concerns for infection    ICD device implant per cardiology. Antibiotics can lower seizure threshold and needs close monitoring, keppra being adjusted by  Neurology, levels per team   Antibiotic side effects/adverse effects/toxicities discussed including gastrointestinal, renal, hematological , ability to lower siezure threshold, risk for C diff infection. Probiotics, daily yogurt encouraged. Thank for the opportunity to participate in the care of this patient. Please contact with questions or concerns.

## 2021-05-17 NOTE — PROGRESS NOTES
SLP Contact Note    SLP treatment complete. Pt tolerating PMV with no supplemental oxygen. Tolerating regular diet/thin liquids. Would consider trach downsize to a 4 cuffless with red-capping trial given this. Hopeful that pt can work toward no longer requiring trach. Full note to follow.       Thank you,  ERNESTO Munoz.Ed, [id number] [facility]  Speech-Language Pathologist

## 2021-05-17 NOTE — PROGRESS NOTES
Neurology Progress Note  Rogelio Bravo NP    Patient: Alok Burger MRN: [id number]  SSN: [id number]    YOB: 1995  Age: 22 y.o. Sex: male      Chief Complaint: Cardiac arrest     Initial HPI:  Alok Burger is a 22 y.o. male who presented to the [facility] ED after cardiac arrest at [facility] at approximately 1500 on 5/6/21. He has a past medical history of seizures, anxiety, polysubstance abuse with IVD use. He was admitted to [facility] on 3/24/21 with AMS in the setting of polysubstance abuse positive for cocaine, heroine, and methamphetamines. He was found to have septic MRSA bacteremia, MSSA endocarditis. Imaging and MRI found Right PCA infarct and several abscesses in the right temporal and parietal lobes. Infarcts were thought to be due to septic emboli. He had left sided residual weakness. He was transferred to [facility] on 4/1 for aortic valve surgery. After transfer he was found to have a new right MCA infarct and also had a type 2 NSTEMI and tamponade with pericardiocentesis done prior to surgery. He had bioprosthetic Aortic Valve Replacement and Aortic Root Abscess Debridement done on 4/16/2021 and suffered cardiac arrest the day of the surgery. He was trached and sent to [facility] on 4/29/21 and has been weaning off of the vent. He has been speaking, following commands and has begun moving his left foot. Still with left arm weakness. His last known well time on 5/6 was just before 1300. He was doing well off of the vent but he was getting anxious and they put him back on and sedated him. His mother stated that around 1500 his eyes were rolled back in his head. She got a call soon after she left saying that he had arrested. Per ER note the patient arrested around 1500 and was pulseless. He had 2 rounds of CPR and meds. He was defibrillated x 1 before ROSC was achieved.  He was hypoxic post arrest. Patient was sent to [facility] ED and admitted to the ICU for further evaluation and treatment. He was sent down for [facility] and CTA chest. CTA chest showed no PE. There was severe multilevel consolidation throughout both lungs consistent with severe multilobar PNA. He has cardiomegaly with small pericardial effusion and moderate bilateral pleural effusions. His CTH showed large territory acute infarctions of the right occipital lobe and right frontal lobe. No ICH. No hydrocephalus. Results discussed with Radiologist Dr. Mehrdad Decker and Dr. Arden Gardner with [facility]. Dr. Arden Gardner felt the right frontal infarct was more chronic and the right PCA was late subacute 3-4 days. The basilar artery did not look dense nor the venous sinuses. Dr. Arden Gardner indicated that CTA H/N and CTP was not necessary at this time as patient would not be a candidate for thrombectomy due to his instability and state of his lungs. Subjective:   Patient now awake, alert and following commands. Complains  of left side pain. Valproic levels remain subtherapeutic due to the interaction with  Meropenem His Keppra was then increase to 1500 mg BID. There have been no seizures since the discontinuation of Depakote. Past Medical History:   Diagnosis Date    Abscess of aortic root 4/16/2021    Acute traumatic pain 4/14/2021    Anxiety     Postoperative acute respiratory failure ([address] 4/1/2021    S/P AVR (aortic valve replacement) and aortoplasty 4/16/2021    Seizure ([address]     Severe muscle deconditioning 5/1/2021    Successful cardiopulmonary resuscitation 4/14/2021     No family history on file. Social History     Tobacco Use    Smoking status: Current Every Day Smoker     Packs/day: 1.00    Smokeless tobacco: Never Used   Substance Use Topics    Alcohol use: Yes     Comment: socially      Prior to Admission Medications   Prescriptions Last Dose Informant Patient Reported? Taking?   divalproex DR (Depakote) 500 mg tablet   Yes No   Sig: Take 500 mg by mouth three (3) times daily.    levETIRAcetam (KEPPRA) 750 mg tablet   No No   Sig: Take 1 Tab by mouth two (2) times a day. Facility-Administered Medications: None       Allergies   Allergen Reactions    Codeine Unknown (comments)     Pt denies        Review of Systems:  Review of systems not obtained due to patient factors. Objective:     Vitals:    05/16/21 2135 05/16/21 2254 05/17/21 0348 05/17/21 0850   BP: 96/65 99/62 (!) 97/55 97/60   Pulse:  (!) 104 97 97   Resp:  24 16 21   Temp:  98.2 °F (36.8 °C) 98.5 °F (36.9 °C) 98.4 °F (36.9 °C)   SpO2:  98% 99% 91%   Weight:   122 lb 12.8 oz (55.7 kg)    Height:          Physical Exam:  GENERAL: NAD, trached on vent. SKIN: Warm, dry, color appropriate for ethnicity. Bandage on L great toe noted. Neurologic Exam:  Mental Status:  Awake, alert, nodding yes/no, following commands by squeezing right hand and wiggling toes. Language:    Mouths words, on trach  Cranial Nerves:   Pupils 3 mm, equal, round and reactive to light. Blinks to threat bilaterally. Mild left facial asymmetry. Motor:    Spontaneous movement against gravity noted in right upper and bilateral lower extremities. Left arm flaccid. Generalized atrophy noted in limbs       Sensation:    Withdraws to noxious stimuli in RUE and BLE. No withdrawal in LUE. Reflexes:    Positive Babinskis    Coordination & Gait: FTN intact.  Deferred gait   Labs:  Lab Results   Component Value Date/Time    WBC 8.0 05/17/2021 03:49 AM    HGB 10.4 (L) 05/17/2021 03:49 AM    HCT 34.4 (L) 05/17/2021 03:49 AM    PLATELET 046 02/85/2600 03:49 AM    MCV 89.4 05/17/2021 03:49 AM      Lab Results   Component Value Date/Time    Sodium 136 05/17/2021 03:49 AM    Potassium 3.9 05/17/2021 03:49 AM    Chloride 103 05/17/2021 03:49 AM    CO2 25 05/17/2021 03:49 AM    Anion gap 8 05/17/2021 03:49 AM    Glucose 90 05/17/2021 03:49 AM    BUN 7 05/17/2021 03:49 AM    Creatinine 0.36 (L) 05/17/2021 03:49 AM    BUN/Creatinine ratio 19 05/17/2021 03:49 AM    GFR est AA >60 05/17/2021 03:49 AM    GFR est non-AA >60 05/17/2021 03:49 AM    Calcium 9.4 05/17/2021 03:49 AM     Lab Results   Component Value Date/Time     (H) 03/24/2021 08:15 PM    Troponin-I 10.500 ([facility]) 03/25/2021 06:25 AM    Troponin-I, Qt. <0.05 05/06/2021 06:00 PM       Imaging:  CT Results (maximum last 3): Results from [place] encounter on 05/06/21   CTA CHEST W OR W WO CONT    Narrative INDICATION:   cardiac arrest eval for PE     COMPARISON:  None    TECHNIQUE:  Following the uneventful intravenous administration of 100 cc Isovue  898, thin helical axial images were obtained through the chest. Postprocessing  was performed. 3D image postprocessing was performed. CT dose reduction was achieved through the use of a standardized protocol  tailored for this examination and automatic exposure control for dose  modulation. FINDINGS: Study is limited by patient motion. A tracheostomy tube is in  satisfactory position. There are lines present in appropriate position. There is mild mediastinal lymphadenopathy. The heart is enlarged. There is a  small pericardial effusion. No filling defect is seen within the pulmonary arterial system to suggest  pulmonary embolus. The aorta is normal in caliber. There is severe multilevel groundglass opacification throughout both lungs, as  well as severe multilobar bilateral consolidation. There is no pneumothorax. There are moderate bilateral pleural effusions. Limited evaluation of the upper abdomen demonstrates no abnormality. There are  median sternotomy wires. Impression 1. No evidence of pulmonary embolus. 2.  Severe multilevel consolidation throughout both lungs, consistent with  severe multilobar pneumonia. 3. Cardiomegaly with small pericardial effusion. 4. Moderate bilateral pleural effusions. CT HEAD WO CONT    Narrative EXAM: CT HEAD WO CONT    INDICATION: unresponsive post arrest    COMPARISON: None. CONTRAST: None.     TECHNIQUE: Unenhanced CT of the head was performed using 5 mm images. Brain and  bone windows were generated. Coronal and sagittal reformats. CT dose reduction  was achieved through use of a standardized protocol tailored for this  examination and automatic exposure control for dose modulation. FINDINGS:  There is no hydrocephalus. There are multiple acute infarctions throughout the  right cerebral hemisphere, involving much of the occipital lobe, as well as much  of the frontal lobe. There is an additional superior right frontoparietal  infarct. There is no intracranial hemorrhage or midline shift. The basilar  cisterns are open. The bone windows demonstrate no abnormalities. The visualized portions of the  paranasal sinuses and mastoid air cells are clear. Impression 1. Large territory acute infarctions of the right occipital lobe and right  frontal lobe. 2. No intracranial hemorrhage. 3. No hydrocephalus. Results from Hospital Encounter encounter on 03/24/21   CTA HEAD    Narrative CTA OF THE HEAD     INDICATION: septic emboli, evaluate for septic aneurysms    COMPARISON: No relevant studies. TECHNIQUE: Noncontrast CT the head performed followed by CTA of the head with   intravenous contrast. Multiplanar two-dimensional and three-dimensional maximum  intensity projection reformats performed and reviewed. All CT exams at this facility use one or more dose reduction techniques  including automatic exposure control, mA/kV adjustment per patient's size, or  iterative reconstruction technique. FINDINGS:    Noncontrast CT demonstrates edema related to the patient's right occipital lobe  infarction. Superimposed vasogenic edema related to patient's microabscesses  disease. There is effacement of the posterior horn of the right lateral  ventricle with asymmetric dilatation of the temporal horn, similar to prior MRI. Postcontrast images demonstrate diffuse leptomeningeal enhancement, better  appreciated on MRI. Multiple areas of early developing abscesses and cerebritis  better appreciated on MRI. There is occlusion of the P2 MP3 segments of the right posterior cerebral artery  seen best on the 3-D reconstructed images. Visualized portions of the left  posterior cerebral artery, basilar artery, and superior cerebellar arteries  appear patent. Bilateral intracranial segments of the internal carotid arteries, bilateral  middle cerebral arteries, and bilateral anterior cerebral arteries are patent. Anterior communicating artery appears patent. Left posterior commuting artery  appears patent. There is no mycotic aneurysm. Hyperdensity along the right  anterior falx seen best on image 13 series 11 corresponds to calcification on  noncontrast study. Impression IMPRESSION:     1. No evidence of mycotic aneurysm. 2.  Occlusion of the right P2/P3. 3. Infarction within the right occipital and temporal lobes. Edema related to  the right-sided small abscesses, better appreciated on MRI. No definite new  areas of enhancement. 4.  Diffuse leptomeningeal enhancement compatible with patient's history of  meningitis.        Assessment:     Hospital Problems  Never Reviewed          Codes Class Noted POA    Severe muscle deconditioning ICD-10-CM: R29.898  ICD-9-CM: 781.99 Acute 5/1/2021 Yes        S/P AVR (aortic valve replacement) and aortoplasty ICD-10-CM: Z95.2  ICD-9-CM: V43.3 Acute 4/16/2021 Yes        Abscess of aortic root ICD-10-CM: I51.89  ICD-9-CM: 429.89 Acute 4/16/2021 Yes        Contusion of chest wall ICD-10-CM: Q53.325W  ICD-9-CM: 922.1 Acute 4/16/2021 Yes        Successful cardiopulmonary resuscitation ICD-10-CM: Z92.89  ICD-9-CM: V12.53 Acute 4/14/2021 Yes        Acute traumatic pain ICD-10-CM: G89.11  ICD-9-CM: 338.11 Acute 4/14/2021 Yes        Postoperative acute respiratory failure ([facility] ICD-10-CM: F62.221  ICD-9-CM: 518.51 Acute 4/1/2021 Yes        Severe protein-calorie malnutrition ([address] ICD-10-CM: E43  ICD-9-CM: 037  5/11/2021 Yes        * (Principal) Cardiac arrest with pulseless electrical activity ([facility] [address] ICD-10-CM: I46.9  ICD-9-CM: 427.5  5/6/2021 Yes            Plan:   1.) CVA. Large territory subacute infarctions of the right occiptal lobe and right frontal lobe on CTH. Most likely caused by septic emboli.              - Continue frequent neuro checks              - A1C 5.4, LDL pending goal <70              - MRI brain on 5/9 shows large subacute to chronic right PCA territory infarct. Moderate size subacute to chronic right MCA territory infarct. Small chronic infarcts in the right cerebellum. Numerous supratentorial and infratentorial micro-hemorrhages suspicious for either septic emboli or thrombotic microangioapthy. Suspected mild cortical restricted diffusion throughout the left cerebral hemisphere and in the right posterior cerebral hemisphere, favored to represent  postictal MR changes.               - ECHO LVEF 20-25%. Dilated left ventricle. Severely reduced systolic function. Mild tricuspid valve regurg. Mild to moderate pulmonary HTN,. Reduced systolic function. Mild to moderate mitral valve regurg and non-specific thickening. Mild aortic stenosis. No evidence of pericardial effusion.               - PT/OT/SLP evals              - Stroke education              - SBP goal 100-160              - Home meds per hospitalist      2. )Hx of seizure disorder.    -Continue Keppra 1500mg BID. - VPA discontinue due to the interaction with meropenem resulting is subtherapticValproic acid level 5/17 was 16    -EEG performed 5/8 showed no epileptiform activity or seizures, with slowing only.     -Seizure precautions    3.) S/P cardiac arrest.    -Sedation weaned and patient alert and following commands.    -Supportive care per Intensivist.      Thank you very much for this consultation. No further neurologic recommendations at this time.        Thank you for this consult and participating in the care of this patient.   Signed By: Lorna Cocking, NP     May 17, 2021        ==============================================================

## 2021-05-17 NOTE — PROGRESS NOTES
Cardiology Progress Note                                        Admit Date: 5/6/2021    Assessment/Plan:       1. Sp cardiac arrest post op 4/17/21,   cardiac arrest Vfib 5/6/21  ekg rbbb qt 460 msec. ICD rec for secondary prevention if life expectancy > 1yr.    2. Hx:  4/16/21 at [facility] bioprosthetic AVR and root abscess debridement. He also had a pericardiocentesis due to tamponade prior to surgery  3. Recent endocardititis 3/24/21 Staph  4. CM  Ef 25%   5.  right MCA infarct and several abscesses in the right temporal and parietal lobes. Infarcts were thought to be due to septic emboli. He had left sided residual weakness. cta 5/6/21:  multiple acute infarctions throughout the  right cerebral hemisphere, involving much of the occipital lobe, as well as much  of the frontal lobe. There is an additional superior right frontoparietal  infarct  6.  severe multilevel consolidation throughout both lungs consistent with severe multilobar PNA. 7. PNA:  Severe multilevel consolidation throughout both lungs, consistent with  severe multilobar pneumonia.     Continue current regimen; no changes  ICD before DC, this vs Lifevest may be determined by antibiotic requirements  Echo ordered  Add dapagliflozin on dc as not on formulary       Maritza Garay is a 22 y.o. male with     PROBLEM LIST:  Patient Active Problem List    Diagnosis Date Noted    Severe muscle deconditioning 05/01/2021     Priority: 3 - Three     Class: Acute    S/P AVR (aortic valve replacement) and aortoplasty 04/16/2021     Priority: 3 - Three     Class: Acute    Abscess of aortic root 04/16/2021     Priority: 3 - Three     Class: Acute    Contusion of chest wall 04/16/2021     Priority: 3 - Three     Class: Acute    Successful cardiopulmonary resuscitation 04/14/2021     Priority: 3 - Three     Class: Acute    Acute traumatic pain 04/14/2021     Priority: 3 - Three     Class: Acute    Postoperative acute respiratory failure ([facility] [address] [address] 04/01/2021     Priority: 3 - Three     Class: Acute    Severe protein-calorie malnutrition ([facility] 05/11/2021    Cardiac arrest with pulseless electrical activity ([facility] 05/06/2021    Cerebral abscess (embolic) 31/33/6725    Drug abuse, cocaine type ([facility] 03/29/2021    Endocarditis due to methicillin susceptible Staphylococcus aureus (MSSA) 03/25/2021    Type 2 myocardial infarction ([facility] 03/24/2021         Subjective:     Natalia Valerio Holota gestures appropriately. Likely orthostatic when sat on edge of bed with PT    Visit Vitals  BP 97/60 (BP 1 Location: Left upper arm, BP Patient Position: At rest)   Pulse 97   Temp 98.4 °F (36.9 °C)   Resp 21   Ht 5' 11\" (1.803 m)   Wt 55.7 kg (122 lb 12.8 oz)   SpO2 91%   BMI 17.13 kg/m²       Intake/Output Summary (Last 24 hours) at 5/17/2021 1059  Last data filed at 5/16/2021 1546  Gross per 24 hour   Intake    Output 850 ml   Net -850 ml       Objective:      Physical Exam:  HEENT: Perrla, EOMI  Neck: No JVD,  No thyroidmegaly  Resp: CTA bilaterally;  No wheezes or rales  CV: RRR s1s2 No murmur no s3  Abd:Soft, Nontender  Ext: No edema  Neuro: unresponsive on vent   Skin: Warm, Dry, Intact  Pulses: 2+ DP/PT/Rad      Telemetry: normal sinus rhythm    Current Facility-Administered Medications   Medication Dose Route Frequency    vancomycin (VANCOCIN) 1,000 mg in 0.9% sodium chloride 250 mL (VIAL-MATE)  1,000 mg IntraVENous Q8H    oxymetazoline (AFRIN) 0.05 % nasal spray 3 Spray  3 Spray Left Nostril BID    sodium chloride (OCEAN) 0.65 % nasal squeeze bottle 2 Spray  2 Spray Left Nostril Q30MIN    levETIRAcetam (KEPPRA) tablet 1,500 mg  1,500 mg Oral BID    famotidine (PEPCID) tablet 20 mg  20 mg Oral BID    oxyCODONE IR (ROXICODONE) tablet 5 mg  5 mg Oral Q4H PRN    lidocaine 4 % patch 1 Patch  1 Patch TransDERmal Q24H    fentaNYL citrate (PF) injection 50 mcg  50 mcg IntraVENous Q4H PRN    meropenem (MERREM) 500 mg in 0.9% sodium chloride (MBP/ADV) 50 mL MBP 0.5 g IntraVENous Q6H    Vancomycin - pharmacy to dose   Other Rx Dosing/Monitoring    naloxone (NARCAN) injection 0.1 mg  0.1 mg IntraVENous PRN    miconazole (MICOTIN) 2 % cream   Topical BID    traZODone (DESYREL) tablet 50 mg  50 mg Oral QHS PRN    carvediloL (COREG) tablet 3.125 mg  3.125 mg Oral BID WITH MEALS    albuterol-ipratropium (DUO-NEB) 2.5 MG-0.5 MG/3 ML  3 mL Nebulization Q4H PRN    spironolactone (ALDACTONE) tablet 12.5 mg  12.5 mg Oral DAILY    hydrALAZINE (APRESOLINE) 20 mg/mL injection 10 mg  10 mg IntraVENous Q6H PRN    sacubitriL-valsartan (ENTRESTO) 24-26 mg tablet 1 Tab  1 Tab Oral Q12H    L.acidophilus-paracasei-S.thermophil-bifidobacter (RISAQUAD) 8 billion cell capsule  1 Cap Nasogastric DAILY    heparin (porcine) injection 5,000 Units  5,000 Units SubCUTAneous Q8H    melatonin tablet 3 mg  3 mg Oral QHS    0.9% sodium chloride infusion 250 mL  250 mL IntraVENous PRN    balsam peru-castor oiL (VENELEX) ointment   Topical BID    sodium chloride (NS) flush 5-40 mL  5-40 mL IntraVENous Q8H    sodium chloride (NS) flush 5-40 mL  5-40 mL IntraVENous PRN    acetaminophen (TYLENOL) tablet 650 mg  650 mg Oral Q6H PRN    Or    acetaminophen (TYLENOL) suppository 650 mg  650 mg Rectal Q6H PRN    polyethylene glycol (MIRALAX) packet 17 g  17 g Oral DAILY PRN    ondansetron (ZOFRAN) injection 4 mg  4 mg IntraVENous Q6H PRN    chlorhexidine (ORAL CARE KIT) 0.12 % mouthwash 15 mL  15 mL Oral Q12H    glucose chewable tablet 16 g  4 Tab Oral PRN    dextrose (D50W) injection syrg 12.5-25 g  25-50 mL IntraVENous PRN    glucagon (GLUCAGEN) injection 1 mg  1 mg IntraMUSCular PRN         Data Review:   Labs:    Recent Results (from the past 24 hour(s))   VALPROIC ACID    Collection Time: 05/16/21 11:28 AM   Result Value Ref Range    Valproic acid 16 (L) 50 - 100 ug/ml   MAGNESIUM    Collection Time: 05/17/21  3:49 AM   Result Value Ref Range    Magnesium 1.5 (L) 1.6 - 2.4 mg/dL PHOSPHORUS    Collection Time: 05/17/21  3:49 AM   Result Value Ref Range    Phosphorus 3.4 2.6 - 4.7 MG/DL   PROCALCITONIN    Collection Time: 05/17/21  3:49 AM   Result Value Ref Range    Procalcitonin 0.13 ng/mL   CBC WITH AUTOMATED DIFF    Collection Time: 05/17/21  3:49 AM   Result Value Ref Range    WBC 8.0 4.1 - 11.1 K/uL    RBC 3.85 (L) 4.10 - 5.70 M/uL    HGB 10.4 (L) 12.1 - 17.0 g/dL    HCT 34.4 (L) 36.6 - 50.3 %    MCV 89.4 80.0 - 99.0 FL    MCH 27.0 26.0 - 34.0 PG    MCHC 30.2 30.0 - 36.5 g/dL    RDW 16.6 (H) 11.5 - 14.5 %    PLATELET 558 171 - 429 K/uL    MPV 11.6 8.9 - 12.9 FL    NRBC 0.0 0  WBC    ABSOLUTE NRBC 0.00 0.00 - 0.01 K/uL    NEUTROPHILS 50 32 - 75 %    LYMPHOCYTES 24 12 - 49 %    MONOCYTES 21 (H) 5 - 13 %    EOSINOPHILS 3 0 - 7 %    BASOPHILS 1 0 - 1 %    IMMATURE GRANULOCYTES 1 (H) 0.0 - 0.5 %    ABS. NEUTROPHILS 4.0 1.8 - 8.0 K/UL    ABS. LYMPHOCYTES 1.9 0.8 - 3.5 K/UL    ABS. MONOCYTES 1.7 (H) 0.0 - 1.0 K/UL    ABS. EOSINOPHILS 0.2 0.0 - 0.4 K/UL    ABS. BASOPHILS 0.1 0.0 - 0.1 K/UL    ABS. IMM. GRANS. 0.1 (H) 0.00 - 0.04 K/UL    DF SMEAR SCANNED      RBC COMMENTS ANISOCYTOSIS  1+       METABOLIC PANEL, COMPREHENSIVE    Collection Time: 05/17/21  3:49 AM   Result Value Ref Range    Sodium 136 136 - 145 mmol/L    Potassium 3.9 3.5 - 5.1 mmol/L    Chloride 103 97 - 108 mmol/L    CO2 25 21 - 32 mmol/L    Anion gap 8 5 - 15 mmol/L    Glucose 90 65 - 100 mg/dL    BUN 7 6 - 20 MG/DL    Creatinine 0.36 (L) 0.70 - 1.30 MG/DL    BUN/Creatinine ratio 19 12 - 20      GFR est AA >60 >60 ml/min/1.73m2    GFR est non-AA >60 >60 ml/min/1.73m2    Calcium 9.4 8.5 - 10.1 MG/DL    Bilirubin, total 0.6 0.2 - 1.0 MG/DL    ALT (SGPT) 17 12 - 78 U/L    AST (SGOT) 22 15 - 37 U/L    Alk.  phosphatase 85 45 - 117 U/L    Protein, total 6.6 6.4 - 8.2 g/dL    Albumin 2.9 (L) 3.5 - 5.0 g/dL    Globulin 3.7 2.0 - 4.0 g/dL    A-G Ratio 0.8 (L) 1.1 - 2.2     VANCOMYCIN, TROUGH    Collection Time: 05/17/21  3:49 AM   Result Value Ref Range    Vancomycin,trough 21.9 (HH) 5.0 - 10.0 ug/mL    Reported dose date NOT PROVIDED      Reported dose time: NOT PROVIDED      Reported dose: NOT PROVIDED UNITS

## 2021-05-18 ENCOUNTER — APPOINTMENT (OUTPATIENT)
Dept: GENERAL RADIOLOGY | Age: 26
DRG: 161 | End: 2021-05-18
Attending: HOSPITALIST
Payer: MEDICAID

## 2021-05-18 LAB
ALBUMIN SERPL-MCNC: 3.1 G/DL (ref 3.5–5)
ALBUMIN/GLOB SERPL: 0.9 {RATIO} (ref 1.1–2.2)
ALP SERPL-CCNC: 83 U/L (ref 45–117)
ALT SERPL-CCNC: 16 U/L (ref 12–78)
ANION GAP SERPL CALC-SCNC: 9 MMOL/L (ref 5–15)
AST SERPL-CCNC: 12 U/L (ref 15–37)
BASOPHILS # BLD: 0 K/UL (ref 0–0.1)
BASOPHILS NFR BLD: 0 % (ref 0–1)
BILIRUB SERPL-MCNC: 0.6 MG/DL (ref 0.2–1)
BUN SERPL-MCNC: 7 MG/DL (ref 6–20)
BUN/CREAT SERPL: 20 (ref 12–20)
CALCIUM SERPL-MCNC: 9.2 MG/DL (ref 8.5–10.1)
CHLORIDE SERPL-SCNC: 103 MMOL/L (ref 97–108)
CO2 SERPL-SCNC: 24 MMOL/L (ref 21–32)
CREAT SERPL-MCNC: 0.35 MG/DL (ref 0.7–1.3)
DIFFERENTIAL METHOD BLD: ABNORMAL
EOSINOPHIL # BLD: 0.2 K/UL (ref 0–0.4)
EOSINOPHIL NFR BLD: 3 % (ref 0–7)
ERYTHROCYTE [DISTWIDTH] IN BLOOD BY AUTOMATED COUNT: 16.4 % (ref 11.5–14.5)
GLOBULIN SER CALC-MCNC: 3.4 G/DL (ref 2–4)
GLUCOSE SERPL-MCNC: 81 MG/DL (ref 65–100)
HCT VFR BLD AUTO: 32.7 % (ref 36.6–50.3)
HGB BLD-MCNC: 10 G/DL (ref 12.1–17)
IMM GRANULOCYTES # BLD AUTO: 0.1 K/UL (ref 0–0.04)
IMM GRANULOCYTES NFR BLD AUTO: 1 % (ref 0–0.5)
LYMPHOCYTES # BLD: 2.2 K/UL (ref 0.8–3.5)
LYMPHOCYTES NFR BLD: 29 % (ref 12–49)
MAGNESIUM SERPL-MCNC: 1.6 MG/DL (ref 1.6–2.4)
MCH RBC QN AUTO: 27 PG (ref 26–34)
MCHC RBC AUTO-ENTMCNC: 30.6 G/DL (ref 30–36.5)
MCV RBC AUTO: 88.1 FL (ref 80–99)
MONOCYTES # BLD: 1.8 K/UL (ref 0–1)
MONOCYTES NFR BLD: 23 % (ref 5–13)
NEUTS SEG # BLD: 3.4 K/UL (ref 1.8–8)
NEUTS SEG NFR BLD: 44 % (ref 32–75)
NRBC # BLD: 0 K/UL (ref 0–0.01)
NRBC BLD-RTO: 0 PER 100 WBC
PHOSPHATE SERPL-MCNC: 3.5 MG/DL (ref 2.6–4.7)
PLATELET # BLD AUTO: 275 K/UL (ref 150–400)
PLATELET COMMENTS,PCOM: ABNORMAL
PMV BLD AUTO: 11.8 FL (ref 8.9–12.9)
POTASSIUM SERPL-SCNC: 3.8 MMOL/L (ref 3.5–5.1)
PROCALCITONIN SERPL-MCNC: 0.08 NG/ML
PROT SERPL-MCNC: 6.5 G/DL (ref 6.4–8.2)
RBC # BLD AUTO: 3.71 M/UL (ref 4.1–5.7)
RBC MORPH BLD: ABNORMAL
RBC MORPH BLD: ABNORMAL
SODIUM SERPL-SCNC: 136 MMOL/L (ref 136–145)
WBC # BLD AUTO: 7.7 K/UL (ref 4.1–11.1)

## 2021-05-18 PROCEDURE — 74011250636 HC RX REV CODE- 250/636: Performed by: HOSPITALIST

## 2021-05-18 PROCEDURE — 74011000258 HC RX REV CODE- 258: Performed by: HOSPITALIST

## 2021-05-18 PROCEDURE — 84145 PROCALCITONIN (PCT): CPT

## 2021-05-18 PROCEDURE — 74011250636 HC RX REV CODE- 250/636: Performed by: INTERNAL MEDICINE

## 2021-05-18 PROCEDURE — 74011250637 HC RX REV CODE- 250/637: Performed by: INTERNAL MEDICINE

## 2021-05-18 PROCEDURE — 83735 ASSAY OF MAGNESIUM: CPT

## 2021-05-18 PROCEDURE — 97530 THERAPEUTIC ACTIVITIES: CPT

## 2021-05-18 PROCEDURE — 84100 ASSAY OF PHOSPHORUS: CPT

## 2021-05-18 PROCEDURE — P9047 ALBUMIN (HUMAN), 25%, 50ML: HCPCS | Performed by: HOSPITALIST

## 2021-05-18 PROCEDURE — 65660000001 HC RM ICU INTERMED STEPDOWN

## 2021-05-18 PROCEDURE — 85025 COMPLETE CBC W/AUTO DIFF WBC: CPT

## 2021-05-18 PROCEDURE — 74011250637 HC RX REV CODE- 250/637: Performed by: HOSPITALIST

## 2021-05-18 PROCEDURE — 97112 NEUROMUSCULAR REEDUCATION: CPT

## 2021-05-18 PROCEDURE — 71045 X-RAY EXAM CHEST 1 VIEW: CPT

## 2021-05-18 PROCEDURE — 80053 COMPREHEN METABOLIC PANEL: CPT

## 2021-05-18 PROCEDURE — 99232 SBSQ HOSP IP/OBS MODERATE 35: CPT | Performed by: INTERNAL MEDICINE

## 2021-05-18 PROCEDURE — 36415 COLL VENOUS BLD VENIPUNCTURE: CPT

## 2021-05-18 PROCEDURE — 74011000250 HC RX REV CODE- 250: Performed by: HOSPITALIST

## 2021-05-18 RX ORDER — SODIUM CHLORIDE 9 MG/ML
50 INJECTION, SOLUTION INTRAVENOUS CONTINUOUS
Status: DISPENSED | OUTPATIENT
Start: 2021-05-18 | End: 2021-05-18

## 2021-05-18 RX ADMIN — MICONAZOLE NITRATE: 20 CREAM TOPICAL at 18:00

## 2021-05-18 RX ADMIN — Medication 1 CAPSULE: at 09:33

## 2021-05-18 RX ADMIN — FENTANYL CITRATE 50 MCG: 50 INJECTION, SOLUTION INTRAMUSCULAR; INTRAVENOUS at 03:36

## 2021-05-18 RX ADMIN — MEROPENEM 500 MG: 500 INJECTION, POWDER, FOR SOLUTION INTRAVENOUS at 21:08

## 2021-05-18 RX ADMIN — VANCOMYCIN HYDROCHLORIDE 1000 MG: 1 INJECTION, POWDER, LYOPHILIZED, FOR SOLUTION INTRAVENOUS at 22:59

## 2021-05-18 RX ADMIN — ALBUMIN (HUMAN) 12.5 G: 0.25 INJECTION, SOLUTION INTRAVENOUS at 12:59

## 2021-05-18 RX ADMIN — MEROPENEM 500 MG: 500 INJECTION, POWDER, FOR SOLUTION INTRAVENOUS at 09:33

## 2021-05-18 RX ADMIN — CASTOR OIL AND BALSAM, PERU: 788; 87 OINTMENT TOPICAL at 17:42

## 2021-05-18 RX ADMIN — ALBUMIN (HUMAN) 12.5 G: 0.25 INJECTION, SOLUTION INTRAVENOUS at 05:54

## 2021-05-18 RX ADMIN — MICONAZOLE NITRATE: 20 CREAM TOPICAL at 09:35

## 2021-05-18 RX ADMIN — Medication 5 SPRAY: at 09:45

## 2021-05-18 RX ADMIN — SODIUM CHLORIDE 50 ML/HR: 9 INJECTION, SOLUTION INTRAVENOUS at 09:35

## 2021-05-18 RX ADMIN — VANCOMYCIN HYDROCHLORIDE 1000 MG: 1 INJECTION, POWDER, LYOPHILIZED, FOR SOLUTION INTRAVENOUS at 14:13

## 2021-05-18 RX ADMIN — HEPARIN SODIUM 5000 UNITS: 5000 INJECTION INTRAVENOUS; SUBCUTANEOUS at 05:54

## 2021-05-18 RX ADMIN — FENTANYL CITRATE 50 MCG: 50 INJECTION, SOLUTION INTRAMUSCULAR; INTRAVENOUS at 17:41

## 2021-05-18 RX ADMIN — HEPARIN SODIUM 5000 UNITS: 5000 INJECTION INTRAVENOUS; SUBCUTANEOUS at 13:00

## 2021-05-18 RX ADMIN — Medication 3 MG: at 21:08

## 2021-05-18 RX ADMIN — Medication 10 ML: at 14:13

## 2021-05-18 RX ADMIN — LEVETIRACETAM 1500 MG: 500 TABLET ORAL at 09:33

## 2021-05-18 RX ADMIN — Medication 2 DROP: at 09:36

## 2021-05-18 RX ADMIN — LEVETIRACETAM 1500 MG: 500 TABLET ORAL at 17:41

## 2021-05-18 RX ADMIN — OXYCODONE HYDROCHLORIDE 7.5 MG: 5 TABLET ORAL at 15:12

## 2021-05-18 RX ADMIN — FENTANYL CITRATE 50 MCG: 50 INJECTION, SOLUTION INTRAMUSCULAR; INTRAVENOUS at 13:00

## 2021-05-18 RX ADMIN — CASTOR OIL AND BALSAM, PERU: 788; 87 OINTMENT TOPICAL at 09:34

## 2021-05-18 RX ADMIN — HEPARIN SODIUM 5000 UNITS: 5000 INJECTION INTRAVENOUS; SUBCUTANEOUS at 21:08

## 2021-05-18 RX ADMIN — MEROPENEM 500 MG: 500 INJECTION, POWDER, FOR SOLUTION INTRAVENOUS at 15:12

## 2021-05-18 RX ADMIN — OXYCODONE HYDROCHLORIDE 7.5 MG: 5 TABLET ORAL at 21:08

## 2021-05-18 RX ADMIN — Medication 10 ML: at 05:55

## 2021-05-18 RX ADMIN — VANCOMYCIN HYDROCHLORIDE 1000 MG: 1 INJECTION, POWDER, LYOPHILIZED, FOR SOLUTION INTRAVENOUS at 05:54

## 2021-05-18 RX ADMIN — MEROPENEM 500 MG: 500 INJECTION, POWDER, FOR SOLUTION INTRAVENOUS at 03:02

## 2021-05-18 NOTE — PROGRESS NOTES
Day #4 of Vancomycin (continued prior to arrival)  Indication:  recent MRSA bacteremia with MSSA endocarditis, and multiple septic cerebral emboli from admission in March  -s/p AVR   -treated with vancomycin here  - for possible sepsis; [facility] paperwork notes 6 week course of vancomycin through      Current regimen:  1g IV q8hrs   Abx regimen:  Vancomycin and Merrem  ID Following ?: YES  Concomitant nephrotoxic drugs (requires more frequent monitoring): None  Frequency of BMP?: Daily     Recent Labs     21  0408 21  0349 21  0305   WBC 7.7 8.0 8.5   CREA 0.35* 0.36* 0.49*   BUN 7 7 7   Est CrCl: >120 ml/min; UO: >1 ml/kg/hr  Temp (24hrs), Av.2 °F (36.8 °C), Min:97.8 °F (36.6 °C), Max:98.6 °F (37 °C)    Cultures:    sputum - ESBL E. coli (S- Meropenem)   toe wound - light mixed skin sunny, final    Goal trough = 15 - 20 mcg/mL    Recent trough history (date/time/level/dose/action taken):  : 21.9 mcg/ml @ ~7h post-dose, true trough extrapolated to ~19 mcg/ml; change to vanc 1g q8h. Plan: Continue current regimen. Will order level prior to AM dose tomorrow to evaluate new dosing.      Madiha Gudino, RAMOSD

## 2021-05-18 NOTE — PROGRESS NOTES
Problem: Mobility Impaired (Adult and Pediatric)  Goal: *Acute Goals and Plan of Care (Insert Text)  Description: FUNCTIONAL STATUS PRIOR TO ADMISSION: Patient was independent prior to March 2021. Pt has been hospitalized and recently at [facility]. Per mother, pt was ambulating with therapy at [facility]. HOME SUPPORT PRIOR TO ADMISSION: The patient lived alone prior to hospitalization. Physical Therapy Goals  Revised 5/17/2021  1. Patient will move from supine to sit and sit to supine , scoot up and down, and roll side to side in bed with minimal assistance/contact guard assist within 7 day(s). 2.  Patient will tolerate sitting EOB with contact guard assistance for approx 5 minutes within 7 day(s). 3.  Patient will transfer from bed to chair and chair to bed with maximal assistance using the least restrictive device within 7 day(s). 4.  Patient will perform sit to stand with maximal assistance within 7 day(s). 5.  Patient will ambulate with maximal assistance for 5 feet with the least restrictive device within 7 day(s). Initiated 5/9/2021  1. Patient will move from supine to sit and sit to supine , scoot up and down, and roll side to side in bed with moderate assistance  within 7 day(s). GOAL MET 5/17/21  2. Patient will tolerate sitting EOB with moderate assistance for approx 3-5 minutes within 7 day(s). GOAL MET 5/17/21  3. Patient will transfer from bed to chair and chair to bed with maximal assistance using the least restrictive device within 7 day(s). 4.  Patient will perform sit to stand with maximal assistance within 7 day(s). 5.  Patient will ambulate with maximal assistance for 5 feet with the least restrictive device within 7 day(s).          Outcome: Progressing Towards Goal   PHYSICAL THERAPY TREATMENT  Patient: Maritza Garay (60 y.o. male)  Date: 5/18/2021  Diagnosis: Cardiac arrest [facility]) [I46.9] Cardiac arrest with pulseless electrical activity ([facility] [address]       Precautions: Fall, Skin  Chart, physical therapy assessment, plan of care and goals were reviewed. ASSESSMENT  Patient continues with skilled PT services and is progressing towards goals. Patient demonstrates progress with sitting tolerance and able to sit for 9 minutes. Pt continues to be impulsive with movement requiring moderate cues yet noted improvements in obtaining upright posture. Pt remains below significantly below functional mobility baseline. Will continue to progress sitting tolerance and work toward transfer training. Current Level of Function Impacting Discharge (mobility/balance): mod A x2 bed mobility    Other factors to consider for discharge: fall risk, 2 assist,          PLAN :  Patient continues to benefit from skilled intervention to address the above impairments. Continue treatment per established plan of care. to address goals. Recommendation for discharge: (in order for the patient to meet his/her long term goals)  Therapy 3 hours per day 5-7 days per week. If not, then SNF  If pt were to d/c home, would benefit from HHPT and require assistance/supervision for 2 assist for all mobility as pt is a fall risk        This discharge recommendation:  Has been made in collaboration with the attending provider and/or case management    IF patient discharges home will need the following DME: hospital bed, mechanical lift, and wheelchair       SUBJECTIVE:   Patient stated it hurts.     OBJECTIVE DATA SUMMARY:   Critical Behavior:  Neurologic State: Alert  Orientation Level: Oriented X4  Cognition: Follows commands  Safety/Judgement: Awareness of environment  Functional Mobility Training:  Bed Mobility:     Supine to Sit: Moderate assistance;Assist x2  Sit to Supine: Moderate assistance;Assist x2           Balance:  Sitting: Impaired; With support  Patient sitting EOB for 9 minutes. Initially required moderate assistance for balance 2/2 LOB toward L side.  Pt able to progress to CGA/min A using RUE for support on rail. Prompted scapula retraction and working on obtaining midline posture    Activity Tolerance:   Fair and requires rest breaks    After treatment patient left in no apparent distress:   Supine in bed, Call bell within reach, and Side rails x 3    COMMUNICATION/COLLABORATION:   The patients plan of care was discussed with: Occupational therapist and Registered nurse.      Loni Dorsey, PT, DPT   Time Calculation: 25 mins

## 2021-05-18 NOTE — PROGRESS NOTES
Problem: Self Care Deficits Care Plan (Adult)  Goal: *Acute Goals and Plan of Care (Insert Text)  Description:   FUNCTIONAL STATUS PRIOR TO ADMISSION: patient was IND prior to recent hospital admissions and has had a complicated medical course including NG tube and trach. Patient recently at [facility] for vent weaning. HOME SUPPORT: The patient lived alone with parents in area to provide assistance. Occupational Therapy Goals  Initiated 5/11/2021, Reviewed 5/18/21, continue all goals  1. Patient will perform 2 simple grooming tasks in supported sitting with minimal assistance/contact guard assist within 7 day(s). 2.  Patient will perform anterior neck to thigh bathing in supported sitting with moderate assistance within 7 day(s). 3.  Patient will tolerate sitting EOB in prep for ADLs with max A within 7 days. 4.  Patient will track to L of midline during ADLs/therapeutic activities with moderate cues within 7 days. 5.  Patient will participate in upper extremity therapeutic exercise/activities with moderate assistance  for 5 minutes within 7 day(s). Outcome: Progressing Towards Goal   OCCUPATIONAL THERAPY WEEKLY RE-ASSESSMENT TREATMENT  Patient: Griffin Bravo (68 y.o. male)  Date: 5/18/2021  Diagnosis: Cardiac arrest [facility]) [I46.9] Cardiac arrest with pulseless electrical activity [facility])       Precautions: Fall, Skin  Chart, occupational therapy assessment, plan of care, and goals were reviewed. ASSESSMENT  Patient continues with skilled OT services and is SLOWLY progressing towards goals. Pt limited by pain, mobility, sit balance, and self-limiting behavior. Pt able to achieve >8 min seated on EOB working on mid-line/positioning, functional reaching, and L UE trace movement. Pt asked to lie back down continuously d/t pain. OT returned to room with yellow sponge to have pt work on L existing grasp. Overall, all goals continue to remain appropriate.   Pt continues to be below baseline d/t increase medical complexity. Recommend further rehab at discharge     Current Level of Function Impacting Discharge (ADLs): mod to total assist    Other factors to consider for discharge: pain, self-limiting behavior         PLAN :  Patient continues to benefit from skilled intervention to address the above impairments. Continue treatment per established plan of care to address goals. Recommend with staff:     Recommend next OT session: see goals, address L UE     Recommendation for discharge: (in order for the patient to meet his/her long term goals)  Therapy 3 hours per day 5-7 days per week    This discharge recommendation:  A follow-up discussion with the attending provider and/or case management is planned    IF patient discharges home will need the following DME: TBD       SUBJECTIVE:   Patient stated My back hurts.     OBJECTIVE DATA SUMMARY:   Cognitive/Behavioral Status:  Neurologic State: Alert                   Functional Mobility and Transfers for ADLs:  Bed Mobility:  Supine to Sit: Moderate assistance;Assist x2  Sit to Supine: Moderate assistance;Assist x2    Transfers:             Balance:  Sitting: Impaired; With support    ADL Intervention:  Feeding  Feeding Assistance: Set-up    Grooming  Grooming Assistance: Moderate assistance    Upper Body Bathing  Bathing Assistance: Moderate assistance;Minimum assistance  Position Performed: (supine with HOB elevated)    Lower Body Bathing  Bathing Assistance: Maximum assistance  Position Performed: Long sitting on bed    Upper [address]: Moderate assistance;Maximum assistance    Lower Body Dressing Assistance  Socks: Moderate assistance;Maximum assistance  Position Performed: Long sitting on bed    Toileting  Toileting Assistance:  Total assistance(dependent)         Therapeutic Exercises:       Pain:  Increase back p!, no verbal score given    Activity Tolerance:   Fair and Poor    After treatment patient left in no apparent distress:   Supine in bed and Call bell within reach    COMMUNICATION/COLLABORATION:   The patients plan of care was discussed with: Physical therapist and Registered nurse.      Judie Shafer, OTR/L  Time Calculation: 26 mins

## 2021-05-18 NOTE — PROGRESS NOTES
2114 Patient refused nasal spray. Requested fentanyl. It was explained that his blood pressure is too low. BP . Hygenic care completed. Patient resting in bed.    2300 Patient requested fentanyl. Oxycodone given instead due to BP. Bedside and Verbal shift change report given to [facility] (oncoming nurse) by Ivon Cagle (offgoing nurse). Report included the following information SBAR, Kardex, ED Summary, Procedure Summary, Intake/Output, MAR, Recent Results and Cardiac Rhythm NSR.

## 2021-05-18 NOTE — PROGRESS NOTES
.  [address] [facility] Adult  Hospitalist Group    PATIENT ID: Letitia Jain  MRN: [id number]   YOB: 1995    PRIMARY CARE PROVIDER: None   DATE OF ADMISSION: 5/6/2021  5:22 PM    ATTENDING PHYSICIAN: Anna Smyth MD  CONSULTATIONS:   IP CONSULT TO INTENSIVIST  IP CONSULT TO NEUROLOGY  IP CONSULT TO PODIATRY  IP CONSULT TO PODIATRY  IP CONSULT TO INFECTIOUS DISEASES  IP CONSULT TO NEUROLOGY  IP CONSULT TO OTOLARYNGOLOGY  IP CONSULT TO CARDIOLOGY    PROCEDURES/SURGERIES:   * No surgery found *    REASON FOR ADMISSION: Cardiac arrest with pulseless electrical activity [facility] PROBLEM LIST:  Patient Active Problem List   Diagnosis Code    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 male with h/o Seizures and anxiety who presented to [facility] ED after a cardiac arrest at [facility] around 1500 5/6/2021. Hx from chart and speaking with patient's sister via phone. Patient had recent hospitalization at the [facility] in March of this year. Initial hospitalization admission was at [facility] on 3/24 with AMS in setting of polysubstance and IVD use (cocaine, heroine, methamphetamines). He was found to have septic MRSA bacteremia, MRSA endocarditis.  Imaging and MRI also found R PCA infarct and several abscesses and right temporal and parietal lobes. Infarcts thought to be due to septic emboli and patient had left sided residual weakness. Patient was transferred to [facility] on 4/1/2021 for evaluation for valve surgery. After transfer he was found to have a New R MCA infarct Also had a type 2 NSTEMI and tamponade with pericardiocentesis done prior to surgery. Did have cardiac arrest on day of surgery. Had a bioprosthetic Aortic Valve Replacement and Aortic Root Abscess Debridement done on 4/16/2021. Trached and sent to [facility] on 4/29/2021. Was undergoing PT and vent wean. Sister stated that patient was able to be off the vent for several hours over the last few days. He was sitting up and able to talk with valve over trach and could follow commands. Stated that he had severe weakness of the left upper ext, but was starting to gain some mobility in the lower left ext. Patient's mother visited patient today prior to arrest. She said the staff told her that the patient was getting very anxious earlier in the day around 1 pm and had to be placed back on the ventilator, and then they had to sedate him while he was on the ventilator. The mother stated \" he did not look good\" and \" his eye were rolled back in his head\". About 30 minutes after she left she got call about arrest. Per ED note patient was found unresponsive around the 1500 hour and was pulseless. Two rounds of CPR and meds were given and patient was defibrillated x 1 before ROSC. Patient was hypoxic post arrest. Unknown down time prior to arrest.        Subjective/HPI    Complaints of pain everywhere,asking for fentanyl    Assessment and Plan:    1.  cardiac arrest - 5/6/21 on admission:   .   Cardiologist following,   -per cardiologist :  ICD vs life vest before DC (but with his recent endocarditis/recent bacteremia till under treatment, ? Timing for ICD)      2. Acute on chronic  respiratory failure:   Had trach last hospitalization at [facility] and was sent to [facility] for further weaning. He was on ventilator while in ICU and now transitioned to trach collar.  -currently on trach collar and PMV  -on 35% fiO2 -wean as tolerated    3. MRSA aortic  Valve endocardititis   -Found to have MRSA bacteremia and MSSA endocarditis end of march. -Hx:  4/16/21 at [address] [facility] bioprosthetic AVR and root abscess debridement. He also had a pericardiocentesis due to tamponade prior to surgery  - Reviewed [facility] record, he was on IV vanco at [facility] and planned to  have a 6 weeks of   tx and EOT 5/28/21 per record. - Continue vancomycin      Pain management : contusion of chest wall, left side, chronic back pain   Cautious with narcotics  On  fentanyl to 50mcg q4h prn and oxycodone 7.5 mg Q 6 hr prn,wean narcotics    9. Bacterial pneumonia  severe multilevel consolidation throughout both lungs consistent with severe multilobar PNA. -sputum culture E coli with ESBL   -on Merrem   -pulmonary toilet   -trach care   -ID following      # Systolic congestive heart failure/CM  Ef 25%   Hold coreg,entresto ,Aldactone  Due to soft BP, gently fluids for 24 hrs 50 cc/hr  Cardiologist following   Echo EF showed 25-30% on 5/17    8. 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 Infarcts were thought to be due to septic emboli. He had left sided residual weakness. cta 5/6/21:  multiple acute infarctions throughout the  right cerebral hemisphere, involving much of the occipital lobe, as well as much  of the frontal lobe. There is an additional superior right frontoparietal  Infarct  Neurologist consulted                -PT/OT/SLP e              - Stroke education              - SBP goal 100-160              10. Seizure DZ:  Continue keppra -1500 mg BID, off valproic acid as subtherapeutic due to meropenem interaction,so valproic acid discontinued  Seizure precautions        11. Severe muscle deconditioning    12: Severe protein-calorie malnutrition    13.  Cerebral abscess (embolic)    60 h/o substance abuse        fulll code        PHYSICAL EXAMINATION:  Visit Vitals  BP (!) 95/50 (BP Patient Position: At rest)   Pulse (!) 105   Temp 98.5 °F (36.9 °C)   Resp 16   Ht 5' 11\" (1.803 m)   Wt 55 kg (121 lb 4.1 oz)   SpO2 100%   BMI 16.91 kg/m²       General:          chronically ill,   HEENT:           Atraumatic,trach capped, dubhoff tube taped           Neck:               Supple,   Lungs:            coarse bs bilat. No wheezing. Midline sternal surgical scar. Heart:              Regular  rhythm, tachycardia,   No edema  Abdomen:       Soft, non-tender. Not distended. Bowel sounds normal  Extremities:     No LE edema  Skin:                Not pale. Not Jaundiced  No rashes   Psych:             Not anxious or agitated. Neurologic:      Alert, ,oriented X 3, unable to move LUE , able to bend LLE at knee, moves RUE and RLE     Labs:     Recent Labs     05/18/21 0408 05/17/21  0349   WBC 7.7 8.0   HGB 10.0* 10.4*   HCT 32.7* 34.4*    289     Recent Labs     05/18/21  0408 05/17/21  0349 05/16/21  0305    136 137   K 3.8 3.9 4.0    103 101   CO2 24 25 27   BUN 7 7 7   CREA 0.35* 0.36* 0.49*   GLU 81 90 103*   CA 9.2 9.4 9.4   MG 1.6 1.5* 1.7   PHOS 3.5 3.4 3.6     Recent Labs     05/18/21  0408 05/17/21  0349 05/16/21  0305   ALT 16 17 21   AP 83 85 96   TBILI 0.6 0.6 0.6   TP 6.5 6.6 7.1   ALB 3.1* 2.9* 3.1*   GLOB 3.4 3.7 4.0     No results for input(s): INR, PTP, APTT, INREXT, INREXT in the last 72 hours. No results for input(s): FE, TIBC, PSAT, FERR in the last 72 hours. No results found for: FOL, RBCF   No results for input(s): PH, PCO2, PO2 in the last 72 hours. No results for input(s): CPK, CKNDX, TROIQ in the last 72 hours.     No lab exists for component: CPKMB  Lab Results   Component Value Date/Time    Cholesterol, total 140 05/11/2021 04:35 AM    HDL Cholesterol 21 05/11/2021 04:35 AM    LDL, calculated 77.4 05/11/2021 04:35 AM Triglyceride 208 (H) 05/11/2021 04:35 AM    CHOL/HDL Ratio 6.7 (H) 05/11/2021 04:35 AM     Lab Results   Component Value Date/Time    Glucose (POC) 120 (H) 05/11/2021 12:00 PM    Glucose (POC) 100 05/11/2021 06:04 AM    Glucose (POC) 87 05/11/2021 12:19 AM    Glucose (POC) 83 05/10/2021 06:53 PM    Glucose (POC) 84 05/10/2021 02:07 PM     Lab Results   Component Value Date/Time    Color YELLOW/STRAW 05/06/2021 05:42 PM    Appearance CLEAR 05/06/2021 05:42 PM    Specific gravity 1.008 05/06/2021 05:42 PM    Specific gravity 1.020 03/24/2021 09:30 PM    pH (UA) 5.0 05/06/2021 05:42 PM    Protein 30 (A) 05/06/2021 05:42 PM    Glucose Negative 05/06/2021 05:42 PM    Ketone Negative 05/06/2021 05:42 PM    Bilirubin Negative 05/06/2021 05:42 PM    Urobilinogen 0.2 05/06/2021 05:42 PM    Nitrites Negative 05/06/2021 05:42 PM    Leukocyte Esterase Negative 05/06/2021 05:42 PM    Epithelial cells FEW 05/06/2021 05:42 PM    Bacteria Negative 05/06/2021 05:42 PM    WBC 0-4 05/06/2021 05:42 PM    RBC 0-5 05/06/2021 05:42 PM         CODE STATUS:  x Full Code    DNR    Partial    Comfort Care       Signed:   Domitila Isaacs MD  Date of Service:  5/18/2021  3:38 PM

## 2021-05-18 NOTE — PROGRESS NOTES
Cardiology Progress Note                                        Admit Date: 5/6/2021    Assessment/Plan:       1. Sp cardiac arrest post op 4/17/21,   cardiac arrest Vfib 5/6/21  ekg rbbb qt 460 msec. ICD rec for secondary prevention if life expectancy > 1yr.    2. Hx:  4/16/21 at [facility] bioprosthetic AVR and root abscess debridement. He also had a pericardiocentesis due to tamponade prior to surgery  3. Recent endocardititis 3/24/21 Staph  4. CM  Ef 25%   5.  right MCA infarct and several abscesses in the right temporal and parietal lobes. Infarcts were thought to be due to septic emboli. He had left sided residual weakness. cta 5/6/21:  multiple acute infarctions throughout the  right cerebral hemisphere, involving much of the occipital lobe, as well as much  of the frontal lobe. There is an additional superior right frontoparietal  infarct  6.  severe multilevel consolidation throughout both lungs consistent with severe multilobar PNA. 7. PNA:  Severe multilevel consolidation throughout both lungs, consistent with  severe multilobar pneumonia. Holding cardiac med 2/2 hypotension; gentle IVF  ICD before DC vs LV;  Abx stop 5/28; will d/w EP  Echo showed mild improvement, LVEF 30-35%  Add dapagliflozin on dc as not on formulary       Thuy Armenta is a 22 y.o. male with     PROBLEM LIST:  Patient Active Problem List    Diagnosis Date Noted    Severe muscle deconditioning 05/01/2021     Priority: 3 - Three     Class: Acute    S/P AVR (aortic valve replacement) and aortoplasty 04/16/2021     Priority: 3 - Three     Class: Acute    Abscess of aortic root 04/16/2021     Priority: 3 - Three     Class: Acute    Contusion of chest wall 04/16/2021     Priority: 3 - Three     Class: Acute    Successful cardiopulmonary resuscitation 04/14/2021     Priority: 3 - Three     Class: Acute    Acute traumatic pain 04/14/2021     Priority: 3 - Three     Class: Acute    Postoperative acute respiratory failure ([facility] 04/01/2021     Priority: 3 - Three     Class: Acute    Severe protein-calorie malnutrition ([facility] 05/11/2021    Cardiac arrest with pulseless electrical activity ([facility] 05/06/2021    Cerebral abscess (embolic) 80/72/4342    Drug abuse, cocaine type ([facility] 03/29/2021    Endocarditis due to methicillin susceptible Staphylococcus aureus (MSSA) 03/25/2021    Type 2 myocardial infarction ([facility] 03/24/2021         Subjective:     Cleda Sleet Holota gestures appropriately. Likely orthostatic when sat on edge of bed with PT    Visit Vitals  BP (!) 88/42 (BP Patient Position: At rest)   Pulse (!) 106   Temp 98.1 °F (36.7 °C)   Resp 16   Ht 5' 11\" (1.803 m)   Wt 55 kg (121 lb 4.1 oz)   SpO2 96%   BMI 16.91 kg/m²       Intake/Output Summary (Last 24 hours) at 5/18/2021 1001  Last data filed at 5/18/2021 0554  Gross per 24 hour   Intake 1590 ml   Output 950 ml   Net 640 ml       Objective:      Physical Exam:  HEENT: Perrla, EOMI  Neck: No JVD,  No thyroidmegaly  Resp: CTA bilaterally;  No wheezes or rales  CV: RRR s1s2 No murmur no s3  Abd:Soft, Nontender  Ext: No edema  Neuro: unresponsive on vent   Skin: Warm, Dry, Intact  Pulses: 2+ DP/PT/Rad      Telemetry: normal sinus rhythm    Current Facility-Administered Medications   Medication Dose Route Frequency    0.9% sodium chloride infusion  50 mL/hr IntraVENous CONTINUOUS    vancomycin (VANCOCIN) 1,000 mg in 0.9% sodium chloride 250 mL (VIAL-MATE)  1,000 mg IntraVENous Q8H    levETIRAcetam (KEPPRA) tablet 1,500 mg  1,500 mg Oral BID    oxymetazoline (AFRIN) 0.05 % nasal spray 5 Spray  5 Spray Left Nostril TID    sodium chloride (AYR SALINE) 0.65 % nasal drops 2 Drop  2 Drop Left Nostril Q2H PRN    albumin human 25% (BUMINATE) solution 12.5 g  12.5 g IntraVENous Q6H    oxyCODONE IR (ROXICODONE) tablet 7.5 mg  7.5 mg Oral Q4H PRN    lidocaine 4 % patch 1 Patch  1 Patch TransDERmal Q24H    fentaNYL citrate (PF) injection 50 mcg  50 mcg IntraVENous Q4H PRN    meropenem (MERREM) 500 mg in 0.9% sodium chloride (MBP/ADV) 50 mL MBP  0.5 g IntraVENous Q6H    Vancomycin - pharmacy to dose   Other Rx Dosing/Monitoring    naloxone (NARCAN) injection 0.1 mg  0.1 mg IntraVENous PRN    miconazole (MICOTIN) 2 % cream   Topical BID    traZODone (DESYREL) tablet 50 mg  50 mg Oral QHS PRN    [Held by provider] carvediloL (COREG) tablet 3.125 mg  3.125 mg Oral BID WITH MEALS    albuterol-ipratropium (DUO-NEB) 2.5 MG-0.5 MG/3 ML  3 mL Nebulization Q4H PRN    [Held by provider] spironolactone (ALDACTONE) tablet 12.5 mg  12.5 mg Oral DAILY    hydrALAZINE (APRESOLINE) 20 mg/mL injection 10 mg  10 mg IntraVENous Q6H PRN    [Held by provider] sacubitriL-valsartan (ENTRESTO) 24-26 mg tablet 1 Tab  1 Tab Oral Q12H    L.acidophilus-paracasei-S.thermophil-bifidobacter (RISAQUAD) 8 billion cell capsule  1 Cap Nasogastric DAILY    heparin (porcine) injection 5,000 Units  5,000 Units SubCUTAneous Q8H    melatonin tablet 3 mg  3 mg Oral QHS    0.9% sodium chloride infusion 250 mL  250 mL IntraVENous PRN    balsam peru-castor oiL (VENELEX) ointment   Topical BID    sodium chloride (NS) flush 5-40 mL  5-40 mL IntraVENous Q8H    sodium chloride (NS) flush 5-40 mL  5-40 mL IntraVENous PRN    acetaminophen (TYLENOL) tablet 650 mg  650 mg Oral Q6H PRN    Or    acetaminophen (TYLENOL) suppository 650 mg  650 mg Rectal Q6H PRN    polyethylene glycol (MIRALAX) packet 17 g  17 g Oral DAILY PRN    ondansetron (ZOFRAN) injection 4 mg  4 mg IntraVENous Q6H PRN    glucose chewable tablet 16 g  4 Tab Oral PRN    dextrose (D50W) injection syrg 12.5-25 g  25-50 mL IntraVENous PRN    glucagon (GLUCAGEN) injection 1 mg  1 mg IntraMUSCular PRN         Data Review:   Labs:    Recent Results (from the past 24 hour(s))   ECHO ADULT FOLLOW-UP OR LIMITED    Collection Time: 05/17/21  2:07 PM   Result Value Ref Range    IVSd 1.03 (A) 0.60 - 1.00 cm    LVIDd 6.29 (A) 4.20 - 5.90 cm    LVIDs 5.63 cm LVPWd 1.43 (A) 0.60 - 1.00 cm    BP EF 27.9 (A) 55.0 - 100.0 percent    LV Ejection Fraction MOD 2C 25 percent    LV Ejection Fraction MOD 4C 30 percent    LV ED Vol A2C 121.76 mL    LV ED Vol A4C 150.72 mL    LV ED Vol .61 67.0 - 155.0 mL    LV ES Vol A2C 91.88 mL    LV ES Vol A4C 104.80 mL    LV ES Vol .35 (A) 22.0 - 58.0 mL    Left Atrium Major Axis 3.29 cm    LV Mass .9 88.0 - 224.0 g    LV Mass AL Index 205.8 49.0 - 115.0 g/m2    LVES Vol Index BP 59.4 mL/m2    LVED Vol Index BP 82.5 mL/m2    Left Atrium Minor Axis 1.93 cm    LVED Vol Index A4C 88.4 mL/m2    LVED Vol Index A2C 71.4 mL/m2    LVES Vol Index A4C 61.5 mL/m2    LVES Vol Index A2C 53.9 mL/m2   LACTIC ACID    Collection Time: 05/17/21  6:45 PM   Result Value Ref Range    Lactic acid 0.8 0.4 - 2.0 MMOL/L   MAGNESIUM    Collection Time: 05/18/21  4:08 AM   Result Value Ref Range    Magnesium 1.6 1.6 - 2.4 mg/dL   PHOSPHORUS    Collection Time: 05/18/21  4:08 AM   Result Value Ref Range    Phosphorus 3.5 2.6 - 4.7 MG/DL   PROCALCITONIN    Collection Time: 05/18/21  4:08 AM   Result Value Ref Range    Procalcitonin 0.08 ng/mL   CBC WITH AUTOMATED DIFF    Collection Time: 05/18/21  4:08 AM   Result Value Ref Range    WBC 7.7 4.1 - 11.1 K/uL    RBC 3.71 (L) 4.10 - 5.70 M/uL    HGB 10.0 (L) 12.1 - 17.0 g/dL    HCT 32.7 (L) 36.6 - 50.3 %    MCV 88.1 80.0 - 99.0 FL    MCH 27.0 26.0 - 34.0 PG    MCHC 30.6 30.0 - 36.5 g/dL    RDW 16.4 (H) 11.5 - 14.5 %    PLATELET 910 350 - 626 K/uL    MPV 11.8 8.9 - 12.9 FL    NRBC 0.0 0  WBC    ABSOLUTE NRBC 0.00 0.00 - 0.01 K/uL    NEUTROPHILS 44 32 - 75 %    LYMPHOCYTES 29 12 - 49 %    MONOCYTES 23 (H) 5 - 13 %    EOSINOPHILS 3 0 - 7 %    BASOPHILS 0 0 - 1 %    IMMATURE GRANULOCYTES 1 (H) 0.0 - 0.5 %    ABS. NEUTROPHILS 3.4 1.8 - 8.0 K/UL    ABS. LYMPHOCYTES 2.2 0.8 - 3.5 K/UL    ABS. MONOCYTES 1.8 (H) 0.0 - 1.0 K/UL    ABS. EOSINOPHILS 0.2 0.0 - 0.4 K/UL    ABS. BASOPHILS 0.0 0.0 - 0.1 K/UL    ABS. IMM. Mikael Rosario. 0.1 (H) 0.00 - 0.04 K/UL    DF SMEAR SCANNED      PLATELET COMMENTS Large Platelets      RBC COMMENTS OVALOCYTES  PRESENT        RBC COMMENTS ANISOCYTOSIS  1+       METABOLIC PANEL, COMPREHENSIVE    Collection Time: 05/18/21  4:08 AM   Result Value Ref Range    Sodium 136 136 - 145 mmol/L    Potassium 3.8 3.5 - 5.1 mmol/L    Chloride 103 97 - 108 mmol/L    CO2 24 21 - 32 mmol/L    Anion gap 9 5 - 15 mmol/L    Glucose 81 65 - 100 mg/dL    BUN 7 6 - 20 MG/DL    Creatinine 0.35 (L) 0.70 - 1.30 MG/DL    BUN/Creatinine ratio 20 12 - 20      GFR est AA >60 >60 ml/min/1.73m2    GFR est non-AA >60 >60 ml/min/1.73m2    Calcium 9.2 8.5 - 10.1 MG/DL    Bilirubin, total 0.6 0.2 - 1.0 MG/DL    ALT (SGPT) 16 12 - 78 U/L    AST (SGOT) 12 (L) 15 - 37 U/L    Alk.  phosphatase 83 45 - 117 U/L    Protein, total 6.5 6.4 - 8.2 g/dL    Albumin 3.1 (L) 3.5 - 5.0 g/dL    Globulin 3.4 2.0 - 4.0 g/dL    A-G Ratio 0.9 (L) 1.1 - 2.2

## 2021-05-18 NOTE — PROGRESS NOTES
Comprehensive Nutrition Assessment    Type and Reason for Visit: Reassess    Nutrition Recommendations/Plan:    1. Continue Regular diet, encourage PO intakes. 2. RD to add Ensure Enlive daily. 3. Standing wt as medical status allows. Nutrition Assessment:    Pt admitted from [facility] d/t Cardiac arrest. PMHx: Polysubstance abuse,  MRSA bacteremia, MSSA endocarditis s/[p AVR and Aortic root abscess debridement, cerebral abscess (embolic), VDRF-s/p trach and PEG placement. B/L multilobar PNA. Cardiogenic shock on admisison-off pressors. CVA-results of MRI noted; neurology following. SLP has been following, pt cleared to initiate oral diet - Regular, thin liquids - on 5/14. SLP notes indicate pt tolerating PMV and has now signed off, with continued recommendation of current diet and trach downsizing. Pt remains with some hypotension, with plans for gentle IVFs. Pt reports he is happy to be eating real food again and appetite is great. He is noted to have severe fat/muscle wasting and made requests for jc and sprite to be added to trays. He reports drinking chocolate Ensure at home, and will provide while admitted as well. Unable to obtain standing wt given current medical status, but hopeful to as continued work with PT. He reports UBW ~180# PTA initial hospital/[facility] presentation, no known food allergies/intolerances.      Malnutrition Assessment:  Malnutrition Status:  Severe malnutrition    Context:  Acute illness     Findings of the 6 clinical characteristics of malnutrition:   Energy Intake:  Mild decrease in energy intake (specify)  Weight Loss:  7.00 - Greater than 7.5% over 3 months     Body Fat Loss:  7 - Moderate body fat loss, Fat overlying ribs, Buccal region   Muscle Mass Loss:  7 - Moderate muscle mass loss, Thigh (quadraceps), Clavicles (pectoralis & deltoids), Calf  Fluid Accumulation:  No significant fluid accumulation,     Strength:  Not performed         Nutritionally Significant Medications: Risaquad, Aldactone (held), IV vanc and meropenem, IVFs - NaCl @ 50 ml/hr    Estimated Daily Nutrient Needs:  Energy (kcal): 2060 (MSJ x 1.2 x 1.1); Weight Used for Energy Requirements: Current  Protein (g): 80 (1.5 g/kg); Weight Used for Protein Requirements: Current  Fluid (ml/day): 2060; Method Used for Fluid Requirements: 1 ml/kcal    Nutrition Related Findings:       BM: Abd soft, last BM recorded 5/15  Edema: None noted  Wounds:  None       Recent Labs     05/18/21  0408 05/17/21  0349 05/16/21  0305   GLU 81 90 103*   BUN 7 7 7   CREA 0.35* 0.36* 0.49*    136 137   K 3.8 3.9 4.0    103 101   CO2 24 25 27   CA 9.2 9.4 9.4   PHOS 3.5 3.4 3.6   MG 1.6 1.5* 1.7     Recent Labs     05/18/21  0408 05/17/21  0349 05/16/21  0305   ALT 16 17 21   AP 83 85 96   TBILI 0.6 0.6 0.6   TP 6.5 6.6 7.1   ALB 3.1* 2.9* 3.1*   GLOB 3.4 3.7 4.0     Recent Labs     05/17/21  1845   LAC 0.8     Recent Labs     05/18/21  0408 05/17/21  0349   WBC 7.7 8.0   HGB 10.0* 10.4*   HCT 32.7* 34.4*    289         Current Nutrition Therapies:   Diet: Cardiac  Supplements: None  Additional Caloric Sources: None    Meal intake: No data found.       Anthropometric Measures:  · Height:  5' 11\" (180.3 cm)  · Current Body Wt:  55 kg (121 lb 4.1 oz)   · Admission Body Wt:  125 lb 3.5 oz      · Ideal Body Wt:  172:  75.6 %   · BMI Categories:  Underweight (BMI less than 18.5)     Wt Readings from Last 10 Encounters:   05/18/21 55 kg (121 lb 4.1 oz)   03/31/21 75 kg (165 lb 5.5 oz)   10/30/20 77.1 kg (169 lb 15.6 oz)   07/15/20 76.2 kg (168 lb)   08/18/19 81.6 kg (180 lb)   11/07/18 72.6 kg (160 lb)   08/04/18 72.6 kg (160 lb)   06/20/18 72.6 kg (160 lb)   11/20/17 72.6 kg (160 lb)       Nutrition Diagnosis:   · Inadequate protein-energy intake related to catabolic illness, increased demand for energy/nutrients, swallowing difficulty, impaired respiratory function as evidenced by weight loss, BMI, severe muscle loss, severe loss of subcutaneous fat      Nutrition Interventions:   Food and/or Nutrient Delivery: Continue current diet, Start oral nutrition supplement  Nutrition Education and Counseling: No recommendations at this time  Coordination of Nutrition Care: Continue to monitor while inpatient    Goals:  TF goal met. New goal: Intakes at least 50% meals + 100% daily supplement. Nutrition Monitoring and Evaluation:   Behavioral-Environmental Outcomes: None identified  Food/Nutrient Intake Outcomes: Food and nutrient intake, Supplement intake  Physical Signs/Symptoms Outcomes: Biochemical data, Weight, Meal time behavior    Discharge Planning:     Too soon to determine     Ger Hansen RD     Contact via [employer]

## 2021-05-18 NOTE — PROGRESS NOTES
Problem: Falls - Risk of  Goal: *Absence of Falls  Description: Document Jesús Da Silva Fall Risk and appropriate interventions in the flowsheet. Outcome: Progressing Towards Goal  Note: Fall Risk Interventions:       Mentation Interventions: Adequate sleep, hydration, pain control    Medication Interventions: Evaluate medications/consider consulting pharmacy    Elimination Interventions: Call light in reach              Problem: Patient Education: Go to Patient Education Activity  Goal: Patient/Family Education  Outcome: Progressing Towards Goal     Problem: Risk for Spread of Infection  Goal: Prevent transmission of infectious organism to others  Description: Prevent the transmission of infectious organisms to other patients, staff members, and visitors. Outcome: Progressing Towards Goal     Problem: Patient Education:  Go to Education Activity  Goal: Patient/Family Education  Outcome: Progressing Towards Goal     Problem: Breathing Pattern - Ineffective  Goal: *Absence of hypoxia  Outcome: Progressing Towards Goal  Goal: *Use of effective breathing techniques  Outcome: Progressing Towards Goal  Goal: *PALLIATIVE CARE:  Alleviation of Dyspnea  Outcome: Progressing Towards Goal     Problem: Pressure Injury - Risk of  Goal: *Prevention of pressure injury  Description: Document Salvador Scale and appropriate interventions in the flowsheet.   Outcome: Progressing Towards Goal  Note: Pressure Injury Interventions:  Sensory Interventions: Assess changes in LOC    Moisture Interventions: Absorbent underpads    Activity Interventions: Pressure redistribution bed/mattress(bed type)    Mobility Interventions: Pressure redistribution bed/mattress (bed type)    Nutrition Interventions: Document food/fluid/supplement intake    Friction and Shear Interventions: Minimize layers                Problem: Patient Education: Go to Patient Education Activity  Goal: Patient/Family Education  Outcome: Progressing Towards Goal     Problem: Patient Education: Go to Patient Education Activity  Goal: Patient/Family Education  Outcome: Progressing Towards Goal     Problem: Patient Education: Go to Patient Education Activity  Goal: Patient/Family Education  Outcome: Progressing Towards Goal     Problem: Patient Education: Go to Patient Education Activity  Goal: Patient/Family Education  Outcome: Progressing Towards Goal     Problem: Patient Education: Go to Patient Education Activity  Goal: Patient/Family Education  Outcome: Progressing Towards Goal     Problem: Nutrition Deficit  Goal: *Optimize nutritional status  Outcome: Progressing Towards Goal     Problem: Airway Clearance - Ineffective  Goal: *Patent airway  Outcome: Progressing Towards Goal  Goal: *Absence of airway secretions  Outcome: Progressing Towards Goal  Goal: *Able to cough effectively  Outcome: Progressing Towards Goal     Problem: Anxiety  Goal: *Alleviation of anxiety  Outcome: Progressing Towards Goal  Goal: *Alleviation of anxiety (Palliative Care)  Outcome: Progressing Towards Goal

## 2021-05-18 NOTE — PROGRESS NOTES
Infectious Disease Progress Note          HPI:    Mr New Roseline seen  Asking for pain meds   no other complaints               Physical Exam:    Vitals:   Patient Vitals for the past 24 hrs:   Temp Pulse Resp BP SpO2   05/18/21 1204 98.5 °F (36.9 °C) (!) 105 16 (!) 95/50 100 %   05/18/21 0930 98.1 °F (36.7 °C) (!) 106 16 (!) 88/42 96 %   05/18/21 0302 97.9 °F (36.6 °C) 94 15 100/67 98 %   05/17/21 2323 98.3 °F (36.8 °C) 88 19 (!) 74/35 97 %   05/17/21 1922 98.6 °F (37 °C) 96 (!) 34 98/61 98 %   05/17/21 1746    (!) 82/49    05/17/21 1608 98.4 °F (36.9 °C) 97 18 (!) 86/41 96 %     · GEN: NAD  · HEENT: Trach collar, no scleral icterus, no thrush , NG  · CV: S1, S2 heard regularly, sternal incision site without any open wounds or discharge noted   · lungs: coarse bilaterally  · Abdomen: soft, non distended, non tender,   · Extremities: no edema  · Neuro: Alert to self, situation moves right upper extremity bilateral lower extremities, does not move left upper extremity well  · Skin: no rash        Labs:   Recent Results (from the past 24 hour(s))   ECHO ADULT FOLLOW-UP OR LIMITED    Collection Time: 05/17/21  2:07 PM   Result Value Ref Range    IVSd 1.03 (A) 0.60 - 1.00 cm    LVIDd 6.29 (A) 4.20 - 5.90 cm    LVIDs 5.63 cm    LVPWd 1.43 (A) 0.60 - 1.00 cm    BP EF 27.9 (A) 55.0 - 100.0 percent    LV Ejection Fraction MOD 2C 25 percent    LV Ejection Fraction MOD 4C 30 percent    LV ED Vol A2C 121.76 mL    LV ED Vol A4C 150.72 mL    LV ED Vol .61 67.0 - 155.0 mL    LV ES Vol A2C 91.88 mL    LV ES Vol A4C 104.80 mL    LV ES Vol .35 (A) 22.0 - 58.0 mL    Left Atrium Major Axis 3.29 cm    LV Mass .9 88.0 - 224.0 g    LV Mass AL Index 205.8 49.0 - 115.0 g/m2    LVES Vol Index BP 59.4 mL/m2    LVED Vol Index BP 82.5 mL/m2    Left Atrium Minor Axis 1.93 cm    LVED Vol Index A4C 88.4 mL/m2    LVED Vol Index A2C 71.4 mL/m2    LVES Vol Index A4C 61.5 mL/m2    LVES Vol Index A2C 53.9 mL/m2   LACTIC ACID Collection Time: 05/17/21  6:45 PM   Result Value Ref Range    Lactic acid 0.8 0.4 - 2.0 MMOL/L   MAGNESIUM    Collection Time: 05/18/21  4:08 AM   Result Value Ref Range    Magnesium 1.6 1.6 - 2.4 mg/dL   PHOSPHORUS    Collection Time: 05/18/21  4:08 AM   Result Value Ref Range    Phosphorus 3.5 2.6 - 4.7 MG/DL   PROCALCITONIN    Collection Time: 05/18/21  4:08 AM   Result Value Ref Range    Procalcitonin 0.08 ng/mL   CBC WITH AUTOMATED DIFF    Collection Time: 05/18/21  4:08 AM   Result Value Ref Range    WBC 7.7 4.1 - 11.1 K/uL    RBC 3.71 (L) 4.10 - 5.70 M/uL    HGB 10.0 (L) 12.1 - 17.0 g/dL    HCT 32.7 (L) 36.6 - 50.3 %    MCV 88.1 80.0 - 99.0 FL    MCH 27.0 26.0 - 34.0 PG    MCHC 30.6 30.0 - 36.5 g/dL    RDW 16.4 (H) 11.5 - 14.5 %    PLATELET 142 238 - 530 K/uL    MPV 11.8 8.9 - 12.9 FL    NRBC 0.0 0  WBC    ABSOLUTE NRBC 0.00 0.00 - 0.01 K/uL    NEUTROPHILS 44 32 - 75 %    LYMPHOCYTES 29 12 - 49 %    MONOCYTES 23 (H) 5 - 13 %    EOSINOPHILS 3 0 - 7 %    BASOPHILS 0 0 - 1 %    IMMATURE GRANULOCYTES 1 (H) 0.0 - 0.5 %    ABS. NEUTROPHILS 3.4 1.8 - 8.0 K/UL    ABS. LYMPHOCYTES 2.2 0.8 - 3.5 K/UL    ABS. MONOCYTES 1.8 (H) 0.0 - 1.0 K/UL    ABS. EOSINOPHILS 0.2 0.0 - 0.4 K/UL    ABS. BASOPHILS 0.0 0.0 - 0.1 K/UL    ABS. IMM.  GRANS. 0.1 (H) 0.00 - 0.04 K/UL    DF SMEAR SCANNED      PLATELET COMMENTS Large Platelets      RBC COMMENTS OVALOCYTES  PRESENT        RBC COMMENTS ANISOCYTOSIS  1+       METABOLIC PANEL, COMPREHENSIVE    Collection Time: 05/18/21  4:08 AM   Result Value Ref Range    Sodium 136 136 - 145 mmol/L    Potassium 3.8 3.5 - 5.1 mmol/L    Chloride 103 97 - 108 mmol/L    CO2 24 21 - 32 mmol/L    Anion gap 9 5 - 15 mmol/L    Glucose 81 65 - 100 mg/dL    BUN 7 6 - 20 MG/DL    Creatinine 0.35 (L) 0.70 - 1.30 MG/DL    BUN/Creatinine ratio 20 12 - 20      GFR est AA >60 >60 ml/min/1.73m2    GFR est non-AA >60 >60 ml/min/1.73m2    Calcium 9.2 8.5 - 10.1 MG/DL    Bilirubin, total 0.6 0.2 - 1.0 MG/DL ALT (SGPT) 16 12 - 78 U/L    AST (SGOT) 12 (L) 15 - 37 U/L    Alk.  phosphatase 83 45 - 117 U/L    Protein, total 6.5 6.4 - 8.2 g/dL    Albumin 3.1 (L) 3.5 - 5.0 g/dL    Globulin 3.4 2.0 - 4.0 g/dL    A-G Ratio 0.9 (L) 1.1 - 2.2         Microbiology Data:      Wound 5/6/21  Specimen Information: Toe; Wound        Component Value Ref Range & Units Status   Special Requests: NO SPECIAL REQUESTS    Final   GRAM STAIN NO DEFINITE ORGANISM SEEN    Final   Culture result:    Final   LIGHT MIXED SKIN LENORA ISOLATED    Result History        Sputum ET suction 5/7/21  Specimen Information: Sputum,ET Suction        Component Value Ref Range & Units Status   Special Requests: NO SPECIAL REQUESTS    Final   GRAM STAIN 1+ WBCS SEEN    Final   GRAM STAIN FEW EPITHELIAL CELLS SEEN    Final   GRAM STAIN OCCASIONAL BUDDING YEAST    Final   Culture result: Abnormal     Final   LIGHT ESCHERICHIA COLI ** (EXTENDED SPECTRUM BETA LACTAMASE ) **    Culture result: Abnormal     Final   LIGHT YEAST, (APPARENT CANDIDA ALBICANS)    Susceptibility     Escherichia coli     ALYCIA    Amikacin ($) <=2 ug/mL S    Ampicillin ($) >=32 ug/mL R    Ampicillin/sulbactam ($) >=32 ug/mL R    Cefazolin ($) >=64 ug/mL R    Cefepime ($$)  R    Cefoxitin 16 ug/mL I    Ceftazidime ($)  R    Ceftriaxone ($) >=64 ug/mL R    Ciprofloxacin ($) <=0.25 ug/mL S    Gentamicin ($) <=1 ug/mL S    Levofloxacin ($) <=0.12 ug/mL S    Meropenem ($$) <=0.25 ug/mL S    Piperacillin/Tazobac ($) 8 ug/mL S    Tobramycin ($) <=1 ug/mL S    Trimeth/Sulfa <=20 ug/mL S        5/6/21 blod  Component Value Ref Range & Units Status   Special Requests: NO SPECIAL REQUESTS    Final   Culture result: NO GROWTH 5 DAYS    Final   Result           Blood 3/24-3/30/21  Blood Culture Result    Final   Gram stain Gram Positive Cocci In Clusters   Called to/read back MARIA LUISA TOLEDO, ICU4   on 3/25/21   at 0925   by DM    Isolate Abnormal     Final   This isolate is presumed to be clindamycin resistant based on inducible clindamycin resistance. Clindamycin may still be effective in some patients. Per CLIS guidelines. **Methicillin Resistant Staphylococcus aureus**    Susceptibility     Staphylococcus aureus Methcillin Resistant     ALYCIA    Cefoxitin screen +       Clindamycin ($) 0.25  R    Erythromycin ($$$$) 4  R    Inducible Clindamycin Resistance +       Levofloxacin ($) 0.25  S    Linezolid ($$$$$) 2  S    Oxacillin >=4  R    Penicillin >=0.5  R    Tetracycline <=1  S    Trimeth/Sulfa <=10  S    Vancomycin ($) 1  S        Imaging:   TTE 5/7/21  Left Ventricle Normal wall thickness. Dilated left ventricle. The estimated EF is 20 - 25%. Severely reduced systolic function. There is inconclusive left ventricular diastolic function. Left Atrium Normal cavity size. Right Ventricle Normal cavity size. Reduced systolic function. Right Atrium Normal cavity size. Aortic Valve Trileaflet valve structure and no regurgitation. There is mild aortic stenosis. Mitral Valve No stenosis. Mitral valve non-specific thickening. Mild to moderate regurgitation. Tricuspid Valve Normal valve structure and no stenosis. Mild regurgitation. Pulmonic Valve Pulmonic valve not well visualized. Aorta Normal aortic root. Pulmonary Artery Pulmonary hypertension found to be mild to moderate. Pericardium No evidence of pericardial effusion. MRI Brain  FINDINGS:  Large subacute to chronic right PCA territory infarct involving the right  temporo-occipital lobe and thalamus with prominent areas of cortical laminar  necrosis as demonstrated by T1 shortening. Moderate size subacute to chronic  right MCA territory infarct involving the right frontal lobe and right basal  ganglia, also with associated cortical laminar necrosis. There are small chronic  infarcts noted in the right cerebellum.  There is petechial hemorrhage associated  with the right MCA and right PCA territory infarcts.     There are numerous microhemorrhages scattered throughout the cerebral  hemispheres and infratentorially within the cerebellum, including located  peripherally in the cerebral hemispheres, but also in the deep gray nuclei.     There is suspected mild cortical restricted diffusion seen throughout the left  cerebral hemisphere and in the right parieto-occipital and temporal lobes.     The ventricles are normal in size and position. Few scattered periventricular  and deep white matter T2/FLAIR hyperintensities, suggestive of mild chronic  microvascular ischemic disease. There is no extra-axial fluid collection or mass  effect. There is no cerebellar tonsillar herniation. Expected arterial  flow-voids are present.     The paranasal sinuses are clear. Large bilateral mastoid effusions. The orbital  contents are within normal limits. No significant osseous or scalp lesions are  identified.     IMPRESSION     1. Large subacute to chronic right PCA territory infarct as above. Moderate size  subacute to chronic right MCA territory infarct as above. Small chronic infarcts  in the right cerebellum. 2. Numerous supratentorial and infratentorial microhemorrhages as above,  suspicious for either septic emboli or thrombotic microangiopathy (as seen with  sepsis, TTP, etc.). 3. Suspected mild cortical restricted diffusion throughout the left cerebral  hemisphere and in the right posterior cerebral hemisphere, favored to represent  postictal MR changes. 4. Large bilateral mastoid effusions. CTA chest   modulation.     FINDINGS: Study is limited by patient motion. A tracheostomy tube is in  satisfactory position. There are lines present in appropriate position.     There is mild mediastinal lymphadenopathy. The heart is enlarged. There is a  small pericardial effusion.     No filling defect is seen within the pulmonary arterial system to suggest  pulmonary embolus.  The aorta is normal in caliber.     There is severe multilevel groundglass opacification throughout both lungs, as  well as severe multilobar bilateral consolidation. There is no pneumothorax. There are moderate bilateral pleural effusions.     Limited evaluation of the upper abdomen demonstrates no abnormality. There are  median sternotomy wires.     IMPRESSION  1. No evidence of pulmonary embolus. 2.  Severe multilevel consolidation throughout both lungs, consistent with  severe multilobar pneumonia. 3. Cardiomegaly with small pericardial effusion. 4. Moderate bilateral pleural effusions. CT head      IMPRESSION     1. Large territory acute infarctions of the right occipital lobe and right  frontal lobe. 2. No intracranial hemorrhage.   3. No hydrocephalus.     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, CNS infection, abscess, meningitis, polysubstance abuse, who was brought to the emergency room on 5/6/2021 from [facility] with cardiac arrest.        1) cardiac arrest  2) history of MRSA bacteremia, endocarditis of the aortic valve with perforation/abscess status post aortic valve replacement and aortic root abscess debridement 4/16/2021 at [facility]  3) septic emboli, CNS infarcts,   4) CNS abscess, ring-enhancing lesions, meningitis history  5) history of probable splenic and renal infarcts from septic emboli  6) systolic dysfunction   7) 3/24- 3/30/2021 MRSA bacteremia  8) ESBL E. coli pneumonia  ( CX + 5/7/21) , CT with multilobar pneumonia severe  9) polysubstance abuse        Plan  Continue meropenem with plan for 7 to 10-day course  Continue IV vancomycin renally dosed for trough of 15-20  Per review of external records, vancomycin plan till 5/28/2021  From an infectious disease standpoint, patient's blood cultures are negative during this admission and will plan with the same duration of treatment unless new findings of worsening  TTE/new abscess or concerns for infection    ICD device implant per cardiology. Antibiotics can lower seizure threshold and needs close monitoring, keppra being adjusted by  Neurology, levels per team   Antibiotic side effects/adverse effects/toxicities discussed including gastrointestinal, renal, hematological , ability to lower siezure threshold, risk for C diff infection. Probiotics, daily yogurt encouraged. Will sign off   Thank for the opportunity to participate in the care of this patient. Please contact with questions or concerns.

## 2021-05-19 LAB
ALBUMIN SERPL-MCNC: 3.3 G/DL (ref 3.5–5)
ALBUMIN/GLOB SERPL: 0.9 {RATIO} (ref 1.1–2.2)
ALP SERPL-CCNC: 85 U/L (ref 45–117)
ALT SERPL-CCNC: 19 U/L (ref 12–78)
ANION GAP SERPL CALC-SCNC: 7 MMOL/L (ref 5–15)
AST SERPL-CCNC: 7 U/L (ref 15–37)
BASOPHILS # BLD: 0.1 K/UL (ref 0–0.1)
BASOPHILS NFR BLD: 1 % (ref 0–1)
BILIRUB SERPL-MCNC: 0.5 MG/DL (ref 0.2–1)
BUN SERPL-MCNC: 9 MG/DL (ref 6–20)
BUN/CREAT SERPL: 26 (ref 12–20)
CALCIUM SERPL-MCNC: 9.9 MG/DL (ref 8.5–10.1)
CHLORIDE SERPL-SCNC: 103 MMOL/L (ref 97–108)
CO2 SERPL-SCNC: 26 MMOL/L (ref 21–32)
CREAT SERPL-MCNC: 0.35 MG/DL (ref 0.7–1.3)
DATE LAST DOSE: ABNORMAL
DIFFERENTIAL METHOD BLD: ABNORMAL
EOSINOPHIL # BLD: 0.2 K/UL (ref 0–0.4)
EOSINOPHIL NFR BLD: 3 % (ref 0–7)
ERYTHROCYTE [DISTWIDTH] IN BLOOD BY AUTOMATED COUNT: 16.4 % (ref 11.5–14.5)
GLOBULIN SER CALC-MCNC: 3.7 G/DL (ref 2–4)
GLUCOSE SERPL-MCNC: 91 MG/DL (ref 65–100)
HCT VFR BLD AUTO: 32.4 % (ref 36.6–50.3)
HGB BLD-MCNC: 9.9 G/DL (ref 12.1–17)
IMM GRANULOCYTES # BLD AUTO: 0.1 K/UL (ref 0–0.04)
IMM GRANULOCYTES NFR BLD AUTO: 1 % (ref 0–0.5)
LYMPHOCYTES # BLD: 1.6 K/UL (ref 0.8–3.5)
LYMPHOCYTES NFR BLD: 22 % (ref 12–49)
MAGNESIUM SERPL-MCNC: 1.7 MG/DL (ref 1.6–2.4)
MCH RBC QN AUTO: 26.8 PG (ref 26–34)
MCHC RBC AUTO-ENTMCNC: 30.6 G/DL (ref 30–36.5)
MCV RBC AUTO: 87.6 FL (ref 80–99)
MONOCYTES # BLD: 1.6 K/UL (ref 0–1)
MONOCYTES NFR BLD: 22 % (ref 5–13)
NEUTS SEG # BLD: 3.6 K/UL (ref 1.8–8)
NEUTS SEG NFR BLD: 51 % (ref 32–75)
NRBC # BLD: 0 K/UL (ref 0–0.01)
NRBC BLD-RTO: 0 PER 100 WBC
PHOSPHATE SERPL-MCNC: 3.6 MG/DL (ref 2.6–4.7)
PLATELET # BLD AUTO: 344 K/UL (ref 150–400)
PLATELET COMMENTS,PCOM: ABNORMAL
PMV BLD AUTO: 11 FL (ref 8.9–12.9)
POTASSIUM SERPL-SCNC: 3.8 MMOL/L (ref 3.5–5.1)
PROCALCITONIN SERPL-MCNC: <0.05 NG/ML
PROT SERPL-MCNC: 7 G/DL (ref 6.4–8.2)
RBC # BLD AUTO: 3.7 M/UL (ref 4.1–5.7)
RBC MORPH BLD: ABNORMAL
RBC MORPH BLD: ABNORMAL
REPORTED DOSE,DOSE: ABNORMAL UNITS
REPORTED DOSE/TIME,TMG: ABNORMAL
SODIUM SERPL-SCNC: 136 MMOL/L (ref 136–145)
VANCOMYCIN TROUGH SERPL-MCNC: 17.7 UG/ML (ref 5–10)
WBC # BLD AUTO: 7.2 K/UL (ref 4.1–11.1)

## 2021-05-19 PROCEDURE — 84145 PROCALCITONIN (PCT): CPT

## 2021-05-19 PROCEDURE — 80202 ASSAY OF VANCOMYCIN: CPT

## 2021-05-19 PROCEDURE — 74011000250 HC RX REV CODE- 250: Performed by: HOSPITALIST

## 2021-05-19 PROCEDURE — 74011250637 HC RX REV CODE- 250/637: Performed by: HOSPITALIST

## 2021-05-19 PROCEDURE — 83735 ASSAY OF MAGNESIUM: CPT

## 2021-05-19 PROCEDURE — 74011250636 HC RX REV CODE- 250/636: Performed by: INTERNAL MEDICINE

## 2021-05-19 PROCEDURE — 74011250636 HC RX REV CODE- 250/636: Performed by: HOSPITALIST

## 2021-05-19 PROCEDURE — 74011000258 HC RX REV CODE- 258: Performed by: HOSPITALIST

## 2021-05-19 PROCEDURE — 97110 THERAPEUTIC EXERCISES: CPT

## 2021-05-19 PROCEDURE — 80053 COMPREHEN METABOLIC PANEL: CPT

## 2021-05-19 PROCEDURE — 36415 COLL VENOUS BLD VENIPUNCTURE: CPT

## 2021-05-19 PROCEDURE — 85025 COMPLETE CBC W/AUTO DIFF WBC: CPT

## 2021-05-19 PROCEDURE — 74011250637 HC RX REV CODE- 250/637: Performed by: INTERNAL MEDICINE

## 2021-05-19 PROCEDURE — 65660000001 HC RM ICU INTERMED STEPDOWN

## 2021-05-19 PROCEDURE — 84100 ASSAY OF PHOSPHORUS: CPT

## 2021-05-19 PROCEDURE — 97530 THERAPEUTIC ACTIVITIES: CPT

## 2021-05-19 RX ORDER — FENTANYL CITRATE 50 UG/ML
50 INJECTION, SOLUTION INTRAMUSCULAR; INTRAVENOUS
Status: DISCONTINUED | OUTPATIENT
Start: 2021-05-19 | End: 2021-05-26

## 2021-05-19 RX ADMIN — MEROPENEM 500 MG: 500 INJECTION, POWDER, FOR SOLUTION INTRAVENOUS at 08:49

## 2021-05-19 RX ADMIN — MEROPENEM 500 MG: 500 INJECTION, POWDER, FOR SOLUTION INTRAVENOUS at 14:09

## 2021-05-19 RX ADMIN — MICONAZOLE NITRATE: 20 CREAM TOPICAL at 08:51

## 2021-05-19 RX ADMIN — OXYCODONE HYDROCHLORIDE 7.5 MG: 5 TABLET ORAL at 04:19

## 2021-05-19 RX ADMIN — VANCOMYCIN HYDROCHLORIDE 1000 MG: 1 INJECTION, POWDER, LYOPHILIZED, FOR SOLUTION INTRAVENOUS at 13:05

## 2021-05-19 RX ADMIN — HEPARIN SODIUM 5000 UNITS: 5000 INJECTION INTRAVENOUS; SUBCUTANEOUS at 22:41

## 2021-05-19 RX ADMIN — OXYCODONE HYDROCHLORIDE 7.5 MG: 5 TABLET ORAL at 14:07

## 2021-05-19 RX ADMIN — MICONAZOLE NITRATE: 20 CREAM TOPICAL at 19:29

## 2021-05-19 RX ADMIN — FENTANYL CITRATE 50 MCG: 50 INJECTION, SOLUTION INTRAMUSCULAR; INTRAVENOUS at 12:43

## 2021-05-19 RX ADMIN — HEPARIN SODIUM 5000 UNITS: 5000 INJECTION INTRAVENOUS; SUBCUTANEOUS at 12:44

## 2021-05-19 RX ADMIN — FENTANYL CITRATE 50 MCG: 50 INJECTION, SOLUTION INTRAMUSCULAR; INTRAVENOUS at 08:49

## 2021-05-19 RX ADMIN — LEVETIRACETAM 1500 MG: 500 TABLET ORAL at 19:29

## 2021-05-19 RX ADMIN — OXYCODONE HYDROCHLORIDE 7.5 MG: 5 TABLET ORAL at 19:32

## 2021-05-19 RX ADMIN — FENTANYL CITRATE 50 MCG: 50 INJECTION INTRAMUSCULAR; INTRAVENOUS at 16:41

## 2021-05-19 RX ADMIN — MEROPENEM 500 MG: 500 INJECTION, POWDER, FOR SOLUTION INTRAVENOUS at 02:50

## 2021-05-19 RX ADMIN — VANCOMYCIN HYDROCHLORIDE 1000 MG: 1 INJECTION, POWDER, LYOPHILIZED, FOR SOLUTION INTRAVENOUS at 05:20

## 2021-05-19 RX ADMIN — CASTOR OIL AND BALSAM, PERU: 788; 87 OINTMENT TOPICAL at 19:29

## 2021-05-19 RX ADMIN — HEPARIN SODIUM 5000 UNITS: 5000 INJECTION INTRAVENOUS; SUBCUTANEOUS at 04:19

## 2021-05-19 RX ADMIN — FENTANYL CITRATE 50 MCG: 50 INJECTION, SOLUTION INTRAMUSCULAR; INTRAVENOUS at 00:57

## 2021-05-19 RX ADMIN — LEVETIRACETAM 1500 MG: 500 TABLET ORAL at 08:49

## 2021-05-19 RX ADMIN — CASTOR OIL AND BALSAM, PERU: 788; 87 OINTMENT TOPICAL at 08:50

## 2021-05-19 RX ADMIN — Medication 5 ML: at 14:00

## 2021-05-19 RX ADMIN — VANCOMYCIN HYDROCHLORIDE 1000 MG: 1 INJECTION, POWDER, LYOPHILIZED, FOR SOLUTION INTRAVENOUS at 22:42

## 2021-05-19 RX ADMIN — MEROPENEM 500 MG: 500 INJECTION, POWDER, FOR SOLUTION INTRAVENOUS at 22:42

## 2021-05-19 RX ADMIN — Medication 1 CAPSULE: at 08:49

## 2021-05-19 RX ADMIN — Medication 3 MG: at 22:42

## 2021-05-19 RX ADMIN — FENTANYL CITRATE 50 MCG: 50 INJECTION INTRAMUSCULAR; INTRAVENOUS at 22:42

## 2021-05-19 NOTE — PROGRESS NOTES
Day #5 of Vancomycin (continued prior to arrival)  Indication:  recent MRSA bacteremia with MSSA endocarditis, and multiple septic cerebral emboli from admission in March  -s/p AVR   -treated with vancomycin here  - for possible sepsis; [facility] paperwork notes 6 week course of vancomycin through      Current regimen:  1g IV q8hrs   Abx regimen:  Vancomycin and Merrem    Recent Labs     21  0536 21  0535 21  0408 21  0349   WBC 7.2  --  7.7 8.0   CREA  --  0.35* 0.35* 0.36*   BUN  --  9 7 7   Est CrCl: >120 ml/min; UO: -  ml/kg/hr  Temp (24hrs), Av.6 °F (37 °C), Min:98.1 °F (36.7 °C), Max:99.1 °F (37.3 °C)    Cultures:    sputum - ESBL E. coli (S- Meropenem)   toe wound - light mixed skin sunny, final    Goal trough = 15 - 20 mcg/mL    Recent trough history -  : 21.9 mcg/ml @ ~7h post-dose, true trough extrapolated to ~19 mcg/ml; change to vanc 1g q8h   @ 0536 = 17.7 mcg/ml (6.75 hours post-dose); extrapolated to ~ 14.8 mcg/ml    Plan: Reported level this am is therapeutic. No change at this time.

## 2021-05-19 NOTE — PROGRESS NOTES
Bedside shift change report given to Marquise Alatorre (oncoming nurse) by Ismael Rankin (offgoing nurse). Report included the following information SBAR, Kardex, ED Summary, Intake/Output, MAR, Recent Results, Med Rec Status and Cardiac Rhythm NSR.

## 2021-05-19 NOTE — PROGRESS NOTES
.  [address] [facility] Adult  Hospitalist Group    PATIENT ID: Stoney Carson  MRN: [id number]   YOB: 1995    PRIMARY CARE PROVIDER: None   DATE OF ADMISSION: 5/6/2021  5:22 PM    ATTENDING PHYSICIAN: Ismael Wooten MD  CONSULTATIONS:   IP CONSULT TO INTENSIVIST  IP CONSULT TO NEUROLOGY  IP CONSULT TO PODIATRY  IP CONSULT TO PODIATRY  IP CONSULT TO INFECTIOUS DISEASES  IP CONSULT TO NEUROLOGY  IP CONSULT TO OTOLARYNGOLOGY  IP CONSULT TO CARDIOLOGY    PROCEDURES/SURGERIES:   * No surgery found *    REASON FOR ADMISSION: Cardiac arrest with pulseless electrical activity [facility] PROBLEM LIST:  Patient Active Problem List   Diagnosis Code    Endocarditis due to methicillin susceptible Staphylococcus aureus (MSSA) I33.0, B95.61    Drug abuse, cocaine type ([facility] F14.10    Type 2 myocardial infarction ([facility] I21. A1    Cerebral abscess (embolic) H93.5    Cardiac arrest with pulseless electrical activity (HCC) I46.9    Severe protein-calorie malnutrition (HCC) E43    Postoperative acute respiratory failure (HCC) J95.821    Severe muscle deconditioning R29.898    Successful cardiopulmonary resuscitation Z92.89    Acute traumatic pain G89.11    S/P AVR (aortic valve replacement) and aortoplasty Z95.2    Abscess of aortic root I51.89    Contusion of chest wall S20.219A         Brief HPI and Hospital Course:      Stoney Carson is a 22 y.o. male with h/o Seizures and anxiety who presented to [facility] ED after a cardiac arrest at [place] [address] around 1500 5/6/2021. Hx from chart and speaking with patient's sister via phone. Patient had recent hospitalization at the in [facility] in March of this year. Initial hospitalization admission was at [facility] on 3/24 with AMS in setting of polysubstance and IVD use (cocaine, heroine, methamphetamines). He was found to have septic MRSA bacteremia, MRSA endocarditis.  Imaging and MRI also found R PCA infarct and several abscesses and right temporal and parietal lobes. Infarcts thought to be due to septic emboli and patient had left sided residual weakness. Patient was transferred to [address] [place] [address] on 4/1/2021 for evaluation for valve surgery. After transfer he was found to have a New R MCA infarct Also had a type 2 NSTEMI and tamponade with pericardiocentesis done prior to surgery. Did have cardiac arrest on day of surgery. Had a bioprosthetic Aortic Valve Replacement and Aortic Root Abscess Debridement done on 4/16/2021. Trached and sent to [facility] on 4/29/2021. Was undergoing PT and vent wean. Sister stated that patient was able to be off the vent for several hours over the last few days. He was sitting up and able to talk with valve over trach and could follow commands. Stated that he had severe weakness of the left upper ext, but was starting to gain some mobility in the lower left ext. Patient's mother visited patient today prior to arrest. She said the staff told her that the patient was getting very anxious earlier in the day around 1 pm and had to be placed back on the ventilator, and then they had to sedate him while he was on the ventilator. The mother stated \" he did not look good\" and \" his eye were rolled back in his head\". About 30 minutes after she left she got call about arrest. Per ED note patient was found unresponsive around the 1500 hour and was pulseless. Two rounds of CPR and meds were given and patient was defibrillated x 1 before ROSC. Patient was hypoxic post arrest. Unknown down time prior to arrest.        Subjective/HPI    Asked for increased fentanyl dose again. Dubhoff tube was removed yesterday    Assessment and Plan:    1.  cardiac arrest - 5/6/21 on admission:   .   Cardiologist following,   -per cardiologist :  ICD vs life vest before DC (but with his recent endocarditis/recent bacteremia till under treatment, ? Timing for ICD)      2. Acute on chronic  respiratory failure:   Had trach last hospitalization at [facility] and was sent to [facility] for further weaning. He was on ventilator while in ICU and now transitioned to trach collar.  -currently on trach collar and PMV  -on 35% fiO2 -wean as tolerated        3. MRSA aortic  Valve endocardititis   -Found to have MRSA bacteremia and MSSA endocarditis end of march. -Hx:  4/16/21 at [facility] bioprosthetic AVR and root abscess debridement. He also had a pericardiocentesis due to tamponade prior to surgery  - Reviewed [facility] record, he was on IV vanco at [facility] and planned to  have a 6 weeks of   tx and EOT 5/28/21 per record. - Continue vancomycin      Pain management : contusion of chest wall, left side, chronic back pain . H/o drug abuse  Cautious with narcotics  On  fentanyl to 50mcg q6h prn and oxycodone 7.5 mg Q 6 hr prn,wean narcotics    9. Bacterial pneumonia  severe multilevel consolidation throughout both lungs consistent with severe multilobar PNA. -sputum culture E coli with ESBL   -per ID -Plan  Continue meropenem with plan for 7 to 10-day course started 5/11  -pulmonary toilet   -trach care   -ID following      # Systolic congestive heart failure/CM  Ef 25%   Hold coreg,entresto ,Aldactone, BP better today  Cardiologist following   Echo EF showed 25-30% on 5/17    8. 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 Infarcts were thought to be due to septic emboli. He had left sided residual weakness. cta 5/6/21:  multiple acute infarctions throughout the  right cerebral hemisphere, involving much of the occipital lobe, as well as much  of the frontal lobe. There is an additional superior right frontoparietal  Infarct  Neurologist consulted                -PT/OT/SLP e              - Stroke education              - SBP goal 100-160              10.  Seizure DZ:  Continue keppra -1500 mg BID, off valproic acid as subtherapeutic due to meropenem interaction,so valproic acid discontinued  Seizure precautions  35. Severe muscle deconditioning    12: Severe protein-calorie malnutrition    13.  Cerebral abscess (embolic)    07 h/o substance abuse    Patient gaver permission to talk to his mother,I called the number, answer. fulll code        PHYSICAL EXAMINATION:  Visit Vitals  /61 (BP 1 Location: Right upper arm, BP Patient Position: At rest)   Pulse 94   Temp 98.1 °F (36.7 °C)   Resp 19   Ht 5' 11\" (1.803 m)   Wt 55.2 kg (121 lb 11.1 oz)   SpO2 99%   BMI 16.97 kg/m²       General:          chronically ill,   HEENT:           Atraumatic,trach capped        Neck:               Supple,   Lungs: No wheezing. Midline sternal surgical scar,minimal crackles. Heart:              Regular  rhythm,normal rate   No edema  Abdomen:       Soft, non-tender. Not distended. Bowel sounds normal  Extremities:     No LE edema  Skin:                Not pale. Not Jaundiced  No rashes         Neurologic:      Alert, oriented X 3, unable to move LUE , able to bend LLE at knee, moves RUE and RLE     Labs:     Recent Labs     05/19/21 0536 05/18/21 0408   WBC 7.2 7.7   HGB 9.9* 10.0*   HCT 32.4* 32.7*    275     Recent Labs     05/19/21 0536 05/19/21 0535 05/18/21 0408 05/17/21  0349   NA  --  136 136 136   K  --  3.8 3.8 3.9   CL  --  103 103 103   CO2  --  26 24 25   BUN  --  9 7 7   CREA  --  0.35* 0.35* 0.36*   GLU  --  91 81 90   CA  --  9.9 9.2 9.4   MG 1.7  --  1.6 1.5*   PHOS 3.6  --  3.5 3.4     Recent Labs     05/19/21  0535 05/18/21  0408 05/17/21  0349   ALT 19 16 17   AP 85 83 85   TBILI 0.5 0.6 0.6   TP 7.0 6.5 6.6   ALB 3.3* 3.1* 2.9*   GLOB 3.7 3.4 3.7     No results for input(s): INR, PTP, APTT, INREXT, INREXT in the last 72 hours. No results for input(s): FE, TIBC, PSAT, FERR in the last 72 hours. No results found for: FOL, RBCF   No results for input(s): PH, PCO2, PO2 in the last 72 hours. No results for input(s): CPK, CKNDX, TROIQ in the last 72 hours.     No lab exists for component: CPKMB  Lab Results   Component Value Date/Time    Cholesterol, total 140 05/11/2021 04:35 AM    HDL Cholesterol 21 05/11/2021 04:35 AM    LDL, calculated 77.4 05/11/2021 04:35 AM    Triglyceride 208 (H) 05/11/2021 04:35 AM    CHOL/HDL Ratio 6.7 (H) 05/11/2021 04:35 AM     Lab Results   Component Value Date/Time    Glucose (POC) 120 (H) 05/11/2021 12:00 PM    Glucose (POC) 100 05/11/2021 06:04 AM    Glucose (POC) 87 05/11/2021 12:19 AM    Glucose (POC) 83 05/10/2021 06:53 PM    Glucose (POC) 84 05/10/2021 02:07 PM     Lab Results   Component Value Date/Time    Color YELLOW/STRAW 05/06/2021 05:42 PM    Appearance CLEAR 05/06/2021 05:42 PM    Specific gravity 1.008 05/06/2021 05:42 PM    Specific gravity 1.020 03/24/2021 09:30 PM    pH (UA) 5.0 05/06/2021 05:42 PM    Protein 30 (A) 05/06/2021 05:42 PM    Glucose Negative 05/06/2021 05:42 PM    Ketone Negative 05/06/2021 05:42 PM    Bilirubin Negative 05/06/2021 05:42 PM    Urobilinogen 0.2 05/06/2021 05:42 PM    Nitrites Negative 05/06/2021 05:42 PM    Leukocyte Esterase Negative 05/06/2021 05:42 PM    Epithelial cells FEW 05/06/2021 05:42 PM    Bacteria Negative 05/06/2021 05:42 PM    WBC 0-4 05/06/2021 05:42 PM    RBC 0-5 05/06/2021 05:42 PM         CODE STATUS:  x Full Code    DNR    Partial    Comfort Care       Signed:   Lluvia Benavides MD  Date of Service:  5/19/2021  3:38 PM

## 2021-05-19 NOTE — PROGRESS NOTES
Problem: Mobility Impaired (Adult and Pediatric)  Goal: *Acute Goals and Plan of Care (Insert Text)  Description: FUNCTIONAL STATUS PRIOR TO ADMISSION: Patient was independent prior to March 2021. Pt has been hospitalized and recently at [facility]. Per mother, pt was ambulating with therapy at [facility]. HOME SUPPORT PRIOR TO ADMISSION: The patient lived alone prior to hospitalization. Physical Therapy Goals  Revised 5/17/2021  1. Patient will move from supine to sit and sit to supine , scoot up and down, and roll side to side in bed with minimal assistance/contact guard assist within 7 day(s). 2.  Patient will tolerate sitting EOB with contact guard assistance for approx 5 minutes within 7 day(s). 3.  Patient will transfer from bed to chair and chair to bed with maximal assistance using the least restrictive device within 7 day(s). 4.  Patient will perform sit to stand with maximal assistance within 7 day(s). 5.  Patient will ambulate with maximal assistance for 5 feet with the least restrictive device within 7 day(s). Initiated 5/9/2021  1. Patient will move from supine to sit and sit to supine , scoot up and down, and roll side to side in bed with moderate assistance  within 7 day(s). GOAL MET 5/17/21  2. Patient will tolerate sitting EOB with moderate assistance for approx 3-5 minutes within 7 day(s). GOAL MET 5/17/21  3. Patient will transfer from bed to chair and chair to bed with maximal assistance using the least restrictive device within 7 day(s). 4.  Patient will perform sit to stand with maximal assistance within 7 day(s). 5.  Patient will ambulate with maximal assistance for 5 feet with the least restrictive device within 7 day(s).          Outcome: Not Met     PHYSICAL THERAPY TREATMENT  Patient: Nelsy Guido (79 y.o. male)  Date: 5/19/2021  Diagnosis: Cardiac arrest [facility]) [I46.9] Cardiac arrest with pulseless electrical activity ([facility] [address]       Precautions: Fall, Skin  Chart, physical therapy assessment, plan of care and goals were reviewed. ASSESSMENT  Patient continues with skilled PT services. Progression towards goals was not appreciated during this session d/t minimal patient participation. Received patient in bed asking for cereal on arrival.  Patient agreed to therapy session while waiting for cereal though continued to make requests putting off therapy activity until addressed. Assisted changing his wet clothing working on rolling and bridging. Patient was max assist x2 to roll, min assist to bridge. After addressing pt requests (cereal, wet clothing/ linens, and time to rest), patient requested pain medication and informed therapy he would not participate in sitting balance training until meds are received. Session ended. RN aware of pt's request for pain meds. Therapy will continue to follow. Current Level of Function Impacting Discharge (mobility/balance): Max assist x2 to roll and scoot up in the bed. Pt declined to attempt sitting EOB. Other factors to consider for discharge: level of assist required, fall risk         PLAN :  Patient continues to benefit from skilled intervention to address the above impairments. Continue treatment per established plan of care. to address goals. Recommendation for discharge: (in order for the patient to meet his/her long term goals)  Therapy 3 hours per day 5-7 days per week.  If not, then SNF  If pt were to d/c home, would benefit from HHPT and require assistance/supervision for 2 assist for all mobility as pt is a fall risk    This discharge recommendation:  Has been made in collaboration with the attending provider and/or case management    IF patient discharges home will need the following DME: hospital bed, mechanical lift, and wheelchair       SUBJECTIVE:   Patient stated I need more cereal.    OBJECTIVE DATA SUMMARY:   Critical Behavior:  Neurologic State: Alert  Orientation Level: Oriented X4  Cognition: Follows commands  Safety/Judgement: Awareness of environment  Functional Mobility Training:  Bed Mobility:  Rolling: Maximum assistance;Assist x2  Scooting: Maximum assistance;Assist x2  Transfers:       Balance:  Sitting: Impaired; With support  Ambulation/Gait Training:       Pain Rating:  Voiced back and neck pain. Did not quantify. Asking for pain meds, specifically Fentanyl. RN aware. Activity Tolerance:   Pt minimally participated today    After treatment patient left in no apparent distress:   Supine in bed, Call bell within reach, and Side rails x 3    COMMUNICATION/COLLABORATION:   The patients plan of care was discussed with: Occupational therapist and Registered nurse.      Lisha Lazo, PT   Time Calculation: 23 mins

## 2021-05-19 NOTE — ROUTINE PROCESS
Bedside and Verbal shift change report given to Maggi Vega (oncoming nurse) by Ramses Mejia (offgoing nurse). Report included the following information SBAR, Kardex, ED Summary, Intake/Output, MAR, Recent Results and Cardiac Rhythm NSR.

## 2021-05-19 NOTE — ROUTINE PROCESS
Bedside and Verbal shift change report given to Corin Samaniego (oncoming nurse) by Sue Villeda (offgoing nurse). Report included the following information SBAR, Kardex, Intake/Output, MAR and Cardiac Rhythm SR/ST.

## 2021-05-19 NOTE — PROGRESS NOTES
Cardiology Progress Note                                        Admit Date: 5/6/2021    Assessment/Plan:       1. Sp cardiac arrest post op 4/17/21,   cardiac arrest Vfib 5/6/21  ekg rbbb qt 460 msec. ICD rec for secondary prevention if life expectancy > 1yr.    2. Hx:  4/16/21 at [facility] bioprosthetic AVR and root abscess debridement. He also had a pericardiocentesis due to tamponade prior to surgery  3. Recent endocardititis 3/24/21 Staph  4. CM  Ef 25%   5.  right MCA infarct and several abscesses in the right temporal and parietal lobes. Infarcts were thought to be due to septic emboli. He had left sided residual weakness. cta 5/6/21:  multiple acute infarctions throughout the  right cerebral hemisphere, involving much of the occipital lobe, as well as much  of the frontal lobe. There is an additional superior right frontoparietal  infarct  6.  severe multilevel consolidation throughout both lungs consistent with severe multilobar PNA. 7. PNA:  Severe multilevel consolidation throughout both lungs, consistent with  severe multilobar pneumonia. Improvement in BP off of meds and s/p IVF - restart as able  ICD before DC vs LV;  Abx stop 5/28; d/w EP  Echo showed mild improvement, LVEF 30-35%  Add dapagliflozin on dc as not on formulary       Anival Modi is a 22 y.o. male with     PROBLEM LIST:  Patient Active Problem List    Diagnosis Date Noted    Severe muscle deconditioning 05/01/2021    S/P AVR (aortic valve replacement) and aortoplasty 04/16/2021    Abscess of aortic root 04/16/2021    Contusion of chest wall 04/16/2021    Successful cardiopulmonary resuscitation 04/14/2021    Acute traumatic pain 04/14/2021    Postoperative acute respiratory failure ([address] 04/01/2021    Severe protein-calorie malnutrition ([address] 05/11/2021    Cardiac arrest with pulseless electrical activity ([address] 05/06/2021    Cerebral abscess (embolic) 50/60/6458    Drug abuse, cocaine type ([address] 03/29/2021    Endocarditis due to methicillin susceptible Staphylococcus aureus (MSSA) 03/25/2021    Type 2 myocardial infarction [facility]) 03/24/2021         Subjective:     Chalice Cam Holota gestures appropriately. Likely orthostatic when sat on edge of bed with PT    Visit Vitals  /61 (BP 1 Location: Right upper arm, BP Patient Position: At rest)   Pulse 94   Temp 98.1 °F (36.7 °C)   Resp 19   Ht 5' 11\" (1.803 m)   Wt 55.2 kg (121 lb 11.1 oz)   SpO2 99%   BMI 16.97 kg/m²       Intake/Output Summary (Last 24 hours) at 5/19/2021 1352  Last data filed at 5/18/2021 1741  Gross per 24 hour   Intake    Output 250 ml   Net -250 ml       Objective:      Physical Exam:  HEENT: Perrla, EOMI  Neck: No JVD,  No thyroidmegaly  Resp: CTA bilaterally;  No wheezes or rales  CV: RRR s1s2 No murmur no s3  Abd:Soft, Nontender  Ext: No edema  Neuro: unresponsive on vent   Skin: Warm, Dry, Intact  Pulses: 2+ DP/PT/Rad      Telemetry: normal sinus rhythm    Current Facility-Administered Medications   Medication Dose Route Frequency    vancomycin (VANCOCIN) 1,000 mg in 0.9% sodium chloride 250 mL (VIAL-MATE)  1,000 mg IntraVENous Q8H    levETIRAcetam (KEPPRA) tablet 1,500 mg  1,500 mg Oral BID    sodium chloride (AYR SALINE) 0.65 % nasal drops 2 Drop  2 Drop Left Nostril Q2H PRN    oxyCODONE IR (ROXICODONE) tablet 7.5 mg  7.5 mg Oral Q4H PRN    lidocaine 4 % patch 1 Patch  1 Patch TransDERmal Q24H    fentaNYL citrate (PF) injection 50 mcg  50 mcg IntraVENous Q4H PRN    meropenem (MERREM) 500 mg in 0.9% sodium chloride (MBP/ADV) 50 mL MBP  0.5 g IntraVENous Q6H    Vancomycin - pharmacy to dose   Other Rx Dosing/Monitoring    naloxone (NARCAN) injection 0.1 mg  0.1 mg IntraVENous PRN    miconazole (MICOTIN) 2 % cream   Topical BID    traZODone (DESYREL) tablet 50 mg  50 mg Oral QHS PRN    [Held by provider] carvediloL (COREG) tablet 3.125 mg  3.125 mg Oral BID WITH MEALS    albuterol-ipratropium (DUO-NEB) 2.5 MG-0.5 MG/3 ML  3 mL Nebulization Q4H PRN    [Held by provider] spironolactone (ALDACTONE) tablet 12.5 mg  12.5 mg Oral DAILY    hydrALAZINE (APRESOLINE) 20 mg/mL injection 10 mg  10 mg IntraVENous Q6H PRN    [Held by provider] sacubitriL-valsartan (ENTRESTO) 24-26 mg tablet 1 Tab  1 Tablet Oral Q12H    L.acidophilus-paracasei-S.thermophil-bifidobacter (RISAQUAD) 8 billion cell capsule  1 Capsule Nasogastric DAILY    heparin (porcine) injection 5,000 Units  5,000 Units SubCUTAneous Q8H    melatonin tablet 3 mg  3 mg Oral QHS    0.9% sodium chloride infusion 250 mL  250 mL IntraVENous PRN    balsam peru-castor oiL (VENELEX) ointment   Topical BID    sodium chloride (NS) flush 5-40 mL  5-40 mL IntraVENous Q8H    sodium chloride (NS) flush 5-40 mL  5-40 mL IntraVENous PRN    acetaminophen (TYLENOL) tablet 650 mg  650 mg Oral Q6H PRN    Or    acetaminophen (TYLENOL) suppository 650 mg  650 mg Rectal Q6H PRN    polyethylene glycol (MIRALAX) packet 17 g  17 g Oral DAILY PRN    ondansetron (ZOFRAN) injection 4 mg  4 mg IntraVENous Q6H PRN    glucose chewable tablet 16 g  4 Tablet Oral PRN    dextrose (D50W) injection syrg 12.5-25 g  25-50 mL IntraVENous PRN    glucagon (GLUCAGEN) injection 1 mg  1 mg IntraMUSCular PRN         Data Review:   Labs:    Recent Results (from the past 24 hour(s))   METABOLIC PANEL, COMPREHENSIVE    Collection Time: 05/19/21  5:35 AM   Result Value Ref Range    Sodium 136 136 - 145 mmol/L    Potassium 3.8 3.5 - 5.1 mmol/L    Chloride 103 97 - 108 mmol/L    CO2 26 21 - 32 mmol/L    Anion gap 7 5 - 15 mmol/L    Glucose 91 65 - 100 mg/dL    BUN 9 6 - 20 MG/DL    Creatinine 0.35 (L) 0.70 - 1.30 MG/DL    BUN/Creatinine ratio 26 (H) 12 - 20      GFR est AA >60 >60 ml/min/1.73m2    GFR est non-AA >60 >60 ml/min/1.73m2    Calcium 9.9 8.5 - 10.1 MG/DL    Bilirubin, total 0.5 0.2 - 1.0 MG/DL    ALT (SGPT) 19 12 - 78 U/L    AST (SGOT) 7 (L) 15 - 37 U/L    Alk.  phosphatase 85 45 - 117 U/L    Protein, total 7.0 6.4 - 8.2 g/dL    Albumin 3.3 (L) 3.5 - 5.0 g/dL    Globulin 3.7 2.0 - 4.0 g/dL    A-G Ratio 0.9 (L) 1.1 - 2.2     MAGNESIUM    Collection Time: 05/19/21  5:36 AM   Result Value Ref Range    Magnesium 1.7 1.6 - 2.4 mg/dL   PHOSPHORUS    Collection Time: 05/19/21  5:36 AM   Result Value Ref Range    Phosphorus 3.6 2.6 - 4.7 MG/DL   PROCALCITONIN    Collection Time: 05/19/21  5:36 AM   Result Value Ref Range    Procalcitonin <0.05 ng/mL   CBC WITH AUTOMATED DIFF    Collection Time: 05/19/21  5:36 AM   Result Value Ref Range    WBC 7.2 4.1 - 11.1 K/uL    RBC 3.70 (L) 4.10 - 5.70 M/uL    HGB 9.9 (L) 12.1 - 17.0 g/dL    HCT 32.4 (L) 36.6 - 50.3 %    MCV 87.6 80.0 - 99.0 FL    MCH 26.8 26.0 - 34.0 PG    MCHC 30.6 30.0 - 36.5 g/dL    RDW 16.4 (H) 11.5 - 14.5 %    PLATELET 118 970 - 489 K/uL    MPV 11.0 8.9 - 12.9 FL    NRBC 0.0 0  WBC    ABSOLUTE NRBC 0.00 0.00 - 0.01 K/uL    NEUTROPHILS 51 32 - 75 %    LYMPHOCYTES 22 12 - 49 %    MONOCYTES 22 (H) 5 - 13 %    EOSINOPHILS 3 0 - 7 %    BASOPHILS 1 0 - 1 %    IMMATURE GRANULOCYTES 1 (H) 0.0 - 0.5 %    ABS. NEUTROPHILS 3.6 1.8 - 8.0 K/UL    ABS. LYMPHOCYTES 1.6 0.8 - 3.5 K/UL    ABS. MONOCYTES 1.6 (H) 0.0 - 1.0 K/UL    ABS. EOSINOPHILS 0.2 0.0 - 0.4 K/UL    ABS. BASOPHILS 0.1 0.0 - 0.1 K/UL    ABS. IMM.  GRANS. 0.1 (H) 0.00 - 0.04 K/UL    DF SMEAR SCANNED      PLATELET COMMENTS Large Platelets      RBC COMMENTS ANISOCYTOSIS  1+        RBC COMMENTS OVALOCYTES  PRESENT       VANCOMYCIN, TROUGH    Collection Time: 05/19/21  5:36 AM   Result Value Ref Range    Vancomycin,trough 17.7 (H) 5.0 - 10.0 ug/mL    Reported dose date NOT PROVIDED      Reported dose time: NOT PROVIDED      Reported dose: NOT PROVIDED UNITS

## 2021-05-19 NOTE — PROGRESS NOTES
Problem: Self Care Deficits Care Plan (Adult)  Goal: *Acute Goals and Plan of Care (Insert Text)  Description:   FUNCTIONAL STATUS PRIOR TO ADMISSION: patient was IND prior to recent hospital admissions and has had a complicated medical course including NG tube and trach. Patient recently at [facility] for vent weaning. HOME SUPPORT: The patient lived alone with parents in area to provide assistance. Occupational Therapy Goals  Initiated 5/11/2021, Reviewed 5/18/21, continue all goals  1. Patient will perform 2 simple grooming tasks in supported sitting with minimal assistance/contact guard assist within 7 day(s). 2.  Patient will perform anterior neck to thigh bathing in supported sitting with moderate assistance within 7 day(s). 3.  Patient will tolerate sitting EOB in prep for ADLs with max A within 7 days. 4.  Patient will track to L of midline during ADLs/therapeutic activities with moderate cues within 7 days. 5.  Patient will participate in upper extremity therapeutic exercise/activities with moderate assistance  for 5 minutes within 7 day(s). Outcome: Not Progressing Towards Goal   OCCUPATIONAL THERAPY TREATMENT  Patient: Arden Mirza (77 y.o. male)  Date: 5/19/2021  Diagnosis: Cardiac arrest [facility]) [I46.9] Cardiac arrest with pulseless electrical activity [facility])       Precautions: Fall, Skin, MRSA  Chart, occupational therapy assessment, plan of care, and goals were reviewed. ASSESSMENT  Patient continues with skilled OT services and is not progressing towards goals. Patient received in bed with visitor present. He continues to be limited secondary to L UE non functional, overall deconditioning, impaired mobility and impaired activity tolerance. Need for x2 assist for mobility and pain. Patient agreeable to therapy and had received PO pain medications prior to session. Completed RUE exercises and education on SROM/positioning of LUE.  Terminated secondary to reports of increase in pain.     Patient prior to infarcts in March he was left handed so he continues to work with his non dominant UE for ADL tasks and activities. Nursing reports was able to self feed lunch with set up. Encouraged patient to continue to complete all ADLs at highest level of independence that is safe. Current Level of Function Impacting Discharge (ADLs): set up self feeding- per nurse    Other factors to consider for discharge: Patient with extensive medical history including neuro and cardiac. Recommend consistent therapist with patient to maximize progression and more of a schedule. Collaborate with patient and nurse regarding pain medication schedule to maximize participation in therapy. Recommend dc to LTAC vs IPR depending on progression while in hospital.          PLAN :  Patient continues to benefit from skilled intervention to address the above impairments. Continue treatment per established plan of care to address goals. Recommend with staff: turning every two hours, ensure L UE wrist and fingers in neutral at rest    Recommend next OT session: seated EOB for ADL tasks with x 2    Recommendation for discharge: (in order for the patient to meet his/her long term goals)  To be determined: LTAC vs IPR    This discharge recommendation:  Has not yet been discussed the attending provider and/or case management    IF patient discharges home will need the following DME: hospital bed, mechanical lift, and nirmal wc       SUBJECTIVE:   Patient stated .    OBJECTIVE DATA SUMMARY:   Cognitive/Behavioral Status:  Neurologic State: Alert  Orientation Level: Oriented X4  Cognition: Follows commands             Functional Mobility and Transfers for ADLs:  Bed Mobility:  Rolling: Maximum assistance;Assist x2  Scooting: Maximum assistance;Assist x2    Transfers:            Therapeutic Exercises:   Shoulder elevation, depression and light retraction x 3-4 reps  Unilateral exercise:  R bicep/tricep  x10 reps with loose red theraband in semi goodwin position. Mild SOB noted. Encouraged rest breaks between sets and slow pace. SpO2 stable. Ed on trunk posture to open chest, cervical extension, cervical rotation gently while in bed to increase postural control and sitting balance. Did not complete at this time, he and visitor voiced understanding. Ed on SROM of L finger/wrist and elbow to be completed through out the day. Good carry over of positioning education from previous session. Trace finger flexion noted. Pain:  Limiting completion of session- reports all L UE, chest, and L knee, he did not rate pain but requested IV pain. Discussed with nursing. Activity Tolerance:   Fair, Poor, and on trach collar at 10 L on 35% FiO2    After treatment patient left in no apparent distress:   Supine in bed, Call bell within reach, Bed / chair alarm activated, and Caregiver / family present    COMMUNICATION/COLLABORATION:   The patients plan of care was discussed with: Physical therapist and Registered nurse.      Payton Mcallister OT  Time Calculation: 20 mins

## 2021-05-19 NOTE — PROGRESS NOTES
EDGAR: anticipate d/c to [facility], [facility]; Pending mother's LTACH choice, Olive Khan [phone]; IV ABX until 5/28, ID following; May need ICD vs Life Vest pending IV ABX tx; Trach Collar; BLS Transport    RUR: 24%    -Continues on IV ABX  -ID following  -Cardiology following  -PT/OT following      1045-CM reviewed pt chart & noted pt remains on trach collar with IV ABX course until 5/28, new IV ABX orders noted from ID today. CM noted that [facility] in [place] does not accept [employer] after speaking with pili Garcia. CM contacted pt's mother, Olive Khan, [phone],DMG advised that she is going to ask her daughter about their other choices and look over [facility] list again. Mother intends to contact CM later today with LTACH choices. CM to follow. 1300-CM received phone call from pt's mother, Myrna Rico [phone] and she advised that she cannot locate LTAC list. She asked that CM email list to her daughter Ciera Ozuna. [facility] to Paloma Campos [email or web address]: [email or web address] CM to follow on LTAC choices.   Dominick Fu RN BSN [facility] 26-May-2018 04:20

## 2021-05-20 PROCEDURE — 74011000258 HC RX REV CODE- 258: Performed by: HOSPITALIST

## 2021-05-20 PROCEDURE — 74011250636 HC RX REV CODE- 250/636: Performed by: HOSPITALIST

## 2021-05-20 PROCEDURE — 74011250637 HC RX REV CODE- 250/637: Performed by: HOSPITALIST

## 2021-05-20 PROCEDURE — 74011250637 HC RX REV CODE- 250/637: Performed by: INTERNAL MEDICINE

## 2021-05-20 PROCEDURE — 77030018861

## 2021-05-20 PROCEDURE — 77010033711 HC HIGH FLOW OXYGEN

## 2021-05-20 PROCEDURE — 74011250636 HC RX REV CODE- 250/636: Performed by: INTERNAL MEDICINE

## 2021-05-20 PROCEDURE — 65660000001 HC RM ICU INTERMED STEPDOWN

## 2021-05-20 PROCEDURE — 94760 N-INVAS EAR/PLS OXIMETRY 1: CPT

## 2021-05-20 PROCEDURE — 97535 SELF CARE MNGMENT TRAINING: CPT

## 2021-05-20 RX ADMIN — VANCOMYCIN HYDROCHLORIDE 1000 MG: 1 INJECTION, POWDER, LYOPHILIZED, FOR SOLUTION INTRAVENOUS at 05:01

## 2021-05-20 RX ADMIN — HEPARIN SODIUM 5000 UNITS: 5000 INJECTION INTRAVENOUS; SUBCUTANEOUS at 22:01

## 2021-05-20 RX ADMIN — LEVETIRACETAM 1500 MG: 500 TABLET ORAL at 09:42

## 2021-05-20 RX ADMIN — FENTANYL CITRATE 50 MCG: 50 INJECTION INTRAMUSCULAR; INTRAVENOUS at 17:18

## 2021-05-20 RX ADMIN — OXYCODONE HYDROCHLORIDE 7.5 MG: 5 TABLET ORAL at 22:01

## 2021-05-20 RX ADMIN — VANCOMYCIN HYDROCHLORIDE 1000 MG: 1 INJECTION, POWDER, LYOPHILIZED, FOR SOLUTION INTRAVENOUS at 21:58

## 2021-05-20 RX ADMIN — MEROPENEM 500 MG: 500 INJECTION, POWDER, FOR SOLUTION INTRAVENOUS at 03:10

## 2021-05-20 RX ADMIN — CASTOR OIL AND BALSAM, PERU: 788; 87 OINTMENT TOPICAL at 17:18

## 2021-05-20 RX ADMIN — Medication 1 CAPSULE: at 09:42

## 2021-05-20 RX ADMIN — FENTANYL CITRATE 50 MCG: 50 INJECTION INTRAMUSCULAR; INTRAVENOUS at 05:01

## 2021-05-20 RX ADMIN — MEROPENEM 500 MG: 500 INJECTION, POWDER, FOR SOLUTION INTRAVENOUS at 09:42

## 2021-05-20 RX ADMIN — SACUBITRIL AND VALSARTAN 1 TABLET: 24; 26 TABLET, FILM COATED ORAL at 21:59

## 2021-05-20 RX ADMIN — MICONAZOLE NITRATE: 20 CREAM TOPICAL at 09:43

## 2021-05-20 RX ADMIN — FENTANYL CITRATE 50 MCG: 50 INJECTION INTRAMUSCULAR; INTRAVENOUS at 23:42

## 2021-05-20 RX ADMIN — Medication 10 ML: at 14:20

## 2021-05-20 RX ADMIN — VANCOMYCIN HYDROCHLORIDE 1000 MG: 1 INJECTION, POWDER, LYOPHILIZED, FOR SOLUTION INTRAVENOUS at 14:12

## 2021-05-20 RX ADMIN — Medication 10 ML: at 06:00

## 2021-05-20 RX ADMIN — Medication 10 ML: at 22:00

## 2021-05-20 RX ADMIN — OXYCODONE HYDROCHLORIDE 7.5 MG: 5 TABLET ORAL at 03:09

## 2021-05-20 RX ADMIN — HEPARIN SODIUM 5000 UNITS: 5000 INJECTION INTRAVENOUS; SUBCUTANEOUS at 13:50

## 2021-05-20 RX ADMIN — MEROPENEM 500 MG: 500 INJECTION, POWDER, FOR SOLUTION INTRAVENOUS at 14:52

## 2021-05-20 RX ADMIN — MICONAZOLE NITRATE: 20 CREAM TOPICAL at 17:20

## 2021-05-20 RX ADMIN — MEROPENEM 500 MG: 500 INJECTION, POWDER, FOR SOLUTION INTRAVENOUS at 21:59

## 2021-05-20 RX ADMIN — OXYCODONE HYDROCHLORIDE 7.5 MG: 5 TABLET ORAL at 15:01

## 2021-05-20 RX ADMIN — CASTOR OIL AND BALSAM, PERU: 788; 87 OINTMENT TOPICAL at 09:42

## 2021-05-20 RX ADMIN — HEPARIN SODIUM 5000 UNITS: 5000 INJECTION INTRAVENOUS; SUBCUTANEOUS at 05:01

## 2021-05-20 RX ADMIN — OXYCODONE HYDROCHLORIDE 7.5 MG: 5 TABLET ORAL at 09:42

## 2021-05-20 RX ADMIN — Medication 3 MG: at 21:59

## 2021-05-20 RX ADMIN — FENTANYL CITRATE 50 MCG: 50 INJECTION INTRAMUSCULAR; INTRAVENOUS at 11:13

## 2021-05-20 RX ADMIN — LEVETIRACETAM 1500 MG: 500 TABLET ORAL at 17:16

## 2021-05-20 NOTE — PROGRESS NOTES
Bedside and Verbal shift change report given to Verena Mercado (oncoming nurse) by Ruth Henson (offgoing nurse). Report included the following information SBAR.

## 2021-05-20 NOTE — PROGRESS NOTES
Cardiology Progress Note                                        Admit Date: 5/6/2021    Assessment/Plan:       1. Sp cardiac arrest post op 4/17/21,   cardiac arrest Vfib 5/6/21  ekg rbbb qt 460 msec. ICD rec for secondary prevention if life expectancy > 1yr.    2. Hx:  4/16/21 at [facility] bioprosthetic AVR and root abscess debridement. He also had a pericardiocentesis due to tamponade prior to surgery  3. Recent endocardititis 3/24/21 Staph  4. CM  Ef 25%   5.  right MCA infarct and several abscesses in the right temporal and parietal lobes. Infarcts were thought to be due to septic emboli. He had left sided residual weakness. cta 5/6/21:  multiple acute infarctions throughout the  right cerebral hemisphere, involving much of the occipital lobe, as well as much  of the frontal lobe. There is an additional superior right frontoparietal  infarct  6.  severe multilevel consolidation throughout both lungs consistent with severe multilobar PNA. 7. PNA:  Severe multilevel consolidation throughout both lungs, consistent with  severe multilobar pneumonia. Try to restart Entresto, holding tegan and BB  ICD before DC vs LV;  Abx stop 5/28; d/w EP  Echo showed mild improvement, LVEF 30-35%  Add dapagliflozin on dc as not on formulary       Jeevan Tenorio is a 22 y.o. male with     PROBLEM LIST:  Patient Active Problem List    Diagnosis Date Noted    Severe muscle deconditioning 05/01/2021    S/P AVR (aortic valve replacement) and aortoplasty 04/16/2021    Abscess of aortic root 04/16/2021    Contusion of chest wall 04/16/2021    Successful cardiopulmonary resuscitation 04/14/2021    Acute traumatic pain 04/14/2021    Postoperative acute respiratory failure ([address] 04/01/2021    Severe protein-calorie malnutrition ([address] 05/11/2021    Cardiac arrest with pulseless electrical activity ([address] 05/06/2021    Cerebral abscess (embolic) 00/92/5287    Drug abuse, cocaine type ([address] 03/29/2021    Endocarditis due to methicillin susceptible Staphylococcus aureus (MSSA) 03/25/2021    Type 2 myocardial infarction [facility]) 03/24/2021         Subjective:     Alf Nedlissa Rhodes gestures appropriately. Likely orthostatic when sat on edge of bed with PT    Visit Vitals  /71 (BP 1 Location: Right upper arm, BP Patient Position: At rest)   Pulse (!) 102   Temp 98.2 °F (36.8 °C)   Resp 18   Ht 5' 11\" (1.803 m)   Wt 54.9 kg (121 lb)   SpO2 98%   BMI 16.88 kg/m²       Intake/Output Summary (Last 24 hours) at 5/20/2021 1615  Last data filed at 5/20/2021 0942  Gross per 24 hour   Intake 1650 ml   Output 570 ml   Net 1080 ml       Objective:      Physical Exam:  HEENT: Perrla, EOMI  Neck: No JVD,  No thyroidmegaly  Resp: CTA bilaterally;  No wheezes or rales  CV: RRR s1s2 No murmur no s3  Abd:Soft, Nontender  Ext: No edema  Neuro: unresponsive on vent   Skin: Warm, Dry, Intact  Pulses: 2+ DP/PT/Rad      Telemetry: normal sinus rhythm    Current Facility-Administered Medications   Medication Dose Route Frequency    fentaNYL citrate (PF) injection 50 mcg  50 mcg IntraVENous Q6H PRN    vancomycin (VANCOCIN) 1,000 mg in 0.9% sodium chloride 250 mL (VIAL-MATE)  1,000 mg IntraVENous Q8H    levETIRAcetam (KEPPRA) tablet 1,500 mg  1,500 mg Oral BID    sodium chloride (AYR SALINE) 0.65 % nasal drops 2 Drop  2 Drop Left Nostril Q2H PRN    oxyCODONE IR (ROXICODONE) tablet 7.5 mg  7.5 mg Oral Q4H PRN    lidocaine 4 % patch 1 Patch  1 Patch TransDERmal Q24H    meropenem (MERREM) 500 mg in 0.9% sodium chloride (MBP/ADV) 50 mL MBP  0.5 g IntraVENous Q6H    Vancomycin - pharmacy to dose   Other Rx Dosing/Monitoring    naloxone (NARCAN) injection 0.1 mg  0.1 mg IntraVENous PRN    miconazole (MICOTIN) 2 % cream   Topical BID    traZODone (DESYREL) tablet 50 mg  50 mg Oral QHS PRN    [Held by provider] carvediloL (COREG) tablet 3.125 mg  3.125 mg Oral BID WITH MEALS    albuterol-ipratropium (DUO-NEB) 2.5 MG-0.5 MG/3 ML  3 mL Nebulization Q4H PRN    [Held by provider] spironolactone (ALDACTONE) tablet 12.5 mg  12.5 mg Oral DAILY    hydrALAZINE (APRESOLINE) 20 mg/mL injection 10 mg  10 mg IntraVENous Q6H PRN    [Held by provider] sacubitriL-valsartan (ENTRESTO) 24-26 mg tablet 1 Tab  1 Tablet Oral Q12H    L.acidophilus-paracasei-S.thermophil-bifidobacter (RISAQUAD) 8 billion cell capsule  1 Capsule Nasogastric DAILY    heparin (porcine) injection 5,000 Units  5,000 Units SubCUTAneous Q8H    melatonin tablet 3 mg  3 mg Oral QHS    0.9% sodium chloride infusion 250 mL  250 mL IntraVENous PRN    balsam peru-castor oiL (VENELEX) ointment   Topical BID    sodium chloride (NS) flush 5-40 mL  5-40 mL IntraVENous Q8H    sodium chloride (NS) flush 5-40 mL  5-40 mL IntraVENous PRN    acetaminophen (TYLENOL) tablet 650 mg  650 mg Oral Q6H PRN    Or    acetaminophen (TYLENOL) suppository 650 mg  650 mg Rectal Q6H PRN    polyethylene glycol (MIRALAX) packet 17 g  17 g Oral DAILY PRN    ondansetron (ZOFRAN) injection 4 mg  4 mg IntraVENous Q6H PRN    glucose chewable tablet 16 g  4 Tablet Oral PRN    dextrose (D50W) injection syrg 12.5-25 g  25-50 mL IntraVENous PRN    glucagon (GLUCAGEN) injection 1 mg  1 mg IntraMUSCular PRN         Data Review:   Labs:    No results found for this or any previous visit (from the past 24 hour(s)).

## 2021-05-20 NOTE — PROGRESS NOTES
.  [facility] Adult  Hospitalist Group    PATIENT ID: Oralia Pavon  MRN: [id number]   YOB: 1995    PRIMARY CARE PROVIDER: None   DATE OF ADMISSION: 5/6/2021  5:22 PM    ATTENDING PHYSICIAN: Vernell Montaño MD  CONSULTATIONS:   IP CONSULT TO INTENSIVIST  IP CONSULT TO NEUROLOGY  IP CONSULT TO PODIATRY  IP CONSULT TO PODIATRY  IP CONSULT TO INFECTIOUS DISEASES  IP CONSULT TO NEUROLOGY  IP CONSULT TO OTOLARYNGOLOGY  IP CONSULT TO CARDIOLOGY    PROCEDURES/SURGERIES:   Procedure(s):  INSERT ICD DUAL    REASON FOR ADMISSION: Cardiac arrest with pulseless electrical activity [facility] PROBLEM LIST:  Patient Active Problem List   Diagnosis Code    Endocarditis due to methicillin susceptible Staphylococcus aureus (MSSA) I33.0, B95.61    Drug abuse, cocaine type ([facility] F14.10    Type 2 myocardial infarction ([facility] I21. A1    Cerebral abscess (embolic) B41.2    Cardiac arrest with pulseless electrical activity (HCC) I46.9    Severe protein-calorie malnutrition (HCC) E43    Postoperative acute respiratory failure (HCC) J95.821    Severe muscle deconditioning R29.898    Successful cardiopulmonary resuscitation Z92.89    Acute traumatic pain G89.11    S/P AVR (aortic valve replacement) and aortoplasty Z95.2    Abscess of aortic root I51.89    Contusion of chest wall S20.219A         Brief HPI and Hospital Course:      Oralia Pavon is a 22 y.o. male with h/o Seizures and anxiety who presented to [facility] after a cardiac arrest at [facility] around 1500 5/6/2021. Hx from chart and speaking with patient's sister via phone. Patient had recent hospitalization at the in [facility] in March of this year. Initial hospitalization admission was at [facility] on 3/24 with AMS in setting of polysubstance and IVD use (cocaine, heroine, methamphetamines). He was found to have septic MRSA bacteremia, MRSA endocarditis.  Imaging and MRI also found R PCA infarct and several abscesses and right temporal and parietal lobes. Infarcts thought to be due to septic emboli and patient had left sided residual weakness. Patient was transferred to [facility] on 4/1/2021 for evaluation for valve surgery. After transfer he was found to have a New R MCA infarct Also had a type 2 NSTEMI and tamponade with pericardiocentesis done prior to surgery. Did have cardiac arrest on day of surgery. Had a bioprosthetic Aortic Valve Replacement and Aortic Root Abscess Debridement done on 4/16/2021. Trached and sent to [facility] on 4/29/2021. Was undergoing PT and vent wean. Sister stated that patient was able to be off the vent for several hours over the last few days. He was sitting up and able to talk with valve over trach and could follow commands. Stated that he had severe weakness of the left upper ext, but was starting to gain some mobility in the lower left ext. Patient's mother visited patient today prior to arrest. She said the staff told her that the patient was getting very anxious earlier in the day around 1 pm and had to be placed back on the ventilator, and then they had to sedate him while he was on the ventilator. The mother stated \" he did not look good\" and \" his eye were rolled back in his head\". About 30 minutes after she left she got call about arrest. Per ED note patient was found unresponsive around the 1500 hour and was pulseless. Two rounds of CPR and meds were given and patient was defibrillated x 1 before ROSC. Patient was hypoxic post arrest. Unknown down time prior to arrest.        Subjective/HPI    Pt seen and examined  Sleeping peacefully but able to wake up   Instantly after waking asking for Fentanyl dose but falling asleep soon after asking     Assessment and Plan:    1.  cardiac arrest - 5/6/21 on admission:   .   Cardiologist following,   -per cardiologist :  ICD vs life vest before DC, Vanco until 5/28  for endocarditis       2.  Acute on chronic  respiratory failure: [facility] and was [facility]. He was on ventilator while in ICU and now transitioned to trach collar.  -currently on trach collar and PMV  -on 8L through trach collar         3. MRSA aortic  Valve endocardititis   -Found to have MRSA bacteremia and MSSA endocarditis end of march. -Hx:  4/16/21 at [facility] bioprosthetic AVR and root abscess debridement. He also had a pericardiocentesis due to tamponade prior to surgery  - Reviewed [facility] record, he was on IV vanco at [facility] and planned to  have a 6 weeks of   tx and EOT 5/28/21 per record. - Continue vancomycin      Pain management : contusion of chest wall, left side, chronic back pain . H/o drug abuse  Cautious with narcotics  On  fentanyl to 50mcg q6h prn and oxycodone 7.5 mg Q 6 hr prn,wean narcotics    9. Bacterial pneumonia  severe multilevel consolidation throughout both lungs consistent with severe multilobar PNA. -sputum culture E coli with ESBL   -per ID -Plan  Continue meropenem with plan for 7 to 10-day course started 5/11, will complete 10 days on 5/21. -pulmonary toilet   -trach care   -ID following      # Systolic congestive heart failure/CM  Ef 25%   Hold coreg,entresto ,Aldactone, BP better today  Cardiologist following   Echo EF showed 25-30% on 5/17    8. 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 Infarcts were thought to be due to septic emboli. He had left sided residual weakness. cta 5/6/21:  multiple acute infarctions throughout the  right cerebral hemisphere, involving much of the occipital lobe, as well as much  of the frontal lobe. There is an additional superior right frontoparietal  Infarct  Neurologist consulted                -PT/OT/SLP e              - Stroke education              - SBP goal 100-160              10.  Seizure DZ:  Continue keppra -1500 mg BID, off valproic acid as subtherapeutic due to meropenem interaction,so valproic acid discontinued  Seizure precautions        11. Severe muscle deconditioning    12: Severe protein-calorie malnutrition    13.  Cerebral abscess (embolic)    47 h/o substance abuse      fulll code     Awaiting discharge to [facility], decision regarding ICD vs LV per cardiology        PHYSICAL EXAMINATION:  Visit Vitals  /71 (BP 1 Location: Right upper arm, BP Patient Position: At rest)   Pulse (!) 102   Temp 98.2 °F (36.8 °C)   Resp 18   Ht 5' 11\" (1.803 m)   Wt 54.9 kg (121 lb)   SpO2 98%   BMI 16.88 kg/m²       General:          chronically ill,   HEENT:           Atraumatic,trach capped        Neck:               Supple,   Lungs: No wheezing. Midline sternal surgical scar,minimal crackles. Heart:              Regular  rhythm,normal rate   No edema  Abdomen:       Soft, non-tender. Not distended. Bowel sounds normal  Extremities:     No LE edema  Skin:                Not pale. Not Jaundiced  No rashes         Neurologic:      Alert, oriented X 3, unable to move LUE , able to bend LLE at knee, moves RUE and RLE     Labs:     Recent Labs     05/19/21  0536 05/18/21  0408   WBC 7.2 7.7   HGB 9.9* 10.0*   HCT 32.4* 32.7*    275     Recent Labs     05/19/21 0536 05/19/21  0535 05/18/21  0408   NA  --  136 136   K  --  3.8 3.8   CL  --  103 103   CO2  --  26 24   BUN  --  9 7   CREA  --  0.35* 0.35*   GLU  --  91 81   CA  --  9.9 9.2   MG 1.7  --  1.6   PHOS 3.6  --  3.5     Recent Labs     05/19/21  0535 05/18/21  0408   ALT 19 16   AP 85 83   TBILI 0.5 0.6   TP 7.0 6.5   ALB 3.3* 3.1*   GLOB 3.7 3.4     No results for input(s): INR, PTP, APTT, INREXT, INREXT in the last 72 hours. No results for input(s): FE, TIBC, PSAT, FERR in the last 72 hours. No results found for: FOL, RBCF   No results for input(s): PH, PCO2, PO2 in the last 72 hours. No results for input(s): CPK, CKNDX, TROIQ in the last 72 hours.     No lab exists for component: CPKMB  Lab Results Component Value Date/Time    Cholesterol, total 140 05/11/2021 04:35 AM    HDL Cholesterol 21 05/11/2021 04:35 AM    LDL, calculated 77.4 05/11/2021 04:35 AM    Triglyceride 208 (H) 05/11/2021 04:35 AM    CHOL/HDL Ratio 6.7 (H) 05/11/2021 04:35 AM     Lab Results   Component Value Date/Time    Glucose (POC) 120 (H) 05/11/2021 12:00 PM    Glucose (POC) 100 05/11/2021 06:04 AM    Glucose (POC) 87 05/11/2021 12:19 AM    Glucose (POC) 83 05/10/2021 06:53 PM    Glucose (POC) 84 05/10/2021 02:07 PM     Lab Results   Component Value Date/Time    Color YELLOW/STRAW 05/06/2021 05:42 PM    Appearance CLEAR 05/06/2021 05:42 PM    Specific gravity 1.008 05/06/2021 05:42 PM    Specific gravity 1.020 03/24/2021 09:30 PM    pH (UA) 5.0 05/06/2021 05:42 PM    Protein 30 (A) 05/06/2021 05:42 PM    Glucose Negative 05/06/2021 05:42 PM    Ketone Negative 05/06/2021 05:42 PM    Bilirubin Negative 05/06/2021 05:42 PM    Urobilinogen 0.2 05/06/2021 05:42 PM    Nitrites Negative 05/06/2021 05:42 PM    Leukocyte Esterase Negative 05/06/2021 05:42 PM    Epithelial cells FEW 05/06/2021 05:42 PM    Bacteria Negative 05/06/2021 05:42 PM    WBC 0-4 05/06/2021 05:42 PM    RBC 0-5 05/06/2021 05:42 PM         CODE STATUS:  x Full Code    DNR    Partial    Comfort Care       Signed:   Nataly Sanchez MD  Date of Service:  5/20/2021  3:38 PM

## 2021-05-20 NOTE — PROGRESS NOTES
Bedside shift change report given to [address] [place] [address],[address] (oncoming nurse) by Carlota Weathers RN (offgoing nurse). Report included the following information SBAR, Kardex, MAR and Cardiac Rhythm NSR.

## 2021-05-20 NOTE — PROGRESS NOTES
Problem: Ventilator Management  Goal: *Adequate oxygenation and ventilation  Outcome: Progressing Towards Goal  Goal: *Patient maintains clear airway/free of aspiration  Outcome: Progressing Towards Goal  Goal: *Absence of infection signs and symptoms  Outcome: Progressing Towards Goal  Goal: *Normal spontaneous ventilation  Outcome: Progressing Towards Goal     Problem: Patient Education: Go to Patient Education Activity  Goal: Patient/Family Education  Outcome: Progressing Towards Goal     Problem: Falls - Risk of  Goal: *Absence of Falls  Description: Document Elliott Fall Risk and appropriate interventions in the flowsheet. Outcome: Progressing Towards Goal  Note: Fall Risk Interventions:       Mentation Interventions: Adequate sleep, hydration, pain control    Medication Interventions: Patient to call before getting OOB    Elimination Interventions: Call light in reach              Problem: Patient Education: Go to Patient Education Activity  Goal: Patient/Family Education  Outcome: Progressing Towards Goal     Problem: Risk for Spread of Infection  Goal: Prevent transmission of infectious organism to others  Description: Prevent the transmission of infectious organisms to other patients, staff members, and visitors.   Outcome: Progressing Towards Goal     Problem: Patient Education:  Go to Education Activity  Goal: Patient/Family Education  Outcome: Progressing Towards Goal     Problem: Breathing Pattern - Ineffective  Goal: *Absence of hypoxia  Outcome: Progressing Towards Goal  Goal: *Use of effective breathing techniques  Outcome: Progressing Towards Goal  Goal: *PALLIATIVE CARE:  Alleviation of Dyspnea  Outcome: Progressing Towards Goal     Problem: Patient Education: Go to Patient Education Activity  Goal: Patient/Family Education  Outcome: Progressing Towards Goal     Problem: Pressure Injury - Risk of  Goal: *Prevention of pressure injury  Description: Document Salvador Scale and appropriate interventions in the flowsheet.   Outcome: Progressing Towards Goal     Problem: Patient Education: Go to Patient Education Activity  Goal: Patient/Family Education  Outcome: Progressing Towards Goal     Problem: Patient Education: Go to Patient Education Activity  Goal: Patient/Family Education  Outcome: Progressing Towards Goal

## 2021-05-20 NOTE — PROGRESS NOTES
Bedside and Verbal shift change report given to Anabel (oncoming nurse) by Quin Herman (offgoing nurse). Report included the following information SBAR, Kardex, Intake/Output, MAR, Recent Results and Cardiac Rhythm NSR.

## 2021-05-20 NOTE — PROGRESS NOTES
Problem: Self Care Deficits Care Plan (Adult)  Goal: *Acute Goals and Plan of Care (Insert Text)  Description:   FUNCTIONAL STATUS PRIOR TO ADMISSION: patient was IND prior to recent hospital admissions and has had a complicated medical course including NG tube and trach. Patient recently at [facility] for vent weaning. HOME SUPPORT: The patient lived alone with parents in area to provide assistance. Occupational Therapy Goals  Initiated 5/11/2021, Reviewed 5/18/21, continue all goals  1. Patient will perform 2 simple grooming tasks in supported sitting with minimal assistance/contact guard assist within 7 day(s). 2.  Patient will perform anterior neck to thigh bathing in supported sitting with moderate assistance within 7 day(s). 3.  Patient will tolerate sitting EOB in prep for ADLs with max A within 7 days. 4.  Patient will track to L of midline during ADLs/therapeutic activities with moderate cues within 7 days. 5.  Patient will participate in upper extremity therapeutic exercise/activities with moderate assistance  for 5 minutes within 7 day(s). Outcome: Progressing Towards Goal   OCCUPATIONAL THERAPY TREATMENT  Patient: Tad Dan (79 y.o. male)  Date: 5/20/2021  Diagnosis: Cardiac arrest [facility]) [I46.9] Cardiac arrest with pulseless electrical activity [facility])       Precautions: Fall, Skin  Chart, occupational therapy assessment, plan of care, and goals were reviewed. ASSESSMENT  This L hand dominant male continues with skilled OT services and is progressing towards goals.   ADLs limited by L UE 0/5 strength, R UE -3/5 shoulder and B LEs, +3/5 elbow-digits, sitting tolerance, static sitting balance, pain management, cardiopulmonary tolerance (trach collar), cognition (attention to task, complex processing, problem solving, self initiation, early termination of task, requires backwards chaining, small achievable goals) and behavior (requires external cues to participate in self care, learned dependency, potentially depressed and could use behavioral and medication management). Current Level of Function Impacting Discharge (ADLs): max assistance overall upper body ADLs; max assistance lower body ADLs; max assistance bed mobility     Other factors to consider for discharge: complex medical hx; needs scheduled time for therapy (patient chose 9 a.m.) and a consistent therapist.          PLAN :  Patient continues to benefit from skilled intervention to address the above impairments. Continue treatment per established plan of care to address goals. Recommend with staff: bed/chair position 3x/day (especially for meals); encouragement for patient to complete ADLs 2* starting to have learned dependency     Recommend next OT session: sitting EOB ADLs; 9 a.m. preference for therapy; an \"I can\" list?.     Recommendation for discharge: (in order for the patient to meet his/her long term goals)  Therapy 3 hours per day 5-7 days per week to address medical complexities, utilized eStimulation (Giovanni Khris), progress patient in a \"well\" environment that is structured to assist with the physical, cognitive and emotional deficits occurring. Patient is working towards tolerating 3 hours of therapy. This discharge recommendation:  Has not yet been discussed the attending provider and/or case management    IF patient discharges home will need the following DME: gait belt, hospital bed, mechanical lift, wheelchair: power, and sliding/transfer board       SUBJECTIVE:   Patient stated Margaret MORILLO on    OBJECTIVE DATA SUMMARY:   Cognitive/Behavioral Status:                      Functional Mobility and Transfers for ADLs:  Bed Mobility:  Rolling: Maximum assistance right and left for reposition in bed and up bed; instruction and demonstrated LE flexion and hand placement on bed rails with max assistance cues on how to participate with extremities I.e. flexion and self range and setup L hand on bed rail. Trendelenburg assist. Left patient in bed/chair position. Transfers:             Balance:  Sitting: Intact; With support    ADL Intervention:  Feeding  Container Management: Moderate assistance  Cutting Food: Total assistance (dependent)  Utensil Management: Supervision  Food to Mouth: Supervision  Drink to Mouth: Supervision  Cues: Verbal cues provided to try on own \"I can't\". Tactile cues provided setup soda can in Left hand with hand over hand; setup pop topped a little and patient completed rest. Visual cues provided                      Lower Body Dressing Assistance  Socks: Maximum assistance  Position Performed: Long sitting on bed  \"I can't\". Verbal and tactile cue flex LE one at a time 2* flexed both; setup over toes after declined to complete, cues to pull over heel and for thoroughness. Cognitive Retraining  Problem Solving: General alternative solution; Inductive reason  Executive Functions: Executing cognitive plans;Regulating behavior  Organizing/Sequencing: Breaking task down;Prioritizing  Following Commands: Follows two step commands/directions  Patient received R side lying and sleeping. Not aroused with voice, aroused with tactile cue. Patient agreeable to therapy, brushing teeth and sitting EOB. Socks donned. Patient closing eyes after and deferring all other activity, repositioned back to right side lying max assistance. Upon leaving room noticed lunch outside of door, brought into patient. Patient immediately awake and stating his needs \"open this. Lucretia Heman Lucretia Heman \" and adamantly declining to reposition. Informed patient of benefits and safety needs sitting up in bed to eat, at this time patient pulled up in bed, bed/chair position, sitting in midline, tray setup and encouragement to complete all opening of items on own.      Therapeutic Exercises:       Pain:      Activity Tolerance:       After treatment patient left in no apparent distress:   Call bell, sitting up in bed/chair, 3 rails up    COMMUNICATION/COLLABORATION:   The patients plan of care was discussed with: Registered nurse.      Ac [place]  Time Calculation: 36 mins

## 2021-05-20 NOTE — PROGRESS NOTES
CM called pt's mother, Sagrario Judge ([phone]), to ask her if she has chosen another LTAC for this pt. She stated that her daughter was called in to work yesterday and they were unable to look at the list. She assured this CM that she and her daughter will review the chart this afternoon and then leave this CM a voice mail with a choice. CM will follow.  Nenita Garcia

## 2021-05-21 PROCEDURE — 97530 THERAPEUTIC ACTIVITIES: CPT

## 2021-05-21 PROCEDURE — 74011250636 HC RX REV CODE- 250/636: Performed by: INTERNAL MEDICINE

## 2021-05-21 PROCEDURE — 74011250637 HC RX REV CODE- 250/637: Performed by: HOSPITALIST

## 2021-05-21 PROCEDURE — 74011250636 HC RX REV CODE- 250/636: Performed by: HOSPITALIST

## 2021-05-21 PROCEDURE — 77010033711 HC HIGH FLOW OXYGEN

## 2021-05-21 PROCEDURE — 94760 N-INVAS EAR/PLS OXIMETRY 1: CPT

## 2021-05-21 PROCEDURE — 74011000258 HC RX REV CODE- 258: Performed by: HOSPITALIST

## 2021-05-21 PROCEDURE — 74011250637 HC RX REV CODE- 250/637: Performed by: INTERNAL MEDICINE

## 2021-05-21 PROCEDURE — 65660000000 HC RM CCU STEPDOWN

## 2021-05-21 PROCEDURE — 97535 SELF CARE MNGMENT TRAINING: CPT

## 2021-05-21 RX ADMIN — LEVETIRACETAM 1500 MG: 500 TABLET ORAL at 17:45

## 2021-05-21 RX ADMIN — MEROPENEM 500 MG: 500 INJECTION, POWDER, FOR SOLUTION INTRAVENOUS at 16:04

## 2021-05-21 RX ADMIN — MEROPENEM 500 MG: 500 INJECTION, POWDER, FOR SOLUTION INTRAVENOUS at 08:30

## 2021-05-21 RX ADMIN — FENTANYL CITRATE 50 MCG: 50 INJECTION INTRAMUSCULAR; INTRAVENOUS at 23:55

## 2021-05-21 RX ADMIN — Medication 1 CAPSULE: at 08:31

## 2021-05-21 RX ADMIN — Medication 10 ML: at 06:00

## 2021-05-21 RX ADMIN — VANCOMYCIN HYDROCHLORIDE 1000 MG: 1 INJECTION, POWDER, LYOPHILIZED, FOR SOLUTION INTRAVENOUS at 06:00

## 2021-05-21 RX ADMIN — IVABRADINE 5 MG: 5 TABLET, FILM COATED ORAL at 16:04

## 2021-05-21 RX ADMIN — VANCOMYCIN HYDROCHLORIDE 1000 MG: 1 INJECTION, POWDER, LYOPHILIZED, FOR SOLUTION INTRAVENOUS at 21:22

## 2021-05-21 RX ADMIN — SACUBITRIL AND VALSARTAN 1 TABLET: 24; 26 TABLET, FILM COATED ORAL at 20:32

## 2021-05-21 RX ADMIN — IVABRADINE 5 MG: 5 TABLET, FILM COATED ORAL at 08:31

## 2021-05-21 RX ADMIN — Medication 3 MG: at 21:22

## 2021-05-21 RX ADMIN — FENTANYL CITRATE 50 MCG: 50 INJECTION INTRAMUSCULAR; INTRAVENOUS at 05:57

## 2021-05-21 RX ADMIN — CASTOR OIL AND BALSAM, PERU: 788; 87 OINTMENT TOPICAL at 08:36

## 2021-05-21 RX ADMIN — FENTANYL CITRATE 50 MCG: 50 INJECTION INTRAMUSCULAR; INTRAVENOUS at 17:45

## 2021-05-21 RX ADMIN — SACUBITRIL AND VALSARTAN 1 TABLET: 24; 26 TABLET, FILM COATED ORAL at 08:31

## 2021-05-21 RX ADMIN — FENTANYL CITRATE 50 MCG: 50 INJECTION INTRAMUSCULAR; INTRAVENOUS at 11:55

## 2021-05-21 RX ADMIN — VANCOMYCIN HYDROCHLORIDE 1000 MG: 1 INJECTION, POWDER, LYOPHILIZED, FOR SOLUTION INTRAVENOUS at 16:04

## 2021-05-21 RX ADMIN — LEVETIRACETAM 1500 MG: 500 TABLET ORAL at 08:31

## 2021-05-21 RX ADMIN — MEROPENEM 500 MG: 500 INJECTION, POWDER, FOR SOLUTION INTRAVENOUS at 02:28

## 2021-05-21 RX ADMIN — OXYCODONE HYDROCHLORIDE 7.5 MG: 5 TABLET ORAL at 08:31

## 2021-05-21 RX ADMIN — MICONAZOLE NITRATE: 20 CREAM TOPICAL at 08:36

## 2021-05-21 RX ADMIN — HEPARIN SODIUM 5000 UNITS: 5000 INJECTION INTRAVENOUS; SUBCUTANEOUS at 20:31

## 2021-05-21 RX ADMIN — Medication 10 ML: at 21:22

## 2021-05-21 RX ADMIN — MEROPENEM 500 MG: 500 INJECTION, POWDER, FOR SOLUTION INTRAVENOUS at 20:31

## 2021-05-21 RX ADMIN — HEPARIN SODIUM 5000 UNITS: 5000 INJECTION INTRAVENOUS; SUBCUTANEOUS at 05:00

## 2021-05-21 RX ADMIN — OXYCODONE HYDROCHLORIDE 7.5 MG: 5 TABLET ORAL at 16:04

## 2021-05-21 RX ADMIN — HEPARIN SODIUM 5000 UNITS: 5000 INJECTION INTRAVENOUS; SUBCUTANEOUS at 12:00

## 2021-05-21 NOTE — WOUND CARE
WOCN Note:  
  
Follow up consult to re-assess left great toe 
  
Chart shows: 
Patient admitted on 5/6/21 with Cardiac Arrest 
Past medical history of anxiety, seizures, polysubstance abuse, endocarditis, history of MRSA sepsis, and embolic CVA Admitted from [facility] Wound Culture obtained on 5/7/21 from left great toe that shows no growth Assessment:  
Patient alert and oriented x4,  verbal, passy shannan valve in place to tracheostomy. Mobility: Patient independent with mobility in bed, able to lift heels off mattress independently. Continence: Continent of urine and stool Last Salvador Score: 13 
Surface: foam mattress 
  
Bilateral heel, buttocks, and sacral skin intact and without erythema Heels offloaded with pillows 1. POA Left distal great toe, had total toenail avulsion on 5/7/21 by podiatrist 
There is a 0.8 x 0.6 x 0.1 wound to distal left great toe with 90% pink tissue and 10% yellow slough, scant amount serosang drainage with no odor, edges attached and defined. Periwound intact with toenail avulsion site healed 
  
2. Red rash with satellite lesions to upper medial thighs, groin, and scrotum Symptoms consistent with candidiasis Resolved Wound Recommendations: 
 
Continue current treatment to left great toe PI Prevention: 
Turn/reposition approximately every 2 hours Offload heels with pillows at all times in bed. Pad bony prominences Keep HOB 30 degrees or less to decrease shearing and pressure unless medically contraindicated. If HOB is to be over 30 degrees, raise knees first then [facility] to prevent sliding Minimize layers of linen/pads under patient to optimize support surface to one flat sheet and one incontinence pad Assess under trach flange each shift to assess skin integrity 
  
Discussed with RN, April 
  
Transition of Care: Plan to follow weekly and as needed while admitted to hospital.   
  
Kaden NIEVESN, RN, [facility], [facility] Certified Wound and Ostomy Nurse office [phone] Can be reached through [facility] 
pager [phone] or call  to page

## 2021-05-21 NOTE — PROGRESS NOTES
Problem: Ventilator Management  Goal: *Adequate oxygenation and ventilation  Outcome: Progressing Towards Goal  Goal: *Patient maintains clear airway/free of aspiration  Outcome: Progressing Towards Goal  Goal: *Absence of infection signs and symptoms  Outcome: Progressing Towards Goal  Goal: *Normal spontaneous ventilation  Outcome: Progressing Towards Goal     Problem: Patient Education: Go to Patient Education Activity  Goal: Patient/Family Education  Outcome: Progressing Towards Goal     Problem: Falls - Risk of  Goal: *Absence of Falls  Description: Document Elliott Fall Risk and appropriate interventions in the flowsheet. Outcome: Progressing Towards Goal  Note: Fall Risk Interventions:       Mentation Interventions: Adequate sleep, hydration, pain control    Medication Interventions: Bed/chair exit alarm    Elimination Interventions: Bed/chair exit alarm              Problem: Patient Education: Go to Patient Education Activity  Goal: Patient/Family Education  Outcome: Progressing Towards Goal     Problem: Risk for Spread of Infection  Goal: Prevent transmission of infectious organism to others  Description: Prevent the transmission of infectious organisms to other patients, staff members, and visitors.   Outcome: Progressing Towards Goal     Problem: Patient Education:  Go to Education Activity  Goal: Patient/Family Education  Outcome: Progressing Towards Goal     Problem: Breathing Pattern - Ineffective  Goal: *Absence of hypoxia  Outcome: Progressing Towards Goal  Goal: *Use of effective breathing techniques  Outcome: Progressing Towards Goal  Goal: *PALLIATIVE CARE:  Alleviation of Dyspnea  Outcome: Progressing Towards Goal     Problem: Patient Education: Go to Patient Education Activity  Goal: Patient/Family Education  Outcome: Progressing Towards Goal     Problem: Pressure Injury - Risk of  Goal: *Prevention of pressure injury  Description: Document Salvador Scale and appropriate interventions in the flowsheet.   Outcome: Progressing Towards Goal  Note: Pressure Injury Interventions:  Sensory Interventions: Assess changes in LOC    Moisture Interventions: Absorbent underpads    Activity Interventions: Increase time out of bed    Mobility Interventions: Assess need for specialty bed    Nutrition Interventions: Document food/fluid/supplement intake    Friction and Shear Interventions: Apply protective barrier, creams and emollients                Problem: Patient Education: Go to Patient Education Activity  Goal: Patient/Family Education  Outcome: Progressing Towards Goal     Problem: Patient Education: Go to Patient Education Activity  Goal: Patient/Family Education  Outcome: Progressing Towards Goal     Problem: Patient Education: Go to Patient Education Activity  Goal: Patient/Family Education  Outcome: Progressing Towards Goal     Problem: Patient Education: Go to Patient Education Activity  Goal: Patient/Family Education  Outcome: Progressing Towards Goal     Problem: Patient Education: Go to Patient Education Activity  Goal: Patient/Family Education  Outcome: Progressing Towards Goal

## 2021-05-21 NOTE — PROGRESS NOTES
EDGAR- Pending referral to [facility], [facility].. JANNET's colleague, Ana M Fletcher, received an email from this pt's mother regarding her next choices for pt's rehab. Her choices are [facility] ([phone]), [facility] ([phone]) and [facility] [phone]). JANNET spoke with Ayo Villegas in admissions at [facility] and found that they do not participate with this pt's insurance. JANNET called [facility] twice, attempting to get the fax number for the admissions. JANNET left 2 voice mails for the admissions department but they have not returned the call. JANNET was able to fax clinicals to [facility], [facility].. Will follow.  Brittany Duran

## 2021-05-21 NOTE — PROGRESS NOTES
.  [facility]  Hospitalist Group    PATIENT ID: Morgan Soto  MRN: [id number]   YOB: 1995    PRIMARY CARE PROVIDER: None   DATE OF ADMISSION: 5/6/2021  5:22 PM    ATTENDING PHYSICIAN: Cherelle Carl MD  CONSULTATIONS:   IP CONSULT TO INTENSIVIST  IP CONSULT TO NEUROLOGY  IP CONSULT TO PODIATRY  IP CONSULT TO PODIATRY  IP CONSULT TO INFECTIOUS DISEASES  IP CONSULT TO NEUROLOGY  IP CONSULT TO OTOLARYNGOLOGY  IP CONSULT TO CARDIOLOGY    PROCEDURES/SURGERIES:   Procedure(s):  INSERT ICD DUAL    REASON FOR ADMISSION: Cardiac arrest with pulseless electrical activity [facility] PROBLEM LIST:  Patient Active Problem List   Diagnosis Code    Endocarditis due to methicillin susceptible Staphylococcus aureus (MSSA) I33.0, B95.61    Drug abuse, cocaine type ([facility] [address] F14.10    Type 2 myocardial infarction ([facility] [address] I21. 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 male with h/o Seizures and anxiety who presented to [facility] after a cardiac arrest at [facility] around 1500 5/6/2021. Hx from chart and speaking with patient's sister via phone. Patient had recent hospitalization at the [facility] in March of this year. Initial hospitalization admission was at [facility] on 3/24 with AMS in setting of polysubstance and IVD use (cocaine, heroine, methamphetamines). He was found to have septic MRSA bacteremia, MRSA endocarditis.  Imaging and MRI also found R PCA infarct and several abscesses and right temporal and parietal lobes. Infarcts thought to be due to septic emboli and patient had left sided residual weakness. Patient was transferred to [facility] on 4/1/2021 for evaluation for valve surgery. After transfer he was found to have a New R MCA infarct Also had a type 2 NSTEMI and tamponade with pericardiocentesis done prior to surgery. Did have cardiac arrest on day of surgery. Had a bioprosthetic Aortic Valve Replacement and Aortic Root Abscess Debridement done on 4/16/2021. Trached and sent to [facility] on 4/29/2021. Was undergoing PT and vent wean. Sister stated that patient was able to be off the vent for several hours over the last few days. He was sitting up and able to talk with valve over trach and could follow commands. Stated that he had severe weakness of the left upper ext, but was starting to gain some mobility in the lower left ext. Patient's mother visited patient today prior to arrest. She said the staff told her that the patient was getting very anxious earlier in the day around 1 pm and had to be placed back on the ventilator, and then they had to sedate him while he was on the ventilator. The mother stated \" he did not look good\" and \" his eye were rolled back in his head\". About 30 minutes after she left she got call about arrest. Per ED note patient was found unresponsive around the 1500 hour and was pulseless. Two rounds of CPR and meds were given and patient was defibrillated x 1 before ROSC. Patient was hypoxic post arrest. Unknown down time prior to arrest.        Subjective/HPI    Pt seen and examined  Sleeping peacefully but able to wake up   Instantly after waking asking for Fentanyl dose but falling asleep soon after asking     Assessment and Plan:  Cardiac arrest - 5/6/21 on admission:   .   Cardiologist following,   -per cardiologist :  -[facility] until 5/28  for endocarditis   -ICD on 5/25 at 7am, NPO at [facility]    Acute on chronic  respiratory failure:   Had trach [facility] hospitalization at [facility] and was [facility] further weaning. He was on ventilator while in ICU and now transitioned to trach collar.  -currently on trach collar and PMV  - on RA this morning, doing well     MRSA aortic  Valve endocardititis   -Found to have MRSA bacteremia and MSSA endocarditis end of march. -Hx:  4/16/21 at [facility] bioprosthetic AVR and root abscess debridement. He also had a pericardiocentesis due to tamponade prior to surgery  - Reviewed [facility] record, he was on IV vanco at [facility] and planned to  have a 6 weeks of   tx and EOT 5/28/21 per record. - Continue vancomycin      Pain management : contusion of chest wall, left side, chronic back pain . H/o drug abuse  Cautious with narcotics  On  fentanyl to 50mcg q6h prn and oxycodone 7.5 mg Q 6 hr prn,wean narcotics    Bacterial pneumonia  severe multilevel consolidation throughout both lungs consistent with severe multilobar PNA. -sputum culture E coli with ESBL   -per ID -Plan  Continue meropenem with plan for 7 to 10-day course started 5/11, will complete 10 days on 5/21. -pulmonary toilet   -trach care   -ID following      # Systolic congestive heart failure/CM  Ef 25%   Hold coreg,entresto ,Aldactone, BP better today  Cardiologist following   Echo EF showed 25-30% on 5/17    H/o right MCA infarct and several brain abscesses in the right temporal and parietal lobes. Infarcts were thought to be due to septic emboli. He had left sided residual weakness. cta 5/6/21:  multiple acute infarctions throughout the  right cerebral hemisphere, involving much of the occipital lobe, as well as much  of the frontal lobe.  There is an additional superior right frontoparietal  Infarct  Neurologist consulted                -PT/OT/SLP e              - Stroke education              - SBP goal 100-160              Seizure DZ:  Continue keppra -1500 mg BID, off valproic acid as subtherapeutic due to meropenem interaction,so valproic acid discontinued  Seizure precautions       Severe muscle deconditioning  Severe protein-calorie malnutrition   Cerebral abscess (embolic)   H/o substance abuse      fulll code     Awaiting discharge to [facility], decision regarding ICD vs LV per cardiology        PHYSICAL EXAMINATION:  Visit Vitals  /66 (BP 1 Location: Right upper arm, BP Patient Position: At rest)   Pulse 91   Temp 98.6 °F (37 °C)   Resp 21   Ht 5' 11\" (1.803 m)   Wt 56.2 kg (123 lb 14.4 oz)   SpO2 98%   BMI 17.28 kg/m²       General:          chronically ill,   HEENT:           Atraumatic,trach capped        Neck:               Supple,   Lungs: No wheezing. Midline sternal surgical scar,minimal crackles. Heart:              Regular  rhythm,normal rate   No edema  Abdomen:       Soft, non-tender. Not distended. Bowel sounds normal  Extremities:     No LE edema  Skin:                Not pale. Not Jaundiced  No rashes         Neurologic:      Alert, oriented X 3, unable to move LUE , able to bend LLE at knee, moves RUE and RLE     Labs:     Recent Labs     05/19/21  0536   WBC 7.2   HGB 9.9*   HCT 32.4*        Recent Labs     05/19/21  0536 05/19/21  0535   NA  --  136   K  --  3.8   CL  --  103   CO2  --  26   BUN  --  9   CREA  --  0.35*   GLU  --  91   CA  --  9.9   MG 1.7  --    PHOS 3.6  --      Recent Labs     05/19/21  0535   ALT 19   AP 85   TBILI 0.5   TP 7.0   ALB 3.3*   GLOB 3.7     No results for input(s): INR, PTP, APTT, INREXT, INREXT in the last 72 hours. No results for input(s): FE, TIBC, PSAT, FERR in the last 72 hours. No results found for: FOL, RBCF   No results for input(s): PH, PCO2, PO2 in the last 72 hours. No results for input(s): CPK, CKNDX, TROIQ in the last 72 hours.     No lab exists for component: CPKMB  Lab Results   Component Value Date/Time    Cholesterol, total 140 05/11/2021 04:35 AM    HDL Cholesterol 21 05/11/2021 04:35 AM    LDL, calculated 77.4 05/11/2021 04:35 AM    Triglyceride 208 (H) 05/11/2021 04:35 AM    CHOL/HDL Ratio 6.7 (H) 05/11/2021 04:35 AM     Lab Results   Component Value Date/Time    Glucose (POC) 120 (H) 05/11/2021 12:00 PM    Glucose (POC) 100 05/11/2021 06:04 AM    Glucose (POC) 87 05/11/2021 12:19 AM    Glucose (POC) 83 05/10/2021 06:53 PM    Glucose (POC) 84 05/10/2021 02:07 PM     Lab Results   Component Value Date/Time    Color YELLOW/STRAW 05/06/2021 05:42 PM    Appearance CLEAR 05/06/2021 05:42 PM    Specific gravity 1.008 05/06/2021 05:42 PM    Specific gravity 1.020 03/24/2021 09:30 PM    pH (UA) 5.0 05/06/2021 05:42 PM    Protein 30 (A) 05/06/2021 05:42 PM    Glucose Negative 05/06/2021 05:42 PM    Ketone Negative 05/06/2021 05:42 PM    Bilirubin Negative 05/06/2021 05:42 PM    Urobilinogen 0.2 05/06/2021 05:42 PM    Nitrites Negative 05/06/2021 05:42 PM    Leukocyte Esterase Negative 05/06/2021 05:42 PM    Epithelial cells FEW 05/06/2021 05:42 PM    Bacteria Negative 05/06/2021 05:42 PM    WBC 0-4 05/06/2021 05:42 PM    RBC 0-5 05/06/2021 05:42 PM         CODE STATUS:  x Full Code    DNR    Partial    Comfort Care       Signed:   Kain Ospina MD  Date of Service:  5/21/2021  3:38 PM

## 2021-05-21 NOTE — ADT AUTH CERT NOTES
Patient Demographics Patient Name Rose Kent  
[id number] Legal Sex Male   
1995 Address [address] Phone [phone] (Home) [phone] (Mobile) Patient Demographics Patient Name Rose Kent  
[id number] Legal Sex Male   
1995 Address [address] Phone [phone] (Home) [phone] (Mobile) CSN:  
[id number] Admit Date: Admit Time Room Bed May 6, 2021  5:22 PM [address] [[id number]] 01 [[id number]] Attending Providers Provider Pager From To Vitor Garcia MD  21 Alberto Uribe DO  21 Gypsy Joy MD  21 Angeli Jimenez MD  21 Marcelino Martino MD  21 Jorge L Gonzalez MD  21 Ruth Guzmán MD  21 Sherryle Naas, MD  21 Emergency Contact(s) Name Relation Home Work Mobile Phoebe Marquez Mother [phone]    
Celine Charter Sister [phone] Utilization Reviews 
 
  
Cardiology [address] Day 16 (2021) by Swathi Mitchell RN 
 
  
Review Status Review Entered Completed 2021 13:22  
  
Criteria Review Care Day: 16 Care Date: 2021 Level of Care: Intermediate Care Guideline Day 3 Level Of Care ( ) * Activity level acceptable 2021 13:22:45 EDT by Kyle Heath Clinical Status   
(X) * Hemodynamic stability 2021 13:22:45 EDT by Petrona Valdivia   
  98.6 91 112/66 20 97% trach collar 10lpm   
( ) * Cardiovascular status acceptable 2021 13:22:45 EDT by Petrona Valdivia   
  ICD placement    
(X) * Cardiac inflammation absent or controlled 2021 13:22:45 EDT by Petrona Valdivia   
  none noted ( ) * General Discharge Criteria met Interventions   
(X) * Intake acceptable 2021 13:22:45 EDT by Janett Haddad   
  cardiac diet   
( ) * No inpatient interventions needed 5/21/2021 13:22:45 EDT by Leonard Morel   
  ICD 5/24, iv abx, trach collar 10lpm, iv fentanyl prn and po roxicodone * Milestone Additional Notes 5/21/2021 LOC IP INTERMEDIATE CARE  
VS 98.6 91 112/66 20 97% trach collar 10lpm  
LABS  no labs/imaging MEDS Fentanyl 25mcg iv q6 prn x4 Risaquad 1 cap daily ng Heparin 5000 units sc q8 Keppra 1500mg po bid Melatonin 3mg po qhs  
Merrem 500mg iv q6 Roxicodone 7.5mg po q4 prn x4 Entresto 1 tab po q12 Vancomycin 1000mg iv q8  
  
  
ATTENDING NOTE Assessment and Plan:  
Cardiac arrest - 5/6/21 on admission:   . Cardiologist following,   
-per cardiologist :  
-Ivet Semen until 5/28  for endocarditis   
-ICD on 5/25 at 7am, NPO at [facility]  
   
Acute on chronic  respiratory failure: [facility] and [facility]. He was on ventilator while in ICU and now transitioned to trach collar.  
-currently on trach collar and PMV  
- on RA this morning, doing well   
   
MRSA aortic  Valve endocardititis   
-Found to have MRSA bacteremia and MSSA endocarditis end of march. -Hx:  4/16/21 at [facility] bioprosthetic AVR and root abscess debridement. He also had a pericardiocentesis due to tamponade prior to surgery - [facility], [facility] on [facility] at [facility] and planned to [facility] a 6 weeks of   
tx and EOT 5/28/21 per record. - Continue vancomycin  
   
   
Pain management : contusion of chest wall, left side, chronic back pain . H/o drug abuse Cautious with narcotics On  fentanyl to 50mcg q6h prn and oxycodone 7.5 mg Q 6 hr prn,wean narcotics  
   
Bacterial pneumonia  severe multilevel consolidation throughout both lungs consistent with severe multilobar PNA. -sputum culture E coli with ESBL   
-per ID -Plan Continue meropenem with plan for 7 to 10-day course started 5/11, will complete 10 days on 5/21.   
-pulmonary toilet   
-trach care   
-ID following  
   
   
# Systolic congestive heart failure/CM  Ef 25% Hold coreg,entresto ,Aldactone, BP better today Cardiologist following Echo EF showed 25-30% on 5/17  
   
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 Infarcts were thought to be due to septic emboli. He had left sided residual weakness.   
  cta 5/6/21:  multiple acute infarctions throughout the  
right cerebral hemisphere, involving much of the occipital lobe, as well as much  
of the frontal lobe. There is an additional superior right frontoparietal  
Infarct Neurologist consulted   
             -PT/OT/SLP e  
            - Stroke education  
            - SBP goal 100-160  
        
   
   
Seizure DZ:  
Continue keppra -1500 mg BID, off valproic acid as subtherapeutic due to meropenem interaction,so valproic acid discontinued Seizure precautions   
   
   
Severe muscle deconditioning Severe protein-calorie malnutrition Cerebral abscess (embolic)  
 H/o substance abuse  
   
   
fulll code   
   
Awaiting discharge to [facility], decision regarding ICD vs LV per cardiology General:          chronically ill, HEENT:           Atraumatic,trach capped        
Neck:               Supple,   
Lungs:             No wheezing. Midline sternal surgical scar,minimal crackles. Heart:              Regular  rhythm,normal rate   No edema Abdomen:       Soft, non-tender. Not distended.  Bowel sounds normal  
Extremities:     No LE edema Skin:                Not pale.  Not Jaundiced  No rashes   
      
Neurologic:      Alert, oriented X 3, unable to move LUE , able to bend LLE at knee, moves RUE and RLE  
  
CARDIOLOGY Pt scheduled for ICD implant  0700 on Tuesday May 25. Willl need NPO after midnight and consent . I discussed with  Jud Alciraelizabeth and he understood and agreed [facility] stated his father had ICD  
  
  
Cardiology [facility] 15 (5/20/2021) by Debbie Dasilva RN 
 
  
Review Status Review Entered Completed 5/21/2021 13:18  
  
Criteria Review Care Day: 15 Care Date: 5/20/2021 Level of Care: Intermediate Care Guideline Day 3 Level Of Care ( ) * Activity level acceptable 5/21/2021 13:18:17 EDT by Jasmyn Ventura   
  bedrest   
Clinical Status ( ) * Hemodynamic stability 5/21/2021 13:18:17 EDT by Jasmyn Ventura   
  heart rate 102 ( ) * Cardiovascular status acceptable 5/21/2021 13:18:17 EDT by Jasmyn Ventura   
  ICD vs life vest before DC (X) * Cardiac inflammation absent or controlled 5/21/2021 13:18:17 EDT by Rocio Dixon none noted ( ) * General Discharge Criteria met Interventions   
(X) * Intake acceptable 5/21/2021 13:18:17 EDT by Jasmyn Ventura   
  cardiac   
( ) * No inpatient interventions needed 5/21/2021 13:18:17 EDT by Jasmyn Ventura   
  trach collar on pmv 10lpm, iv abx, ICD vs LV before discharge * Milestone Additional Notes 5/20/2021 LOC IP INTERMEDIATE CARE  
VS 98.2 102 101/58 18 99% room trach collar 10lpm   
LABS  no labs/imaging MEDS Fentanyl 25mcg iv q6 prn x4 Risaquad 1 cap daily ng Heparin 5000 units sc q8 Keppra 1500mg po bid Melatonin 3mg po qhs  
Merrem 500mg iv q6 Roxicodone 7.5mg po q4 prn x4 Entresto 1 tab po q12 Vancomycin 1000mg iv q8  
  
  
ATTENDING NOTE Pt seen and examined Sleeping peacefully but able to wake up Instantly after waking asking for Fentanyl dose but falling asleep soon after asking   
   
Assessment and Plan:  
  1.  cardiac arrest - 5/6/21 on admission:   . Cardiologist following,   
-per cardiologist :  
ICD vs life vest before DC, Vanco until 5/28  for endocarditis   
   
   
2. Acute on chronic  respiratory failure: [facility]. He was on ventilator while in ICU and now transitioned to trach collar.  
-currently on trach collar and PMV  
-on 8L through trach collar   
   
   
   
3. MRSA aortic  Valve endocardititis   
-Found to have MRSA bacteremia and MSSA endocarditis end of march. -Hx:  4/16/21 at [facility] bioprosthetic AVR and root abscess debridement. He also had a pericardiocentesis due to tamponade prior to surgery - Reviewed [facility] record, he was on IV vanco at [facility] and planned to [facility] a 6 weeks of   
tx and EOT 5/28/21 per record. - Continue vancomycin  
   
   
Pain management : contusion of chest wall, left side, chronic back pain . H/o drug abuse Cautious with narcotics On  fentanyl to 50mcg q6h prn and oxycodone 7.5 mg Q 6 hr prn,wean narcotics  
   
9. Bacterial pneumonia  severe multilevel consolidation throughout both lungs consistent with severe multilobar PNA. -sputum culture E coli with ESBL   
-per ID -Plan Continue meropenem with plan for 7 to 10-day course started 5/11, will complete 10 days on 5/21. -pulmonary toilet   
-trach care   
-ID following  
   
   
# Systolic congestive heart failure/CM  Ef 25% Hold coreg,entresto ,Aldactone, BP better today Cardiologist following Echo EF showed 25-30% on 5/17  
   
8. 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 Infarcts were thought to be due to septic emboli. He had left sided residual weakness.   
  cta 5/6/21:  multiple acute infarctions throughout the  
right cerebral hemisphere, involving much of the occipital lobe, as well as much  
of the frontal lobe. There is an additional superior right frontoparietal  
Infarct Neurologist consulted   
             -PT/OT/SLP e  
            - Stroke education  
            - SBP goal 100-160  
        
   
   
10. Seizure DZ:  
Continue keppra -1500 mg BID, off valproic acid as subtherapeutic due to meropenem interaction,so valproic acid discontinued Seizure precautions    
   
 11.  Severe muscle deconditioning  
  12: Severe protein-calorie malnutrition   
 13.  Cerebral abscess (embolic)  
  18 h/o substance abuse  
   
   
fulll code   
   
Awaiting discharge to [facility], decision regarding ICD vs LV per cardiology   
   
General:          chronically ill, HEENT:           Atraumatic,trach capped        
Neck:               Supple,   
Lungs:             No wheezing. Midline sternal surgical scar,minimal crackles. Heart:              Regular  rhythm,normal rate   No edema Abdomen:       Soft, non-tender. Not distended.  Bowel sounds normal  
Extremities:     No LE edema Skin:                Not pale.  Not Jaundiced  No rashes   
      
Neurologic:      Alert, oriented X 3, unable to move LUE , able to bend LLE at knee, moves RUE and RLE   
   
CARDIOLOGY NOTE Assessment/Plan:  
   
   
1.  Sp cardiac arrest post op 4/17/21,  
 cardiac arrest Vfib 5/6/21  ekg rbbb qt 460 msec. ICD rec for secondary prevention if life expectancy > 1yr.  
   
2. Hx:  4/16/21 at [facility] bioprosthetic AVR and root abscess debridement. He also had a pericardiocentesis due to tamponade prior to surgery 3. Recent endocardititis 3/24/21 Staph 4. CM  Ef 25% 5.  right MCA infarct and several abscesses in the right temporal and parietal lobes. Infarcts were thought to be due to septic emboli. He had left sided residual weakness.   
  cta 5/6/21:  multiple acute infarctions throughout the  
right cerebral hemisphere, involving much of the occipital lobe, as well as much  
of the frontal lobe. There is an additional superior right frontoparietal  
infarct 6.  severe multilevel consolidation throughout both lungs consistent with severe multilobar PNA. 7. PNA:  Severe multilevel consolidation throughout both lungs, consistent with  
severe multilobar pneumonia.  
   
Try to restart Entresto, holding tegan and BB  
ICD before DC vs LV; Abx stop 5/28; d/w EP Echo showed mild improvement, LVEF 30-35% Add dapagliflozin on dc as not on formulary   
  
  
Cardiology [address] Day 14 (5/19/2021) by Nyasia Garcia RN 
 
  
Review Status Review Entered Completed 5/21/2021 13:10  
  
Criteria Review Care Day: 14 Care Date: 5/19/2021 Level of Care: Intermediate Care Guideline Day 3 Level Of Care ( ) * Activity level acceptable 5/21/2021 13:10:50 EDT by Syed Lindsey   
  bedrest   
Clinical Status ( ) * Hemodynamic stability 5/21/2021 13:10:50 EDT by Syed Primdax   
  tachy cardia 113   
( ) * Cardiovascular status acceptable 5/21/2021 13:10:50 EDT by Syed Lindsey   
  ICD vs life vest before DC (X) * Cardiac inflammation absent or controlled ( ) * General Discharge Criteria met Interventions   
(X) * Intake acceptable 5/21/2021 13:10:51 EDT by Syed Primdax   
  cardiac   
( ) * No inpatient interventions needed * Milestone Additional Notes 5/19/2021 LOC IP INTERMEDIATE CARE  
VS 98.3 113 119/83 20 99% TRACH COLLAR 10LPM   
LABS    
HGB: 9.9 (L) HCT: 32.4 (L) Creatinine: 0.35 (L) BUN/Creatinine ratio: 26 (H) MEDS Fentanyl 25mcg iv q6 prn x2 Heparin 5000 units sc q8 Keppra 1500mg po bid Melatonin 3mg po qhs  
Merrem 500mg iv q6 Roxicodone 7.5mg po q4 prn x3 Vancomycin 1000mg iv q8  
  
  
ATTENDING NOTE Asked for increased fentanyl dose again. Dubhoff tube was removed yesterday  
   
Assessment and Plan:  
  1.  cardiac arrest - 5/6/21 on admission:   . Cardiologist following,   
-per cardiologist :  
ICD vs life vest before DC (but with his recent endocarditis/recent bacteremia till under treatment, ? Timing for ICD)    
   
2. Acute on chronic  respiratory failure: Had trach last hospitalization at [facility] and was sent to [facility]aning.  He was on ventilator while in ICU and now transitioned to trach collar.  
-currently on trach collar and PMV  
-on 35% fiO2 -wean as tolerated  
   
   
  3. MRSA aortic  Valve endocardititis   
-Found to have MRSA bacteremia and MSSA endocarditis end of march. -Hx:  4/16/21 at [facility] bioprosthetic AVR and root abscess debridement. He also had a pericardiocentesis due to tamponade prior to surgery - Reviewed [facility] record, he was on IV vanco at [facility] and planned to [facility] a 6 weeks of   
tx and EOT 5/28/21 per record. - Continue vancomycin  
   
   
Pain management : contusion of chest wall, left side, chronic back pain . H/o drug abuse Cautious with narcotics On  fentanyl to 50mcg q6h prn and oxycodone 7.5 mg Q 6 hr prn,wean narcotics  
   
9. Bacterial pneumonia  severe multilevel consolidation throughout both lungs consistent with severe multilobar PNA. -sputum culture E coli with ESBL   
-per ID -Plan Continue meropenem with plan for 7 to 10-day course started 5/11  
-pulmonary toilet   
-trach care   
-ID following  
   
   
# Systolic congestive heart failure/CM  Ef 25% Hold coreg,entresto ,Aldactone, BP better today Cardiologist following Echo EF showed 25-30% on 5/17  
   
8. 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 Infarcts were thought to be due to septic emboli. He had left sided residual weakness.   
  cta 5/6/21:  multiple acute infarctions throughout the  
right cerebral hemisphere, involving much of the occipital lobe, as well as much  
of the frontal lobe. There is an additional superior right frontoparietal  
Infarct Neurologist consulted   
             -PT/OT/SLP e  
            - Stroke education  
            - SBP goal 100-160  
        
   
   
10. Seizure DZ:  
Continue keppra -1500 mg BID, off valproic acid as subtherapeutic due to meropenem interaction,so valproic acid discontinued Seizure precautions    
   
 11.  Severe muscle deconditioning  
  12: Severe protein-calorie malnutrition   
 13.  Cerebral abscess (embolic)  
  12 h/o substance abuse  
   
Patient gaver permission to talk to his mother,I called the number, answer.  
   
   
fulll code General:          chronically ill, HEENT:           Atraumatic,trach capped        
Neck:               Supple,   
Lungs:             No wheezing. Midline sternal surgical scar,minimal crackles. Heart:              Regular  rhythm,normal rate   No edema Abdomen:       Soft, non-tender. Not distended.  Bowel sounds normal  
Extremities:     No LE edema Skin:                Not pale.  Not Jaundiced  No rashes   
      
Neurologic:      Alert, oriented X 3, unable to move LUE , able to bend LLE at knee, moves RUE and RLE   
   
  
CARDIOLOGY NOTE Assessment/Plan:  
   
   
1.  Sp cardiac arrest post op 4/17/21,  
 cardiac arrest Vfib 5/6/21  ekg rbbb qt 460 msec. ICD rec for secondary prevention if life expectancy > 1yr.  
   
2. Hx:  [facility] and root abscess debridement. He also had a pericardiocentesis due to tamponade prior to surgery 3. Recent endocardititis 3/24/21 Staph 4. CM  Ef 25% 5.  right MCA infarct and several abscesses in the right temporal and parietal lobes. Infarcts were thought to be due to septic emboli. He had left sided residual weakness.   
  cta 5/6/21:  multiple acute infarctions throughout the  
right cerebral hemisphere, involving much of the occipital lobe, as well as much  
of the frontal lobe. There is an additional superior right frontoparietal  
infarct 6.  severe multilevel consolidation throughout both lungs consistent with severe multilobar PNA. 7. PNA:  Severe multilevel consolidation throughout both lungs, consistent with  
severe multilobar pneumonia.  
   
Improvement in BP off of meds and s/p IVF - restart as able ICD before DC vs LV; Abx stop 5/28; d/w EP Echo showed mild improvement, LVEF 30-35% Add dapagliflozin on dc as not on formulary

## 2021-05-21 NOTE — PROGRESS NOTES
EDGAR- Pending referrals to [facility], [facility]. and [facility].  received a call from [place] in admissions at [facility]. Her fax number is [phone]. CM faxed pt's referral to [facility] at this number.  Marilynn Orozco

## 2021-05-21 NOTE — PROGRESS NOTES
Bedside shift change report given to Mozell Sicard, RN (oncoming nurse) by MARIA LUISA Locke (offgoing nurse). Report included the following information SBAR, Kardex, Intake/Output, MAR and Cardiac Rhythm NSR.

## 2021-05-21 NOTE — PROGRESS NOTES
Problem: Self Care Deficits Care Plan (Adult)  Goal: *Acute Goals and Plan of Care (Insert Text)  Description:   FUNCTIONAL STATUS PRIOR TO ADMISSION: patient was IND prior to recent hospital admissions and has had a complicated medical course including NG tube and trach. Patient recently at [facility] for vent weaning. HOME SUPPORT: The patient lived alone with parents in area to provide assistance. Occupational Therapy Goals  Initiated 5/11/2021, Reviewed 5/18/21, continue all goals  1. Patient will perform 2 simple grooming tasks in supported sitting with minimal assistance/contact guard assist within 7 day(s). 2.  Patient will perform anterior neck to thigh bathing in supported sitting with moderate assistance within 7 day(s). 3.  Patient will tolerate sitting EOB in prep for ADLs with max A within 7 days. 4.  Patient will track to L of midline during ADLs/therapeutic activities with moderate cues within 7 days. 5.  Patient will participate in upper extremity therapeutic exercise/activities with moderate assistance  for 5 minutes within 7 day(s). Outcome: Progressing Towards Goal   OCCUPATIONAL THERAPY TREATMENT  Patient: Diaz Sin (48 y.o. male)  Date: 5/21/2021  Diagnosis: Cardiac arrest [facility]) [I46.9] Cardiac arrest with pulseless electrical activity ([facility] [address]  Procedure(s) (LRB):  INSERT ICD DUAL (N/A)    Precautions: Fall, Skin  Chart, occupational therapy assessment, plan of care, and goals were reviewed. ASSESSMENT  This L hand dominant male continues with skilled OT services and is progressing towards goals physically, cognitively and emotionally.   ADLs limited by L UE 0/5 strength, R UE -3/5 shoulder and B LEs, +3/5 elbow-digits, sitting tolerance, static sitting balance, pain management (orthopedically back, chest; neurologically L UE), cardiopulmonary tolerance (trach, room air), cognition (attention to task, complex processing, problem solving, self initiation with difficult tasks, early termination of task not self motivated by, requires backwards chaining, small achievable goals) and behavior (requires external cues to participate in self care, learned dependency, potentially depressed and could use behavioral and medication management.      Current Level of Function Impacting Discharge (ADLs): max assistance overall upper body ADLs; max assistance lower body ADLs; max assistance bed mobility      Other factors to consider for discharge: complex medical hx; consistent staff and expectations          PLAN :  Patient continues to benefit from skilled intervention to address the above impairments. Continue treatment per established plan of care to address goals.     Recommend with staff: bed/chair position 3x/day (especially for meals); encouragement for patient to complete ADLs 2* starting to have learned dependency      Recommend next OT session: sitting EOB ADLs with L UE WB for neuro re-ed; 9 a.m. preference for therapy; continue to update \"I can\" list on board     Recommendation for discharge: (in order for the patient to meet his/her long term goals)  Therapy 3 hours per day 5-7 days per week to address medical complexities, utilized eStimulation ([employer]), progress patient in a \"well\" environment that is structured to assist with the physical, cognitive and emotional deficits occurring. Patient is working towards tolerating 3 hours of therapy.      This discharge recommendation:  Has not yet been discussed the attending provider and/or case management     IF patient discharges home will need the following DME: gait belt, hospital bed, mechanical lift, wheelchair: power, and sliding/transfer board        SUBJECTIVE:   Patient stated I want to work on walking and using this arm (L)\".      OBJECTIVE DATA SUMMARY:   Cognitive/Behavioral Status:   Alert, oriented x4     After stating needs, needs being met, patient then changed to \"can you\" and \"thank you\", eye contact, conversation. Through conversation, discussed what was going well, his achievements and next steps to increase independence, control over his care via appropriate self advocacy, and therapy goals. \"I can\" written on white board to increase communication between patient and staff - bath self (needs help with back side) and participate in OT at 9 a.m. patient very receptive and appreciative. Functional Mobility and Transfers for ADLs:  Bed Mobility:  Scooting: Maximum assistance (cues LE, cue again R LE; push on 3, minimal effort) up the bed, trendelenburg    Transfers:             Balance:  Sitting: Intact; With support    ADL Intervention:  Feeding  Container Management: Moderate assistance  Cutting Food: Total assistance (dependent)  Utensil Management: Modified independent (R hand, non - dominant)  Food to Mouth: Modified independent (R hand, non - dominant)  Drink to Mouth: Modified independent (R hand, non - dominant)   Instruction on neuro re-education benefits setup items L hand to stabilize; patient then demonstrated 4/5. Setup coffee as give and take during session to increase rapport. Cognitive Retraining  Problem Solving: General alternative solution  Executive Functions: Executing cognitive plans  Organizing/Sequencing: Breaking task down;Prioritizing  Following Commands: Follows multi-step simple commands/directions    Therapeutic Exercises:   Encouraged to complete daily. Pain:  Spine from being in bed and chest from coughing (instruction on benefits of mobilizing and pain meds); L UE neurologically (instruction on benefits L UE WB-patient agreed, use and some pain meds can assist).      Activity Tolerance:   requires rest breaks   Received with trach room air; sats during session 100-94% during session  -111  RR 22-30    After treatment patient left in no apparent distress:   Supine in bed, Heels elevated for pressure relief, Call bell within reach and Side rails x 3  Side lying R as received    COMMUNICATION/COLLABORATION:   The patients plan of care was discussed with: Registered nurse.      Sherie Stafford  Time Calculation: 29 mins

## 2021-05-21 NOTE — PROGRESS NOTES
Cardiology Progress Note                                        Admit Date: 5/6/2021    Assessment/Plan:       1. Sp cardiac arrest post op 4/17/21,   cardiac arrest Vfib 5/6/21  ekg rbbb qt 460 msec. ICD rec for secondary prevention if life expectancy > 1yr.    2. Hx:  4/16/21 at [facility] bioprosthetic AVR and root abscess debridement. He also had a pericardiocentesis due to tamponade prior to surgery  3. Recent endocardititis 3/24/21 Staph  4. CM  Ef 25%   5.  right MCA infarct and several abscesses in the right temporal and parietal lobes. Infarcts were thought to be due to septic emboli. He had left sided residual weakness. cta 5/6/21:  multiple acute infarctions throughout the  right cerebral hemisphere, involving much of the occipital lobe, as well as much  of the frontal lobe. There is an additional superior right frontoparietal  infarct  6.  severe multilevel consolidation throughout both lungs consistent with severe multilobar PNA. 7. PNA:  Severe multilevel consolidation throughout both lungs, consistent with  severe multilobar pneumonia.     Cont Entresto, holding tegan and BB; restart as able  NPO at [facility] on 5/25 for ICD  Echo showed mild improvement, LVEF 30-35%  Add dapagliflozin on dc as not on formulary       Maday Ring is a 22 y.o. male with     PROBLEM LIST:  Patient Active Problem List    Diagnosis Date Noted    Severe muscle deconditioning 05/01/2021    S/P AVR (aortic valve replacement) and aortoplasty 04/16/2021    Abscess of aortic root 04/16/2021    Contusion of chest wall 04/16/2021    Successful cardiopulmonary resuscitation 04/14/2021    Acute traumatic pain 04/14/2021    Postoperative acute respiratory failure ([address] 04/01/2021    Severe protein-calorie malnutrition ([address] 05/11/2021    Cardiac arrest with pulseless electrical activity ([address] 05/06/2021    Cerebral abscess (embolic) 46/30/2991    Drug abuse, cocaine type ([address] 03/29/2021    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 Likely orthostatic when sat on edge of bed with PT    Visit Vitals  /62 (BP 1 Location: Right arm, BP Patient Position: At rest)   Pulse 100   Temp 98.6 °F (37 °C)   Resp 18   Ht 5' 11\" (1.803 m)   Wt 56.2 kg (123 lb 14.4 oz)   SpO2 98%   BMI 17.28 kg/m²       Intake/Output Summary (Last 24 hours) at 5/21/2021 1620  Last data filed at 5/21/2021 1140  Gross per 24 hour   Intake    Output 900 ml   Net -900 ml       Objective:      Physical Exam:  HEENT: Perrla, EOMI  Neck: No JVD,  No thyroidmegaly  Resp: CTA bilaterally;  No wheezes or rales  CV: RRR s1s2 No murmur no s3  Abd:Soft, Nontender  Ext: No edema  Neuro: unresponsive on vent   Skin: Warm, Dry, Intact  Pulses: 2+ DP/PT/Rad      Telemetry: normal sinus rhythm    Current Facility-Administered Medications   Medication Dose Route Frequency    ivabradine (CORLANOR) tablet 5 mg  5 mg Oral BID WITH MEALS    fentaNYL citrate (PF) injection 50 mcg  50 mcg IntraVENous Q6H PRN    vancomycin (VANCOCIN) 1,000 mg in 0.9% sodium chloride 250 mL (VIAL-MATE)  1,000 mg IntraVENous Q8H    levETIRAcetam (KEPPRA) tablet 1,500 mg  1,500 mg Oral BID    sodium chloride (AYR SALINE) 0.65 % nasal drops 2 Drop  2 Drop Left Nostril Q2H PRN    oxyCODONE IR (ROXICODONE) tablet 7.5 mg  7.5 mg Oral Q4H PRN    lidocaine 4 % patch 1 Patch  1 Patch TransDERmal Q24H    meropenem (MERREM) 500 mg in 0.9% sodium chloride (MBP/ADV) 50 mL MBP  0.5 g IntraVENous Q6H    Vancomycin - pharmacy to dose   Other Rx Dosing/Monitoring    naloxone (NARCAN) injection 0.1 mg  0.1 mg IntraVENous PRN    miconazole (MICOTIN) 2 % cream   Topical BID    traZODone (DESYREL) tablet 50 mg  50 mg Oral QHS PRN    [Held by provider] carvediloL (COREG) tablet 3.125 mg  3.125 mg Oral BID WITH MEALS    albuterol-ipratropium (DUO-NEB) 2.5 MG-0.5 MG/3 ML  3 mL Nebulization Q4H PRN    [Held by provider] spironolactone (ALDACTONE) tablet 12.5 mg  12.5 mg Oral DAILY    hydrALAZINE (APRESOLINE) 20 mg/mL injection 10 mg  10 mg IntraVENous Q6H PRN    sacubitriL-valsartan (ENTRESTO) 24-26 mg tablet 1 Tab  1 Tablet Oral Q12H    L.acidophilus-paracasei-S.thermophil-bifidobacter (RISAQUAD) 8 billion cell capsule  1 Capsule Nasogastric DAILY    heparin (porcine) injection 5,000 Units  5,000 Units SubCUTAneous Q8H    melatonin tablet 3 mg  3 mg Oral QHS    0.9% sodium chloride infusion 250 mL  250 mL IntraVENous PRN    balsam peru-castor oiL (VENELEX) ointment   Topical BID    sodium chloride (NS) flush 5-40 mL  5-40 mL IntraVENous Q8H    sodium chloride (NS) flush 5-40 mL  5-40 mL IntraVENous PRN    acetaminophen (TYLENOL) tablet 650 mg  650 mg Oral Q6H PRN    Or    acetaminophen (TYLENOL) suppository 650 mg  650 mg Rectal Q6H PRN    polyethylene glycol (MIRALAX) packet 17 g  17 g Oral DAILY PRN    ondansetron (ZOFRAN) injection 4 mg  4 mg IntraVENous Q6H PRN    glucose chewable tablet 16 g  4 Tablet Oral PRN    dextrose (D50W) injection syrg 12.5-25 g  25-50 mL IntraVENous PRN    glucagon (GLUCAGEN) injection 1 mg  1 mg IntraMUSCular PRN         Data Review:   Labs:    No results found for this or any previous visit (from the past 24 hour(s)).

## 2021-05-21 NOTE — PROGRESS NOTES
Problem: Mobility Impaired (Adult and Pediatric)  Goal: *Acute Goals and Plan of Care (Insert Text)  Description: FUNCTIONAL STATUS PRIOR TO ADMISSION: Patient was independent prior to March 2021. Pt has been hospitalized and recently at [facility]. Per mother, pt was ambulating with therapy at [facility]. HOME SUPPORT PRIOR TO ADMISSION: The patient lived alone prior to hospitalization. Physical Therapy Goals  Revised 5/17/2021  1. Patient will move from supine to sit and sit to supine , scoot up and down, and roll side to side in bed with minimal assistance/contact guard assist within 7 day(s). 2.  Patient will tolerate sitting EOB with contact guard assistance for approx 5 minutes within 7 day(s). 3.  Patient will transfer from bed to chair and chair to bed with maximal assistance using the least restrictive device within 7 day(s). 4.  Patient will perform sit to stand with maximal assistance within 7 day(s). 5.  Patient will ambulate with maximal assistance for 5 feet with the least restrictive device within 7 day(s). Initiated 5/9/2021  1. Patient will move from supine to sit and sit to supine , scoot up and down, and roll side to side in bed with moderate assistance  within 7 day(s). GOAL MET 5/17/21  2. Patient will tolerate sitting EOB with moderate assistance for approx 3-5 minutes within 7 day(s). GOAL MET 5/17/21  3. Patient will transfer from bed to chair and chair to bed with maximal assistance using the least restrictive device within 7 day(s). 4.  Patient will perform sit to stand with maximal assistance within 7 day(s). 5.  Patient will ambulate with maximal assistance for 5 feet with the least restrictive device within 7 day(s).          Outcome: Progressing Towards Goal    PHYSICAL THERAPY TREATMENT  Patient: Nelsy Guido (24 y.o. male)  Date: 5/21/2021  Diagnosis: Cardiac arrest [facility]) [I46.9] Cardiac arrest with pulseless electrical activity ([address]  Procedure(s) (LRB):  INSERT ICD DUAL (N/A)    Precautions: Fall, Skin  Chart, physical therapy assessment, plan of care and goals were reviewed. ASSESSMENT  Patient continues with skilled PT services and is progressing towards goals. Pt received supine in bed and willing to work with therapy. Pt able to tolerate bed mobility with less assistance to EOB. Pt presents with flacid LUE and, weakness with LLE, able to intiate LE movement to EOB. Pt able to tolerate seated EOB with RUE support for >1 min with support needed. Pt able to tolerate trunk exercises for a short time, then requested to lay back down. Pt completed session in modified bed/chair position with call bell within reach and all needs met at the time. Rn notified of the session. Current Level of Function Impacting Discharge (mobility/balance): mod/max for bed mobility    Other factors to consider for discharge: low tolerance of  Activity, pain management, overall weakness, fall risk         PLAN :  Patient continues to benefit from skilled intervention to address the above impairments. Continue treatment per established plan of care. to address goals. Recommendation for discharge: (in order for the patient to meet his/her long term goals)  Therapy 3 hours per day 5-7 days per week/ SNF    This discharge recommendation:  Has not yet been discussed the attending provider and/or case management    IF patient discharges home will need the following DME: to be determined (TBD)       SUBJECTIVE:   Patient stated Ill work more with you if you can get my meds.     OBJECTIVE DATA SUMMARY:   Critical Behavior:  Neurologic State: Alert  Orientation Level: Oriented X4  Cognition: Follows commands  Safety/Judgement: Awareness of environment  Functional Mobility Training:  Bed Mobility:  Rolling: Moderate assistance  Supine to Sit: Moderate assistance;Maximum assistance  Sit to Supine:  Moderate assistance  Scooting: Maximum assistance  Level of Assistance: Maximum assistance    Balance:  Sitting: Impaired  Sitting - Static: Fair (occasional); Occassional  Sitting - Dynamic: Poor (constant support)  Therapeutic Exercises:   Supine-   Ankle pumps  Quad sets  Glute sets x10ea 3 times a day    Pain Rating:  Reported chest pain, keeps asking for meds    Activity Tolerance:   Fair    After treatment patient left in no apparent distress:   Supine in bed and Call bell within reach    COMMUNICATION/COLLABORATION:   The patients plan of care was discussed with: Registered nurse.      Greg Lacy PTA   Time Calculation: 29 mins

## 2021-05-21 NOTE — PROGRESS NOTES
EPS    Pt scheduled for ICD implant  0700 on Tuesday May 25. Willl need NPO after midnight and consent .    I discussed with Mr. Elvinmariposa Ni and he understood and agreed  He stated his father had ICD

## 2021-05-21 NOTE — PROGRESS NOTES
Bedside and Verbal shift change report given to April (oncoming nurse) by Mary Whitlock (offgoing nurse). Report included the following information SBAR, Kardex, Intake/Output, MAR, Recent Results and Cardiac Rhythm NSR.

## 2021-05-22 LAB
ATRIAL RATE: 83 BPM
BASOPHILS # BLD: 0.1 K/UL (ref 0–0.1)
BASOPHILS NFR BLD: 2 % (ref 0–1)
CALCULATED P AXIS, ECG09: 48 DEGREES
CALCULATED R AXIS, ECG10: 74 DEGREES
CALCULATED T AXIS, ECG11: 95 DEGREES
DIAGNOSIS, 93000: NORMAL
DIFFERENTIAL METHOD BLD: ABNORMAL
EOSINOPHIL # BLD: 0.4 K/UL (ref 0–0.4)
EOSINOPHIL NFR BLD: 5 % (ref 0–7)
ERYTHROCYTE [DISTWIDTH] IN BLOOD BY AUTOMATED COUNT: 15.9 % (ref 11.5–14.5)
HCT VFR BLD AUTO: 36 % (ref 36.6–50.3)
HGB BLD-MCNC: 11.2 G/DL (ref 12.1–17)
IMM GRANULOCYTES # BLD AUTO: 0 K/UL (ref 0–0.04)
IMM GRANULOCYTES NFR BLD AUTO: 0 % (ref 0–0.5)
LYMPHOCYTES # BLD: 1.9 K/UL (ref 0.8–3.5)
LYMPHOCYTES NFR BLD: 28 % (ref 12–49)
MAGNESIUM SERPL-MCNC: 1.8 MG/DL (ref 1.6–2.4)
MCH RBC QN AUTO: 27.3 PG (ref 26–34)
MCHC RBC AUTO-ENTMCNC: 31.1 G/DL (ref 30–36.5)
MCV RBC AUTO: 87.6 FL (ref 80–99)
MONOCYTES # BLD: 1.2 K/UL (ref 0–1)
MONOCYTES NFR BLD: 17 % (ref 5–13)
NEUTS SEG # BLD: 3.3 K/UL (ref 1.8–8)
NEUTS SEG NFR BLD: 48 % (ref 32–75)
NRBC # BLD: 0 K/UL (ref 0–0.01)
NRBC BLD-RTO: 0 PER 100 WBC
P-R INTERVAL, ECG05: 174 MS
PHOSPHATE SERPL-MCNC: 3.4 MG/DL (ref 2.6–4.7)
PLATELET # BLD AUTO: 373 K/UL (ref 150–400)
PMV BLD AUTO: 10.4 FL (ref 8.9–12.9)
PROCALCITONIN SERPL-MCNC: <0.05 NG/ML
Q-T INTERVAL, ECG07: 418 MS
QRS DURATION, ECG06: 136 MS
QTC CALCULATION (BEZET), ECG08: 491 MS
RBC # BLD AUTO: 4.11 M/UL (ref 4.1–5.7)
VENTRICULAR RATE, ECG03: 83 BPM
WBC # BLD AUTO: 6.9 K/UL (ref 4.1–11.1)

## 2021-05-22 PROCEDURE — 74011250637 HC RX REV CODE- 250/637: Performed by: INTERNAL MEDICINE

## 2021-05-22 PROCEDURE — 77030008806 HC TU TRACH UNCUF COVD -B

## 2021-05-22 PROCEDURE — 74011250636 HC RX REV CODE- 250/636: Performed by: HOSPITALIST

## 2021-05-22 PROCEDURE — 36415 COLL VENOUS BLD VENIPUNCTURE: CPT

## 2021-05-22 PROCEDURE — 77030021668 HC NEB PREFIL KT VYRM -A

## 2021-05-22 PROCEDURE — 74011250636 HC RX REV CODE- 250/636: Performed by: INTERNAL MEDICINE

## 2021-05-22 PROCEDURE — 93005 ELECTROCARDIOGRAM TRACING: CPT

## 2021-05-22 PROCEDURE — 77010033711 HC HIGH FLOW OXYGEN

## 2021-05-22 PROCEDURE — 77030006998

## 2021-05-22 PROCEDURE — 94760 N-INVAS EAR/PLS OXIMETRY 1: CPT

## 2021-05-22 PROCEDURE — L8501 TRACHEOSTOMY SPEAKING VALVE: HCPCS

## 2021-05-22 PROCEDURE — 85025 COMPLETE CBC W/AUTO DIFF WBC: CPT

## 2021-05-22 PROCEDURE — 84145 PROCALCITONIN (PCT): CPT

## 2021-05-22 PROCEDURE — 65660000000 HC RM CCU STEPDOWN

## 2021-05-22 PROCEDURE — 77030008793 HC TU TRACH CUF COVD -B

## 2021-05-22 PROCEDURE — 84100 ASSAY OF PHOSPHORUS: CPT

## 2021-05-22 PROCEDURE — 74011250637 HC RX REV CODE- 250/637: Performed by: HOSPITALIST

## 2021-05-22 PROCEDURE — 83735 ASSAY OF MAGNESIUM: CPT

## 2021-05-22 PROCEDURE — 74011000258 HC RX REV CODE- 258: Performed by: HOSPITALIST

## 2021-05-22 RX ORDER — METOPROLOL SUCCINATE 25 MG/1
12.5 TABLET, EXTENDED RELEASE ORAL DAILY
Status: DISCONTINUED | OUTPATIENT
Start: 2021-05-23 | End: 2021-06-08 | Stop reason: HOSPADM

## 2021-05-22 RX ADMIN — IVABRADINE 5 MG: 5 TABLET, FILM COATED ORAL at 16:21

## 2021-05-22 RX ADMIN — CASTOR OIL AND BALSAM, PERU: 788; 87 OINTMENT TOPICAL at 09:13

## 2021-05-22 RX ADMIN — OXYCODONE HYDROCHLORIDE 7.5 MG: 5 TABLET ORAL at 16:21

## 2021-05-22 RX ADMIN — SACUBITRIL AND VALSARTAN 1 TABLET: 24; 26 TABLET, FILM COATED ORAL at 20:34

## 2021-05-22 RX ADMIN — Medication 10 ML: at 05:14

## 2021-05-22 RX ADMIN — Medication 10 ML: at 13:14

## 2021-05-22 RX ADMIN — MEROPENEM 500 MG: 500 INJECTION, POWDER, FOR SOLUTION INTRAVENOUS at 03:43

## 2021-05-22 RX ADMIN — Medication 1 CAPSULE: at 09:13

## 2021-05-22 RX ADMIN — FENTANYL CITRATE 50 MCG: 50 INJECTION INTRAMUSCULAR; INTRAVENOUS at 18:06

## 2021-05-22 RX ADMIN — VANCOMYCIN HYDROCHLORIDE 1000 MG: 1 INJECTION, POWDER, LYOPHILIZED, FOR SOLUTION INTRAVENOUS at 20:33

## 2021-05-22 RX ADMIN — Medication 3 MG: at 22:26

## 2021-05-22 RX ADMIN — VANCOMYCIN HYDROCHLORIDE 1000 MG: 1 INJECTION, POWDER, LYOPHILIZED, FOR SOLUTION INTRAVENOUS at 13:14

## 2021-05-22 RX ADMIN — SACUBITRIL AND VALSARTAN 1 TABLET: 24; 26 TABLET, FILM COATED ORAL at 09:13

## 2021-05-22 RX ADMIN — FENTANYL CITRATE 50 MCG: 50 INJECTION INTRAMUSCULAR; INTRAVENOUS at 11:41

## 2021-05-22 RX ADMIN — CASTOR OIL AND BALSAM, PERU: 788; 87 OINTMENT TOPICAL at 17:01

## 2021-05-22 RX ADMIN — OXYCODONE HYDROCHLORIDE 7.5 MG: 5 TABLET ORAL at 09:11

## 2021-05-22 RX ADMIN — MEROPENEM 500 MG: 500 INJECTION, POWDER, FOR SOLUTION INTRAVENOUS at 09:13

## 2021-05-22 RX ADMIN — IVABRADINE 5 MG: 5 TABLET, FILM COATED ORAL at 09:13

## 2021-05-22 RX ADMIN — LEVETIRACETAM 1500 MG: 500 TABLET ORAL at 09:13

## 2021-05-22 RX ADMIN — Medication 10 ML: at 22:26

## 2021-05-22 RX ADMIN — LEVETIRACETAM 1500 MG: 500 TABLET ORAL at 17:01

## 2021-05-22 RX ADMIN — HEPARIN SODIUM 5000 UNITS: 5000 INJECTION INTRAVENOUS; SUBCUTANEOUS at 12:33

## 2021-05-22 RX ADMIN — MICONAZOLE NITRATE: 20 CREAM TOPICAL at 17:01

## 2021-05-22 RX ADMIN — HEPARIN SODIUM 5000 UNITS: 5000 INJECTION INTRAVENOUS; SUBCUTANEOUS at 05:13

## 2021-05-22 RX ADMIN — VANCOMYCIN HYDROCHLORIDE 1000 MG: 1 INJECTION, POWDER, LYOPHILIZED, FOR SOLUTION INTRAVENOUS at 05:13

## 2021-05-22 RX ADMIN — HEPARIN SODIUM 5000 UNITS: 5000 INJECTION INTRAVENOUS; SUBCUTANEOUS at 20:34

## 2021-05-22 RX ADMIN — MICONAZOLE NITRATE: 20 CREAM TOPICAL at 09:13

## 2021-05-22 NOTE — PROGRESS NOTES
.  [facility] Adult  Hospitalist Group    PATIENT ID: Giancarlo Bolden  MRN: [id number]   YOB: 1995    PRIMARY CARE PROVIDER: None   DATE OF ADMISSION: 5/6/2021  5:22 PM    ATTENDING PHYSICIAN: Elen Hernandez MD  CONSULTATIONS:   IP CONSULT TO INTENSIVIST  IP CONSULT TO NEUROLOGY  IP CONSULT TO PODIATRY  IP CONSULT TO PODIATRY  IP CONSULT TO INFECTIOUS DISEASES  IP CONSULT TO NEUROLOGY  IP CONSULT TO OTOLARYNGOLOGY  IP CONSULT TO CARDIOLOGY    PROCEDURES/SURGERIES:   Procedure(s):  INSERT ICD DUAL    REASON FOR ADMISSION: Cardiac arrest with pulseless electrical activity [facility] PROBLEM LIST:  Patient Active Problem List   Diagnosis Code    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 A1    Cerebral abscess (embolic) W25.9    Cardiac arrest with pulseless electrical activity (HCC) I46.9    Severe protein-calorie malnutrition (HCC) E43    Postoperative acute respiratory failure (HCC) J95.821    Severe muscle deconditioning R29.898    Successful cardiopulmonary resuscitation Z92.89    Acute traumatic pain G89.11    S/P AVR (aortic valve replacement) and aortoplasty Z95.2    Abscess of aortic root I51.89    Contusion of chest wall S20.219A         Brief HPI and Hospital Course:      Giancarlo Bolden is a 22 y.o. male with h/o Seizures and anxiety who presented to [facility] after a cardiac arrest at [facility] around 1500 5/6/2021. Hx from chart and speaking with patient's sister via phone. Patient had recent hospitalization at the [facility] in March of this year. Initial hospitalization admission was at [facility] on 3/24 with AMS in setting of polysubstance and IVD use (cocaine, heroine, methamphetamines). He was found to have septic MRSA bacteremia, MRSA endocarditis.  Imaging and MRI also found R PCA infarct and several abscesses and right temporal and parietal lobes. Infarcts thought to be due to septic emboli and patient had left sided residual weakness. Patient was transferred to [facility] on 4/1/2021 for evaluation for valve surgery. After transfer he was found to have a New R MCA infarct Also had a type 2 NSTEMI and tamponade with pericardiocentesis done prior to surgery. Did have cardiac arrest on day of surgery. Had a bioprosthetic Aortic Valve Replacement and Aortic Root Abscess Debridement done on 4/16/2021. Trached and sent to [facility] on 4/29/2021. Was undergoing PT and vent wean. Sister stated that patient was able to be off the vent for several hours over the last few days. He was sitting up and able to talk with valve over trach and could follow commands. Stated that he had severe weakness of the left upper ext, but was starting to gain some mobility in the lower left ext. Patient's mother visited patient today prior to arrest. She said the staff told her that the patient was getting very anxious earlier in the day around 1 pm and had to be placed back on the ventilator, and then they had to sedate him while he was on the ventilator. The mother stated \" he did not look good\" and \" his eye were rolled back in his head\". About 30 minutes after she left she got call about arrest. Per ED note patient was found unresponsive around the 1500 hour and was pulseless. Two rounds of CPR and meds were given and patient was defibrillated x 1 before ROSC. Patient was hypoxic post arrest. Unknown down time prior to arrest.        Subjective/HPI    Pt seen and examined  Awake and alert  C/o pain   On RA, trach capped    Assessment and Plan:  Cardiac arrest - 5/6/21 on admission:   .   Cardiologist following,   -per cardiologist :  -Emma Herrera until 5/28  for endocarditis   -ICD on 5/25 at 7am, NPO at [facility]    Acute on chronic  respiratory failure: [facility] and was [facility] further we[facility]. He was on ventilator while in ICU and now transitioned to trach collar.  -currently on trach collar and PMV  - on RA this morning, doing well     MRSA aortic  Valve endocardititis   -Found to have MRSA bacteremia and MSSA endocarditis end of march. -Hx:  4/16/21 at [facility] bioprosthetic AVR and root abscess debridement. He also had a pericardiocentesis due to tamponade prior to surgery  - Reviewed [facility] record, he was on IV vanco at [facility] and planned to  have a 6 weeks of   tx and EOT 5/28/21 per record. - Continue vancomycin      Pain management : contusion of chest wall, left side, chronic back pain . H/o drug abuse  Cautious with narcotics  On  fentanyl to 50mcg q6h prn and oxycodone 7.5 mg Q 6 hr prn,wean narcotics    Bacterial pneumonia  severe multilevel consolidation throughout both lungs consistent with severe multilobar PNA. -sputum culture E coli with ESBL   -per ID -Plan  Continue meropenem with plan for 7 to 10-day course started 5/11--5/22, will dc and cont vanc for Endocarditis   -pulmonary toilet   -trach care   -ID following      # Systolic congestive heart failure/CM  Ef 25%   Hold coreg,entresto ,Aldactone, BP better today  Cardiologist following   Echo EF showed 25-30% on 5/17    H/o right MCA infarct and several brain abscesses in the right temporal and parietal lobes. Infarcts were thought to be due to septic emboli. He had left sided residual weakness. cta 5/6/21:  multiple acute infarctions throughout the  right cerebral hemisphere, involving much of the occipital lobe, as well as much  of the frontal lobe.  There is an additional superior right frontoparietal  Infarct  Neurologist consulted                -PT/OT/SLP e              - Stroke education              - SBP goal 100-160              Seizure DZ:  Continue keppra -1500 mg BID, off valproic acid as subtherapeutic due to meropenem interaction,so valproic acid discontinued  Seizure precautions       Severe muscle deconditioning  Severe protein-calorie malnutrition   Cerebral abscess (embolic)   H/o substance abuse      fulll code     Awaiting discharge to [facility], decision regarding ICD vs LV per cardiology        PHYSICAL EXAMINATION:  Visit Vitals  /67 (BP 1 Location: Right upper arm, BP Patient Position: At rest)   Pulse 78   Temp 98.2 °F (36.8 °C)   Resp 13   Ht 5' 11\" (1.803 m)   Wt 55.2 kg (121 lb 11.1 oz)   SpO2 97%   BMI 16.97 kg/m²       General:          chronically ill,   HEENT:           Atraumatic,trach capped        Neck:               Supple,   Lungs: No wheezing. Midline sternal surgical scar,minimal crackles. Heart:              Regular  rhythm,normal rate   No edema  Abdomen:       Soft, non-tender. Not distended. Bowel sounds normal  Extremities:     No LE edema  Skin:                Not pale. Not Jaundiced  No rashes         Neurologic:      Alert, oriented X 3, unable to move LUE , able to bend LLE at knee, moves RUE and RLE     Labs:     Recent Labs     05/22/21  0546   WBC 6.9   HGB 11.2*   HCT 36.0*        Recent Labs     05/22/21  0546   MG 1.8   PHOS 3.4     No results for input(s): ALT, AP, TBIL, TBILI, TP, ALB, GLOB, GGT, AML, LPSE in the last 72 hours. No lab exists for component: SGOT, GPT, AMYP, HLPSE  No results for input(s): INR, PTP, APTT, INREXT, INREXT in the last 72 hours. No results for input(s): FE, TIBC, PSAT, FERR in the last 72 hours. No results found for: FOL, RBCF   No results for input(s): PH, PCO2, PO2 in the last 72 hours. No results for input(s): CPK, CKNDX, TROIQ in the last 72 hours.     No lab exists for component: CPKMB  Lab Results   Component Value Date/Time    Cholesterol, total 140 05/11/2021 04:35 AM    HDL Cholesterol 21 05/11/2021 04:35 AM    LDL, calculated 77.4 05/11/2021 04:35 AM    Triglyceride 208 (H) 05/11/2021 04:35 AM    CHOL/HDL Ratio 6.7 (H) 05/11/2021 04:35 AM     Lab Results   Component Value Date/Time    Glucose (POC) 120 (H) 05/11/2021 12:00 PM    Glucose (POC) 100 05/11/2021 06:04 AM    Glucose (POC) 87 05/11/2021 12:19 AM    Glucose (POC) 83 05/10/2021 06:53 PM    Glucose (POC) 84 05/10/2021 02:07 PM     Lab Results   Component Value Date/Time    Color YELLOW/STRAW 05/06/2021 05:42 PM    Appearance CLEAR 05/06/2021 05:42 PM    Specific gravity 1.008 05/06/2021 05:42 PM    Specific gravity 1.020 03/24/2021 09:30 PM    pH (UA) 5.0 05/06/2021 05:42 PM    Protein 30 (A) 05/06/2021 05:42 PM    Glucose Negative 05/06/2021 05:42 PM    Ketone Negative 05/06/2021 05:42 PM    Bilirubin Negative 05/06/2021 05:42 PM    Urobilinogen 0.2 05/06/2021 05:42 PM    Nitrites Negative 05/06/2021 05:42 PM    Leukocyte Esterase Negative 05/06/2021 05:42 PM    Epithelial cells FEW 05/06/2021 05:42 PM    Bacteria Negative 05/06/2021 05:42 PM    WBC 0-4 05/06/2021 05:42 PM    RBC 0-5 05/06/2021 05:42 PM         CODE STATUS:  x Full Code    DNR    Partial    Comfort Care       Signed:   Loyd Helton MD

## 2021-05-22 NOTE — PROGRESS NOTES
Problem: Ventilator Management  Goal: *Adequate oxygenation and ventilation  Outcome: Progressing Towards Goal  Goal: *Patient maintains clear airway/free of aspiration  Outcome: Progressing Towards Goal  Goal: *Absence of infection signs and symptoms  Outcome: Progressing Towards Goal  Goal: *Normal spontaneous ventilation  Outcome: Progressing Towards Goal     Problem: Patient Education: Go to Patient Education Activity  Goal: Patient/Family Education  Outcome: Progressing Towards Goal     Problem: Falls - Risk of  Goal: *Absence of Falls  Description: Document Elliott Fall Risk and appropriate interventions in the flowsheet. Outcome: Progressing Towards Goal  Note: Fall Risk Interventions:       Mentation Interventions: Adequate sleep, hydration, pain control    Medication Interventions: Evaluate medications/consider consulting pharmacy    Elimination Interventions: Call light in reach              Problem: Risk for Spread of Infection  Goal: Prevent transmission of infectious organism to others  Description: Prevent the transmission of infectious organisms to other patients, staff members, and visitors. Outcome: Progressing Towards Goal     Problem: Breathing Pattern - Ineffective  Goal: *Absence of hypoxia  Outcome: Progressing Towards Goal  Goal: *Use of effective breathing techniques  Outcome: Progressing Towards Goal  Goal: *PALLIATIVE CARE:  Alleviation of Dyspnea  Outcome: Progressing Towards Goal     Problem: Patient Education: Go to Patient Education Activity  Goal: Patient/Family Education  Outcome: Progressing Towards Goal     Problem: Pressure Injury - Risk of  Goal: *Prevention of pressure injury  Description: Document Salvador Scale and appropriate interventions in the flowsheet.   Outcome: Progressing Towards Goal  Note: Pressure Injury Interventions:  Sensory Interventions: Assess changes in LOC    Moisture Interventions: Absorbent underpads, Apply protective barrier, creams and emollients    Activity Interventions: Pressure redistribution bed/mattress(bed type)    Mobility Interventions: Pressure redistribution bed/mattress (bed type)    Nutrition Interventions: Document food/fluid/supplement intake    Friction and Shear Interventions: Apply protective barrier, creams and emollients                Problem: Patient Education: Go to Patient Education Activity  Goal: Patient/Family Education  Outcome: Not Progressing Towards Goal     Problem: Patient Education: Go to Patient Education Activity  Goal: Patient/Family Education  Outcome: Not Progressing Towards Goal     Problem: Patient Education: Go to Patient Education Activity  Goal: Patient/Family Education  Outcome: Resolved/Met     Problem: Patient Education: Go to Patient Education Activity  Goal: Patient/Family Education  Outcome: Resolved/Met     Problem: Nutrition Deficit  Goal: *Optimize nutritional status  Outcome: Progressing Towards Goal     Problem: Airway Clearance - Ineffective  Goal: *Patent airway  Outcome: Progressing Towards Goal  Goal: *Absence of airway secretions  Outcome: Progressing Towards Goal  Goal: *Able to cough effectively  Outcome: Progressing Towards Goal  Goal: *PALLIATIVE CARE:  Alleviation of secretions, cough and/or nasal congestion  Outcome: Progressing Towards Goal     Problem: Patient Education: Go to Patient Education Activity  Goal: Patient/Family Education  Outcome: Progressing Towards Goal     Problem: Pain  Goal: *Control of Pain  Outcome: Progressing Towards Goal  Goal: *PALLIATIVE CARE:  Alleviation of Pain  Outcome: Progressing Towards Goal     Problem: General Wound Care  Goal: *Non-infected wound: Improvement of existing wound, absence of infection, and maintenance of skin integrity  Outcome: Progressing Towards Goal  Goal: *Infected Wound: Prevention of further infection and promotion of healing  Description: Infection control procedures (eg: clean dressings, clean gloves, hand washing, precautions to isolate wound from contamination, sterile instruments used for wound debridement) should be implemented.   Outcome: Progressing Towards Goal  Goal: Interventions  Outcome: Progressing Towards Goal     Problem: Anxiety  Goal: *Alleviation of anxiety  Outcome: Progressing Towards Goal

## 2021-05-22 NOTE — PROGRESS NOTES
Bedside shift change report given to Samanta Mahmood RN (oncoming nurse) by Silvia Cruz RN (offgoing nurse).  Report included the following information SBAR, Intake/Output, MAR, Recent Results and Cardiac Rhythm SR.

## 2021-05-22 NOTE — PROGRESS NOTES
Cardiology Progress Note                                        Admit Date: 5/6/2021    Assessment/Plan:       1. Sp cardiac arrest post op 4/17/21,   cardiac arrest Vfib 5/6/21  ekg rbbb qt 460 msec. ICD rec for secondary prevention if life expectancy > 1yr.    2. Hx:  4/16/21 at [facility] bioprosthetic AVR and root abscess debridement. He also had a pericardiocentesis due to tamponade prior to surgery  3. Recent endocardititis 3/24/21 Staph  4. CM  Ef 25%   5.  right MCA infarct and several abscesses in the right temporal and parietal lobes. Infarcts were thought to be due to septic emboli. He had left sided residual weakness. cta 5/6/21:  multiple acute infarctions throughout the  right cerebral hemisphere, involving much of the occipital lobe, as well as much  of the frontal lobe. There is an additional superior right frontoparietal  infarct  6.  severe multilevel consolidation throughout both lungs consistent with severe multilobar PNA. 7. PNA:  Severe multilevel consolidation throughout both lungs, consistent with  severe multilobar pneumonia. Cont Entresto, holding tegan and BB; restart as able  NPO at [facility] on 5/25 for ICD  Echo showed mild improvement, LVEF 30-35%  Add dapagliflozin on dc as not on formulary     WEEKEND EVENTS:  BP still too soft to restart spironolactone. Will try metoprolol succinate 12.5 mg daily instead of carvedilol. Continue other CV medications as tolerated.     Arden Mirza is a 22 y.o. male with     PROBLEM LIST:  Patient Active Problem List    Diagnosis Date Noted    Severe muscle deconditioning 05/01/2021    S/P AVR (aortic valve replacement) and aortoplasty 04/16/2021    Abscess of aortic root 04/16/2021    Contusion of chest wall 04/16/2021    Successful cardiopulmonary resuscitation 04/14/2021    Acute traumatic pain 04/14/2021    Postoperative acute respiratory failure ([facility] [address] 04/01/2021    Severe protein-calorie malnutrition ([facility] [address] 05/11/2021    Cardiac arrest with pulseless electrical activity ([facility] 05/06/2021    Cerebral abscess (embolic) 01/04/6593    Drug abuse, cocaine type ([facility] 03/29/2021    Endocarditis due to methicillin susceptible Staphylococcus aureus (MSSA) 03/25/2021    Type 2 myocardial infarction ([facility] 03/24/2021         Subjective:     Anival Modi reports unchanged chest discomfort. Denies dyspnea. Very weak in LUE.     Visit Vitals  /67 (BP 1 Location: Right upper arm, BP Patient Position: At rest)   Pulse 78   Temp 98.2 °F (36.8 °C)   Resp 13   Ht 5' 11\" (1.803 m)   Wt 55.2 kg (121 lb 11.1 oz)   SpO2 97%   BMI 16.97 kg/m²       Intake/Output Summary (Last 24 hours) at 5/22/2021 1318  Last data filed at 5/22/2021 1026  Gross per 24 hour   Intake 1800 ml   Output 1200 ml   Net 600 ml       Objective:      Physical Exam:  GEN: NAD, appears stated age  HEENT: EOMI, MMM, OP clear  NECK: Normal JVP, carotids 2+ b/l and symmetrical  CV: RRR, normal S1 and S2, no M/R/G  LUNGS: CTAB, no W/R/R  ABD: NABS, soft, NT/ND  EXT: No edema   PSYCH: Mood and affect normal  NEURO: AAO, LUE flaccid, face symmetrical, speech intact    Telemetry: normal sinus rhythm    Current Facility-Administered Medications   Medication Dose Route Frequency    ivabradine (CORLANOR) tablet 5 mg  5 mg Oral BID WITH MEALS    fentaNYL citrate (PF) injection 50 mcg  50 mcg IntraVENous Q6H PRN    vancomycin (VANCOCIN) 1,000 mg in 0.9% sodium chloride 250 mL (VIAL-MATE)  1,000 mg IntraVENous Q8H    levETIRAcetam (KEPPRA) tablet 1,500 mg  1,500 mg Oral BID    sodium chloride (AYR SALINE) 0.65 % nasal drops 2 Drop  2 Drop Left Nostril Q2H PRN    oxyCODONE IR (ROXICODONE) tablet 7.5 mg  7.5 mg Oral Q4H PRN    lidocaine 4 % patch 1 Patch  1 Patch TransDERmal Q24H    meropenem (MERREM) 500 mg in 0.9% sodium chloride (MBP/ADV) 50 mL MBP  0.5 g IntraVENous Q6H    Vancomycin - pharmacy to dose   Other Rx Dosing/Monitoring    naloxone (NARCAN) injection 0.1 mg  0.1 mg IntraVENous PRN    miconazole (MICOTIN) 2 % cream   Topical BID    traZODone (DESYREL) tablet 50 mg  50 mg Oral QHS PRN    [Held by provider] carvediloL (COREG) tablet 3.125 mg  3.125 mg Oral BID WITH MEALS    albuterol-ipratropium (DUO-NEB) 2.5 MG-0.5 MG/3 ML  3 mL Nebulization Q4H PRN    [Held by provider] spironolactone (ALDACTONE) tablet 12.5 mg  12.5 mg Oral DAILY    hydrALAZINE (APRESOLINE) 20 mg/mL injection 10 mg  10 mg IntraVENous Q6H PRN    sacubitriL-valsartan (ENTRESTO) 24-26 mg tablet 1 Tab  1 Tablet Oral Q12H    L.acidophilus-paracasei-S.thermophil-bifidobacter (RISAQUAD) 8 billion cell capsule  1 Capsule Nasogastric DAILY    heparin (porcine) injection 5,000 Units  5,000 Units SubCUTAneous Q8H    melatonin tablet 3 mg  3 mg Oral QHS    0.9% sodium chloride infusion 250 mL  250 mL IntraVENous PRN    balsam peru-castor oiL (VENELEX) ointment   Topical BID    sodium chloride (NS) flush 5-40 mL  5-40 mL IntraVENous Q8H    sodium chloride (NS) flush 5-40 mL  5-40 mL IntraVENous PRN    acetaminophen (TYLENOL) tablet 650 mg  650 mg Oral Q6H PRN    Or    acetaminophen (TYLENOL) suppository 650 mg  650 mg Rectal Q6H PRN    polyethylene glycol (MIRALAX) packet 17 g  17 g Oral DAILY PRN    ondansetron (ZOFRAN) injection 4 mg  4 mg IntraVENous Q6H PRN    glucose chewable tablet 16 g  4 Tablet Oral PRN    dextrose (D50W) injection syrg 12.5-25 g  25-50 mL IntraVENous PRN    glucagon (GLUCAGEN) injection 1 mg  1 mg IntraMUSCular PRN         Data Review:   Labs:    Recent Results (from the past 24 hour(s))   MAGNESIUM    Collection Time: 05/22/21  5:46 AM   Result Value Ref Range    Magnesium 1.8 1.6 - 2.4 mg/dL   PHOSPHORUS    Collection Time: 05/22/21  5:46 AM   Result Value Ref Range    Phosphorus 3.4 2.6 - 4.7 MG/DL   PROCALCITONIN    Collection Time: 05/22/21  5:46 AM   Result Value Ref Range    Procalcitonin <0.05 ng/mL   CBC WITH AUTOMATED DIFF    Collection Time: 05/22/21  5:46 AM Result Value Ref Range    WBC 6.9 4.1 - 11.1 K/uL    RBC 4.11 4.10 - 5.70 M/uL    HGB 11.2 (L) 12.1 - 17.0 g/dL    HCT 36.0 (L) 36.6 - 50.3 %    MCV 87.6 80.0 - 99.0 FL    MCH 27.3 26.0 - 34.0 PG    MCHC 31.1 30.0 - 36.5 g/dL    RDW 15.9 (H) 11.5 - 14.5 %    PLATELET 102 772 - 715 K/uL    MPV 10.4 8.9 - 12.9 FL    NRBC 0.0 0  WBC    ABSOLUTE NRBC 0.00 0.00 - 0.01 K/uL    NEUTROPHILS 48 32 - 75 %    LYMPHOCYTES 28 12 - 49 %    MONOCYTES 17 (H) 5 - 13 %    EOSINOPHILS 5 0 - 7 %    BASOPHILS 2 (H) 0 - 1 %    IMMATURE GRANULOCYTES 0 0.0 - 0.5 %    ABS. NEUTROPHILS 3.3 1.8 - 8.0 K/UL    ABS. LYMPHOCYTES 1.9 0.8 - 3.5 K/UL    ABS. MONOCYTES 1.2 (H) 0.0 - 1.0 K/UL    ABS. EOSINOPHILS 0.4 0.0 - 0.4 K/UL    ABS. BASOPHILS 0.1 0.0 - 0.1 K/UL    ABS. IMM.  GRANS. 0.0 0.00 - 0.04 K/UL    DF AUTOMATED

## 2021-05-22 NOTE — PROGRESS NOTES
Bedside and Verbal shift change report given to Shoshana Perry (oncoming nurse) by Herman Woods (offgoing nurse). Report included the following information SBAR, Kardex, ED Summary, Procedure Summary, Intake/Output, MAR, Recent Results and Cardiac Rhythm NSR.

## 2021-05-23 LAB
BASOPHILS # BLD: 0.1 K/UL (ref 0–0.1)
BASOPHILS NFR BLD: 1 % (ref 0–1)
DIFFERENTIAL METHOD BLD: ABNORMAL
EOSINOPHIL # BLD: 0.4 K/UL (ref 0–0.4)
EOSINOPHIL NFR BLD: 6 % (ref 0–7)
ERYTHROCYTE [DISTWIDTH] IN BLOOD BY AUTOMATED COUNT: 16.1 % (ref 11.5–14.5)
HCT VFR BLD AUTO: 33.5 % (ref 36.6–50.3)
HGB BLD-MCNC: 10.1 G/DL (ref 12.1–17)
IMM GRANULOCYTES # BLD AUTO: 0 K/UL (ref 0–0.04)
IMM GRANULOCYTES NFR BLD AUTO: 0 % (ref 0–0.5)
LYMPHOCYTES # BLD: 1.8 K/UL (ref 0.8–3.5)
LYMPHOCYTES NFR BLD: 25 % (ref 12–49)
MAGNESIUM SERPL-MCNC: 1.7 MG/DL (ref 1.6–2.4)
MCH RBC QN AUTO: 26.8 PG (ref 26–34)
MCHC RBC AUTO-ENTMCNC: 30.1 G/DL (ref 30–36.5)
MCV RBC AUTO: 88.9 FL (ref 80–99)
MONOCYTES # BLD: 1.1 K/UL (ref 0–1)
MONOCYTES NFR BLD: 14 % (ref 5–13)
NEUTS SEG # BLD: 3.9 K/UL (ref 1.8–8)
NEUTS SEG NFR BLD: 54 % (ref 32–75)
NRBC # BLD: 0 K/UL (ref 0–0.01)
NRBC BLD-RTO: 0 PER 100 WBC
PHOSPHATE SERPL-MCNC: 3.5 MG/DL (ref 2.6–4.7)
PLATELET # BLD AUTO: 367 K/UL (ref 150–400)
PMV BLD AUTO: 11.5 FL (ref 8.9–12.9)
PROCALCITONIN SERPL-MCNC: <0.05 NG/ML
RBC # BLD AUTO: 3.77 M/UL (ref 4.1–5.7)
WBC # BLD AUTO: 7.3 K/UL (ref 4.1–11.1)

## 2021-05-23 PROCEDURE — 83735 ASSAY OF MAGNESIUM: CPT

## 2021-05-23 PROCEDURE — 65660000000 HC RM CCU STEPDOWN

## 2021-05-23 PROCEDURE — 74011250637 HC RX REV CODE- 250/637: Performed by: STUDENT IN AN ORGANIZED HEALTH CARE EDUCATION/TRAINING PROGRAM

## 2021-05-23 PROCEDURE — 74011250637 HC RX REV CODE- 250/637: Performed by: INTERNAL MEDICINE

## 2021-05-23 PROCEDURE — 94760 N-INVAS EAR/PLS OXIMETRY 1: CPT

## 2021-05-23 PROCEDURE — 74011250636 HC RX REV CODE- 250/636: Performed by: INTERNAL MEDICINE

## 2021-05-23 PROCEDURE — 77010033711 HC HIGH FLOW OXYGEN

## 2021-05-23 PROCEDURE — 84145 PROCALCITONIN (PCT): CPT

## 2021-05-23 PROCEDURE — 84100 ASSAY OF PHOSPHORUS: CPT

## 2021-05-23 PROCEDURE — 74011250636 HC RX REV CODE- 250/636: Performed by: HOSPITALIST

## 2021-05-23 PROCEDURE — 36415 COLL VENOUS BLD VENIPUNCTURE: CPT

## 2021-05-23 PROCEDURE — 85025 COMPLETE CBC W/AUTO DIFF WBC: CPT

## 2021-05-23 PROCEDURE — 74011250637 HC RX REV CODE- 250/637: Performed by: HOSPITALIST

## 2021-05-23 RX ADMIN — LEVETIRACETAM 1500 MG: 500 TABLET ORAL at 17:14

## 2021-05-23 RX ADMIN — CASTOR OIL AND BALSAM, PERU: 788; 87 OINTMENT TOPICAL at 09:06

## 2021-05-23 RX ADMIN — Medication 10 ML: at 13:41

## 2021-05-23 RX ADMIN — HEPARIN SODIUM 5000 UNITS: 5000 INJECTION INTRAVENOUS; SUBCUTANEOUS at 04:42

## 2021-05-23 RX ADMIN — FENTANYL CITRATE 50 MCG: 50 INJECTION INTRAMUSCULAR; INTRAVENOUS at 09:06

## 2021-05-23 RX ADMIN — METOPROLOL SUCCINATE 12.5 MG: 25 TABLET, EXTENDED RELEASE ORAL at 09:06

## 2021-05-23 RX ADMIN — SACUBITRIL AND VALSARTAN 1 TABLET: 24; 26 TABLET, FILM COATED ORAL at 09:06

## 2021-05-23 RX ADMIN — IVABRADINE 5 MG: 5 TABLET, FILM COATED ORAL at 17:14

## 2021-05-23 RX ADMIN — MICONAZOLE NITRATE: 20 CREAM TOPICAL at 09:06

## 2021-05-23 RX ADMIN — Medication 10 ML: at 22:12

## 2021-05-23 RX ADMIN — HEPARIN SODIUM 5000 UNITS: 5000 INJECTION INTRAVENOUS; SUBCUTANEOUS at 13:41

## 2021-05-23 RX ADMIN — LEVETIRACETAM 1500 MG: 500 TABLET ORAL at 09:06

## 2021-05-23 RX ADMIN — Medication 10 ML: at 05:05

## 2021-05-23 RX ADMIN — VANCOMYCIN HYDROCHLORIDE 1000 MG: 1 INJECTION, POWDER, LYOPHILIZED, FOR SOLUTION INTRAVENOUS at 13:41

## 2021-05-23 RX ADMIN — VANCOMYCIN HYDROCHLORIDE 1000 MG: 1 INJECTION, POWDER, LYOPHILIZED, FOR SOLUTION INTRAVENOUS at 22:02

## 2021-05-23 RX ADMIN — MICONAZOLE NITRATE: 20 CREAM TOPICAL at 22:03

## 2021-05-23 RX ADMIN — Medication 3 MG: at 22:12

## 2021-05-23 RX ADMIN — FENTANYL CITRATE 50 MCG: 50 INJECTION INTRAMUSCULAR; INTRAVENOUS at 00:23

## 2021-05-23 RX ADMIN — HEPARIN SODIUM 5000 UNITS: 5000 INJECTION INTRAVENOUS; SUBCUTANEOUS at 22:02

## 2021-05-23 RX ADMIN — Medication 10 ML: at 17:49

## 2021-05-23 RX ADMIN — OXYCODONE HYDROCHLORIDE 7.5 MG: 5 TABLET ORAL at 13:41

## 2021-05-23 RX ADMIN — VANCOMYCIN HYDROCHLORIDE 1000 MG: 1 INJECTION, POWDER, LYOPHILIZED, FOR SOLUTION INTRAVENOUS at 04:42

## 2021-05-23 RX ADMIN — CASTOR OIL AND BALSAM, PERU: 788; 87 OINTMENT TOPICAL at 22:04

## 2021-05-23 RX ADMIN — SACUBITRIL AND VALSARTAN 1 TABLET: 24; 26 TABLET, FILM COATED ORAL at 22:04

## 2021-05-23 RX ADMIN — Medication 1 CAPSULE: at 09:06

## 2021-05-23 RX ADMIN — IVABRADINE 5 MG: 5 TABLET, FILM COATED ORAL at 09:06

## 2021-05-23 RX ADMIN — FENTANYL CITRATE 50 MCG: 50 INJECTION INTRAMUSCULAR; INTRAVENOUS at 17:46

## 2021-05-23 NOTE — PROGRESS NOTES
0616 Patient refused turning, hygenic care, dressing and linen change. Patient requested fentanyl. He was told it is not due at this time. Patient left resting in bed in no obvious distress. 0418 Bedside and Verbal shift change report given to Saloni Alonzo (oncoming nurse) by Nicole Fernandez (offgoing nurse). Report included the following information SBAR, Kardex, ED Summary, Procedure Summary, Intake/Output, MAR, Recent Results and Cardiac Rhythm NSR.

## 2021-05-23 NOTE — PROGRESS NOTES
TRANSFER - OUT REPORT:    Verbal report given to Steffanie Ayers RN(name) on Anival Modi  being transferred to  (unit) for routine progression of care       Report consisted of patients Situation, Background, Assessment and   Recommendations(SBAR). Information from the following report(s) SBAR, Intake/Output, MAR, Recent Results and Cardiac Rhythm SR was reviewed with the receiving nurse. Lines:   Peripheral IV 05/19/21 Right Arm (Active)   Site Assessment Clean, dry, & intact 05/23/21 0810   Phlebitis Assessment 0 05/23/21 0810   Infiltration Assessment 0 05/23/21 0810   Dressing Status Clean, dry, & intact 05/23/21 0810   Dressing Type Tape;Transparent 05/23/21 0810   Hub Color/Line Status Infusing 05/23/21 0810   Action Taken Open ports on tubing capped 05/23/21 0419   Alcohol Cap Used Yes 05/23/21 0810        Opportunity for questions and clarification was provided.       Patient transported with:   [facility]

## 2021-05-23 NOTE — PROGRESS NOTES
Cardiology Progress Note                                        Admit Date: 5/6/2021    Assessment/Plan:       1. Sp cardiac arrest post op 4/17/21,   cardiac arrest Vfib 5/6/21  ekg rbbb qt 460 msec. ICD rec for secondary prevention if life expectancy > 1yr.    2. Hx:  4/16/21 at [facility] bioprosthetic AVR and root abscess debridement. He also had a pericardiocentesis due to tamponade prior to surgery  3. Recent endocardititis 3/24/21 Staph  4. CM  Ef 25%   5.  right MCA infarct and several abscesses in the right temporal and parietal lobes. Infarcts were thought to be due to septic emboli. He had left sided residual weakness. cta 5/6/21:  multiple acute infarctions throughout the  right cerebral hemisphere, involving much of the occipital lobe, as well as much  of the frontal lobe. There is an additional superior right frontoparietal  infarct  6.  severe multilevel consolidation throughout both lungs consistent with severe multilobar PNA. 7. PNA:  Severe multilevel consolidation throughout both lungs, consistent with  severe multilobar pneumonia. Cont Entresto, holding tegan and BB; restart as able  NPO at [facility] on 5/25 for ICD  Echo showed mild improvement, LVEF 30-35%  Add dapagliflozin on dc as not on formulary     WEEKEND EVENTS:  BP still too soft to restart spironolactone. Started metoprolol succinate 12.5 mg daily instead of carvedilol. Continue other CV medications as tolerated.     Mariella Blas is a 22 y.o. male with     PROBLEM LIST:  Patient Active Problem List    Diagnosis Date Noted    Severe muscle deconditioning 05/01/2021    S/P AVR (aortic valve replacement) and aortoplasty 04/16/2021    Abscess of aortic root 04/16/2021    Contusion of chest wall 04/16/2021    Successful cardiopulmonary resuscitation 04/14/2021    Acute traumatic pain 04/14/2021    Postoperative acute respiratory failure ([facility] [address] 04/01/2021    Severe protein-calorie malnutrition ([facility] [address] 05/11/2021    Cardiac arrest with pulseless electrical activity ([facility] 05/06/2021    Cerebral abscess (embolic) 56/04/7968    Drug abuse, cocaine type ([facility] 03/29/2021    Endocarditis due to methicillin susceptible Staphylococcus aureus (MSSA) 03/25/2021    Type 2 myocardial infarction ([facility] 03/24/2021         Subjective:     Renée Carvajal reports unchanged chest discomfort. Says breathing is improved.       Visit Vitals  /68 (BP 1 Location: Right upper arm, BP Patient Position: At rest)   Pulse 90   Temp 98.3 °F (36.8 °C)   Resp 20   Ht 5' 11\" (1.803 m)   Wt 56.6 kg (124 lb 12.5 oz)   SpO2 97%   BMI 17.40 kg/m²       Intake/Output Summary (Last 24 hours) at 5/23/2021 1034  Last data filed at 5/22/2021 1551  Gross per 24 hour   Intake    Output 225 ml   Net -225 ml       Objective:      Physical Exam:  GEN: NAD, appears stated age  HEENT: EOMI, MMM, OP clear  NECK: Normal JVP, carotids 2+ b/l and symmetrical  CV: RRR, normal S1 and S2, no M/R/G  LUNGS: CTAB, no W/R/R  ABD: NABS, soft, NT/ND  EXT: No edema   PSYCH: Mood and affect normal  NEURO: AAO, LUE flaccid, face symmetrical, speech intact    Telemetry: normal sinus rhythm    Current Facility-Administered Medications   Medication Dose Route Frequency    metoprolol succinate (TOPROL-XL) XL tablet 12.5 mg  12.5 mg Oral DAILY    Vancomycin - Pharmacy Dosing   Other Rx Dosing/Monitoring    vancomycin (VANCOCIN) 1,000 mg in 0.9% sodium chloride 250 mL (VIAL-MATE)  1,000 mg IntraVENous Q8H    ivabradine (CORLANOR) tablet 5 mg  5 mg Oral BID WITH MEALS    fentaNYL citrate (PF) injection 50 mcg  50 mcg IntraVENous Q6H PRN    levETIRAcetam (KEPPRA) tablet 1,500 mg  1,500 mg Oral BID    sodium chloride (AYR SALINE) 0.65 % nasal drops 2 Drop  2 Drop Left Nostril Q2H PRN    oxyCODONE IR (ROXICODONE) tablet 7.5 mg  7.5 mg Oral Q4H PRN    lidocaine 4 % patch 1 Patch  1 Patch TransDERmal Q24H    naloxone (NARCAN) injection 0.1 mg  0.1 mg IntraVENous PRN    miconazole (MICOTIN) 2 % cream   Topical BID    traZODone (DESYREL) tablet 50 mg  50 mg Oral QHS PRN    albuterol-ipratropium (DUO-NEB) 2.5 MG-0.5 MG/3 ML  3 mL Nebulization Q4H PRN    [Held by provider] spironolactone (ALDACTONE) tablet 12.5 mg  12.5 mg Oral DAILY    hydrALAZINE (APRESOLINE) 20 mg/mL injection 10 mg  10 mg IntraVENous Q6H PRN    sacubitriL-valsartan (ENTRESTO) 24-26 mg tablet 1 Tab  1 Tablet Oral Q12H    L.acidophilus-paracasei-S.thermophil-bifidobacter (RISAQUAD) 8 billion cell capsule  1 Capsule Nasogastric DAILY    heparin (porcine) injection 5,000 Units  5,000 Units SubCUTAneous Q8H    melatonin tablet 3 mg  3 mg Oral QHS    0.9% sodium chloride infusion 250 mL  250 mL IntraVENous PRN    balsam peru-castor oiL (VENELEX) ointment   Topical BID    sodium chloride (NS) flush 5-40 mL  5-40 mL IntraVENous Q8H    sodium chloride (NS) flush 5-40 mL  5-40 mL IntraVENous PRN    acetaminophen (TYLENOL) tablet 650 mg  650 mg Oral Q6H PRN    Or    acetaminophen (TYLENOL) suppository 650 mg  650 mg Rectal Q6H PRN    polyethylene glycol (MIRALAX) packet 17 g  17 g Oral DAILY PRN    ondansetron (ZOFRAN) injection 4 mg  4 mg IntraVENous Q6H PRN    glucose chewable tablet 16 g  4 Tablet Oral PRN    dextrose (D50W) injection syrg 12.5-25 g  25-50 mL IntraVENous PRN    glucagon (GLUCAGEN) injection 1 mg  1 mg IntraMUSCular PRN         Data Review:   Labs:    Recent Results (from the past 24 hour(s))   EKG, 12 LEAD, INITIAL    Collection Time: 05/22/21  2:18 PM   Result Value Ref Range    Ventricular Rate 83 BPM    Atrial Rate 83 BPM    P-R Interval 174 ms    QRS Duration 136 ms    Q-T Interval 418 ms    QTC Calculation (Bezet) 491 ms    Calculated P Axis 48 degrees    Calculated R Axis 74 degrees    Calculated T Axis 95 degrees    Diagnosis       Normal sinus rhythm  Right bundle branch block  T wave abnormality, consider lateral ischemia  When compared with ECG of 06-MAY-2021 17:30,  QRS axis shifted left  T wave inversion more evident in Anterolateral leads  Confirmed by Valorie Mendez ([id number]) on 5/22/2021 3:14:37 PM     MAGNESIUM    Collection Time: 05/23/21  5:03 AM   Result Value Ref Range    Magnesium 1.7 1.6 - 2.4 mg/dL   PHOSPHORUS    Collection Time: 05/23/21  5:03 AM   Result Value Ref Range    Phosphorus 3.5 2.6 - 4.7 MG/DL   PROCALCITONIN    Collection Time: 05/23/21  5:03 AM   Result Value Ref Range    Procalcitonin <0.05 ng/mL   CBC WITH AUTOMATED DIFF    Collection Time: 05/23/21  5:03 AM   Result Value Ref Range    WBC 7.3 4.1 - 11.1 K/uL    RBC 3.77 (L) 4.10 - 5.70 M/uL    HGB 10.1 (L) 12.1 - 17.0 g/dL    HCT 33.5 (L) 36.6 - 50.3 %    MCV 88.9 80.0 - 99.0 FL    MCH 26.8 26.0 - 34.0 PG    MCHC 30.1 30.0 - 36.5 g/dL    RDW 16.1 (H) 11.5 - 14.5 %    PLATELET 147 431 - 018 K/uL    MPV 11.5 8.9 - 12.9 FL    NRBC 0.0 0  WBC    ABSOLUTE NRBC 0.00 0.00 - 0.01 K/uL    NEUTROPHILS 54 32 - 75 %    LYMPHOCYTES 25 12 - 49 %    MONOCYTES 14 (H) 5 - 13 %    EOSINOPHILS 6 0 - 7 %    BASOPHILS 1 0 - 1 %    IMMATURE GRANULOCYTES 0 0.0 - 0.5 %    ABS. NEUTROPHILS 3.9 1.8 - 8.0 K/UL    ABS. LYMPHOCYTES 1.8 0.8 - 3.5 K/UL    ABS. MONOCYTES 1.1 (H) 0.0 - 1.0 K/UL    ABS. EOSINOPHILS 0.4 0.0 - 0.4 K/UL    ABS. BASOPHILS 0.1 0.0 - 0.1 K/UL    ABS. IMM.  GRANS. 0.0 0.00 - 0.04 K/UL    DF AUTOMATED

## 2021-05-23 NOTE — PROGRESS NOTES
TRANSFER - IN REPORT:    Verbal report received from Yamini(name) on Amrit Zayas  being received from [facility](unit) for routine progression of care      Report consisted of patients Situation, Background, Assessment and   Recommendations(SBAR). Information from the following report(s) Kardex, Intake/Output, MAR and Recent Results was reviewed with the receiving nurse. Opportunity for questions and clarification was provided. Assessment completed upon patients arrival to unit and care assumed.

## 2021-05-23 NOTE — PROGRESS NOTES
.  [address] [facility] Adult  Hospitalist Group    PATIENT ID: Chantal Presley  MRN: [id number]   YOB: 1995    PRIMARY CARE PROVIDER: None   DATE OF ADMISSION: 5/6/2021  5:22 PM    ATTENDING PHYSICIAN: Rebecca Jolly MD  CONSULTATIONS:   IP CONSULT TO INTENSIVIST  IP CONSULT TO NEUROLOGY  IP CONSULT TO PODIATRY  IP CONSULT TO PODIATRY  IP CONSULT TO INFECTIOUS DISEASES  IP CONSULT TO NEUROLOGY  IP CONSULT TO OTOLARYNGOLOGY  IP CONSULT TO CARDIOLOGY    PROCEDURES/SURGERIES:   Procedure(s):  INSERT ICD DUAL    REASON FOR ADMISSION: Cardiac arrest with pulseless electrical activity [facility] PROBLEM LIST:  Patient Active Problem List   Diagnosis Code    Endocarditis due to methicillin susceptible Staphylococcus aureus (MSSA) I33.0, B95.61    Drug abuse, cocaine type ([facility] F14.10    Type 2 myocardial infarction ([facility] I21. 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 male with h/o Seizures and anxiety who presented to [facility] after a cardiac arrest at [facility] around 1500 5/6/2021. Hx from chart and speaking with patient's sister via phone. Patient had recent hospitalization at the [facility] in March of this year. Initial hospitalization admission was at [facility] on 3/24 with AMS in setting of polysubstance and IVD use (cocaine, heroine, methamphetamines). He was found to have septic MRSA bacteremia, MRSA endocarditis.  Imaging and MRI also found R PCA infarct and several abscesses and right temporal and parietal lobes. Infarcts thought to be due to septic emboli and patient had left sided residual weakness. Patient was transferred to [address] [place] [address] on 4/1/2021 for evaluation for valve surgery. After transfer he was found to have a New R MCA infarct Also had a type 2 NSTEMI and tamponade with pericardiocentesis done prior to surgery. Did have cardiac arrest on day of surgery. Had a bioprosthetic Aortic Valve Replacement and Aortic Root Abscess Debridement done on 4/16/2021. Trached and sent to [facility] on 4/29/2021. Was undergoing PT and vent wean. Sister stated that patient was able to be off the vent for several hours over the last few days. He was sitting up and able to talk with valve over trach and could follow commands. Stated that he had severe weakness of the left upper ext, but was starting to gain some mobility in the lower left ext. Patient's mother visited patient today prior to arrest. She said the staff told her that the patient was getting very anxious earlier in the day around 1 pm and had to be placed back on the ventilator, and then they had to sedate him while he was on the ventilator. The mother stated \" he did not look good\" and \" his eye were rolled back in his head\". About 30 minutes after she left she got call about arrest. Per ED note patient was found unresponsive around the 1500 hour and was pulseless. Two rounds of CPR and meds were given and patient was defibrillated x 1 before ROSC. Patient was hypoxic post arrest. Unknown down time prior to arrest.    Subjective/HPI    Pt seen and examined  Awake and alert  Having coughing spells      Assessment and Plan:  V Fib Cardiac arrest - 5/6/21 on admission:   .   Cardiologist following,   -per cardiologist : on [address] until 5/28  for endocarditis   -ICD on 5/25 at 7am, NPO at [facility]    Acute on chronic  respiratory failure:   Had trach last hospitalization [facility] and was [facility] weaning. He was on ventilator while in ICU and now transitioned to trach collar.  -currently on trach collar and PMV  - on RA this morning, doing well     MRSA aortic  Valve endocardititis   -Found to have MRSA bacteremia and MSSA endocarditis end of march. -Hx:  4/16/21 at [address] [facility] bioprosthetic AVR and root abscess debridement. He also had a pericardiocentesis due to tamponade prior to surgery  - Reviewed [facility] record, he was on IV vanco at [facility] and planned to  have a 6 weeks of   tx and EOT 5/28/21 per record. - Continue vancomycin      Pain management : contusion of chest wall, left side, chronic back pain . H/o drug abuse  Cautious with narcotics  On  fentanyl to 50mcg q6h prn     Bacterial pneumonia  severe multilevel consolidation throughout both lungs consistent with severe multilobar PNA. -sputum culture E coli with ESBL   -per ID -Plan  Continue meropenem with plan for 7 to 10-day course started 5/11--5/22, will dc and cont vanc for Endocarditis   -pulmonary toilet   -trach care   -ID following      # Systolic congestive heart failure/CM  Ef 25%   Hold coreg,entresto ,Aldactone, BP better today  Cardiologist following   Echo EF showed 25-30% on 5/17  Starting Toprol XL    H/o right MCA infarct and several brain abscesses in the right temporal and parietal lobes. Infarcts were thought to be due to septic emboli. He had left sided residual weakness. cta 5/6/21:  multiple acute infarctions throughout the  right cerebral hemisphere, involving much of the occipital lobe, as well as much  of the frontal lobe.  There is an additional superior right frontoparietal  Infarct  Neurologist consulted                -PT/OT/SLP e              - Stroke education              - SBP goal 100-160              Seizure DZ:  Continue keppra -1500 mg BID, off valproic acid as subtherapeutic due to meropenem interaction,so valproic acid discontinued  Seizure precautions       Severe muscle deconditioning  Severe protein-calorie malnutrition   Cerebral abscess (embolic)   H/o substance abuse      fulll code     Awaiting discharge to [facility] after ICD placement        PHYSICAL EXAMINATION:  Visit Vitals  BP (!) 113/56 (BP 1 Location: Right upper arm, BP Patient Position: At rest)   Pulse 94   Temp 96.9 °F (36.1 °C)   Resp 15   Ht 5' 11\" (1.803 m)   Wt 56.6 kg (124 lb 12.5 oz)   SpO2 97%   BMI 17.40 kg/m²       General:          chronically ill,   HEENT:           Atraumatic,trach capped        Neck:               Supple,   Lungs: No wheezing. Midline sternal surgical scar,minimal crackles. Heart:              Regular  rhythm,normal rate   No edema  Abdomen:       Soft, non-tender. Not distended. Bowel sounds normal  Extremities:     No LE edema  Skin:                Not pale. Not Jaundiced  No rashes         Neurologic:      Alert, oriented X 3, unable to move LUE , able to bend LLE at knee, moves RUE and RLE     Labs:     Recent Labs     05/23/21  0503 05/22/21  0546   WBC 7.3 6.9   HGB 10.1* 11.2*   HCT 33.5* 36.0*    373     Recent Labs     05/23/21  0503 05/22/21  0546   MG 1.7 1.8   PHOS 3.5 3.4     No results for input(s): ALT, AP, TBIL, TBILI, TP, ALB, GLOB, GGT, AML, LPSE in the last 72 hours. No lab exists for component: SGOT, GPT, AMYP, HLPSE  No results for input(s): INR, PTP, APTT, INREXT, INREXT in the last 72 hours. No results for input(s): FE, TIBC, PSAT, FERR in the last 72 hours. No results found for: FOL, RBCF   No results for input(s): PH, PCO2, PO2 in the last 72 hours. No results for input(s): CPK, CKNDX, TROIQ in the last 72 hours.     No lab exists for component: CPKMB  Lab Results   Component Value Date/Time    Cholesterol, total 140 05/11/2021 04:35 AM    HDL Cholesterol 21 05/11/2021 04:35 AM    LDL, calculated 77.4 05/11/2021 04:35 AM    Triglyceride 208 (H) 05/11/2021 04:35 AM    CHOL/HDL Ratio 6.7 (H) 05/11/2021 04:35 AM     Lab Results   Component Value Date/Time Glucose (POC) 120 (H) 05/11/2021 12:00 PM    Glucose (POC) 100 05/11/2021 06:04 AM    Glucose (POC) 87 05/11/2021 12:19 AM    Glucose (POC) 83 05/10/2021 06:53 PM    Glucose (POC) 84 05/10/2021 02:07 PM     Lab Results   Component Value Date/Time    Color YELLOW/STRAW 05/06/2021 05:42 PM    Appearance CLEAR 05/06/2021 05:42 PM    Specific gravity 1.008 05/06/2021 05:42 PM    Specific gravity 1.020 03/24/2021 09:30 PM    pH (UA) 5.0 05/06/2021 05:42 PM    Protein 30 (A) 05/06/2021 05:42 PM    Glucose Negative 05/06/2021 05:42 PM    Ketone Negative 05/06/2021 05:42 PM    Bilirubin Negative 05/06/2021 05:42 PM    Urobilinogen 0.2 05/06/2021 05:42 PM    Nitrites Negative 05/06/2021 05:42 PM    Leukocyte Esterase Negative 05/06/2021 05:42 PM    Epithelial cells FEW 05/06/2021 05:42 PM    Bacteria Negative 05/06/2021 05:42 PM    WBC 0-4 05/06/2021 05:42 PM    RBC 0-5 05/06/2021 05:42 PM         CODE STATUS:  x Full Code    DNR    Partial    Comfort Care       Signed:   Mathew Ritchie MD

## 2021-05-23 NOTE — PROGRESS NOTES
05/22/21 2136   Airway - Tracheostomy Tube 05/07/21 Shiley   Placement Date/Time: 05/07/21 0000   Present on Admission/Arrival: Yes  Airway Types: Tracheostomy  Trach Tube Type: Shiley  Airway Tube Size: 8 mm   Inner Cannula #8 Foster; Cuffed, cuff deflated  (Percutaneous)     Patient still has cuffed trach. Patient has been tolerating PMV all day. Please consider changing & downsizing his trach.     Thank you  RT

## 2021-05-23 NOTE — PROGRESS NOTES
Problem: Falls - Risk of  Goal: *Absence of Falls  Description: Document Loly Torres Fall Risk and appropriate interventions in the flowsheet. Outcome: Progressing Towards Goal  Note: Fall Risk Interventions:       Mentation Interventions: Adequate sleep, hydration, pain control    Medication Interventions: Evaluate medications/consider consulting pharmacy    Elimination Interventions: Patient to call for help with toileting needs, Elevated toilet seat              Problem: Risk for Spread of Infection  Goal: Prevent transmission of infectious organism to others  Description: Prevent the transmission of infectious organisms to other patients, staff members, and visitors. Outcome: Not Progressing Towards Goal     Problem: Breathing Pattern - Ineffective  Goal: *Absence of hypoxia  Outcome: Progressing Towards Goal     Problem: Pressure Injury - Risk of  Goal: *Prevention of pressure injury  Description: Document Salvador Scale and appropriate interventions in the flowsheet.   Outcome: Progressing Towards Goal  Note: Pressure Injury Interventions:  Sensory Interventions: Assess need for specialty bed    Moisture Interventions: Absorbent underpads, Apply protective barrier, creams and emollients    Activity Interventions: Assess need for specialty bed    Mobility Interventions: Float heels, HOB 30 degrees or less, Pressure redistribution bed/mattress (bed type)    Nutrition Interventions: Document food/fluid/supplement intake    Friction and Shear Interventions: Apply protective barrier, creams and emollients, Feet elevated on foot rest                Problem: Patient Education: Go to Patient Education Activity  Goal: Patient/Family Education  Outcome: Progressing Towards Goal     Problem: Patient Education: Go to Patient Education Activity  Goal: Patient/Family Education  Outcome: Not Progressing Towards Goal     Problem: Nutrition Deficit  Goal: *Optimize nutritional status  Outcome: Progressing Towards Goal     Problem: Airway Clearance - Ineffective  Goal: *Patent airway  Outcome: Progressing Towards Goal  Goal: *Absence of airway secretions  Outcome: Progressing Towards Goal  Goal: *Able to cough effectively  Outcome: Progressing Towards Goal     Problem: Pain  Goal: *Control of Pain  Outcome: Progressing Towards Goal     Problem: General Wound Care  Goal: *Non-infected wound: Improvement of existing wound, absence of infection, and maintenance of skin integrity  Outcome: Progressing Towards Goal     Problem: Anxiety  Goal: *Alleviation of anxiety  Outcome: Progressing Towards Goal  Goal: *Alleviation of anxiety (Palliative Care)  Outcome: Progressing Towards Goal

## 2021-05-24 LAB
BASOPHILS # BLD: 0.1 K/UL (ref 0–0.1)
BASOPHILS NFR BLD: 1 % (ref 0–1)
DIFFERENTIAL METHOD BLD: ABNORMAL
EOSINOPHIL # BLD: 0.4 K/UL (ref 0–0.4)
EOSINOPHIL NFR BLD: 4 % (ref 0–7)
ERYTHROCYTE [DISTWIDTH] IN BLOOD BY AUTOMATED COUNT: 16 % (ref 11.5–14.5)
HCT VFR BLD AUTO: 32.3 % (ref 36.6–50.3)
HGB BLD-MCNC: 10 G/DL (ref 12.1–17)
IMM GRANULOCYTES # BLD AUTO: 0 K/UL (ref 0–0.04)
IMM GRANULOCYTES NFR BLD AUTO: 0 % (ref 0–0.5)
LYMPHOCYTES # BLD: 2.5 K/UL (ref 0.8–3.5)
LYMPHOCYTES NFR BLD: 27 % (ref 12–49)
MCH RBC QN AUTO: 26.7 PG (ref 26–34)
MCHC RBC AUTO-ENTMCNC: 31 G/DL (ref 30–36.5)
MCV RBC AUTO: 86.1 FL (ref 80–99)
MONOCYTES # BLD: 1.3 K/UL (ref 0–1)
MONOCYTES NFR BLD: 13 % (ref 5–13)
NEUTS SEG # BLD: 5.3 K/UL (ref 1.8–8)
NEUTS SEG NFR BLD: 55 % (ref 32–75)
NRBC # BLD: 0 K/UL (ref 0–0.01)
NRBC BLD-RTO: 0 PER 100 WBC
PLATELET # BLD AUTO: 353 K/UL (ref 150–400)
PMV BLD AUTO: 10.5 FL (ref 8.9–12.9)
RBC # BLD AUTO: 3.75 M/UL (ref 4.1–5.7)
WBC # BLD AUTO: 9.6 K/UL (ref 4.1–11.1)

## 2021-05-24 PROCEDURE — 84100 ASSAY OF PHOSPHORUS: CPT

## 2021-05-24 PROCEDURE — 74011250637 HC RX REV CODE- 250/637: Performed by: INTERNAL MEDICINE

## 2021-05-24 PROCEDURE — 84145 PROCALCITONIN (PCT): CPT

## 2021-05-24 PROCEDURE — 74011250636 HC RX REV CODE- 250/636: Performed by: HOSPITALIST

## 2021-05-24 PROCEDURE — 85025 COMPLETE CBC W/AUTO DIFF WBC: CPT

## 2021-05-24 PROCEDURE — 97112 NEUROMUSCULAR REEDUCATION: CPT

## 2021-05-24 PROCEDURE — 80048 BASIC METABOLIC PNL TOTAL CA: CPT

## 2021-05-24 PROCEDURE — 97530 THERAPEUTIC ACTIVITIES: CPT

## 2021-05-24 PROCEDURE — 83735 ASSAY OF MAGNESIUM: CPT

## 2021-05-24 PROCEDURE — 65660000000 HC RM CCU STEPDOWN

## 2021-05-24 PROCEDURE — 80202 ASSAY OF VANCOMYCIN: CPT

## 2021-05-24 PROCEDURE — 97535 SELF CARE MNGMENT TRAINING: CPT

## 2021-05-24 PROCEDURE — 74011250636 HC RX REV CODE- 250/636: Performed by: INTERNAL MEDICINE

## 2021-05-24 PROCEDURE — 36415 COLL VENOUS BLD VENIPUNCTURE: CPT

## 2021-05-24 PROCEDURE — 74011250637 HC RX REV CODE- 250/637: Performed by: HOSPITALIST

## 2021-05-24 RX ADMIN — LEVETIRACETAM 1500 MG: 500 TABLET ORAL at 18:21

## 2021-05-24 RX ADMIN — HEPARIN SODIUM 5000 UNITS: 5000 INJECTION INTRAVENOUS; SUBCUTANEOUS at 13:17

## 2021-05-24 RX ADMIN — Medication 10 ML: at 14:00

## 2021-05-24 RX ADMIN — HEPARIN SODIUM 5000 UNITS: 5000 INJECTION INTRAVENOUS; SUBCUTANEOUS at 21:51

## 2021-05-24 RX ADMIN — SACUBITRIL AND VALSARTAN 1 TABLET: 24; 26 TABLET, FILM COATED ORAL at 21:51

## 2021-05-24 RX ADMIN — Medication 10 ML: at 05:55

## 2021-05-24 RX ADMIN — FENTANYL CITRATE 50 MCG: 50 INJECTION INTRAMUSCULAR; INTRAVENOUS at 13:18

## 2021-05-24 RX ADMIN — FENTANYL CITRATE 50 MCG: 50 INJECTION INTRAMUSCULAR; INTRAVENOUS at 07:01

## 2021-05-24 RX ADMIN — LEVETIRACETAM 1500 MG: 500 TABLET ORAL at 09:19

## 2021-05-24 RX ADMIN — SACUBITRIL AND VALSARTAN 1 TABLET: 24; 26 TABLET, FILM COATED ORAL at 09:19

## 2021-05-24 RX ADMIN — Medication 10 ML: at 21:52

## 2021-05-24 RX ADMIN — IVABRADINE 5 MG: 5 TABLET, FILM COATED ORAL at 18:20

## 2021-05-24 RX ADMIN — CASTOR OIL AND BALSAM, PERU: 788; 87 OINTMENT TOPICAL at 09:22

## 2021-05-24 RX ADMIN — VANCOMYCIN HYDROCHLORIDE 1000 MG: 1 INJECTION, POWDER, LYOPHILIZED, FOR SOLUTION INTRAVENOUS at 05:54

## 2021-05-24 RX ADMIN — Medication 1 CAPSULE: at 09:19

## 2021-05-24 RX ADMIN — CASTOR OIL AND BALSAM, PERU: 788; 87 OINTMENT TOPICAL at 18:22

## 2021-05-24 RX ADMIN — FENTANYL CITRATE 50 MCG: 50 INJECTION INTRAMUSCULAR; INTRAVENOUS at 00:34

## 2021-05-24 RX ADMIN — IVABRADINE 5 MG: 5 TABLET, FILM COATED ORAL at 09:25

## 2021-05-24 RX ADMIN — VANCOMYCIN HYDROCHLORIDE 1000 MG: 1 INJECTION, POWDER, LYOPHILIZED, FOR SOLUTION INTRAVENOUS at 23:17

## 2021-05-24 RX ADMIN — METOPROLOL SUCCINATE 12.5 MG: 25 TABLET, EXTENDED RELEASE ORAL at 09:19

## 2021-05-24 RX ADMIN — MICONAZOLE NITRATE: 20 CREAM TOPICAL at 09:20

## 2021-05-24 RX ADMIN — Medication 3 MG: at 21:51

## 2021-05-24 RX ADMIN — FENTANYL CITRATE 50 MCG: 50 INJECTION INTRAMUSCULAR; INTRAVENOUS at 20:21

## 2021-05-24 RX ADMIN — MICONAZOLE NITRATE: 20 CREAM TOPICAL at 18:22

## 2021-05-24 RX ADMIN — HEPARIN SODIUM 5000 UNITS: 5000 INJECTION INTRAVENOUS; SUBCUTANEOUS at 05:55

## 2021-05-24 NOTE — PROGRESS NOTES
Problem: Self Care Deficits Care Plan (Adult)  Goal: *Acute Goals and Plan of Care (Insert Text)  Description:   FUNCTIONAL STATUS PRIOR TO ADMISSION: patient was IND prior to recent hospital admissions and has had a complicated medical course including NG tube and trach. Patient recently at [facility] for vent weaning. HOME SUPPORT: The patient lived alone with parents in area to provide assistance. Occupational Therapy Goals  Initiated 5/11/2021, Reviewed 5/18/21, continue all goals  1. Patient will perform 2 simple grooming tasks in supported sitting with minimal assistance/contact guard assist within 7 day(s). 2.  Patient will perform anterior neck to thigh bathing in supported sitting with moderate assistance within 7 day(s). 3.  Patient will tolerate sitting EOB in prep for ADLs with max A within 7 days. 4.  Patient will track to L of midline during ADLs/therapeutic activities with moderate cues within 7 days. 5.  Patient will participate in upper extremity therapeutic exercise/activities with moderate assistance  for 5 minutes within 7 day(s). Outcome: Progressing Towards Goal   OCCUPATIONAL THERAPY TREATMENT  Patient: Arden Mirza (89 y.o. male)  Date: 5/24/2021  Diagnosis: Cardiac arrest [facility]) [I46.9] Cardiac arrest with pulseless electrical activity ([facility]  Procedure(s) (LRB):  INSERT ICD DUAL (N/A)    Precautions: Fall, Skin  Chart, occupational therapy assessment, plan of care, and goals were reviewed. ASSESSMENT  Patient continues with skilled OT services and is progressing towards goals. Patient participated in self care including toilet transfer off of [facility], toileting, AAROM of left UE with facilitation and gravity eliminated. Participation impacted by impaired cognition (memory, attention to task, initiation, orientation), impaired seated and standing balance, impaired strength and endurance for functional activity, impaired left UE and LE AROM, and impaired reach to LEs. Patient received seated on BSC with PCT. Patient participated in toileting hygiene in sitting and donning of underwear with total assist. Patient demonstrates active mobility of left UE at 2(-) to 2/5. Current Level of Function Impacting Discharge (ADLs): Self care with Mod to total assistance, functional tranfers with max assist of 1    Other factors to consider for discharge: medical complexity         PLAN :  Patient continues to benefit from skilled intervention to address the above impairments. Continue treatment per established plan of care to address goals. Recommend with staff: Francheska Barone on chair versus bed in chair position for meals and self care, BSC for bowel management with max of 1 to mod of 2    Recommend next OT session: POC    Recommendation for discharge: (in order for the patient to meet his/her long term goals)  Therapy 3 hours per day 5-7 days per week    This discharge recommendation:  Has been made in collaboration with the attending provider and/or case management    IF patient discharges home will need the following DME: bedside commode and hospital bed       SUBJECTIVE:   Patient stated I don't know.  asked what month it is    OBJECTIVE DATA SUMMARY:   Cognitive/Behavioral Status:  Neurologic State: Alert  Orientation Level: Oriented to person;Oriented to place;Oriented to situation;Disoriented to time  Cognition: Decreased attention/concentration;Decreased command following; Impulsive;Memory loss  Perception: Cues to maintain midline in standing; Tactile;Verbal  Perseveration: No perseveration noted  Safety/Judgement: Awareness of environment    Functional Mobility and Transfers for ADLs:  Bed Mobility:  Rolling: Moderate assistance;Assist x1;Additional time; Adaptive equipment  Supine to Sit: Moderate assistance;Assist x1;Additional time; Adaptive equipment  Sit to Supine: Moderate assistance;Assist x1;Additional time    Transfers:  Sit to Stand:  Moderate assistance;Assist x1;Additional time;Maximum assistance  Functional Transfers  Toilet Transfer : Maximum assistance;Assist x1;Additional time  Cues: Physical assistance; Tactile cues provided;Verbal cues provided  Adaptive Equipment: Bedside commode       Balance:  Sitting: Impaired; Without support  Sitting - Static: Good (unsupported)  Sitting - Dynamic: Fair (occasional)  Standing: Impaired; With support  Standing - Static: Fair;Constant support  Standing - Dynamic : Poor;Constant support    ADL Intervention:  Feeding  Drink to Mouth: Modified independent         Upper Body Bathing  Bathing Assistance: Moderate assistance (in simulation)  Position Performed: Long sitting on bed  Cues: Physical assistance; Tactile cues provided;Verbal cues provided;Visual cues provided    Lower Body Bathing  Lower Body : Moderate assistance (inferred from mobility and assist for completion for hygiene)  Position Performed: Long sitting on bed  Cues: Physical assistance; Tactile cues provided;Verbal cues provided;Visual cues provided         Lower Body Dressing Assistance  Socks: Moderate assistance  Position Performed: Long sitting on bed  Cues: Physical assistance  Underwear: Total assistance  Toileting  Bowel Hygiene: Minimum assistance (seated)  Clothing Management: Total assistance (dependent) (gown and underwear)  Cues: Physical assistance for pants down;Physical assistance for pants up; Tactile cues provided;Verbal cues provided;Visual cues provided    Cognitive Retraining  Orientation Retraining: Reorienting;Time  Problem Solving: General alternative solution  Executive Functions: Executing cognitive plans  Organizing/Sequencing: Breaking task down;Prioritizing  Attention to Task: Distractibility  Maintains Attention For (Time): 30 seconds  Following Commands:  Follows multi-step simple commands/directions  Safety/Judgement: Awareness of environment  Cues: Tactile cues provided;Verbal cues provided;Visual cues provided    Therapeutic Exercises:   AAROM of left UE with gravity eliminated and facilitation of movement      Activity Tolerance:   Fair    After treatment patient left in no apparent distress:   Supine in bed, Heels elevated for pressure relief, Call bell within reach, and Side rails x 3    COMMUNICATION/COLLABORATION:   The patients plan of care was discussed with: Registered nurse.      ANTHONY Catherine  Time Calculation: 37 mins

## 2021-05-24 NOTE — PROGRESS NOTES
Problem: Falls - Risk of  Goal: *Absence of Falls  Description: Document Robles Maple Fall Risk and appropriate interventions in the flowsheet.   Outcome: Progressing Towards Goal  Note: Fall Risk Interventions:       Mentation Interventions: Adequate sleep, hydration, pain control    Medication Interventions: Evaluate medications/consider consulting pharmacy    Elimination Interventions: Call light in reach

## 2021-05-24 NOTE — PROGRESS NOTES
Problem: Mobility Impaired (Adult and Pediatric)  Goal: *Acute Goals and Plan of Care (Insert Text)  Description: FUNCTIONAL STATUS PRIOR TO ADMISSION: Patient was independent prior to March 2021. Pt has been hospitalized and recently at [facility]. Per mother, pt was ambulating with therapy at [facility]. HOME SUPPORT PRIOR TO ADMISSION: The patient lived alone prior to hospitalization. Physical Therapy Goals  Revised 5/17/2021; goals revised as appropriate 5/24/21  1. Patient will move from supine to sit and sit to supine , scoot up and down, and roll side to side in bed with minimal assistance/contact guard assist within 7 day(s). 2.  Patient will tolerate sitting EOB with contact guard assistance for approx 5 minutes within 7 day(s). 3.  Patient will transfer from bed to chair and chair to bed with moderate assistance using the least restrictive device within 7 day(s). 4.  Patient will perform sit to stand with moderate assistance within 7 day(s). 5.  Patient will ambulate with maximal assistance for 5 feet with the least restrictive device within 7 day(s). Initiated 5/9/2021  1. Patient will move from supine to sit and sit to supine , scoot up and down, and roll side to side in bed with moderate assistance  within 7 day(s). GOAL MET 5/17/21  2. Patient will tolerate sitting EOB with moderate assistance for approx 3-5 minutes within 7 day(s). GOAL MET 5/17/21  3. Patient will transfer from bed to chair and chair to bed with maximal assistance using the least restrictive device within 7 day(s). 4.  Patient will perform sit to stand with maximal assistance within 7 day(s). 5.  Patient will ambulate with maximal assistance for 5 feet with the least restrictive device within 7 day(s).          Outcome: Progressing Towards Goal   PHYSICAL THERAPY TREATMENT: WEEKLY REASSESSMENT  Patient: Faiza Corea (89 y.o. male)  Date: 5/24/2021  Primary Diagnosis: Cardiac arrest ([facility] [address] [I46.9]  Procedure(s) (LRB):  INSERT ICD DUAL (N/A)     Precautions:   Fall, Skin      ASSESSMENT  Patient continues with skilled PT services and is progressing towards goals. Patient demonstrates improvements with activity tolerance and able to perform standing trials. Pt able to tolerate upright position for approx 5 minutes and standing position for 10 seconds prior to knees buckling. Pt continues to present below baseline for functional mobility and would benefit from rehab to continue progress with functional independence. Will continue to progress standing tolerance next session. Patient's progression toward goals since last assessment: standing mod A x 2    Current Level of Function Impacting Discharge (mobility/balance): 2 assist, mod A for bed mobility    Functional Outcome Measure: The patient scored 20/100 on the Barthel Index outcome measure which is indicative of patient is 80% dependent on others for functional mobility. Other factors to consider for discharge: fall risk, 2 assist         PLAN :  Goals have been updated based on progression since last assessment. Patient continues to benefit from skilled intervention to address the above impairments. Recommendations and Planned Interventions: bed mobility training, transfer training, gait training, therapeutic exercises, neuromuscular re-education, patient and family training/education and therapeutic activities      Frequency/Duration: Patient will be followed by physical therapy:  5 times a week to address goals.     Recommendation for discharge: (in order for the patient to meet his/her long term goals)  Therapy 3 hours per day 5-7 days per week  If pt were to d/c home, would benefit from HHPT and require assistance/supervision for  2 assist for all mobility as pt is a fall risk    This discharge recommendation:  Has been made in collaboration with the attending provider and/or case management    IF patient discharges home will need the following DME: hospital bed and wheelchair         SUBJECTIVE:   Patient stated My wife brought them.  pt referring to pillows    OBJECTIVE DATA SUMMARY:   HISTORY:    Past Medical History:   Diagnosis Date    Abscess of aortic root 4/16/2021    Acute traumatic pain 4/14/2021    Anxiety     Postoperative acute respiratory failure ([address] 4/1/2021    S/P AVR (aortic valve replacement) and aortoplasty 4/16/2021    Seizure ([address]     Severe muscle deconditioning 5/1/2021    Successful cardiopulmonary resuscitation 4/14/2021     Past Surgical History:   Procedure Laterality Date    HX TONSILLECTOMY         Personal factors and/or comorbidities impacting plan of care: [address] Environment: [address] [place] [address] Name: Mona Becker  One/Two Story Residence: One story  Living Alone: No  Support Systems: Parent, Skilled nursing facility  Patient Expects to be Discharged to[de-identified] Skilled nursing facility  Current DME Used/Available at Home: None    EXAMINATION/PRESENTATION/DECISION MAKING:   Critical Behavior:  Neurologic State: Alert  Orientation Level: Oriented X4  Cognition: Follows commands  Safety/Judgement: Awareness of environment  Hearing: Auditory  Auditory Impairment: None  Skin:  intact  Edema: none noted  Range Of Motion:  AROM: Generally decreased, functional           PROM: Generally decreased, functional           Strength:    Strength: Generally decreased, functional                    Tone & Sensation:   Tone: Abnormal              Sensation: Impaired               Coordination:  Coordination: Generally decreased, functional  Vision:      Functional Mobility:  Bed Mobility:     Supine to Sit: Moderate assistance;Assist x2  Sit to Supine: Minimum assistance     Transfers:  Sit to Stand: Moderate assistance;Assist x2  Stand to Sit: Moderate assistance;Assist x2                       Balance:   Sitting: Impaired; Without support  Sitting - Static: Good (unsupported)  Sitting - Dynamic: Fair (occasional)  Standing: Impaired; With support  Standing - Static: Fair;Constant support  Standing - Dynamic : Poor;Constant support    Functional Measure:  Barthel Index:    Bathin  Bladder: 5  Bowels: 5  Groomin  Dressin  Feedin  Mobility: 0  Stairs: 0  Toilet Use: 0  Transfer (Bed to Chair and Back): 5  Total: 20/100       The Barthel ADL Index: Guidelines  1. The index should be used as a record of what a patient does, not as a record of what a patient could do. 2. The main aim is to establish degree of independence from any help, physical or verbal, however minor and for whatever reason. 3. The need for supervision renders the patient not independent. 4. A patient's performance should be established using the best available evidence. Asking the patient, friends/relatives and nurses are the usual sources, but direct observation and common sense are also important. However direct testing is not needed. 5. Usually the patient's performance over the preceding 24-48 hours is important, but occasionally longer periods will be relevant. 6. Middle categories imply that the patient supplies over 50 per cent of the effort. 7. Use of aids to be independent is allowed. Gerald Jimenez., Barthel, D.W. ([id number]). Functional evaluation: the Barthel Index. [facility] (14)2. CHIQUIS Rob, Jenifer Sweet., Kemi Ochoa., [place], [address] [place] [address] (). Measuring the change indisability after inpatient rehabilitation; comparison of the responsiveness of the Barthel Index and Functional Grady Measure. Journal of Neurology, Neurosurgery, and Psychiatry, 66(4), 909-010. Adolfo Mcgill, N.J.A, ALLIE Foley, & Brock Smiley MMariettaA. (2004.) Assessment of post-stroke quality of life in cost-effectiveness studies: The usefulness of the Barthel Index and the EuroQoL-5D.  Quality of Life Research, 13, 272-85       Activity Tolerance:   Fair and requires rest breaks    After treatment patient left in no apparent distress:   Supine in bed, Call bell within reach and Side rails x 3    COMMUNICATION/EDUCATION:   The patients plan of care was discussed with: Registered nurse and Case management. Fall prevention education was provided and the patient/caregiver indicated understanding., Patient/family have participated as able in goal setting and plan of care. and Patient/family agree to work toward stated goals and plan of care.     Thank you for this referral.  Marci Coreas, PT, DPT   Time Calculation: 17 mins

## 2021-05-24 NOTE — PROGRESS NOTES
EDGAR:  1. RUR-27%  2. Admitted from [facility], his family would not like for him to return back to [facility]. [facility] referral pending. 3. Per MD, patient will have ICD tomorrow. 4. Transport- TBD. CM noted of previous cm notes, and referrals were sent to [facility], and [facility] and [facility]. CM contacted Yamini with [facility] and left a voicemail, [phone]. CM spoke with Kelsey Weaver at [facility] and [facility], [phone] they are unable to accept due to past medical history. CM also noted that [facility] does not participate with his insurance. A referral was sent in [employer] to [facility], they are not able to accept due to no Medicaid beds at this time. (CM confirmed with their admissions coordinator, [phone]). Cm will contact his mother, Wan Day [phone] to let her know the above information. If [facility] is not able to accept, then the plan will be for him to go back to [facility] which is where he was prior to this admission. 1155- CM spoke with his mother, Kenny Dumont who agreed to the above information but would like for cm to send referral to [address] [place] [address]. CM spoke with attending MD he is agreeable with the referral.      1402- JANNET spoke with Ary Marsh at [address] [place] [address], [phone]. She spoke with patient and his mother who both are in agreement to [address] [place] [address]. PT/OT notes pending for today, and he also will need insurance auth.    Jasmyn Moise, [facility]

## 2021-05-24 NOTE — PROGRESS NOTES
Bedside and Verbal shift change report given to Patty Heimlich RN (oncoming nurse) by Bekah Mann (offgoing nurse). Report included the following information SBAR, Kardex and MAR.

## 2021-05-24 NOTE — PROGRESS NOTES
.  [address] [facility] Adult  Hospitalist Group    PATIENT ID: Jeevan Tenorio  MRN: [id number]   YOB: 1995    PRIMARY CARE PROVIDER: None   DATE OF ADMISSION: 5/6/2021  5:22 PM    ATTENDING PHYSICIAN: Jerardo Triplett MD  CONSULTATIONS:   IP CONSULT TO INTENSIVIST  IP CONSULT TO NEUROLOGY  IP CONSULT TO PODIATRY  IP CONSULT TO PODIATRY  IP CONSULT TO INFECTIOUS DISEASES  IP CONSULT TO NEUROLOGY  IP CONSULT TO OTOLARYNGOLOGY  IP CONSULT TO CARDIOLOGY  IP CONSULT TO PULMONOLOGY    PROCEDURES/SURGERIES:   Procedure(s):  INSERT ICD DUAL    REASON FOR ADMISSION: Cardiac arrest with pulseless electrical activity [facility] PROBLEM LIST:  Patient Active Problem List   Diagnosis Code    Endocarditis due to methicillin susceptible Staphylococcus aureus (MSSA) I33.0, B95.61    Drug abuse, cocaine type ([facility] F14.10    Type 2 myocardial infarction ([facility] [address] I21. A1    Cerebral abscess (embolic) F35.7    Cardiac arrest with pulseless electrical activity (HCC) I46.9    Severe protein-calorie malnutrition (HCC) E43    Postoperative acute respiratory failure (HCC) J95.821    Severe muscle deconditioning R29.898    Successful cardiopulmonary resuscitation Z92.89    Acute traumatic pain G89.11    S/P AVR (aortic valve replacement) and aortoplasty Z95.2    Abscess of aortic root I51.89    Contusion of chest wall S20.219A         Brief HPI and Hospital Course:      Jeevan Tenorio is a 22 y.o. male with h/o Seizures and anxiety who presented to [facility] after a cardiac arrest at [facility] around 1500 5/6/2021. Hx from chart and speaking with patient's sister via phone. Patient had recent hospitalization at the [facility] in March of this year. Initial hospitalization admission was at [facility] on 3/24 with AMS in setting of polysubstance and IVD use (cocaine, heroine, methamphetamines). He was found to have septic MRSA bacteremia, MRSA endocarditis.  Imaging and MRI also found R PCA infarct and several abscesses and right temporal and parietal lobes. Infarcts thought to be due to septic emboli and patient had left sided residual weakness. Patient was transferred to [facility] on 4/1/2021 for evaluation for valve surgery. After transfer he was found to have a New R MCA infarct Also had a type 2 NSTEMI and tamponade with pericardiocentesis done prior to surgery. Did have cardiac arrest on day of surgery. Had a bioprosthetic Aortic Valve Replacement and Aortic Root Abscess Debridement done on 4/16/2021. Trached and sent to [facility] on 4/29/2021. Was undergoing PT and vent wean. Sister stated that patient was able to be off the vent for several hours over the last few days. He was sitting up and able to talk with valve over trach and could follow commands. Stated that he had severe weakness of the left upper ext, but was starting to gain some mobility in the lower left ext. Patient's mother visited patient today prior to arrest. She said the staff told her that the patient was getting very anxious earlier in the day around 1 pm and had to be placed back on the ventilator, and then they had to sedate him while he was on the ventilator. The mother stated \" he did not look good\" and \" his eye were rolled back in his head\". About 30 minutes after she left she got call about arrest. Per ED note patient was found unresponsive around the 1500 hour and was pulseless. Two rounds of CPR and meds were given and patient was defibrillated x 1 before ROSC. Patient was hypoxic post arrest. Unknown down time prior to arrest.    Subjective/HPI    Pt seen and examined  Doing well   Worked with PT and stood up today   Has been on RA for past 3 days     Assessment and Plan:  V Fib Cardiac arrest - 5/6/21 on admission:   .   Cardiologist following,   -per cardiologist : on [address] until 5/28  for endocarditis   -ICD on 5/25 at 7am, NPO at [facility]    Acute on chronic  respiratory failure:   [facility] coral [facility] and was [facility]. He was on ventilator while in ICU and now transitioned to trach collar.  -currently on trach collar and PMV  - on RA now for > 48 hours and maintaining airways without difficulty  - Will ask Pulmonary team to evaluate for trach decannulation if felt appropriate    MRSA aortic  Valve endocardititis   -Found to have MRSA bacteremia and MSSA endocarditis end of march. -Hx:  4/16/21 at [facility] bioprosthetic AVR and root abscess debridement. He also had a pericardiocentesis due to tamponade prior to surgery  - Reviewed [facility] record, he was on IV vanco at [facility] and planned to  have a 6 weeks of   tx and EOT 5/28/21 per record. - Continue vancomycin until 5/28/21      Pain management : contusion of chest wall, left side, chronic back pain . H/o drug abuse  Cautious with narcotics  On  fentanyl to 50mcg q6h prn     Bacterial pneumonia  severe multilevel consolidation throughout both lungs consistent with severe multilobar PNA. -sputum culture E coli with ESBL   -per ID -Plan  Continue meropenem with plan for 7 to 10-day course started 5/11--5/22, will dc and cont vanc for Endocarditis   -pulmonary toilet   -trach care   -ID following      # Systolic congestive heart failure/CM  Ef 25%   Hold coreg,entresto ,Aldactone, BP better today  Cardiologist following   Echo EF showed 25-30% on 5/17  Started Toprol Xl, Cardiology added Entresto as well    H/o right MCA infarct and several brain abscesses in the right temporal and parietal lobes. Infarcts were thought to be due to septic emboli. He had left sided residual weakness. cta 5/6/21:  multiple acute infarctions throughout the  right cerebral hemisphere, involving much of the occipital lobe, as well as much  of the frontal lobe.  There is an additional superior right frontoparietal  Infarct  Neurologist consulted                -PT/OT/SLP e              - Stroke education              - SBP goal 100-160              Seizure DZ:  Continue keppra -1500 mg BID, off valproic acid as subtherapeutic due to meropenem interaction,so valproic acid discontinued      Severe muscle deconditioning  Severe protein-calorie malnutrition   Cerebral abscess (embolic)   H/o substance abuse      fulll code     PT/OT, I think patient can go to inpatient rehab at this point after completing antibiotics        PHYSICAL EXAMINATION:  Visit Vitals  /75 (BP 1 Location: Right upper arm, BP Patient Position: At rest)   Pulse 79   Temp 98.5 °F (36.9 °C)   Resp 16   Ht 5' 11\" (1.803 m)   Wt 54.8 kg (120 lb 12.8 oz)   SpO2 97%   BMI 16.85 kg/m²       General:          chronically ill,   HEENT:           Atraumatic,trach capped        Neck:               Supple,   Lungs: No wheezing. Midline sternal surgical scar,minimal crackles. Heart:              Regular  rhythm,normal rate   No edema  Abdomen:       Soft, non-tender. Not distended. Bowel sounds normal  Extremities:     No LE edema  Skin:                Not pale. Not Jaundiced  No rashes         Neurologic:      Alert, oriented X 3, unable to move LUE , able to bend LLE at knee, moves RUE and RLE     Labs:     Recent Labs     05/23/21  0503 05/22/21  0546   WBC 7.3 6.9   HGB 10.1* 11.2*   HCT 33.5* 36.0*    373     Recent Labs     05/23/21  0503 05/22/21  0546   MG 1.7 1.8   PHOS 3.5 3.4     No results for input(s): ALT, AP, TBIL, TBILI, TP, ALB, GLOB, GGT, AML, LPSE in the last 72 hours. No lab exists for component: SGOT, GPT, AMYP, HLPSE  No results for input(s): INR, PTP, APTT, INREXT, INREXT in the last 72 hours. No results for input(s): FE, TIBC, PSAT, FERR in the last 72 hours. No results found for: FOL, RBCF   No results for input(s): PH, PCO2, PO2 in the last 72 hours. No results for input(s): CPK, CKNDX, TROIQ in the last 72 hours.     No lab exists for component: CPKMB  Lab Results   Component Value Date/Time    Cholesterol, total 140 05/11/2021 04:35 AM    HDL Cholesterol 21 05/11/2021 04:35 AM    LDL, calculated 77.4 05/11/2021 04:35 AM    Triglyceride 208 (H) 05/11/2021 04:35 AM    CHOL/HDL Ratio 6.7 (H) 05/11/2021 04:35 AM     Lab Results   Component Value Date/Time    Glucose (POC) 120 (H) 05/11/2021 12:00 PM    Glucose (POC) 100 05/11/2021 06:04 AM    Glucose (POC) 87 05/11/2021 12:19 AM    Glucose (POC) 83 05/10/2021 06:53 PM    Glucose (POC) 84 05/10/2021 02:07 PM     Lab Results   Component Value Date/Time    Color YELLOW/STRAW 05/06/2021 05:42 PM    Appearance CLEAR 05/06/2021 05:42 PM    Specific gravity 1.008 05/06/2021 05:42 PM    Specific gravity 1.020 03/24/2021 09:30 PM    pH (UA) 5.0 05/06/2021 05:42 PM    Protein 30 (A) 05/06/2021 05:42 PM    Glucose Negative 05/06/2021 05:42 PM    Ketone Negative 05/06/2021 05:42 PM    Bilirubin Negative 05/06/2021 05:42 PM    Urobilinogen 0.2 05/06/2021 05:42 PM    Nitrites Negative 05/06/2021 05:42 PM    Leukocyte Esterase Negative 05/06/2021 05:42 PM    Epithelial cells FEW 05/06/2021 05:42 PM    Bacteria Negative 05/06/2021 05:42 PM    WBC 0-4 05/06/2021 05:42 PM    RBC 0-5 05/06/2021 05:42 PM         CODE STATUS:  x Full Code    DNR    Partial    Comfort Care       Signed:   Skye Calle MD

## 2021-05-24 NOTE — PROGRESS NOTES
Cardiology Progress Note                                        Admit Date: 5/6/2021    Assessment/Plan:       1. Sp cardiac arrest post op 4/17/21,   cardiac arrest Vfib 5/6/21  ekg rbbb qt 460 msec. ICD rec for secondary prevention if life expectancy > 1yr.    2. Hx:  4/16/21 at [facility] bi[facility]hetic AVR and root abscess debridement. He also had a pericardiocentesis due to tamponade prior to surgery  3. Recent endocardititis 3/24/21 Staph  4. CM  Ef 25%   5.  right MCA infarct and several abscesses in the right temporal and parietal lobes. Infarcts were thought to be due to septic emboli. He had left sided residual weakness. cta 5/6/21:  multiple acute infarctions throughout the  right cerebral hemisphere, involving much of the occipital lobe, as well as much  of the frontal lobe. There is an additional superior right frontoparietal  infarct  6.  severe multilevel consolidation throughout both lungs consistent with severe multilobar PNA. 7. PNA:  Severe multilevel consolidation throughout both lungs, consistent with  severe multilobar pneumonia.     Cont Entresto, BB, ivabridine  NPO at [facility] on 5/25 for ICD  Echo showed mild improvement, LVEF 30-35%  Add dapagliflozin on dc as not on formulary     Faiza Corea is a 22 y.o. male with     PROBLEM LIST:  Patient Active Problem List    Diagnosis Date Noted    Severe muscle deconditioning 05/01/2021    S/P AVR (aortic valve replacement) and aortoplasty 04/16/2021    Abscess of aortic root 04/16/2021    Contusion of chest wall 04/16/2021    Successful cardiopulmonary resuscitation 04/14/2021    Acute traumatic pain 04/14/2021    Postoperative acute respiratory failure ([address] 04/01/2021    Severe protein-calorie malnutrition ([address] 05/11/2021    Cardiac arrest with pulseless electrical activity ([address] 05/06/2021    Cerebral abscess (embolic) 68/05/5191    Drug abuse, cocaine type ([address] 03/29/2021    Endocarditis due to methicillin susceptible Staphylococcus aureus (MSSA) 03/25/2021    Type 2 myocardial infarction [facility]) 03/24/2021         Subjective:     Rudy Kawasaki reports unchanged chest discomfort. Says breathing is improved.       Visit Vitals  /75 (BP 1 Location: Right upper arm, BP Patient Position: At rest)   Pulse 79   Temp 98.5 °F (36.9 °C)   Resp 16   Ht 5' 11\" (1.803 m)   Wt 54.8 kg (120 lb 12.8 oz)   SpO2 97%   BMI 16.85 kg/m²       Intake/Output Summary (Last 24 hours) at 5/24/2021 1000  Last data filed at 5/24/2021 0034  Gross per 24 hour   Intake    Output 600 ml   Net -600 ml       Objective:      Physical Exam:  GEN: NAD, appears stated age  HEENT: EOMI, MMM, OP clear  NECK: Normal JVP, carotids 2+ b/l and symmetrical  CV: RRR, normal S1 and S2, no M/R/G  LUNGS: CTAB, no W/R/R  ABD: NABS, soft, NT/ND  EXT: No edema   PSYCH: Mood and affect normal  NEURO: AAO, LUE flaccid, face symmetrical, speech intact    Telemetry: normal sinus rhythm    Current Facility-Administered Medications   Medication Dose Route Frequency    metoprolol succinate (TOPROL-XL) XL tablet 12.5 mg  12.5 mg Oral DAILY    Vancomycin - Pharmacy Dosing   Other Rx Dosing/Monitoring    vancomycin (VANCOCIN) 1,000 mg in 0.9% sodium chloride 250 mL (VIAL-MATE)  1,000 mg IntraVENous Q8H    ivabradine (CORLANOR) tablet 5 mg  5 mg Oral BID WITH MEALS    fentaNYL citrate (PF) injection 50 mcg  50 mcg IntraVENous Q6H PRN    levETIRAcetam (KEPPRA) tablet 1,500 mg  1,500 mg Oral BID    sodium chloride (AYR SALINE) 0.65 % nasal drops 2 Drop  2 Drop Left Nostril Q2H PRN    oxyCODONE IR (ROXICODONE) tablet 7.5 mg  7.5 mg Oral Q4H PRN    lidocaine 4 % patch 1 Patch  1 Patch TransDERmal Q24H    naloxone (NARCAN) injection 0.1 mg  0.1 mg IntraVENous PRN    miconazole (MICOTIN) 2 % cream   Topical BID    traZODone (DESYREL) tablet 50 mg  50 mg Oral QHS PRN    albuterol-ipratropium (DUO-NEB) 2.5 MG-0.5 MG/3 ML  3 mL Nebulization Q4H PRN    [Held by provider] spironolactone (ALDACTONE) tablet 12.5 mg  12.5 mg Oral DAILY    hydrALAZINE (APRESOLINE) 20 mg/mL injection 10 mg  10 mg IntraVENous Q6H PRN    sacubitriL-valsartan (ENTRESTO) 24-26 mg tablet 1 Tab  1 Tablet Oral Q12H    L.acidophilus-paracasei-S.thermophil-bifidobacter (RISAQUAD) 8 billion cell capsule  1 Capsule Nasogastric DAILY    heparin (porcine) injection 5,000 Units  5,000 Units SubCUTAneous Q8H    melatonin tablet 3 mg  3 mg Oral QHS    0.9% sodium chloride infusion 250 mL  250 mL IntraVENous PRN    balsam peru-castor oiL (VENELEX) ointment   Topical BID    sodium chloride (NS) flush 5-40 mL  5-40 mL IntraVENous Q8H    sodium chloride (NS) flush 5-40 mL  5-40 mL IntraVENous PRN    acetaminophen (TYLENOL) tablet 650 mg  650 mg Oral Q6H PRN    Or    acetaminophen (TYLENOL) suppository 650 mg  650 mg Rectal Q6H PRN    polyethylene glycol (MIRALAX) packet 17 g  17 g Oral DAILY PRN    ondansetron (ZOFRAN) injection 4 mg  4 mg IntraVENous Q6H PRN    glucose chewable tablet 16 g  4 Tablet Oral PRN    dextrose (D50W) injection syrg 12.5-25 g  25-50 mL IntraVENous PRN    glucagon (GLUCAGEN) injection 1 mg  1 mg IntraMUSCular PRN         Data Review:   Labs:    No results found for this or any previous visit (from the past 24 hour(s)).

## 2021-05-25 ENCOUNTER — APPOINTMENT (OUTPATIENT)
Dept: GENERAL RADIOLOGY | Age: 26
DRG: 161 | End: 2021-05-25
Attending: INTERNAL MEDICINE
Payer: MEDICAID

## 2021-05-25 LAB
ANION GAP SERPL CALC-SCNC: 6 MMOL/L (ref 5–15)
BUN SERPL-MCNC: 12 MG/DL (ref 6–20)
BUN/CREAT SERPL: 29 (ref 12–20)
CALCIUM SERPL-MCNC: 9.9 MG/DL (ref 8.5–10.1)
CHLORIDE SERPL-SCNC: 103 MMOL/L (ref 97–108)
CO2 SERPL-SCNC: 26 MMOL/L (ref 21–32)
CREAT SERPL-MCNC: 0.42 MG/DL (ref 0.7–1.3)
DATE LAST DOSE: NORMAL
GLUCOSE SERPL-MCNC: 99 MG/DL (ref 65–100)
MAGNESIUM SERPL-MCNC: 1.6 MG/DL (ref 1.6–2.4)
PHOSPHATE SERPL-MCNC: 3.8 MG/DL (ref 2.6–4.7)
POTASSIUM SERPL-SCNC: 3.9 MMOL/L (ref 3.5–5.1)
PROCALCITONIN SERPL-MCNC: <0.05 NG/ML
REPORTED DOSE,DOSE: NORMAL UNITS
REPORTED DOSE/TIME,TMG: NORMAL
SARS-COV-2, COV2: NORMAL
SODIUM SERPL-SCNC: 135 MMOL/L (ref 136–145)
VANCOMYCIN TROUGH SERPL-MCNC: 9.2 UG/ML (ref 5–10)

## 2021-05-25 PROCEDURE — 74011250636 HC RX REV CODE- 250/636: Performed by: INTERNAL MEDICINE

## 2021-05-25 PROCEDURE — 99152 MOD SED SAME PHYS/QHP 5/>YRS: CPT | Performed by: INTERNAL MEDICINE

## 2021-05-25 PROCEDURE — 65660000000 HC RM CCU STEPDOWN

## 2021-05-25 PROCEDURE — 74011250637 HC RX REV CODE- 250/637: Performed by: INTERNAL MEDICINE

## 2021-05-25 PROCEDURE — C1777 LEAD, AICD, ENDO SINGLE COIL: HCPCS | Performed by: INTERNAL MEDICINE

## 2021-05-25 PROCEDURE — 04HY32Z INSERTION OF MONITORING DEVICE INTO LOWER ARTERY, PERCUTANEOUS APPROACH: ICD-10-PCS | Performed by: INTERNAL MEDICINE

## 2021-05-25 PROCEDURE — 71045 X-RAY EXAM CHEST 1 VIEW: CPT

## 2021-05-25 PROCEDURE — 77030041075 HC DRSG AG OPTIFRM MDII -B: Performed by: INTERNAL MEDICINE

## 2021-05-25 PROCEDURE — C1722 AICD, SINGLE CHAMBER: HCPCS | Performed by: INTERNAL MEDICINE

## 2021-05-25 PROCEDURE — 74011250637 HC RX REV CODE- 250/637: Performed by: HOSPITALIST

## 2021-05-25 PROCEDURE — 0JH608Z INSERTION OF DEFIBRILLATOR GENERATOR INTO CHEST SUBCUTANEOUS TISSUE AND FASCIA, OPEN APPROACH: ICD-10-PCS | Performed by: INTERNAL MEDICINE

## 2021-05-25 PROCEDURE — 77030002996 HC SUT SLK J&J -A: Performed by: INTERNAL MEDICINE

## 2021-05-25 PROCEDURE — 02HV33Z INSERTION OF INFUSION DEVICE INTO SUPERIOR VENA CAVA, PERCUTANEOUS APPROACH: ICD-10-PCS | Performed by: INTERNAL MEDICINE

## 2021-05-25 PROCEDURE — 93642 EP EVL 1/2CHMB TRNSVNS CVDFB: CPT | Performed by: INTERNAL MEDICINE

## 2021-05-25 PROCEDURE — 74011250636 HC RX REV CODE- 250/636: Performed by: HOSPITALIST

## 2021-05-25 PROCEDURE — C1892 INTRO/SHEATH,FIXED,PEEL-AWAY: HCPCS | Performed by: INTERNAL MEDICINE

## 2021-05-25 PROCEDURE — 02H63KZ INSERTION OF DEFIBRILLATOR LEAD INTO RIGHT ATRIUM, PERCUTANEOUS APPROACH: ICD-10-PCS | Performed by: INTERNAL MEDICINE

## 2021-05-25 PROCEDURE — 77030039046 HC PAD DEFIB RADIOTRNSPNT CNMD -B: Performed by: INTERNAL MEDICINE

## 2021-05-25 PROCEDURE — 33249 INSJ/RPLCMT DEFIB W/LEAD(S): CPT | Performed by: INTERNAL MEDICINE

## 2021-05-25 PROCEDURE — C1893 INTRO/SHEATH, FIXED,NON-PEEL: HCPCS | Performed by: INTERNAL MEDICINE

## 2021-05-25 PROCEDURE — 03HY32Z INSERTION OF MONITORING DEVICE INTO UPPER ARTERY, PERCUTANEOUS APPROACH: ICD-10-PCS | Performed by: INTERNAL MEDICINE

## 2021-05-25 PROCEDURE — C1781 MESH (IMPLANTABLE): HCPCS | Performed by: INTERNAL MEDICINE

## 2021-05-25 PROCEDURE — 74011000250 HC RX REV CODE- 250: Performed by: INTERNAL MEDICINE

## 2021-05-25 PROCEDURE — 77030031139 HC SUT VCRL2 J&J -A: Performed by: INTERNAL MEDICINE

## 2021-05-25 PROCEDURE — 74011000636 HC RX REV CODE- 636: Performed by: INTERNAL MEDICINE

## 2021-05-25 PROCEDURE — 99153 MOD SED SAME PHYS/QHP EA: CPT | Performed by: INTERNAL MEDICINE

## 2021-05-25 PROCEDURE — 74011000250 HC RX REV CODE- 250: Performed by: HOSPITALIST

## 2021-05-25 PROCEDURE — U0005 INFEC AGEN DETEC AMPLI PROBE: HCPCS

## 2021-05-25 PROCEDURE — 02HK3KZ INSERTION OF DEFIBRILLATOR LEAD INTO RIGHT VENTRICLE, PERCUTANEOUS APPROACH: ICD-10-PCS | Performed by: INTERNAL MEDICINE

## 2021-05-25 DEVICE — ENVELOPE CMRM6133 ABSORB LRG MR
Type: IMPLANTABLE DEVICE | Status: FUNCTIONAL
Brand: TYRX™

## 2021-05-25 DEVICE — ICD-VR DVFB1D4 VISIA AF MRI US DF4
Type: IMPLANTABLE DEVICE | Status: FUNCTIONAL
Brand: VISIA AF MRI™ VR SURESCAN™

## 2021-05-25 DEVICE — LEAD 6935M62 QUATTRO SECURE S MRI US
Type: IMPLANTABLE DEVICE | Status: FUNCTIONAL
Brand: SPRINT QUATTRO SECURE S MRI™ SURESCAN™

## 2021-05-25 RX ORDER — MIDAZOLAM HYDROCHLORIDE 1 MG/ML
INJECTION, SOLUTION INTRAMUSCULAR; INTRAVENOUS AS NEEDED
Status: DISCONTINUED | OUTPATIENT
Start: 2021-05-25 | End: 2021-05-25 | Stop reason: HOSPADM

## 2021-05-25 RX ORDER — SODIUM CHLORIDE 0.9 % (FLUSH) 0.9 %
5-40 SYRINGE (ML) INJECTION AS NEEDED
Status: DISCONTINUED | OUTPATIENT
Start: 2021-05-25 | End: 2021-05-25 | Stop reason: HOSPADM

## 2021-05-25 RX ORDER — SODIUM CHLORIDE 0.9 % (FLUSH) 0.9 %
5-40 SYRINGE (ML) INJECTION EVERY 8 HOURS
Status: DISCONTINUED | OUTPATIENT
Start: 2021-05-25 | End: 2021-06-08 | Stop reason: HOSPADM

## 2021-05-25 RX ORDER — BUPIVACAINE HYDROCHLORIDE 7.5 MG/ML
20 INJECTION, SOLUTION EPIDURAL; RETROBULBAR ONCE
Status: DISCONTINUED | OUTPATIENT
Start: 2021-05-25 | End: 2021-05-25 | Stop reason: HOSPADM

## 2021-05-25 RX ORDER — FENTANYL CITRATE 50 UG/ML
INJECTION, SOLUTION INTRAMUSCULAR; INTRAVENOUS AS NEEDED
Status: DISCONTINUED | OUTPATIENT
Start: 2021-05-25 | End: 2021-05-25 | Stop reason: HOSPADM

## 2021-05-25 RX ORDER — SODIUM CHLORIDE 0.9 % (FLUSH) 0.9 %
5-40 SYRINGE (ML) INJECTION AS NEEDED
Status: DISCONTINUED | OUTPATIENT
Start: 2021-05-25 | End: 2021-06-08 | Stop reason: HOSPADM

## 2021-05-25 RX ORDER — LIDOCAINE HYDROCHLORIDE 10 MG/ML
INJECTION INFILTRATION; PERINEURAL AS NEEDED
Status: DISCONTINUED | OUTPATIENT
Start: 2021-05-25 | End: 2021-05-25 | Stop reason: HOSPADM

## 2021-05-25 RX ORDER — SODIUM CHLORIDE 0.9 % (FLUSH) 0.9 %
5-40 SYRINGE (ML) INJECTION EVERY 8 HOURS
Status: DISCONTINUED | OUTPATIENT
Start: 2021-05-25 | End: 2021-05-25 | Stop reason: HOSPADM

## 2021-05-25 RX ADMIN — Medication 10 ML: at 06:03

## 2021-05-25 RX ADMIN — CASTOR OIL AND BALSAM, PERU: 788; 87 OINTMENT TOPICAL at 18:07

## 2021-05-25 RX ADMIN — VANCOMYCIN HYDROCHLORIDE 750 MG: 750 INJECTION, POWDER, LYOPHILIZED, FOR SOLUTION INTRAVENOUS at 22:32

## 2021-05-25 RX ADMIN — Medication 10 ML: at 13:29

## 2021-05-25 RX ADMIN — HEPARIN SODIUM 5000 UNITS: 5000 INJECTION INTRAVENOUS; SUBCUTANEOUS at 22:24

## 2021-05-25 RX ADMIN — LEVETIRACETAM 1500 MG: 500 TABLET ORAL at 10:56

## 2021-05-25 RX ADMIN — MICONAZOLE NITRATE: 20 CREAM TOPICAL at 11:00

## 2021-05-25 RX ADMIN — VANCOMYCIN HYDROCHLORIDE 750 MG: 750 INJECTION, POWDER, LYOPHILIZED, FOR SOLUTION INTRAVENOUS at 06:03

## 2021-05-25 RX ADMIN — Medication 10 ML: at 22:35

## 2021-05-25 RX ADMIN — FENTANYL CITRATE 50 MCG: 50 INJECTION INTRAMUSCULAR; INTRAVENOUS at 19:32

## 2021-05-25 RX ADMIN — Medication 3 MG: at 22:23

## 2021-05-25 RX ADMIN — CASTOR OIL AND BALSAM, PERU: 788; 87 OINTMENT TOPICAL at 10:59

## 2021-05-25 RX ADMIN — HEPARIN SODIUM 5000 UNITS: 5000 INJECTION INTRAVENOUS; SUBCUTANEOUS at 13:28

## 2021-05-25 RX ADMIN — LEVETIRACETAM 1500 MG: 500 TABLET ORAL at 18:06

## 2021-05-25 RX ADMIN — Medication 1 CAPSULE: at 10:56

## 2021-05-25 RX ADMIN — VANCOMYCIN HYDROCHLORIDE 750 MG: 750 INJECTION, POWDER, LYOPHILIZED, FOR SOLUTION INTRAVENOUS at 14:40

## 2021-05-25 RX ADMIN — SACUBITRIL AND VALSARTAN 1 TABLET: 24; 26 TABLET, FILM COATED ORAL at 22:32

## 2021-05-25 RX ADMIN — METOPROLOL SUCCINATE 12.5 MG: 25 TABLET, EXTENDED RELEASE ORAL at 10:57

## 2021-05-25 RX ADMIN — SACUBITRIL AND VALSARTAN 1 TABLET: 24; 26 TABLET, FILM COATED ORAL at 10:57

## 2021-05-25 RX ADMIN — FENTANYL CITRATE 50 MCG: 50 INJECTION INTRAMUSCULAR; INTRAVENOUS at 02:24

## 2021-05-25 RX ADMIN — MICONAZOLE NITRATE: 20 CREAM TOPICAL at 18:07

## 2021-05-25 RX ADMIN — FENTANYL CITRATE 50 MCG: 50 INJECTION INTRAMUSCULAR; INTRAVENOUS at 13:29

## 2021-05-25 RX ADMIN — IVABRADINE 5 MG: 5 TABLET, FILM COATED ORAL at 18:06

## 2021-05-25 RX ADMIN — Medication 10 ML: at 22:34

## 2021-05-25 RX ADMIN — OXYCODONE HYDROCHLORIDE 7.5 MG: 5 TABLET ORAL at 22:24

## 2021-05-25 NOTE — PROGRESS NOTES
EDGAR:  1. RUR-28%  2. [facility] IPR accepted, auth started today. 3. BLS transport on discharge. 4. PT/OT following. CM requested for COVID PCR order from MD through [facility].     [facility]

## 2021-05-25 NOTE — PROGRESS NOTES
TRANSFER - OUT REPORT:    Verbal report given to Mira Sanchez RN on Stoney Carson being transferred to cath  for routine progression of care       Report consisted of patients Situation, Background, Assessment and   Recommendations(SBAR). Information from the following report(s) Procedure Summary was reviewed with the receiving nurse. Opportunity for questions and clarification was provided. Cardiac Cath Lab Procedure Area Arrival Note:    Stoney Carson arrived to Cardiac Cath Lab, Procedure Area. Patient identifiers verified with NAME and DATE OF BIRTH. Procedure verified with patient. Consent forms verified. Allergies verified. Patient informed of procedure and plan of care. Questions answered with review. Patient voiced understanding of procedure and plan of care. Patient on cardiac monitor, non-invasive blood pressure, SPO2 monitor. On  or O2 @ 2 lpm via nasal canula. IV of 0.9% NS  on pump at 25 ml/hr. Patient status doing well without problems. Patient is A&Ox 4. Patient reports no pain. Patient medicated during procedure with orders obtained and verified by Dr. Bruno Bee. Refer to patients Cardiac Cath Lab PROCEDURE REPORT for vital signs, assessment, status, and response during procedure, printed at end of case. Printed report on chart or scanned into chart.

## 2021-05-25 NOTE — PROGRESS NOTES
Dr Iam Corado made aware of very minimal pneumothorax on CXR per radiologist Dr Lorelei Garcia. Pt is asymptomatic. Will monitor.

## 2021-05-25 NOTE — PROGRESS NOTES
Pharmacist Note - Vancomycin Dosing  Therapy day 11  Indication: MRSA aortic valve endocarditis  Current regimen: 1000mg IV [id number]    Recent Labs     05/24/21  2338 05/23/21  0503 05/22/21  0546   WBC 9.6 7.3 6.9   CREA 0.42*  --   --    BUN 12  --   --        A Trough Level resulted at 9.2 mcg/mL which was obtained ~17.5 hrs post-dose. The extrapolated \"true\" trough is approximately 24.5 mcg/mL based on the patient's known kinetics. Goal trough: 15 - 20 mcg/mL       Plan: Change to 750mg IV [id number] in order to bring down the true trough to within the goal therapeutic range. Pharmacy will continue to monitor this patient daily for changes in clinical status and renal function.

## 2021-05-25 NOTE — PROGRESS NOTES
Problem: Falls - Risk of  Goal: *Absence of Falls  Description: Document Darwin [place] Fall Risk and appropriate interventions in the flowsheet.   Outcome: Progressing Towards Goal  Note: Fall Risk Interventions:       Mentation Interventions: Adequate sleep, hydration, pain control    Medication Interventions: Evaluate medications/consider consulting pharmacy    Elimination Interventions: Call light in reach

## 2021-05-25 NOTE — PROGRESS NOTES
TRANSFER - IN REPORT:    Verbal report received from Bernard(name) on Mariella Blas  being received from recovery(unit) for routine progression of care      Report consisted of patients Situation, Background, Assessment and   Recommendations(SBAR). Information from the following report(s) SBAR, Kardex and Intake/Output was reviewed with the receiving nurse. Opportunity for questions and clarification was provided. Assessment completed upon patients arrival to unit and care assumed.

## 2021-05-25 NOTE — PROGRESS NOTES
Cardiology Progress Note                                        Admit Date: 5/6/2021    Assessment/Plan:       1. Sp cardiac arrest post op 4/17/21,   cardiac arrest Vfib 5/6/21  ekg rbbb qt 460 msec. ICD rec for secondary prevention if life expectancy > 1yr.    2. Hx:  4/16/21 at [facility] bioprosthetic AVR and root abscess debridement. He also had a pericardiocentesis due to tamponade prior to surgery  3. Recent endocardititis 3/24/21 Staph  4. CM  Ef 25%   5.  right MCA infarct and several abscesses in the right temporal and parietal lobes. Infarcts were thought to be due to septic emboli. He had left sided residual weakness. cta 5/6/21:  multiple acute infarctions throughout the  right cerebral hemisphere, involving much of the occipital lobe, as well as much  of the frontal lobe. There is an additional superior right frontoparietal  infarct  6.  severe multilevel consolidation throughout both lungs consistent with severe multilobar PNA. 7. PNA:  Severe multilevel consolidation throughout both lungs, consistent with  severe multilobar pneumonia.     Cont Entresto, BB, ivabridine, no adjustments made  NPO for ICD today  Echo showed mild improvement, LVEF 30-35%  Add dapagliflozin on dc as not on formulary     Diaz Sin is a 22 y.o. male with     PROBLEM LIST:  Patient Active Problem List    Diagnosis Date Noted    Severe muscle deconditioning 05/01/2021    S/P AVR (aortic valve replacement) and aortoplasty 04/16/2021    Abscess of aortic root 04/16/2021    Contusion of chest wall 04/16/2021    Successful cardiopulmonary resuscitation 04/14/2021    Acute traumatic pain 04/14/2021    Postoperative acute respiratory failure ([address] 04/01/2021    Severe protein-calorie malnutrition ([address] 05/11/2021    Cardiac arrest with pulseless electrical activity ([address] 05/06/2021    Cerebral abscess (embolic) 62/21/4196    Drug abuse, cocaine type ([address] 03/29/2021    Endocarditis due to methicillin susceptible Staphylococcus aureus (MSSA) 03/25/2021    Type 2 myocardial infarction [facility]) 03/24/2021         Subjective:     Chantal Presley reports unchanged chest discomfort. Says breathing is improved.       Visit Vitals  BP 94/61 (BP 1 Location: Left upper arm, BP Patient Position: At rest)   Pulse 73   Temp 97.9 °F (36.6 °C)   Resp 19   Ht 5' 11\" (1.803 m)   Wt 54.8 kg (120 lb 12.8 oz)   SpO2 98%   BMI 16.85 kg/m²       Intake/Output Summary (Last 24 hours) at 5/25/2021 9970  Last data filed at 5/24/2021 0820  Gross per 24 hour   Intake 240 ml   Output    Net 240 ml       Objective:      Physical Exam:  GEN: NAD, appears stated age  HEENT: EOMI, MMM, OP clear  NECK: Normal JVP, carotids 2+ b/l and symmetrical  CV: RRR, normal S1 and S2, no M/R/G  LUNGS: CTAB, no W/R/R  ABD: NABS, soft, NT/ND  EXT: No edema   PSYCH: Mood and affect normal  NEURO: AAO, LUE flaccid, face symmetrical, speech intact    Telemetry: normal sinus rhythm    Current Facility-Administered Medications   Medication Dose Route Frequency    vancomycin (VANCOCIN) 750 mg in 0.9% sodium chloride 250 mL (VIAL-MATE)  750 mg IntraVENous Q8H    metoprolol succinate (TOPROL-XL) XL tablet 12.5 mg  12.5 mg Oral DAILY    Vancomycin - Pharmacy Dosing   Other Rx Dosing/Monitoring    ivabradine (CORLANOR) tablet 5 mg  5 mg Oral BID WITH MEALS    fentaNYL citrate (PF) injection 50 mcg  50 mcg IntraVENous Q6H PRN    levETIRAcetam (KEPPRA) tablet 1,500 mg  1,500 mg Oral BID    sodium chloride (AYR SALINE) 0.65 % nasal drops 2 Drop  2 Drop Left Nostril Q2H PRN    oxyCODONE IR (ROXICODONE) tablet 7.5 mg  7.5 mg Oral Q4H PRN    lidocaine 4 % patch 1 Patch  1 Patch TransDERmal Q24H    naloxone (NARCAN) injection 0.1 mg  0.1 mg IntraVENous PRN    miconazole (MICOTIN) 2 % cream   Topical BID    traZODone (DESYREL) tablet 50 mg  50 mg Oral QHS PRN    albuterol-ipratropium (DUO-NEB) 2.5 MG-0.5 MG/3 ML  3 mL Nebulization Q4H PRN    [Held by provider] spironolactone (ALDACTONE) tablet 12.5 mg  12.5 mg Oral DAILY    hydrALAZINE (APRESOLINE) 20 mg/mL injection 10 mg  10 mg IntraVENous Q6H PRN    sacubitriL-valsartan (ENTRESTO) 24-26 mg tablet 1 Tab  1 Tablet Oral Q12H    L.acidophilus-paracasei-S.thermophil-bifidobacter (RISAQUAD) 8 billion cell capsule  1 Capsule Nasogastric DAILY    heparin (porcine) injection 5,000 Units  5,000 Units SubCUTAneous Q8H    melatonin tablet 3 mg  3 mg Oral QHS    0.9% sodium chloride infusion 250 mL  250 mL IntraVENous PRN    balsam peru-castor oiL (VENELEX) ointment   Topical BID    sodium chloride (NS) flush 5-40 mL  5-40 mL IntraVENous Q8H    sodium chloride (NS) flush 5-40 mL  5-40 mL IntraVENous PRN    acetaminophen (TYLENOL) tablet 650 mg  650 mg Oral Q6H PRN    Or    acetaminophen (TYLENOL) suppository 650 mg  650 mg Rectal Q6H PRN    polyethylene glycol (MIRALAX) packet 17 g  17 g Oral DAILY PRN    ondansetron (ZOFRAN) injection 4 mg  4 mg IntraVENous Q6H PRN    glucose chewable tablet 16 g  4 Tablet Oral PRN    dextrose (D50W) injection syrg 12.5-25 g  25-50 mL IntraVENous PRN    glucagon (GLUCAGEN) injection 1 mg  1 mg IntraMUSCular PRN         Data Review:   Labs:    Recent Results (from the past 24 hour(s))   PROCALCITONIN    Collection Time: 05/24/21 11:38 PM   Result Value Ref Range    Procalcitonin <0.05 ng/mL   CBC WITH AUTOMATED DIFF    Collection Time: 05/24/21 11:38 PM   Result Value Ref Range    WBC 9.6 4.1 - 11.1 K/uL    RBC 3.75 (L) 4.10 - 5.70 M/uL    HGB 10.0 (L) 12.1 - 17.0 g/dL    HCT 32.3 (L) 36.6 - 50.3 %    MCV 86.1 80.0 - 99.0 FL    MCH 26.7 26.0 - 34.0 PG    MCHC 31.0 30.0 - 36.5 g/dL    RDW 16.0 (H) 11.5 - 14.5 %    PLATELET 807 457 - 718 K/uL    MPV 10.5 8.9 - 12.9 FL    NRBC 0.0 0  WBC    ABSOLUTE NRBC 0.00 0.00 - 0.01 K/uL    NEUTROPHILS 55 32 - 75 %    LYMPHOCYTES 27 12 - 49 %    MONOCYTES 13 5 - 13 %    EOSINOPHILS 4 0 - 7 %    BASOPHILS 1 0 - 1 %    IMMATURE GRANULOCYTES 0 0.0 - 0.5 %    ABS. NEUTROPHILS 5.3 1.8 - 8.0 K/UL    ABS. LYMPHOCYTES 2.5 0.8 - 3.5 K/UL    ABS. MONOCYTES 1.3 (H) 0.0 - 1.0 K/UL    ABS. EOSINOPHILS 0.4 0.0 - 0.4 K/UL    ABS. BASOPHILS 0.1 0.0 - 0.1 K/UL    ABS. IMM.  GRANS. 0.0 0.00 - 0.04 K/UL    DF AUTOMATED     METABOLIC PANEL, BASIC    Collection Time: 05/24/21 11:38 PM   Result Value Ref Range    Sodium 135 (L) 136 - 145 mmol/L    Potassium 3.9 3.5 - 5.1 mmol/L    Chloride 103 97 - 108 mmol/L    CO2 26 21 - 32 mmol/L    Anion gap 6 5 - 15 mmol/L    Glucose 99 65 - 100 mg/dL    BUN 12 6 - 20 MG/DL    Creatinine 0.42 (L) 0.70 - 1.30 MG/DL    BUN/Creatinine ratio 29 (H) 12 - 20      GFR est AA >60 >60 ml/min/1.73m2    GFR est non-AA >60 >60 ml/min/1.73m2    Calcium 9.9 8.5 - 10.1 MG/DL   VANCOMYCIN, TROUGH    Collection Time: 05/24/21 11:38 PM   Result Value Ref Range    Vancomycin,trough 9.2 5.0 - 10.0 ug/mL    Reported dose date NOT PROVIDED      Reported dose time: NOT PROVIDED      Reported dose: NOT PROVIDED UNITS   MAGNESIUM    Collection Time: 05/24/21 11:38 PM   Result Value Ref Range    Magnesium 1.6 1.6 - 2.4 mg/dL   PHOSPHORUS    Collection Time: 05/24/21 11:38 PM   Result Value Ref Range    Phosphorus 3.8 2.6 - 4.7 MG/DL

## 2021-05-25 NOTE — PROGRESS NOTES
TRANSFER - IN REPORT:    Verbal report received from John Kiran CVT on Mariella Blas  being received from procedure area for routine progression of care. Report consisted of patients Situation, Background, Assessment and Recommendations(SBAR). Information from the following report(s) SBAR, Procedure Summary, MAR, Recent Results and Cardiac Rhythm NSR was reviewed with the receiving clinician. Opportunity for questions and clarification was provided. Assessment completed upon patients arrival to [address] [place] [address] and care assumed. Cardiac Cath Lab Recovery Arrival Note:    Mariella Blas arrived to [facility] recovery area. Patient procedure= ICD. Patient on cardiac monitor, non-invasive blood pressure, SPO2 monitor. On Room Air. IV  of NS on pump at 25 ml/hr. Patient status doing well without problems. Patient is A&Ox 4. Patient reports No Pain. PROCEDURE SITE CHECK:    Procedure site:without any bleeding and No Hematoma, No pain/discomfort reported at procedure site. No change in patient status. Continue to monitor patient and status.

## 2021-05-25 NOTE — PROGRESS NOTES
Bedside and Verbal shift change report given to Andrew Rodríguez RN (oncoming nurse) by Warren Shaerer RN (offgoing nurse). Report included the following information SBAR, Kardex and MAR.

## 2021-05-25 NOTE — PROGRESS NOTES
Physical Therapy  Patient currently off the floor in cath lab. Will continue to follow.   Elizabeth Ramirez PT, DPT

## 2021-05-25 NOTE — PROCEDURES
Procedure: ICD implant     5/25/2021        Indication: Cardiomyopathy LVEF 25%  ICD for secondary prevention after resuscitation from Vfib  Pt is 3 days from completing Rx course of MRSA endocarditis with no signs of residual infection     PROCEDURES PERFORMED:  1. Conscious sedation. 2. Fluoroscopy for lead placement. 3. Permanent Implantable Cardioverter Defibrillator (ICD) Implantation:      A: [employer]  ICD model  Visa AF [id number]      B: Placement of ventricular lead with threshold testing. Lead: 6935        4. ICD defribrillation threshold testing by the step down method    COMPLICATIONS:None. ESTIMATED BLOOD LOSS: Minimal  SPECIMENS: None  [facility]ST[facility]: [facility]    PROCEDURAL NOTE:  The patient was brought to the laboratory in a sedated postabsorptive state. The left chest wall was prepped and draped in the usual fashion and anesthetized with 20 mL of 2% lidocaine. Incision was made over the deltopectoral groove and extended to the level of the pectoralis fascia. A pocket was made anterior to the pectoralis fascia for in which to implant the device. The left cephalic vein was not accessible and the left axillary vein was cannulated by the Seldinger technique under fluorscopic guidance with a micropuncture needle   A guide wire was advanced into the central circulation followed by a tear away sheath. The ventricular lead was inserted and advanced to the right ventricular apex. Threshold testing was performed. Once adequate position was obtained the peel-away sheath was removed    The lead was  secured to the pectoralis muscle utilizing its protective sleeve with #2-0 silk ties around the lead. The ICD pocket was flushed with antibiotic containing sterile saline  solution. The lead was attached to generator. Lead  and generator placed in the pocket with the leads posterior to the generator in an antibiotic eluting pouch. Subcutaneous tissue closed in 2 layers utilizing #2-0 Vicryl.  Skin closed with dermabond glue with an excellent final result. Defibrillation threshold test was performed by the step down method with a threshold of 20 J. The patient was transferred to the holding area    No complications were noted.           IMPRESSION AND RECOMMENDATION:  The patient will be followed up in the [address] [place] [address]

## 2021-05-25 NOTE — ACP (ADVANCE CARE PLANNING)
Advance Care Planning   Advance Care Planning Inpatient Note  [facility] Department    Today's Date: 5/25/2021  Unit: [address] CATH LAB    Received request from Care Manager (In Basket). Upon review of chart and communication with care team, patient's decision making abilities are not in question. Goals of ACP Conversation:  Discuss Advance Care planning documents    Health Care Decision Makers:      Primary Decision Maker: Gian Jaramillo - Mother - [phone]    Secondary Decision Maker: Radha Prado - Sister - [phone]  Click here to complete [address] including selection of the Healthcare Decision Maker Relationship (ie \"Primary\")     Today we:  Provided AMD materials and contact information. Advance Care Planning Documents (Patient Wishes) on file:  None     Assessment:      received request for Advance Directive (AMD) consult per In Basket request from Care Manager, patient would like to complete AMD.  Patient out of room to Cardiac Cath Lab at the time of this visit. Spoke to Care manager about patient's request and informed her of this visit and materials /information provided. Provided blank copy of AMD, a copy of the booklet, \"Your Right to Decide,\" and information card informing patient of  availability and contact information. Also left card asking patient to have his nurse contact the  should he wish to complete AMD once he has returned, rested, and recovered from this morning's procedure at his convenience. Please contact spiritual care for further referral or consult. Interventions:  See note above. Outcomes/Plan:  See note above.      Electronically signed by Flip Mckenzie, [address] on 5/25/2021 at 9:03 AM

## 2021-05-25 NOTE — PROGRESS NOTES
TRANSFER - OUT REPORT:    Verbal report given to [facility] on [place] Harness being transferred to Room 208 for routine progression of care       Report consisted of patients Situation, Background, Assessment and   Recommendations(SBAR). Information from the following report(s) SBAR, Procedure Summary, MAR, Recent Results and Cardiac Rhythm NSR was reviewed with the receiving nurse. Opportunity for questions and clarification was provided.

## 2021-05-25 NOTE — PROGRESS NOTES
Sp icd  instructions in discharge  Cxr : small apical ptx . Will repeat pa lateral in am .  Expect spontaneous resolution.

## 2021-05-25 NOTE — PROGRESS NOTES
Spiritual Care Assessment/Progress Note  ST. [address]      NAME: Letitia Jain      MRN: [id number]  AGE: 22 y.o.  SEX: male  Religion Affiliation: No preference   Language: English     5/25/2021     Total Time (in minutes): 26     Spiritual Assessment begun in [address] through conversation with:         []Patient        [] Family    [] Friend(s)        Reason for Consult: Advance medical directive consult     Spiritual beliefs: (Please include comment if needed)     [] Identifies with a chloé tradition:         [] Supported by a chloé community:            [] Claims no spiritual orientation:           [] Seeking spiritual identity:                [] Adheres to an individual form of spirituality:           [x] Not able to assess:                           Identified resources for coping:      [] Prayer                               [] Music                  [] Guided Imagery     [] Family/friends                 [] Pet visits     [] Devotional reading                         [x] Unknown     [] Other:                                              Interventions offered during this visit: (See comments for more details)    Patient Interventions: Initial visit, Other (comment) (Note left at bedside)     Family/Friend(s): Other (comment) (Note left at bedside)     Plan of Care:     [] Support spiritual and/or cultural needs    [x] Support AMD and/or advance care planning process      [] Support grieving process   [] Coordinate Rites and/or Rituals    [] Coordination with community clergy   [] No spiritual needs identified at this time   [] Detailed Plan of Care below (See Comments)  [] Make referral to Music Therapy  [] Make referral to Pet Therapy     [] Make referral to Addiction services  [] Make referral to [facility]  [] Make referral to Spiritual Care Partner  [] No future visits requested        [x] Follow up upon further referrals     Comments:  received request for Advance Directive (AMD) consult per In Basket request from Care Manager, patient would like to complete AMD.  Patient out of room to Cardiac Cath Lab at the time of this visit. Spoke to Care manager about patient's request and informed her of this visit and materials /information provided. Provided blank copy of AMD, a copy of the booklet, \"Your Right to Decide,\" and information card informing patient of  availability and contact information. Also left card asking patient to have his nurse contact the  should he wish to complete AMD once he has returned, rested, and recovered from this morning's procedure at his convenience. Please contact spiritual care for further referral or consult. Rev.  Nettie Dalton MDiv, [facility], [facility]   paging service: [phone])

## 2021-05-25 NOTE — PROGRESS NOTES
.  [address] [facility] Adult  Hospitalist Group    PATIENT ID: Faiza Corea  MRN: [id number]   YOB: 1995    PRIMARY CARE PROVIDER: None   DATE OF ADMISSION: 5/6/2021  5:22 PM    ATTENDING PHYSICIAN: Amy Guerin MD  CONSULTATIONS:   IP CONSULT TO INTENSIVIST  IP CONSULT TO NEUROLOGY  IP CONSULT TO PODIATRY  IP CONSULT TO PODIATRY  IP CONSULT TO INFECTIOUS DISEASES  IP CONSULT TO NEUROLOGY  IP CONSULT TO OTOLARYNGOLOGY  IP CONSULT TO CARDIOLOGY  IP CONSULT TO PULMONOLOGY    PROCEDURES/SURGERIES:   Procedure(s):  INSERT ICD DUAL  Eval Icd Generator & Leads W Testing At Implant    [address]: Cardiac arrest with pulseless electrical activity [facility] PROBLEM LIST:  Patient Active Problem List   Diagnosis Code    Endocarditis due to methicillin susceptible Staphylococcus aureus (MSSA) I33.0, B95.61    Drug abuse, cocaine type ([facility] F14.10    Type 2 myocardial infarction ([facility] I21. A1    Cerebral abscess (embolic) Z02.1    Cardiac arrest with pulseless electrical activity (HCC) I46.9    Severe protein-calorie malnutrition (HCC) E43    Postoperative acute respiratory failure (HCC) J95.821    Severe muscle deconditioning R29.898    Successful cardiopulmonary resuscitation Z92.89    Acute traumatic pain G89.11    S/P AVR (aortic valve replacement) and aortoplasty Z95.2    Abscess of aortic root I51.89    Contusion of chest wall S20.219A         Brief HPI and Hospital Course:      Faiza Corea is a 22 y.o. male with h/o Seizures and anxiety who presented to [facility] after a cardiac arrest at [facility] around 1500 5/6/2021. Hx from chart and speaking with patient's sister via phone. Patient had recent hospitalization at the [facility] in March of this year. Initial hospitalization admission was at [facility] on 3/24 with AMS in setting of polysubstance and IVD use (cocaine, heroine, methamphetamines).  He was found to have septic MRSA bacteremia, MRSA endocarditis. Imaging and MRI also found R PCA infarct and several abscesses and right temporal and parietal lobes. Infarcts thought to be due to septic emboli and patient had left sided residual weakness. Patient was transferred to [facility] on 4/1/2021 for evaluation for valve surgery. After transfer he was found to have a New R MCA infarct Also had a type 2 NSTEMI and tamponade with pericardiocentesis done prior to surgery. Did have cardiac arrest on day of surgery. Had a bioprosthetic Aortic Valve Replacement and Aortic Root Abscess Debridement done on 4/16/2021. Trached and sent to [facility] on 4/29/2021. Was undergoing PT and vent wean. Sister stated that patient was able to be off the vent for several hours over the last few days. He was sitting up and able to talk with valve over trach and could follow commands. Stated that he had severe weakness of the left upper ext, but was starting to gain some mobility in the lower left ext. Patient's mother visited patient today prior to arrest. She said the staff told her that the patient was getting very anxious earlier in the day around 1 pm and had to be placed back on the ventilator, and then they had to sedate him while he was on the ventilator. The mother stated \" he did not look good\" and \" his eye were rolled back in his head\". About 30 minutes after she left she got call about arrest. Per ED note patient was found unresponsive around the 1500 hour and was pulseless. Two rounds of CPR and meds were given and patient was defibrillated x 1 before ROSC. Patient was hypoxic post arrest. Unknown down time prior to arrest.    Subjective/HPI    Pt seen and examined post procedure. Currently stable on RA. NAD. He is complaining of generalized pain, requesting pain meds.       Assessment and Plan:  V Fib Cardiac arrest - 5/6/21 on admission:   .  Echo showed mild improvement, LVEF 30-35%   -per cardiologist : on [address] until 5/28  for endocarditis   -s/p ICD on 5/25   -Add dapagliflozin on dc as not on formulary     Pneumothorax post ICD placement  CXR showed Very minimal left apical pneumothorax. Follow-up exam is suggested  Pt is currently stable on RA. Will repeat CXR    Acute on chronic  respiratory failure, resolved  [facility] and was [facility]. He was on ventilator while in ICU and now transitioned to trach collar.  -currently on trach collar and PMV  - on RA now for > 48 hours and maintaining airways without difficulty  - pulmonary following, plan to change trach size. MRSA aortic  Valve endocardititis   -Found to have MRSA bacteremia and MSSA endocarditis end of march. -Hx:  4/16/21 at [facility] bioprosthetic AVR and root abscess debridement. He also had a pericardiocentesis due to tamponade prior to surgery  - Reviewed [facility] record, he was on IV vanco at [facility] and planned to  have a 6 weeks of   tx and EOT 5/28/21 per record. - Continue vancomycin until 5/28/21, appreciate ID's recs      Pain management : contusion of chest wall, left side, chronic back pain . H/o drug abuse  Cautious with narcotics  On  fentanyl to 50mcg q6h prn     Bacterial pneumonia  severe multilevel consolidation throughout both lungs consistent with severe multilobar PNA. -sputum culture E coli with ESBL   -per ID -Plan  Continue meropenem with plan for 7 to 10-day course started 5/11--5/22, will dc and cont vanc for Endocarditis   -pulmonary toilet   -trach care   -ID following    # Systolic congestive heart failure/CM  Ef 25%   Hold coreg,entresto ,Aldactone, BP better today  Cardiologist following   Echo EF showed 25-30% on 5/17  Started Toprol Xl, Cardiology added Entresto as well    H/o right MCA infarct and several brain abscesses in the right temporal and parietal lobes. Infarcts were thought to be due to septic emboli. He had left sided residual weakness.      cta 5/6/21:  multiple acute infarctions throughout the  right cerebral hemisphere, involving much of the occipital lobe, as well as much  of the frontal lobe. There is an additional superior right frontoparietal Infarct  Evaluated by neurology, cont' AC for CVA preventing moving forward. Cont' current anticonvulsants. No seizures on EEG. Neurologist consulted                -PT/OT/SLP               - Stroke education              - SBP goal 100-160              Seizure DZ:  Continue keppra -1500 mg BID, off valproic acid as subtherapeutic due to meropenem interaction,so valproic acid discontinued      Severe muscle deconditioning  Severe protein-calorie malnutrition   Cerebral abscess (embolic)   H/o substance abuse      fulll code     PT/OT, I think patient can go to inpatient rehab at this point after completing antibiotics        PHYSICAL EXAMINATION:  Visit Vitals  /68 (BP 1 Location: Right upper arm, BP Patient Position: Supine)   Pulse 84   Temp 97.3 °F (36.3 °C)   Resp 18   Ht 5' 11\" (1.803 m)   Wt 54.8 kg (120 lb 12.8 oz)   SpO2 99%   BMI 16.85 kg/m²       General:          Cachetic male in bed, chronically ill appearing  HEENT:           Atraumatic,trach capped        Neck:               Supple,   Lungs: No wheezing. Midline sternal surgical scar, no crackles. Heart:              Regular  rhythm,normal rate   No edema  Abdomen:       Soft, non-tender. Not distended. Bowel sounds normal  Extremities:     No LE edema  Skin:                Not pale.   Not Jaundiced  No rashes         Neurologic:      Alert, oriented X 3, unable to move LUE , able to bend LLE at knee, moves RUE and RLE     Labs:     Recent Labs     05/24/21 2338 05/23/21  0503   WBC 9.6 7.3   HGB 10.0* 10.1*   HCT 32.3* 33.5*    367     Recent Labs     05/24/21 2338 05/23/21  0503   *  --    K 3.9  --      --    CO2 26  --    BUN 12  --    CREA 0.42*  --    GLU 99  --    CA 9.9  --    MG 1.6 1.7   PHOS 3.8 3.5     No results for input(s): ALT, AP, TBIL, TBILI, TP, ALB, GLOB, GGT, AML, LPSE in the last 72 hours. No lab exists for component: SGOT, GPT, AMYP, HLPSE  No results for input(s): INR, PTP, APTT, INREXT, INREXT in the last 72 hours. No results for input(s): FE, TIBC, PSAT, FERR in the last 72 hours. No results found for: FOL, RBCF   No results for input(s): PH, PCO2, PO2 in the last 72 hours. No results for input(s): CPK, CKNDX, TROIQ in the last 72 hours.     No lab exists for component: CPKMB  Lab Results   Component Value Date/Time    Cholesterol, total 140 05/11/2021 04:35 AM    HDL Cholesterol 21 05/11/2021 04:35 AM    LDL, calculated 77.4 05/11/2021 04:35 AM    Triglyceride 208 (H) 05/11/2021 04:35 AM    CHOL/HDL Ratio 6.7 (H) 05/11/2021 04:35 AM     Lab Results   Component Value Date/Time    Glucose (POC) 120 (H) 05/11/2021 12:00 PM    Glucose (POC) 100 05/11/2021 06:04 AM    Glucose (POC) 87 05/11/2021 12:19 AM    Glucose (POC) 83 05/10/2021 06:53 PM    Glucose (POC) 84 05/10/2021 02:07 PM     Lab Results   Component Value Date/Time    Color YELLOW/STRAW 05/06/2021 05:42 PM    Appearance CLEAR 05/06/2021 05:42 PM    Specific gravity 1.008 05/06/2021 05:42 PM    Specific gravity 1.020 03/24/2021 09:30 PM    pH (UA) 5.0 05/06/2021 05:42 PM    Protein 30 (A) 05/06/2021 05:42 PM    Glucose Negative 05/06/2021 05:42 PM    Ketone Negative 05/06/2021 05:42 PM    Bilirubin Negative 05/06/2021 05:42 PM    Urobilinogen 0.2 05/06/2021 05:42 PM    Nitrites Negative 05/06/2021 05:42 PM    Leukocyte Esterase Negative 05/06/2021 05:42 PM    Epithelial cells FEW 05/06/2021 05:42 PM    Bacteria Negative 05/06/2021 05:42 PM    WBC 0-4 05/06/2021 05:42 PM    RBC 0-5 05/06/2021 05:42 PM         CODE STATUS:  x Full Code    DNR    Partial    Comfort Care       Signed:   Stephany Whittington MD

## 2021-05-25 NOTE — CONSULTS
PULMONARY MEDICINE    Initial Physician Consultation Note    Name: Oralia Pavon   : 1995   MRN: [id number]   Date: 2021      Subjective:   Consult Note: 2021   Requesting Physician: Dr. Marina Dye  Reason for consult: Tracheostomy    Medical records and data reviewed. Patient is a 22 y.o. male who presented to the hospital with cardiac arrest. Patient has a complicated medical history including history of polysubstance abuse with MRSA bacteremia and endocarditis and cerebral abscesses who was admitted to an outside hospital in April and ultimately received a tracheostomy and was sent to long-term care facility. He had recurrent arrest in the setting of LV dysfunction and needed to be connected to the ventilator on admission on 2021. He was subsequently weaned off. He has received ICD implantation today. When seen earlier today he was sedated. He has a #8 trach Shiley in place with a Passy-Rosa valve and apparently has been awake previously following commands and participating in some physical therapy. Plans are to consider inpatient rehabilitation. We have been consulted to assist in decannulation. As mentioned above patient is currently sedated and as per review of chart is able to protect airway and is able to handle secretions. Further assessment of mental status will be attempted when sedatives wear off.  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, ICD is in place  Other medical problems per chart      Recommendations:   Downsize trach to cuffless # 6 with Passy-[place] valve and continue downsize with ultimate goals of capping trial to decannulate if tolerated  Other management ongoing per consulting and primary teams  Orders entered  D/W RN    Active Problem List:     Problem List  Never Reviewed        Codes Class    Severe muscle deconditioning ICD-10-CM: R29.898  ICD-9-CM: 781.99 Acute        S/P AVR (aortic valve replacement) and aortoplasty ICD-10-CM: Z95.2  ICD-9-CM: V43.3 Acute        Abscess of aortic root ICD-10-CM: I51.89  ICD-9-CM: 429.89 Acute        Contusion of chest wall ICD-10-CM: S20.219A  ICD-9-CM: 922.1 Acute        Successful cardiopulmonary resuscitation ICD-10-CM: Z92.89  ICD-9-CM: V12.53 Acute        Acute traumatic pain ICD-10-CM: G89.11  ICD-9-CM: 338.11 Acute        Postoperative acute respiratory failure ([facility] [address] ICD-10-CM: Y79.192  ICD-9-CM: 518.51 Acute        Severe protein-calorie malnutrition ([facility] [address] ICD-10-CM: E43  ICD-9-CM: 262         * (Principal) Cardiac arrest with pulseless electrical activity ([facility] [address] ICD-10-CM: I46.9  ICD-9-CM: 427.5         Cerebral abscess (embolic) UUM-28-XE: C93.2  ICD-9-CM: 324.0     Overview Signed 3/30/2021  4:51 PM by Tapan Ibrahim MD     CT and MRI revealed evidence of a right posterior cerebellar artery infarct, 9 x 2.6 cm. He also had several areas of cerebral abscesses in the right temporal and parietal lobes measuring 2 x 1 cm, 1.3 x 0.9 cm and 0.6 x 5.5 mm with associated 3 mm midline shift. CSF showed no organisms to date, but elevated WBC.     3/24/21 CT Normal noncontrast head CT  3/29/21 MRI Extensive multiple acute infarcts throughout the bilateral cerebral hemispheres with a large hemorrhagic right PCA territory infarction measuring up to 7 cm. Some of the additional smaller infarcts also demonstrated hemorrhage. Findings are highly suspicious for septic emboli  3/30/21 CTA Occlusion of the right P2/P3. Infarction within the right occipital and temporal lobes.  Edema related to the right-sided small abscesses, better appreciated on MRI             Drug abuse, cocaine type [facility]) ICD-10-CM: F14.10  ICD-9-CM: 305.60     Overview Signed 3/29/2021  8:34 PM by Gretchen Leyden, MD     3/24/21 UDS + methamphetamines and cocaine             Endocarditis due to methicillin susceptible Staphylococcus aureus (MSSA) ICD-10-CM: I33.0, B95.61  ICD-9-CM: 421.0, 041.11     Overview Addendum 3/30/2021  4:51 PM by Gretchen Leyden, MD     3/24/21 ER Patient with a history of drug use anxiety and seizure not on medications for the past 4 months according to the sister presents for evaluation of altered mental status;  BC x2 + MRSA, WBC 11.6 P-67%, K 4.0 BUN 45, Creat 1.1, Trop I 3.89-> 13.60-> 10.52; UDS + cocaine and amphetamines; Lactic acid 4.2  3/24/21 Intubated      CT head negative      CT chest, neg PE, ? LV mass  3/25/21 ECHO EF 36%, LV 44/45, S/PW 8/9,  Mod AR  3/2/621 RAISSA EF 55%, Vegetations on AV 0.9cm LCC, small perforation 0.4cm LCC. No feg on MV/TV/PV, No thrombus in MELLY             Type 2 myocardial infarction [facility]) ICD-10-CM: I21. A1  ICD-9-CM: 410.90     Overview Signed 3/29/2021  8:37 PM by Gretchen Leyden, MD     Trop I 3.89-> 13.60-> 10.52; Past Medical History:      has a past medical history of Abscess of aortic root (4/16/2021), Acute traumatic pain (4/14/2021), Anxiety, Postoperative acute respiratory failure ([address] (4/1/2021), S/P AVR (aortic valve replacement) and aortoplasty (4/16/2021), Seizure ([address] Severe muscle deconditioning (5/1/2021), and Successful cardiopulmonary resuscitation (4/14/2021). Past Surgical History:      has a past surgical history that includes hx tonsillectomy. Home Medications:     Prior to Admission medications    Medication Sig Start Date End Date Taking? Authorizing Provider   divalprotariq KING (Depakote) 500 mg tablet Take 500 mg by mouth three (3) times daily.    Yes Other, MD Maddy   levETIRAcetam (KEPPRA) 750 mg tablet Take 1 Tab by mouth two (2) times a day. 18  Yes Sonia Garcia PA-C       Allergies/Social/Family History: Allergies   Allergen Reactions    Codeine Unknown (comments)     Pt denies       Social History     Tobacco Use    Smoking status: Current Every Day Smoker     Packs/day: 1.00    Smokeless tobacco: Never Used   Substance Use Topics    Alcohol use: Yes     Comment: socially      No family history on file. Objective:   Vital Signs:  Visit Vitals  /71 (BP 1 Location: Right upper arm, BP Patient Position: At rest)   Pulse 84   Temp 98.2 °F (36.8 °C)   Resp 17   Ht 5' 11\" (1.803 m)   Wt 54.8 kg (120 lb 12.8 oz)   SpO2 100%   BMI 16.85 kg/m²    O2 Flow Rate (L/min): 10 l/min O2 Device: None (Room air) Temp (24hrs), Av.8 °F (36.6 °C), Min:97.3 °F (36.3 °C), Max:98.4 °F (36.9 °C)           Intake/Output:   No intake or output data in the 24 hours ending 21 1530    Physical Exam:   General:  Lethargic, no distress, appears stated age. Head:  Normocephalic, without obvious abnormality, atraumatic. Eyes:  Conjunctivae/corneas clear. PERRL   Neck: # 8 shiley in place-- PM valve in place   Lungs:   Diminished BS. Chest wall:  No tenderness or deformity. Heart:  Regular rate and rhythm, S1-S2 normal, no murmur, no click, rub or gallop. Abdomen:   Soft, non-tender. Bowel sounds present. No masses,  No organomegaly. Extremities: Atraumatic, no cyanosis or edema. Pulses: Palpable   Skin: No rashes or lesions   Neurologic: Weak         LABS AND  DATA: Personally reviewed  Recent Labs     21  0503   WBC 9.6 7.3   HGB 10.0* 10.1*   HCT 32.3* 33.5*    367     Recent Labs     21  0503   *  --    K 3.9  --      --    CO2 26  --    BUN 12  --    CREA 0.42*  --    GLU 99  --    CA 9.9  --    MG 1.6 1.7   PHOS 3.8 3.5     No results for input(s): AP, TBIL, TP, ALB, GLOB, AML, LPSE in the last 72 hours.     No lab exists for component: SGOT, GPT, AMYP  No results for input(s): INR, PTP, APTT, INREXT in the last 72 hours. No results for input(s): PHI, PCO2I, PO2I, FIO2I in the last 72 hours. No results for input(s): CPK, CKMB, TROIQ, BNPP in the last 72 hours. MEDS: Reviewed    Chest Imaging: personally reviewed and report checked    Tele- reviewed    Medical decision making:   I have reviewed the flowsheet and previous day's notes  Patient has acute or chronic illness that poses a threat to life or bodily function  Review and order of Clinical lab tests  Review and Order of Radiology tests  Independent visualization of Image  Reviewed Oxygen/ NiPPV  High Risk Drug therapy requiring intensive monitoring for toxicity: eg steroids, pressors, antibiotics        Thank you for allowing me to participate in this patient's care.     Julianne Haynes MD      [facility]

## 2021-05-26 ENCOUNTER — APPOINTMENT (OUTPATIENT)
Dept: GENERAL RADIOLOGY | Age: 26
DRG: 161 | End: 2021-05-26
Attending: INTERNAL MEDICINE
Payer: MEDICAID

## 2021-05-26 ENCOUNTER — APPOINTMENT (OUTPATIENT)
Dept: GENERAL RADIOLOGY | Age: 26
DRG: 161 | End: 2021-05-26
Attending: PHYSICIAN ASSISTANT
Payer: MEDICAID

## 2021-05-26 LAB
DATE LAST DOSE: ABNORMAL
REPORTED DOSE,DOSE: ABNORMAL UNITS
REPORTED DOSE/TIME,TMG: ABNORMAL
SARS-COV-2, XPLCVT: NOT DETECTED
SOURCE, COVRS: NORMAL
VANCOMYCIN TROUGH SERPL-MCNC: 12.9 UG/ML (ref 5–10)

## 2021-05-26 PROCEDURE — 74011000250 HC RX REV CODE- 250: Performed by: PHYSICIAN ASSISTANT

## 2021-05-26 PROCEDURE — 65660000000 HC RM CCU STEPDOWN

## 2021-05-26 PROCEDURE — 74011250636 HC RX REV CODE- 250/636: Performed by: INTERNAL MEDICINE

## 2021-05-26 PROCEDURE — 74011250636 HC RX REV CODE- 250/636: Performed by: HOSPITALIST

## 2021-05-26 PROCEDURE — 94640 AIRWAY INHALATION TREATMENT: CPT

## 2021-05-26 PROCEDURE — 36415 COLL VENOUS BLD VENIPUNCTURE: CPT

## 2021-05-26 PROCEDURE — 74011250637 HC RX REV CODE- 250/637: Performed by: INTERNAL MEDICINE

## 2021-05-26 PROCEDURE — 74011000250 HC RX REV CODE- 250: Performed by: HOSPITALIST

## 2021-05-26 PROCEDURE — 97535 SELF CARE MNGMENT TRAINING: CPT

## 2021-05-26 PROCEDURE — 71045 X-RAY EXAM CHEST 1 VIEW: CPT

## 2021-05-26 PROCEDURE — 74011250637 HC RX REV CODE- 250/637: Performed by: HOSPITALIST

## 2021-05-26 PROCEDURE — 71046 X-RAY EXAM CHEST 2 VIEWS: CPT

## 2021-05-26 PROCEDURE — 80202 ASSAY OF VANCOMYCIN: CPT

## 2021-05-26 PROCEDURE — 97530 THERAPEUTIC ACTIVITIES: CPT

## 2021-05-26 RX ORDER — BUDESONIDE 0.5 MG/2ML
500 INHALANT ORAL
Status: DISCONTINUED | OUTPATIENT
Start: 2021-05-26 | End: 2021-06-03

## 2021-05-26 RX ORDER — IPRATROPIUM BROMIDE AND ALBUTEROL SULFATE 2.5; .5 MG/3ML; MG/3ML
3 SOLUTION RESPIRATORY (INHALATION)
Status: DISCONTINUED | OUTPATIENT
Start: 2021-05-26 | End: 2021-05-29

## 2021-05-26 RX ADMIN — IVABRADINE 5 MG: 5 TABLET, FILM COATED ORAL at 09:21

## 2021-05-26 RX ADMIN — Medication 10 ML: at 05:32

## 2021-05-26 RX ADMIN — VANCOMYCIN HYDROCHLORIDE 750 MG: 750 INJECTION, POWDER, LYOPHILIZED, FOR SOLUTION INTRAVENOUS at 15:42

## 2021-05-26 RX ADMIN — VANCOMYCIN HYDROCHLORIDE 750 MG: 750 INJECTION, POWDER, LYOPHILIZED, FOR SOLUTION INTRAVENOUS at 23:53

## 2021-05-26 RX ADMIN — OXYCODONE HYDROCHLORIDE 7.5 MG: 5 TABLET ORAL at 21:16

## 2021-05-26 RX ADMIN — MICONAZOLE NITRATE: 20 CREAM TOPICAL at 09:14

## 2021-05-26 RX ADMIN — CASTOR OIL AND BALSAM, PERU: 788; 87 OINTMENT TOPICAL at 17:20

## 2021-05-26 RX ADMIN — Medication 10 ML: at 21:17

## 2021-05-26 RX ADMIN — VANCOMYCIN HYDROCHLORIDE 750 MG: 750 INJECTION, POWDER, LYOPHILIZED, FOR SOLUTION INTRAVENOUS at 06:24

## 2021-05-26 RX ADMIN — Medication 10 ML: at 05:31

## 2021-05-26 RX ADMIN — FENTANYL CITRATE 50 MCG: 50 INJECTION INTRAMUSCULAR; INTRAVENOUS at 00:45

## 2021-05-26 RX ADMIN — IPRATROPIUM BROMIDE AND ALBUTEROL SULFATE 3 ML: .5; 3 SOLUTION RESPIRATORY (INHALATION) at 20:05

## 2021-05-26 RX ADMIN — Medication 1 CAPSULE: at 09:13

## 2021-05-26 RX ADMIN — METOPROLOL SUCCINATE 12.5 MG: 25 TABLET, EXTENDED RELEASE ORAL at 09:13

## 2021-05-26 RX ADMIN — SACUBITRIL AND VALSARTAN 1 TABLET: 24; 26 TABLET, FILM COATED ORAL at 09:13

## 2021-05-26 RX ADMIN — OXYCODONE HYDROCHLORIDE 7.5 MG: 5 TABLET ORAL at 13:33

## 2021-05-26 RX ADMIN — Medication 3 MG: at 21:16

## 2021-05-26 RX ADMIN — TRAZODONE HYDROCHLORIDE 50 MG: 50 TABLET ORAL at 00:45

## 2021-05-26 RX ADMIN — Medication 10 ML: at 13:40

## 2021-05-26 RX ADMIN — LEVETIRACETAM 1500 MG: 500 TABLET ORAL at 17:19

## 2021-05-26 RX ADMIN — SACUBITRIL AND VALSARTAN 1 TABLET: 24; 26 TABLET, FILM COATED ORAL at 21:16

## 2021-05-26 RX ADMIN — HEPARIN SODIUM 5000 UNITS: 5000 INJECTION INTRAVENOUS; SUBCUTANEOUS at 13:32

## 2021-05-26 RX ADMIN — MICONAZOLE NITRATE: 20 CREAM TOPICAL at 17:20

## 2021-05-26 RX ADMIN — LEVETIRACETAM 1500 MG: 500 TABLET ORAL at 09:13

## 2021-05-26 RX ADMIN — HEPARIN SODIUM 5000 UNITS: 5000 INJECTION INTRAVENOUS; SUBCUTANEOUS at 05:31

## 2021-05-26 RX ADMIN — CASTOR OIL AND BALSAM, PERU: 788; 87 OINTMENT TOPICAL at 09:14

## 2021-05-26 RX ADMIN — HEPARIN SODIUM 5000 UNITS: 5000 INJECTION INTRAVENOUS; SUBCUTANEOUS at 21:17

## 2021-05-26 RX ADMIN — FENTANYL CITRATE 50 MCG: 50 INJECTION INTRAMUSCULAR; INTRAVENOUS at 11:35

## 2021-05-26 RX ADMIN — OXYCODONE HYDROCHLORIDE 7.5 MG: 5 TABLET ORAL at 17:20

## 2021-05-26 RX ADMIN — BUDESONIDE 500 MCG: 0.5 INHALANT RESPIRATORY (INHALATION) at 20:06

## 2021-05-26 RX ADMIN — FENTANYL CITRATE 50 MCG: 50 INJECTION INTRAMUSCULAR; INTRAVENOUS at 05:30

## 2021-05-26 NOTE — PROGRESS NOTES
Problem: Falls - Risk of  Goal: *Absence of Falls  Description: Document Elyssa Mcclelland Fall Risk and appropriate interventions in the flowsheet.   Outcome: Progressing Towards Goal  Note: Fall Risk Interventions:       Mentation Interventions: Door open when patient unattended, Evaluate medications/consider consulting pharmacy    Medication Interventions: Evaluate medications/consider consulting pharmacy    Elimination Interventions: Call light in reach, Patient to call for help with toileting needs, Urinal in reach

## 2021-05-26 NOTE — PROGRESS NOTES
Pharmacist Note - Vancomycin Dosing  Therapy day 13 (in-house)  Indication: MRSA aortic valve endocarditis (treat thorough 5/28)   - recent MRSA bacteremia with MSSA endocarditis, and multiple septic cerebral emboli from admission in March 2021   - s/p AVR 4/16   - treated with vancomycin here 5/6 -5/11 for possible sepsis; [facility] paperwork notes 6 week course of vancomycin through 5/28   Current regimen: 750 mg IV Q 8hrs    Recent Labs     05/24/21  2338   WBC 9.6   CREA 0.42*   BUN 12     A Trough Level resulted at 12.9 mcg/mL which was obtained 9.17 hrs post-dose. The extrapolated \"true\" trough is approximately 14.83 mcg/mL based on the patient's known kinetics. Goal trough: 15 - 20 mcg/mL       Recent trough history (date/time/level/dose/action taken):  - 5/17 @ 0349 = 21.9 mcg/ml (~7h post-dose, extrap to ~19 mcg/mL) - change to 1000mg q8h  - 5/19 @ 0536 = 17.7 mcg/ml (~6.75 hours post-dose, extrap to 14.8 mcg/mL [1000mg q8h]) - continued  - 5/24 @ 2338 = 9.2 mcg/mL (~17.5 hrs post dose, extrap to 24.5 mcg/mL [1000mg q8h]) - changed to 750mg q8h  - 5/26 @ 1534 = 12.9 mcg/mL (~9.17hrs post-dose, extrap to 14.83 mcg/mL [750mg q8h]) - continue    Plan: Patient just under trough goal. Afebrile and WBCs WNL. Continue current regimen. Pharmacy will continue to monitor this patient daily for changes in clinical status and renal function.     Blayne Garcia, PharmD  Clinical Pharmacist, Orthopedics and Med/Surg  [id number] [address] ()

## 2021-05-26 NOTE — PROGRESS NOTES
Subjective:   \"I am too tired\". Objective:   Chart reviewed. Patient received supine in bed stating fatigued, about to fall asleep and in pain. Informed of OT and benefits, schedule of 9 a.m. to get up and eat breakfast. Patient deferring due to muscular chest pain, asking for Fentanyl at first then with discussion. Then stating L UE to digits pain. Instruction on the types of pain and how each one has to be addressed differently. POD#1 pacemaker as well. Instruction on benefits mobilizing, exercising and performing ADLs. Declining breakfast, instruction on benefits of nutrition for healing and energy. As prepped room, chair, and breakfast transport came to take patient to xray. Upon re-entering room patient awake, conversant, wanting to leave AMA/ good self advocation and collaboration. Re-oriented to progress made alone at [facility], medical benefits of goals addressing now, small goals to achieve for bigger picture. Patient with buy in at that time and could then collaborate on care moving forward. Education provided (2* poor cognition), \"I can\" schedule posted again (2* just moved to new room) but upgraded to OOB to chair 1x/day, exercise arms and legs, and bath self. Patient then agreeable to work with therapy, stand, get out of bed to chair. Assessment:   Brain injury tips for more buy in/participationg to his self care:   -Recommend one person talking to patient at a time.   -Keeping schedule  -Day/night schedule   -Give and take on his ADLs in which patient has some say to direct care /time of day to complete but also firm it needs to be completed. -Encourage self completion of ADLs. Thank you for your assistance.      This L hand dominant male continues with skilled OT services and is progressing towards goals physically, cognitively and emotionally.  ADLs limited by L UE 0/5 strength, R UE -3/5 shoulder and B LEs, +3/5 elbow-digits, sitting tolerance, static sitting balance, pain management (orthopedically back, chest; neurologically L UE), cardiopulmonary tolerance (trach, room air), cognition (short term memory loss, attention to task, complex processing, problem solving, self initiation with difficult tasks, early termination of task not self motivated by, requires backwards chaining, small achievable goals) and behavior (requires external cues to participate in self care, learned dependency, potentially depressed and could use behavioral and medication management. Current Level of Function Impacting Discharge (ADLs): max assistance overall upper body ADLs; max assistance lower body ADLs; max assistance bed mobility     PLAN :  Patient continues to benefit from skilled intervention to address the above impairments.  Continue treatment per established plan of care to address goals.     Recommendation for discharge: (in order for the patient to meet his/her long term goals)  Therapy 3 hours per day 5-7 days per week to address medical complexities, utilized eStimulation ([employer], [employer], [employer]), progress patient in a \"well\" environment that is structured to assist with the physical, cognitive and emotional deficits occurring.  Patient is working towards tolerating 3 hours of therapy.     Total time 11 mins

## 2021-05-26 NOTE — PROGRESS NOTES
.  [address] [facility]    PATIENT ID: Giancarlo Bolden  MRN: [id number]   YOB: 1995    PRIMARY CARE PROVIDER: None   DATE OF ADMISSION: 5/6/2021  5:22 PM    ATTENDING PHYSICIAN: Nathan Batista MD  CONSULTATIONS:   IP CONSULT TO INTENSIVIST  IP CONSULT TO NEUROLOGY  IP CONSULT TO PODIATRY  IP CONSULT TO PODIATRY  IP CONSULT TO INFECTIOUS DISEASES  IP CONSULT TO NEUROLOGY  IP CONSULT TO OTOLARYNGOLOGY  IP CONSULT TO CARDIOLOGY  IP CONSULT TO PULMONOLOGY    PROCEDURES/SURGERIES:   Procedure(s):  INSERT ICD DUAL  Eval Icd Generator & Leads W Testing At Implant    [address]: Cardiac arrest with pulseless electrical activity [facility] PROBLEM LIST:  Patient Active Problem List   Diagnosis Code    Endocarditis due to methicillin susceptible Staphylococcus aureus (MSSA) I33.0, B95.61    Drug abuse, cocaine type ([facility] F14.10    Type 2 myocardial infarction ([facility] [facility] I21. A1    Cerebral abscess (embolic) P98.6    Cardiac arrest with pulseless electrical activity (HCC) I46.9    Severe protein-calorie malnutrition (HCC) E43    Postoperative acute respiratory failure (HCC) J95.821    Severe muscle deconditioning R29.898    Successful cardiopulmonary resuscitation Z92.89    Acute traumatic pain G89.11    S/P AVR (aortic valve replacement) and aortoplasty Z95.2    Abscess of aortic root I51.89    Contusion of chest wall S20.219A         Brief HPI and Hospital Course:      Giancarlo Bolden is a 22 y.o. male with h/o Seizures and anxiety who presented to [address] [place] [address] ED after a cardiac arrest at [facility] around 1500 5/6/2021. Hx from chart and speaking with patient's sister via phone. Patient had recent hospitalization at the [facility] in March of this year. Initial hospitalization admission was at [facility] on 3/24 with AMS in setting of polysubstance and IVD use (cocaine, heroine, methamphetamines).  He was found to have septic MRSA bacteremia, MRSA endocarditis. Imaging and MRI also found R PCA infarct and several abscesses and right temporal and parietal lobes. Infarcts thought to be due to septic emboli and patient had left sided residual weakness. Patient was transferred to [facility] on 4/1/2021 for evaluation for valve surgery. After transfer he was found to have a New R MCA infarct Also had a type 2 NSTEMI and tamponade with pericardiocentesis done prior to surgery. Did have cardiac arrest on day of surgery. Had a bioprosthetic Aortic Valve Replacement and Aortic Root Abscess Debridement done on 4/16/2021. Trached and sent to [facility] on 4/29/2021. Was undergoing PT and vent wean. Sister stated that patient was able to be off the vent for several hours over the last few days. He was sitting up and able to talk with valve over trach and could follow commands. Stated that he had severe weakness of the left upper ext, but was starting to gain some mobility in the lower left ext. Patient's mother visited patient today prior to arrest. She said the staff told her that the patient was getting very anxious earlier in the day around 1 pm and had to be placed back on the ventilator, and then they had to sedate him while he was on the ventilator. The mother stated \" he did not look good\" and \" his eye were rolled back in his head\". About 30 minutes after she left she got call about arrest. Per ED note patient was found unresponsive around the 1500 hour and was pulseless. Two rounds of CPR and meds were given and patient was defibrillated x 1 before ROSC. Patient was hypoxic post arrest. Unknown down time prior to arrest.    Subjective/HPI    Pt awake, has ongoing pain. Refused to work with PT/OT per RN.       Assessment and Plan:  V Fib Cardiac arrest - 5/6/21 on admission:   .  Echo showed mild improvement, LVEF 30-35%   -per cardiologist : on [address] until 5/28  for endocarditis   -s/p ICD on 5/25   -Add dapagliflozin on dc as not on formulary     Acute on chronic  respiratory failure, resolved  Pneumothorax post ICD placement  [facility] and was [facility] further [facility]. He was on ventilator while in ICU and now transitioned to trach collar. -CXR showed Very minimal left apical pneumothorax. Repeat CXR 5/26 showed L-sided pneumothorax has increased . Pt is currently stable on RA. Repeat CXR has been ordered  -trach downsize to cuffless #6 with Passy-Rosa valveon 5/25  - on RA now for > 48 hours and maintaining airways without difficulty  - pulmonary following, plan to change trach size. MRSA aortic  Valve endocardititis   -Found to have MRSA bacteremia and MSSA endocarditis end of march. -Hx:  4/16/21 at [facility] bioprosthetic AVR and root abscess debridement. He also had a pericardiocentesis due to tamponade prior to surgery  - Reviewed [facility] record, he was on IV vanco at [facility] and planned to  have a 6 weeks of   tx and EOT 5/28/21 per record. - Continue vancomycin until 5/28/21, appreciate ID's recs    Pain management : contusion of chest wall, left side, chronic back pain . H/o drug abuse. Stop fentanyl to 50mcg q6h prn, cont' oxycodone prn. Cautious with narcotics. Bacterial pneumonia  severe multilevel consolidation throughout both lungs consistent with severe multilobar PNA. -sputum culture E coli with ESBL   -per ID -Plan  Continue meropenem with plan for 7 to 10-day course started 5/11--5/22, will dc and cont vanc for Endocarditis   -pulmonary toilet   -trach care   -ID following    # Systolic congestive heart failure/CM  Ef 25%   Hold coreg,entresto ,Aldactone, BP better today  Echo EF showed 25-30% on 5/17  Cont' Toprol Xl, Entresto  Cardiologist following     H/o right MCA infarct and several brain abscesses in the right temporal and parietal lobes. Infarcts were thought to be due to septic emboli. He had left sided residual weakness.      cta 5/6/21:  multiple acute infarctions throughout the  right cerebral hemisphere, involving much of the occipital lobe, as well as much  of the frontal lobe. There is an additional superior right frontoparietal Infarct  Evaluated by neurology, cont' AC for CVA preventing moving forward. Cont' current anticonvulsants. No seizures on EEG. Neurologist consulted                -PT/OT/SLP               - Stroke education              - SBP goal 100-160              Seizure DZ:  Continue keppra -1500 mg BID, off valproic acid as subtherapeutic due to meropenem interaction,so valproic acid discontinued      Severe muscle deconditioning  Severe protein-calorie malnutrition   Cerebral abscess (embolic)   H/o substance abuse      fulll code     PT/OT, I think patient can go to inpatient rehab at this point after completing antibiotics        PHYSICAL EXAMINATION:  Visit Vitals  BP (!) 88/57 (BP 1 Location: Right upper arm, BP Patient Position: At rest)   Pulse 85   Temp 98.2 °F (36.8 °C)   Resp 17   Ht 5' 11\" (1.803 m)   Wt 54.8 kg (120 lb 12.8 oz)   SpO2 98%   BMI 16.85 kg/m²       General:          Cachetic male in bed, chronically ill appearing  HEENT:           Atraumatic,trach capped        Neck:               Supple,   Lungs: No wheezing. Midline sternal surgical scar, no crackles. Heart:              Regular  rhythm,normal rate   No edema  Abdomen:       Soft, non-tender. Not distended. Bowel sounds normal  Extremities:     No LE edema  Skin:                Not pale. Not Jaundiced  No rashes         Neurologic:      Alert, oriented X 3, unable to move LUE , able to bend LLE at knee, moves RUE and RLE     Labs:     Recent Labs     05/24/21  2338   WBC 9.6   HGB 10.0*   HCT 32.3*        Recent Labs     05/24/21  2338   *   K 3.9      CO2 26   BUN 12   CREA 0.42*   GLU 99   CA 9.9   MG 1.6   PHOS 3.8     No results for input(s): ALT, AP, TBIL, TBILI, TP, ALB, GLOB, GGT, AML, LPSE in the last 72 hours.     No lab exists for component: SGOT, GPT, AMYP, HLPSE  No results for input(s): INR, PTP, APTT, INREXT, INREXT in the last 72 hours. No results for input(s): FE, TIBC, PSAT, FERR in the last 72 hours. No results found for: FOL, RBCF   No results for input(s): PH, PCO2, PO2 in the last 72 hours. No results for input(s): CPK, CKNDX, TROIQ in the last 72 hours.     No lab exists for component: CPKMB  Lab Results   Component Value Date/Time    Cholesterol, total 140 05/11/2021 04:35 AM    HDL Cholesterol 21 05/11/2021 04:35 AM    LDL, calculated 77.4 05/11/2021 04:35 AM    Triglyceride 208 (H) 05/11/2021 04:35 AM    CHOL/HDL Ratio 6.7 (H) 05/11/2021 04:35 AM     Lab Results   Component Value Date/Time    Glucose (POC) 120 (H) 05/11/2021 12:00 PM    Glucose (POC) 100 05/11/2021 06:04 AM    Glucose (POC) 87 05/11/2021 12:19 AM    Glucose (POC) 83 05/10/2021 06:53 PM    Glucose (POC) 84 05/10/2021 02:07 PM     Lab Results   Component Value Date/Time    Color YELLOW/STRAW 05/06/2021 05:42 PM    Appearance CLEAR 05/06/2021 05:42 PM    Specific gravity 1.008 05/06/2021 05:42 PM    Specific gravity 1.020 03/24/2021 09:30 PM    pH (UA) 5.0 05/06/2021 05:42 PM    Protein 30 (A) 05/06/2021 05:42 PM    Glucose Negative 05/06/2021 05:42 PM    Ketone Negative 05/06/2021 05:42 PM    Bilirubin Negative 05/06/2021 05:42 PM    Urobilinogen 0.2 05/06/2021 05:42 PM    Nitrites Negative 05/06/2021 05:42 PM    Leukocyte Esterase Negative 05/06/2021 05:42 PM    Epithelial cells FEW 05/06/2021 05:42 PM    Bacteria Negative 05/06/2021 05:42 PM    WBC 0-4 05/06/2021 05:42 PM    RBC 0-5 05/06/2021 05:42 PM         CODE STATUS:  x Full Code    DNR    Partial    Comfort Care       Signed:   Poncho Maldonado MD

## 2021-05-26 NOTE — PROGRESS NOTES
Problem: Mobility Impaired (Adult and Pediatric)  Goal: *Acute Goals and Plan of Care (Insert Text)  Description: FUNCTIONAL STATUS PRIOR TO ADMISSION: Patient was independent prior to March 2021. Pt has been hospitalized and recently at [facility]. Per mother, pt was ambulating with therapy at [facility]. HOME SUPPORT PRIOR TO ADMISSION: The patient lived alone prior to hospitalization. Physical Therapy Goals  Revised 5/17/2021; goals revised as appropriate 5/24/21  1. Patient will move from supine to sit and sit to supine , scoot up and down, and roll side to side in bed with minimal assistance/contact guard assist within 7 day(s). 2.  Patient will tolerate sitting EOB with contact guard assistance for approx 5 minutes within 7 day(s). 3.  Patient will transfer from bed to chair and chair to bed with moderate assistance using the least restrictive device within 7 day(s). 4.  Patient will perform sit to stand with moderate assistance within 7 day(s). 5.  Patient will ambulate with maximal assistance for 5 feet with the least restrictive device within 7 day(s). Initiated 5/9/2021  1. Patient will move from supine to sit and sit to supine , scoot up and down, and roll side to side in bed with moderate assistance  within 7 day(s). GOAL MET 5/17/21  2. Patient will tolerate sitting EOB with moderate assistance for approx 3-5 minutes within 7 day(s). GOAL MET 5/17/21  3. Patient will transfer from bed to chair and chair to bed with maximal assistance using the least restrictive device within 7 day(s). 4.  Patient will perform sit to stand with maximal assistance within 7 day(s). 5.  Patient will ambulate with maximal assistance for 5 feet with the least restrictive device within 7 day(s).          Outcome: Progressing Towards Goal   PHYSICAL THERAPY TREATMENT  Patient: Heather Rendon (12 y.o. male)  Date: 5/26/2021  Diagnosis: Cardiac arrest [facility]) [I46.9] Cardiac arrest with pulseless electrical activity (HCC)  Procedure(s) (LRB):  INSERT ICD DUAL (N/A)  Eval Icd Generator & Leads W Testing At Implant (N/A) 1 Day Post-Op  Precautions: Fall, Skin  Chart, physical therapy assessment, plan of care and goals were reviewed. ASSESSMENT  Patient continues with skilled PT services and is progressing towards goals. Patient continues to demonstrated decreased activity tolerance and weakness, requires encouragement to transfer to chair. Pt performed 2 trials of sit<>stand using RW for RUE support. On second trial, pt able to transfer to chair toward R side requiring assistance to control descent into chair. Propped legs onto chair and RN aware of level of assistance required. Will continue to progress transfers and standing tolerance. Current Level of Function Impacting Discharge (mobility/balance): mod A for transfers    Other factors to consider for discharge: fall risk, weakness, able to progress to standing/transfers         PLAN :  Patient continues to benefit from skilled intervention to address the above impairments. Continue treatment per established plan of care. to address goals. Recommendation for discharge: (in order for the patient to meet his/her long term goals)  Therapy 3 hours per day 5-7 days per week  If pt were to d/c home, would benefit from HHPT and require assistance/supervision for all mobility as pt is a fall risk      This discharge recommendation:  Has been made in collaboration with the attending provider and/or case management    IF patient discharges home will need the following DME: hospital bed and wheelchair       SUBJECTIVE:   Patient stated Darcella Climes you help me.     OBJECTIVE DATA SUMMARY:   Critical Behavior:  Neurologic State: Alert  Orientation Level: Oriented X4  Cognition: Follows commands  Safety/Judgement: Awareness of environment  Functional Mobility Training:  Bed Mobility:     Supine to Sit: Moderate assistance              Transfers:  Sit to Stand:  Moderate assistance; Adaptive equipment; Additional time;Assist x1  Stand to Sit: Moderate assistance; Adaptive equipment; Additional time;Assist x1    Balance:  Sitting: Impaired; Without support  Sitting - Static: Good (unsupported)  Sitting - Dynamic: Fair (occasional)  Standing: Impaired; With support  Standing - Static: Fair;Constant support  Standing - Dynamic : Fair;Constant support         Activity Tolerance:   Fair and requires rest breaks    After treatment patient left in no apparent distress:   Sitting in chair, Call bell within reach, and Bed / chair alarm activated    COMMUNICATION/COLLABORATION:   The patients plan of care was discussed with: Registered nurse and Rehabilitation technician.      Robbie Jimenez PT, DPT   Time Calculation: 15 mins

## 2021-05-26 NOTE — PROGRESS NOTES
Bedside shift change report given to cindy (oncoming nurse) by Lupillo Crane (offgoing nurse). Report included the following information SBAR, Kardex and Intake/Output.

## 2021-05-26 NOTE — PROGRESS NOTES
Cardiology Progress Note                                        Admit Date: 5/6/2021    Assessment/Plan:     1. Sp cardiac arrest post op 4/17/21,   cardiac arrest Vfib 5/6/21  ekg rbbb qt 460 msec. Sp ICD  2. Pneumothorax:  Small but radiology rec fu until resolution  cxr portable in am      3. Hx:  4/16/21 at [facility] bioprosthetic AVR and root abscess debridement. He also had a pericardiocentesis due to tamponade prior to surgery  4. Recent endocardititis 3/24/21 Staph  5. CM  Ef 25%   6.  right MCA infarct and several abscesses in the right temporal and parietal lobes. Infarcts were thought to be due to septic emboli. He had left sided residual weakness. cta 5/6/21:  multiple acute infarctions throughout the  right cerebral hemisphere, involving much of the occipital lobe, as well as much  of the frontal lobe. There is an additional superior right frontoparietal  infarct         Tad Dan is a 22 y.o. male with     PROBLEM LIST:  Patient Active Problem List    Diagnosis Date Noted    Severe muscle deconditioning 05/01/2021    S/P AVR (aortic valve replacement) and aortoplasty 04/16/2021    Abscess of aortic root 04/16/2021    Contusion of chest wall 04/16/2021    Successful cardiopulmonary resuscitation 04/14/2021    Acute traumatic pain 04/14/2021    Postoperative acute respiratory failure ([address] 04/01/2021    Severe protein-calorie malnutrition ([address] 05/11/2021    Cardiac arrest with pulseless electrical activity ([address] 05/06/2021    Cerebral abscess (embolic) 12/55/7456    Drug abuse, cocaine type ([address] 03/29/2021    Endocarditis due to methicillin susceptible Staphylococcus aureus (MSSA) 03/25/2021    Type 2 myocardial infarction ([address] 03/24/2021         Subjective:     Tad Dan denies dyspnea.     Visit Vitals  /65 (BP 1 Location: Right upper arm, BP Patient Position: At rest)   Pulse 82   Temp 97.9 °F (36.6 °C)   Resp 15   Ht 5' 11\" (1.803 m)   Wt 54.8 kg (120 lb 12.8 oz)   SpO2 98%   BMI 16.85 kg/m²     No intake or output data in the 24 hours ending 05/26/21 1113    Objective:      Physical Exam:  HEENT: Perrla, EOMI  Neck: No JVD,  No thyroidmegaly  Resp: CTA bilaterally; No wheezes or rales  CV: RRR s1s2 No murmur no s3  Abd:Soft, Nontender  Ext: No edema  Neuro: Alert and oriented; Nonfocal  Skin: Warm, Dry, Intact  Pulses: 2+ DP/PT/Rad      Telemetry: normal sinus rhythm    Current Facility-Administered Medications   Medication Dose Route Frequency    Vancomycin, Trough - Please draw IMMEDIATELY PRIOR to starting 1500 dose on Wednesday, 5/26. Thanks!    Other ONCE    vancomycin (VANCOCIN) 750 mg in 0.9% sodium chloride 250 mL (VIAL-MATE)  750 mg IntraVENous Q8H    sodium chloride (NS) flush 5-40 mL  5-40 mL IntraVENous Q8H    sodium chloride (NS) flush 5-40 mL  5-40 mL IntraVENous PRN    metoprolol succinate (TOPROL-XL) XL tablet 12.5 mg  12.5 mg Oral DAILY    Vancomycin - Pharmacy Dosing   Other Rx Dosing/Monitoring    ivabradine (CORLANOR) tablet 5 mg  5 mg Oral BID WITH MEALS    fentaNYL citrate (PF) injection 50 mcg  50 mcg IntraVENous Q6H PRN    levETIRAcetam (KEPPRA) tablet 1,500 mg  1,500 mg Oral BID    sodium chloride (AYR SALINE) 0.65 % nasal drops 2 Drop  2 Drop Left Nostril Q2H PRN    oxyCODONE IR (ROXICODONE) tablet 7.5 mg  7.5 mg Oral Q4H PRN    lidocaine 4 % patch 1 Patch  1 Patch TransDERmal Q24H    naloxone (NARCAN) injection 0.1 mg  0.1 mg IntraVENous PRN    miconazole (MICOTIN) 2 % cream   Topical BID    traZODone (DESYREL) tablet 50 mg  50 mg Oral QHS PRN    albuterol-ipratropium (DUO-NEB) 2.5 MG-0.5 MG/3 ML  3 mL Nebulization Q4H PRN    [Held by provider] spironolactone (ALDACTONE) tablet 12.5 mg  12.5 mg Oral DAILY    hydrALAZINE (APRESOLINE) 20 mg/mL injection 10 mg  10 mg IntraVENous Q6H PRN    sacubitriL-valsartan (ENTRESTO) 24-26 mg tablet 1 Tab  1 Tablet Oral Q12H    L.acidophilus-paracasei-S.thermophil-bifidobacter (RISAQUAD) 8 billion cell capsule  1 Capsule Nasogastric DAILY    heparin (porcine) injection 5,000 Units  5,000 Units SubCUTAneous Q8H    melatonin tablet 3 mg  3 mg Oral QHS    0.9% sodium chloride infusion 250 mL  250 mL IntraVENous PRN    balsam peru-castor oiL (VENELEX) ointment   Topical BID    sodium chloride (NS) flush 5-40 mL  5-40 mL IntraVENous Q8H    sodium chloride (NS) flush 5-40 mL  5-40 mL IntraVENous PRN    acetaminophen (TYLENOL) tablet 650 mg  650 mg Oral Q6H PRN    Or    acetaminophen (TYLENOL) suppository 650 mg  650 mg Rectal Q6H PRN    polyethylene glycol (MIRALAX) packet 17 g  17 g Oral DAILY PRN    ondansetron (ZOFRAN) injection 4 mg  4 mg IntraVENous Q6H PRN    glucose chewable tablet 16 g  4 Tablet Oral PRN    dextrose (D50W) injection syrg 12.5-25 g  25-50 mL IntraVENous PRN    glucagon (GLUCAGEN) injection 1 mg  1 mg IntraMUSCular PRN         Data Review:   Labs:    Recent Results (from the past 24 hour(s))   SARS-COV-2    Collection Time: 05/25/21  7:37 PM   Result Value Ref Range    SARS-CoV-2 Please find results under separate order

## 2021-05-26 NOTE — PROGRESS NOTES
Came to room to assess patient for down sizing of trach. Sutures in trach, patients first trach change was not done by doctor.

## 2021-05-26 NOTE — PROGRESS NOTES
Comprehensive Nutrition Assessment    Type and Reason for Visit: Reassess    Nutrition Recommendations/Plan:    1. Encourage good protein intake with all meals  2. Weekly weights    Nutrition Assessment:    Pt admitted from [facility] d/t Cardiac arrest. PMHx: Polysubstance abuse, bacteremia, endocarditis s/p AVR, cerebral abscess (embolic), respiratory failure s/p trach and PEG placement. PNA on admit. Cardiogenic shock on admission with pt on vent via trach - now off. S/p ICD placement yesterday. Pt cleared by SLP for regular, thin liquids on 5/14. Pt visited today but sleeping soundly on visit. Spoke with RN and staff who reports pt eating well with meals. Multiple milk cartons at bedside so will add whole milk on all trays and increase Ensure to TID (1050kcal, 40g protein) - prefers chocolate. UBW ~180# [facility]. Wt this admission on 5/6 was 63kg down to 55kg today with bedscale used. Malnutrition Assessment:  Malnutrition Status:  Severe malnutrition    Context:  Acute illness     Findings of the 6 clinical characteristics of malnutrition:   Energy Intake:  Mild decrease in energy intake (specify)  Weight Loss:  7.00 - Greater than 7.5% over 3 months     Body Fat Loss:  7 - Moderate body fat loss, Fat overlying ribs, Buccal region   Muscle Mass Loss:  7 - Moderate muscle mass loss, Thigh (quadraceps), Clavicles (pectoralis & deltoids), Calf  Fluid Accumulation:  No significant fluid accumulation,     Strength:  Not performed     Nutritionally Significant Medications:  Risaquad, keppra, vanco; PRN: zofran, miralax, trazadone    Estimated Daily Nutrient Needs:  Energy (kcal): 2060 (MSJ x 1.2 x 1.1); Weight Used for Energy Requirements: Current  Protein (g): 80 (1.5 g/kg);  Weight Used for Protein Requirements: Current  Fluid (ml/day): 2060; Method Used for Fluid Requirements: 1 ml/kcal    Nutrition Related Findings:       BM: 5/26  Edema: None   Wounds:  None      Current Nutrition Therapies:   Diet: Cardiac  Supplements: Ensure Enlive (chocolate)  Additional Caloric Sources: None    Meal intake:   Patient Vitals for the past 168 hrs:   % Diet Eaten   05/24/21 0820 76 - 100%   05/21/21 0823 76 - 100%     Anthropometric Measures:  · Height:  5' 11\" (180.3 cm)  · Current Body Wt:  55 kg (121 lb 4.1 oz)   · Admission Body Wt:  125 lb 3.5 oz      · Ideal Body Wt:  172:  70.5 %   · BMI Categories:  Underweight (BMI less than 18.5)     Wt Readings:   05/23/21 54.8 kg (120 lb 12.8 oz)   03/31/21 75 kg (165 lb 5.5 oz)   10/30/20 77.1 kg (169 lb 15.6 oz)   07/15/20 76.2 kg (168 lb)   08/18/19 81.6 kg (180 lb)     Nutrition Diagnosis:   · Inadequate protein-energy intake (improving) related to cognitive or neurological impairment, catabolic illness as evidenced by BMI (Consuming >75% meals)    Nutrition Interventions:   Food and/or Nutrient Delivery: Modify oral nutrition supplement  Nutrition Education and Counseling: No recommendations at this time  Coordination of Nutrition Care: Continue to monitor while inpatient    Goals:  Consumption of at least 75% meals and 2 supplements/day       Nutrition Monitoring and Evaluation:   Behavioral-Environmental Outcomes: None identified  Food/Nutrient Intake Outcomes: Food and nutrient intake, Supplement intake  Physical Signs/Symptoms Outcomes: Biochemical data, Weight, Meal time behavior    Discharge Planning:    Continue oral nutrition supplement, Continue current diet     Len Alex, RD  [address] , Pager #[phone] or via [employer]

## 2021-05-26 NOTE — PROGRESS NOTES
RT was called and notify about pulmonology order to downsize his trach cannula,   Patient also seem to be bit wheezy, provider aware of it, ordering some breathing TX.

## 2021-05-26 NOTE — ROUTINE PROCESS
Bedside shift change report given to Taiow Springer (oncoming nurse) by Sajan Ruff (offgoing nurse). Report included the following information SBAR, Kardex, MAR and Recent Results.

## 2021-05-26 NOTE — PROGRESS NOTES
PULMONARY MEDICINE    Progress Note    Name: Letitia Jain   : 1995   MRN: [id number]   Date: 2021      Subjective:       On PMV  Pain is of most concern to him  Chest x-ray shows a very small PTX     Consult Note:   Requesting Physician: Dr. Annika Clement  Reason for consult: Tracheostomy    Medical records and data reviewed. Patient is a 22 y.o. male who presented to the hospital with cardiac arrest. Patient has a complicated medical history including history of polysubstance abuse with MRSA bacteremia and endocarditis and cerebral abscesses who was admitted to an outside hospital in April and ultimately received a tracheostomy and was sent to long-term care facility. He had recurrent arrest in the setting of LV dysfunction and needed to be connected to the ventilator on admission on 2021. He was subsequently weaned off. He has received ICD implantation today. When seen earlier today he was sedated. He has a #8 trach Shiley in place with a Passy-Rosa valve and apparently has been awake previously following commands and participating in some physical therapy. Plans are to consider inpatient rehabilitation. We have been consulted to assist in decannulation. As mentioned above patient is currently sedated and as per review of chart is able to protect airway and is able to handle secretions. Further assessment of mental status will be attempted when sedatives wear off.  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, ICD is in place  Hx of asthma  Smoker   Other medical problems per chart      Recommendations:   Downsize trach to cuffless # 6 with Passy-[place] valve and continue downsize with ultimate goals of capping trial to decannulate if tolerated; can be down at inpatiant rehab   Other management ongoing per consulting and primary teams  O2 titration above 90%  Add duonebs and pulmicort   D/W RN  Repeat chest x-ray this afternoon; less likely that ptx will worsen but we will keep an eye on it     Active Problem List:     Problem List  Never Reviewed        Codes Class    Severe muscle deconditioning ICD-10-CM: R29.898  ICD-9-CM: 781.99 Acute        S/P AVR (aortic valve replacement) and aortoplasty ICD-10-CM: Z95.2  ICD-9-CM: V43.3 Acute        Abscess of aortic root ICD-10-CM: I51.89  ICD-9-CM: 429.89 Acute        Contusion of chest wall ICD-10-CM: S20.219A  ICD-9-CM: 922.1 Acute        Successful cardiopulmonary resuscitation ICD-10-CM: Z92.89  ICD-9-CM: V12.53 Acute        Acute traumatic pain ICD-10-CM: G89.11  ICD-9-CM: 338.11 Acute        Postoperative acute respiratory failure ([facility] [address] ICD-10-CM: G06.638  ICD-9-CM: 518.51 Acute        Severe protein-calorie malnutrition ([facility] [address] ICD-10-CM: E43  ICD-9-CM: 262         * (Principal) Cardiac arrest with pulseless electrical activity ([facility] [address] ICD-10-CM: I46.9  ICD-9-CM: 427.5         Cerebral abscess (embolic) [id number]: A29.5  ICD-9-CM: 324.0     Overview Signed 3/30/2021  4:51 PM by Solange Carpenter MD     CT and MRI revealed evidence of a right posterior cerebellar artery infarct, 9 x 2.6 cm. He also had several areas of cerebral abscesses in the right temporal and parietal lobes measuring 2 x 1 cm, 1.3 x 0.9 cm and 0.6 x 5.5 mm with associated 3 mm midline shift.  CSF showed no organisms to date, but elevated WBC.     3/24/21 CT Normal noncontrast head CT  3/29/21 MRI Extensive multiple acute infarcts throughout the bilateral cerebral hemispheres with a large hemorrhagic right PCA territory infarction measuring up to 7 cm. Some of the additional smaller infarcts also demonstrated hemorrhage. Findings are highly suspicious for septic emboli  3/30/21 CTA Occlusion of the right P2/P3. Infarction within the right occipital and temporal lobes. Edema related to the right-sided small abscesses, better appreciated on MRI             Drug abuse, cocaine type [facility]) ICD-10-CM: F14.10  ICD-9-CM: 305.60     Overview Signed 3/29/2021  8:34 PM by Chula Payan MD     3/24/21 UDS + methamphetamines and cocaine             Endocarditis due to methicillin susceptible Staphylococcus aureus (MSSA) ICD-10-CM: I33.0, B95.61  ICD-9-CM: 421.0, 041.11     Overview Addendum 3/30/2021  4:51 PM by Chula Payan MD     3/24/21 ER Patient with a history of drug use anxiety and seizure not on medications for the past 4 months according to the sister presents for evaluation of altered mental status;  BC x2 + MRSA, WBC 11.6 P-67%, K 4.0 BUN 45, Creat 1.1, Trop I 3.89-> 13.60-> 10.52; UDS + cocaine and amphetamines; Lactic acid 4.2  3/24/21 Intubated      CT head negative      CT chest, neg PE, ? LV mass  3/25/21 ECHO EF 36%, LV 44/45, S/PW 8/9,  Mod AR  3/2/621 RAISSA EF 55%, Vegetations on AV 0.9cm LCC, small perforation 0.4cm LCC. No feg on MV/TV/PV, No thrombus in MELLY             Type 2 myocardial infarction [facility]) ICD-10-CM: I21. A1  ICD-9-CM: 410.90     Overview Signed 3/29/2021  8:37 PM by Chula Payan MD     Trop I 3.89-> 13.60-> 10.52; Past Medical History:      has a past medical history of Abscess of aortic root (4/16/2021), Acute traumatic pain (4/14/2021), Anxiety, Postoperative acute respiratory failure ([address] (4/1/2021), S/P AVR (aortic valve replacement) and aortoplasty (4/16/2021), Seizure ([facility] [address] Severe muscle deconditioning (5/1/2021), and Successful cardiopulmonary resuscitation (4/14/2021).     Past Surgical History:      has a past surgical history that includes hx tonsillectomy. Home Medications:     Prior to Admission medications    Medication Sig Start Date End Date Taking? Authorizing Provider   divalproex DR (Depakote) 500 mg tablet Take 500 mg by mouth three (3) times daily. Yes Other, MD Maddy   levETIRAcetam (KEPPRA) 750 mg tablet Take 1 Tab by mouth two (2) times a day. 18  Yes Meredith Murguia PA-C       Allergies/Social/Family History: Allergies   Allergen Reactions    Codeine Unknown (comments)     Pt denies       Social History     Tobacco Use    Smoking status: Current Every Day Smoker     Packs/day: 1.00    Smokeless tobacco: Never Used   Substance Use Topics    Alcohol use: Yes     Comment: socially      No family history on file. Objective:   Vital Signs:  Visit Vitals  BP 97/66 (BP 1 Location: Right upper arm, BP Patient Position: At rest)   Pulse 81   Temp 97.8 °F (36.6 °C)   Resp 17   Ht 5' 11\" (1.803 m)   Wt 54.8 kg (120 lb 12.8 oz)   SpO2 99%   BMI 16.85 kg/m²    O2 Flow Rate (L/min): 10 l/min O2 Device: None (Room air) Temp (24hrs), Av °F (36.7 °C), Min:97.5 °F (36.4 °C), Max:98.4 °F (36.9 °C)           Intake/Output:   No intake or output data in the 24 hours ending 21 1316    Physical Exam:   General:  Lethargic, no distress, appears stated age. Head:  Normocephalic, without obvious abnormality, atraumatic. Eyes:  Conjunctivae/corneas clear. PERRL   Neck: # 8 shiley in place-- PM valve in place   Lungs:   Diminished BS. Chest wall:  No tenderness or deformity. Heart:  Regular rate and rhythm, S1-S2 normal, no murmur, no click, rub or gallop. Abdomen:   Soft, non-tender. Bowel sounds present. No masses,  No organomegaly. Extremities: Atraumatic, no cyanosis or edema.    Pulses: Palpable   Skin: No rashes or lesions   Neurologic: Weak         LABS AND  DATA: Personally reviewed  Recent Labs     21  2338   WBC 9.6   HGB 10.0*   HCT 32.3*        Recent Labs 05/24/21  2338   *   K 3.9      CO2 26   BUN 12   CREA 0.42*   GLU 99   CA 9.9   MG 1.6   PHOS 3.8     No results for input(s): AP, TBIL, TP, ALB, GLOB, AML, LPSE in the last 72 hours. No lab exists for component: SGOT, GPT, AMYP  No results for input(s): INR, PTP, APTT, INREXT, INREXT in the last 72 hours. No results for input(s): PHI, PCO2I, PO2I, FIO2I in the last 72 hours. No results for input(s): CPK, CKMB, TROIQ, BNPP in the last 72 hours. MEDS: Reviewed    Chest Imaging: personally reviewed and report checked    Tele- reviewed    Medical decision making:   I have reviewed the flowsheet and previous day's notes  Patient has acute or chronic illness that poses a threat to life or bodily function  Review and order of Clinical lab tests  Review and Order of Radiology tests  Independent visualization of Image  Reviewed Oxygen/ NiPPV  High Risk Drug therapy requiring intensive monitoring for toxicity: eg steroids, pressors, antibiotics        Thank you for allowing me to participate in this patient's care.     Laz Mas PA-C      [facility]

## 2021-05-27 ENCOUNTER — APPOINTMENT (OUTPATIENT)
Dept: GENERAL RADIOLOGY | Age: 26
DRG: 161 | End: 2021-05-27
Attending: INTERNAL MEDICINE
Payer: MEDICAID

## 2021-05-27 LAB
ANION GAP SERPL CALC-SCNC: 8 MMOL/L (ref 5–15)
BUN SERPL-MCNC: 8 MG/DL (ref 6–20)
BUN/CREAT SERPL: 21 (ref 12–20)
CALCIUM SERPL-MCNC: 10.1 MG/DL (ref 8.5–10.1)
CHLORIDE SERPL-SCNC: 104 MMOL/L (ref 97–108)
CO2 SERPL-SCNC: 25 MMOL/L (ref 21–32)
CREAT SERPL-MCNC: 0.39 MG/DL (ref 0.7–1.3)
GLUCOSE SERPL-MCNC: 91 MG/DL (ref 65–100)
MAGNESIUM SERPL-MCNC: 1.6 MG/DL (ref 1.6–2.4)
PHOSPHATE SERPL-MCNC: 3.7 MG/DL (ref 2.6–4.7)
POTASSIUM SERPL-SCNC: 3.9 MMOL/L (ref 3.5–5.1)
PROCALCITONIN SERPL-MCNC: 0.05 NG/ML
SODIUM SERPL-SCNC: 137 MMOL/L (ref 136–145)

## 2021-05-27 PROCEDURE — 74011250636 HC RX REV CODE- 250/636: Performed by: INTERNAL MEDICINE

## 2021-05-27 PROCEDURE — 65660000000 HC RM CCU STEPDOWN

## 2021-05-27 PROCEDURE — 74011250637 HC RX REV CODE- 250/637: Performed by: INTERNAL MEDICINE

## 2021-05-27 PROCEDURE — 84100 ASSAY OF PHOSPHORUS: CPT

## 2021-05-27 PROCEDURE — 71045 X-RAY EXAM CHEST 1 VIEW: CPT

## 2021-05-27 PROCEDURE — 74011000250 HC RX REV CODE- 250: Performed by: PHYSICIAN ASSISTANT

## 2021-05-27 PROCEDURE — 74011250637 HC RX REV CODE- 250/637: Performed by: HOSPITALIST

## 2021-05-27 PROCEDURE — 83735 ASSAY OF MAGNESIUM: CPT

## 2021-05-27 PROCEDURE — 36415 COLL VENOUS BLD VENIPUNCTURE: CPT

## 2021-05-27 PROCEDURE — 97535 SELF CARE MNGMENT TRAINING: CPT

## 2021-05-27 PROCEDURE — 84145 PROCALCITONIN (PCT): CPT

## 2021-05-27 PROCEDURE — 94640 AIRWAY INHALATION TREATMENT: CPT

## 2021-05-27 PROCEDURE — 80048 BASIC METABOLIC PNL TOTAL CA: CPT

## 2021-05-27 PROCEDURE — 74011250636 HC RX REV CODE- 250/636: Performed by: NURSE PRACTITIONER

## 2021-05-27 PROCEDURE — 97530 THERAPEUTIC ACTIVITIES: CPT

## 2021-05-27 RX ORDER — FENTANYL CITRATE 50 UG/ML
25 INJECTION, SOLUTION INTRAMUSCULAR; INTRAVENOUS ONCE
Status: COMPLETED | OUTPATIENT
Start: 2021-05-27 | End: 2021-05-27

## 2021-05-27 RX ADMIN — MICONAZOLE NITRATE: 20 CREAM TOPICAL at 19:06

## 2021-05-27 RX ADMIN — MICONAZOLE NITRATE: 20 CREAM TOPICAL at 10:13

## 2021-05-27 RX ADMIN — SACUBITRIL AND VALSARTAN 1 TABLET: 24; 26 TABLET, FILM COATED ORAL at 20:42

## 2021-05-27 RX ADMIN — IPRATROPIUM BROMIDE AND ALBUTEROL SULFATE 3 ML: .5; 3 SOLUTION RESPIRATORY (INHALATION) at 19:18

## 2021-05-27 RX ADMIN — Medication 10 ML: at 06:58

## 2021-05-27 RX ADMIN — FENTANYL CITRATE 25 MCG: 50 INJECTION INTRAMUSCULAR; INTRAVENOUS at 05:41

## 2021-05-27 RX ADMIN — Medication 10 ML: at 16:22

## 2021-05-27 RX ADMIN — BUDESONIDE 500 MCG: 0.5 INHALANT RESPIRATORY (INHALATION) at 07:46

## 2021-05-27 RX ADMIN — Medication 1 CAPSULE: at 10:05

## 2021-05-27 RX ADMIN — LEVETIRACETAM 1500 MG: 500 TABLET ORAL at 10:05

## 2021-05-27 RX ADMIN — HEPARIN SODIUM 5000 UNITS: 5000 INJECTION INTRAVENOUS; SUBCUTANEOUS at 20:42

## 2021-05-27 RX ADMIN — Medication 3 MG: at 21:00

## 2021-05-27 RX ADMIN — IPRATROPIUM BROMIDE AND ALBUTEROL SULFATE 3 ML: .5; 3 SOLUTION RESPIRATORY (INHALATION) at 12:23

## 2021-05-27 RX ADMIN — OXYCODONE HYDROCHLORIDE 7.5 MG: 5 TABLET ORAL at 04:20

## 2021-05-27 RX ADMIN — BUDESONIDE 500 MCG: 0.5 INHALANT RESPIRATORY (INHALATION) at 19:19

## 2021-05-27 RX ADMIN — METOPROLOL SUCCINATE 12.5 MG: 25 TABLET, EXTENDED RELEASE ORAL at 10:05

## 2021-05-27 RX ADMIN — SACUBITRIL AND VALSARTAN 1 TABLET: 24; 26 TABLET, FILM COATED ORAL at 10:05

## 2021-05-27 RX ADMIN — HEPARIN SODIUM 5000 UNITS: 5000 INJECTION INTRAVENOUS; SUBCUTANEOUS at 04:20

## 2021-05-27 RX ADMIN — OXYCODONE HYDROCHLORIDE 7.5 MG: 5 TABLET ORAL at 16:19

## 2021-05-27 RX ADMIN — IPRATROPIUM BROMIDE AND ALBUTEROL SULFATE 3 ML: .5; 3 SOLUTION RESPIRATORY (INHALATION) at 07:46

## 2021-05-27 RX ADMIN — IVABRADINE 5 MG: 5 TABLET, FILM COATED ORAL at 16:20

## 2021-05-27 RX ADMIN — LEVETIRACETAM 1500 MG: 500 TABLET ORAL at 19:05

## 2021-05-27 RX ADMIN — IVABRADINE 5 MG: 5 TABLET, FILM COATED ORAL at 10:11

## 2021-05-27 RX ADMIN — IPRATROPIUM BROMIDE AND ALBUTEROL SULFATE 3 ML: .5; 3 SOLUTION RESPIRATORY (INHALATION) at 16:49

## 2021-05-27 RX ADMIN — OXYCODONE HYDROCHLORIDE 7.5 MG: 5 TABLET ORAL at 10:06

## 2021-05-27 RX ADMIN — CASTOR OIL AND BALSAM, PERU: 788; 87 OINTMENT TOPICAL at 19:05

## 2021-05-27 RX ADMIN — VANCOMYCIN HYDROCHLORIDE 750 MG: 750 INJECTION, POWDER, LYOPHILIZED, FOR SOLUTION INTRAVENOUS at 16:21

## 2021-05-27 RX ADMIN — TRAZODONE HYDROCHLORIDE 50 MG: 50 TABLET ORAL at 20:59

## 2021-05-27 RX ADMIN — OXYCODONE HYDROCHLORIDE 7.5 MG: 5 TABLET ORAL at 20:59

## 2021-05-27 RX ADMIN — VANCOMYCIN HYDROCHLORIDE 750 MG: 750 INJECTION, POWDER, LYOPHILIZED, FOR SOLUTION INTRAVENOUS at 06:57

## 2021-05-27 RX ADMIN — CASTOR OIL AND BALSAM, PERU: 788; 87 OINTMENT TOPICAL at 10:13

## 2021-05-27 NOTE — PROGRESS NOTES
PULMONARY MEDICINE    Progress Note    Name: Stoney Carson   : 1995   MRN: [id number]   Date: 2021      Subjective:       Noted RT attempted to change trach but it still had sutures in trach. Wheeze has gone with nebs and he feels better        On PMV  Pain is of most concern to him  Chest x-ray shows a very small PTX     Consult Note:   Requesting Physician: Dr. Santhosh Molina  Reason for consult: Tracheostomy    Medical records and data reviewed. Patient is a 22 y.o. male who presented to the hospital with cardiac arrest. Patient has a complicated medical history including history of polysubstance abuse with MRSA bacteremia and endocarditis and cerebral abscesses who was admitted to an outside hospital in April and ultimately received a tracheostomy and was sent to long-term care facility. He had recurrent arrest in the setting of LV dysfunction and needed to be connected to the ventilator on admission on 2021. He was subsequently weaned off. He has received ICD implantation today. When seen earlier today he was sedated. He has a #8 trach Shiley in place with a Passy-[place] valve and apparently has been awake previously following commands and participating in some physical therapy. Plans are to consider inpatient rehabilitation. We have been consulted to assist in decannulation. As mentioned above patient is currently sedated and as per review of chart is able to protect airway and is able to handle secretions. Further assessment of mental status will be attempted when sedatives wear off.  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 Would recommend ENT changing out the first trach change prior to discharge    Other management ongoing per consulting and primary teams  O2 titration above 90%  duonebs and pulmicort; recommend keeping at discharge   Chest x-ray today is pending, but PTX was stable yesterday     Active Problem List:     Problem List  Never Reviewed        Codes Class    Severe muscle deconditioning ICD-10-CM: R29.898  ICD-9-CM: 781.99 Acute        S/P AVR (aortic valve replacement) and aortoplasty ICD-10-CM: Z95.2  ICD-9-CM: V43.3 Acute        Abscess of aortic root ICD-10-CM: I51.89  ICD-9-CM: 429.89 Acute        Contusion of chest wall ICD-10-CM: S20.219A  ICD-9-CM: 922.1 Acute        Successful cardiopulmonary resuscitation ICD-10-CM: Z92.89  ICD-9-CM: V12.53 Acute        Acute traumatic pain ICD-10-CM: G89.11  ICD-9-CM: 338.11 Acute        Postoperative acute respiratory failure ([facility] [address] ICD-10-CM: P30.922  ICD-9-CM: 518.51 Acute        Severe protein-calorie malnutrition ([facility] ICD-10-CM: E43  ICD-9-CM: 262         * (Principal) Cardiac arrest with pulseless electrical activity ([facility] ICD-10-CM: I46.9  ICD-9-CM: 427.5         Cerebral abscess (embolic) [id number]: D89.9  ICD-9-CM: 324.0     Overview Signed 3/30/2021  4:51 PM by Caio Plascencia MD     CT and MRI revealed evidence of a right posterior cerebellar artery infarct, 9 x 2.6 cm. He also had several areas of cerebral abscesses in the right temporal and parietal lobes measuring 2 x 1 cm, 1.3 x 0.9 cm and 0.6 x 5.5 mm with associated 3 mm midline shift.  CSF showed no organisms to date, but elevated WBC.     3/24/21 CT Normal noncontrast head CT  3/29/21 MRI Extensive multiple acute infarcts throughout the bilateral cerebral hemispheres with a large hemorrhagic right PCA territory infarction measuring up to 7 cm. Some of the additional smaller infarcts also demonstrated hemorrhage. Findings are highly suspicious for septic emboli  3/30/21 CTA Occlusion of the right P2/P3. Infarction within the right occipital and temporal lobes. Edema related to the right-sided small abscesses, better appreciated on MRI             Drug abuse, cocaine type [facility]) ICD-10-CM: F14.10  ICD-9-CM: 305.60     Overview Signed 3/29/2021  8:34 PM by Ciara Cuenca MD     3/24/21 UDS + methamphetamines and cocaine             Endocarditis due to methicillin susceptible Staphylococcus aureus (MSSA) ICD-10-CM: I33.0, B95.61  ICD-9-CM: 421.0, 041.11     Overview Addendum 3/30/2021  4:51 PM by Ciara Cuenca MD     3/24/21 ER Patient with a history of drug use anxiety and seizure not on medications for the past 4 months according to the sister presents for evaluation of altered mental status;  BC x2 + MRSA, WBC 11.6 P-67%, K 4.0 BUN 45, Creat 1.1, Trop I 3.89-> 13.60-> 10.52; UDS + cocaine and amphetamines; Lactic acid 4.2  3/24/21 Intubated      CT head negative      CT chest, neg PE, ? LV mass  3/25/21 ECHO EF 36%, LV 44/45, S/PW 8/9,  Mod AR  3/2/621 RAISSA EF 55%, Vegetations on AV 0.9cm LCC, small perforation 0.4cm LCC. No feg on MV/TV/PV, No thrombus in MELLY             Type 2 myocardial infarction [facility]) ICD-10-CM: I21. A1  ICD-9-CM: 410.90     Overview Signed 3/29/2021  8:37 PM by Ciara Cuenca MD     Trop I 3.89-> 13.60-> 10.52;                    Past Medical History:      has a past medical history of Abscess of aortic root (4/16/2021), Acute traumatic pain (4/14/2021), Anxiety, Postoperative acute respiratory failure ([address] (4/1/2021), S/P AVR (aortic valve replacement) and aortoplasty (4/16/2021), Seizure ([facility] [address] Severe muscle deconditioning (5/1/2021), and Successful cardiopulmonary resuscitation (2021). Past Surgical History:      has a past surgical history that includes hx tonsillectomy. Home Medications:     Prior to Admission medications    Medication Sig Start Date End Date Taking? Authorizing Provider   divalproex DR (Depakote) 500 mg tablet Take 500 mg by mouth three (3) times daily. Yes Other, MD Maddy   levETIRAcetam (KEPPRA) 750 mg tablet Take 1 Tab by mouth two (2) times a day. 18  Yes Aloma Human, PAJaC       Allergies/Social/Family History: Allergies   Allergen Reactions    Codeine Unknown (comments)     Pt denies       Social History     Tobacco Use    Smoking status: Current Every Day Smoker     Packs/day: 1.00    Smokeless tobacco: Never Used   Substance Use Topics    Alcohol use: Yes     Comment: socially      No family history on file. Objective:   Vital Signs:  Visit Vitals  /72 (BP 1 Location: Right upper arm, BP Patient Position: At rest)   Pulse 81   Temp 98.4 °F (36.9 °C)   Resp 18   Ht 5' 11\" (1.803 m)   Wt 57 kg (125 lb 9.6 oz)   SpO2 100%   BMI 17.52 kg/m²    O2 Flow Rate (L/min): 10 l/min O2 Device: None (Room air) Temp (24hrs), Av.1 °F (36.7 °C), Min:97.4 °F (36.3 °C), Max:98.5 °F (36.9 °C)           Intake/Output:     Intake/Output Summary (Last 24 hours) at 2021 1035  Last data filed at 2021 0022  Gross per 24 hour   Intake    Output 600 ml   Net -600 ml       Physical Exam:   General:  Lethargic, no distress, appears stated age. Head:  Normocephalic, without obvious abnormality, atraumatic. Eyes:  Conjunctivae/corneas clear. PERRL   Neck: # 8 shiley in place-- PM valve in place   Lungs:   Diminished BS. Chest wall:  No tenderness or deformity. Heart:  Regular rate and rhythm, S1-S2 normal, no murmur, no click, rub or gallop. Abdomen:   Soft, non-tender. Bowel sounds present. No masses,  No organomegaly. Extremities: Atraumatic, no cyanosis or edema.    Pulses: Palpable   Skin: No rashes or lesions   Neurologic: Weak         LABS AND  DATA: Personally reviewed  Recent Labs     05/24/21  2338   WBC 9.6   HGB 10.0*   HCT 32.3*        Recent Labs     05/27/21  0508 05/24/21  2338    135*   K 3.9 3.9    103   CO2 25 26   BUN 8 12   CREA 0.39* 0.42*   GLU 91 99   CA 10.1 9.9   MG 1.6 1.6   PHOS 3.7 3.8     No results for input(s): AP, TBIL, TP, ALB, GLOB, AML, LPSE in the last 72 hours. No lab exists for component: SGOT, GPT, AMYP  No results for input(s): INR, PTP, APTT, INREXT, INREXT in the last 72 hours. No results for input(s): PHI, PCO2I, PO2I, FIO2I in the last 72 hours. No results for input(s): CPK, CKMB, TROIQ, BNPP in the last 72 hours. MEDS: Reviewed    Chest Imaging: personally reviewed and report checked    Tele- reviewed    Medical decision making:   I have reviewed the flowsheet and previous day's notes  Patient has acute or chronic illness that poses a threat to life or bodily function  Review and order of Clinical lab tests  Review and Order of Radiology tests  Independent visualization of Image  Reviewed Oxygen/ NiPPV  High Risk Drug therapy requiring intensive monitoring for toxicity: eg steroids, pressors, antibiotics        Thank you for allowing me to participate in this patient's care.     Cy Cancer, PAJaC      [facility]

## 2021-05-27 NOTE — PROGRESS NOTES
Problem: Falls - Risk of  Goal: *Absence of Falls  Description: Document Ramila Watkins Fall Risk and appropriate interventions in the flowsheet. Outcome: Progressing Towards Goal  Note: Fall Risk Interventions:       Mentation Interventions: Door open when patient unattended    Medication Interventions: Evaluate medications/consider consulting pharmacy    Elimination Interventions: Call light in reach              Problem: Pressure Injury - Risk of  Goal: *Prevention of pressure injury  Description: Document Salvador Scale and appropriate interventions in the flowsheet.   Outcome: Progressing Towards Goal  Note: Pressure Injury Interventions:  Sensory Interventions: Avoid rigorous massage over bony prominences    Moisture Interventions: Apply protective barrier, creams and emollients    Activity Interventions: Pressure redistribution bed/mattress(bed type)    Mobility Interventions: Float heels    Nutrition Interventions: Document food/fluid/supplement intake    Friction and Shear Interventions: Minimize layers                Problem: Patient Education: Go to Patient Education Activity  Goal: Patient/Family Education  Outcome: Progressing Towards Goal     Problem: Airway Clearance - Ineffective  Goal: *Patent airway  Outcome: Progressing Towards Goal

## 2021-05-27 NOTE — PROGRESS NOTES
.  [address] [facility]ospitalist Group    PATIENT ID: Giancarlo Bolden  MRN: [id number]   YOB: 1995    PRIMARY CARE PROVIDER: None   DATE OF ADMISSION: 5/6/2021  5:22 PM    ATTENDING PHYSICIAN: Chitra Guaman MD  CONSULTATIONS:   IP CONSULT TO INTENSIVIST  IP CONSULT TO NEUROLOGY  IP CONSULT TO PODIATRY  IP CONSULT TO PODIATRY  IP CONSULT TO INFECTIOUS DISEASES  IP CONSULT TO NEUROLOGY  IP CONSULT TO OTOLARYNGOLOGY  IP CONSULT TO CARDIOLOGY  IP CONSULT TO PULMONOLOGY    PROCEDURES/SURGERIES:   Procedure(s):  INSERT ICD DUAL  Eval Icd Generator & Leads W Testing At Implant    [address]: Cardiac arrest with pulseless electrical activity [facility] PROBLEM LIST:  Patient Active Problem List   Diagnosis Code    Endocarditis due to methicillin susceptible Staphylococcus aureus (MSSA) I33.0, B95.61    Drug abuse, cocaine type ([facility] F14.10    Type 2 myocardial infarction ([facility] I21. 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 male with h/o Seizures and anxiety who presented to [facility] after a cardiac arrest at [facility] around 1500 5/6/2021. Hx from chart and speaking with patient's sister via phone. Patient had recent hospitalization at the [facility] in March of this year. Initial hospitalization admission was at [facility] on 3/24 with AMS in setting of polysubstance and IVD use (cocaine, heroine, methamphetamines).  He was found to have septic MRSA bacteremia, MRSA endocarditis. Imaging and MRI also found R PCA infarct and several abscesses and right temporal and parietal lobes. Infarcts thought to be due to septic emboli and patient had left sided residual weakness. Patient was transferred to [facility] on 4/1/2021 for evaluation for valve surgery. After transfer he was found to have a New R MCA infarct Also had a type 2 NSTEMI and tamponade with pericardiocentesis done prior to surgery. Did have cardiac arrest on day of surgery. Had a bioprosthetic Aortic Valve Replacement and Aortic Root Abscess Debridement done on 4/16/2021. Trached and sent to [facility] on 4/29/2021. Was undergoing PT and vent wean. Sister stated that patient was able to be off the vent for several hours over the last few days. He was sitting up and able to talk with valve over trach and could follow commands. Stated that he had severe weakness of the left upper ext, but was starting to gain some mobility in the lower left ext. Patient's mother visited patient today prior to arrest. She said the staff told her that the patient was getting very anxious earlier in the day around 1 pm and had to be placed back on the ventilator, and then they had to sedate him while he was on the ventilator. The mother stated \" he did not look good\" and \" his eye were rolled back in his head\". About 30 minutes after she left she got call about arrest. Per ED note patient was found unresponsive around the 1500 hour and was pulseless. Two rounds of CPR and meds were given and patient was defibrillated x 1 before ROSC. Patient was hypoxic post arrest. Unknown down time prior to arrest.    Subjective/HPI    Patient awake. No acute changes. Pulmonology following. Needs peer to peer.      Assessment and Plan:  V Fib Cardiac arrest - 5/6/21 on admission:   .  Echo showed mild improvement, LVEF 30-35%   -per cardiologist : on [address] until 5/28  for endocarditis   -s/p ICD on 5/25   -Add dapagliflozin on dc as not on formulary   CXR for tomorrow to follow up on PTX    Acute on chronic  respiratory failure, resolved  Pneumothorax post ICD placement  Had [facility] and was sent to [facility] for further weaning. He was on ventilator while in ICU and now transitioned to trach collar. -CXR showed Very minimal left apical pneumothorax. Repeat CXR 5/26 showed L-sided pneumothorax has increased . Pt is currently stable on RA. Repeat CXR has been ordered  -trach downsize to cuffless #6 with Passy-Rosa valveon 5/25  - on RA now for > 48 hours and maintaining airways without difficulty  - pulmonary following, plan to change trach size. MRSA aortic  Valve endocardititis   -Found to have MRSA bacteremia and MSSA endocarditis end of march. -Hx:  4/16/21 at [facility] bioprosthetic AVR and root abscess debridement. He also had a pericardiocentesis due to tamponade prior to surgery  - Reviewed [facility] record, he was on IV vanco at [facility] and planned to  have a 6 weeks of   tx and EOT 5/28/21 per record. - Continue vancomycin until 5/28/21, appreciate ID's recs    Pain management : contusion of chest wall, left side, chronic back pain . H/o drug abuse. Stop fentanyl to 50mcg q6h prn, cont' oxycodone prn. Cautious with narcotics. Bacterial pneumonia  severe multilevel consolidation throughout both lungs consistent with severe multilobar PNA. -sputum culture E coli with ESBL   -per ID -Plan  S/p meropenem , will dc and cont vanc for Endocarditis   -pulmonary toilet   -trach care   -ID following    # Systolic congestive heart failure/CM  Ef 25%   Hold coreg,entresto ,Aldactone, BP better today  Echo EF showed 25-30% on 5/17  Cont' Toprol Xl, Entresto  Cardiologist following     H/o right MCA infarct and several brain abscesses in the right temporal and parietal lobes. Infarcts were thought to be due to septic emboli. He had left sided residual weakness.      cta 5/6/21:  multiple acute infarctions throughout the  right cerebral hemisphere, involving much of the occipital lobe, as well as much  of the frontal lobe. There is an additional superior right frontoparietal Infarct  Evaluated by neurology,  Cont' current anticonvulsants. No seizures on EEG. Neurologist consulted                -PT/OT/SLP               - Stroke education              - SBP goal 100-160              Seizure DZ:  Continue keppra -1500 mg BID, off valproic acid as subtherapeutic due to meropenem interaction,so valproic acid discontinued      Severe muscle deconditioning  Severe protein-calorie malnutrition   Cerebral abscess (embolic)   H/o substance abuse      fulll code     PT/OT, ]placement pending        PHYSICAL EXAMINATION:  Visit Vitals  /63 (BP 1 Location: Left leg, BP Patient Position: At rest)   Pulse 79   Temp 98.4 °F (36.9 °C)   Resp 18   Ht 5' 11\" (1.803 m)   Wt 57 kg (125 lb 9.6 oz)   SpO2 99%   BMI 17.52 kg/m²       General:          Cachetic male in bed, chronically ill appearing  HEENT:           Atraumatic,trach capped        Neck:               Supple,   Lungs: No wheezing. Midline sternal surgical scar, no crackles. Heart:              Regular  rhythm,normal rate   No edema  Abdomen:       Soft, non-tender. Not distended. Bowel sounds normal  Extremities:     No LE edema  Skin:                Not pale. Not Jaundiced  No rashes         Neurologic:      Alert, oriented X 3, unable to move LUE , able to bend LLE at knee, moves RUE and RLE     Labs:     Recent Labs     05/24/21  2338   WBC 9.6   HGB 10.0*   HCT 32.3*        Recent Labs     05/27/21  0508 05/24/21  2338    135*   K 3.9 3.9    103   CO2 25 26   BUN 8 12   CREA 0.39* 0.42*   GLU 91 99   CA 10.1 9.9   MG 1.6 1.6   PHOS 3.7 3.8     No results for input(s): ALT, AP, TBIL, TBILI, TP, ALB, GLOB, GGT, AML, LPSE in the last 72 hours.     No lab exists for component: SGOT, GPT, AMYP, HLPSE  No results for input(s): INR, PTP, APTT, INREXT, INREXT in the last 72 hours. No results for input(s): FE, TIBC, PSAT, FERR in the last 72 hours. No results found for: FOL, RBCF   No results for input(s): PH, PCO2, PO2 in the last 72 hours. No results for input(s): CPK, CKNDX, TROIQ in the last 72 hours.     No lab exists for component: CPKMB  Lab Results   Component Value Date/Time    Cholesterol, total 140 05/11/2021 04:35 AM    HDL Cholesterol 21 05/11/2021 04:35 AM    LDL, calculated 77.4 05/11/2021 04:35 AM    Triglyceride 208 (H) 05/11/2021 04:35 AM    CHOL/HDL Ratio 6.7 (H) 05/11/2021 04:35 AM     Lab Results   Component Value Date/Time    Glucose (POC) 120 (H) 05/11/2021 12:00 PM    Glucose (POC) 100 05/11/2021 06:04 AM    Glucose (POC) 87 05/11/2021 12:19 AM    Glucose (POC) 83 05/10/2021 06:53 PM    Glucose (POC) 84 05/10/2021 02:07 PM     Lab Results   Component Value Date/Time    Color YELLOW/STRAW 05/06/2021 05:42 PM    Appearance CLEAR 05/06/2021 05:42 PM    Specific gravity 1.008 05/06/2021 05:42 PM    Specific gravity 1.020 03/24/2021 09:30 PM    pH (UA) 5.0 05/06/2021 05:42 PM    Protein 30 (A) 05/06/2021 05:42 PM    Glucose Negative 05/06/2021 05:42 PM    Ketone Negative 05/06/2021 05:42 PM    Bilirubin Negative 05/06/2021 05:42 PM    Urobilinogen 0.2 05/06/2021 05:42 PM    Nitrites Negative 05/06/2021 05:42 PM    Leukocyte Esterase Negative 05/06/2021 05:42 PM    Epithelial cells FEW 05/06/2021 05:42 PM    Bacteria Negative 05/06/2021 05:42 PM    WBC 0-4 05/06/2021 05:42 PM    RBC 0-5 05/06/2021 05:42 PM         CODE STATUS:  x Full Code    DNR    Partial    Comfort Care       Signed:   Wilner Key MD

## 2021-05-27 NOTE — PROGRESS NOTES
Cardiology Progress Note                                        Admit Date: 5/6/2021    Assessment/Plan:       1. Sp cardiac arrest post op 4/17/21,   cardiac arrest Vfib 5/6/21  ekg rbbb qt 460 msec. ICD rec for secondary prevention if life expectancy > 1yr.    2. Hx:  4/16/21 at [facility] bioprosthetic AVR and root abscess debridement. He also had a pericardiocentesis due to tamponade prior to surgery  3. Recent endocardititis 3/24/21 Staph  4. CM  Ef 25%   5.  right MCA infarct and several abscesses in the right temporal and parietal lobes. Infarcts were thought to be due to septic emboli. He had left sided residual weakness. cta 5/6/21:  multiple acute infarctions throughout the  right cerebral hemisphere, involving much of the occipital lobe, as well as much  of the frontal lobe. There is an additional superior right frontoparietal  infarct  6.  severe multilevel consolidation throughout both lungs consistent with severe multilobar PNA. 7. PNA:  Severe multilevel consolidation throughout both lungs, consistent with  severe multilobar pneumonia.     Cont Entresto, BB, ivabridine, no adjustments made; unable to spironolactone as of yet  PCXR in AM for ICD  Echo showed mild improvement, LVEF 30-35%  Add dapagliflozin on dc as not on formulary     Sheila Dobbs is a 22 y.o. male with     PROBLEM LIST:  Patient Active Problem List    Diagnosis Date Noted    Severe muscle deconditioning 05/01/2021    S/P AVR (aortic valve replacement) and aortoplasty 04/16/2021    Abscess of aortic root 04/16/2021    Contusion of chest wall 04/16/2021    Successful cardiopulmonary resuscitation 04/14/2021    Acute traumatic pain 04/14/2021    Postoperative acute respiratory failure ([address] 04/01/2021    Severe protein-calorie malnutrition ([address] 05/11/2021    Cardiac arrest with pulseless electrical activity ([address] 05/06/2021    Cerebral abscess (embolic) 77/56/0871    Drug abuse, cocaine type ([address] 03/29/2021    Endocarditis due to methicillin susceptible Staphylococcus aureus (MSSA) 03/25/2021    Type 2 myocardial infarction ([facility] [address] 03/24/2021         Subjective:     Alessandra Patel reports unchanged chest discomfort. Says breathing is improved.       Visit Vitals  /63 (BP 1 Location: Left leg, BP Patient Position: At rest)   Pulse 79   Temp 98.4 °F (36.9 °C)   Resp 18   Ht 5' 11\" (1.803 m)   Wt 57 kg (125 lb 9.6 oz)   SpO2 99%   BMI 17.52 kg/m²       Intake/Output Summary (Last 24 hours) at 5/27/2021 1409  Last data filed at 5/27/2021 1047  Gross per 24 hour   Intake 240 ml   Output 600 ml   Net -360 ml       Objective:      Physical Exam:  GEN: NAD, appears stated age  HEENT: EOMI, MMM, OP clear  NECK: Normal JVP, carotids 2+ b/l and symmetrical  CV: RRR, normal S1 and S2, no M/R/G  LUNGS: CTAB, no W/R/R  ABD: NABS, soft, NT/ND  EXT: No edema   PSYCH: Mood and affect normal  NEURO: AAO, LUE flaccid, face symmetrical, speech intact    Telemetry: normal sinus rhythm    Current Facility-Administered Medications   Medication Dose Route Frequency    albuterol-ipratropium (DUO-NEB) 2.5 MG-0.5 MG/3 ML  3 mL Nebulization QID RT    budesonide (PULMICORT) 500 mcg/2 ml nebulizer suspension  500 mcg Nebulization BID RT    vancomycin (VANCOCIN) 750 mg in 0.9% sodium chloride 250 mL (VIAL-MATE)  750 mg IntraVENous Q8H    sodium chloride (NS) flush 5-40 mL  5-40 mL IntraVENous Q8H    sodium chloride (NS) flush 5-40 mL  5-40 mL IntraVENous PRN    metoprolol succinate (TOPROL-XL) XL tablet 12.5 mg  12.5 mg Oral DAILY    Vancomycin - Pharmacy Dosing   Other Rx Dosing/Monitoring    ivabradine (CORLANOR) tablet 5 mg  5 mg Oral BID WITH MEALS    levETIRAcetam (KEPPRA) tablet 1,500 mg  1,500 mg Oral BID    sodium chloride (AYR SALINE) 0.65 % nasal drops 2 Drop  2 Drop Left Nostril Q2H PRN    oxyCODONE IR (ROXICODONE) tablet 7.5 mg  7.5 mg Oral Q4H PRN    lidocaine 4 % patch 1 Patch  1 Patch TransDERmal Q24H    naloxone [facility]) injection 0.1 mg  0.1 mg IntraVENous PRN    miconazole (MICOTIN) 2 % cream   Topical BID    traZODone (DESYREL) tablet 50 mg  50 mg Oral QHS PRN    albuterol-ipratropium (DUO-NEB) 2.5 MG-0.5 MG/3 ML  3 mL Nebulization Q4H PRN    [Held by provider] spironolactone (ALDACTONE) tablet 12.5 mg  12.5 mg Oral DAILY    hydrALAZINE (APRESOLINE) 20 mg/mL injection 10 mg  10 mg IntraVENous Q6H PRN    sacubitriL-valsartan (ENTRESTO) 24-26 mg tablet 1 Tab  1 Tablet Oral Q12H    L.acidophilus-paracasei-S.thermophil-bifidobacter (RISAQUAD) 8 billion cell capsule  1 Capsule Nasogastric DAILY    heparin (porcine) injection 5,000 Units  5,000 Units SubCUTAneous Q8H    melatonin tablet 3 mg  3 mg Oral QHS    0.9% sodium chloride infusion 250 mL  250 mL IntraVENous PRN    balsam peru-castor oiL (VENELEX) ointment   Topical BID    sodium chloride (NS) flush 5-40 mL  5-40 mL IntraVENous Q8H    sodium chloride (NS) flush 5-40 mL  5-40 mL IntraVENous PRN    acetaminophen (TYLENOL) tablet 650 mg  650 mg Oral Q6H PRN    Or    acetaminophen (TYLENOL) suppository 650 mg  650 mg Rectal Q6H PRN    polyethylene glycol (MIRALAX) packet 17 g  17 g Oral DAILY PRN    ondansetron (ZOFRAN) injection 4 mg  4 mg IntraVENous Q6H PRN    glucose chewable tablet 16 g  4 Tablet Oral PRN    dextrose (D50W) injection syrg 12.5-25 g  25-50 mL IntraVENous PRN    glucagon (GLUCAGEN) injection 1 mg  1 mg IntraMUSCular PRN         Data Review:   Labs:    Recent Results (from the past 24 hour(s))   VANCOMYCIN, TROUGH    Collection Time: 05/26/21  3:34 PM   Result Value Ref Range    Vancomycin,trough 12.9 (H) 5.0 - 10.0 ug/mL    Reported dose date NOT PROVIDED      Reported dose time: NOT PROVIDED      Reported dose: NOT PROVIDED UNITS   MAGNESIUM    Collection Time: 05/27/21  5:08 AM   Result Value Ref Range    Magnesium 1.6 1.6 - 2.4 mg/dL   PHOSPHORUS    Collection Time: 05/27/21  5:08 AM   Result Value Ref Range    Phosphorus 3.7 2.6 - 4.7 MG/DL   PROCALCITONIN    Collection Time: 05/27/21  5:08 AM   Result Value Ref Range    Procalcitonin 2.55 ng/mL   METABOLIC PANEL, BASIC    Collection Time: 05/27/21  5:08 AM   Result Value Ref Range    Sodium 137 136 - 145 mmol/L    Potassium 3.9 3.5 - 5.1 mmol/L    Chloride 104 97 - 108 mmol/L    CO2 25 21 - 32 mmol/L    Anion gap 8 5 - 15 mmol/L    Glucose 91 65 - 100 mg/dL    BUN 8 6 - 20 MG/DL    Creatinine 0.39 (L) 0.70 - 1.30 MG/DL    BUN/Creatinine ratio 21 (H) 12 - 20      GFR est AA >60 >60 ml/min/1.73m2    GFR est non-AA >60 >60 ml/min/1.73m2    Calcium 10.1 8.5 - 10.1 MG/DL

## 2021-05-27 NOTE — PROGRESS NOTES
Problem: Mobility Impaired (Adult and Pediatric)  Goal: *Acute Goals and Plan of Care (Insert Text)  Description: FUNCTIONAL STATUS PRIOR TO ADMISSION: Patient was independent prior to March 2021. Pt has been hospitalized and recently at [facility]. Per mother, pt was ambulating with therapy at [facility]. HOME SUPPORT PRIOR TO ADMISSION: The patient lived alone prior to hospitalization. Physical Therapy Goals  Revised 5/17/2021; goals revised as appropriate 5/24/21  1. Patient will move from supine to sit and sit to supine , scoot up and down, and roll side to side in bed with minimal assistance/contact guard assist within 7 day(s). 2.  Patient will tolerate sitting EOB with contact guard assistance for approx 5 minutes within 7 day(s). 3.  Patient will transfer from bed to chair and chair to bed with moderate assistance using the least restrictive device within 7 day(s). 4.  Patient will perform sit to stand with moderate assistance within 7 day(s). 5.  Patient will ambulate with maximal assistance for 5 feet with the least restrictive device within 7 day(s). Initiated 5/9/2021  1. Patient will move from supine to sit and sit to supine , scoot up and down, and roll side to side in bed with moderate assistance  within 7 day(s). GOAL MET 5/17/21  2. Patient will tolerate sitting EOB with moderate assistance for approx 3-5 minutes within 7 day(s). GOAL MET 5/17/21  3. Patient will transfer from bed to chair and chair to bed with maximal assistance using the least restrictive device within 7 day(s). 4.  Patient will perform sit to stand with maximal assistance within 7 day(s). 5.  Patient will ambulate with maximal assistance for 5 feet with the least restrictive device within 7 day(s).          Outcome: Progressing Towards Goal   PHYSICAL THERAPY TREATMENT  Patient: Maritza Garay (20 y.o. male)  Date: 5/27/2021  Diagnosis: Cardiac arrest [facility]) [I46.9] Cardiac arrest with pulseless electrical activity (HCC)  Procedure(s) (LRB):  INSERT ICD DUAL (N/A)  Eval Icd Generator & Leads W Testing At Implant (N/A) 2 Days Post-Op  Precautions: Fall, Skin, pacer precautions L  Chart, physical therapy assessment, plan of care and goals were reviewed. ASSESSMENT  Patient continues with skilled PT services and is progressing towards goals. Pt received in chair, alert, cooperative. Focus of session included transfer training and standing balance this date. Pt tolerated sit to stand from chair and bed heights with verbal/ tactile cues to facilitate equal WB through LEs and eccentric control with stand to sit. Completed multiple standing trials with R UE support and initiation of pre-gait lateral wt shifts; noted soft L knee in standing (no buckling), however pt demo good response to verbal cues to increase ext. Required min/ mod A for balance and L LE mgmt with SPT transfer to L. Noted pt able to bridge for assist with positioning in bed. Pt remains below functional baseline. Barriers to indep with functional mobility include L UE>LE weakness, impaired standing balance, decreased activity tolerance, cognitive deficits, decreased safety awareness, medical complexity, increased risk for fall. Pt demo excellent rehab potential and will benefit from IPR at d/c, as he will require intensive multidisciplinary therapy setting with ongoing medical support to return to max level of functional indep. Current Level of Function Impacting Discharge (mobility/balance): bed mob CGA, sit to stand min A, SPT mod A    Other factors to consider for discharge: complex medical course         PLAN :  Patient continues to benefit from skilled intervention to address the above impairments. Continue treatment per established plan of care. to address goals.     Recommendation for discharge: (in order for the patient to meet his/her long term goals)  Therapy 3 hours per day 5-7 days per week    This discharge recommendation:  Has been made in collaboration with the attending provider and/or case management    IF patient discharges home will need the following DME: to be determined (TBD)       SUBJECTIVE:   Patient stated That's the first time I've been able to do that re stand with min A    OBJECTIVE DATA SUMMARY:   Critical Behavior:  Neurologic State: Alert  Orientation Level: Oriented X4  Cognition: Follows commands  Safety/Judgement: Awareness of environment  Functional Mobility Training:  Bed Mobility:     Supine to Sit: Moderate assistance (exit R side, cues roll, push through R UE)  Sit to Supine: Contact guard assistance  Scooting: Minimum assistance (for scoot to middle of bed)        Transfers:  Sit to Stand: Minimum assistance (from chair height and EOB)  Stand to Sit: Minimum assistance (cues for eccentric control)        Bed to Chair: Moderate assistance (chair to bed SPT to L, A for balance and guarding L knee)                    Balance:  Sitting: Intact  Sitting - Static: Good (unsupported)  Sitting - Dynamic: Good (unsupported)  Standing: Impaired  Standing - Static: Fair (R UE support)  Standing - Dynamic : Poor;Fair;Constant support  Pain Rating:  Pt reports soreness at sacrum, discomfort L shoulder     Activity Tolerance:   Good    After treatment patient left in no apparent distress:   Supine in bed, Call bell within reach, Bed / chair alarm activated, Side rails x 3, and HOB elevated    COMMUNICATION/COLLABORATION:   The patients plan of care was discussed with: Occupational therapist and Registered nurse.      Winston Cornejo, PT   Time Calculation: 32 mins

## 2021-05-27 NOTE — PROGRESS NOTES
EDGAR:  1. RUR-29%  2. Plan is to discharge to [facility] if peer to peer approved. 3. BLS transport on discharge. CM notified by Maude Whitney at [facility] that his insurance denied for IPR. MD is agreeable to complete peer to peer. Lazarus called [phone] ext. [phone] to schedule for MD, voicemail left.     Milagro Lozano, [facility]

## 2021-05-27 NOTE — PROGRESS NOTES
Cardiology Progress Note                                        Admit Date: 5/6/2021    Assessment/Plan:     1. Sp cardiac arrest post op 4/17/21,   cardiac arrest Vfib 5/6/21  ekg rbbb qt 460 msec. Sp ICD  2. Pneumothorax:  Small but radiology rec fu until resolution  cxr portable repeated yesterday   No change  Repeat in one day and if no change or better will not require further fu       3. Hx:  4/16/21 at [facility] bioprosthetic AVR and root abscess debridement. He also had a pericardiocentesis due to tamponade prior to surgery  4. Recent endocardititis 3/24/21 Staph  5. CM  Ef 25%   6.  right MCA infarct and several abscesses in the right temporal and parietal lobes. Infarcts were thought to be due to septic emboli. He had left sided residual weakness. cta 5/6/21:  multiple acute infarctions throughout the  right cerebral hemisphere, involving much of the occipital lobe, as well as much  of the frontal lobe. There is an additional superior right frontoparietal  infarct         Sheila Dobbs is a 22 y.o. male with     PROBLEM LIST:  Patient Active Problem List    Diagnosis Date Noted    Severe muscle deconditioning 05/01/2021    S/P AVR (aortic valve replacement) and aortoplasty 04/16/2021    Abscess of aortic root 04/16/2021    Contusion of chest wall 04/16/2021    Successful cardiopulmonary resuscitation 04/14/2021    Acute traumatic pain 04/14/2021    Postoperative acute respiratory failure ([address] 04/01/2021    Severe protein-calorie malnutrition ([address] 05/11/2021    Cardiac arrest with pulseless electrical activity ([address] 05/06/2021    Cerebral abscess (embolic) 17/97/1501    Drug abuse, cocaine type ([address] 03/29/2021    Endocarditis due to methicillin susceptible Staphylococcus aureus (MSSA) 03/25/2021    Type 2 myocardial infarction ([address] 03/24/2021         Subjective:     Sheila Dobbs denies dyspnea.     Visit Vitals  /71 (BP 1 Location: Right upper arm, BP Patient Position: At rest)   Pulse 90   Temp 98.3 °F (36.8 °C)   Resp 18   Ht 5' 11\" (1.803 m)   Wt 57 kg (125 lb 9.6 oz)   SpO2 98%   BMI 17.52 kg/m²       Intake/Output Summary (Last 24 hours) at 5/27/2021 0720  Last data filed at 5/27/2021 0022  Gross per 24 hour   Intake    Output 600 ml   Net -600 ml       Objective:      Physical Exam:  HEENT: P[place], EOMI  Neck: No JVD,  No thyroidmegaly  Resp: CTA bilaterally;  No wheezes or rales  CV: RRR s1s2 No murmur no s3  Abd:Soft, Nontender  Ext: No edema  Neuro: Alert and oriented; Nonfocal  Skin: Warm, Dry, Intact  Pulses: 2+ DP/PT/Rad      Telemetry: normal sinus rhythm    Current Facility-Administered Medications   Medication Dose Route Frequency    albuterol-ipratropium (DUO-NEB) 2.5 MG-0.5 MG/3 ML  3 mL Nebulization QID RT    budesonide (PULMICORT) 500 mcg/2 ml nebulizer suspension  500 mcg Nebulization BID RT    vancomycin (VANCOCIN) 750 mg in 0.9% sodium chloride 250 mL (VIAL-MATE)  750 mg IntraVENous Q8H    sodium chloride (NS) flush 5-40 mL  5-40 mL IntraVENous Q8H    sodium chloride (NS) flush 5-40 mL  5-40 mL IntraVENous PRN    metoprolol succinate (TOPROL-XL) XL tablet 12.5 mg  12.5 mg Oral DAILY    Vancomycin - Pharmacy Dosing   Other Rx Dosing/Monitoring    ivabradine (CORLANOR) tablet 5 mg  5 mg Oral BID WITH MEALS    levETIRAcetam (KEPPRA) tablet 1,500 mg  1,500 mg Oral BID    sodium chloride (AYR SALINE) 0.65 % nasal drops 2 Drop  2 Drop Left Nostril Q2H PRN    oxyCODONE IR (ROXICODONE) tablet 7.5 mg  7.5 mg Oral Q4H PRN    lidocaine 4 % patch 1 Patch  1 Patch TransDERmal Q24H    naloxone (NARCAN) injection 0.1 mg  0.1 mg IntraVENous PRN    miconazole (MICOTIN) 2 % cream   Topical BID    traZODone (DESYREL) tablet 50 mg  50 mg Oral QHS PRN    albuterol-ipratropium (DUO-NEB) 2.5 MG-0.5 MG/3 ML  3 mL Nebulization Q4H PRN    [Held by provider] spironolactone (ALDACTONE) tablet 12.5 mg  12.5 mg Oral DAILY    hydrALAZINE (APRESOLINE) 20 mg/mL injection 10 mg  10 mg IntraVENous Q6H PRN    sacubitriL-valsartan (ENTRESTO) 24-26 mg tablet 1 Tab  1 Tablet Oral Q12H    L.acidophilus-paracasei-S.thermophil-bifidobacter (RISAQUAD) 8 billion cell capsule  1 Capsule Nasogastric DAILY    heparin (porcine) injection 5,000 Units  5,000 Units SubCUTAneous Q8H    melatonin tablet 3 mg  3 mg Oral QHS    0.9% sodium chloride infusion 250 mL  250 mL IntraVENous PRN    balsam peru-castor oiL (VENELEX) ointment   Topical BID    sodium chloride (NS) flush 5-40 mL  5-40 mL IntraVENous Q8H    sodium chloride (NS) flush 5-40 mL  5-40 mL IntraVENous PRN    acetaminophen (TYLENOL) tablet 650 mg  650 mg Oral Q6H PRN    Or    acetaminophen (TYLENOL) suppository 650 mg  650 mg Rectal Q6H PRN    polyethylene glycol (MIRALAX) packet 17 g  17 g Oral DAILY PRN    ondansetron (ZOFRAN) injection 4 mg  4 mg IntraVENous Q6H PRN    glucose chewable tablet 16 g  4 Tablet Oral PRN    dextrose (D50W) injection syrg 12.5-25 g  25-50 mL IntraVENous PRN    glucagon (GLUCAGEN) injection 1 mg  1 mg IntraMUSCular PRN         Data Review:   Labs:    Recent Results (from the past 24 hour(s))   VANCOMYCIN, TROUGH    Collection Time: 05/26/21  3:34 PM   Result Value Ref Range    Vancomycin,trough 12.9 (H) 5.0 - 10.0 ug/mL    Reported dose date NOT PROVIDED      Reported dose time: NOT PROVIDED      Reported dose: NOT PROVIDED UNITS   MAGNESIUM    Collection Time: 05/27/21  5:08 AM   Result Value Ref Range    Magnesium 1.6 1.6 - 2.4 mg/dL   PHOSPHORUS    Collection Time: 05/27/21  5:08 AM   Result Value Ref Range    Phosphorus 3.7 2.6 - 4.7 MG/DL   PROCALCITONIN    Collection Time: 05/27/21  5:08 AM   Result Value Ref Range    Procalcitonin 2.87 ng/mL   METABOLIC PANEL, BASIC    Collection Time: 05/27/21  5:08 AM   Result Value Ref Range    Sodium 137 136 - 145 mmol/L    Potassium 3.9 3.5 - 5.1 mmol/L    Chloride 104 97 - 108 mmol/L    CO2 25 21 - 32 mmol/L    Anion gap 8 5 - 15 mmol/L    Glucose 91 65 - 100 mg/dL    BUN 8 6 - 20 MG/DL    Creatinine 0.39 (L) 0.70 - 1.30 MG/DL    BUN/Creatinine ratio 21 (H) 12 - 20      GFR est AA >60 >60 ml/min/1.73m2    GFR est non-AA >60 >60 ml/min/1.73m2    Calcium 10.1 8.5 - 10.1 MG/DL

## 2021-05-27 NOTE — PROGRESS NOTES
Problem: Self Care Deficits Care Plan (Adult)  Goal: *Acute Goals and Plan of Care (Insert Text)  Description:   FUNCTIONAL STATUS PRIOR TO ADMISSION: patient was independent prior to recent hospital admissions and has had a complicated medical course including NG tube and trach. Patient recently at [facility] for vent weaning. HOME SUPPORT: The patient lived alone with parents in area to provide assistance. 5/27/2021 stating wife assisting with in hospital stay. Occupational Therapy Goals  Revised 5/27/2021  1. Patient will perform brushing teeth using L UE as stabilizer with min assistance within 7 day(s). 2.  Patient will perform bathing anterior body sitting EOB with moderate assistance within 7 day(s). 3.  Patient will perform upper body dressing nirmal technique with moderate assistance within 7 days. 4.  Patient will open one ADL item with left UE as stabilizer with moderate assistance within 7 days. 5.  Patient will demonstrate [facility] transfer with moderate assistance within 7 days. 6. Patient will demonstrate upper extremity therapeutic exercise/activities self ROM (L to 90* shoulder flexion) with moderate assistance minutes within 7 day(s). Initiated 5/11/2021, Reviewed 5/18/21, continue all goals  1. Patient will perform 2 simple grooming tasks in supported sitting with minimal assistance/contact guard assist within 7 day(s). 2.  Patient will perform anterior neck to thigh bathing in supported sitting with moderate assistance within 7 day(s). 3.  Patient will tolerate sitting EOB in prep for ADLs with max A within 7 days. 4.  Patient will track to L of midline during ADLs/therapeutic activities with moderate cues within 7 days. 5.  Patient will participate in upper extremity therapeutic exercise/activities with moderate assistance  for 5 minutes within 7 day(s).     Outcome: Progressing Towards Goal  OCCUPATIONAL THERAPY TREATMENT/WEEKLY RE-ASSESSMENT  Patient: Letitia Jain (34 y.o. male)  Date: 5/27/2021  Diagnosis: Cardiac arrest [facility]) [I46.9] Cardiac arrest with pulseless electrical activity ([facility]  Procedure(s) (LRB):  INSERT ICD DUAL (N/A)  Eval Icd Generator & Leads W Testing At Implant (N/A) 2 Days Post-Op  Precautions: Fall, Skin; pacemaker L   Chart, occupational therapy assessment, plan of care, and goals were reviewed. ASSESSMENT  Patient continues with skilled OT services and is progressing towards goals physically, cognitively and emotionally. All goals upgraded. This left hand dominant patient fully participating today due to environmental setup from yesterday in his new room with the \"I can\" schedule and times for therapy. ADLs limited by ICD precautions L, L UE 0/5 strength, R UE -3/5 shoulder and B LEs, +3/5 elbow-digits, sitting tolerance, static standing balance, pain management (orthopedically back, chest; neurologically L UE), cardiopulmonary tolerance (trach, room air), cognition (short term memory loss, attention to task, complex processing, problem solving, self initiation with difficult tasks, early termination of task not self motivated by, requires backwards chaining, small achievable goals) and behavior (requires external cues to participate in self care, learned dependency, potentially depressed and could use behavioral and medication management. Current Level of Function Impacting Discharge (ADLs): moderate assistance overall upper and lower body ADLs; moderate assistance bed mobility - chair        PLAN :  Goals have been updated based on progression since last assessment. Patient continues to benefit from skilled intervention to address the above impairments. Continue to follow patient 3 times a week to address goals.     Recommend with staff/Brain injury tips for more buy in/participationg to his self care:   -Recommend one person talking to patient at a time.   -Keeping schedule  -Day/night schedule   -Give and take on his ADLs in which patient has some say to direct care /time of day to complete but also firm it needs to be completed. -Encourage self completion of ADLs. Recommend next OT session: brush teeth in w/c in bathroom, get to w/c so can go outside    Recommendation for discharge: (in order for the patient to meet his/her long term goals)  Therapy 3 hours per day 5-7 days per week to address medical complexities, utilized eStimulation (Tacey Kaitlynn), progress patient in a \"well\" environment that is structured to assist with the physical, cognitive and emotional deficits occurring. Patient is working towards tolerating 3 hours of therapy. This discharge recommendation:  Has not yet been discussed the attending provider and/or case management    IF patient discharges home will need the following DME: wheelchair, nirmal propel       SUBJECTIVE:   Patient stated I am sorry I was a jerk, if you come in with coffee we can make the morning schedule happen. ... yes 9 a.m. still works.     OBJECTIVE DATA SUMMARY:   Cognitive/Behavioral Status:  Neurologic State: Alert  Orientation Level: Oriented X4  Cognition: Follows commands             Functional Mobility and Transfers for ADLs:  Bed Mobility:  Supine to Sit: Moderate assistance (exit R side, cues roll, push through R UE)  Scooting: Minimum assistance (cues throughout to EOB)    Transfers:  Sit to Stand: Moderate assistance (gait belt, rock and stand on 3)     Bed to Chair: Moderate assistance (to R side)    Balance:  Sitting: Intact; Without support  Sitting - Static: Good (unsupported)  Sitting - Dynamic: Good (unsupported)  Standing: Impaired; Without support  Standing - Static: Poor  Standing - Dynamic : Poor    ADL Intervention:  Feeding  Utensil Management: Modified independent  Food to Mouth: Modified independent  Drink to Mouth: supervision/setup  Asking for drink each time and then taking from therapist (expecting straw to his lips), but when held closer to R hand patient then taking and self feeding. Encouragement to pour own cereal in bowl. However, patient meeting his max tolerance of intervention towards independence and trying to regain control over environment by instructing others. Grooming  Washing Face: Set-up    CHG, cues throughout to complete all below on own, stating \"I can't\" throughout. Instruction techniques R UE to wash L UE by pushing cloth along. Upper Body Bathing  Bathing Assistance: Moderate assistance  Position Performed: Other (comment) (in bed)    Lower Body Bathing  Perineal  : Minimum assistance  Position Performed: Supine  Lower Body : Moderate assistance  Position Performed: Long sitting on bed  Cues: Verbal cues provided    Upper Body [address]: Moderate assistance (instruction nirmal technique L UE first, to elbow; assist)  Cues: Verbal cues provided; Tactile cues provided;Visual cues provided    Lower Body Dressing Assistance  Underpants: Moderate assistance (setup over feet to mid shin)  Socks: Total assistance (dependent)  Position Performed: Long sitting on bed         Cognitive Retraining  Orientation Retraining: Situation  Problem Solving: General alternative solution; Identifying the task; Identifying the problem; Inductive reason  Executive Functions: Regulating behavior; Executing cognitive plans  Organizing/Sequencing: Breaking task down; Two step sequence  Attention to Task: Single task  Maintains Attention For (Time): 4 minutes  Following Commands: Follows multi-step simple commands/directions  Cues: Verbal cues provided; Tactile cues provided        Pain:  Chest, pain meds received    Activity Tolerance:   Good    After treatment patient left in no apparent distress:   Sitting in chair    COMMUNICATION/COLLABORATION:   The patients plan of care was discussed with: Physical therapist and Registered nurse.      Gissell Stafford  Time Calculation: 55 mins

## 2021-05-28 ENCOUNTER — APPOINTMENT (OUTPATIENT)
Dept: GENERAL RADIOLOGY | Age: 26
DRG: 161 | End: 2021-05-28
Attending: INTERNAL MEDICINE
Payer: MEDICAID

## 2021-05-28 LAB
BASOPHILS # BLD: 0.1 K/UL (ref 0–0.1)
BASOPHILS NFR BLD: 1 % (ref 0–1)
DIFFERENTIAL METHOD BLD: ABNORMAL
EOSINOPHIL # BLD: 0.6 K/UL (ref 0–0.4)
EOSINOPHIL NFR BLD: 8 % (ref 0–7)
ERYTHROCYTE [DISTWIDTH] IN BLOOD BY AUTOMATED COUNT: 16.1 % (ref 11.5–14.5)
HCT VFR BLD AUTO: 31.5 % (ref 36.6–50.3)
HGB BLD-MCNC: 9.7 G/DL (ref 12.1–17)
IMM GRANULOCYTES # BLD AUTO: 0 K/UL (ref 0–0.04)
IMM GRANULOCYTES NFR BLD AUTO: 0 % (ref 0–0.5)
LYMPHOCYTES # BLD: 2.3 K/UL (ref 0.8–3.5)
LYMPHOCYTES NFR BLD: 34 % (ref 12–49)
MAGNESIUM SERPL-MCNC: 1.7 MG/DL (ref 1.6–2.4)
MCH RBC QN AUTO: 27.3 PG (ref 26–34)
MCHC RBC AUTO-ENTMCNC: 30.8 G/DL (ref 30–36.5)
MCV RBC AUTO: 88.7 FL (ref 80–99)
MONOCYTES # BLD: 0.6 K/UL (ref 0–1)
MONOCYTES NFR BLD: 9 % (ref 5–13)
NEUTS SEG # BLD: 3.2 K/UL (ref 1.8–8)
NEUTS SEG NFR BLD: 48 % (ref 32–75)
NRBC # BLD: 0 K/UL (ref 0–0.01)
NRBC BLD-RTO: 0 PER 100 WBC
PHOSPHATE SERPL-MCNC: 4.5 MG/DL (ref 2.6–4.7)
PLATELET # BLD AUTO: 250 K/UL (ref 150–400)
PMV BLD AUTO: 10.9 FL (ref 8.9–12.9)
PROCALCITONIN SERPL-MCNC: <0.05 NG/ML
RBC # BLD AUTO: 3.55 M/UL (ref 4.1–5.7)
WBC # BLD AUTO: 6.8 K/UL (ref 4.1–11.1)

## 2021-05-28 PROCEDURE — 74011000250 HC RX REV CODE- 250: Performed by: PHYSICIAN ASSISTANT

## 2021-05-28 PROCEDURE — 74011250637 HC RX REV CODE- 250/637: Performed by: INTERNAL MEDICINE

## 2021-05-28 PROCEDURE — 97530 THERAPEUTIC ACTIVITIES: CPT

## 2021-05-28 PROCEDURE — 74011250637 HC RX REV CODE- 250/637: Performed by: HOSPITALIST

## 2021-05-28 PROCEDURE — 84100 ASSAY OF PHOSPHORUS: CPT

## 2021-05-28 PROCEDURE — 94664 DEMO&/EVAL PT USE INHALER: CPT

## 2021-05-28 PROCEDURE — 94640 AIRWAY INHALATION TREATMENT: CPT

## 2021-05-28 PROCEDURE — 85025 COMPLETE CBC W/AUTO DIFF WBC: CPT

## 2021-05-28 PROCEDURE — 36415 COLL VENOUS BLD VENIPUNCTURE: CPT

## 2021-05-28 PROCEDURE — 71045 X-RAY EXAM CHEST 1 VIEW: CPT

## 2021-05-28 PROCEDURE — 74011250636 HC RX REV CODE- 250/636: Performed by: INTERNAL MEDICINE

## 2021-05-28 PROCEDURE — 97116 GAIT TRAINING THERAPY: CPT

## 2021-05-28 PROCEDURE — 83735 ASSAY OF MAGNESIUM: CPT

## 2021-05-28 PROCEDURE — 84145 PROCALCITONIN (PCT): CPT

## 2021-05-28 PROCEDURE — 65660000000 HC RM CCU STEPDOWN

## 2021-05-28 PROCEDURE — 77010033678 HC OXYGEN DAILY

## 2021-05-28 PROCEDURE — 97535 SELF CARE MNGMENT TRAINING: CPT

## 2021-05-28 RX ORDER — BALSAM PERU/CASTOR OIL
OINTMENT (GRAM) TOPICAL 3 TIMES DAILY
Status: DISCONTINUED | OUTPATIENT
Start: 2021-05-28 | End: 2021-06-08 | Stop reason: HOSPADM

## 2021-05-28 RX ADMIN — VANCOMYCIN HYDROCHLORIDE 750 MG: 750 INJECTION, POWDER, LYOPHILIZED, FOR SOLUTION INTRAVENOUS at 22:03

## 2021-05-28 RX ADMIN — OXYCODONE HYDROCHLORIDE 7.5 MG: 5 TABLET ORAL at 02:55

## 2021-05-28 RX ADMIN — Medication 5 ML: at 14:00

## 2021-05-28 RX ADMIN — VANCOMYCIN HYDROCHLORIDE 750 MG: 750 INJECTION, POWDER, LYOPHILIZED, FOR SOLUTION INTRAVENOUS at 06:33

## 2021-05-28 RX ADMIN — Medication 10 ML: at 00:00

## 2021-05-28 RX ADMIN — VANCOMYCIN HYDROCHLORIDE 750 MG: 750 INJECTION, POWDER, LYOPHILIZED, FOR SOLUTION INTRAVENOUS at 00:00

## 2021-05-28 RX ADMIN — OXYCODONE HYDROCHLORIDE 7.5 MG: 5 TABLET ORAL at 13:25

## 2021-05-28 RX ADMIN — CASTOR OIL AND BALSAM, PERU: 788; 87 OINTMENT TOPICAL at 22:03

## 2021-05-28 RX ADMIN — HEPARIN SODIUM 5000 UNITS: 5000 INJECTION INTRAVENOUS; SUBCUTANEOUS at 22:04

## 2021-05-28 RX ADMIN — LEVETIRACETAM 1500 MG: 500 TABLET ORAL at 18:02

## 2021-05-28 RX ADMIN — SACUBITRIL AND VALSARTAN 1 TABLET: 24; 26 TABLET, FILM COATED ORAL at 09:27

## 2021-05-28 RX ADMIN — BUDESONIDE 500 MCG: 0.5 INHALANT RESPIRATORY (INHALATION) at 21:32

## 2021-05-28 RX ADMIN — MICONAZOLE NITRATE: 20 CREAM TOPICAL at 09:27

## 2021-05-28 RX ADMIN — VANCOMYCIN HYDROCHLORIDE 750 MG: 750 INJECTION, POWDER, LYOPHILIZED, FOR SOLUTION INTRAVENOUS at 14:44

## 2021-05-28 RX ADMIN — BUDESONIDE 500 MCG: 0.5 INHALANT RESPIRATORY (INHALATION) at 08:05

## 2021-05-28 RX ADMIN — METOPROLOL SUCCINATE 12.5 MG: 25 TABLET, EXTENDED RELEASE ORAL at 09:27

## 2021-05-28 RX ADMIN — Medication 10 ML: at 22:05

## 2021-05-28 RX ADMIN — LEVETIRACETAM 1500 MG: 500 TABLET ORAL at 09:27

## 2021-05-28 RX ADMIN — Medication 10 ML: at 06:35

## 2021-05-28 RX ADMIN — HEPARIN SODIUM 5000 UNITS: 5000 INJECTION INTRAVENOUS; SUBCUTANEOUS at 06:34

## 2021-05-28 RX ADMIN — OXYCODONE HYDROCHLORIDE 7.5 MG: 5 TABLET ORAL at 22:04

## 2021-05-28 RX ADMIN — Medication 10 ML: at 14:00

## 2021-05-28 RX ADMIN — IPRATROPIUM BROMIDE AND ALBUTEROL SULFATE 3 ML: .5; 3 SOLUTION RESPIRATORY (INHALATION) at 08:05

## 2021-05-28 RX ADMIN — CASTOR OIL AND BALSAM, PERU: 788; 87 OINTMENT TOPICAL at 18:03

## 2021-05-28 RX ADMIN — CASTOR OIL AND BALSAM, PERU: 788; 87 OINTMENT TOPICAL at 09:26

## 2021-05-28 RX ADMIN — Medication 3 MG: at 22:04

## 2021-05-28 RX ADMIN — IVABRADINE 5 MG: 5 TABLET, FILM COATED ORAL at 18:02

## 2021-05-28 RX ADMIN — IPRATROPIUM BROMIDE AND ALBUTEROL SULFATE 3 ML: .5; 3 SOLUTION RESPIRATORY (INHALATION) at 15:27

## 2021-05-28 RX ADMIN — OXYCODONE HYDROCHLORIDE 7.5 MG: 5 TABLET ORAL at 18:02

## 2021-05-28 RX ADMIN — OXYCODONE HYDROCHLORIDE 7.5 MG: 5 TABLET ORAL at 06:34

## 2021-05-28 RX ADMIN — IPRATROPIUM BROMIDE AND ALBUTEROL SULFATE 3 ML: .5; 3 SOLUTION RESPIRATORY (INHALATION) at 12:04

## 2021-05-28 RX ADMIN — MICONAZOLE NITRATE: 20 CREAM TOPICAL at 18:04

## 2021-05-28 RX ADMIN — HEPARIN SODIUM 5000 UNITS: 5000 INJECTION INTRAVENOUS; SUBCUTANEOUS at 13:25

## 2021-05-28 RX ADMIN — Medication 10 ML: at 06:34

## 2021-05-28 RX ADMIN — SACUBITRIL AND VALSARTAN 1 TABLET: 24; 26 TABLET, FILM COATED ORAL at 22:04

## 2021-05-28 RX ADMIN — IPRATROPIUM BROMIDE AND ALBUTEROL SULFATE 3 ML: .5; 3 SOLUTION RESPIRATORY (INHALATION) at 21:32

## 2021-05-28 RX ADMIN — Medication 1 CAPSULE: at 09:27

## 2021-05-28 NOTE — PROGRESS NOTES
Cardiology Progress Note                                        Admit Date: 5/6/2021    Assessment/Plan:       1. Sp cardiac arrest post op 4/17/21,   cardiac arrest Vfib 5/6/21  ekg rbbb qt 460 msec. ICD rec for secondary prevention if life expectancy > 1yr.    2. Hx:  4/16/21 at [facility] bioprosthetic AVR and root abscess debridement. He also had a pericardiocentesis due to tamponade prior to surgery  3. Recent endocardititis 3/24/21 Staph  4. CM  Ef 25%   5.  right MCA infarct and several abscesses in the right temporal and parietal lobes. Infarcts were thought to be due to septic emboli. He had left sided residual weakness. cta 5/6/21:  multiple acute infarctions throughout the  right cerebral hemisphere, involving much of the occipital lobe, as well as much  of the frontal lobe. There is an additional superior right frontoparietal  infarct  6.  severe multilevel consolidation throughout both lungs consistent with severe multilobar PNA. 7. PNA:  Severe multilevel consolidation throughout both lungs, consistent with  severe multilobar pneumonia.     Cont Entresto, BB, ivabridine, no adjustments made; unable to spironolactone as of yet  Echo showed mild improvement, LVEF 30-35%  Add dapagliflozin on dc as not on formulary   Signing off, please call with questions    Maritza Garya is a 22 y.o. male with     PROBLEM LIST:  Patient Active Problem List    Diagnosis Date Noted    Severe muscle deconditioning 05/01/2021    S/P AVR (aortic valve replacement) and aortoplasty 04/16/2021    Abscess of aortic root 04/16/2021    Contusion of chest wall 04/16/2021    Successful cardiopulmonary resuscitation 04/14/2021    Acute traumatic pain 04/14/2021    Postoperative acute respiratory failure ([address] 04/01/2021    Severe protein-calorie malnutrition ([address] 05/11/2021    Cardiac arrest with pulseless electrical activity ([address] 05/06/2021    Cerebral abscess (embolic) 56/22/9860    Drug abuse, cocaine type ([facility] 03/29/2021    Endocarditis due to methicillin susceptible Staphylococcus aureus (MSSA) 03/25/2021    Type 2 myocardial infarction ([facility] 03/24/2021         Subjective:     Roxana Pitts reports unchanged chest discomfort. Says breathing is improved.       Visit Vitals  /67 (BP 1 Location: Right upper arm, BP Patient Position: At rest)   Pulse 86   Temp 98.4 °F (36.9 °C)   Resp 16   Ht 5' 11\" (1.803 m)   Wt 57 kg (125 lb 11.2 oz)   SpO2 99%   BMI 17.53 kg/m²       Intake/Output Summary (Last 24 hours) at 5/28/2021 1702  Last data filed at 5/28/2021 9619  Gross per 24 hour   Intake 880 ml   Output 700 ml   Net 180 ml       Objective:      Physical Exam:  GEN: NAD, appears stated age  HEENT: EOMI, MMM, OP clear  NECK: Normal JVP, carotids 2+ b/l and symmetrical  CV: RRR, normal S1 and S2, no M/R/G  LUNGS: CTAB, no W/R/R  ABD: NABS, soft, NT/ND  EXT: No edema   PSYCH: Mood and affect normal  NEURO: AAO, LUE flaccid, face symmetrical, speech intact    Telemetry: normal sinus rhythm    Current Facility-Administered Medications   Medication Dose Route Frequency    balsam peru-castor oiL (VENELEX) ointment   Topical TID    albuterol-ipratropium (DUO-NEB) 2.5 MG-0.5 MG/3 ML  3 mL Nebulization QID RT    budesonide (PULMICORT) 500 mcg/2 ml nebulizer suspension  500 mcg Nebulization BID RT    vancomycin (VANCOCIN) 750 mg in 0.9% sodium chloride 250 mL (VIAL-MATE)  750 mg IntraVENous Q8H    sodium chloride (NS) flush 5-40 mL  5-40 mL IntraVENous Q8H    sodium chloride (NS) flush 5-40 mL  5-40 mL IntraVENous PRN    metoprolol succinate (TOPROL-XL) XL tablet 12.5 mg  12.5 mg Oral DAILY    Vancomycin - Pharmacy Dosing   Other Rx Dosing/Monitoring    ivabradine (CORLANOR) tablet 5 mg  5 mg Oral BID WITH MEALS    levETIRAcetam (KEPPRA) tablet 1,500 mg  1,500 mg Oral BID    sodium chloride (AYR SALINE) 0.65 % nasal drops 2 Drop  2 Drop Left Nostril Q2H PRN    oxyCODONE IR (ROXICODONE) tablet 7.5 mg  7.5 mg Oral Q4H PRN    lidocaine 4 % patch 1 Patch  1 Patch TransDERmal Q24H    naloxone (NARCAN) injection 0.1 mg  0.1 mg IntraVENous PRN    miconazole (MICOTIN) 2 % cream   Topical BID    traZODone (DESYREL) tablet 50 mg  50 mg Oral QHS PRN    albuterol-ipratropium (DUO-NEB) 2.5 MG-0.5 MG/3 ML  3 mL Nebulization Q4H PRN    [Held by provider] spironolactone (ALDACTONE) tablet 12.5 mg  12.5 mg Oral DAILY    hydrALAZINE (APRESOLINE) 20 mg/mL injection 10 mg  10 mg IntraVENous Q6H PRN    sacubitriL-valsartan (ENTRESTO) 24-26 mg tablet 1 Tab  1 Tablet Oral Q12H    L.acidophilus-paracasei-S.thermophil-bifidobacter (RISAQUAD) 8 billion cell capsule  1 Capsule Nasogastric DAILY    heparin (porcine) injection 5,000 Units  5,000 Units SubCUTAneous Q8H    melatonin tablet 3 mg  3 mg Oral QHS    0.9% sodium chloride infusion 250 mL  250 mL IntraVENous PRN    sodium chloride (NS) flush 5-40 mL  5-40 mL IntraVENous Q8H    sodium chloride (NS) flush 5-40 mL  5-40 mL IntraVENous PRN    acetaminophen (TYLENOL) tablet 650 mg  650 mg Oral Q6H PRN    Or    acetaminophen (TYLENOL) suppository 650 mg  650 mg Rectal Q6H PRN    polyethylene glycol (MIRALAX) packet 17 g  17 g Oral DAILY PRN    ondansetron (ZOFRAN) injection 4 mg  4 mg IntraVENous Q6H PRN    glucose chewable tablet 16 g  4 Tablet Oral PRN    dextrose (D50W) injection syrg 12.5-25 g  25-50 mL IntraVENous PRN    glucagon (GLUCAGEN) injection 1 mg  1 mg IntraMUSCular PRN         Data Review:   Labs:    Recent Results (from the past 24 hour(s))   MAGNESIUM    Collection Time: 05/28/21  2:59 AM   Result Value Ref Range    Magnesium 1.7 1.6 - 2.4 mg/dL   PHOSPHORUS    Collection Time: 05/28/21  2:59 AM   Result Value Ref Range    Phosphorus 4.5 2.6 - 4.7 MG/DL   PROCALCITONIN    Collection Time: 05/28/21  2:59 AM   Result Value Ref Range    Procalcitonin <0.05 ng/mL   CBC WITH AUTOMATED DIFF    Collection Time: 05/28/21  2:59 AM   Result Value Ref Range    WBC 6.8 4.1 - 11.1 K/uL    RBC 3.55 (L) 4.10 - 5.70 M/uL    HGB 9.7 (L) 12.1 - 17.0 g/dL    HCT 31.5 (L) 36.6 - 50.3 %    MCV 88.7 80.0 - 99.0 FL    MCH 27.3 26.0 - 34.0 PG    MCHC 30.8 30.0 - 36.5 g/dL    RDW 16.1 (H) 11.5 - 14.5 %    PLATELET 845 251 - 769 K/uL    MPV 10.9 8.9 - 12.9 FL    NRBC 0.0 0  WBC    ABSOLUTE NRBC 0.00 0.00 - 0.01 K/uL    NEUTROPHILS 48 32 - 75 %    LYMPHOCYTES 34 12 - 49 %    MONOCYTES 9 5 - 13 %    EOSINOPHILS 8 (H) 0 - 7 %    BASOPHILS 1 0 - 1 %    IMMATURE GRANULOCYTES 0 0.0 - 0.5 %    ABS. NEUTROPHILS 3.2 1.8 - 8.0 K/UL    ABS. LYMPHOCYTES 2.3 0.8 - 3.5 K/UL    ABS. MONOCYTES 0.6 0.0 - 1.0 K/UL    ABS. EOSINOPHILS 0.6 (H) 0.0 - 0.4 K/UL    ABS. BASOPHILS 0.1 0.0 - 0.1 K/UL    ABS. IMM.  GRANS. 0.0 0.00 - 0.04 K/UL    DF AUTOMATED

## 2021-05-28 NOTE — PROGRESS NOTES
Problem: Falls - Risk of  Goal: *Absence of Falls  Description: Document Elyssa Mcclelland Fall Risk and appropriate interventions in the flowsheet.   Outcome: Progressing Towards Goal  Note: Fall Risk Interventions:  Mobility Interventions: Communicate number of staff needed for ambulation/transfer    Mentation Interventions: Room close to nurse's station    Medication Interventions: Evaluate medications/consider consulting pharmacy    Elimination Interventions: Call light in reach              Problem: Patient Education: Go to Patient Education Activity  Goal: Patient/Family Education  Outcome: Progressing Towards Goal     Problem: Nutrition Deficit  Goal: *Optimize nutritional status  Outcome: Progressing Towards Goal

## 2021-05-28 NOTE — PROGRESS NOTES
Bedside and Verbal shift change report given to [address] [facility] [address] (oncoming nurse) by Michael Myers RN (offgoing nurse). Report included the following information SBAR, Procedure Summary, Intake/Output, MAR and Recent Results.

## 2021-05-28 NOTE — PROGRESS NOTES
EDGAR:  1. RUR-29%  2. [facility] IPR accepted pending P2P completion. 3. BLS transport on discharge. CM spoke with patient insurance to schedule P2P. This will be completed today by 5:00 p.m. or by Tuesday due to holiday observance on Monday. CM will update patient mother.       Francisco Cardenas [facility]

## 2021-05-28 NOTE — PROGRESS NOTES
.  [address] [facility] Adult  Hospitalist Group    PATIENT ID: Anival Modi  MRN: [id number]   YOB: 1995    PRIMARY CARE PROVIDER: None   DATE OF ADMISSION: 5/6/2021  5:22 PM    ATTENDING PHYSICIAN: Kenny Blas MD  CONSULTATIONS:   IP CONSULT TO INTENSIVIST  IP CONSULT TO NEUROLOGY  IP CONSULT TO PODIATRY  IP CONSULT TO PODIATRY  IP CONSULT TO INFECTIOUS DISEASES  IP CONSULT TO NEUROLOGY  IP CONSULT TO OTOLARYNGOLOGY  IP CONSULT TO OTOLARYNGOLOGY  IP CONSULT TO OTOLARYNGOLOGY  IP CONSULT TO CARDIOLOGY  IP CONSULT TO PULMONOLOGY    PROCEDURES/SURGERIES:   Procedure(s):  INSERT ICD DUAL  Eval Icd Generator & Leads W Testing At Implant    [address]: Cardiac arrest with pulseless electrical activity [facility] PROBLEM LIST:  Patient Active Problem List   Diagnosis Code    Endocarditis due to methicillin susceptible Staphylococcus aureus (MSSA) I33.0, B95.61    Drug abuse, cocaine type ([facility] F14.10    Type 2 myocardial infarction ([facility] I21. A1    Cerebral abscess (embolic) H73.3    Cardiac arrest with pulseless electrical activity (HCC) I46.9    Severe protein-calorie malnutrition (HCC) E43    Postoperative acute respiratory failure (HCC) J95.821    Severe muscle deconditioning R29.898    Successful cardiopulmonary resuscitation Z92.89    Acute traumatic pain G89.11    S/P AVR (aortic valve replacement) and aortoplasty Z95.2    Abscess of aortic root I51.89    Contusion of chest wall S20.219A         Brief HPI and Hospital Course:      Anival Modi is a 22 y.o. male with h/o Seizures and anxiety who presented to [facility] after a cardiac arrest at [facility] around 1500 5/6/2021. Hx from chart and speaking with patient's sister via phone. Patient had recent hospitalization at the [facility] in March of this year.  Initial hospitalization admission was at [facility] on 3/24 with AMS in setting of polysubstance and IVD use (cocaine, heroine, methamphetamines). He was found to have septic MRSA bacteremia, MRSA endocarditis. Imaging and MRI also found R PCA infarct and several abscesses and right temporal and parietal lobes. Infarcts thought to be due to septic emboli and patient had left sided residual weakness. Patient was transferred to [facility] on 4/1/2021 for evaluation for valve surgery. After transfer he was found to have a New R MCA infarct Also had a type 2 NSTEMI and tamponade with pericardiocentesis done prior to surgery. Did have cardiac arrest on day of surgery. Had a bioprosthetic Aortic Valve Replacement and Aortic Root Abscess Debridement done on 4/16/2021. Trached and sent to [facility] on 4/29/2021. Was undergoing PT and vent wean. Sister stated that patient was able to be off the vent for several hours over the last few days. He was sitting up and able to talk with valve over trach and could follow commands. Stated that he had severe weakness of the left upper ext, but was starting to gain some mobility in the lower left ext. Patient's mother visited patient today prior to arrest. She said the staff told her that the patient was getting very anxious earlier in the day around 1 pm and had to be placed back on the ventilator, and then they had to sedate him while he was on the ventilator. The mother stated \" he did not look good\" and \" his eye were rolled back in his head\". About 30 minutes after she left she got call about arrest. Per ED note patient was found unresponsive around the 1500 hour and was pulseless. Two rounds of CPR and meds were given and patient was defibrillated x 1 before ROSC. Patient was hypoxic post arrest. Unknown down time prior to arrest.    Subjective/HPI    Patient awake. No acute changes. Pulmonology following. Needs peer to peer.    Consulted ENT as per pulmonology recommendations regarding first trach change     Assessment and Plan:  V Fib Cardiac arrest - 5/6/21 on admission:   .  Echo showed mild improvement, LVEF 30-35%   -per cardiologist : on [address] until 5/28  for endocarditis   -s/p ICD on 5/25   -Add dapagliflozin on dc as not on formulary as per Dr. Yasmin Mobley (5/25)  CXR Stable left pneumothorax    Acute on chronic  respiratory failure, resolved  Pneumothorax post ICD placement  [facility] and [facility] for further weaning. He was on ventilator while in ICU and now transitioned to trach collar. -CXR showed Very minimal left apical pneumothorax. Repeat CXR 5/26 showed L-sided pneumothorax has increased . Pt is currently stable on RA. Repeat CXR has been ordered  -trach downsize to cuffless #6 with Pass[place] valveon 5/25  - on RA now for > 48 hours and maintaining airways without difficulty  - pulmonary following, plan to change trach size. ENT consulted as per pulmonology recommendations for first time trach change     MRSA aortic  Valve endocardititis   -Found to have MRSA bacteremia and MSSA endocarditis end of march. -Hx:  4/16/21 at [facility] bioprosthetic AVR and root abscess debridement. He also had a pericardiocentesis due to tamponade prior to surgery  - Reviewed [facility] record, he was on IV vanco at [address] [place] [address] and planned to  have a 6 weeks of   tx and EOT 5/28/21 per record. - Continue vancomycin until 5/28/21, appreciate ID's recs    Pain management : contusion of chest wall, left side, chronic back pain . H/o drug abuse. Stop fentanyl , cont' oxycodone prn. Cautious with narcotics. Bacterial pneumonia  severe multilevel consolidation throughout both lungs consistent with severe multilobar PNA.    -sputum culture E coli with ESBL   -per ID -Plan  S/p meropenem , will dc and cont vanc for Endocarditis   -pulmonary toilet   -trach care     # Systolic congestive heart failure/CM  Ef 25%   Hold coreg,entresto ,Aldactone, BP better today  Echo EF showed 25-30% on 5/17  Cont' Toprol Xl, Entresto  Cardiologist following     H/o right MCA infarct and several brain abscesses in the right temporal and parietal lobes. Infarcts were thought to be due to septic emboli. He had left sided residual weakness. cta 5/6/21:  multiple acute infarctions throughout the  right cerebral hemisphere, involving much of the occipital lobe, as well as much  of the frontal lobe. There is an additional superior right frontoparietal Infarct  Evaluated by neurology,  Cont' current anticonvulsants. No seizures on EEG. Neurologist consulted                -PT/OT/SLP               - Stroke education              - SBP goal 100-160              Seizure DZ:  Continue keppra -1500 mg BID, off valproic acid as subtherapeutic due to meropenem interaction,so valproic acid discontinued      Severe muscle deconditioning  Severe protein-calorie malnutrition   Cerebral abscess (embolic)   H/o substance abuse      fulll code     PT/OT, ]placement pending        PHYSICAL EXAMINATION:  Visit Vitals  BP 92/60 (BP 1 Location: Right upper arm)   Pulse 77   Temp 98.4 °F (36.9 °C)   Resp 16   Ht 5' 11\" (1.803 m)   Wt 57 kg (125 lb 11.2 oz)   SpO2 97%   BMI 17.53 kg/m²       General:          Cachetic male in bed, chronically ill appearing  HEENT:           Atraumatic,trach capped        Neck:               Supple,   Lungs: No wheezing. Midline sternal surgical scar, no crackles. Heart:              Regular  rhythm,normal rate   No edema  Abdomen:       Soft, non-tender. Not distended. Bowel sounds normal  Extremities:     No LE edema  Skin:                Not pale.   Not Jaundiced  No rashes         Neurologic:      Alert, oriented X 3, unable to move LUE , able to bend LLE at knee, moves RUE and RLE     Labs:     Recent Labs     05/28/21  0259   WBC 6.8   HGB 9.7*   HCT 31.5*        Recent Labs     05/28/21  0259 05/27/21  0508   NA  --  137   K  --  3.9   CL  --  104   CO2  --  25   BUN  --  8   CREA  --  0.39*   GLU  --  91   CA  --  10.1   MG 1.7 1.6   PHOS 4.5 3.7     No results for input(s): ALT, AP, TBIL, TBILI, TP, ALB, GLOB, GGT, AML, LPSE in the last 72 hours. No lab exists for component: SGOT, GPT, AMYP, HLPSE  No results for input(s): INR, PTP, APTT, INREXT, INREXT in the last 72 hours. No results for input(s): FE, TIBC, PSAT, FERR in the last 72 hours. No results found for: FOL, RBCF   No results for input(s): PH, PCO2, PO2 in the last 72 hours. No results for input(s): CPK, CKNDX, TROIQ in the last 72 hours.     No lab exists for component: CPKMB  Lab Results   Component Value Date/Time    Cholesterol, total 140 05/11/2021 04:35 AM    HDL Cholesterol 21 05/11/2021 04:35 AM    LDL, calculated 77.4 05/11/2021 04:35 AM    Triglyceride 208 (H) 05/11/2021 04:35 AM    CHOL/HDL Ratio 6.7 (H) 05/11/2021 04:35 AM     Lab Results   Component Value Date/Time    Glucose (POC) 120 (H) 05/11/2021 12:00 PM    Glucose (POC) 100 05/11/2021 06:04 AM    Glucose (POC) 87 05/11/2021 12:19 AM    Glucose (POC) 83 05/10/2021 06:53 PM    Glucose (POC) 84 05/10/2021 02:07 PM     Lab Results   Component Value Date/Time    Color YELLOW/STRAW 05/06/2021 05:42 PM    Appearance CLEAR 05/06/2021 05:42 PM    Specific gravity 1.008 05/06/2021 05:42 PM    Specific gravity 1.020 03/24/2021 09:30 PM    pH (UA) 5.0 05/06/2021 05:42 PM    Protein 30 (A) 05/06/2021 05:42 PM    Glucose Negative 05/06/2021 05:42 PM    Ketone Negative 05/06/2021 05:42 PM    Bilirubin Negative 05/06/2021 05:42 PM    Urobilinogen 0.2 05/06/2021 05:42 PM    Nitrites Negative 05/06/2021 05:42 PM    Leukocyte Esterase Negative 05/06/2021 05:42 PM    Epithelial cells FEW 05/06/2021 05:42 PM    Bacteria Negative 05/06/2021 05:42 PM    WBC 0-4 05/06/2021 05:42 PM    RBC 0-5 05/06/2021 05:42 PM         CODE STATUS:  x Full Code    DNR    Partial    Comfort Care       Signed:   Juhi Figueroa MD

## 2021-05-28 NOTE — WOUND CARE
WOCN Note:  
  
Follow up consult to re-assess left great toe 
  
Chart shows: 
Patient admitted on 5/6/21 with Cardiac Arrest 
Past medical history of anxiety, seizures, polysubstance abuse, endocarditis, history of MRSA sepsis, and embolic CVA Admitted from [facility] 
  
Wound Culture obtained on 5/7/21 from left great toe that shows no growth Assessment:  
Patient alert and oriented x4,  verbal, passy shannan valve in place to tracheostomy. Mobility: Patient independent with mobility in bed, able to lift heels off mattress independently. Continence: Continent of urine and stool Last Salvador Score: 17 Surface: Alfa mattress 
  
Bilateral heels skin intact and without erythema. Buttocks and sacrum with blanchable erythema Heels offloaded with pillows 1. POA Left distal great toe, had total toenail avulsion on 5/7/21 by podiatrist 
Area healed 
  
2. Red rash with satellite lesions to upper medial thighs, groin, and scrotum Symptoms consistent with candidiasis  
Resolved 
  
Wound Recommendations: 
  
Venelex ointment to sacrum/buttocks TID 
 
PI Prevention: 
Turn/reposition approximately every 2 hours Offload heels with pillows at all times in bed. Pad bony prominences Keep HOB 30 degrees or less to decrease shearing and pressure unless medically contraindicated. If HOB is to be over 30 degrees, raise knees first then [facility] to prevent sliding Minimize layers of linen/pads under patient to optimize support surface to one flat sheet and one incontinence pad  
Assess under trach flange each shift to assess skin integrity 
  
Discussed with Shannon GLASS 
  
Transition of Care: Plan to follow weekly and as needed while admitted to hospital. Plan is for patient to be discharged to [address] Lizeth Kolb, RN, [facility], [address] [place] [address] Certified Wound and Ostomy Nurse 
office [phone] Can be reached through [address] [place] [address] 
pager [phone] or call  to page

## 2021-05-28 NOTE — PROGRESS NOTES
PULMONARY MEDICINE    Progress Note    Name: Giancarlo Bolden   : 1995   MRN: [id number]   Date: 2021      Subjective:       Resting in bed   No WOB       Noted RT attempted to change trach but it still had sutures in trach. Wheeze has gone with nebs and he feels better        On PMV  Pain is of most concern to him  Chest x-ray shows a very small PTX     Consult Note:   Requesting Physician: Dr. Marcella Bellamy  Reason for consult: Tracheostomy    Medical records and data reviewed. Patient is a 22 y.o. male who presented to the hospital with cardiac arrest. Patient has a complicated medical history including history of polysubstance abuse with MRSA bacteremia and endocarditis and cerebral abscesses who was admitted to an outside hospital in April and ultimately received a tracheostomy and was sent to long-term care facility. He had recurrent arrest in the setting of LV dysfunction and needed to be connected to the ventilator on admission on 2021. He was subsequently weaned off. He has received ICD implantation today. When seen earlier today he was sedated. He has a #8 trach Shiley in place with a Pass[place] valve and apparently has been awake previously following commands and participating in some physical therapy. Plans are to consider inpatient rehabilitation. We have been consulted to assist in decannulation. As mentioned above patient is currently sedated and as per review of chart is able to protect airway and is able to handle secretions. Further assessment of mental status will be attempted when sedatives wear off.  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 Would recommend ENT changing out the first trach change prior to discharge; discussed with hospitalist     Other management ongoing per consulting and primary teams  O2 titration above 90%  duonebs and pulmicort; recommend keeping at discharge   Chest x-ray on 5/27 showed a mild progression to the left apical ptx, continue to monitor daily  PRN over the weekend      Active Problem List:     Problem List  Never Reviewed        Codes Class    Severe muscle deconditioning ICD-10-CM: R29.898  ICD-9-CM: 781.99 Acute        S/P AVR (aortic valve replacement) and aortoplasty ICD-10-CM: Z95.2  ICD-9-CM: V43.3 Acute        Abscess of aortic root ICD-10-CM: I51.89  ICD-9-CM: 429.89 Acute        Contusion of chest wall ICD-10-CM: S20.219A  ICD-9-CM: 922.1 Acute        Successful cardiopulmonary resuscitation ICD-10-CM: Z92.89  ICD-9-CM: V12.53 Acute        Acute traumatic pain ICD-10-CM: G89.11  ICD-9-CM: 338.11 Acute        Postoperative acute respiratory failure ([facility] ICD-10-CM: X04.678  ICD-9-CM: 518.51 Acute        Severe protein-calorie malnutrition ([facility] ICD-10-CM: E43  ICD-9-CM: 262         * (Principal) Cardiac arrest with pulseless electrical activity ([facility] ICD-10-CM: I46.9  ICD-9-CM: 427.5         Cerebral abscess (embolic) OSB-71-ML: K51.4  ICD-9-CM: 324.0     Overview Signed 3/30/2021  4:51 PM by Jeanna Hernandez MD     CT and MRI revealed evidence of a right posterior cerebellar artery infarct, 9 x 2.6 cm.   He also had several areas of cerebral abscesses in the right temporal and parietal lobes measuring 2 x 1 cm, 1.3 x 0.9 cm and 0.6 x 5.5 mm with associated 3 mm midline shift. CSF showed no organisms to date, but elevated WBC.     3/24/21 CT Normal noncontrast head CT  3/29/21 MRI Extensive multiple acute infarcts throughout the bilateral cerebral hemispheres with a large hemorrhagic right PCA territory infarction measuring up to 7 cm. Some of the additional smaller infarcts also demonstrated hemorrhage. Findings are highly suspicious for septic emboli  3/30/21 CTA Occlusion of the right P2/P3. Infarction within the right occipital and temporal lobes. Edema related to the right-sided small abscesses, better appreciated on MRI             Drug abuse, cocaine type [facility]) ICD-10-CM: F14.10  ICD-9-CM: 305.60     Overview Signed 3/29/2021  8:34 PM by Janice Tejeda MD     3/24/21 UDS + methamphetamines and cocaine             Endocarditis due to methicillin susceptible Staphylococcus aureus (MSSA) ICD-10-CM: I33.0, B95.61  ICD-9-CM: 421.0, 041.11     Overview Addendum 3/30/2021  4:51 PM by Janice Tejeda MD     3/24/21 ER Patient with a history of drug use anxiety and seizure not on medications for the past 4 months according to the sister presents for evaluation of altered mental status;  BC x2 + MRSA, WBC 11.6 P-67%, K 4.0 BUN 45, Creat 1.1, Trop I 3.89-> 13.60-> 10.52; UDS + cocaine and amphetamines; Lactic acid 4.2  3/24/21 Intubated      CT head negative      CT chest, neg PE, ? LV mass  3/25/21 ECHO EF 36%, LV 44/45, S/PW 8/9,  Mod AR  3/2/621 RAISSA EF 55%, Vegetations on AV 0.9cm LCC, small perforation 0.4cm LCC. No feg on MV/TV/PV, No thrombus in MELLY             Type 2 myocardial infarction [facility]) ICD-10-CM: I21. A1  ICD-9-CM: 410.90     Overview Signed 3/29/2021  8:37 PM by Janice Tejeda MD     Trop I 3.89-> 13.60-> 10.52;                    Past Medical History:      has a past medical history of Abscess of aortic root (4/16/2021), Acute traumatic pain (4/14/2021), Anxiety, Postoperative acute respiratory failure ([facility] (4/1/2021), S/P AVR (aortic valve replacement) and aortoplasty (2021), Seizure ([address] Severe muscle deconditioning (2021), and Successful cardiopulmonary resuscitation (2021). Past Surgical History:      has a past surgical history that includes hx tonsillectomy. Home Medications:     Prior to Admission medications    Medication Sig Start Date End Date Taking? Authorizing Provider   divalproex DR (Depakote) 500 mg tablet Take 500 mg by mouth three (3) times daily. Yes Other, MD Maddy   levETIRAcetam (KEPPRA) 750 mg tablet Take 1 Tab by mouth two (2) times a day. 18  Yes Sonia Garcia PA-C       Allergies/Social/Family History: Allergies   Allergen Reactions    Codeine Unknown (comments)     Pt denies       Social History     Tobacco Use    Smoking status: Current Every Day Smoker     Packs/day: 1.00    Smokeless tobacco: Never Used   Substance Use Topics    Alcohol use: Yes     Comment: socially      No family history on file. Objective:   Vital Signs:  Visit Vitals  /74 (BP 1 Location: Right upper arm, BP Patient Position: At rest)   Pulse 77   Temp 98.6 °F (37 °C)   Resp 18   Ht 5' 11\" (1.803 m)   Wt 57 kg (125 lb 11.2 oz)   SpO2 98%   BMI 17.53 kg/m²    O2 Flow Rate (L/min): 10 l/min O2 Device: None (Room air) Temp (24hrs), Av.4 °F (36.9 °C), Min:97.8 °F (36.6 °C), Max:98.7 °F (37.1 °C)           Intake/Output:     Intake/Output Summary (Last 24 hours) at 2021 0906  Last data filed at 2021  Gross per 24 hour   Intake 640 ml   Output 100 ml   Net 540 ml       Physical Exam:   General:  Lethargic, no distress, appears stated age. Head:  Normocephalic, without obvious abnormality, atraumatic. Eyes:  Conjunctivae/corneas clear. PERRL   Neck: # 8 shiley in place-- PM valve in place   Lungs:   Diminished BS. Chest wall:  No tenderness or deformity. Heart:  Regular rate and rhythm, S1-S2 normal, no murmur, no click, rub or gallop. Abdomen:   Soft, non-tender.  Bowel sounds present. No masses,  No organomegaly. Extremities: Atraumatic, no cyanosis or edema. Pulses: Palpable   Skin: No rashes or lesions   Neurologic: Weak         LABS AND  DATA: Personally reviewed  Recent Labs     05/28/21  0259   WBC 6.8   HGB 9.7*   HCT 31.5*        Recent Labs     05/28/21  0259 05/27/21  0508   NA  --  137   K  --  3.9   CL  --  104   CO2  --  25   BUN  --  8   CREA  --  0.39*   GLU  --  91   CA  --  10.1   MG 1.7 1.6   PHOS 4.5 3.7     No results for input(s): AP, TBIL, TP, ALB, GLOB, AML, LPSE in the last 72 hours. No lab exists for component: SGOT, GPT, AMYP  No results for input(s): INR, PTP, APTT, INREXT, INREXT in the last 72 hours. No results for input(s): PHI, PCO2I, PO2I, FIO2I in the last 72 hours. No results for input(s): CPK, CKMB, TROIQ, BNPP in the last 72 hours. MEDS: Reviewed    Chest Imaging: personally reviewed and report checked    Tele- reviewed    Medical decision making:   I have reviewed the flowsheet and previous day's notes  Patient has acute or chronic illness that poses a threat to life or bodily function  Review and order of Clinical lab tests  Review and Order of Radiology tests  Independent visualization of Image  Reviewed Oxygen/ NiPPV  High Risk Drug therapy requiring intensive monitoring for toxicity: eg steroids, pressors, antibiotics        Thank you for allowing me to participate in this patient's care.     Cecilia Perez PA-C      [facility]

## 2021-05-28 NOTE — PROGRESS NOTES
Problem: Mobility Impaired (Adult and Pediatric)  Goal: *Acute Goals and Plan of Care (Insert Text)  Description: FUNCTIONAL STATUS PRIOR TO ADMISSION: Patient was independent prior to March 2021. Pt has been hospitalized and recently at [facility]. Per mother, pt was ambulating with therapy at [facility]. HOME SUPPORT PRIOR TO ADMISSION: The patient lived alone prior to hospitalization. Physical Therapy Goals  Revised 5/17/2021; goals revised as appropriate 5/24/21  1. Patient will move from supine to sit and sit to supine , scoot up and down, and roll side to side in bed with minimal assistance/contact guard assist within 7 day(s). 2.  Patient will tolerate sitting EOB with contact guard assistance for approx 5 minutes within 7 day(s). 3.  Patient will transfer from bed to chair and chair to bed with moderate assistance using the least restrictive device within 7 day(s). 4.  Patient will perform sit to stand with moderate assistance within 7 day(s). 5.  Patient will ambulate with maximal assistance for 5 feet with the least restrictive device within 7 day(s). Initiated 5/9/2021  1. Patient will move from supine to sit and sit to supine , scoot up and down, and roll side to side in bed with moderate assistance  within 7 day(s). GOAL MET 5/17/21  2. Patient will tolerate sitting EOB with moderate assistance for approx 3-5 minutes within 7 day(s). GOAL MET 5/17/21  3. Patient will transfer from bed to chair and chair to bed with maximal assistance using the least restrictive device within 7 day(s). 4.  Patient will perform sit to stand with maximal assistance within 7 day(s). 5.  Patient will ambulate with maximal assistance for 5 feet with the least restrictive device within 7 day(s).          Outcome: Progressing Towards Goal   PHYSICAL THERAPY TREATMENT  Patient: Charu Byrne (89 y.o. male)  Date: 5/28/2021  Diagnosis: Cardiac arrest [facility]) [I46.9] Cardiac arrest with pulseless electrical activity (HCC)  Procedure(s) (LRB):  INSERT ICD DUAL (N/A)  Eval Icd Generator & Leads W Testing At Implant (N/A) 3 Days Post-Op  Precautions: Fall, Skin  Chart, physical therapy assessment, plan of care and goals were reviewed. ASSESSMENT  Patient continues with skilled PT services and is progressing towards goals. He continues to demonstrates the best participation provided clear one step commands. He demonstrates good sitting balance EOB. Patient requires VC for safe set up for sit<>stand transfers including scooting to the edge of the chair and achieving foot flat. He demonstrates decreased L LE strength with increased knee flexion in gait, this increases with increased distance ambulated, and decreased ability to clear the L LE in gait. He is fatigued after a short distance. VC and Min A are provided for safe stand to sit as patient attempt to sit before becoming square with chair. At this time patient would highly benefit from continued skilled services in the inpatient rehab setting. He continues to require VC and single step commands for safety. It is unsafe for him to transfer without the assistance of at least one person. He is safest ambulating with two person assistance due to the need to monitor the L UE for safety (patient requires frequent cues to protect the L UE). Patient is easily fatigued with short distance ambulation and has not trailed stairs since admission. Current Level of Function Impacting Discharge (mobility/balance): CGA- Min A 1-2     Other factors to consider for discharge: medical stability, increased risk for falls, decreased strength/endurance, decreased balance         PLAN :  Patient continues to benefit from skilled intervention to address the above impairments. Continue treatment per established plan of care. to address goals.     Recommendation for discharge: (in order for the patient to meet his/her long term goals)  Therapy 3 hours per day 5-7 days per week v. SNF  V. Intensive HH PT with equipment TBD    This discharge recommendation:  Has been made in collaboration with the attending provider and/or case management    IF patient discharges home will need the following DME: bedside commode, gait belt, transfer bench, wheelchair, and bed rails        SUBJECTIVE:   Patient stated Sharon Ponce I have my pepsi please.     OBJECTIVE DATA SUMMARY:   Critical Behavior:  Neurologic State: Alert  Orientation Level: Oriented to person, Oriented to place, Oriented to situation, Disoriented to time  Cognition: Decreased attention/concentration, Follows commands, Impulsive  Safety/Judgement: Awareness of environment  Functional Mobility Training:  Bed Mobility:     Supine to Sit: Contact guard assistance;Assist x1;Additional time; Adaptive equipment;Bed Modified; Other (comment) (out right side of bed)              Transfers:  Sit to Stand: Minimum assistance;Assist x1  Stand to Sit: Minimum assistance;Assist x1        Bed to Chair: Moderate assistance;Assist x1                    Balance:  Sitting: Intact  Sitting - Static: Good (unsupported)  Sitting - Dynamic: Good (unsupported)  Standing: Impaired; With support  Standing - Static: Fair  Standing - Dynamic : Fair;Constant support  Ambulation/Gait Training:  Distance (ft): 18 Feet (ft)  Assistive Device: Gait belt  Ambulation - Level of Assistance: Contact guard assistance;Assist x2        Gait Abnormalities: Decreased step clearance; Altered arm swing;Trunk sway increased        Base of Support: Narrowed  Stance: Left decreased  Speed/Candice: Shuffled;Pace decreased (<100 feet/min)  Step Length: Left shortened  Swing Pattern: Left asymmetrical                 Stairs:               Therapeutic Exercises:     Pain Rating:      Activity Tolerance:   Good    After treatment patient left in no apparent distress:   Sitting in chair, Call bell within reach, Bed / chair alarm activated, and seated on waffle cushion    COMMUNICATION/COLLABORATION: The patients plan of care was discussed with: Occupational therapy assistant and Registered nurse.      Brina Dalton, PT   Time Calculation: 27 mins

## 2021-05-28 NOTE — PROGRESS NOTES
Problem: Self Care Deficits Care Plan (Adult)  Goal: *Acute Goals and Plan of Care (Insert Text)  Description:   FUNCTIONAL STATUS PRIOR TO ADMISSION: patient was independent prior to recent hospital admissions and has had a complicated medical course including NG tube and trach. Patient recently at [facility] for vent weaning. HOME SUPPORT: The patient lived alone with parents in area to provide assistance. 5/27/2021 stating wife assisting with in hospital stay. Occupational Therapy Goals  Revised 5/27/2021  1. Patient will perform brushing teeth using L UE as stabilizer with min assistance within 7 day(s). 2.  Patient will perform bathing anterior body sitting EOB with moderate assistance within 7 day(s). 3.  Patient will perform upper body dressing nirmal technique with moderate assistance within 7 days. 4.  Patient will open one ADL item with left UE as stabilizer with moderate assistance within 7 days. 5.  Patient will demonstrate [facility] transfer with moderate assistance within 7 days. 6. Patient will demonstrate upper extremity therapeutic exercise/activities self ROM (L to 90* shoulder flexion) with moderate assistance minutes within 7 day(s). Initiated 5/11/2021, Reviewed 5/18/21, continue all goals  1. Patient will perform 2 simple grooming tasks in supported sitting with minimal assistance/contact guard assist within 7 day(s). 2.  Patient will perform anterior neck to thigh bathing in supported sitting with moderate assistance within 7 day(s). 3.  Patient will tolerate sitting EOB in prep for ADLs with max A within 7 days. 4.  Patient will track to L of midline during ADLs/therapeutic activities with moderate cues within 7 days. 5.  Patient will participate in upper extremity therapeutic exercise/activities with moderate assistance  for 5 minutes within 7 day(s).     Outcome: Progressing Towards Goal   OCCUPATIONAL THERAPY TREATMENT  Patient: Faiza Corea (37 y.o. male)  Date: 5/28/2021  Diagnosis: Cardiac arrest ([facility] [I46.9] Cardiac arrest with pulseless electrical activity ([facility] [address]  Procedure(s) (LRB):  INSERT ICD DUAL (N/A)  Eval Icd Generator & Leads W Testing At Implant (N/A) 3 Days Post-Op  Precautions: Fall, Skin  Chart, occupational therapy assessment, plan of care, and goals were reviewed. ASSESSMENT  Patient continues with skilled OT services and is progressing towards goals. Patient participated in bed mobility,  short walk, transfer to chair, self care, along with management of left UE and instruction in and practiced use of compensatory techniques. Patient requiring fewer cues to attend to task this date. Able to be redirected back to task with one cue when distracted. Participation impacted by impaired cognition( attention, short term memory, complex processing, problem solving, and self initiation), ICD precautions, sitting tolerance, standing balance and tolerance, impaired AROM left UE and LE, low BMI with need for waffle cushion in sitting to prevent breakdown. Current Level of Function Impacting Discharge (ADLs): UE self care with Moderate assist, LE self care with mod to max assist, self feeding with set up, functional transfers with gait belt and min to mod assist of 1. Other factors to consider for discharge: medical complexity, extended hospitalization         PLAN :  Patient continues to benefit from skilled intervention to address the above impairments. Continue treatment per established plan of care to address goals.     Recommend with staff: Verena Spaulding in chair for meals and self care, set up for self feeding, BSC for bowel management       Recommend next OT session: brush teeth in w/c in bathroom, get to w/c so can go outside     Recommendation for discharge: (in order for the patient to meet his/her long term goals)  Therapy 3 hours per day 5-7 days per week to address medical complexities, utilized eStimulation (Nettie Curran), progress patient in a \"well\" environment that is structured to assist with the physical, cognitive and emotional deficits occurring. Patient is working towards tolerating 3 hours of therapy. This discharge recommendation:  Has been made in collaboration with the attending provider and/or case management    IF patient discharges home will need the following DME: shower chair and nirmal propelled wheelchair       SUBJECTIVE:   Patient stated my wife is home all day, she makes her own schedule.     OBJECTIVE DATA SUMMARY:   Cognitive/Behavioral Status:  Neurologic State: Alert  Orientation Level: Oriented to person;Oriented to place;Oriented to situation;Disoriented to time  Cognition: Decreased attention/concentration; Follows commands; Impulsive  Perception: Cues to attend to left side of body; Tactile;Verbal  Perseveration: No perseveration noted  Safety/Judgement: Awareness of environment    Functional Mobility and Transfers for ADLs:  Bed Mobility:  Supine to Sit: Contact guard assistance;Assist x1;Additional time; Adaptive equipment;Bed Modified; Other (comment) (out right side of bed)    Transfers:  Sit to Stand: Minimum assistance;Assist x1     Bed to Chair: Moderate assistance;Assist x1    Balance:  Sitting: Intact  Sitting - Static: Good (unsupported)  Sitting - Dynamic: Good (unsupported)  Standing: Impaired; With support  Standing - Static: Fair  Standing - Dynamic : Fair;Constant support    ADL Intervention:  Feeding  Container Management: Set-up  Food to Mouth: Modified independent  Drink to Mouth: Modified independent         Upper Body Bathing  Bathing Assistance: Moderate assistance  Position Performed: Seated in chair  Cues: Physical assistance;Verbal cues provided; Tactile cues provided    Lower Body Bathing  Lower Body : Moderate assistance  Position Performed: Seated in chair  Cues: Physical assistance; Tactile cues provided;Verbal cues provided    Upper [address]:  Moderate assistance; Compensatory technique training  Cues: Physical assistance    Lower Body Dressing Assistance  Socks: Moderate assistance  Leg Crossed Method Used: No  Position Performed: Seated in chair  Cues: Physical assistance;Verbal cues provided; Tactile cues provided         Cognitive Retraining  Orientation Retraining: Reorienting;Time  Executive Functions: Executing cognitive plans  Organizing/Sequencing: Breaking task down  Attention to Task: Single task  Following Commands: Follows multi-step simple commands/directions  Safety/Judgement: Awareness of environment  Cues: Verbal cues provided; Tactile cues provided;Visual cues provided      Activity Tolerance:   Fair    After treatment patient left in no apparent distress:   Sitting in chair, Heels elevated for pressure relief, Call bell within reach, and Bed / chair alarm activated, waffle cushion for seat    COMMUNICATION/COLLABORATION:   The patients plan of care was discussed with: Physical therapist and Registered nurse.      ANTHONY Martins  Time Calculation: 53 mins  7 min deducted for portable X ray taken during session

## 2021-05-29 PROCEDURE — 74011250637 HC RX REV CODE- 250/637: Performed by: INTERNAL MEDICINE

## 2021-05-29 PROCEDURE — 74011000250 HC RX REV CODE- 250: Performed by: PHYSICIAN ASSISTANT

## 2021-05-29 PROCEDURE — 74011000250 HC RX REV CODE- 250: Performed by: HOSPITALIST

## 2021-05-29 PROCEDURE — 65660000000 HC RM CCU STEPDOWN

## 2021-05-29 PROCEDURE — 74011250637 HC RX REV CODE- 250/637: Performed by: HOSPITALIST

## 2021-05-29 PROCEDURE — 74011250636 HC RX REV CODE- 250/636: Performed by: INTERNAL MEDICINE

## 2021-05-29 PROCEDURE — 94640 AIRWAY INHALATION TREATMENT: CPT

## 2021-05-29 RX ORDER — IPRATROPIUM BROMIDE AND ALBUTEROL SULFATE 2.5; .5 MG/3ML; MG/3ML
3 SOLUTION RESPIRATORY (INHALATION)
Status: DISCONTINUED | OUTPATIENT
Start: 2021-05-29 | End: 2021-06-01 | Stop reason: SDDI

## 2021-05-29 RX ADMIN — MICONAZOLE NITRATE: 20 CREAM TOPICAL at 10:41

## 2021-05-29 RX ADMIN — SACUBITRIL AND VALSARTAN 1 TABLET: 24; 26 TABLET, FILM COATED ORAL at 10:39

## 2021-05-29 RX ADMIN — CASTOR OIL AND BALSAM, PERU: 788; 87 OINTMENT TOPICAL at 10:42

## 2021-05-29 RX ADMIN — Medication 10 ML: at 15:22

## 2021-05-29 RX ADMIN — OXYCODONE HYDROCHLORIDE 7.5 MG: 5 TABLET ORAL at 11:46

## 2021-05-29 RX ADMIN — MICONAZOLE NITRATE: 20 CREAM TOPICAL at 17:38

## 2021-05-29 RX ADMIN — LEVETIRACETAM 1500 MG: 500 TABLET ORAL at 10:40

## 2021-05-29 RX ADMIN — HEPARIN SODIUM 5000 UNITS: 5000 INJECTION INTRAVENOUS; SUBCUTANEOUS at 06:01

## 2021-05-29 RX ADMIN — METOPROLOL SUCCINATE 12.5 MG: 25 TABLET, EXTENDED RELEASE ORAL at 10:39

## 2021-05-29 RX ADMIN — IPRATROPIUM BROMIDE AND ALBUTEROL SULFATE 3 ML: .5; 3 SOLUTION RESPIRATORY (INHALATION) at 08:06

## 2021-05-29 RX ADMIN — SACUBITRIL AND VALSARTAN 1 TABLET: 24; 26 TABLET, FILM COATED ORAL at 21:23

## 2021-05-29 RX ADMIN — CASTOR OIL AND BALSAM, PERU: 788; 87 OINTMENT TOPICAL at 21:23

## 2021-05-29 RX ADMIN — IVABRADINE 5 MG: 5 TABLET, FILM COATED ORAL at 17:37

## 2021-05-29 RX ADMIN — Medication 10 ML: at 15:21

## 2021-05-29 RX ADMIN — CASTOR OIL AND BALSAM, PERU: 788; 87 OINTMENT TOPICAL at 17:38

## 2021-05-29 RX ADMIN — Medication 10 ML: at 06:01

## 2021-05-29 RX ADMIN — Medication 10 ML: at 21:24

## 2021-05-29 RX ADMIN — HEPARIN SODIUM 5000 UNITS: 5000 INJECTION INTRAVENOUS; SUBCUTANEOUS at 13:24

## 2021-05-29 RX ADMIN — HEPARIN SODIUM 5000 UNITS: 5000 INJECTION INTRAVENOUS; SUBCUTANEOUS at 21:23

## 2021-05-29 RX ADMIN — Medication 10 ML: at 06:02

## 2021-05-29 RX ADMIN — Medication 1 CAPSULE: at 10:39

## 2021-05-29 RX ADMIN — LEVETIRACETAM 1500 MG: 500 TABLET ORAL at 17:37

## 2021-05-29 RX ADMIN — Medication 10 ML: at 21:53

## 2021-05-29 RX ADMIN — IVABRADINE 5 MG: 5 TABLET, FILM COATED ORAL at 10:43

## 2021-05-29 RX ADMIN — OXYCODONE HYDROCHLORIDE 7.5 MG: 5 TABLET ORAL at 21:22

## 2021-05-29 RX ADMIN — TRAZODONE HYDROCHLORIDE 50 MG: 50 TABLET ORAL at 21:22

## 2021-05-29 RX ADMIN — BUDESONIDE 500 MCG: 0.5 INHALANT RESPIRATORY (INHALATION) at 08:06

## 2021-05-29 RX ADMIN — OXYCODONE HYDROCHLORIDE 7.5 MG: 5 TABLET ORAL at 17:37

## 2021-05-29 RX ADMIN — Medication 3 MG: at 21:22

## 2021-05-29 NOTE — ROUTINE PROCESS
Bedside shift change report given to anabella elliott rn (oncoming nurse) by chandler rn (offgoing nurse). Report included the following information SBAR and Kardex.

## 2021-05-29 NOTE — PROGRESS NOTES
[facility]    PATIENT ID: Jeevan Tenorio  MRN: [id number]   YOB: 1995    PRIMARY CARE PROVIDER: None   DATE OF ADMISSION: 5/6/2021  5:22 PM    ATTENDING PHYSICIAN: Ally Valdez MD  CONSULTATIONS:   IP CONSULT TO INTENSIVIST  IP CONSULT TO NEUROLOGY  IP CONSULT TO PODIATRY  IP CONSULT TO PODIATRY  IP CONSULT TO INFECTIOUS DISEASES  IP CONSULT TO NEUROLOGY  IP CONSULT TO OTOLARYNGOLOGY  IP CONSULT TO OTOLARYNGOLOGY  IP CONSULT TO OTOLARYNGOLOGY  IP CONSULT TO CARDIOLOGY  IP CONSULT TO PULMONOLOGY    PROCEDURES/SURGERIES:   Procedure(s):  INSERT ICD DUAL  Eval Icd Generator & Leads W Testing At Implant    [address]: Cardiac arrest with pulseless electrical activity [facility] PROBLEM LIST:  Patient Active Problem List   Diagnosis Code    Endocarditis due to methicillin susceptible Staphylococcus aureus (MSSA) I33.0, B95.61    Drug abuse, cocaine type ([facility] [address] F14.10    Type 2 myocardial infarction ([facility] [address] I21. 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 male with h/o Seizures and anxiety who presented to [facility] ED after a cardiac arrest at [facility] around 1500 5/6/2021. Hx from chart and speaking with patient's sister via phone. Patient had recent hospitalization at the in [facility] in March of this year.  Initial hospitalization admission was at [facility] on 3/24 with AMS in setting of polysubstance and IVD use (cocaine, heroine, methamphetamines). He was found to have septic MRSA bacteremia, MRSA endocarditis. Imaging and MRI also found R PCA infarct and several abscesses and right temporal and parietal lobes. Infarcts thought to be due to septic emboli and patient had left sided residual weakness. Patient was transferred to [facility] on 4/1/2021 for evaluation for valve surgery. After transfer he was found to have a New R MCA infarct Also had a type 2 NSTEMI and tamponade with pericardiocentesis done prior to surgery. Did have cardiac arrest on day of surgery. Had a bioprosthetic Aortic Valve Replacement and Aortic Root Abscess Debridement done on 4/16/2021. Trached and sent to [facility] on 4/29/2021. Was undergoing PT and vent wean. Sister stated that patient was able to be off the vent for several hours over the last few days. He was sitting up and able to talk with valve over trach and could follow commands. Stated that he had severe weakness of the left upper ext, but was starting to gain some mobility in the lower left ext. Patient's mother visited patient today prior to arrest. She said the staff told her that the patient was getting very anxious earlier in the day around 1 pm and had to be placed back on the ventilator, and then they had to sedate him while he was on the ventilator. The mother stated \" he did not look good\" and \" his eye were rolled back in his head\". About 30 minutes after she left she got call about arrest. Per ED note patient was found unresponsive around the 1500 hour and was pulseless. Two rounds of CPR and meds were given and patient was defibrillated x 1 before ROSC. Patient was hypoxic post arrest. Unknown down time prior to arrest.    Subjective/HPI    Patient awake. No acute changes. Pulmonology following. Needs peer to peer. Consulted ENT as per pulmonology recommendations regarding first trach change   No acute changes. D/w nursing. trying to reach out ENT for firat trach change.      Assessment and Plan:  V Fib Cardiac arrest - 5/6/21 on admission:   .  Echo showed mild improvement, LVEF 30-35%   -per cardiologist : on [address] until 5/28  for endocarditis   -s/p ICD on 5/25   -Add dapagliflozin on dc as not on formulary as per Dr. Steffanie Ayers (5/25)  CXR Stable left pneumothorax    Acute on chronic  respiratory failure, resolved  Pneumothorax post ICD placement  [facility] and was [facility]. He was on ventilator while in ICU and now transitioned to trach collar. -CXR showed Very minimal left apical pneumothorax. Repeat CXR 5/26 showed L-sided pneumothorax has increased . Pt is currently stable on RA. Repeat CXR has been ordered  -trach downsize to cuffless #6 with Passy-[place]ont valveon 5/25  - on RA now for > 48 hours and maintaining airways without difficulty  - pulmonary following, plan to change trach size. ENT consulted as per pulmonology recommendations for first time trach change, pending    MRSA aortic  Valve endocardititis   -Found to have MRSA bacteremia and MSSA endocarditis end of march. -Hx:  4/16/21 at [facility] bioprosthetic AVR and root abscess debridement. He also had a pericardiocentesis due to tamponade prior to surgery  - Reviewed [facility] record, he was on IV vanco at [facility] and planned to  have a 6 weeks of   tx and EOT 5/28/21 per record. - Continue vancomycin until 5/28/21, appreciate ID's recs    Pain management : contusion of chest wall, left side, chronic back pain . H/o drug abuse. Stop fentanyl , cont' oxycodone prn. Cautious with narcotics. Bacterial pneumonia  severe multilevel consolidation throughout both lungs consistent with severe multilobar PNA.    -sputum culture E coli with ESBL   -per ID -Plan  S/p meropenem , will dc and cont vanc for Endocarditis   -pulmonary toilet   -trach care     # Systolic congestive heart failure/CM  Ef 25%   Hold coreg,entresto ,Aldactone, BP better today  Echo EF showed 25-30% on 5/17  Cont' Toprol Xl, Entresto  Cardiologist following     H/o right MCA infarct and several brain abscesses in the right temporal and parietal lobes. Infarcts were thought to be due to septic emboli. He had left sided residual weakness. cta 5/6/21:  multiple acute infarctions throughout the  right cerebral hemisphere, involving much of the occipital lobe, as well as much  of the frontal lobe. There is an additional superior right frontoparietal Infarct  Evaluated by neurology,  Cont' current anticonvulsants. No seizures on EEG. Neurologist consulted                -PT/OT/SLP               - Stroke education              - SBP goal 100-160              Seizure DZ:  Continue keppra -1500 mg BID, off valproic acid as subtherapeutic due to meropenem interaction,so valproic acid discontinued      Severe muscle deconditioning  Severe protein-calorie malnutrition   Cerebral abscess (embolic)   H/o substance abuse      fulll code     PT/OT, ]placement pending        PHYSICAL EXAMINATION:  Visit Vitals  BP 98/64 (BP 1 Location: Right upper arm, BP Patient Position: At rest)   Pulse 77   Temp 98.1 °F (36.7 °C)   Resp 16   Ht 5' 11\" (1.803 m)   Wt 55.7 kg (122 lb 11.2 oz)   SpO2 98%   BMI 17.11 kg/m²       General:          Cachetic male in bed, chronically ill appearing  HEENT:           Atraumatic,trach capped        Neck:               Supple,   Lungs: No wheezing. Midline sternal surgical scar, no crackles. Heart:              Regular  rhythm,normal rate   No edema  Abdomen:       Soft, non-tender. Not distended. Bowel sounds normal  Extremities:     No LE edema  Skin:                Not pale.   Not Jaundiced  No rashes         Neurologic:      Alert, oriented X 3, unable to move LUE , able to bend LLE at knee, moves RUE and RLE     Labs:     Recent Labs     05/28/21  0259   WBC 6.8   HGB 9.7*   HCT 31.5*        Recent Labs     05/28/21  0259 05/27/21  0508   NA  --  137   K  --  3.9   CL  -- 104   CO2  --  25   BUN  --  8   CREA  --  0.39*   GLU  --  91   CA  --  10.1   MG 1.7 1.6   PHOS 4.5 3.7     No results for input(s): ALT, AP, TBIL, TBILI, TP, ALB, GLOB, GGT, AML, LPSE in the last 72 hours. No lab exists for component: SGOT, GPT, AMYP, HLPSE  No results for input(s): INR, PTP, APTT, INREXT, INREXT in the last 72 hours. No results for input(s): FE, TIBC, PSAT, FERR in the last 72 hours. No results found for: FOL, RBCF   No results for input(s): PH, PCO2, PO2 in the last 72 hours. No results for input(s): CPK, CKNDX, TROIQ in the last 72 hours.     No lab exists for component: CPKMB  Lab Results   Component Value Date/Time    Cholesterol, total 140 05/11/2021 04:35 AM    HDL Cholesterol 21 05/11/2021 04:35 AM    LDL, calculated 77.4 05/11/2021 04:35 AM    Triglyceride 208 (H) 05/11/2021 04:35 AM    CHOL/HDL Ratio 6.7 (H) 05/11/2021 04:35 AM     Lab Results   Component Value Date/Time    Glucose (POC) 120 (H) 05/11/2021 12:00 PM    Glucose (POC) 100 05/11/2021 06:04 AM    Glucose (POC) 87 05/11/2021 12:19 AM    Glucose (POC) 83 05/10/2021 06:53 PM    Glucose (POC) 84 05/10/2021 02:07 PM     Lab Results   Component Value Date/Time    Color YELLOW/STRAW 05/06/2021 05:42 PM    Appearance CLEAR 05/06/2021 05:42 PM    Specific gravity 1.008 05/06/2021 05:42 PM    Specific gravity 1.020 03/24/2021 09:30 PM    pH (UA) 5.0 05/06/2021 05:42 PM    Protein 30 (A) 05/06/2021 05:42 PM    Glucose Negative 05/06/2021 05:42 PM    Ketone Negative 05/06/2021 05:42 PM    Bilirubin Negative 05/06/2021 05:42 PM    Urobilinogen 0.2 05/06/2021 05:42 PM    Nitrites Negative 05/06/2021 05:42 PM    Leukocyte Esterase Negative 05/06/2021 05:42 PM    Epithelial cells FEW 05/06/2021 05:42 PM    Bacteria Negative 05/06/2021 05:42 PM    WBC 0-4 05/06/2021 05:42 PM    RBC 0-5 05/06/2021 05:42 PM         CODE STATUS:  x Full Code    DNR    Partial    Comfort Care       Signed:   Kenny Blas MD

## 2021-05-29 NOTE — PROGRESS NOTES
Bedside shift change report given to Kang Myers (oncoming nurse) by Lakshmi Dumont (offgoing nurse). Report included the following information SBAR, Kardex, MAR and Recent Results.

## 2021-05-29 NOTE — PROGRESS NOTES
Chart reviewed. JANNET received call from Queenie Garcia with [phone]. The [id number] has been approved and they can admit patient today. CM sent perfect serve to MD to find out if patient is medically stable for discharge. Per MD patient not medically stable for discharge today, patient needs to see ENT regarding tracheotomy.      Ronit Cruz, BSW/CRM

## 2021-05-29 NOTE — PROGRESS NOTES
Problem: Falls - Risk of  Goal: *Absence of Falls  Description: Document Roosevelt Jung Fall Risk and appropriate interventions in the flowsheet. Outcome: Progressing Towards Goal  Note: Fall Risk Interventions:  Mobility Interventions: Bed/chair exit alarm    Mentation Interventions: Adequate sleep, hydration, pain control    Medication Interventions: Bed/chair exit alarm    Elimination Interventions: Bed/chair exit alarm, Call light in reach              Problem: Breathing Pattern - Ineffective  Goal: *Absence of hypoxia  Outcome: Progressing Towards Goal  Goal: *Use of effective breathing techniques  Outcome: Progressing Towards Goal  Goal: *PALLIATIVE CARE:  Alleviation of Dyspnea  Outcome: Progressing Towards Goal     Problem: Pressure Injury - Risk of  Goal: *Prevention of pressure injury  Description: Document Salvador Scale and appropriate interventions in the flowsheet.   Outcome: Progressing Towards Goal  Note: Pressure Injury Interventions:  Sensory Interventions: Keep linens dry and wrinkle-free    Moisture Interventions: Limit adult briefs    Activity Interventions: Pressure redistribution bed/mattress(bed type)    Mobility Interventions: Float heels, HOB 30 degrees or less    Nutrition Interventions: Document food/fluid/supplement intake    Friction and Shear Interventions: HOB 30 degrees or less       Problem: Nutrition Deficit  Goal: *Optimize nutritional status  Outcome: Progressing Towards Goal

## 2021-05-30 PROCEDURE — 74011250636 HC RX REV CODE- 250/636: Performed by: INTERNAL MEDICINE

## 2021-05-30 PROCEDURE — 74011250637 HC RX REV CODE- 250/637: Performed by: HOSPITALIST

## 2021-05-30 PROCEDURE — 65660000000 HC RM CCU STEPDOWN

## 2021-05-30 PROCEDURE — 74011250637 HC RX REV CODE- 250/637: Performed by: INTERNAL MEDICINE

## 2021-05-30 RX ADMIN — Medication 3 MG: at 20:56

## 2021-05-30 RX ADMIN — LEVETIRACETAM 1500 MG: 500 TABLET ORAL at 10:13

## 2021-05-30 RX ADMIN — CASTOR OIL AND BALSAM, PERU: 788; 87 OINTMENT TOPICAL at 10:46

## 2021-05-30 RX ADMIN — IVABRADINE 5 MG: 5 TABLET, FILM COATED ORAL at 18:15

## 2021-05-30 RX ADMIN — SACUBITRIL AND VALSARTAN 1 TABLET: 24; 26 TABLET, FILM COATED ORAL at 10:13

## 2021-05-30 RX ADMIN — HEPARIN SODIUM 5000 UNITS: 5000 INJECTION INTRAVENOUS; SUBCUTANEOUS at 20:56

## 2021-05-30 RX ADMIN — SACUBITRIL AND VALSARTAN 1 TABLET: 24; 26 TABLET, FILM COATED ORAL at 20:56

## 2021-05-30 RX ADMIN — OXYCODONE HYDROCHLORIDE 7.5 MG: 5 TABLET ORAL at 15:14

## 2021-05-30 RX ADMIN — Medication 10 ML: at 14:00

## 2021-05-30 RX ADMIN — METOPROLOL SUCCINATE 12.5 MG: 25 TABLET, EXTENDED RELEASE ORAL at 10:14

## 2021-05-30 RX ADMIN — Medication 1 CAPSULE: at 10:14

## 2021-05-30 RX ADMIN — CASTOR OIL AND BALSAM, PERU: 788; 87 OINTMENT TOPICAL at 18:17

## 2021-05-30 RX ADMIN — IVABRADINE 5 MG: 5 TABLET, FILM COATED ORAL at 10:46

## 2021-05-30 RX ADMIN — OXYCODONE HYDROCHLORIDE 7.5 MG: 5 TABLET ORAL at 10:13

## 2021-05-30 RX ADMIN — MICONAZOLE NITRATE: 20 CREAM TOPICAL at 18:15

## 2021-05-30 RX ADMIN — OXYCODONE HYDROCHLORIDE 7.5 MG: 5 TABLET ORAL at 18:16

## 2021-05-30 RX ADMIN — CASTOR OIL AND BALSAM, PERU: 788; 87 OINTMENT TOPICAL at 21:00

## 2021-05-30 RX ADMIN — OXYCODONE HYDROCHLORIDE 7.5 MG: 5 TABLET ORAL at 22:15

## 2021-05-30 RX ADMIN — LEVETIRACETAM 1500 MG: 500 TABLET ORAL at 18:16

## 2021-05-30 NOTE — PROGRESS NOTES
.  [facility] Adult  Hospitalist Group    PATIENT ID: Sheila Dobbs  MRN: [id number]   YOB: 1995    PRIMARY CARE PROVIDER: None   DATE OF ADMISSION: 5/6/2021  5:22 PM    ATTENDING PHYSICIAN: Sebastian Maldonado MD  CONSULTATIONS:   IP CONSULT TO INTENSIVIST  IP CONSULT TO NEUROLOGY  IP CONSULT TO PODIATRY  IP CONSULT TO PODIATRY  IP CONSULT TO INFECTIOUS DISEASES  IP CONSULT TO NEUROLOGY  IP CONSULT TO OTOLARYNGOLOGY  IP CONSULT TO OTOLARYNGOLOGY  IP CONSULT TO OTOLARYNGOLOGY  IP CONSULT TO CARDIOLOGY  IP CONSULT TO PULMONOLOGY    PROCEDURES/SURGERIES:   Procedure(s):  INSERT ICD DUAL  Eval Icd Generator & Leads W Testing At Implant    [address]: Cardiac arrest with pulseless electrical activity [facility] PROBLEM LIST:  Patient Active Problem List   Diagnosis Code    Endocarditis due to methicillin susceptible Staphylococcus aureus (MSSA) I33.0, B95.61    Drug abuse, cocaine type ([facility] [address] F14.10    Type 2 myocardial infarction ([facility] [address] I21. 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 male with h/o Seizures and anxiety who presented to [facility] [place] ED after a cardiac arrest at [facility] around 1500 5/6/2021. Hx from chart and speaking with patient's sister via phone. Patient had recent hospitalization at the [facility] in March of this year.  Initial hospitalization admission was at [facility] on 3/24 with AMS in setting of polysubstance and IVD use (cocaine, heroine, methamphetamines). He was found to have septic MRSA bacteremia, MRSA endocarditis. Imaging and MRI also found R PCA infarct and several abscesses and right temporal and parietal lobes. Infarcts thought to be due to septic emboli and patient had left sided residual weakness. Patient was transferred to [facility] on 4/1/2021 for evaluation for valve surgery. After transfer he was found to have a New R MCA infarct Also had a type 2 NSTEMI and tamponade with pericardiocentesis done prior to surgery. Did have cardiac arrest on day of surgery. Had a bioprosthetic Aortic Valve Replacement and Aortic Root Abscess Debridement done on 4/16/2021. Trached and sent to [facility] on 4/29/2021. Was undergoing PT and vent wean. Sister stated that patient was able to be off the vent for several hours over the last few days. He was sitting up and able to talk with valve over trach and could follow commands. Stated that he had severe weakness of the left upper ext, but was starting to gain some mobility in the lower left ext. Patient's mother visited patient today prior to arrest. She said the staff told her that the patient was getting very anxious earlier in the day around 1 pm and had to be placed back on the ventilator, and then they had to sedate him while he was on the ventilator. The mother stated \" he did not look good\" and \" his eye were rolled back in his head\". About 30 minutes after she left she got call about arrest. Per ED note patient was found unresponsive around the 1500 hour and was pulseless. Two rounds of CPR and meds were given and patient was defibrillated x 1 before ROSC. Patient was hypoxic post arrest. Unknown down time prior to arrest.    Subjective/HPI    Consulted ENT as per pulmonology recommendations regarding first trach change . Still pending. As per nursing staff ENT is going to see patient today   No acute changes. D/w nursing. trying to reach out ENT for firat trach change.      Assessment and Plan:  V Fib Cardiac arrest - 5/6/21 on admission:   .  Echo showed mild improvement, LVEF 30-35%   -per cardiologist : on [address] until 5/28  for endocarditis   -s/p ICD on 5/25   -Add dapagliflozin on dc as not on formulary as per Dr. King Devoid (5/25)  CXR Stable left pneumothorax    Acute on chronic  respiratory failure, resolved  Pneumothorax post ICD placement  [facility] and was [facility] further [facility]. He was on ventilator while in ICU and now transitioned to trach collar. -CXR showed Very minimal left apical pneumothorax. Repeat CXR 5/26 showed L-sided pneumothorax has increased . Pt is currently stable on RA. Repeat CXR has been ordered  -trach downsize to cuffless #6 with [place] valveon 5/25  - on RA now for > 48 hours and maintaining airways without difficulty  - pulmonary following, plan to change trach size. ENT consulted as per pulmonology recommendations for first time trach change, pending    MRSA aortic  Valve endocardititis   -Found to have MRSA bacteremia and MSSA endocarditis end of march. -Hx:  4/16/21 at [facility] bioprosthetic AVR and root abscess debridement. He also had a pericardiocentesis due to tamponade prior to surgery  - Reviewed [facility] record, he was on IV vanco at [facility] and planned to  have a 6 weeks of   tx and EOT 5/28/21 per record. - s/p vancomycin until 5/28/21, appreciate ID's recs    Pain management : contusion of chest wall, left side, chronic back pain . H/o drug abuse. Stop fentanyl , cont' oxycodone prn. Cautious with narcotics. Bacterial pneumonia  severe multilevel consolidation throughout both lungs consistent with severe multilobar PNA.    -sputum culture E coli with ESBL   -per ID -Plan  S/p meropenem , will dc and cont vanc for Endocarditis   -pulmonary toilet   -trach care     # Systolic congestive heart failure/CM  Ef 25%   Hold coreg,entresto ,Aldactone, BP better today  Echo EF showed 25-30% on 5/17  Cont' [place] Velasquze  Cardiologist following     H/o right MCA infarct and several brain abscesses in the right temporal and parietal lobes. Infarcts were thought to be due to septic emboli. He had left sided residual weakness. cta 5/6/21:  multiple acute infarctions throughout the  right cerebral hemisphere, involving much of the occipital lobe, as well as much  of the frontal lobe. There is an additional superior right frontoparietal Infarct  Evaluated by neurology,  Cont' current anticonvulsants. No seizures on EEG. Neurologist consulted                -PT/OT/SLP               - Stroke education              - SBP goal 100-160              Seizure DZ:  Continue keppra -1500 mg BID, off valproic acid as subtherapeutic due to meropenem interaction,so valproic acid discontinued      Severe muscle deconditioning  Severe protein-calorie malnutrition   Cerebral abscess (embolic)   H/o substance abuse      fulll code     PT/OT, ]placement pending        PHYSICAL EXAMINATION:  Visit Vitals  /71 (BP 1 Location: Right arm, BP Patient Position: At rest)   Pulse 92   Temp 98.2 °F (36.8 °C)   Resp 16   Ht 5' 11\" (1.803 m)   Wt 55.6 kg (122 lb 9.2 oz)   SpO2 92%   BMI 17.10 kg/m²       General:          Cachetic male in bed, chronically ill appearing  HEENT:           Atraumatic,trach capped        Neck:               Supple,   Lungs: No wheezing. Midline sternal surgical scar, no crackles. Heart:              Regular  rhythm,normal rate   No edema  Abdomen:       Soft, non-tender. Not distended. Bowel sounds normal  Extremities:     No LE edema  Skin:                Not pale.   Not Jaundiced  No rashes         Neurologic:      Alert, oriented X 3, unable to move LUE , able to bend LLE at knee, moves RUE and RLE     Labs:     Recent Labs     05/28/21  0259   WBC 6.8   HGB 9.7*   HCT 31.5*        Recent Labs     05/28/21  0259   MG 1.7   PHOS 4.5     No results for input(s): ALT, AP, TBIL, TBILI, TP, ALB, GLOB, GGT, AML, LPSE in the last 72 hours. No lab exists for component: SGOT, GPT, AMYP, HLPSE  No results for input(s): INR, PTP, APTT, INREXT, INREXT in the last 72 hours. No results for input(s): FE, TIBC, PSAT, FERR in the last 72 hours. No results found for: FOL, RBCF   No results for input(s): PH, PCO2, PO2 in the last 72 hours. No results for input(s): CPK, CKNDX, TROIQ in the last 72 hours.     No lab exists for component: CPKMB  Lab Results   Component Value Date/Time    Cholesterol, total 140 05/11/2021 04:35 AM    HDL Cholesterol 21 05/11/2021 04:35 AM    LDL, calculated 77.4 05/11/2021 04:35 AM    Triglyceride 208 (H) 05/11/2021 04:35 AM    CHOL/HDL Ratio 6.7 (H) 05/11/2021 04:35 AM     Lab Results   Component Value Date/Time    Glucose (POC) 120 (H) 05/11/2021 12:00 PM    Glucose (POC) 100 05/11/2021 06:04 AM    Glucose (POC) 87 05/11/2021 12:19 AM    Glucose (POC) 83 05/10/2021 06:53 PM    Glucose (POC) 84 05/10/2021 02:07 PM     Lab Results   Component Value Date/Time    Color YELLOW/STRAW 05/06/2021 05:42 PM    Appearance CLEAR 05/06/2021 05:42 PM    Specific gravity 1.008 05/06/2021 05:42 PM    Specific gravity 1.020 03/24/2021 09:30 PM    pH (UA) 5.0 05/06/2021 05:42 PM    Protein 30 (A) 05/06/2021 05:42 PM    Glucose Negative 05/06/2021 05:42 PM    Ketone Negative 05/06/2021 05:42 PM    Bilirubin Negative 05/06/2021 05:42 PM    Urobilinogen 0.2 05/06/2021 05:42 PM    Nitrites Negative 05/06/2021 05:42 PM    Leukocyte Esterase Negative 05/06/2021 05:42 PM    Epithelial cells FEW 05/06/2021 05:42 PM    Bacteria Negative 05/06/2021 05:42 PM    WBC 0-4 05/06/2021 05:42 PM    RBC 0-5 05/06/2021 05:42 PM         CODE STATUS:  x Full Code    DNR    Partial    Comfort Care       Signed:   Fernando Bolaños MD

## 2021-05-30 NOTE — PROGRESS NOTES
Problem: Falls - Risk of  Goal: *Absence of Falls  Description: Document Kate Hubbard Fall Risk and appropriate interventions in the flowsheet. Outcome: Progressing Towards Goal  Note: Fall Risk Interventions:  Mobility Interventions: Bed/chair exit alarm    Mentation Interventions: Adequate sleep, hydration, pain control    Medication Interventions: Bed/chair exit alarm    Elimination Interventions: Bed/chair exit alarm, Call light in reach              Problem: Patient Education: Go to Patient Education Activity  Goal: Patient/Family Education  Outcome: Progressing Towards Goal     Problem: Risk for Spread of Infection  Goal: Prevent transmission of infectious organism to others  Description: Prevent the transmission of infectious organisms to other patients, staff members, and visitors.   Outcome: Progressing Towards Goal     Problem: Patient Education:  Go to Education Activity  Goal: Patient/Family Education  Outcome: Progressing Towards Goal     Problem: Breathing Pattern - Ineffective  Goal: *Absence of hypoxia  Outcome: Progressing Towards Goal  Goal: *Use of effective breathing techniques  Outcome: Progressing Towards Goal  Goal: *PALLIATIVE CARE:  Alleviation of Dyspnea  Outcome: Progressing Towards Goal     Problem: Breathing Pattern - Ineffective  Goal: *Absence of hypoxia  Outcome: Progressing Towards Goal  Goal: *Use of effective breathing techniques  Outcome: Progressing Towards Goal  Goal: *PALLIATIVE CARE:  Alleviation of Dyspnea  Outcome: Progressing Towards Goal     Problem: Patient Education: Go to Patient Education Activity  Goal: Patient/Family Education  Outcome: Progressing Towards Goal     Problem: Patient Education: Go to Patient Education Activity  Goal: Patient/Family Education  Outcome: Progressing Towards Goal     Problem: Patient Education: Go to Patient Education Activity  Goal: Patient/Family Education  Outcome: Progressing Towards Goal     Problem: Patient Education: Go to Patient Education Activity  Goal: Patient/Family Education  Outcome: Progressing Towards Goal     Problem: Patient Education: Go to Patient Education Activity  Goal: Patient/Family Education  Outcome: Progressing Towards Goal     Problem: Nutrition Deficit  Goal: *Optimize nutritional status  Outcome: Progressing Towards Goal     Problem: Nutrition Deficit  Goal: *Optimize nutritional status  Outcome: Progressing Towards Goal     Problem: Airway Clearance - Ineffective  Goal: *Patent airway  Outcome: Progressing Towards Goal  Goal: *Absence of airway secretions  Outcome: Progressing Towards Goal  Goal: *Able to cough effectively  Outcome: Progressing Towards Goal  Goal: *PALLIATIVE CARE:  Alleviation of secretions, cough and/or nasal congestion  Outcome: Progressing Towards Goal     Problem: Patient Education: Go to Patient Education Activity  Goal: Patient/Family Education  Outcome: Progressing Towards Goal     Problem: Pain  Goal: *Control of Pain  Outcome: Progressing Towards Goal  Goal: *PALLIATIVE CARE:  Alleviation of Pain  Outcome: Progressing Towards Goal     Problem: Patient Education: Go to Patient Education Activity  Goal: Patient/Family Education  Outcome: Progressing Towards Goal     Problem: Tobacco Use  Goal: *Tobacco use abstinence  Outcome: Progressing Towards Goal  Goal: *Knowledge of tobacco use cessation methods  Outcome: Progressing Towards Goal     Problem: Patient Education: Go to Patient Education Activity  Goal: Patient/Family Education  Outcome: Progressing Towards Goal     Problem: General Wound Care  Goal: *Non-infected wound: Improvement of existing wound, absence of infection, and maintenance of skin integrity  Outcome: Progressing Towards Goal  Goal: *Infected Wound: Prevention of further infection and promotion of healing  Description: Infection control procedures (eg: clean dressings, clean gloves, hand washing, precautions to isolate wound from contamination, sterile instruments used for wound debridement) should be implemented.   Outcome: Progressing Towards Goal  Goal: Interventions  Outcome: Progressing Towards Goal     Problem: Patient Education: Go to Patient Education Activity  Goal: Patient/Family Education  Outcome: Progressing Towards Goal     Problem: Anxiety  Goal: *Alleviation of anxiety  Outcome: Progressing Towards Goal  Goal: *Alleviation of anxiety (Palliative Care)  Outcome: Progressing Towards Goal     Problem: Patient Education: Go to Patient Education Activity  Goal: Patient/Family Education  Outcome: Progressing Towards Goal

## 2021-05-30 NOTE — PROGRESS NOTES
Bedside shift change report given to [address] (oncoming nurse) by Keny Palma RN  (offgoing nurse). Report included the following information SBAR, Kardex, MAR and Recent Results.

## 2021-05-31 PROCEDURE — 77010033678 HC OXYGEN DAILY

## 2021-05-31 PROCEDURE — 65660000000 HC RM CCU STEPDOWN

## 2021-05-31 PROCEDURE — 74011250637 HC RX REV CODE- 250/637: Performed by: INTERNAL MEDICINE

## 2021-05-31 PROCEDURE — 94760 N-INVAS EAR/PLS OXIMETRY 1: CPT

## 2021-05-31 PROCEDURE — 74011250636 HC RX REV CODE- 250/636: Performed by: INTERNAL MEDICINE

## 2021-05-31 PROCEDURE — 74011250637 HC RX REV CODE- 250/637: Performed by: HOSPITALIST

## 2021-05-31 RX ADMIN — METOPROLOL SUCCINATE 12.5 MG: 25 TABLET, EXTENDED RELEASE ORAL at 09:20

## 2021-05-31 RX ADMIN — IVABRADINE 5 MG: 5 TABLET, FILM COATED ORAL at 17:27

## 2021-05-31 RX ADMIN — HEPARIN SODIUM 5000 UNITS: 5000 INJECTION INTRAVENOUS; SUBCUTANEOUS at 13:06

## 2021-05-31 RX ADMIN — OXYCODONE HYDROCHLORIDE 7.5 MG: 5 TABLET ORAL at 07:14

## 2021-05-31 RX ADMIN — IVABRADINE 5 MG: 5 TABLET, FILM COATED ORAL at 09:20

## 2021-05-31 RX ADMIN — LEVETIRACETAM 1500 MG: 500 TABLET ORAL at 17:26

## 2021-05-31 RX ADMIN — OXYCODONE HYDROCHLORIDE 7.5 MG: 5 TABLET ORAL at 21:58

## 2021-05-31 RX ADMIN — Medication 10 ML: at 13:05

## 2021-05-31 RX ADMIN — Medication 10 ML: at 07:01

## 2021-05-31 RX ADMIN — HEPARIN SODIUM 5000 UNITS: 5000 INJECTION INTRAVENOUS; SUBCUTANEOUS at 21:59

## 2021-05-31 RX ADMIN — MICONAZOLE NITRATE: 20 CREAM TOPICAL at 09:20

## 2021-05-31 RX ADMIN — OXYCODONE HYDROCHLORIDE 7.5 MG: 5 TABLET ORAL at 13:05

## 2021-05-31 RX ADMIN — HEPARIN SODIUM 5000 UNITS: 5000 INJECTION INTRAVENOUS; SUBCUTANEOUS at 05:50

## 2021-05-31 RX ADMIN — Medication 3 MG: at 21:58

## 2021-05-31 RX ADMIN — MICONAZOLE NITRATE: 20 CREAM TOPICAL at 17:29

## 2021-05-31 RX ADMIN — Medication 10 ML: at 21:59

## 2021-05-31 RX ADMIN — LEVETIRACETAM 1500 MG: 500 TABLET ORAL at 09:20

## 2021-05-31 RX ADMIN — CASTOR OIL AND BALSAM, PERU: 788; 87 OINTMENT TOPICAL at 22:00

## 2021-05-31 RX ADMIN — Medication 1 CAPSULE: at 09:20

## 2021-05-31 RX ADMIN — SACUBITRIL AND VALSARTAN 1 TABLET: 24; 26 TABLET, FILM COATED ORAL at 09:20

## 2021-05-31 RX ADMIN — OXYCODONE HYDROCHLORIDE 7.5 MG: 5 TABLET ORAL at 02:21

## 2021-05-31 RX ADMIN — TRAZODONE HYDROCHLORIDE 50 MG: 50 TABLET ORAL at 21:58

## 2021-05-31 RX ADMIN — CASTOR OIL AND BALSAM, PERU: 788; 87 OINTMENT TOPICAL at 17:27

## 2021-05-31 RX ADMIN — OXYCODONE HYDROCHLORIDE 7.5 MG: 5 TABLET ORAL at 17:26

## 2021-05-31 RX ADMIN — CASTOR OIL AND BALSAM, PERU: 788; 87 OINTMENT TOPICAL at 09:21

## 2021-05-31 RX ADMIN — SACUBITRIL AND VALSARTAN 1 TABLET: 24; 26 TABLET, FILM COATED ORAL at 22:00

## 2021-05-31 NOTE — PROGRESS NOTES
Patient resting in bed watching tv, appetite is good, states pain is never controlled, requests IV pain medication, reinforced education that patient may be discharged soon and IV pain medication would not be available. Problem: Falls - Risk of  Goal: *Absence of Falls  Description: Document Lear Shaker Fall Risk and appropriate interventions in the flowsheet. Outcome: Progressing Towards Goal  Note: Fall Risk Interventions:  Mobility Interventions: Communicate number of staff needed for ambulation/transfer, Bed/chair exit alarm, Patient to call before getting OOB, Strengthening exercises (ROM-active/passive), Utilize gait belt for transfers/ambulation    Mentation Interventions: Adequate sleep, hydration, pain control, Bed/chair exit alarm, More frequent rounding    Medication Interventions: Evaluate medications/consider consulting pharmacy, Bed/chair exit alarm    Elimination Interventions: Toileting schedule/hourly rounds, Patient to call for help with toileting needs, Call light in reach, Bed/chair exit alarm              Problem: Patient Education: Go to Patient Education Activity  Goal: Patient/Family Education  Outcome: Progressing Towards Goal     Problem: Risk for Spread of Infection  Goal: Prevent transmission of infectious organism to others  Description: Prevent the transmission of infectious organisms to other patients, staff members, and visitors.   Outcome: Progressing Towards Goal     Problem: Patient Education:  Go to Education Activity  Goal: Patient/Family Education  Outcome: Progressing Towards Goal     Problem: Breathing Pattern - Ineffective  Goal: *Absence of hypoxia  Outcome: Progressing Towards Goal  Goal: *Use of effective breathing techniques  Outcome: Progressing Towards Goal  Goal: *PALLIATIVE CARE:  Alleviation of Dyspnea  Outcome: Progressing Towards Goal     Problem: Patient Education: Go to Patient Education Activity  Goal: Patient/Family Education  Outcome: Progressing Towards Goal     Problem: Pressure Injury - Risk of  Goal: *Prevention of pressure injury  Description: Document Salvador Scale and appropriate interventions in the flowsheet.   Outcome: Progressing Towards Goal  Note: Pressure Injury Interventions:  Sensory Interventions: Float heels, Keep linens dry and wrinkle-free, Minimize linen layers    Moisture Interventions: Apply protective barrier, creams and emollients, Absorbent underpads    Activity Interventions: Increase time out of bed, Pressure redistribution bed/mattress(bed type)    Mobility Interventions: HOB 30 degrees or less, Pressure redistribution bed/mattress (bed type), Float heels    Nutrition Interventions: Document food/fluid/supplement intake    Friction and Shear Interventions: Lift sheet, HOB 30 degrees or less, Minimize layers                Problem: Patient Education: Go to Patient Education Activity  Goal: Patient/Family Education  Outcome: Progressing Towards Goal     Problem: Patient Education: Go to Patient Education Activity  Goal: Patient/Family Education  Outcome: Progressing Towards Goal     Problem: Patient Education: Go to Patient Education Activity  Goal: Patient/Family Education  Outcome: Progressing Towards Goal     Problem: Nutrition Deficit  Goal: *Optimize nutritional status  Outcome: Progressing Towards Goal     Problem: Airway Clearance - Ineffective  Goal: *Patent airway  Outcome: Progressing Towards Goal  Goal: *Absence of airway secretions  Outcome: Progressing Towards Goal  Goal: *Able to cough effectively  Outcome: Progressing Towards Goal  Goal: *PALLIATIVE CARE:  Alleviation of secretions, cough and/or nasal congestion  Outcome: Progressing Towards Goal     Problem: Patient Education: Go to Patient Education Activity  Goal: Patient/Family Education  Outcome: Progressing Towards Goal     Problem: Pain  Goal: *Control of Pain  Outcome: Progressing Towards Goal  Goal: *PALLIATIVE CARE:  Alleviation of Pain  Outcome: Progressing Towards Goal     Problem: Patient Education: Go to Patient Education Activity  Goal: Patient/Family Education  Outcome: Progressing Towards Goal     Problem: Tobacco Use  Goal: *Tobacco use abstinence  Outcome: Progressing Towards Goal  Goal: *Knowledge of tobacco use cessation methods  Outcome: Progressing Towards Goal     Problem: Patient Education: Go to Patient Education Activity  Goal: Patient/Family Education  Outcome: Progressing Towards Goal     Problem: General Wound Care  Goal: *Non-infected wound: Improvement of existing wound, absence of infection, and maintenance of skin integrity  Outcome: Progressing Towards Goal  Goal: *Infected Wound: Prevention of further infection and promotion of healing  Description: Infection control procedures (eg: clean dressings, clean gloves, hand washing, precautions to isolate wound from contamination, sterile instruments used for wound debridement) should be implemented.   Outcome: Progressing Towards Goal  Goal: Interventions  Outcome: Progressing Towards Goal     Problem: Patient Education: Go to Patient Education Activity  Goal: Patient/Family Education  Outcome: Progressing Towards Goal     Problem: Anxiety  Goal: *Alleviation of anxiety  Outcome: Progressing Towards Goal  Goal: *Alleviation of anxiety (Palliative Care)  Outcome: Progressing Towards Goal     Problem: Patient Education: Go to Patient Education Activity  Goal: Patient/Family Education  Outcome: Progressing Towards Goal     Problem: Nutrition Deficit  Goal: *Optimize nutritional status  Outcome: Progressing Towards Goal

## 2021-05-31 NOTE — PROGRESS NOTES
Bedside shift change report given to Darren Ivy (oncoming nurse) by Mckenzie Bruce RN (offgoing nurse). Report included the following information SBAR, Kardex, MAR and Recent Results.

## 2021-05-31 NOTE — PROGRESS NOTES
.  [address] [facility]  Hospitalist Group    PATIENT ID: Griffin Bravo  MRN: [id number]   YOB: 1995    PRIMARY CARE PROVIDER: None   DATE OF ADMISSION: 5/6/2021  5:22 PM    ATTENDING PHYSICIAN: Adrian lCements MD  CONSULTATIONS:   IP CONSULT TO INTENSIVIST  IP CONSULT TO NEUROLOGY  IP CONSULT TO PODIATRY  IP CONSULT TO PODIATRY  IP CONSULT TO INFECTIOUS DISEASES  IP CONSULT TO NEUROLOGY  IP CONSULT TO OTOLARYNGOLOGY  IP CONSULT TO OTOLARYNGOLOGY  IP CONSULT TO OTOLARYNGOLOGY  IP CONSULT TO CARDIOLOGY  IP CONSULT TO PULMONOLOGY    PROCEDURES/SURGERIES:   Procedure(s):  INSERT ICD DUAL  Eval Icd Generator & Leads W Testing At Implant    [address]: Cardiac arrest with pulseless electrical activity [facility] PROBLEM LIST:  Patient Active Problem List   Diagnosis Code    Endocarditis due to methicillin susceptible Staphylococcus aureus (MSSA) I33.0, B95.61    Drug abuse, cocaine type ([facility] [address] F14.10    Type 2 myocardial infarction ([facility] [address] I21. A1    Cerebral abscess (embolic) B62.8    Cardiac arrest with pulseless electrical activity (HCC) I46.9    Severe protein-calorie malnutrition (HCC) E43    Postoperative acute respiratory failure (HCC) J95.821    Severe muscle deconditioning R29.898    Successful cardiopulmonary resuscitation Z92.89    Acute traumatic pain G89.11    S/P AVR (aortic valve replacement) and aortoplasty Z95.2    Abscess of aortic root I51.89    Contusion of chest wall S20.219A         Brief HPI and Hospital Course:      Griffin Bravo is a 22 y.o. male with h/o Seizures and anxiety who presented to [facility] after a cardiac arrest at [facility] around 1500 5/6/2021. Hx from chart and speaking with patient's sister via phone. Patient had recent hospitalization at the in [facility] in March of this year.  Initial hospitalization admission was at [facility] on 3/24 with AMS in setting of polysubstance and IVD use (cocaine, heroine, methamphetamines). He was found to have septic MRSA bacteremia, MRSA endocarditis. Imaging and MRI also found R PCA infarct and several abscesses and right temporal and parietal lobes. Infarcts thought to be due to septic emboli and patient had left sided residual weakness. Patient was transferred to [facility] on 4/1/2021 for evaluation for valve surgery. After transfer he was found to have a New R MCA infarct Also had a type 2 NSTEMI and tamponade with pericardiocentesis done prior to surgery. Did have cardiac arrest on day of surgery. Had a bioprosthetic Aortic Valve Replacement and Aortic Root Abscess Debridement done on 4/16/2021. Trached and sent to [facility] on 4/29/2021. Was undergoing PT and vent wean. Sister stated that patient was able to be off the vent for several hours over the last few days. He was sitting up and able to talk with valve over trach and could follow commands. Stated that he had severe weakness of the left upper ext, but was starting to gain some mobility in the lower left ext. Patient's mother visited patient today prior to arrest. She said the staff told her that the patient was getting very anxious earlier in the day around 1 pm and had to be placed back on the ventilator, and then they had to sedate him while he was on the ventilator. The mother stated \" he did not look good\" and \" his eye were rolled back in his head\". About 30 minutes after she left she got call about arrest. Per ED note patient was found unresponsive around the 1500 hour and was pulseless. Two rounds of CPR and meds were given and patient was defibrillated x 1 before ROSC. Patient was hypoxic post arrest. Unknown down time prior to arrest.    Subjective/HPI    Consulted ENT as per pulmonology recommendations regarding first trach change . As per nursing staff ENT is going to see patient today   No acute changes. D/w nursing. trying to reach out ENT for firat trach change. Waiting for ENT consult. Pending . No acute changes overnight     Assessment and Plan:  V Fib Cardiac arrest - 5/6/21 on admission:   .  Echo showed mild improvement, LVEF 30-35%   -per cardiologist : on [address] until 5/28  for endocarditis   -s/p ICD on 5/25   -Add dapagliflozin on dc as not on formulary as per Dr. Rey Hunter (5/25)  CXR Stable left pneumothorax    Acute on chronic  respiratory failure, resolved  Pneumothorax post ICD placement  Had [facility] hospitalization at [facility] and was sent to [facility] for further weaning. He was on ventilator while in ICU and now transitioned to trach collar. -CXR showed Very minimal left apical pneumothorax. Repeat CXR 5/26 showed L-sided pneumothorax has increased . Pt is currently stable on RA. Repeat CXR has been ordered  -trach downsize to cuffless #6 with Passy-Boaz valveon 5/25  - on RA now for > 48 hours and maintaining airways without difficulty  - pulmonary following, plan to change trach size. ENT consulted as per pulmonology recommendations for first time trach change, pending    MRSA aortic  Valve endocardititis   -Found to have MRSA bacteremia and MSSA endocarditis end of march. -Hx:  4/16/21 at [facility] bioprosthetic AVR and root abscess debridement. He also had a pericardiocentesis due to tamponade prior to surgery  - Reviewed [facility] record, he was on IV vanco at [facility] and planned to  have a 6 weeks of   tx and EOT 5/28/21 per record. - s/p vancomycin until 5/28/21, appreciate ID's recs    Pain management : contusion of chest wall, left side, chronic back pain . H/o drug abuse. Stop fentanyl , cont' oxycodone prn. Cautious with narcotics. Bacterial pneumonia  severe multilevel consolidation throughout both lungs consistent with severe multilobar PNA.    -sputum culture E coli with ESBL   -per ID -Plan  S/p meropenem , will dc and cont vanc for Endocarditis   -pulmonary toilet   -trach care     # Systolic congestive heart failure/CM  Ef 25%   on coreg,entresto ,Aldactone on hold, BP better today  Echo EF showed 25-30% on 5/17  Cont' Toprol Xl, Entresto  Cardiologist following     H/o right MCA infarct and several brain abscesses in the right temporal and parietal lobes. Infarcts were thought to be due to septic emboli. He had left sided residual weakness. cta 5/6/21:  multiple acute infarctions throughout the  right cerebral hemisphere, involving much of the occipital lobe, as well as much  of the frontal lobe. There is an additional superior right frontoparietal Infarct  Evaluated by neurology,  Cont' current anticonvulsants. No seizures on EEG. Neurologist consulted                -PT/OT/SLP               - Stroke education              - SBP goal 100-160              Seizure DZ:  Continue keppra -1500 mg BID, off valproic acid as subtherapeutic due to meropenem interaction,so valproic acid discontinued      Severe muscle deconditioning  Severe protein-calorie malnutrition   Cerebral abscess (embolic)   H/o substance abuse      fulll code     PT/OT, ]placement pending        PHYSICAL EXAMINATION:  Visit Vitals  BP (!) 99/59 (BP 1 Location: Left upper arm, BP Patient Position: At rest)   Pulse 72   Temp 98.1 °F (36.7 °C)   Resp 18   Ht 5' 11\" (1.803 m)   Wt 55.6 kg (122 lb 9.2 oz)   SpO2 97%   BMI 17.10 kg/m²       General:          Cachetic male in bed, chronically ill appearing  HEENT:           Atraumatic,trach capped        Neck:               Supple,   Lungs: No wheezing. Midline sternal surgical scar, no crackles. Heart:              Regular  rhythm,normal rate   No edema  Abdomen:       Soft, non-tender. Not distended. Bowel sounds normal  Extremities:     No LE edema  Skin:                Not pale. Not Jaundiced  No rashes         Neurologic:      Alert, oriented X 3, unable to move LUE , able to bend LLE at knee, moves RUE and RLE     Labs:     No results for input(s): WBC, HGB, HCT, PLT, HGBEXT, HCTEXT, PLTEXT, HGBEXT, HCTEXT, PLTEXT in the last 72 hours.   No results for input(s): NA, K, CL, CO2, BUN, CREA, GLU, CA, MG, PHOS, URICA in the last 72 hours. No results for input(s): ALT, AP, TBIL, TBILI, TP, ALB, GLOB, GGT, AML, LPSE in the last 72 hours. No lab exists for component: SGOT, GPT, AMYP, HLPSE  No results for input(s): INR, PTP, APTT, INREXT, INREXT in the last 72 hours. No results for input(s): FE, TIBC, PSAT, FERR in the last 72 hours. No results found for: FOL, RBCF   No results for input(s): PH, PCO2, PO2 in the last 72 hours. No results for input(s): CPK, CKNDX, TROIQ in the last 72 hours.     No lab exists for component: CPKMB  Lab Results   Component Value Date/Time    Cholesterol, total 140 05/11/2021 04:35 AM    HDL Cholesterol 21 05/11/2021 04:35 AM    LDL, calculated 77.4 05/11/2021 04:35 AM    Triglyceride 208 (H) 05/11/2021 04:35 AM    CHOL/HDL Ratio 6.7 (H) 05/11/2021 04:35 AM     Lab Results   Component Value Date/Time    Glucose (POC) 120 (H) 05/11/2021 12:00 PM    Glucose (POC) 100 05/11/2021 06:04 AM    Glucose (POC) 87 05/11/2021 12:19 AM    Glucose (POC) 83 05/10/2021 06:53 PM    Glucose (POC) 84 05/10/2021 02:07 PM     Lab Results   Component Value Date/Time    Color YELLOW/STRAW 05/06/2021 05:42 PM    Appearance CLEAR 05/06/2021 05:42 PM    Specific gravity 1.008 05/06/2021 05:42 PM    Specific gravity 1.020 03/24/2021 09:30 PM    pH (UA) 5.0 05/06/2021 05:42 PM    Protein 30 (A) 05/06/2021 05:42 PM    Glucose Negative 05/06/2021 05:42 PM    Ketone Negative 05/06/2021 05:42 PM    Bilirubin Negative 05/06/2021 05:42 PM    Urobilinogen 0.2 05/06/2021 05:42 PM    Nitrites Negative 05/06/2021 05:42 PM    Leukocyte Esterase Negative 05/06/2021 05:42 PM    Epithelial cells FEW 05/06/2021 05:42 PM    Bacteria Negative 05/06/2021 05:42 PM    WBC 0-4 05/06/2021 05:42 PM    RBC 0-5 05/06/2021 05:42 PM         CODE STATUS:  x Full Code    DNR    Partial    Comfort Care       Signed:   Carolina Lunsford MD

## 2021-06-01 PROCEDURE — 74011250637 HC RX REV CODE- 250/637: Performed by: INTERNAL MEDICINE

## 2021-06-01 PROCEDURE — 65660000000 HC RM CCU STEPDOWN

## 2021-06-01 PROCEDURE — 74011250637 HC RX REV CODE- 250/637: Performed by: HOSPITALIST

## 2021-06-01 PROCEDURE — 74011250636 HC RX REV CODE- 250/636: Performed by: INTERNAL MEDICINE

## 2021-06-01 PROCEDURE — 97116 GAIT TRAINING THERAPY: CPT

## 2021-06-01 PROCEDURE — 97530 THERAPEUTIC ACTIVITIES: CPT

## 2021-06-01 PROCEDURE — 77010033678 HC OXYGEN DAILY

## 2021-06-01 PROCEDURE — 74011250636 HC RX REV CODE- 250/636: Performed by: NURSE PRACTITIONER

## 2021-06-01 PROCEDURE — 94760 N-INVAS EAR/PLS OXIMETRY 1: CPT

## 2021-06-01 RX ORDER — KETOROLAC TROMETHAMINE 30 MG/ML
30 INJECTION, SOLUTION INTRAMUSCULAR; INTRAVENOUS
Status: COMPLETED | OUTPATIENT
Start: 2021-06-01 | End: 2021-06-01

## 2021-06-01 RX ADMIN — CASTOR OIL AND BALSAM, PERU: 788; 87 OINTMENT TOPICAL at 22:00

## 2021-06-01 RX ADMIN — SACUBITRIL AND VALSARTAN 1 TABLET: 24; 26 TABLET, FILM COATED ORAL at 08:38

## 2021-06-01 RX ADMIN — HEPARIN SODIUM 5000 UNITS: 5000 INJECTION INTRAVENOUS; SUBCUTANEOUS at 05:00

## 2021-06-01 RX ADMIN — IVABRADINE 5 MG: 5 TABLET, FILM COATED ORAL at 17:08

## 2021-06-01 RX ADMIN — MICONAZOLE NITRATE: 20 CREAM TOPICAL at 19:26

## 2021-06-01 RX ADMIN — Medication 10 ML: at 07:08

## 2021-06-01 RX ADMIN — CASTOR OIL AND BALSAM, PERU: 788; 87 OINTMENT TOPICAL at 17:10

## 2021-06-01 RX ADMIN — OXYCODONE HYDROCHLORIDE 7.5 MG: 5 TABLET ORAL at 14:33

## 2021-06-01 RX ADMIN — OXYCODONE HYDROCHLORIDE 7.5 MG: 5 TABLET ORAL at 19:26

## 2021-06-01 RX ADMIN — LEVETIRACETAM 1500 MG: 500 TABLET ORAL at 08:38

## 2021-06-01 RX ADMIN — Medication 3 MG: at 21:42

## 2021-06-01 RX ADMIN — Medication 1 CAPSULE: at 08:38

## 2021-06-01 RX ADMIN — SACUBITRIL AND VALSARTAN 1 TABLET: 24; 26 TABLET, FILM COATED ORAL at 21:42

## 2021-06-01 RX ADMIN — CASTOR OIL AND BALSAM, PERU: 788; 87 OINTMENT TOPICAL at 08:38

## 2021-06-01 RX ADMIN — Medication 10 ML: at 21:43

## 2021-06-01 RX ADMIN — HEPARIN SODIUM 5000 UNITS: 5000 INJECTION INTRAVENOUS; SUBCUTANEOUS at 21:43

## 2021-06-01 RX ADMIN — MICONAZOLE NITRATE: 20 CREAM TOPICAL at 08:38

## 2021-06-01 RX ADMIN — Medication 10 ML: at 17:09

## 2021-06-01 RX ADMIN — HEPARIN SODIUM 5000 UNITS: 5000 INJECTION INTRAVENOUS; SUBCUTANEOUS at 14:33

## 2021-06-01 RX ADMIN — TRAZODONE HYDROCHLORIDE 50 MG: 50 TABLET ORAL at 21:42

## 2021-06-01 RX ADMIN — METOPROLOL SUCCINATE 12.5 MG: 25 TABLET, EXTENDED RELEASE ORAL at 08:38

## 2021-06-01 RX ADMIN — LEVETIRACETAM 1500 MG: 500 TABLET ORAL at 17:08

## 2021-06-01 RX ADMIN — KETOROLAC TROMETHAMINE 30 MG: 30 INJECTION, SOLUTION INTRAMUSCULAR; INTRAVENOUS at 21:43

## 2021-06-01 RX ADMIN — IVABRADINE 5 MG: 5 TABLET, FILM COATED ORAL at 08:37

## 2021-06-01 RX ADMIN — OXYCODONE HYDROCHLORIDE 7.5 MG: 5 TABLET ORAL at 08:38

## 2021-06-01 NOTE — PROGRESS NOTES
Pulmonary / Critical Care Note  Name: Jessica Yu  Unit: [unfilled]    : 1995  Hospital: [address]    Date: 2021 9:42 AM Date of Admission: 2021       Impression:   Plan:   Recurrent cardiac arrest with vent dependent respiratory failure, s/p trach from prior hospitalization  Small left apical ptx following ICD  Hx MRSA endocarditis and cerebral abscess  HFrEF  Hx asthma, tobacco abuse, polysubstance abuse  Other medical issues per chart -- S/p trach change to 6 cuffless on   -- PMV as tolerated  -- O2 if needed  -- Therapy, pulmonary toilet  -- Nebs  -- Cardiac meds  -- CXR in the AM  -- DVT ppx     CC:   Chief Complaint   Patient presents with    Cardiac arrest      Interval History:  Reports that his breathing feels ok. States his neck is some sore from tracheostomy change earlier today. Tolerating PMV, seems to be able to cough secretions up to mouth though cough is a little weak. Hospital course:   Admitted after cardiac arrest at [facility]. Hx polysubstance abuse with bacteremia/endocarditis, vent-dependent respiratory failure for which he received tracheostomy in April. Biprosthetic AVR and root abscess debriedment at [facility] in April.  EF 25%. Right MCA infarct and several abscesses in right temporal / parietal lobes likely septic emboli. ICD placed  --> small left ptx.     Exam Findings:  General: Awake, alert, no acute distress   HEENT: NC/AT, EOMI, moist mucous membranes   Neck: Supple, no meningismus, no masses or swelling, trach with PMV in place  HEART: RRR, no murmurs/rubs/gallops   Lungs: No deformities, normal chest rise and fall, no increased work of breathing   Lung auscultation: Reasonable air movement bilaterally, basilar rales  Abdomen: Soft/NT, non rigid mildly distended   Extremities: No cyanosis/clubbing, normal peripheral pulses  Skin: Dry, normal temperature   Neuro: Alert, normal speech   Psych: Normal affect, normal mood     Vitals:   Visit Vitals  BP 98/60 (BP 1 Location: Right upper arm, BP Patient Position: At rest)   Pulse 70   Temp 98.4 °F (36.9 °C)   Resp 17   Ht 5' 11\" (1.803 m)   Wt 55.6 kg (122 lb 9.2 oz)   SpO2 100%   BMI 17.10 kg/m²        Current Medications / Inpatient Orders: Active Orders   Procedures    REASON FOR NOT SELECTING BASAL INSULIN     Frequency: CONTINUOUS     Number of Occurrences: Until Specified   Lab    CBC WITH AUTOMATED DIFF     Frequency: DAILY     Number of Occurrences: Until Specified    CBC WITH AUTOMATED DIFF     Frequency: DAILY     Number of Occurrences: 3 Occurrences    MAGNESIUM     Frequency: DAILY     Number of Occurrences: Until Specified    METABOLIC PANEL, BASIC     Frequency: ONE TIME     Number of Occurrences: 1 Occurrences    METABOLIC PANEL, BASIC     Frequency: DAILY     Number of Occurrences: 3 Occurrences    PHOSPHORUS     Frequency: DAILY     Number of Occurrences: Until Specified    PROCALCITONIN     Frequency: DAILY     Number of Occurrences: Until Specified   Diet    DIET REGULAR     Frequency: DIET EFFECTIVE NOW     Number of Occurrences: Until Specified   Nursing    APPLY DRESSING Other (specify) Apply sacral foam border dressing to sacral area for pressure injury prevention. Change every 3 days and prn if becomes soiled. Lift dressing every shift to assess skin integrity and reapply same dressing. Frequency: EVERY THREE DAYS     Number of Occurrences: Until Specified     Order Comments: Apply sacral foam border dressing to sacral area for pressure injury prevention. Change every 3 days and prn if becomes soiled. Lift dressing every shift to assess skin integrity and reapply same dressing.       APPLY/MAINTAIN SEQUENTIAL COMPRESSION DEVICE     Frequency: CONTINUOUS     Number of Occurrences: Until Specified    BEDREST, COMPLETE     Frequency: CONTINUOUS     Number of Occurrences: Until Specified    CLEAN/CHANGE INNER CANNULA     Frequency: DAILY     Number of Occurrences: Until Specified Order Comments: & as needed      ELEVATE HEAD OF BED     Frequency: CONTINUOUS     Number of Occurrences: Until Specified     Order Comments: Elevate 30 Degrees. FECAL MANAGEMENT SYSTEM     Frequency: CONTINUOUS     Number of Occurrences: Until Specified    FLOAT HEELS     Frequency: CONTINUOUS     Number of Occurrences: Until Specified     Order Comments: Float heels at all times while in bed. Place pillows longitudinally under lower legs from calf to ankle to allow feet to hang off the end of the pillow. Heels should not make contact with mattress      TYLER CATH, DISCONTINUE     Frequency: ONE TIME     Number of Occurrences: 1 Occurrences    ICE BAG TO INCISION SITE     Frequency: PRN     Number of Occurrences: Until Specified     Order Comments: Ice bag to incision site. INITIATE ABCDE PROTOCOL     Frequency: CONTINUOUS     Number of Occurrences: Until Specified    INTAKE AND OUTPUT     Frequency: Q8H     Number of Occurrences: Until Specified     Order Comments: Call for urine output less than 0.5mL/kg/hr      NEUROLOGIC STATUS ASSESSMENT     Frequency: NOW THEN Q4H     Number of Occurrences: Until Specified    NG TUBE, INSERTION     Frequency: ONE TIME     Number of Occurrences: 1 Occurrences    NG/OG TUBE, INSERTION/CARE     Frequency: CONTINUOUS     Number of Occurrences: Until Specified    NURSING-MISCELLANEOUS: Blood glucose targets -- ICU: 140 - 180 mg/dL;  NON-ICU: 100 - 180 mg/dL CONTINUOUS     Frequency: CONTINUOUS     Number of Occurrences: Until Specified    NURSING-MISCELLANEOUS: Document all interventions in the Electronic Medical  Record (EMR). CONTINUOUS     Frequency: CONTINUOUS     Number of Occurrences: Until Specified    NURSING-MISCELLANEOUS: Following Hypoglycemic Protocol: Once patient is fully alert and BG greater than 70 provide a small snack,  if NPO consider IV fluids with dextrose.  CONTINUOUS     Frequency: CONTINUOUS     Number of Occurrences: Until Specified NURSING-MISCELLANEOUS: FOR CONSCIOUS PATIENT: Administer 4 ounces fruit juice OR regular soda OR 4 glucose tablets. CONTINUOUS     Frequency: CONTINUOUS     Number of Occurrences: Until Specified    NURSING-MISCELLANEOUS: If patient NPO, unconscious or unable to swallow and If Blood Glucose between 60 and 70 mg/dL: Follow instructions below If patient NPO, unconscious or unable to swallow and if blood glucose between 60 and 70 mg/dL: Give 25 . .. Frequency: CONTINUOUS     Number of Occurrences: Until Specified     Order Comments: If patient NPO, unconscious or unable to swallow and if blood glucose between 60 and 70 mg/dL: Give 25 mL D50% IV. If blood glucose LESS THAN 60 mg/dL: Give 50 mL D50% IV. If no IV, administer glucagon 1 mg IM. Repeat blood glucose in 15 minutes. Continue to repeat blood glucose and treatment every 15 minutes until blood glucose is GREATER THAN 70 mg/dL and notify provider. NURSING-MISCELLANEOUS: Initiate hypoglycemic protocol if blood glucose is LESS THAN 70 mg/dL CONTINUOUS     Frequency: CONTINUOUS     Number of Occurrences: Until Specified    NURSING-MISCELLANEOUS: please obtain consent for icd for POA CONTINUOUS     Frequency: CONTINUOUS     Number of Occurrences: Until Specified    NURSING-MISCELLANEOUS: remove arterial line CONTINUOUS     Frequency: CONTINUOUS     Number of Occurrences: Until Specified    NURSING-MISCELLANEOUS: Repeat finger stick blood glucose in 15 minutes AFTER treatment, if LESS THAN 70 repeat hypoglycemic protocol and notify provider.  CONTINUOUS     Frequency: CONTINUOUS     Number of Occurrences: Until Specified    ROUTINE TRACHEOSTOMY CARE     Frequency: CONTINUOUS     Number of Occurrences: Until Specified    SKIN BARRIER OINTMENT     Frequency: TID     Number of Occurrences: Until Specified     Order Comments: Z-guard cream to buttocks and sacrum TID and as needed with incontinence care      TURN & POSITION     Frequency: CONTINUOUS     Number of Occurrences: Until Specified     Order Comments: Every 2 hours      VITAL SIGNS     Frequency: CONTINUOUS     Number of Occurrences: Until Specified     Order Comments: Per unit routine      WEIGH PATIENT     Frequency: DAILY     Number of Occurrences: Until Specified    WOUND CARE, DRESSING CHANGE     Frequency: BID RT     Number of Occurrences: Until Specified     Order Comments: Lift sacral foam dressing and cleanse sacrum with remedy foaming cleanser, pat dry, apply Venelex ointment BID and reapply same sacral foam dressing     Code Status    FULL CODE     Frequency: CONTINUOUS     Number of Occurrences: Until Specified   Consult    IP CONSULT TO INFECTIOUS DISEASES     Frequency: ONE TIME     Number of Occurrences: 1 Occurrences   Isolation    Contact Isolation     Frequency: CONTINUOUS     Number of Occurrences: Until Specified   Nourishments    DIET NUTRITIONAL SUPPLEMENTS Dinner, Breakfast, Lunch; Ensure Enlive     Frequency: DIET EFFECTIVE NOW     Number of Occurrences: Until Specified     Order Comments: Chocolate      DIET ONE TIME MESSAGE     Frequency: ONE TIME     Number of Occurrences: 1 Occurrences     Order Comments: Pt requesting either Cola or Sprite on lunch and dinner trays, please provide. Please send Chocolate Ensure Enlive on dinner tray tonight. DIET ONE TIME MESSAGE     Frequency: ONE TIME     Number of Occurrences: 1 Occurrences     Order Comments: Whole milk on all trays. OT    OT--EVAL, DEVISE PLAN OF CARE AND TREAT     Frequency: ONE TIME     Number of Occurrences: 1 Occurrences   PT    PT--EVAL, DEVISE PLAN OF CARE AND TREAT     Frequency: ONE TIME     Number of Occurrences: 1 Occurrences   Respiratory Care    OXIMETRY, CONTINUOUS     Frequency: CONTINUOUS     Number of Occurrences: Until Specified    RT--OXIMETRY, SPOT CHECK     Frequency: PRN     Number of Occurrences: Until Specified    RT--RESPIRATORY THERAPY MISCELLANEOUS Please downsize trach to # 6 cuffless.  Thanks Routine ONE TIME     Frequency: ONE TIME     Number of Occurrences: 1 Occurrences    RT--SUCTIONING     Frequency: PRN     Number of Occurrences: Until Specified     Order Comments: Via tracheostomy      RT--TRACH SPEAKING VALVE     Frequency: PRN     Number of Occurrences: Until Specified     Order Comments: Patient to use according to Speech recommendations;  Patient can not wear PMV while sleeping      RT--TRACHEOSTOMY MASK     Frequency: CONTINUOUS     Number of Occurrences: Until Specified     Order Comments: Wean to obtain SpO2 92% or > per protocol     IV    REMOVE CENTRAL LINE ONE TIME Routine     Frequency: ONE TIME     Number of Occurrences: 1 Occurrences    REMOVE PICC LINE ONE TIME Routine     Frequency: ONE TIME     Number of Occurrences: 1 Occurrences   Medications    0.9% sodium chloride infusion 250 mL     Frequency: PRN     Dose: 250 mL     Route: IntraVENous    acetaminophen (TYLENOL) suppository 650 mg     Linked Order: Or     Frequency: Q6H PRN     Dose: 650 mg     Route: Rectal    acetaminophen (TYLENOL) tablet 650 mg     Linked Order: Or     Frequency: Q6H PRN     Dose: 650 mg     Route: Oral    albuterol-ipratropium (DUO-NEB) 2.5 MG-0.5 MG/3 ML     Frequency: Q4H PRN     Dose: 3 mL     Route: Nebulization    albuterol-ipratropium (DUO-NEB) 2.5 MG-0.5 MG/3 ML     Frequency: BID RT     Dose: 3 mL     Route: Nebulization    balsam peru-castor oiL (VENELEX) ointment     Frequency: TID     Route: Topical    budesonide (PULMICORT) 500 mcg/2 ml nebulizer suspension     Frequency: BID RT     Dose: 500 mcg     Route: Nebulization    dextrose (D50W) injection syrg 12.5-25 g     Frequency: PRN     Dose: 25-50 mL     Route: IntraVENous    glucagon (GLUCAGEN) injection 1 mg     Frequency: PRN     Dose: 1 mg     Route: IntraMUSCular    glucose chewable tablet 16 g     Frequency: PRN     Dose: 4 Tablet     Route: Oral    heparin (porcine) injection 5,000 Units     Frequency: Q8H     Dose: 5,000 Units Route:  SubCUTAneous    hydrALAZINE (APRESOLINE) 20 mg/mL injection 10 mg     Frequency: Q6H PRN     Dose: 10 mg     Route: IntraVENous    ivabradine (CORLANOR) tablet 5 mg     Frequency: BID WITH MEALS     Dose: 5 mg     Route: Oral    L.acidophilus-paracasei-S.thermophil-bifidobacter (RISAQUAD) 8 billion cell capsule     Frequency: DAILY     Dose: 1 Capsule     Route: Nasogastric    levETIRAcetam (KEPPRA) tablet 1,500 mg     Frequency: BID     Dose: 1,500 mg     Route: Oral    lidocaine 4 % patch 1 Patch     Frequency: Q24H     Dose: 1 Patch     Route: TransDERmal    melatonin tablet 3 mg     Frequency: QHS     Dose: 3 mg     Route: Oral    metoprolol succinate (TOPROL-XL) XL tablet 12.5 mg     Frequency: DAILY     Dose: 12.5 mg     Route: Oral    miconazole (MICOTIN) 2 % cream     Frequency: BID     Route: Topical    naloxone (NARCAN) injection 0.1 mg     Frequency: PRN     Dose: 0.1 mg     Route: IntraVENous    ondansetron (ZOFRAN) injection 4 mg     Frequency: Q6H PRN     Dose: 4 mg     Route: IntraVENous    oxyCODONE IR (ROXICODONE) tablet 7.5 mg     Frequency: Q4H PRN     Dose: 7.5 mg     Route: Oral    polyethylene glycol (MIRALAX) packet 17 g     Frequency: DAILY PRN     Dose: 17 g     Route: Oral    sacubitriL-valsartan (ENTRESTO) 24-26 mg tablet 1 Tab     Frequency: Q12H     Dose: 1 Tablet     Route: Oral    sodium chloride (AYR SALINE) 0.65 % nasal drops 2 Drop     Frequency: Q2H PRN     Dose: 2 Drop     Route: Left Nostril    sodium chloride (NS) flush 5-40 mL     Frequency: Q8H     Dose: 5-40 mL     Route: IntraVENous    sodium chloride (NS) flush 5-40 mL     Frequency: PRN     Dose: 5-40 mL     Route: IntraVENous    sodium chloride (NS) flush 5-40 mL     Frequency: Q8H     Dose: 5-40 mL     Route: IntraVENous    sodium chloride (NS) flush 5-40 mL     Frequency: PRN     Dose: 5-40 mL     Route: IntraVENous    spironolactone (ALDACTONE) tablet 12.5 mg     Frequency: DAILY     Dose: 12.5 mg     Route: Oral    traZODone (DESYREL) tablet 50 mg     Frequency: QHS PRN     Dose: 50 mg     Route: Oral   Ancillary Consult    IP CONSULT TO MOBILITY SERVICES [facility])     Frequency: DAILY     Number of Occurrences: Until Specified   Tyler    TYLER CATHETER, INSERTION     Frequency: ONE TIME     Number of Occurrences: 1 Occurrences        History: includes:  Past Medical History:   Diagnosis Date    Abscess of aortic root 4/16/2021    Acute traumatic pain 4/14/2021    Anxiety     Postoperative acute respiratory failure ([facility] [address] 4/1/2021    S/P AVR (aortic valve replacement) and aortoplasty 4/16/2021    Seizure ([facility] [address]     Severe muscle deconditioning 5/1/2021    Successful cardiopulmonary resuscitation 4/14/2021      Past Surgical History:   Procedure Laterality Date    HX TONSILLECTOMY        Prior to Admission medications    Medication Sig Start Date End Date Taking? Authorizing Provider   divalproex  (Depakote) 500 mg tablet Take 500 mg by mouth three (3) times daily. Yes Other, MD Maddy   levETIRAcetam (KEPPRA) 750 mg tablet Take 1 Tab by mouth two (2) times a day. 11/7/18  Yes Leonel Correia PA-C      Allergies   Allergen Reactions    Codeine Unknown (comments)     Pt denies       Social History     Tobacco Use    Smoking status: Current Every Day Smoker     Packs/day: 1.00    Smokeless tobacco: Never Used   Substance Use Topics    Alcohol use: Yes     Comment: socially      No family history on file. Documented Home Medications:  Prior to Admission medications    Medication Sig Start Date End Date Taking? Authorizing Provider   divalprotariq KING (Depakote) 500 mg tablet Take 500 mg by mouth three (3) times daily. Yes Other, MD Maddy   levETIRAcetam (KEPPRA) 750 mg tablet Take 1 Tab by mouth two (2) times a day. 11/7/18  Yes Leonel oCrreia PA-C        Allergies:    Allergies   Allergen Reactions    Codeine Unknown (comments)     Pt denies         Labs/Imaging:   No results for input(s): PHI, PCO2I, PO2I, HCO3I, SO2I, FIO2I in the last 72 hours. No results for input(s): WBC, HGB, HCT, PLT, MCV, MCH, HGBEXT, HCTEXT, PLTEXT in the last 72 hours. No results for input(s): NA, K, CL, CO2, GLU, BUN, CREA, CA, MG, PHOS, ALB, TBIL, ALT, INR, INREXT in the last 72 hours. No lab exists for component: SGOT  XR CHEST PORT  Narrative: EXAM: Portable CXR. 1123 hours. COMPARISON: 5/27/2021. INDICATION: comparison with prior imaging , PTX    FINDINGS:  Small left apical pneumothorax is stable. Stable minimal pleural parenchymal opacity in the left costophrenic angle. No  new/acute airspace disease. Heart size is stable. There is prior median  sternotomy and AVR. Tracheostomy tube is stable. ICD is stable. Impression: Stable left pneumothorax. I personally reviewed laboratory testing, pulmonary imaging, radiology reports, and pulse oximetry data.     Signature:   Itzel Kothari   6/1/2021

## 2021-06-01 NOTE — PROGRESS NOTES
[facility]    PATIENT ID: Amrit Zayas  MRN: [id number]   YOB: 1995    PRIMARY CARE PROVIDER: None   DATE OF ADMISSION: 5/6/2021  5:22 PM    ATTENDING PHYSICIAN: Noris Babin MD  CONSULTATIONS:   IP CONSULT TO INTENSIVIST  IP CONSULT TO NEUROLOGY  IP CONSULT TO PODIATRY  IP CONSULT TO PODIATRY  IP CONSULT TO INFECTIOUS DISEASES  IP CONSULT TO NEUROLOGY  IP CONSULT TO OTOLARYNGOLOGY  IP CONSULT TO OTOLARYNGOLOGY  IP CONSULT TO OTOLARYNGOLOGY  IP CONSULT TO CARDIOLOGY  IP CONSULT TO PULMONOLOGY    PROCEDURES/SURGERIES:   Procedure(s):  INSERT ICD DUAL  Eval Icd Generator & Leads W Testing At Implant    [address]: Cardiac arrest with pulseless electrical activity [facility] PROBLEM LIST:  Patient Active Problem List   Diagnosis Code    Endocarditis due to methicillin susceptible Staphylococcus aureus (MSSA) I33.0, B95.61    Drug abuse, cocaine type ([facility] F14.10    Type 2 myocardial infarction ([facility] I21. 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 male with h/o Seizures and anxiety who presented to [facility] after a cardiac arrest at [facility] around 1500 5/6/2021. Hx from chart and speaking with patient's sister via phone. Patient had recent hospitalization at the [facility] in March of this year.  Initial hospitalization admission was at [facility] on 3/24 with AMS in setting of polysubstance and IVD use (cocaine, heroine, methamphetamines). He was found to have septic MRSA bacteremia, MRSA endocarditis. Imaging and MRI also found R PCA infarct and several abscesses and right temporal and parietal lobes. Infarcts thought to be due to septic emboli and patient had left sided residual weakness. Patient was transferred to [facility] on 4/1/2021 for evaluation for valve surgery. After transfer he was found to have a New R MCA infarct Also had a type 2 NSTEMI and tamponade with pericardiocentesis done prior to surgery. Did have cardiac arrest on day of surgery. Had a bioprosthetic Aortic Valve Replacement and Aortic Root Abscess Debridement done on 4/16/2021. Trached and sent to [facility] on 4/29/2021. Was undergoing PT and vent wean. Sister stated that patient was able to be off the vent for several hours over the last few days. He was sitting up and able to talk with valve over trach and could follow commands. Stated that he had severe weakness of the left upper ext, but was starting to gain some mobility in the lower left ext. Patient's mother visited patient today prior to arrest. She said the staff told her that the patient was getting very anxious earlier in the day around 1 pm and had to be placed back on the ventilator, and then they had to sedate him while he was on the ventilator. The mother stated \" he did not look good\" and \" his eye were rolled back in his head\". About 30 minutes after she left she got call about arrest. Per ED note patient was found unresponsive around the 1500 hour and was pulseless. Two rounds of CPR and meds were given and patient was defibrillated x 1 before ROSC. Patient was hypoxic post arrest. Unknown down time prior to arrest.    Subjective/HPI    Patient with no major complaints. ENT saw the patient today with trach change. likely will be discharged in 48 hours.  D/w cm and nursing staff     Assessment and Plan:  V Fib Cardiac arrest - 5/6/21 on admission:   .  Echo showed mild improvement, LVEF 30-35%   -per cardiologist : on [address] until 5/28  for endocarditis   -s/p ICD on 5/25   -Add dapagliflozin on dc as not on formulary as per Dr. Mike Rodriguez (5/25)  CXR Stable left pneumothorax    Acute on chronic  respiratory failure, resolved  Pneumothorax post ICD placement  [facility] and was [facility] further we[facility]. He was on ventilator while in ICU and now transitioned to trach collar. -CXR showed Very minimal left apical pneumothorax. Repeat CXR 5/26 showed L-sided pneumothorax has increased . Pt is currently stable on RA. Repeat CXR has been ordered  -trach downsize to cuffless #6 with Passy-Rosa valveon 5/25  - on RA now for > 48 hours and maintaining airways without difficulty  - pulmonary following, plan to change trach size. ENT consulted as per pulmonology as per ENT Tracheostomy tube change completed. Downsized to 6 cuffless. Will defer to RT re: decannulation but I suspect could be changed to a 4 tomorrow and start capping trials. ENT signed off. Lysbeth Carrel MRSA aortic  Valve endocardititis   -Found to have MRSA bacteremia and MSSA endocarditis end of march. -Hx:  4/16/21 at [facility] bioprosthetic AVR and root abscess debridement. He also had a pericardiocentesis due to tamponade prior to surgery  - Reviewed [facility] record, he was on IV vanco at [facility] and planned to  have a 6 weeks of   tx and EOT 5/28/21 per record. - s/p vancomycin until 5/28/21, appreciate ID's recs    Pain management : contusion of chest wall, left side, chronic back pain . H/o drug abuse. Stop fentanyl , cont' oxycodone prn. Cautious with narcotics. Bacterial pneumonia  severe multilevel consolidation throughout both lungs consistent with severe multilobar PNA.    -sputum culture E coli with ESBL   -per ID -Plan  S/p meropenem , will dc and cont vanc for Endocarditis   -pulmonary toilet   -trach care     # Systolic congestive heart failure/CM  Ef 25%   on coreg,entresto ,Aldactone on hold, BP better today  Echo EF showed 25-30% on 5/17  Cont' Toprol Xl, Entresto  Cardiologist following     H/o right MCA infarct and several brain abscesses in the right temporal and parietal lobes. Infarcts were thought to be due to septic emboli. He had left sided residual weakness. cta 5/6/21:  multiple acute infarctions throughout the  right cerebral hemisphere, involving much of the occipital lobe, as well as much  of the frontal lobe. There is an additional superior right frontoparietal Infarct  Evaluated by neurology,  Cont' current anticonvulsants. No seizures on EEG. Neurologist consulted                -PT/OT/SLP               - Stroke education              - SBP goal 100-160              Seizure DZ:  Continue keppra -1500 mg BID, off valproic acid as subtherapeutic due to meropenem interaction,so valproic acid discontinued      Severe muscle deconditioning  Severe protein-calorie malnutrition   Cerebral abscess (embolic)   H/o substance abuse      fulll code     PT/OT, ]placement pending        PHYSICAL EXAMINATION:  Visit Vitals  BP 98/60 (BP 1 Location: Right upper arm, BP Patient Position: At rest)   Pulse 70   Temp 98.4 °F (36.9 °C)   Resp 17   Ht 5' 11\" (1.803 m)   Wt 55.6 kg (122 lb 9.2 oz)   SpO2 100%   BMI 17.10 kg/m²       General:          Cachetic male in bed, chronically ill appearing  HEENT:           Atraumatic,trach capped        Neck:               Supple,   Lungs: No wheezing. Midline sternal surgical scar, no crackles. Heart:              Regular  rhythm,normal rate   No edema  Abdomen:       Soft, non-tender. Not distended. Bowel sounds normal  Extremities:     No LE edema  Skin:                Not pale.   Not Jaundiced  No rashes         Neurologic:      Alert, oriented X 3, unable to move LUE , able to bend LLE at knee, moves RUE and RLE     Labs:     No results for input(s): WBC, HGB, HCT, PLT, HGBEXT, HCTEXT, PLTEXT, HGBEXT, HCTEXT, PLTEXT in the last 72 hours. No results for input(s): NA, K, CL, CO2, BUN, CREA, GLU, CA, MG, PHOS, URICA in the last 72 hours. No results for input(s): ALT, AP, TBIL, TBILI, TP, ALB, GLOB, GGT, AML, LPSE in the last 72 hours. No lab exists for component: SGOT, GPT, AMYP, HLPSE  No results for input(s): INR, PTP, APTT, INREXT, INREXT in the last 72 hours. No results for input(s): FE, TIBC, PSAT, FERR in the last 72 hours. No results found for: FOL, RBCF   No results for input(s): PH, PCO2, PO2 in the last 72 hours. No results for input(s): CPK, CKNDX, TROIQ in the last 72 hours.     No lab exists for component: CPKMB  Lab Results   Component Value Date/Time    Cholesterol, total 140 05/11/2021 04:35 AM    HDL Cholesterol 21 05/11/2021 04:35 AM    LDL, calculated 77.4 05/11/2021 04:35 AM    Triglyceride 208 (H) 05/11/2021 04:35 AM    CHOL/HDL Ratio 6.7 (H) 05/11/2021 04:35 AM     Lab Results   Component Value Date/Time    Glucose (POC) 120 (H) 05/11/2021 12:00 PM    Glucose (POC) 100 05/11/2021 06:04 AM    Glucose (POC) 87 05/11/2021 12:19 AM    Glucose (POC) 83 05/10/2021 06:53 PM    Glucose (POC) 84 05/10/2021 02:07 PM     Lab Results   Component Value Date/Time    Color YELLOW/STRAW 05/06/2021 05:42 PM    Appearance CLEAR 05/06/2021 05:42 PM    Specific gravity 1.008 05/06/2021 05:42 PM    Specific gravity 1.020 03/24/2021 09:30 PM    pH (UA) 5.0 05/06/2021 05:42 PM    Protein 30 (A) 05/06/2021 05:42 PM    Glucose Negative 05/06/2021 05:42 PM    Ketone Negative 05/06/2021 05:42 PM    Bilirubin Negative 05/06/2021 05:42 PM    Urobilinogen 0.2 05/06/2021 05:42 PM    Nitrites Negative 05/06/2021 05:42 PM    Leukocyte Esterase Negative 05/06/2021 05:42 PM    Epithelial cells FEW 05/06/2021 05:42 PM    Bacteria Negative 05/06/2021 05:42 PM    WBC 0-4 05/06/2021 05:42 PM    RBC 0-5 05/06/2021 05:42 PM         CODE STATUS:  x Full Code    DNR    Partial    Comfort Care       Signed:   Sebastian Maldonado MD

## 2021-06-01 NOTE — CONSULTS
Dr. Mike De Santiago,    Thank you for involving me in this patient's care. Please see below for my assessment and feel free to contact me directly with any questions. -BF    OTOLARYNGOLOGY - HEAD AND NECK SURGERY HISTORY AND PHYSICAL    Requesting Physician:    Erica Arrington, *     CC:   Tracheostomy management    HPI:     Tad Dan is a 22 y.o. male seen today in consultation at the request of Dr. Mike De Santiago for tracheostomy management. Patient is a 22 y.o. male who presented to the hospital with cardiac arrest. Patient has a complicated medical history including history of polysubstance abuse with MRSA bacteremia and endocarditis and cerebral abscesses who was admitted to an outside hospital in April and ultimately received a tracheostomy and was sent to long-term care facility. He had recurrent arrest in the setting of LV dysfunction and needed to be connected to the ventilator on admission on 05/06/2021. He was subsequently weaned off. He has received ICD implantation this admission. . He has a #8 trach Shiley in place with a Passy-Rosa valve. Eating normally and breathing fine with PMV. Consulted to start downsizing process. Nurse reports has not needed any ventilator support in days.     Past Medical History:   Diagnosis Date    Abscess of aortic root 4/16/2021    Acute traumatic pain 4/14/2021    Anxiety     Postoperative acute respiratory failure ([address] 4/1/2021    S/P AVR (aortic valve replacement) and aortoplasty 4/16/2021    Seizure (HCC)     Severe muscle deconditioning 5/1/2021    Successful cardiopulmonary resuscitation 4/14/2021     Past Surgical History:   Procedure Laterality Date    HX TONSILLECTOMY       Current Facility-Administered Medications   Medication Dose Route Frequency    albuterol-ipratropium (DUO-NEB) 2.5 MG-0.5 MG/3 ML  3 mL Nebulization BID RT    balsam peru-castor oiL (VENELEX) ointment   Topical TID    budesonide (PULMICORT) 500 mcg/2 ml nebulizer suspension  500 mcg Nebulization BID RT    sodium chloride (NS) flush 5-40 mL  5-40 mL IntraVENous Q8H    sodium chloride (NS) flush 5-40 mL  5-40 mL IntraVENous PRN    metoprolol succinate (TOPROL-XL) XL tablet 12.5 mg  12.5 mg Oral DAILY    ivabradine (CORLANOR) tablet 5 mg  5 mg Oral BID WITH MEALS    levETIRAcetam (KEPPRA) tablet 1,500 mg  1,500 mg Oral BID    sodium chloride (AYR SALINE) 0.65 % nasal drops 2 Drop  2 Drop Left Nostril Q2H PRN    oxyCODONE IR (ROXICODONE) tablet 7.5 mg  7.5 mg Oral Q4H PRN    lidocaine 4 % patch 1 Patch  1 Patch TransDERmal Q24H    naloxone (NARCAN) injection 0.1 mg  0.1 mg IntraVENous PRN    miconazole (MICOTIN) 2 % cream   Topical BID    traZODone (DESYREL) tablet 50 mg  50 mg Oral QHS PRN    albuterol-ipratropium (DUO-NEB) 2.5 MG-0.5 MG/3 ML  3 mL Nebulization Q4H PRN    [Held by provider] spironolactone (ALDACTONE) tablet 12.5 mg  12.5 mg Oral DAILY    hydrALAZINE (APRESOLINE) 20 mg/mL injection 10 mg  10 mg IntraVENous Q6H PRN    sacubitriL-valsartan (ENTRESTO) 24-26 mg tablet 1 Tab  1 Tablet Oral Q12H    L.acidophilus-paracasei-S.thermophil-bifidobacter (RISAQUAD) 8 billion cell capsule  1 Capsule Nasogastric DAILY    heparin (porcine) injection 5,000 Units  5,000 Units SubCUTAneous Q8H    melatonin tablet 3 mg  3 mg Oral QHS    0.9% sodium chloride infusion 250 mL  250 mL IntraVENous PRN    sodium chloride (NS) flush 5-40 mL  5-40 mL IntraVENous Q8H    sodium chloride (NS) flush 5-40 mL  5-40 mL IntraVENous PRN    acetaminophen (TYLENOL) tablet 650 mg  650 mg Oral Q6H PRN    Or    acetaminophen (TYLENOL) suppository 650 mg  650 mg Rectal Q6H PRN    polyethylene glycol (MIRALAX) packet 17 g  17 g Oral DAILY PRN    ondansetron (ZOFRAN) injection 4 mg  4 mg IntraVENous Q6H PRN    glucose chewable tablet 16 g  4 Tablet Oral PRN    dextrose (D50W) injection syrg 12.5-25 g  25-50 mL IntraVENous PRN    glucagon (GLUCAGEN) injection 1 mg  1 mg IntraMUSCular PRN      Allergies   Allergen Reactions    Codeine Unknown (comments)     Pt denies      No family history on file. Social History     Tobacco Use    Smoking status: Current Every Day Smoker     Packs/day: 1.00    Smokeless tobacco: Never Used   Substance Use Topics    Alcohol use: Yes     Comment: socially    Drug use: Not Currently     Comment: denies drug use         REVIEW OF SYSTEMS  A full 10 point review of systems was performed today. The review was non-pertinent other than the details already listed in the history of present illness. Visit Vitals  BP 99/65   Pulse 75   Temp 98.5 °F (36.9 °C)   Resp 16   Ht 5' 11\" (1.803 m)   Wt 55.6 kg (122 lb 9.2 oz)   SpO2 100%   BMI 17.10 kg/m²        PHYSICAL EXAM  General:  No acute distress. Alert and oriented x 3. MSK:   Normal muscle bulk  Psych:  Mood and affect appropriate. Neuro:  CN II - XII grossly intact bilaterally. Eyes:  PERRL/EOMI, no nystagmus. ENT:   EACs are patent, clean and dry. TMs clear and intact with normal anatomic landmarks. No middle ear fluid. Anterior rhinoscopy without mucopurulence or polyps. OC/OP clear without masses or lesions. Lymph:  Neck soft and supple without lymphadenopathy. Resp:   No audible stridor or wheezing. 8 DCS deflated in place. Skin:   Head and neck skin is without suspicious lesions. DATA REVIEW:  Personally reviewed his charts for 20 minutes before removing current tracheostomy. It was placed for prolonged ventilator wean. Procedure Note:  Procedure: Tracheostomy change and downsize  Indications: no longer requiring tracheostomy  Description: After the patients identity and planned procedure were verified, he was laid flat in a supine position. Verbal consent obtained. 8 cuffed shiley removed after suture removed. New 6 cuffless shiley placed easily. PM valve replaced. He tolerated the procedure well. ASSESSMENT/PLAN:  Tracheostomy tube change completed. Downsized to 6 cuffless. Will defer to RT re: decannulation but I suspect could be changed to a 4 tomorrow and start capping trials. Signing off. [address], [address], [facility]  [address] [facility], [address]   Z) [phone] (B) [phone]

## 2021-06-01 NOTE — PROGRESS NOTES
Problem: Mobility Impaired (Adult and Pediatric)  Goal: *Acute Goals and Plan of Care (Insert Text)  Description: FUNCTIONAL STATUS PRIOR TO ADMISSION: Patient was independent prior to March 2021. Pt has been hospitalized and recently at [facility]. Per mother, pt was ambulating with therapy at [facility]. HOME SUPPORT PRIOR TO ADMISSION: The patient lived alone prior to hospitalization. Physical Therapy Goals  Revised 5/17/2021; goals revised as appropriate 5/24/21  1. Patient will move from supine to sit and sit to supine , scoot up and down, and roll side to side in bed with minimal assistance/contact guard assist within 7 day(s). 2.  Patient will tolerate sitting EOB with contact guard assistance for approx 5 minutes within 7 day(s). 3.  Patient will transfer from bed to chair and chair to bed with moderate assistance using the least restrictive device within 7 day(s). 4.  Patient will perform sit to stand with moderate assistance within 7 day(s). 5.  Patient will ambulate with maximal assistance for 5 feet with the least restrictive device within 7 day(s). Initiated 5/9/2021  1. Patient will move from supine to sit and sit to supine , scoot up and down, and roll side to side in bed with moderate assistance  within 7 day(s). GOAL MET 5/17/21  2. Patient will tolerate sitting EOB with moderate assistance for approx 3-5 minutes within 7 day(s). GOAL MET 5/17/21  3. Patient will transfer from bed to chair and chair to bed with maximal assistance using the least restrictive device within 7 day(s). 4.  Patient will perform sit to stand with maximal assistance within 7 day(s). 5.  Patient will ambulate with maximal assistance for 5 feet with the least restrictive device within 7 day(s).          Outcome: Progressing Towards Goal   PHYSICAL THERAPY TREATMENT  Patient: Anabela Luo (89 y.o. male)  Date: 6/1/2021  Diagnosis: Cardiac arrest [facility]) [I46.9] Cardiac arrest with pulseless electrical activity (HCC)  Procedure(s) (LRB):  INSERT ICD DUAL (N/A)  Eval Icd Generator & Leads W Testing At Implant (N/A) 7 Days Post-Op  Precautions: Fall, Skin, PPM precautions L UE  Chart, physical therapy assessment, plan of care and goals were reviewed. ASSESSMENT  Patient continues with skilled PT services and is progressing towards goals. Barriers to indep with functional mobility include decreased safety awareness, difficulty processing multi step instructions, PPM precautions L UE, weakness L UE>LE, impaired standing balance, decreased activity tolerance, gait dysfunction, increased risk for fall. Pt received in bed with R side of necklace seemingly tucked under trach (notified RN); agreed to OOB mobility. Pt participated in functional dynamic seated activities EOB with supervision for safety. Noted increasing motor return L elbow/ wrist/ hand. Pt tolerated short distance amb x 3 trials including multiple turns with hand held support L UE for [address] joint protection and assist for balance. Noted slight softening L knee in midstance, no overt instability. Pt performed repeated sit<->stand without UEs, focus on equal WBing through LEs and eccentric control to facilitate increased motor control L LE, CGA for balance/ safety. Pt remains below functional baseline with excellent rehab potential. Will benefit from con't PT for neuro re-ed and balance/ mobility progression. Recommend follow up IPR at d/c as pt will require intense, multidisciplinary therapy setting with consistent medical/ nursing support to facilitate return to max level of functional indep. Current Level of Function Impacting Discharge (mobility/balance): bed mob CGA, bed to chair SPT min A, amb with min A for L UE support and balance    Other factors to consider for discharge:          PLAN :  Patient continues to benefit from skilled intervention to address the above impairments. Continue treatment per established plan of care.   to address goals. Recommendation for discharge: (in order for the patient to meet his/her long term goals)  Therapy 3 hours per day 5-7 days per week    This discharge recommendation:  Has been made in collaboration with the attending provider and/or case management    IF patient discharges home will need the following DME: to be determined (TBD)       SUBJECTIVE:   Patient stated Omid Moran downsized my trach today, it's sore.     OBJECTIVE DATA SUMMARY:   Critical Behavior:  Neurologic State: Alert  Orientation Level: Oriented X4  Cognition: Follows commands  Safety/Judgement: Awareness of environment  Functional Mobility Training:  Bed Mobility:     Supine to Sit: Contact guard assistance  Sit to Supine: Contact guard assistance;Minimum assistance (assist for lowering control at trunk)  Scooting: Stand-by assistance        Transfers:  Sit to Stand: Contact guard assistance  Stand to Sit: Contact guard assistance (cues for eccentric control)        Bed to Chair: Contact guard assistance;Minimum assistance (SPT with A for balance, cues for safe alignment w/ surface)                    Balance:  Sitting: Intact  Sitting - Static: Good (unsupported)  Sitting - Dynamic: Fair (occasional)  Standing: Impaired  Standing - Static: Fair;Good (L UE support for joint protection)  Standing - Dynamic : Fair  Ambulation/Gait Training:  Distance (ft): 20 Feet (ft) (also 12' x 2)  Assistive Device: Gait belt (hand held support to L UE to provide joint protection)  Ambulation - Level of Assistance: Minimal assistance        Gait Abnormalities: Decreased step clearance;Trunk sway increased; Altered arm swing        Base of Support: Narrowed     Speed/Candice: Pace decreased (<100 feet/min)  Step Length: Left shortened;Right shortened  Swing Pattern: Left asymmetrical       Neuro re-ed:   Repeated sit<->stand without UE support, focus on equal WBing LEs and eccentric control to increased motor control L LE  Pain Rating:  Pt reports soreness related to trach downsize today    Activity Tolerance:   Good    After treatment patient left in no apparent distress:   Supine in bed, Call bell within reach, Bed / chair alarm activated, Side rails x 3, and HOB elevated (pt declined up to chair)    COMMUNICATION/COLLABORATION:   The patients plan of care was discussed with: Registered nurse.      Rayray Porter, PT   Time Calculation: 39 mins

## 2021-06-02 ENCOUNTER — APPOINTMENT (OUTPATIENT)
Dept: GENERAL RADIOLOGY | Age: 26
DRG: 161 | End: 2021-06-02
Attending: PHYSICIAN ASSISTANT
Payer: MEDICAID

## 2021-06-02 LAB — SARS-COV-2, COV2: NORMAL

## 2021-06-02 PROCEDURE — 97535 SELF CARE MNGMENT TRAINING: CPT

## 2021-06-02 PROCEDURE — 74011250637 HC RX REV CODE- 250/637: Performed by: INTERNAL MEDICINE

## 2021-06-02 PROCEDURE — 74011250636 HC RX REV CODE- 250/636: Performed by: INTERNAL MEDICINE

## 2021-06-02 PROCEDURE — 71045 X-RAY EXAM CHEST 1 VIEW: CPT

## 2021-06-02 PROCEDURE — 97116 GAIT TRAINING THERAPY: CPT

## 2021-06-02 PROCEDURE — 65660000000 HC RM CCU STEPDOWN

## 2021-06-02 PROCEDURE — U0005 INFEC AGEN DETEC AMPLI PROBE: HCPCS

## 2021-06-02 PROCEDURE — 74011250637 HC RX REV CODE- 250/637: Performed by: HOSPITALIST

## 2021-06-02 PROCEDURE — 74011250636 HC RX REV CODE- 250/636: Performed by: NURSE PRACTITIONER

## 2021-06-02 PROCEDURE — 97110 THERAPEUTIC EXERCISES: CPT

## 2021-06-02 RX ORDER — KETOROLAC TROMETHAMINE 30 MG/ML
30 INJECTION, SOLUTION INTRAMUSCULAR; INTRAVENOUS
Status: COMPLETED | OUTPATIENT
Start: 2021-06-02 | End: 2021-06-02

## 2021-06-02 RX ADMIN — OXYCODONE HYDROCHLORIDE 7.5 MG: 5 TABLET ORAL at 09:11

## 2021-06-02 RX ADMIN — Medication 3 MG: at 22:31

## 2021-06-02 RX ADMIN — METOPROLOL SUCCINATE 12.5 MG: 25 TABLET, EXTENDED RELEASE ORAL at 08:57

## 2021-06-02 RX ADMIN — Medication 10 ML: at 22:32

## 2021-06-02 RX ADMIN — SACUBITRIL AND VALSARTAN 1 TABLET: 24; 26 TABLET, FILM COATED ORAL at 22:31

## 2021-06-02 RX ADMIN — SACUBITRIL AND VALSARTAN 1 TABLET: 24; 26 TABLET, FILM COATED ORAL at 08:57

## 2021-06-02 RX ADMIN — TRAZODONE HYDROCHLORIDE 50 MG: 50 TABLET ORAL at 22:31

## 2021-06-02 RX ADMIN — HEPARIN SODIUM 5000 UNITS: 5000 INJECTION INTRAVENOUS; SUBCUTANEOUS at 05:00

## 2021-06-02 RX ADMIN — Medication 10 ML: at 07:02

## 2021-06-02 RX ADMIN — CASTOR OIL AND BALSAM, PERU: 788; 87 OINTMENT TOPICAL at 19:35

## 2021-06-02 RX ADMIN — Medication 10 ML: at 14:00

## 2021-06-02 RX ADMIN — IVABRADINE 5 MG: 5 TABLET, FILM COATED ORAL at 19:37

## 2021-06-02 RX ADMIN — MICONAZOLE NITRATE: 20 CREAM TOPICAL at 14:21

## 2021-06-02 RX ADMIN — MICONAZOLE NITRATE: 20 CREAM TOPICAL at 19:35

## 2021-06-02 RX ADMIN — LEVETIRACETAM 1500 MG: 500 TABLET ORAL at 19:34

## 2021-06-02 RX ADMIN — HEPARIN SODIUM 5000 UNITS: 5000 INJECTION INTRAVENOUS; SUBCUTANEOUS at 22:34

## 2021-06-02 RX ADMIN — IVABRADINE 5 MG: 5 TABLET, FILM COATED ORAL at 09:11

## 2021-06-02 RX ADMIN — KETOROLAC TROMETHAMINE 30 MG: 30 INJECTION, SOLUTION INTRAMUSCULAR; INTRAVENOUS at 22:31

## 2021-06-02 RX ADMIN — CASTOR OIL AND BALSAM, PERU: 788; 87 OINTMENT TOPICAL at 14:20

## 2021-06-02 RX ADMIN — LEVETIRACETAM 1500 MG: 500 TABLET ORAL at 08:56

## 2021-06-02 RX ADMIN — HEPARIN SODIUM 5000 UNITS: 5000 INJECTION INTRAVENOUS; SUBCUTANEOUS at 14:20

## 2021-06-02 RX ADMIN — Medication 1 CAPSULE: at 08:57

## 2021-06-02 RX ADMIN — OXYCODONE HYDROCHLORIDE 7.5 MG: 5 TABLET ORAL at 14:21

## 2021-06-02 NOTE — PROGRESS NOTES
EDGAR:  1. RUR-26%  2. Discharge to [address] [place] [address] once stable. 3. RT to decannulate/downsize trache. 4. BLS transport on discharge. 5. COVID PCR pending for placement. CM provided update of the above to Niranjan Christensen with [facility]. Per MD in rounds, the patient should be ready in 24-48 hours. His emergency contact is his mother, Crow Yanez [phone].     Mary Carmen [facility]

## 2021-06-02 NOTE — PROGRESS NOTES
Problem: Self Care Deficits Care Plan (Adult)  Goal: *Acute Goals and Plan of Care (Insert Text)  Description:   FUNCTIONAL STATUS PRIOR TO ADMISSION: patient was independent prior to recent hospital admissions and has had a complicated medical course including NG tube and trach. Patient recently at [facility] for vent weaning. HOME SUPPORT: The patient lived alone with parents in area to provide assistance. 5/27/2021 stating wife assisting with in hospital stay. Occupational Therapy Goals  Revised 5/27/2021  1. Patient will perform brushing teeth using L UE as stabilizer with min assistance within 7 day(s). 2.  Patient will perform bathing anterior body sitting EOB with moderate assistance within 7 day(s). 3.  Patient will perform upper body dressing nirmal technique with moderate assistance within 7 days. 4.  Patient will open one ADL item with left UE as stabilizer with moderate assistance within 7 days. 5.  Patient will demonstrate [facility] transfer with moderate assistance within 7 days. Met 6/2/2021, upgrade to toilet transfer supervision within 7 days. 6. Patient will demonstrate upper extremity therapeutic exercise/activities self ROM (L to 90* shoulder flexion) with moderate assistance minutes within 7 day(s). Initiated 5/11/2021, Reviewed 5/18/21, continue all goals  1. Patient will perform 2 simple grooming tasks in supported sitting with minimal assistance/contact guard assist within 7 day(s). 2.  Patient will perform anterior neck to thigh bathing in supported sitting with moderate assistance within 7 day(s). 3.  Patient will tolerate sitting EOB in prep for ADLs with max A within 7 days. 4.  Patient will track to L of midline during ADLs/therapeutic activities with moderate cues within 7 days. 5.  Patient will participate in upper extremity therapeutic exercise/activities with moderate assistance  for 5 minutes within 7 day(s).     Outcome: Progressing Towards Goal   OCCUPATIONAL THERAPY TREATMENT  Patient: Roxana Pitts (51 y.o. male)  Date: 6/2/2021  Diagnosis: Cardiac arrest [facility]) [I46.9] Cardiac arrest with pulseless electrical activity ([facility] [address]  Procedure(s) (LRB):  INSERT ICD DUAL (N/A)  Eval Icd Generator & Leads W Testing At Implant (N/A) 8 Days Post-Op  Precautions: Fall, Skin  Chart, occupational therapy assessment, plan of care, and goals were reviewed. ASSESSMENT  Patient continues with skilled OT services and is progressing towards goals physically (L shoulder flexion, elbow -3/5, 1/5 forearm - digits!), cognitively and emotionally. This left hand dominant patient fully participating today. ADLs limited by ICD precautions L; L UE 1/5 strength, R UE +3/5 and B LEs; sitting tolerance due to back pain and skin sensitivity on sacrum; static standing balance; pain management (orthopedically back, neurologically L UE), cardiopulmonary tolerance (trach, room air), cognition (short term memory loss, attention to task, complex processing, problem solving, self initiation with difficult tasks, early termination of task not self motivated by, requires backwards chaining, small achievable goals) and behavior (requires external cues to participate in self care, learned dependency, potentially depressed and could use behavioral and medication management). Current Level of Function Impacting Discharge (ADLs): moderate assistance overall upper and lower body ADLs; moderate assistance bed<>bathroom functional mobility         PLAN :  Patient continues to benefit from skilled intervention to address the above impairments.       Recommend with staff/Brain injury tips for more buy in/participationg to his self care:   -Recommend one person talking to patient at a time.   -One step/concept at a time to ensure heard and processed information  -Keeping schedule  -Day/night schedule   -Give and take on his ADLs in which patient has some say to direct care /time of day to complete but also firm it needs to be completed. -Encourage self completion of ADLs. Recommend next OT session: gather ADL items high and low; w/c outside     Recommendation for discharge: (in order for the patient to meet his/her long term goals)  Therapy 3 hours per day 5-7 days per week to address medical complexities, utilized eStimulation (Myah Scales), progress patient in a \"well\" environment that is structured to assist with the physical, cognitive and emotional deficits occurring. Patient is working towards tolerating 3 hours of therapy. This discharge recommendation:  Has not yet been discussed the attending provider and/or case management     IF patient discharges home will need the following DME: wheelchair, nirmal propel          SUBJECTIVE:   Patient stated I am trying to get better.  (much better and positive attitude)    OBJECTIVE DATA SUMMARY:   Cognitive/Behavioral Status:  Neurologic State: Alert  Orientation Level: Oriented to person;Oriented to place;Oriented to situation  Cognition: Follows commands             Functional Mobility and Transfers for ADLs:  Bed Mobility:  Rolling: Modified independent  Supine to Sit: Supervision (HOB elevated)    Transfers:  Sit to Stand: Minimum assistance (HHA, gait belt)  Functional Transfers  Bathroom Mobility: Moderate assistance       Balance:  Sitting: Intact; Without support  Sitting - Static: Good (unsupported)  Sitting - Dynamic: Good (unsupported)  Standing: Impaired; Without support  Standing - Static: Fair  Standing - Dynamic : Poor (one hand on stable surface)    ADL Intervention:  Feeding  Container Management: Maximum assistance (setup in L hand, hand over hand to stabilize, neuro re-edu)  Food to Mouth: Modified independent  Drink to Mouth: Modified independent    Grooming  Brushing Teeth:  Moderate assistance standing at sink, instruction on benefits L hand WB on sink, utilize as stabilizer for ADL items to open paste; demonstrated with max assistance but completing tasks with R UE no assistance due to fatigue of standing, pain knees and focusing on one small aspect at a time (standing, at sink, L UE on sink, utilizing as holding for paste / neuro re-education) was a lot of new items at once to challenge patient on, want to end on good, positive note for encouragement to move forward. Updated white board and [employer] (stated bedpan; now states BSC; if Bathroom then gait belt & hold on!). Lower Body Dressing Assistance  Socks: Minimum assistance (cues technique open hand, nirmal)              Therapeutic Exercises:   Patient stating he is completing self ROM, asking for light resistance sponge for left hand as he is gaining muscle movement and would like to continue to work on it. Pain:  Back and bottom    Activity Tolerance:   Good    After treatment patient left in no apparent distress:   Supine in bed, Call bell within reach and Side rails x 3    COMMUNICATION/COLLABORATION:   The patients plan of care was discussed with: Registered nurse.      Silvestre Goldberg [place]  Time Calculation: 26 mins

## 2021-06-02 NOTE — PROGRESS NOTES
[facility]    PATIENT ID: Griffin Bravo  MRN: [id number]   YOB: 1995    PRIMARY CARE PROVIDER: None   DATE OF ADMISSION: 5/6/2021  5:22 PM    ATTENDING PHYSICIAN: Adrian Clements MD  CONSULTATIONS:   IP CONSULT TO INTENSIVIST  IP CONSULT TO NEUROLOGY  IP CONSULT TO PODIATRY  IP CONSULT TO PODIATRY  IP CONSULT TO INFECTIOUS DISEASES  IP CONSULT TO NEUROLOGY  IP CONSULT TO OTOLARYNGOLOGY  IP CONSULT TO OTOLARYNGOLOGY  IP CONSULT TO OTOLARYNGOLOGY  IP CONSULT TO CARDIOLOGY  IP CONSULT TO PULMONOLOGY    PROCEDURES/SURGERIES:   Procedure(s):  INSERT ICD DUAL  Eval Icd Generator & Leads W Testing At Implant    [address] [place] [address] Road: Cardiac arrest with pulseless electrical activity [facility]     HOSPITAL PROBLEM LIST:  Patient Active Problem List   Diagnosis Code    Endocarditis due to methicillin susceptible Staphylococcus aureus (MSSA) I33.0, B95.61    Drug abuse, cocaine type ([facility] [address] F14.10    Type 2 myocardial infarction ([facility] [address] I21. 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 male with h/o Seizures and anxiety who presented to [facility] ED after a cardiac arrest at [facility] around 1500 5/6/2021. Hx from chart and speaking with patient's sister via phone. Patient had recent hospitalization at the [facility] in March of this year.  Initial hospitalization admission was at [facility] on 3/24 with AMS in setting of polysubstance and IVD use (cocaine, heroine, methamphetamines). He was found to have septic MRSA bacteremia, MRSA endocarditis. Imaging and MRI also found R PCA infarct and several abscesses and right temporal and parietal lobes. Infarcts thought to be due to septic emboli and patient had left sided residual weakness. Patient was transferred to [facility] on 4/1/2021 for evaluation for valve surgery. After transfer he was found to have a New R MCA infarct Also had a type 2 NSTEMI and tamponade with pericardiocentesis done prior to surgery. Did have cardiac arrest on day of surgery. Had a bioprosthetic Aortic Valve Replacement and Aortic Root Abscess Debridement done on 4/16/2021. Trached and sent to [facility] on 4/29/2021. Was undergoing PT and vent wean. Sister stated that patient was able to be off the vent for several hours over the last few days. He was sitting up and able to talk with valve over trach and could follow commands. Stated that he had severe weakness of the left upper ext, but was starting to gain some mobility in the lower left ext. Patient's mother visited patient today prior to arrest. She said the staff told her that the patient was getting very anxious earlier in the day around 1 pm and had to be placed back on the ventilator, and then they had to sedate him while he was on the ventilator. The mother stated \" he did not look good\" and \" his eye were rolled back in his head\". About 30 minutes after she left she got call about arrest. Per ED note patient was found unresponsive around the 1500 hour and was pulseless. Two rounds of CPR and meds were given and patient was defibrillated x 1 before ROSC. Patient was hypoxic post arrest. Unknown down time prior to arrest.    Subjective/HPI    Patient with no major complaints. ENT changed the trach yesterday   likely will be discharged in 48 hours.  D/w cm and nursing staff   Respiratory to work on downsizing to 4 and capping trials     Assessment and Plan:  [facility] Cardiac arrest - 5/6/21 on admission:   .  Echo showed mild improvement, LVEF 30-35%   -per cardiologist : on [address] until 5/28  for endocarditis   -s/p ICD on 5/25   -Add dapagliflozin on dc as not on formulary as per Dr. Rosario Barr (5/25)  CXR Stable left pneumothorax    Acute on chronic  respiratory failure, resolved  Pneumothorax post ICD placement  [facility] and [facility]. He was on ventilator while in ICU and now transitioned to trach collar. -CXR showed Very minimal left apical pneumothorax. Repeat CXR 5/26 showed L-sided pneumothorax has increased . Pt is currently stable on RA. Repeat CXR has been ordered  -trach downsize to cuffless #6 with Passy-Hartly valveon 5/25  - on RA now for > 48 hours and maintaining airways without difficulty  - pulmonary following, plan to change trach size. ENT consulted as per pulmonology as per ENT Tracheostomy tube change completed. Downsized to 6 cuffless. Will defer to RT re: decannulation but I suspect could be changed to a 4 tomorrow and start capping trials. ENT signed off. Albert Bajwa MRSA aortic  Valve endocardititis   -Found to have MRSA bacteremia and MSSA endocarditis end of march. -Hx:  4/16/21 at [facility] bioprosthetic AVR and root abscess debridement. He also had a pericardiocentesis due to tamponade prior to surgery  - Reviewed [facility] record, he was on IV vanco at [facility] and planned to  have a 6 weeks of   tx and EOT 5/28/21 per record. - s/p vancomycin until 5/28/21, appreciate ID's recs    Pain management : contusion of chest wall, left side, chronic back pain . H/o drug abuse. Stop fentanyl , cont' oxycodone prn. Cautious with narcotics. Bacterial pneumonia  severe multilevel consolidation throughout both lungs consistent with severe multilobar PNA.    -sputum culture E coli with ESBL   -per ID -Plan  S/p meropenem , will dc and cont vanc for Endocarditis   -pulmonary toilet   -trach care     # Systolic congestive heart failure/CM  Ef 25%   on coreg,entresto ,Aldactone on hold, BP better today  Echo EF showed 25-30% on 5/17  Cont' Toprol Xl, Entresto  Cardiologist following     H/o right MCA infarct and several brain abscesses in the right temporal and parietal lobes. Infarcts were thought to be due to septic emboli. He had left sided residual weakness. cta 5/6/21:  multiple acute infarctions throughout the  right cerebral hemisphere, involving much of the occipital lobe, as well as much  of the frontal lobe. There is an additional superior right frontoparietal Infarct  Evaluated by neurology,  Cont' current anticonvulsants. No seizures on EEG. Neurologist consulted                -PT/OT/SLP               - Stroke education              - SBP goal 100-160              Seizure DZ:  Continue keppra -1500 mg BID, off valproic acid as subtherapeutic due to meropenem interaction,so valproic acid discontinued      Severe muscle deconditioning  Severe protein-calorie malnutrition   Cerebral abscess (embolic)   H/o substance abuse      fulll code     PT/OT, ]placement pending        PHYSICAL EXAMINATION:  Visit Vitals  /63 (BP 1 Location: Right upper arm, BP Patient Position: At rest)   Pulse 76   Temp 97.8 °F (36.6 °C)   Resp 17   Ht 5' 11\" (1.803 m)   Wt 55.6 kg (122 lb 9.2 oz)   SpO2 94%   BMI 17.10 kg/m²       General:          Cachetic male in bed,   HEENT:           Atraumatic       Neck:               Supple,   Lungs: No wheezing. Midline sternal surgical scar, no crackles. Heart:              Regular  rhythm,normal rate   No edema  Abdomen:       Soft, non-tender. Not distended. Bowel sounds normal  Extremities:     No LE edema  Skin:                Not pale. Not Jaundiced  No rashes         Neurologic:      Alert, oriented X 3,     Labs:     No results for input(s): WBC, HGB, HCT, PLT, HGBEXT, HCTEXT, PLTEXT, HGBEXT, HCTEXT, PLTEXT in the last 72 hours.   No results for input(s): NA, K, CL, CO2, BUN, CREA, GLU, CA, MG, PHOS, URICA in the last 72 hours. No results for input(s): ALT, AP, TBIL, TBILI, TP, ALB, GLOB, GGT, AML, LPSE in the last 72 hours. No lab exists for component: SGOT, GPT, AMYP, HLPSE  No results for input(s): INR, PTP, APTT, INREXT, INREXT in the last 72 hours. No results for input(s): FE, TIBC, PSAT, FERR in the last 72 hours. No results found for: FOL, RBCF   No results for input(s): PH, PCO2, PO2 in the last 72 hours. No results for input(s): CPK, CKNDX, TROIQ in the last 72 hours.     No lab exists for component: CPKMB  Lab Results   Component Value Date/Time    Cholesterol, total 140 05/11/2021 04:35 AM    HDL Cholesterol 21 05/11/2021 04:35 AM    LDL, calculated 77.4 05/11/2021 04:35 AM    Triglyceride 208 (H) 05/11/2021 04:35 AM    CHOL/HDL Ratio 6.7 (H) 05/11/2021 04:35 AM     Lab Results   Component Value Date/Time    Glucose (POC) 120 (H) 05/11/2021 12:00 PM    Glucose (POC) 100 05/11/2021 06:04 AM    Glucose (POC) 87 05/11/2021 12:19 AM    Glucose (POC) 83 05/10/2021 06:53 PM    Glucose (POC) 84 05/10/2021 02:07 PM     Lab Results   Component Value Date/Time    Color YELLOW/STRAW 05/06/2021 05:42 PM    Appearance CLEAR 05/06/2021 05:42 PM    Specific gravity 1.008 05/06/2021 05:42 PM    Specific gravity 1.020 03/24/2021 09:30 PM    pH (UA) 5.0 05/06/2021 05:42 PM    Protein 30 (A) 05/06/2021 05:42 PM    Glucose Negative 05/06/2021 05:42 PM    Ketone Negative 05/06/2021 05:42 PM    Bilirubin Negative 05/06/2021 05:42 PM    Urobilinogen 0.2 05/06/2021 05:42 PM    Nitrites Negative 05/06/2021 05:42 PM    Leukocyte Esterase Negative 05/06/2021 05:42 PM    Epithelial cells FEW 05/06/2021 05:42 PM    Bacteria Negative 05/06/2021 05:42 PM    WBC 0-4 05/06/2021 05:42 PM    RBC 0-5 05/06/2021 05:42 PM         CODE STATUS:  x Full Code    DNR    Partial    Comfort Care       Signed:   Donovan Brunson MD

## 2021-06-02 NOTE — PROGRESS NOTES
Comprehensive Nutrition Assessment    Type and Reason for Visit: Reassess    Nutrition Recommendations/Plan:    1. Add bowel regimen - no BM documented since 5/26 (1 week)  2. Encourage good protein intake with all meals  3. Weekly weights    Nutrition Assessment:    Pt admitted from [address] [place] [address] d/t cardiac arrest. PMHx: Polysubstance abuse, bacteremia, endocarditis s/p AVR, cerebral abscess (embolic), respiratory failure s/p trach and PEG placement. PNA on admit. Cardiogenic shock on admission with pt on vent via trach - now off. Working on down sizing trach with hopeful d/c to [facility] later this week. Pt visited today. Reports eating well, mostly with foods from outside and brought in by his mom. Many snacks at bedside and will add PM and HS snack. Drinking maybe 1 Ensure per day mixed with whole milk since thicker than the Ensure Original he usually drinks. Left hemiparesis but doing fine with self feeding once tray set up. New menu provided and reviewed. UBW ~180# PTA initial hospital/[facility] presentation. Wt this admission on 5/6 was 63kg; down to 55kg but stable from last week. Muscle and fat wasting observed but also with likely muscle atrophy. Malnutrition Assessment:  Malnutrition Status:  Severe malnutrition    Context:  Acute illness     Findings of the 6 clinical characteristics of malnutrition:   Energy Intake:  Mild decrease in energy intake (specify)  Weight Loss:  7.00 - Greater than 7.5% over 3 months     Body Fat Loss:  7 - Moderate body fat loss, Fat overlying ribs, Buccal region   Muscle Mass Loss:  7 - Moderate muscle mass loss, Thigh (quadraceps), Clavicles (pectoralis & deltoids), Calf  Fluid Accumulation:  No significant fluid accumulation,     Strength:  Not performed     Nutritionally Significant Medications:  Risaquad, keppra; PRN: zofran, miralax, trazadone    Estimated Daily Nutrient Needs:  Energy (kcal): 2060 (MSJ x 1.2 x 1.1);  Weight Used for Energy Requirements: Current  Protein (g): 80 (1.5 g/kg);  Weight Used for Protein Requirements: Current  Fluid (ml/day): 2060; Method Used for Fluid Requirements: 1 ml/kcal    Nutrition Related Findings:       BM: 5/26  Edema: None   Wounds:  None      Current Nutrition Therapies:   Diet: regular  Supplements: Ensure Enlive (chocolate) TID  Meal intake:   Patient Vitals for the past 168 hrs:   % Diet Eaten   06/02/21 0858 51 - 75%   06/01/21 1710 51 - 75%   06/01/21 1420 76 - 100%   06/01/21 0825 26 - 50%   05/31/21 1934 0%   05/31/21 1254 0%   05/31/21 0921 0%   05/30/21 2056 76 - 100%   05/29/21 1310 26 - 50%   05/29/21 1015 51 - 75%   05/29/21 1012 51 - 75%   05/28/21 0815 51 - 75%   05/27/21 1936 51 - 75%   05/27/21 1047 76 - 100%     Anthropometric Measures:  · Height:  5' 11\" (180.3 cm)  · Current Body Wt:  55 kg (121 lb 4.1 oz)   · Admission Body Wt:  125 lb 3.5 oz      · Ideal Body Wt:  172:  70.5 %   · BMI Categories:  Underweight (BMI less than 18.5)     Wt Readings:   05/23/21 54.8 kg (120 lb 12.8 oz)   03/31/21 75 kg (165 lb 5.5 oz)   10/30/20 77.1 kg (169 lb 15.6 oz)   07/15/20 76.2 kg (168 lb)   08/18/19 81.6 kg (180 lb)     Nutrition Diagnosis:   · Inadequate protein-energy intake (resolved) related to cognitive or neurological impairment, catabolic illness as evidenced by  (consuming >75% meals; wt maintenance)    Nutrition Interventions:   Food and/or Nutrient Delivery: Modify current diet, Snacks (specify)  Nutrition Education and Counseling: No recommendations at this time  Coordination of Nutrition Care: Continue to monitor while inpatient, Interdisciplinary rounds    Goals:  Continued consumption of at least 75% meals and 2 supplements/day       Nutrition Monitoring and Evaluation:   Behavioral-Environmental Outcomes: None identified  Food/Nutrient Intake Outcomes: Food and nutrient intake, Supplement intake  Physical Signs/Symptoms Outcomes: Weight    Discharge Planning:    Continue oral nutrition supplement, Continue current diet     Verónica Jj RD  [facility], Pager #[phone] or via [employer]

## 2021-06-02 NOTE — PROGRESS NOTES
PULMONARY MEDICINE    Progress Note    Name: Juana Cox   : 1995   MRN: [id number]   Date: 2021      Subjective:        Dante Feeling was downsized to #6  No wheeze   Doing ok from a pulmonary aspect       Resting in bed   No WOB       Noted RT attempted to change trach but it still had sutures in trach. Wheeze has gone with nebs and he feels better        On PMV  Pain is of most concern to him  Chest x-ray shows a very small PTX     Consult Note:   Requesting Physician: Dr. Bailey Betts  Reason for consult: Tracheostomy    Medical records and data reviewed. Patient is a 22 y.o. male who presented to the hospital with cardiac arrest. Patient has a complicated medical history including history of polysubstance abuse with MRSA bacteremia and endocarditis and cerebral abscesses who was admitted to an outside hospital in April and ultimately received a tracheostomy and was sent to long-term care facility. He had recurrent arrest in the setting of LV dysfunction and needed to be connected to the ventilator on admission on 2021. He was subsequently weaned off. He has received ICD implantation today. When seen earlier today he was sedated. He has a #8 trach Shiley in place with a Passy-Rosa valve and apparently has been awake previously following commands and participating in some physical therapy. Plans are to consider inpatient rehabilitation. We have been consulted to assist in decannulation. As mentioned above patient is currently sedated and as per review of chart is able to protect airway and is able to handle secretions. Further assessment of mental status will be attempted when sedatives wear off.  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, ICD is in place  Hx of asthma  Smoker   Other medical problems per chart      Recommendations:   Trach per ENT  Other management ongoing per consulting and primary teams  O2 titration above 90%  duonebs and pulmicort; recommend keeping at discharge   PTX resolved on 6/2 chest film  Follow up in 1-2 wks in pulmonary clinic  We will be available again to see if needed     Active Problem List:     Problem List  Never Reviewed        Codes Class    Severe muscle deconditioning ICD-10-CM: R29.898  ICD-9-CM: 781.99 Acute        S/P AVR (aortic valve replacement) and aortoplasty ICD-10-CM: Z95.2  ICD-9-CM: V43.3 Acute        Abscess of aortic root ICD-10-CM: I51.89  ICD-9-CM: 429.89 Acute        Contusion of chest wall ICD-10-CM: S20.219A  ICD-9-CM: 922.1 Acute        Successful cardiopulmonary resuscitation ICD-10-CM: Z92.89  ICD-9-CM: V12.53 Acute        Acute traumatic pain ICD-10-CM: G89.11  ICD-9-CM: 338.11 Acute        Postoperative acute respiratory failure ([facility] ICD-10-CM: P67.870  ICD-9-CM: 518.51 Acute        Severe protein-calorie malnutrition ([facility] ICD-10-CM: E43  ICD-9-CM: 262         * (Principal) Cardiac arrest with pulseless electrical activity ([facility] ICD-10-CM: I46.9  ICD-9-CM: 427.5         Cerebral abscess (embolic) [id number]: Q17.5  ICD-9-CM: 324.0     Overview Signed 3/30/2021  4:51 PM by Emerson Roque MD     CT and MRI revealed evidence of a right posterior cerebellar artery infarct, 9 x 2.6 cm. He also had several areas of cerebral abscesses in the right temporal and parietal lobes measuring 2 x 1 cm, 1.3 x 0.9 cm and 0.6 x 5.5 mm with associated 3 mm midline shift.  CSF showed no organisms to date, but elevated WBC.     3/24/21 CT Normal noncontrast head CT  3/29/21 MRI Extensive multiple acute infarcts throughout the bilateral cerebral hemispheres with a large hemorrhagic right PCA territory infarction measuring up to 7 cm. Some of the additional smaller infarcts also demonstrated hemorrhage. Findings are highly suspicious for septic emboli  3/30/21 CTA Occlusion of the right P2/P3. Infarction within the right occipital and temporal lobes. Edema related to the right-sided small abscesses, better appreciated on MRI             Drug abuse, cocaine type [facility]) ICD-10-CM: F14.10  ICD-9-CM: 305.60     Overview Signed 3/29/2021  8:34 PM by Ciara Cuenca MD     3/24/21 UDS + methamphetamines and cocaine             Endocarditis due to methicillin susceptible Staphylococcus aureus (MSSA) ICD-10-CM: I33.0, B95.61  ICD-9-CM: 421.0, 041.11     Overview Addendum 3/30/2021  4:51 PM by Ciara Cuenca MD     3/24/21 ER Patient with a history of drug use anxiety and seizure not on medications for the past 4 months according to the sister presents for evaluation of altered mental status;  BC x2 + MRSA, WBC 11.6 P-67%, K 4.0 BUN 45, Creat 1.1, Trop I 3.89-> 13.60-> 10.52; UDS + cocaine and amphetamines; Lactic acid 4.2  3/24/21 Intubated      CT head negative      CT chest, neg PE, ? LV mass  3/25/21 ECHO EF 36%, LV 44/45, S/PW 8/9,  Mod AR  3/2/621 RAISSA EF 55%, Vegetations on AV 0.9cm LCC, small perforation 0.4cm LCC. No feg on MV/TV/PV, No thrombus in MELLY             Type 2 myocardial infarction [facility]) ICD-10-CM: I21. A1  ICD-9-CM: 410.90     Overview Signed 3/29/2021  8:37 PM by Ciara Cuenca MD     Trop I 3.89-> 13.60-> 10.52;                    Past Medical History:      has a past medical history of Abscess of aortic root (4/16/2021), Acute traumatic pain (4/14/2021), Anxiety, Postoperative acute respiratory failure ([address] (4/1/2021), S/P AVR (aortic valve replacement) and aortoplasty (4/16/2021), Seizure ([address] Severe muscle deconditioning (2021), and Successful cardiopulmonary resuscitation (2021). Past Surgical History:      has a past surgical history that includes hx tonsillectomy. Home Medications:     Prior to Admission medications    Medication Sig Start Date End Date Taking? Authorizing Provider   divalproex DR (Depakote) 500 mg tablet Take 500 mg by mouth three (3) times daily. Yes Other, MD Maddy   levETIRAcetam (KEPPRA) 750 mg tablet Take 1 Tab by mouth two (2) times a day. 18  Yes Maddi Paulson PA-C       Allergies/Social/Family History: Allergies   Allergen Reactions    Codeine Unknown (comments)     Pt denies       Social History     Tobacco Use    Smoking status: Current Every Day Smoker     Packs/day: 1.00    Smokeless tobacco: Never Used   Substance Use Topics    Alcohol use: Yes     Comment: socially      No family history on file. Objective:   Vital Signs:  Visit Vitals  /63 (BP 1 Location: Right upper arm, BP Patient Position: At rest)   Pulse 76   Temp 97.8 °F (36.6 °C)   Resp 17   Ht 5' 11\" (1.803 m)   Wt 55.6 kg (122 lb 9.2 oz)   SpO2 94%   BMI 17.10 kg/m²    O2 Flow Rate (L/min): 10 l/min O2 Device: None (Room air) Temp (24hrs), Av °F (36.7 °C), Min:97.8 °F (36.6 °C), Max:98.4 °F (36.9 °C)           Intake/Output:     Intake/Output Summary (Last 24 hours) at 2021 1038  Last data filed at 2021 0459  Gross per 24 hour   Intake    Output 300 ml   Net -300 ml       Physical Exam:   General:  Lethargic, no distress, appears stated age. Head:  Normocephalic, without obvious abnormality, atraumatic. Eyes:  Conjunctivae/corneas clear. PERRL   Neck: # 8 shiley in place-- PM valve in place   Lungs:   Diminished BS. Chest wall:  No tenderness or deformity. Heart:  Regular rate and rhythm, S1-S2 normal, no murmur, no click, rub or gallop. Abdomen:   Soft, non-tender. Bowel sounds present. No masses,  No organomegaly. Extremities: Atraumatic, no cyanosis or edema. Pulses: Palpable   Skin: No rashes or lesions   Neurologic: Weak         LABS AND  DATA: Personally reviewed  No results for input(s): WBC, HGB, HCT, PLT, HGBEXT, HCTEXT, PLTEXT, HGBEXT, HCTEXT, PLTEXT in the last 72 hours. No results for input(s): NA, K, CL, CO2, BUN, CREA, GLU, CA, MG, PHOS in the last 72 hours. No results for input(s): AP, TBIL, TP, ALB, GLOB, AML, LPSE in the last 72 hours. No lab exists for component: SGOT, GPT, AMYP  No results for input(s): INR, PTP, APTT, INREXT, INREXT in the last 72 hours. No results for input(s): PHI, PCO2I, PO2I, FIO2I in the last 72 hours. No results for input(s): CPK, CKMB, TROIQ, BNPP in the last 72 hours. MEDS: Reviewed    Chest Imaging: personally reviewed and report checked    Tele- reviewed    Medical decision making:   I have reviewed the flowsheet and previous day's notes  Patient has acute or chronic illness that poses a threat to life or bodily function  Review and order of Clinical lab tests  Review and Order of Radiology tests  Independent visualization of Image  Reviewed Oxygen/ NiPPV  High Risk Drug therapy requiring intensive monitoring for toxicity: eg steroids, pressors, antibiotics        Thank you for allowing me to participate in this patient's care.     Cristela Ceballos PA-C      [facility]

## 2021-06-02 NOTE — PROGRESS NOTES
Problem: Mobility Impaired (Adult and Pediatric)  Goal: *Acute Goals and Plan of Care (Insert Text)  Description: FUNCTIONAL STATUS PRIOR TO ADMISSION: Patient was independent prior to March 2021. Pt has been hospitalized and recently at [facility]. Per mother, pt was ambulating with therapy at [facility]. HOME SUPPORT PRIOR TO ADMISSION: The patient lived alone prior to hospitalization. Physical Therapy Goals  Upgraded on 6/2/2021  1. Patient will move from supine to sit and sit to supine , scoot up and down, and roll side to side in bed with modified independence within 7 day(s). 2.  Patient will tolerate sitting up in chair three times daily for minimum of 30 minutes each session within 7 day(s). 3.  Patient will transfer from bed to chair and chair to bed with contact guard assistance using the least restrictive device within 7 day(s). 4.  Patient will perform sit to stand with contact guard assistance within 7 day(s). 5.  Patient will ambulate with contact guard assistance for 50 feet with the least restrictive device within 7 day(s). Revised 5/17/2021; goals revised as appropriate 5/24/21  1. Patient will move from supine to sit and sit to supine , scoot up and down, and roll side to side in bed with minimal assistance/contact guard assist within 7 day(s). MET 6/2  2. Patient will tolerate sitting EOB with contact guard assistance for approx 5 minutes within 7 day(s). MET 6/2  3. Patient will transfer from bed to chair and chair to bed with moderate assistance using the least restrictive device within 7 day(s). MET 6/2  4. Patient will perform sit to stand with moderate assistance within 7 day(s). MET 6/2  5. Patient will ambulate with maximal assistance for 5 feet with the least restrictive device within 7 day(s). MET 6/2    Initiated 5/9/2021  1.   Patient will move from supine to sit and sit to supine , scoot up and down, and roll side to side in bed with moderate assistance  within 7 day(s). GOAL MET 5/17/21  2. Patient will tolerate sitting EOB with moderate assistance for approx 3-5 minutes within 7 day(s). GOAL MET 5/17/21  3. Patient will transfer from bed to chair and chair to bed with maximal assistance using the least restrictive device within 7 day(s). 4.  Patient will perform sit to stand with maximal assistance within 7 day(s). 5.  Patient will ambulate with maximal assistance for 5 feet with the least restrictive device within 7 day(s). Outcome: Progressing Towards Goal   PHYSICAL THERAPY TREATMENT: WEEKLY REASSESSMENT  Patient: Renetta Lopez (89 y.o. male)  Date: 6/2/2021  Primary Diagnosis: Cardiac arrest ([facility] [I46.9]  Procedure(s) (LRB):  INSERT ICD DUAL (N/A)  Eval Icd Generator & Leads W Testing At Implant (N/A) 8 Days Post-Op   Precautions:    (Pacemaker precautions)      ASSESSMENT  Patient continues with skilled PT services and is progressing towards goals. Patient continues to present with multiple medical issues including L UE weakness with improvements noted in AROM but limited due to PM precautions, decreased motor planning and multitasking, poor attention to tasks, impaired standing balance and unsteady gait s/p admission for cardiac arrest while at [facility] s/p long hospitalization for CVAs and multiple medical issues. Today, patient more participatory but still requires frequent cues for encouragement and continuing with a task, quickly tires physically and mentally to a task. Gait is still very unsafe, especially as he fatigues, with increased scissoring and path deviations and decreased control of his speed. Worked on controlling this today with repeated trials as well as LE AROM therex seated EOB. Left in chair with goal to sit through lunch, educated and demonstrated ability to weight shift to offload buttocks.   Continue to highly recommend IPR at d/c, patient working to tolerate 3 hours of therapy and participating, will benefit from multidisciplinary interventions and services available at rehab. Patient's progression toward goals since last assessment: Met all prior goals, updated    Current Level of Function Impacting Discharge (mobility/balance): MIN A for gait and transfers    Functional Outcome Measure: The patient scored 30 on the Barthel outcome measure which is indicative of 70% reliance on others for self care and mobility. Other factors to consider for discharge: very far below baseline         PLAN :  Goals have been updated based on progression since last assessment. Patient continues to benefit from skilled intervention to address the above impairments. Recommendations and Planned Interventions: bed mobility training, transfer training, gait training, therapeutic exercises, neuromuscular re-education, patient and family training/education, and therapeutic activities      Frequency/Duration: Patient will be followed by physical therapy:  5 times a week to address goals. Recommendation for discharge: (in order for the patient to meet his/her long term goals)  Therapy 3 hours per day 5-7 days per week    This discharge recommendation:  Has been made in collaboration with the attending provider and/or case management    IF patient discharges home will need the following DME: to be determined (TBD)         SUBJECTIVE:   Patient stated I don't like to sit up but I will today for lunch.     OBJECTIVE DATA SUMMARY:   HISTORY:    Past Medical History:   Diagnosis Date    Abscess of aortic root 4/16/2021    Acute traumatic pain 4/14/2021    Anxiety     Postoperative acute respiratory failure ([address] 4/1/2021    S/P AVR (aortic valve replacement) and aortoplasty 4/16/2021    Seizure ([facility])     Severe muscle deconditioning 5/1/2021    Successful cardiopulmonary resuscitation 4/14/2021     Past Surgical History:   Procedure Laterality Date    HX TONSILLECTOMY         Personal factors and/or comorbidities impacting plan of care: multiple issues, CVAs, illegal drug use, PM precautions,     Home Situation  Home Environment: [address] [place] [address] Name: Peter  One/Two Story Residence: One story  Living Alone: No  Support Systems: Parent, Skilled nursing facility  Patient Expects to be Discharged to[de-identified] Skilled nursing facility  Current DME Used/Available at Home: None    EXAMINATION/PRESENTATION/DECISION MAKING:   Critical Behavior:  Neurologic State: Alert  Orientation Level: Oriented to person, Oriented to place, Oriented to situation  Cognition: Follows commands  Safety/Judgement: Awareness of environment  Hearing: Auditory  Auditory Impairment: None    Range Of Motion:      Generally decreased, functional everywhere except L UE limited by PM precautions and weakness                    Strength:           Generally decreased, functional everywhere but L UE, non functional but improved active motion in elbow and wrist              Tone & Sensation:          L UE decreased tone                        Coordination:   Impaired  Vision:      Functional Mobility:  Bed Mobility:  Rolling: Modified independent  Supine to Sit: Supervision (HOB elevated)        Transfers:  Sit to Stand: Minimum assistance (HHA, gait belt)  Stand to Sit: Minimum assistance (gait belt, HHA)                       Balance:   Sitting: Intact; Without support  Sitting - Static: Good (unsupported)  Sitting - Dynamic: Good (unsupported)  Standing: Impaired; Without support  Standing - Static: Fair  Standing - Dynamic : Poor (one hand on stable surface)  Ambulation/Gait Training:  Distance (ft): 12 Feet (ft) (x 3 trials with rest between each)  Assistive Device: Gait belt (HHA on L)  Ambulation - Level of Assistance: Minimal assistance        Gait Abnormalities: Decreased step clearance;Trunk sway increased; Path deviations        Base of Support: Narrowed; Center of gravity altered     Speed/Candice: Fluctuations  Step Length: Right shortened;Left shortened  Swing Pattern: Left asymmetrical                  Stairs: Therapeutic Exercises:   Seated LAQs, marches, PF and DF at bedside    Functional Measure:  Barthel Index:    Bathin  Bladder: 5  Bowels: 5  Groomin  Dressin  Feedin  Mobility: 0  Stairs: 0  Toilet Use: 5  Transfer (Bed to Chair and Back): 10  Total: 30/100       The Barthel ADL Index: Guidelines  1. The index should be used as a record of what a patient does, not as a record of what a patient could do. 2. The main aim is to establish degree of independence from any help, physical or verbal, however minor and for whatever reason. 3. The need for supervision renders the patient not independent. 4. A patient's performance should be established using the best available evidence. Asking the patient, friends/relatives and nurses are the usual sources, but direct observation and common sense are also important. However direct testing is not needed. 5. Usually the patient's performance over the preceding 24-48 hours is important, but occasionally longer periods will be relevant. 6. Middle categories imply that the patient supplies over 50 per cent of the effort. 7. Use of aids to be independent is allowed. Min De Oliveira., Barthel, D.W. (2478). Functional evaluation: the Barthel Index. [address] [facility] (14)2. CHIQUIS Harper, Issac Garcia., Sammi Lim.[facility], [address] [place] [address] (). Measuring the change indisability after inpatient rehabilitation; comparison of the responsiveness of the Barthel Index and Functional [place] Measure. Journal of Neurology, Neurosurgery, and Psychiatry, 66(4), 484-421. Shree Miranda, N.RAIN.JACKIE, ALLIE Foley, & Marisol De Leon MMariettaA. (2004.) Assessment of post-stroke quality of life in cost-effectiveness studies: The usefulness of the Barthel Index and the EuroQoL-5D.  Quality of Life Research, 13, 427-43           Pain Rating:  Reports 10 at conclusion and requesting pain medicine, RN made aware    Activity Tolerance:   Fair and requires frequent rest breaks    After treatment patient left in no apparent distress:   Sitting in chair, Call bell within reach, and Bed / chair alarm activated    COMMUNICATION/EDUCATION:   The patients plan of care was discussed with: Registered nurse. Fall prevention education was provided and the patient/caregiver indicated understanding. and Patient/family agree to work toward stated goals and plan of care.     Thank you for this referral.  Joe Banda, PT   Time Calculation: 24 mins

## 2021-06-02 NOTE — PROGRESS NOTES
Pt c/o some chest discomfort, ICD insertion site, VS: HR-77, B/p 103/67, sats 98% on RA.      No changes in cardiac rhythm,   Pt does not seem to be on any distress,   MD paged made aware,

## 2021-06-03 LAB
SARS-COV-2, XPLCVT: NOT DETECTED
SOURCE, COVRS: NORMAL

## 2021-06-03 PROCEDURE — 97110 THERAPEUTIC EXERCISES: CPT

## 2021-06-03 PROCEDURE — 74011250636 HC RX REV CODE- 250/636: Performed by: HOSPITALIST

## 2021-06-03 PROCEDURE — 74011250637 HC RX REV CODE- 250/637: Performed by: HOSPITALIST

## 2021-06-03 PROCEDURE — 94760 N-INVAS EAR/PLS OXIMETRY 1: CPT

## 2021-06-03 PROCEDURE — 74011250637 HC RX REV CODE- 250/637: Performed by: INTERNAL MEDICINE

## 2021-06-03 PROCEDURE — 74011250636 HC RX REV CODE- 250/636: Performed by: INTERNAL MEDICINE

## 2021-06-03 PROCEDURE — 65660000000 HC RM CCU STEPDOWN

## 2021-06-03 PROCEDURE — 97535 SELF CARE MNGMENT TRAINING: CPT

## 2021-06-03 PROCEDURE — 97116 GAIT TRAINING THERAPY: CPT

## 2021-06-03 RX ORDER — BUDESONIDE 0.5 MG/2ML
500 INHALANT ORAL
Status: DISCONTINUED | OUTPATIENT
Start: 2021-06-04 | End: 2021-06-08 | Stop reason: HOSPADM

## 2021-06-03 RX ORDER — KETOROLAC TROMETHAMINE 30 MG/ML
15 INJECTION, SOLUTION INTRAMUSCULAR; INTRAVENOUS
Status: DISPENSED | OUTPATIENT
Start: 2021-06-03 | End: 2021-06-07

## 2021-06-03 RX ADMIN — Medication 3 MG: at 21:30

## 2021-06-03 RX ADMIN — CASTOR OIL AND BALSAM, PERU: 788; 87 OINTMENT TOPICAL at 21:30

## 2021-06-03 RX ADMIN — Medication 10 ML: at 07:10

## 2021-06-03 RX ADMIN — METOPROLOL SUCCINATE 12.5 MG: 25 TABLET, EXTENDED RELEASE ORAL at 09:36

## 2021-06-03 RX ADMIN — HEPARIN SODIUM 5000 UNITS: 5000 INJECTION INTRAVENOUS; SUBCUTANEOUS at 05:00

## 2021-06-03 RX ADMIN — IVABRADINE 5 MG: 5 TABLET, FILM COATED ORAL at 17:19

## 2021-06-03 RX ADMIN — SACUBITRIL AND VALSARTAN 1 TABLET: 24; 26 TABLET, FILM COATED ORAL at 21:30

## 2021-06-03 RX ADMIN — Medication 10 ML: at 14:38

## 2021-06-03 RX ADMIN — CASTOR OIL AND BALSAM, PERU: 788; 87 OINTMENT TOPICAL at 09:45

## 2021-06-03 RX ADMIN — CASTOR OIL AND BALSAM, PERU: 788; 87 OINTMENT TOPICAL at 17:20

## 2021-06-03 RX ADMIN — Medication 10 ML: at 21:38

## 2021-06-03 RX ADMIN — Medication 10 ML: at 21:31

## 2021-06-03 RX ADMIN — LEVETIRACETAM 1500 MG: 500 TABLET ORAL at 17:19

## 2021-06-03 RX ADMIN — LEVETIRACETAM 1500 MG: 500 TABLET ORAL at 09:36

## 2021-06-03 RX ADMIN — HEPARIN SODIUM 5000 UNITS: 5000 INJECTION INTRAVENOUS; SUBCUTANEOUS at 14:37

## 2021-06-03 RX ADMIN — MICONAZOLE NITRATE: 20 CREAM TOPICAL at 09:45

## 2021-06-03 RX ADMIN — SACUBITRIL AND VALSARTAN 1 TABLET: 24; 26 TABLET, FILM COATED ORAL at 09:36

## 2021-06-03 RX ADMIN — KETOROLAC TROMETHAMINE 15 MG: 30 INJECTION, SOLUTION INTRAMUSCULAR; INTRAVENOUS at 18:51

## 2021-06-03 RX ADMIN — HEPARIN SODIUM 5000 UNITS: 5000 INJECTION INTRAVENOUS; SUBCUTANEOUS at 21:30

## 2021-06-03 RX ADMIN — OXYCODONE HYDROCHLORIDE 7.5 MG: 5 TABLET ORAL at 15:09

## 2021-06-03 RX ADMIN — Medication 1 CAPSULE: at 09:36

## 2021-06-03 RX ADMIN — OXYCODONE HYDROCHLORIDE 7.5 MG: 5 TABLET ORAL at 09:36

## 2021-06-03 RX ADMIN — IVABRADINE 5 MG: 5 TABLET, FILM COATED ORAL at 09:49

## 2021-06-03 NOTE — PROGRESS NOTES
Problem: Mobility Impaired (Adult and Pediatric)  Goal: *Acute Goals and Plan of Care (Insert Text)  Description: FUNCTIONAL STATUS PRIOR TO ADMISSION: Patient was independent prior to March 2021. Pt has been hospitalized and recently at [facility]. Per mother, pt was ambulating with therapy at [facility]. HOME SUPPORT PRIOR TO ADMISSION: The patient lived alone prior to hospitalization. Physical Therapy Goals  Upgraded on 6/2/2021  1. Patient will move from supine to sit and sit to supine , scoot up and down, and roll side to side in bed with modified independence within 7 day(s). 2.  Patient will tolerate sitting up in chair three times daily for minimum of 30 minutes each session within 7 day(s). 3.  Patient will transfer from bed to chair and chair to bed with contact guard assistance using the least restrictive device within 7 day(s). 4.  Patient will perform sit to stand with contact guard assistance within 7 day(s). 5.  Patient will ambulate with contact guard assistance for 50 feet with the least restrictive device within 7 day(s). Revised 5/17/2021; goals revised as appropriate 5/24/21  1. Patient will move from supine to sit and sit to supine , scoot up and down, and roll side to side in bed with minimal assistance/contact guard assist within 7 day(s). MET 6/2  2. Patient will tolerate sitting EOB with contact guard assistance for approx 5 minutes within 7 day(s). MET 6/2  3. Patient will transfer from bed to chair and chair to bed with moderate assistance using the least restrictive device within 7 day(s). MET 6/2  4. Patient will perform sit to stand with moderate assistance within 7 day(s). MET 6/2  5. Patient will ambulate with maximal assistance for 5 feet with the least restrictive device within 7 day(s). MET 6/2    Initiated 5/9/2021  1.   Patient will move from supine to sit and sit to supine , scoot up and down, and roll side to side in bed with moderate assistance  within 7 day(s). GOAL MET 5/17/21  2. Patient will tolerate sitting EOB with moderate assistance for approx 3-5 minutes within 7 day(s). GOAL MET 5/17/21  3. Patient will transfer from bed to chair and chair to bed with maximal assistance using the least restrictive device within 7 day(s). 4.  Patient will perform sit to stand with maximal assistance within 7 day(s). 5.  Patient will ambulate with maximal assistance for 5 feet with the least restrictive device within 7 day(s). Outcome: Progressing Towards Goal   PHYSICAL THERAPY TREATMENT  Patient: Morgan Soto (24 y.o. male)  Date: 6/3/2021  Diagnosis: Cardiac arrest [facility]) [I46.9] Cardiac arrest with pulseless electrical activity ([facility] [address]  Procedure(s) (LRB):  INSERT ICD DUAL (N/A)  Eval Icd Generator & Leads W Testing At Implant (N/A) 9 Days Post-Op  Precautions:  (Pacemaker precautions)  Chart, physical therapy assessment, plan of care and goals were reviewed. ASSESSMENT  Patient continues with skilled PT services and is progressing towards goals. Barriers to indep with functional mobility include impaired cognition with decreased attention to task/ decreased safety awareness, L UE>LE weakness, impaired balance, decreased coordination L side, increased risk for fall. Pt tolerated gait training with L UE support and assist for balance, cues for safety with turning; noted narrow ABELINO with occasional LOB requiring assist to correct. Pt performed seated LE strength ex with cues to count reps for increased attention to task. Pt easily distracted during session, good response to cues for attention to task and continued activity. Pt demonstrates excellent rehab potential with increasing motor return L side and progress toward all functional goals. Recommend follow up IPR at d/c for facilitation of pt return to max level of functional indep.     Current Level of Function Impacting Discharge (mobility/balance): bed mob CGA, sit to stand min A, amb with L UE support min A    Other factors to consider for discharge: plan for trach downsize         PLAN :  Patient continues to benefit from skilled intervention to address the above impairments. Continue treatment per established plan of care. to address goals. Recommendation for discharge: (in order for the patient to meet his/her long term goals)  Therapy 3 hours per day 5-7 days per week    This discharge recommendation:  Has been made in collaboration with the attending provider and/or case management    IF patient discharges home will need the following DME: to be determined (TBD)       SUBJECTIVE:   Patient stated I can tell I'm getting better    OBJECTIVE DATA SUMMARY:   Critical Behavior:  Neurologic State: Alert  Orientation Level: Oriented X4  Cognition: Follows commands  Safety/Judgement: Awareness of environment  Functional Mobility Training:  Bed Mobility:     Supine to Sit: Stand-by assistance (cues for attending to L UE)  Sit to Supine: Contact guard assistance  Scooting: Stand-by assistance        Transfers:  Sit to Stand: Contact guard assistance;Minimum assistance  Stand to Sit: Contact guard assistance;Minimum assistance (cues for safe alignment with surface)                             Balance:  Sitting: Intact  Standing: Impaired  Standing - Static: Fair  Standing - Dynamic : Poor;Constant support  Ambulation/Gait Training:  Distance (ft): 20 Feet (ft) (x 3)  Assistive Device: Gait belt (L UE support for GH joint protection)  Ambulation - Level of Assistance: Minimal assistance        Gait Abnormalities: Decreased step clearance; Altered arm swing;Path deviations        Base of Support: Narrowed     Speed/Candice: Pace decreased (<100 feet/min)  Step Length: Left shortened;Right shortened  Swing Pattern: Left asymmetrical       Therapeutic Exercises:   Seated LE strength ex: heel raises, LAQs, ham curls with red Tband; cues to attend to task throughout  Pain Rating:  Pt notes discomfort L shoulder    Activity Tolerance:   Good and requires rest breaks    After treatment patient left in no apparent distress:   Supine in bed, Heels elevated for pressure relief, Call bell within reach, Bed / chair alarm activated, Caregiver / family present, Side rails x 3, and HOB elevated    COMMUNICATION/COLLABORATION:   The patients plan of care was discussed with: Registered nurse.      Shi Davies, PT   Time Calculation: 28 mins

## 2021-06-03 NOTE — PROGRESS NOTES
Bedside and Verbal shift change report given to Wero Ritchie RN (oncoming nurse) by Silvestre Lr (offgoing nurse). Report included the following information SBAR, Kardex, Intake/Output and MAR.

## 2021-06-03 NOTE — PROGRESS NOTES
[facility]  Casandra Villegas MD          Admission summary and hospital course. Renetta Lopez is a 22 y.o. male with h/o Seizures and anxiety who presented to [address] [place] [address] ED after a cardiac arrest at [facility] around 1500 5/6/2021. Two rounds of CPR and meds were given and patient was defibrillated x 1 before ROSC. Patient was hypoxic post arrest. Unknown down time prior to arrest.. Patient had recent hospitalization at the [facility] in March of this year. Initial hospitalization admission was at [facility] on 3/24 with AMS in setting of polysubstance and IVD use (cocaine, heroine, methamphetamines). He was found to have sepsis, MRSA bacteremia, MRSA endocarditis. Imaging and MRI also found R PCA infarct and several abscesses and right temporal and parietal lobes. Infarcts thought to be due to septic emboli and patient had left sided residual weakness. Patient was transferred to [facility] on 4/1/2021 for evaluation for valve surgery. After transfer he was found to have a New R MCA infarct also ,had a type 2 NSTEMI and tamponade requiring pericardiocentesis done prior to surgery. He did have cardiac arrest on day of surgery. He had had a bioprosthetic Aortic Valve Replacement and Aortic Root Abscess Debridement done on 4/16/2021. Trached and sent to [facility] on 4/29/2021. Was undergoing PT and vent weaning. Patient is transferred out of the ICU on 5/15. Subjective/HPI    Patient is alert and oriented x4. Lying in bed, on room air. He complains of intermittent left-sided chest pain noting that Toradol gives him relief and asking if he can get a dose. He denies shortness of breath, diaphoresis, palpitations. Assessment and Plan:  V Fib Cardiac arrest - 5/6/21 on admission:   .  -Echo showed mild improvement  -Current cardiac regimen includes ivabradine, Toprol and Entresto. Spironolactone on hold.   -Vanco completed on 5/28  for endocarditis that was present on admission  -s/p ICD on 5/25   -Add dapagliflozin on dc as not on formulary as per Dr. Gianna Chicas (9/86)    Systolic congestive heart failure/CM   -Recent Echo EF on 5/27 with LVEF of 25-30%   -Current GDMT includes Toprol, Entresto. Aldactone on hold      Acute on chronic  respiratory failure, resolved. John Pillion being downsized. Pneumothorax post ICD placement  -Trach collar in place, on room air  - on RA now for > 48 hours and maintaining airways without difficulty  - pulmonary following, plan to change trach size. Pulmonary following. ENT recommended downsizing and capping trial per respiratory therapist.    Bacterial pneumonia severe multilevel consolidation throughout both lungs consistent with severe multilobar PNA. -sputum culture E coli with ESBL   -Completed meropenem and vancomycin. ID were consulted and assisted with the management. MRSA bacteremia and aortic valve endocardititis, PTA.  -Status post bioprosthetic AVR and root abscess debridement at [address] [place] [address] on 4/16/2021.   -Status post pericardiocentesis due to tamponade prior to surgery at [address] [place] [address] as well.  -Per records from [address] [place] [address], vancomycin was due to finish on 5/22/2021. ID here followed. Vancomycin stopped on 528/21. Pain management  -Contusion of chest wall, left side, chronic back pain . H/o drug abuse. Stop fentanyl , cont' oxycodone prn. Cautious with narcotics. -NSAIDs sparingly. 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 Infarcts were thought to be due to septic emboli. He had left sided residual weakness. cta 5/6/21:  multiple acute infarctions throughout the  right cerebral hemisphere, involving much of the occipital lobe, as well as much  of the frontal lobe. There is an additional superior right frontoparietal Infarct  Evaluated by neurology,  Cont' current anticonvulsants. No seizures on EEG.     Neurologist consulted -PT/OT/SLP               - Stroke education              - SBP goal 100-160              Seizure DZ:  Continue keppra -1500 mg BID, valproate  discontinued. Severe muscle deconditioning  Severe protein-calorie malnutrition   Cerebral abscess (embolic)   H/o substance abuse    CODE STATUS: Full  DVT prophylaxis: SCDs, heparin  Disposition: He is a great candidate for inpatient rehab after downsizing tracheostomy and/or decannulation. PHYSICAL EXAMINATION:  Visit Vitals  BP (!) 91/57 (BP 1 Location: Right arm, BP Patient Position: At rest)   Pulse 72   Temp 99.1 °F (37.3 °C)   Resp 16   Ht 5' 11\" (1.803 m)   Wt 55.3 kg (122 lb)   SpO2 96%   BMI 17.02 kg/m²       General:   Pleasant gentleman, cachectic. Not in distress  HEENT:            No pallor or jaundice. Neck:                Tracheostomy tube on the anterior neck, no discharge. Lungs: No wheezing. Midline sternal surgical scar, no crackles. Tender left chest wall. Heart:              Regular  rhythm,normal rate   No edema  Abdomen:       Soft, non-tender. Not distended. Bowel sounds normal  Extremities:     No LE edema  Skin:                Not pale. Not Jaundiced  No rashes         Neurologic:      Alert, oriented X 3. Left upper extremity weakness 4 out of 5. Left lower extremity 5-/5      Labs:     No results for input(s): WBC, HGB, HCT, PLT, HGBEXT, HCTEXT, PLTEXT, HGBEXT, HCTEXT, PLTEXT in the last 72 hours. No results for input(s): NA, K, CL, CO2, BUN, CREA, GLU, CA, MG, PHOS, URICA in the last 72 hours. No results for input(s): ALT, AP, TBIL, TBILI, TP, ALB, GLOB, GGT, AML, LPSE in the last 72 hours. No lab exists for component: SGOT, GPT, AMYP, HLPSE  No results for input(s): INR, PTP, APTT, INREXT, INREXT in the last 72 hours. No results for input(s): FE, TIBC, PSAT, FERR in the last 72 hours. No results found for: FOL, RBCF   No results for input(s): PH, PCO2, PO2 in the last 72 hours.   No results for input(s): CPK, CKNDX, TROIQ in the last 72 hours. No lab exists for component: CPKMB  Lab Results   Component Value Date/Time    Cholesterol, total 140 05/11/2021 04:35 AM    HDL Cholesterol 21 05/11/2021 04:35 AM    LDL, calculated 77.4 05/11/2021 04:35 AM    Triglyceride 208 (H) 05/11/2021 04:35 AM    CHOL/HDL Ratio 6.7 (H) 05/11/2021 04:35 AM     Lab Results   Component Value Date/Time    Glucose (POC) 120 (H) 05/11/2021 12:00 PM    Glucose (POC) 100 05/11/2021 06:04 AM    Glucose (POC) 87 05/11/2021 12:19 AM    Glucose (POC) 83 05/10/2021 06:53 PM    Glucose (POC) 84 05/10/2021 02:07 PM     Lab Results   Component Value Date/Time    Color YELLOW/STRAW 05/06/2021 05:42 PM    Appearance CLEAR 05/06/2021 05:42 PM    Specific gravity 1.008 05/06/2021 05:42 PM    Specific gravity 1.020 03/24/2021 09:30 PM    pH (UA) 5.0 05/06/2021 05:42 PM    Protein 30 (A) 05/06/2021 05:42 PM    Glucose Negative 05/06/2021 05:42 PM    Ketone Negative 05/06/2021 05:42 PM    Bilirubin Negative 05/06/2021 05:42 PM    Urobilinogen 0.2 05/06/2021 05:42 PM    Nitrites Negative 05/06/2021 05:42 PM    Leukocyte Esterase Negative 05/06/2021 05:42 PM    Epithelial cells FEW 05/06/2021 05:42 PM    Bacteria Negative 05/06/2021 05:42 PM    WBC 0-4 05/06/2021 05:42 PM    RBC 0-5 05/06/2021 05:42 PM         Signed:    Cirilo Draper MD

## 2021-06-03 NOTE — ADT AUTH CERT NOTES
Utilization Reviews 
 
  
Cardiology [address] - [facility] Day 27 (6/1/2021) by Kaykay Pandya 
 
  
Review Entered Review Status 6/1/2021 14:56 Completed  
  
Criteria Review Care Day: 32 Care Date: 6/1/2021 Level of Care: Telemetry Guideline Day 3 Level Of Care   
(X) * Activity level acceptable 6/1/2021 14:56:16 EDT by Kaykay Pandya   
  bedrest complete Clinical Status   
(X) * Hemodynamic stability 6/1/2021 14:56:16 EDT by Kaykay Pandya   
  VSS   
(X) * Cardiovascular status acceptable 6/1/2021 14:56:16 EDT by Kaykay Pnadya   
  Systolic congestive heart failure/CM  Ef 25%  
on coreg,entresto ,Aldactone on hold, BP better today   
(X) * Cardiac inflammation absent or controlled 6/1/2021 14:56:16 EDT by Kaykay Pandya   
  none noted ( ) * General Discharge Criteria met Interventions   
(X) * Intake acceptable 6/1/2021 14:56:16 EDT by Kaykay Pandya   
  regular diet with ensure enlive, PO meds ( ) * No inpatient interventions needed 6/1/2021 14:56:16 EDT by Kaykay Pandya   
  daily CBC, daily magnesium, daily CMP, daily phosphorus, daily procalcitonin, regular diet with ensure enlive, SCDs, bedrest complete, clean/change inner cannula daily, contact isolation, elevate HOB 30 degrees, FMS, float heels, I&Os q8   
* Milestone Additional Notes Date of care: 6/1/2021 IP- LOC- Telemetry Hospitalist PN: Subjective/HPI Patient with no major complaints. ENT saw the patient today with trach change. likely will be discharged in 48 hours. D/w cm and nursing staff Assessment and Plan:  
V Fib Cardiac arrest - 5/6/21 on admission:   . Echo showed mild improvement, LVEF 30-35%   
-per cardiologist : on [address] [facility] until 5/28  for endocarditis -s/p ICD on 5/25   
-Add dapagliflozin on dc as not on formulary as per Dr. Dana Garcia (5/25) CXR Stable left pneumothorax  
   
Acute on chronic  respiratory failure, resolved Pneumothorax post ICD placement [facility] at [facility] and was sent to [facility] for [facility] weaning. He was on ventilator while in ICU and now transitioned to trach collar. -CXR showed Very minimal left apical pneumothorax.  Repeat CXR 5/26 showed L-sided pneumothorax has increased .  Pt is currently stable on RA.  Repeat CXR has been ordered  
-trach downsize to cuffless #6 with Passy-[place]ll[place] valveon 5/25  
- on RA now for > 48 hours and maintaining airways without difficulty - pulmonary following, plan to change trach size. ENT consulted as per pulmonology as per ENT Tracheostomy tube change completed. Downsized to 6 cuffless.  Will defer to RT re: decannulation but I suspect could be changed to a 4 tomorrow and start capping trials.  ENT signed off. .  
   
MRSA aortic  Valve endocardititis   
-Found to have MRSA bacteremia and MSSA endocarditis end of march. -Hx:  4/16/21 at [facility] bioprosthetic AVR and root abscess debridement. He also had a pericardiocentesis due to tamponade prior to surgery - Reviewed [facility] record, he was on IV vanco at [facility] and planned to [facility] a 6 weeks of   
tx and EOT 5/28/21 per record. - s/p vancomycin until 5/28/21, appreciate ID's recs  
   
Pain management : contusion of chest wall, left side, chronic back pain . H/o drug abuse.  Stop fentanyl , cont' oxycodone prn.  Cautious with narcotics.  
   
Bacterial pneumonia  severe multilevel consolidation throughout both lungs consistent with severe multilobar PNA. -sputum culture E coli with ESBL   
-per ID -Plan S/p meropenem , will dc and cont vanc for Endocarditis   
-pulmonary toilet   
-trach care   
   
# Systolic congestive heart failure/CM  Ef 25%   
on coreg,entresto ,Aldactone on hold, BP better today Echo EF showed 25-30% on 5/17 Cont' Chapincito Bhatia Cardiologist following   
   
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  Infarcts were thought to be due to septic emboli. He had left sided residual weakness.   
  cta 5/6/21:  multiple acute infarctions throughout the  
right cerebral hemisphere, involving much of the occipital lobe, as well as much  
of the frontal lobe. There is an additional superior right frontoparietal Infarct Evaluated by neurology,  Cont' current anticonvulsants.  No seizures on EEG.    
Neurologist consulted   
             -PT/OT/SLP   
            - Stroke education  
            - SBP goal 100-160  
   
Seizure DZ:  
Continue keppra -1500 mg BID, off valproic acid as subtherapeutic due to meropenem interaction,so valproic acid discontinued  
   
Severe muscle deconditioning Severe protein-calorie malnutrition Cerebral abscess (embolic)  
 H/o substance abuse General:          Cachetic male in bed, chronically ill appearing HEENT:           Atraumatic,trach capped        
Neck:               Supple,   
Lungs:             No wheezing. Midline sternal surgical scar, no crackles. Heart:              Regular  rhythm,normal rate   No edema Abdomen:       Soft, non-tender. Not distended.  Bowel sounds normal  
Extremities:     No LE edema Skin:                Not pale.  Not Jaundiced  No rashes Neurologic:      Alert, oriented X 3, unable to move LUE , able to bend LLE at knee, moves RUE and RLE  
   
PD PN: Impression:    
Recurrent cardiac arrest with vent dependent respiratory failure, s/p trach from prior hospitalization Small left apical ptx following ICD Hx MRSA endocarditis and cerebral abscess HFrEF Hx asthma, tobacco abuse, polysubstance abuse Other medical issues per chart Plan:  
-- S/p trach change to 6 cuffless on 6/1  
-- PMV as tolerated  
-- O2 if needed -- Therapy, pulmonary toilet  
-- Nebs  
-- Cardiac meds -- CXR in the AM  
-- DVT ppx Reports that his breathing feels ok.  States his neck is some sore from tracheostomy change earlier today.  Tolerating PMV, seems to be able to cough secretions up to mouth though cough is a little weak. ENT consult: ASSESSMENT/PLAN:  
Tracheostomy tube change completed. Downsized to 6 cuffless.  Will defer to RT re: decannulation but I suspect could be changed to a 4 tomorrow and start capping trials.  Signing off. Vitals: Temp 98.5, HR 75, BP 97/63, RR 17, 100% on RA No labs at time of review Medications:  
Duo-neb 2.5mg-0.5mg/3mL nebulization BID Pulmicort 500mcg nebulization BID Heparin 5,000units SC q8  
Corlanor 5mg PO BID with meals Risaquad 8 billion cell cap 1 cap NG daily Keppra 1,500mg PO BID Lidocaine 4% patch 1 patch transdermal q24 Melatonin 3mg PO every bedtime Metoprolol 12.5mg PO daily Oxycodone IR 7.5mg PO q4 PRN x2 Entresto 24-26mg tablet 1 tab PO q12 Plan: daily CBC, daily magnesium, daily CMP, daily phosphorus, daily procalcitonin, regular diet with ensure enlive, SCDs, bedrest complete, clean/change inner cannula daily, contact isolation, elevate HOB 30 degrees, FMS, float heels, I&Os q8, neurologic status assessment q4, NG tube, PT/OT, continuous oximetry, spot check oximetry, suctioning, trach speaking valve, trach mask, turn and position q2, daily weights, wound care BID [facility] - Care Day 26 (5/31/2021) by Adonay Elkins 
 
  
Review Entered Review Status 6/1/2021 14:46 Completed  
  
Criteria Review Care Day: 26 Care Date: 5/31/2021 Level of Care: Telemetry Guideline Day 3 Level Of Care   
(X) * Activity level acceptable 6/1/2021 14:46:44 EDT by Adonay Elkins   
  bedrest complete Clinical Status   
(X) * Hemodynamic stability 6/1/2021 14:46:44 EDT by Adonay Elkins   
  VSS   
(X) * Cardiovascular status acceptable 6/1/2021 14:46:44 EDT by Adonay Elkins   
  MRSA aortic  Valve endocardititis s/p vancomycin until 5/28/21   
(X) * Cardiac inflammation absent or controlled 6/1/2021 14:46:44 EDT by Lloyd Barton   
  none noted ( ) * General Discharge Criteria met Interventions   
(X) * Intake acceptable 6/1/2021 14:46:44 EDT by Lloyd Barton   
  regular diet with ensure enlive, PO meds ( ) * No inpatient interventions needed 6/1/2021 14:46:44 EDT by Lloyd Barton   
  daily CBC, daily magnesium, daily CMP, daily phosphorus, daily procalcitonin, regular diet with ensure enlive, SCDs, bedrest complete, clean/change inner cannula daily, contact isolation, elevate HOB 30 degrees, FMS, float heels, I&Os q8   
* Milestone Additional Notes Date of care: 5/31/2021 IP- LOC- Telemetry Hospitalist PN:   
Subjective/HPI Consulted ENT as per pulmonology recommendations regarding first trach change .  As per nursing staff ENT is going to see patient today No acute changes. D/w nursing. trying to reach out ENT for firat trach change. Waiting for ENT consult. Pending . No acute changes overnight   
   
Assessment and Plan:  
V Fib Cardiac arrest - 5/6/21 on admission:   . Echo showed mild improvement, LVEF 30-35%   
-per cardiologist : on [address] [facility] until 5/28  for endocarditis -s/p ICD on 5/25   
-Add dapagliflozin on dc as not on formulary as per Dr. Jan Madrid (5/25) CXR Stable left pneumothorax  
   
Acute on chronic  respiratory failure, resolved Pneumothorax post ICD placement [facility] at [facility] and was sent to [facility] for further weaning. He was on ventilator while in ICU and now transitioned to trach collar. -CXR showed Very minimal left apical pneumothorax.  Repeat CXR 5/26 showed L-sided pneumothorax has increased .  Pt is currently stable on RA.  Repeat CXR has been ordered  
-trach downsize to cuffless #6 with Pass[place] valveon 5/25  
- on RA now for > 48 hours and maintaining airways without difficulty - pulmonary following, plan to change trach size.  ENT consulted as per pulmonology recommendations for first time trach change, pending  
   
MRSA aortic  Valve endocardititis   
-Found to have MRSA bacteremia and MSSA endocarditis end of march. -Hx:  4/16/21 at [facility] bioprosthetic AVR and root abscess debridement. He also had a pericardiocentesis due to tamponade prior to surgery - Reviewed [facility] record, he was on IV vanco at [facility] and planned to [facility] a 6 weeks of   
tx and EOT 5/28/21 per record. - s/p vancomycin until 5/28/21, appreciate ID's recs  
   
Pain management : contusion of chest wall, left side, chronic back pain . H/o drug abuse.  Stop fentanyl , cont' oxycodone prn.  Cautious with narcotics.  
   
Bacterial pneumonia  severe multilevel consolidation throughout both lungs consistent with severe multilobar PNA. -sputum culture E coli with ESBL   
-per ID -Plan S/p meropenem , will dc and cont vanc for Endocarditis   
-pulmonary toilet   
-trach care   
   
# Systolic congestive heart failure/CM  Ef 25%   
on coreg,entresto ,Aldactone on hold, BP better today Echo EF showed 25-30% on 5/17 Cont' Toprol Mariangel Mortonuro Cardiologist following   
   
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 Infarcts were thought to be due to septic emboli. He had left sided residual weakness.   
  cta 5/6/21:  multiple acute infarctions throughout the  
right cerebral hemisphere, involving much of the occipital lobe, as well as much  
of the frontal lobe. There is an additional superior right frontoparietal Infarct Evaluated by neurology,  Cont' current anticonvulsants.  No seizures on EEG.    
Neurologist consulted   
             -PT/OT/SLP   
            - Stroke education  
            - SBP goal 100-160  
   
Seizure DZ:  
Continue keppra -1500 mg BID, off valproic acid as subtherapeutic due to meropenem interaction,so valproic acid discontinued  
   
Severe muscle deconditioning Severe protein-calorie malnutrition Cerebral abscess (embolic)  
 H/o substance abuse General:          Cachetic male in bed, chronically ill appearing HEENT:           Atraumatic,trach capped        
Neck:               Supple,   
Lungs:             No wheezing. Midline sternal surgical scar, no crackles. Heart:              Regular  rhythm,normal rate   No edema Abdomen:       Soft, non-tender. Not distended.  Bowel sounds normal  
Extremities:     No LE edema Skin:                Not pale.  Not Jaundiced  No rashes Neurologic:      Alert, oriented X 3, unable to move LUE , able to bend LLE at knee, moves RUE and RLE Vitals: Temp 98.9, HR 75, BP 93/57, RR 18, 95% on RA No labs at time of review Medications:  
Duo-neb 2.5mg-0.5mg/3mL nebulization BID Pulmicort 500mcg nebulization BID Heparin 5,000units SC q8  
Corlanor 5mg PO BID with meals Risaquad 8 billion cell cap 1 cap NG daily Keppra 1,500mg PO BID Lidocaine 4% patch 1 patch transdermal q24 Melatonin 3mg PO every bedtime Metoprolol 12.5mg PO daily Oxycodone IR 7.5mg PO q4 PRN x5 Entresto 24-26mg tablet 1 tab PO q12 Trazodone 50mg PO bedtime PRN x1 Plan: daily CBC, daily magnesium, daily CMP, daily phosphorus, daily procalcitonin, regular diet with ensure enlive, SCDs, bedrest complete, clean/change inner cannula daily, contact isolation, elevate HOB 30 degrees, FMS, float heels, I&Os q8, neurologic status assessment q4, NG tube, PT/OT, continuous oximetry, spot check oximetry, suctioning, trach speaking valve, trach mask, turn and position q2, daily weights, wound care BID [facility] - Care Day 25 (5/30/2021) by Gladys Arias 
 
  
Review Entered Review Status 5/31/2021 12:23 Completed  
  
Criteria Review Care Day: 25 Care Date: 5/30/2021 Level of Care: Telemetry Guideline Day 3 Level Of Care   
(X) * Activity level acceptable 5/31/2021 12:23:32 EDT by Gladys Arias   
  with assistance Clinical Status   
(X) * Hemodynamic stability (X) * Cardiovascular status acceptable   
(X) * Cardiac inflammation absent or controlled ( ) * General Discharge Criteria met Interventions   
(X) * Intake acceptable 5/31/2021 12:23:32 EDT by Carmell Fuel   
  regular diet   
( ) * No inpatient interventions needed * Milestone Additional Notes  5/30/21 - Telemetry unit Wt: 55.6kg 88.0-82-95-17/83, 98% on RA  
  
Pulmicort 500mcg nebs BID, Heparin 5000u Q8h, Corlanor 5mg PO BID, Keppra 1500mg PO BID, Toprol XL 12.5mg QD, Oxycodone IR 7.5mg x 4, Entresto 24-26mg Q12h, Aldactone 12.5mg QD Orders: contact isolation, regular diet, Ensure Enlive TID, RT for Trach mask & speaking valve, oximetry spot check prn, PT/OT, trach care, float heels, I&O, compression stockings, elevate HOB, daily weight Hospitalist progress note 5/30/21:  
  
Subjective/HPI  
   
Consulted ENT as per pulmonology recommendations regarding first trach change . Still pending. As per nursing staff ENT is going to see patient today No acute changes. D/w nursing. trying to reach out ENT for firat trach change. General:          Cachetic male in bed, chronically ill appearing HEENT:           Atraumatic,trach capped        
Neck:               Supple,   
Lungs:             No wheezing. Midline sternal surgical scar, no crackles. Heart:              Regular  rhythm,normal rate   No edema Abdomen:       Soft, non-tender. Not distended.  Bowel sounds normal  
Extremities:     No LE edema Skin:                Not pale.  Not Jaundiced  No rashes   
      
Neurologic:      Alert, oriented X 3, unable to move LUE , able to bend LLE at knee, moves RUE and RLE Assessment and Plan:  
V Fib Cardiac arrest - 5/6/21 on admission:   . Echo showed mild improvement, LVEF 30-35%   
-per cardiologist : on [address] [facility] until 5/28  for endocarditis -s/p ICD on 5/25   
-Add dapagliflozin on dc as not on formulary as per Dr. Lakhwinder Mckee (5/25) CXR Stable left pneumothorax  
   
Acute on chronic  respiratory failure, resolved Pneumothorax post ICD placement [facility]. He was on ventilator while in ICU and now transitioned to trach collar. -CXR showed Very minimal left apical pneumothorax.  Repeat CXR 5/26 showed L-sided pneumothorax has increased .  Pt is currently stable on RA.  Repeat CXR has been ordered  
-trach downsize to cuffless #6 with Passy-[place] valveon 5/25  
- on RA now for > 48 hours and maintaining airways without difficulty - pulmonary following, plan to change trach size. ENT consulted as per pulmonology recommendations for first time trach change, pending  
   
MRSA aortic  Valve endocardititis   
-Found to have MRSA bacteremia and MSSA endocarditis end of march. -Hx:  4/16/21 at [facility] bioprosthetic AVR and root abscess debridement. He also had a pericardiocentesis due to tamponade prior to surgery - Reviewed [facility] record, he was on IV vanco at [facility] and planned to [facility] a 6 weeks of   
tx and EOT 5/28/21 per record. - s/p vancomycin until 5/28/21, appreciate ID's recs  
   
Pain management : contusion of chest wall, left side, chronic back pain . H/o drug abuse.  Stop fentanyl , cont' oxycodone prn.  Cautious with narcotics.  
   
Bacterial pneumonia  severe multilevel consolidation throughout both lungs consistent with severe multilobar PNA. -sputum culture E coli with ESBL   
-per ID -Plan S/p meropenem , will dc and cont vanc for Endocarditis   
-pulmonary toilet   
-trach care   
   
# Systolic congestive heart failure/CM  Ef 25% Hold coreg,entresto ,Aldactone, BP better today Echo EF showed 25-30% on 5/17 Cont' Toprol Courtney Velasquez Cardiologist following   
   
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 Infarcts were thought to be due to septic emboli.  He had left sided residual weakness.   
Mehnaz Carvajal 5/6/21:  multiple acute infarctions throughout the  
right cerebral hemisphere, involving much of the occipital lobe, as well as much  
of the frontal lobe. There is an additional superior right frontoparietal Infarct Evaluated by neurology,  Cont' current anticonvulsants.  No seizures on EEG.    
Neurologist consulted   
             -PT/OT/SLP   
            - Stroke education  
            - SBP goal 100-160  
        
   
   
Seizure DZ:  
Continue keppra -1500 mg BID, off valproic acid as subtherapeutic due to meropenem interaction,so valproic acid discontinued  
   
   
Severe muscle deconditioning Severe protein-calorie malnutrition Cerebral abscess (embolic)  
 H/o substance abuse  
    
  
  
Cardiology [address] [facility] Day 24 (5/29/2021) by Marci Ceja 
 
  
Review Entered Review Status 5/31/2021 12:14 Completed  
  
Criteria Review Care Day: 24 Care Date: 5/29/2021 Level of Care: Telemetry Guideline Day 3 Level Of Care   
(X) * Activity level acceptable 5/31/2021 12:14:15 EDT by Marci Ceja   
  with assistance Clinical Status   
(X) * Hemodynamic stability   
(X) * Cardiovascular status acceptable   
(X) * Cardiac inflammation absent or controlled 5/31/2021 12:14:15 EDT by Marci Ceja   
  absent   
( ) * General Discharge Criteria met Interventions   
(X) * Intake acceptable 5/31/2021 12:14:15 EDT by Marci Ceja   
  regular diet   
( ) * No inpatient interventions needed * Milestone Additional Notes  5/29/21 - Telemetry unit Wt: 55.7kg 98.3-84-/65, 98% on RA Duo nebs 3cc BID, Pulmicort 500mcg nebs BID, Heparin 5000u Q8h, Corlanor 5mg PO BID, Keppra 1500mg PO BID, Toprol XL 12.5mg QD, Oxycodone IR 7.5mg x 3, Entresto 24-26mg Q12h, Aldactone 12.5mg QD, Desyrel 50mg x 1 Orders: contact isolation, regular diet, Ensure Enlive TID, Phos/Mag/CBC QD, RT for trach suctioning & speaking valve, oximetry spot check prn, PT/OT, trach care, float heels, I&O, daily weight, compression stockings, elevate HOB Hospitalist progress note 5/29/21:  
  
Subjective/HPI  
   
Patient awake. No acute changes. Pulmonology following. Needs peer to peer. Consulted ENT as per pulmonology recommendations regarding first trach change No acute changes. D/w nursing. trying to reach out ENT for firat trach change. General:          Cachetic male in bed, chronically ill appearing HEENT:           Atraumatic,trach capped        
Neck:               Supple,   
Lungs:             No wheezing. Midline sternal surgical scar, no crackles. Heart:              Regular  rhythm,normal rate   No edema Abdomen:       Soft, non-tender. Not distended.  Bowel sounds normal  
Extremities:     No LE edema Skin:                Not pale.  Not Jaundiced  No rashes   
      
Neurologic:      Alert, oriented X 3, unable to move LUE , able to bend LLE at knee, moves RUE and RLE Assessment and Plan:  
V Fib Cardiac arrest - 5/6/21 on admission:   . Echo showed mild improvement, LVEF 30-35%   
-per cardiologist : on [address] [facility] until 5/28  for endocarditis -s/p ICD on 5/25   
-Add dapagliflozin on dc as not on formulary as per Dr. Yasmin Mobley (5/25) CXR Stable left pneumothorax  
   
Acute on chronic  respiratory failure, resolved Pneumothorax post ICD placement [facility] and [facility] further weaning. He was on ventilator while in ICU and now transitioned to trach collar. -CXR showed Very minimal left apical pneumothorax.  Repeat CXR 5/26 showed L-sided pneumothorax has increased .  Pt is currently stable on RA.  Repeat CXR has been ordered  
-trach downsize to cuffless #6 with Passy-[place]osa valveon 5/25  
- on RA now for > 48 hours and maintaining airways without difficulty - pulmonary following, plan to change trach size.  ENT consulted as per pulmonology recommendations for first time trach change, pending  
  MRSA aortic  Valve endocardititis   
-Found to have MRSA bacteremia and MSSA endocarditis end of march. -Hx:  4/16/21 at [facility] bioprosthetic AVR and root abscess debridement. He also had a pericardiocentesis due to tamponade prior to surgery - Reviewed [facility] record, he was on IV vanco at [facility] and planned to [facility] a 6 weeks of   
tx and EOT 5/28/21 per record. - Continue vancomycin until 5/28/21, appreciate ID's recs  
   
Pain management : contusion of chest wall, left side, chronic back pain . H/o drug abuse.  Stop fentanyl , cont' oxycodone prn.  Cautious with narcotics.  
   
Bacterial pneumonia  severe multilevel consolidation throughout both lungs consistent with severe multilobar PNA. -sputum culture E coli with ESBL   
-per ID -Plan S/p meropenem , will dc and cont vanc for Endocarditis   
-pulmonary toilet   
-trach care   
   
# Systolic congestive heart failure/CM  Ef 25% Hold coreg,entresto ,Aldactone, BP better today Echo EF showed 25-30% on 5/17 Cont' Toprol [facility] Cardiologist following   
   
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 Infarcts were thought to be due to septic emboli. He had left sided residual weakness.   
  cta 5/6/21:  multiple acute infarctions throughout the  
right cerebral hemisphere, involving much of the occipital lobe, as well as much  
of the frontal lobe. There is an additional superior right frontoparietal Infarct Evaluated by neurology,  Cont' current anticonvulsants.  No seizures on EEG.    
Neurologist consulted   
             -PT/OT/SLP   
            - Stroke education  
            - SBP goal 100-160  
        
   
   
Seizure DZ:  
Continue keppra -1500 mg BID, off valproic acid as subtherapeutic due to meropenem interaction,so valproic acid discontinued  
   
   
Severe muscle deconditioning Severe protein-calorie malnutrition Cerebral abscess (embolic)  
 H/o substance abuse   
Cardiology [address] - Care Day 23 (5/28/2021) by Timmy Villegas 
 
  
Review Entered Review Status 5/28/2021 15:39 Completed  
  
Criteria Review Care Day: 23 Care Date: 5/28/2021 Level of Care: Telemetry Guideline Day 3 Level Of Care   
(X) * Activity level acceptable 5/28/2021 15:39:02 EDT by Timmy Villegas   
  bedrest complete Clinical Status   
(X) * Hemodynamic stability 5/28/2021 15:39:02 EDT by Timmy Villegas   
  VSS   
( ) * Cardiovascular status acceptable 5/28/2021 15:39:02 EDT by Timmy Villegas   
  MRSA aortic  Valve endocardititis   
(X) * Cardiac inflammation absent or controlled 5/28/2021 15:39:02 EDT by Timmy Villegas   
  Continue vancomycin until 5/28/21, appreciate ID's recs ( ) * General Discharge Criteria met Interventions   
(X) * Intake acceptable 5/28/2021 15:39:02 EDT by Maureen Solomon regular diet with ensure enlive , PO meds ( ) * No inpatient interventions needed 5/28/2021 15:39:02 EDT by Timmy Villegas   
  IV antibiotics, daily labs * Milestone Additional Notes Date of care: 5/28/2021 IP- LOC- Telemetry Hospitalist PN: Subjective/HPI  
   
Patient awake. No acute changes. Pulmonology following. Needs peer to peer. Consulted ENT as per pulmonology recommendations regarding first trach change   
   
Assessment and Plan:  
V Fib Cardiac arrest - 5/6/21 on admission:   . Echo showed mild improvement, LVEF 30-35%   
-per cardiologist : on [address] [facility] until 5/28  for endocarditis -s/p ICD on 5/25   
-Add dapagliflozin on dc as not on formulary as per Dr. Yasmin Mobley (5/25) CXR Stable left pneumothorax  
   
Acute on chronic  respiratory failure, resolved Pneumothorax post ICD placement Had trach last hospitalization at [facility] and was sent to [facility] for further weaning. He was on ventilator while in ICU and now transitioned to trach collar.   
-CXR showed Very minimal left apical pneumothorax.  Repeat CXR 5/26 showed L-sided pneumothorax has increased .  Pt is currently stable on RA.  Repeat CXR has been ordered  
-trach downsize to cuffless #6 with Passy-[place] valveon 5/25  
- on RA now for > 48 hours and maintaining airways without difficulty - pulmonary following, plan to change trach size. ENT consulted as per pulmonology recommendations for first time trach change   
  MRSA aortic  Valve endocardititis   
-Found to have MRSA bacteremia and MSSA endocarditis end of march. -Hx:  4/16/21 at [facility] bioprosthetic AVR and root abscess debridement. He also had a pericardiocentesis due to tamponade prior to surgery - Reviewed [facility] record, he was on IV vanco at [facility] and planned to [facility] a 6 weeks of   
tx and EOT 5/28/21 per record. - Continue vancomycin until 5/28/21, appreciate ID's recs  
   
Pain management : contusion of chest wall, left side, chronic back pain . H/o drug abuse.  Stop fentanyl , cont' oxycodone prn.  Cautious with narcotics.  
   
Bacterial pneumonia  severe multilevel consolidation throughout both lungs consistent with severe multilobar PNA. -sputum culture E coli with ESBL   
-per ID -Plan S/p meropenem , will dc and cont vanc for Endocarditis   
-pulmonary toilet   
-trach care   
   
# Systolic congestive heart failure/CM  Ef 25% Hold coreg,entresto ,Aldactone, BP better today Echo EF showed 25-30% on 5/17 Cont' Toprol Chantelle Cerda Cardiologist following   
   
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 Infarcts were thought to be due to septic emboli. He had left sided residual weakness.   
  cta 5/6/21:  multiple acute infarctions throughout the  
right cerebral hemisphere, involving much of the occipital lobe, as well as much  
of the frontal lobe. There is an additional superior right frontoparietal Infarct Evaluated by neurology,  Cont' current anticonvulsants.  No seizures on EEG.    Neurologist consulted   
             -PT/OT/SLP   
            - Stroke education  
            - SBP goal 100-160  
   
Seizure DZ:  
Continue keppra -1500 mg BID, off valproic acid as subtherapeutic due to meropenem interaction,so valproic acid discontinued  
   
Severe muscle deconditioning Severe protein-calorie malnutrition Cerebral abscess (embolic)  
 H/o substance abuse General:          Cachetic male in bed, chronically ill appearing HEENT:           Atraumatic,trach capped        
Neck:               Supple,   
Lungs:             No wheezing. Midline sternal surgical scar, no crackles. Heart:              Regular  rhythm,normal rate   No edema Abdomen:       Soft, non-tender. Not distended.  Bowel sounds normal  
Extremities:     No LE edema Skin:                Not pale.  Not Jaundiced  No rashes Neurologic:      Alert, oriented X 3, unable to move LUE , able to bend LLE at knee, moves RUE and RLE  
    
Vitals: Temp 98.6, HR 77, BP 92/60, RR 18, 97% on RA Labs:  
5/28/2021 02:59 WBC: 6.8  
RBC: 3.55 (L) HGB: 9.7 (L) HCT: 31.5 (L) MCHC: 30.8 RDW: 16.1 (H) PLATELET: 826 MPV: 10.9 NEUTROPHILS: 48  
LYMPHOCYTES: 34 MONOCYTES: 9  
EOSINOPHILS: 8 (H) BASOPHILS: 1 IMMATURE GRANULOCYTES: 0  
ABS. NEUTROPHILS: 3.2  
ABS. IMM. GRANS.: 0.0  
ABS. LYMPHOCYTES: 2.3  
ABS. MONOCYTES: 0.6  
ABS. EOSINOPHILS: 0.6 (H) Phosphorus: 4.5 Magnesium: 1.7 Chest x-ray: IMPRESSION Stable left pneumothorax. Medications:  
Duo-neb 2.5mg-0.5/3mL nebulization QID Pulmicort 500mcg nebulization BID Heparin 5,000units SC q8  
Corlanor 5mg PO BID with meals Risaquad 8 billion cell 1cap NG daily Keppra 1,500mg PO BID Lidocaine 4% patch 1 patch transdermal q24 Melatonin 3mg PO every bedtime Metoprolol XL 12.5mg PO daily Oxycodone IR 7.5mg PO q4 PRN x3 Entresto 24-26mg tablet 1 tab PO q12 Vancomycin 750mg IV q8 Plan: daily CBC, daily magnesium, daily phosphorus, daily procalcitonin, regular diet with ensure enlive nutritional supplement, consult ENT, SCDs, bedrest complete, clean/change inner cannula, contact isolation, elevate HOB 30 degrees, FMS, float heels, I&Os q8, neurologic status assessment q4, NG/OG tube, PT/OT, spot check oximetry, suctioning, trach speaking valve, trach mask, turn and position q2, daily weights, wound care BID sacrum  
  
  
                        
  
  
  
  
  
  
  
  
  
Cardiology [id number] Day 22 (5/27/2021) by Maureen Ernandez 
 
  
Review Entered Review Status 5/28/2021 12:31 Completed  
  
Criteria Review Care Day: 22 Care Date: 5/27/2021 Level of Care: Telemetry Guideline Day 3 Level Of Care   
(X) * Activity level acceptable 5/28/2021 12:31:57 EDT by Maureen Ernandez   
  bedrest complete Clinical Status   
(X) * Hemodynamic stability 5/28/2021 12:31:57 EDT by Maureen Ernandez   
  VSS   
( ) * Cardiovascular status acceptable 5/28/2021 12:31:57 EDT by Maureen Ernandez   
  MRSA aortic  Valve endocardititis   
(X) * Cardiac inflammation absent or controlled 5/28/2021 12:31:57 EDT by Maureen Ernandez   
  - Continue vancomycin until 5/28/21, appreciate ID's recs ( ) * General Discharge Criteria met Interventions   
(X) * Intake acceptable 5/28/2021 12:31:57 EDT by Mikel Santacruz regular diet with ensure enlive nutritional supplement, PO meds ( ) * No inpatient interventions needed 5/28/2021 12:31:57 EDT by Maureen Ernandez   
  IV antibiotics, IV pain meds, telemetry * Milestone Additional Notes Date of care: 5/27/2021 IP- LOC- Telemetry Cardiology PN: Assessment/Plan: 1.  Sp cardiac arrest post op 4/17/21,  
 cardiac arrest Vfib 5/6/21  ekg rbbb qt 460 msec. Sp ICD 2.  Pneumothorax:  Small but radiology rec fu until resolution  
cxr portable repeated yesterday   No change Repeat in one day and if no change or better will not require further fu    
   
3. Hx:  4/[facility] and root abscess debridement. He also had a pericardiocentesis due to tamponade prior to surgery 4. Recent endocardititis 3/24/21 Staph 5. CM  Ef 25% 6.  right MCA infarct and several abscesses in the right temporal and parietal lobes. Infarcts were thought to be due to septic emboli. He had left sided residual weakness.   
  cta 5/6/21:  multiple acute infarctions throughout the  
right cerebral hemisphere, involving much of the occipital lobe, as well as much  
of the frontal lobe. There is an additional superior right frontoparietal  
infarct PD PN: Subjective:  
5/27 Noted RT attempted to change trach but it still had sutures in trach. Wheeze has gone with nebs and he feels better    
Assessment:  
Tracheostomy status; prior to arrival   
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  
Downsizing trach; it appears that the first trach change hasn't been done yet? Would recommend ENT changing out the first trach change prior to discharge    
Other management ongoing per consulting and primary teams O2 titration above 90%  
duonebs and pulmicort; recommend keeping at discharge Chest x-ray today is pending, but PTX was stable yesterday  
   
Cardiology PN: Assessment/Plan: 1.  Sp cardiac arrest post op 4/17/21,  
 cardiac arrest Vfib 5/6/21  ekg rbbb qt 460 msec. ICD rec for secondary prevention if life expectancy > 1yr.  
   
2. Hx:  4/16/21 at [facility] bioprosthetic AVR and root abscess debridement. He also had a pericardiocentesis due to tamponade prior to surgery 3. Recent endocardititis 3/24/21 Staph 4. CM  Ef 25% 5.  right MCA infarct and several abscesses in the right temporal and parietal lobes. Infarcts were thought to be due to septic emboli. He had left sided residual weakness.   
  cta 5/6/21:  multiple acute infarctions throughout the  
right cerebral hemisphere, involving much of the occipital lobe, as well as much  
of the frontal lobe. There is an additional superior right frontoparietal  
infarct 6.  severe multilevel consolidation throughout both lungs consistent with severe multilobar PNA. 7. PNA:  Severe multilevel consolidation throughout both lungs, consistent with  
severe multilobar pneumonia.  
   
Cont Entresto, BB, ivabridine, no adjustments made; unable to spironolactone as of yet PCXR in AM for ICD Echo showed mild improvement, LVEF 30-35% Add dapagliflozin on dc as not on formulary Hospitalist PN: Subjective/HPI Patient awake. No acute changes. Pulmonology following. Needs peer to peer. Assessment and Plan:  
V Fib Cardiac arrest - 5/6/21 on admission:   . Echo showed mild improvement, LVEF 30-35%   
-per cardiologist : on [address] [facility] until 5/28  for endocarditis -s/p ICD on 5/25   
-Add dapagliflozin on dc as not on formulary CXR for tomorrow to follow up on PTX  
   
Acute on chronic  respiratory failure, resolved Pneumothorax post ICD placement [facility] for further weaning. He was on ventilator while in ICU and now transitioned to trach collar. -CXR showed Very minimal left apical pneumothorax.  Repeat CXR 5/26 showed L-sided pneumothorax has increased .  Pt is currently stable on RA.  Repeat CXR has been ordered  
-trach downsize to cuffless #6 with Passy-Rosa valveon 5/25  
- on RA now for > 48 hours and maintaining airways without difficulty - pulmonary following, plan to change trach size.  
   
MRSA aortic  Valve endocardititis   
-Found to have MRSA bacteremia and MSSA endocarditis end of march.    
-Hx:  4/16/21 at [address] [place] [address] bioprosthetic AVR and root abscess debridement. He also had a pericardiocentesis due to tamponade prior to surgery - Reviewed [facility] record, he was on IV vanco at [facility] and planned to [facility] a 6 weeks of   
tx and EOT 5/28/21 per record. - Continue vancomycin until 5/28/21, appreciate ID's recs  
   
Pain management : contusion of chest wall, left side, chronic back pain . H/o drug abuse.  Stop fentanyl to 50mcg q6h prn, cont' oxycodone prn.  Cautious with narcotics.  
   
Bacterial pneumonia  severe multilevel consolidation throughout both lungs consistent with severe multilobar PNA. -sputum culture E coli with ESBL   
-per ID -Plan S/p meropenem , will dc and cont vanc for Endocarditis   
-pulmonary toilet   
-trach care   
-ID following  
   
# Systolic congestive heart failure/CM  Ef 25% Hold coreg,entresto ,Aldactone, BP better today Echo EF showed 25-30% on 5/17 Cont' [place] Cesar Cardiologist following   
   
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 Infarcts were thought to be due to septic emboli. He had left sided residual weakness.   
  cta 5/6/21:  multiple acute infarctions throughout the  
right cerebral hemisphere, involving much of the occipital lobe, as well as much  
of the frontal lobe. There is an additional superior right frontoparietal Infarct Evaluated by neurology,  Cont' current anticonvulsants.  No seizures on EEG.    
Neurologist consulted   
             -PT/OT/SLP   
            - Stroke education  
            - SBP goal 100-160  
   
Seizure DZ:  
Continue keppra -1500 mg BID, off valproic acid as subtherapeutic due to meropenem interaction,so valproic acid discontinued  
   
Severe muscle deconditioning Severe protein-calorie malnutrition Cerebral abscess (embolic)  
 H/o substance abuse General:          Cachetic male in bed, chronically ill appearing HEENT:           Atraumatic,trach capped       Neck:               Supple,   
Lungs:             No wheezing. Midline sternal surgical scar, no crackles. Heart:              Regular  rhythm,normal rate   No edema Abdomen:       Soft, non-tender. Not distended.  Bowel sounds normal  
Extremities:     No LE edema Skin:                Not pale.  Not Jaundiced  No rashes Neurologic:      Alert, oriented X 3, unable to move LUE , able to bend LLE at knee, moves RUE and RLE  
   
Vitals: Temp 98.7, HR 90, /63, RR 18, 97% on RA Labs:  
5/27/2021 05:08 Sodium: 137 Potassium: 3.9 Chloride: 104 CO2: 25 Anion gap: 8 Glucose: 91 BUN: 8 Creatinine: 0.39 (L) BUN/Creatinine ratio: 21 (H) Calcium: 10.1 Phosphorus: 3.7 Magnesium: 1.6 Chest x-ray: IMPRESSION The small left apical pneumothorax has mildly increased in size Medications:  
Duo-neb 2.5-0.5mg/3mL nebulization QID Pulmicort 500mcg nebulization BID Heparin 5,000units SC q8  
Corlanor 5mg PO BID with meals Risaquad 8 billion cell 1 cap NG daily Keppra 1,500mg PO BID Lidocaine 4% patch 1 patch transdermal transdermal q24 Melatonin 3mg PO every bedtime Metoprolol XL 12.5mg PO daily Oxycodone IR 7.5mg PO q4 PRN x4 Entresto 24-26mg tablet 1 tab PO q12 Trazodone 50mg PO bedtime PRN x1 Vancomycin 750mg IV q8 Fentanyl 25mcg IV q6 PRN x1 Plan: daily CBC, daily magnesium, daily phosphorus, daily procalcitonin, regular diet with ensure enlive nutritional supplement, SCDs, bedrest complete, clean/change inner cannula, contact isolation, elevate HOB 30 degrees, FMS, float heels, ABCDE protocol, I&Os q8  
  
  
                        
  
  
  
  
  
  
  
  
  
Cardiology [address] Day 21 (5/26/2021) by Keke Melo 
 
  
Review Entered Review Status 5/28/2021 12:24 Completed  
  
Criteria Review Care Day: 21 Care Date: 5/26/2021 Level of Care: Telemetry Guideline Day 3 Level Of Care   
(X) * Activity level acceptable 5/28/2021 12:24:04 EDT by Hardy Mccarthy   
  bedrest complete Clinical Status   
(X) * Hemodynamic stability 5/28/2021 12:24:04 EDT by Hardy Mccarthy   
  VSS   
( ) * Cardiovascular status acceptable 5/28/2021 12:24:04 EDT by Jag Marquez until 5/28  for endocarditis   
(X) * Cardiac inflammation absent or controlled 5/28/2021 12:24:04 EDT by Hardy Mccarthy   
  Continue vancomycin until 5/28/21, appreciate ID's recs ( ) * General Discharge Criteria met Interventions   
(X) * Intake acceptable 5/28/2021 12:24:04 EDT by Demond Quijano regular diet with ensure enlive nutritional supplement, PO meds ( ) * No inpatient interventions needed 5/28/2021 12:24:04 EDT by Hardy Mccarthy   
  IV pain meds, IV antibiotics, telemetry * Milestone Additional Notes Date of care: 5/26/2021 IP- LOC- Telemetry Cardiology PN: Assessment/Plan: 1.  Sp cardiac arrest post op 4/17/21,  
 cardiac arrest Vfib 5/6/21  ekg rbbb qt 460 msec. Sp ICD 2.  Pneumothorax:  Small but radiology rec fu until resolution  
cxr portable in am   
   
3. Hx:  4/16/21 at [facility] bioprosthetic AVR and root abscess debridement. He also had a pericardiocentesis due to tamponade prior to surgery 4. Recent endocardititis 3/24/21 Staph 5. CM  Ef 25% 6.  right MCA infarct and several abscesses in the right temporal and parietal lobes. Infarcts were thought to be due to septic emboli. He had left sided residual weakness.   
  cta 5/6/21:  multiple acute infarctions throughout the  
right cerebral hemisphere, involving much of the occipital lobe, as well as much  
of the frontal lobe. There is an additional superior right frontoparietal  
infarct IM PN: Subjective/HPI Pt awake, has ongoing pain.  Refused to work with PT/OT per RN.    
Assessment and Plan:  
V Fib Cardiac arrest - 5/6/21 on admission:   . Echo showed mild improvement, LVEF 30-35%   
-per cardiologist : on Corlanor -Vanco until 5/28  for endocarditis -s/p ICD on 5/25   
-Add dapagliflozin on dc as not on formulary   
   
Acute on chronic  respiratory failure, resolved Pneumothorax post ICD placement Had trach last hospitalization at [facility] and was sent to [facility] for further weaning. He was on ventilator while in ICU and now transitioned to trach collar. -CXR showed Very minimal left apical pneumothorax.  Repeat CXR 5/26 showed L-sided pneumothorax has increased .  Pt is currently stable on RA.  Repeat CXR has been ordered  
-trach downsize to cuffless #6 with Passy-[place] valveon 5/25  
- on RA now for > 48 hours and maintaining airways without difficulty - pulmonary following, plan to change trach size.  
   
MRSA aortic  Valve endocardititis   
-Found to have MRSA bacteremia and MSSA endocarditis end of march. -Hx:  4/16/21 at [facility] bioprosthetic AVR and root abscess debridement. He also had a pericardiocentesis due to tamponade prior to surgery - Reviewed [facility] record, he was on IV vanco at [facility] and planned to [facility] a 6 weeks of   
tx and EOT 5/28/21 per record. - Continue vancomycin until 5/28/21, appreciate ID's recs  
   
Pain management : contusion of chest wall, left side, chronic back pain . H/o drug abuse.  Stop fentanyl to 50mcg q6h prn, cont' oxycodone prn.  Cautious with narcotics.  
   
Bacterial pneumonia  severe multilevel consolidation throughout both lungs consistent with severe multilobar PNA. -sputum culture E coli with ESBL   
-per ID -Plan Continue meropenem with plan for 7 to 10-day course started 5/11--5/22, will dc and cont vanc for Endocarditis   
-pulmonary toilet   
-trach care   
-ID following  
   
# Systolic congestive heart failure/CM  Ef 25% Hold coreg,entresto ,Aldactone, BP better today Echo EF showed 25-30% on 5/17 Cont' Toprol [place] Cardiologist following   
   
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 Infarcts were thought to be due to septic emboli. He had left sided residual weakness.   
  cta 5/6/21:  multiple acute infarctions throughout the  
right cerebral hemisphere, involving much of the occipital lobe, as well as much  
of the frontal lobe. There is an additional superior right frontoparietal Infarct Evaluated by neurology, cont' AC for CVA preventing moving forward.  Cont' current anticonvulsants.  No seizures on EEG.    
Neurologist consulted   
             -PT/OT/SLP   
            - Stroke education  
            - SBP goal 100-160  
        
Seizure DZ:  
Continue keppra -1500 mg BID, off valproic acid as subtherapeutic due to meropenem interaction,so valproic acid discontinued  
   
Severe muscle deconditioning Severe protein-calorie malnutrition Cerebral abscess (embolic)  
 H/o substance abuse General:          Cachetic male in bed, chronically ill appearing HEENT:           Atraumatic,trach capped        
Neck:               Supple,   
Lungs:             No wheezing. Midline sternal surgical scar, no crackles. Heart:              Regular  rhythm,normal rate   No edema Abdomen:       Soft, non-tender. Not distended.  Bowel sounds normal  
Extremities:     No LE edema Skin:                Not pale.  Not Jaundiced  No rashes Neurologic:      Alert, oriented X 3, unable to move LUE , able to bend LLE at knee, moves RUE and RLE  
  
PD PN: 5/26 On PMV Pain is of most concern to him Chest x-ray shows a very small PTX Assessment:  
Tracheostomy status; prior to arrival   
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  
Downsize trach to cuffless # 6 with Passy-Rosa valve and continue downsize with ultimate goals of capping trial to decannulate if tolerated; can be down at inpatiant rehab Other management ongoing per consulting and primary teams O2 titration above 90% Add duonebs and pulmicort D/W RN  
Repeat chest x-ray this afternoon; less likely that ptx will worsen but we will keep an eye on it  
   
Vitals: Temp 98.4, HR 88, BP 96/63, RR 18, 95% on RA Labs:  
Chest x-ray: IMPRESSION 1. Left-sided pneumothorax has increased as described above. Follow-up to  
resolution is suggested. IMPRESSION No change. Medications:  
Duo-neb 2.5-0.5mg/3mL nebulization QID Pulmicort 500mcg nebulization BID Heparin 5,000units SC q8  
Corlanor 5mg PO BID with meals Risaquad 8 billion cell 1 cap NG daily Keppra 1,500mg PO BID Lidocaine 4% patch 1 patch transdermal transdermal q24 Melatonin 3mg PO every bedtime Metoprolol XL 12.5mg PO daily Oxycodone IR 7.5mg PO q4 PRN x3 Entresto 24-26mg tablet 1 tab PO q12 Trazodone 50mg PO bedtime PRN x1 Vancomycin 750mg IV q8 Fentanyl 50mcg IV q6 PRN x3 Plan: daily CBC, daily magnesium, daily phosphorus, daily procalcitonin, regular diet with ensure enlive nutritional supplement, SCDs, bedrest complete, clean/change inner cannula, contact isolation, elevate HOB 30 degrees, FMS, float heels, ABCDE protocol, I&Os q8  
  
  
                        
  
  
  
  
  
  
  
  
  
Cardiology [id number] Day 20 (5/25/2021) by Chencho Willett RN 
 
  
Review Entered Review Status 5/25/2021 15:50 Completed  
  
Criteria Review Care Day: 20 Care Date: 5/25/2021 Level of Care: Intermediate Care Guideline Day 3 Level Of Care ( ) * Activity level acceptable Clinical Status   
(X) * Hemodynamic stability 5/25/2021 15:50:14 EDT by Bertram Gomez [id number] Temp97.3 °F (36.3 °C) Resp18 O2 sat 99% RA   
( ) * Cardiovascular status acceptable   
(X) * Cardiac inflammation absent or controlled ( ) * General Discharge Criteria met Interventions   
(X) * Intake acceptable 5/25/2021 15:50:14 EDT by Christian Pillai   
  cardiac diet Corlanor 5mg po 2x/day Risaquad 1 cap q day NG 
Keppra 1,500 mg po 2x/day Melatonin 3 mg po q hs Toprol XL 12.5 mg po q day Entresto 24-26mg/tab 1 tab po q 12hrs ( ) * No inpatient interventions needed * Milestone Additional Notes 5/25/21 Cardiology Progress Note:  
  
Tacey Light reports unchanged chest discomfort.  Says breathing is improved.    
  
Physical Exam:  
GEN: NAD, appears stated age HEENT: EOMI, MMM, OP clear NECK: Normal JVP, carotids 2+ b/l and symmetrical  
CV: RRR, normal S1 and S2, no M/R/G  
LUNGS: CTAB, no W/R/R  
ABD: NABS, soft, NT/ND  
EXT: No edema PSYCH: Mood and affect normal  
NEURO: AAO, LUE flaccid, face symmetrical, speech intact  
   
Telemetry: normal sinus rhythm                                                                                     
  
Assessment/Plan:  
   
   
1.  Sp cardiac arrest post op 4/17/21,  
 cardiac arrest Vfib 5/6/21  ekg rbbb qt 460 msec. ICD rec for secondary prevention if life expectancy > 1yr.  
   
2. Hx:  4/16/21 at [facility] bioprosthetic AVR and root abscess debridement. He also had a pericardiocentesis due to tamponade prior to surgery 3. Recent endocardititis 3/24/21 Staph 4. CM  Ef 25% 5.  right MCA infarct and several abscesses in the right temporal and parietal lobes. Infarcts were thought to be due to septic emboli. He had left sided residual weakness.   
  cta 5/6/21:  multiple acute infarctions throughout the  
right cerebral hemisphere, involving much of the occipital lobe, as well as much  
of the frontal lobe. There is an additional superior right frontoparietal  
infarct 6.  severe multilevel consolidation throughout both lungs consistent with severe multilobar PNA.    
7. PNA:  Severe multilevel consolidation throughout both lungs, consistent with  
severe multilobar pneumonia.  
   
Cont Entresto, BB, ivabridine, no adjustments made NPO for ICD today Echo showed mild improvement, LVEF 30-35% Add dapagliflozin on dc as not on formulary Procedure: ICD implant Indication: Cardiomyopathy LVEF 25% ICD for secondary prevention after resuscitation from [address] [place] [address] Pt is 3 days from completing Rx course of MRSA endocarditis with no signs of residual infection PROCEDURES PERFORMED:  
1. Conscious sedation. 2. Fluoroscopy for lead placement. 3. Permanent Implantable Cardioverter Defibrillator (ICD) Implantation:  
    A: Medtronic  ICD model  Visa AF [id number]  
    B: Placement of ventricular lead with threshold testing. Lead: 6935  
      
4.  ICD defribrillation threshold testing by the step down method Hospitalist Progress Note:  
  
Pt seen and examined post procedure.  Currently stable on RA.  NAD. He is complaining of generalized pain, requesting pain meds.    
  
General:          Cachetic male in bed, chronically ill appearing HEENT:           Atraumatic,trach capped        
Neck:               Supple,   
Lungs:             No wheezing. Midline sternal surgical scar, no crackles. Heart:              Regular  rhythm,normal rate   No edema Abdomen:       Soft, non-tender. Not distended.  Bowel sounds normal  
Extremities:     No LE edema Skin:                Not pale.  Not Jaundiced  No rashes   
      
Neurologic:      Alert, oriented X 3, unable to move LUE , able to bend LLE at knee, moves RUE and RLE Assessment and Plan:  
V Fib Cardiac arrest - 5/6/21 on admission:   . Echo showed mild improvement, LVEF 30-35%   
-per cardiologist : on [address] [facility] until 5/28  for endocarditis -s/p ICD on 5/25   
-Add dapagliflozin on dc as not on formulary   
   
Pneumothorax post ICD placement CXR showed Very minimal left apical pneumothorax. Follow-up exam is suggested Pt is currently stable on RA.  Will repeat CXR  
  Acute on chronic  respiratory failure, resolved Had trach [facility] hospitalization at [facility] and was sent to [facility] for [facility] weaning. He was on ventilator while in ICU and now transitioned to trach collar.  
-currently on trach collar and PMV  
- on RA now for > 48 hours and maintaining airways without difficulty - pulmonary following, plan to change trach size.  
   
MRSA aortic  Valve endocardititis   
-Found to have MRSA bacteremia and MSSA endocarditis end of march. -Hx:  4/16/21 at [facility] bioprosthetic AVR and root abscess debridement. He also had a pericardiocentesis due to tamponade prior to surgery - Reviewed [facility] record, he was on IV vanco at [address] [place] [address] and planned to [facility] a 6 weeks of   
tx and EOT 5/28/21 per record. - Continue vancomycin until 5/28/21, appreciate ID's recs  
   
   
Pain management : contusion of chest wall, left side, chronic back pain . H/o drug abuse Cautious with narcotics On  fentanyl to 50mcg q6h prn   
   
Bacterial pneumonia  severe multilevel consolidation throughout both lungs consistent with severe multilobar PNA. -sputum culture E coli with ESBL   
-per ID -Plan Continue meropenem with plan for 7 to 10-day course started 5/11--5/22, will dc and cont vanc for Endocarditis   
-pulmonary toilet   
-trach care   
-ID following  
   
# Systolic congestive heart failure/CM  Ef 25% Hold coreg,entresto ,Aldactone, BP better today Cardiologist following Echo EF showed 25-30% on 5/17 Started Toprol Xl, Cardiology added Entresto as well  
   
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 Infarcts were thought to be due to septic emboli. He had left sided residual weakness.   
  cta 5/6/21:  multiple acute infarctions throughout the  
right cerebral hemisphere, involving much of the occipital lobe, as well as much  
of the frontal lobe. There is an additional superior right frontoparietal Infarct Evaluated by neurology, cont' AC for CVA preventing moving forward.  Cont' current anticonvulsants.  No seizures on EEG.    
Neurologist consulted   
             -PT/OT/SLP   
            - Stroke education  
            - SBP goal 100-160  
        
   
   
Seizure DZ:  
Continue keppra -1500 mg BID, off valproic acid as subtherapeutic due to meropenem interaction,so valproic acid discontinued  
   
   
Severe muscle deconditioning Severe protein-calorie malnutrition Cerebral abscess (embolic)  
 H/o substance abuse  
   
   
fulll code   
   
PT/OT, I think patient can go to inpatient rehab at this point after completing antibiotics   
   
  
No labs CXR:  Very minimal left apical pneumothorax. Follow-up exam is suggested. The unit was  
notified. Orders ICU  
  
SCDs Contact isolation PT Cleanse left great toe wound with normal saline, apply Silvasorb gel ( if run out, it is stocked in 5E supply room), cover with Curad oil emulsion non adherent dressing or Mepilex one, cover with 2x2 gauze and secure with medipore tape every other day Fentanyl 50 mcgIV q 6hrs prn x 2 doses Heparin SQ q 8hrs Lidocaine 4% patch 1 patch TD q day Vanco 750mg IV q 8hrs

## 2021-06-04 PROCEDURE — 97530 THERAPEUTIC ACTIVITIES: CPT

## 2021-06-04 PROCEDURE — 97535 SELF CARE MNGMENT TRAINING: CPT

## 2021-06-04 PROCEDURE — 74011250637 HC RX REV CODE- 250/637: Performed by: HOSPITALIST

## 2021-06-04 PROCEDURE — 94760 N-INVAS EAR/PLS OXIMETRY 1: CPT

## 2021-06-04 PROCEDURE — 77010033678 HC OXYGEN DAILY

## 2021-06-04 PROCEDURE — 65660000000 HC RM CCU STEPDOWN

## 2021-06-04 PROCEDURE — 74011250637 HC RX REV CODE- 250/637: Performed by: INTERNAL MEDICINE

## 2021-06-04 PROCEDURE — 74011250636 HC RX REV CODE- 250/636: Performed by: INTERNAL MEDICINE

## 2021-06-04 PROCEDURE — 97116 GAIT TRAINING THERAPY: CPT

## 2021-06-04 PROCEDURE — 97112 NEUROMUSCULAR REEDUCATION: CPT

## 2021-06-04 PROCEDURE — 74011250636 HC RX REV CODE- 250/636: Performed by: HOSPITALIST

## 2021-06-04 RX ADMIN — HEPARIN SODIUM 5000 UNITS: 5000 INJECTION INTRAVENOUS; SUBCUTANEOUS at 21:15

## 2021-06-04 RX ADMIN — MICONAZOLE NITRATE: 20 CREAM TOPICAL at 17:05

## 2021-06-04 RX ADMIN — SACUBITRIL AND VALSARTAN 1 TABLET: 24; 26 TABLET, FILM COATED ORAL at 21:14

## 2021-06-04 RX ADMIN — OXYCODONE HYDROCHLORIDE 7.5 MG: 5 TABLET ORAL at 13:04

## 2021-06-04 RX ADMIN — Medication 10 ML: at 17:05

## 2021-06-04 RX ADMIN — Medication 3 MG: at 21:15

## 2021-06-04 RX ADMIN — IVABRADINE 5 MG: 5 TABLET, FILM COATED ORAL at 08:46

## 2021-06-04 RX ADMIN — KETOROLAC TROMETHAMINE 15 MG: 30 INJECTION, SOLUTION INTRAMUSCULAR; INTRAVENOUS at 22:59

## 2021-06-04 RX ADMIN — Medication 1 CAPSULE: at 08:46

## 2021-06-04 RX ADMIN — MICONAZOLE NITRATE: 20 CREAM TOPICAL at 08:50

## 2021-06-04 RX ADMIN — CASTOR OIL AND BALSAM, PERU: 788; 87 OINTMENT TOPICAL at 08:50

## 2021-06-04 RX ADMIN — IVABRADINE 5 MG: 5 TABLET, FILM COATED ORAL at 17:05

## 2021-06-04 RX ADMIN — Medication 10 ML: at 05:59

## 2021-06-04 RX ADMIN — Medication 10 ML: at 21:16

## 2021-06-04 RX ADMIN — OXYCODONE HYDROCHLORIDE 7.5 MG: 5 TABLET ORAL at 06:07

## 2021-06-04 RX ADMIN — Medication 10 ML: at 06:00

## 2021-06-04 RX ADMIN — CASTOR OIL AND BALSAM, PERU: 788; 87 OINTMENT TOPICAL at 17:04

## 2021-06-04 RX ADMIN — OXYCODONE HYDROCHLORIDE 7.5 MG: 5 TABLET ORAL at 17:02

## 2021-06-04 RX ADMIN — LEVETIRACETAM 1500 MG: 500 TABLET ORAL at 17:03

## 2021-06-04 RX ADMIN — CASTOR OIL AND BALSAM, PERU: 788; 87 OINTMENT TOPICAL at 21:21

## 2021-06-04 RX ADMIN — HEPARIN SODIUM 5000 UNITS: 5000 INJECTION INTRAVENOUS; SUBCUTANEOUS at 05:59

## 2021-06-04 RX ADMIN — LEVETIRACETAM 1500 MG: 500 TABLET ORAL at 08:46

## 2021-06-04 RX ADMIN — OXYCODONE HYDROCHLORIDE 7.5 MG: 5 TABLET ORAL at 21:15

## 2021-06-04 RX ADMIN — KETOROLAC TROMETHAMINE 15 MG: 30 INJECTION, SOLUTION INTRAMUSCULAR; INTRAVENOUS at 08:47

## 2021-06-04 NOTE — ROUTINE PROCESS
Bedside shift change report given to Abdulkadir Laguerre (oncoming nurse) by LAKISHA GOMEZ [facility] (offgoing nurse). Report included the following information SBAR, Kardex, Intake/Output, MAR and Recent Results.

## 2021-06-04 NOTE — PROGRESS NOTES
Occupational Therapy: Chart reviewed and nurse cleared. Patient in bed and asleep. Awakened to voice. Stating he did not sleep last night and deferring session despite education on the importance of mobilization. Spoke with nurse and PT. Will see as able and appropriate to make up session.   Michael Said, ANTHONY/L

## 2021-06-04 NOTE — PROGRESS NOTES
[facility]  Daniel Luong MD          Admission summary and hospital course. Stoney Carson is a 22 y.o. male with h/o Seizures and anxiety who presented to [facility] ED after a cardiac arrest at [facility] around 1500 5/6/2021. Two rounds of CPR and meds were given and patient was defibrillated x 1 before ROSC. Patient was hypoxic post arrest. Unknown down time prior to arrest.. Patient had recent hospitalization at the [facility] in March of this year. Initial hospitalization admission was at [facility] on 3/24 with AMS in setting of polysubstance and IVD use (cocaine, heroine, methamphetamines). He was found to have sepsis, MRSA bacteremia, MRSA endocarditis. Imaging and MRI also found R PCA infarct and several abscesses and right temporal and parietal lobes. Infarcts thought to be due to septic emboli and patient had left sided residual weakness. Patient was transferred to [facility] on 4/1/2021 for evaluation for valve surgery. After transfer he was found to have a New R MCA infarct also ,had a type 2 NSTEMI and tamponade requiring pericardiocentesis done prior to surgery. He did have cardiac arrest on day of surgery. He had had a bioprosthetic Aortic Valve Replacement and Aortic Root Abscess Debridement done on 4/16/2021. Trached and sent to [facility] on 4/29/2021. Was undergoing PT and vent weaning. Patient is transferred out of the ICU on 5/15. Subjective/HPI    Patient is alert and oriented x4  Asked RT to downsize cannula to 4,but pt declined earlier  He advised him to cooperate,we need to downsize or preferably de cannulate him for discharge     Assessment and Plan:  V Fib Cardiac arrest - 5/6/21 on admission:   .  -Echo showed mild improvement  -Current cardiac regimen includes ivabradine, Toprol and Entresto. Spironolactone on hold.   -Vanco completed on 5/28  for endocarditis that was present on admission  -s/p ICD on 5/25 -Add dapagliflozin on dc as not on formulary as per Dr. Eyal Moy (3/58)    Systolic congestive heart failure/CM   -Recent Echo EF on 5/27 with LVEF of 25-30%   -Current GDMT includes Toprol, Entresto. Aldactone on hold      Acute on chronic  respiratory failure, resolved. Idris Allegra being downsized. Pneumothorax post ICD placement  -Trach collar in place, on room air  - on RA now for > 48 hours and maintaining airways without difficulty  - pulmonary following, plan to change trach size. Pulmonary following. ENT recommended downsizing and capping trial per respiratory therapist.    Bacterial pneumonia severe multilevel consolidation throughout both lungs consistent with severe multilobar PNA. -sputum culture E coli with ESBL   -Completed meropenem and vancomycin. ID were consulted and assisted with the management. MRSA bacteremia and aortic valve endocardititis, PTA.  -Status post bioprosthetic AVR and root abscess debridement at [address] [place] [address] on 4/16/2021.   -Status post pericardiocentesis due to tamponade prior to surgery at [address] [place] [address] as well.  -Per records from [address] [place] [address], vancomycin was due to finish on 5/22/2021. ID here followed. Vancomycin stopped on 528/21. Pain management  -Contusion of chest wall, left side, chronic back pain . H/o drug abuse. Stop fentanyl , cont' oxycodone prn. Cautious with narcotics. -NSAIDs sparingly. 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 Infarcts were thought to be due to septic emboli. He had left sided residual weakness. cta 5/6/21:  multiple acute infarctions throughout the  right cerebral hemisphere, involving much of the occipital lobe, as well as much  of the frontal lobe. There is an additional superior right frontoparietal Infarct  Evaluated by neurology,  Cont' current anticonvulsants. No seizures on EEG.     Neurologist consulted                -PT/OT/SLP               - Stroke education              - SBP goal 100-160              Seizure DZ:  Continue keppra -1500 mg BID, valproate  discontinued. Severe muscle deconditioning  Severe protein-calorie malnutrition   Cerebral abscess (embolic)   H/o substance abuse    CODE STATUS: Full  DVT prophylaxis: SCDs, heparin  Disposition: He is a great candidate for inpatient rehab after downsizing tracheostomy and/or decannulation. PHYSICAL EXAMINATION:  Visit Vitals  BP 91/61 (BP 1 Location: Right upper arm, BP Patient Position: At rest)   Pulse 69   Temp 98.5 °F (36.9 °C)   Resp 18   Ht 5' 11\" (1.803 m)   Wt 55.3 kg (122 lb)   SpO2 97%   BMI 17.02 kg/m²       General:   Pleasant gentleman, cachectic. Not in distress  HEENT:            No pallor or jaundice. Neck:                Tracheostomy tube on the anterior neck, no discharge. Lungs: No wheezing. Midline sternal surgical scar, no crackles. Tender left chest wall. Heart:              Regular  rhythm,normal rate   No edema  Abdomen:       Soft, non-tender. Not distended. Bowel sounds normal  Extremities:     No LE edema  Skin:                Not pale. Not Jaundiced  No rashes         Neurologic:      Alert, oriented X 3. Left upper extremity weakness 4 out of 5. Left lower extremity 5-/5      Labs:     No results for input(s): WBC, HGB, HCT, PLT, HGBEXT, HCTEXT, PLTEXT, HGBEXT, HCTEXT, PLTEXT in the last 72 hours. No results for input(s): NA, K, CL, CO2, BUN, CREA, GLU, CA, MG, PHOS, URICA in the last 72 hours. No results for input(s): ALT, AP, TBIL, TBILI, TP, ALB, GLOB, GGT, AML, LPSE in the last 72 hours. No lab exists for component: SGOT, GPT, AMYP, HLPSE  No results for input(s): INR, PTP, APTT, INREXT, INREXT in the last 72 hours. No results for input(s): FE, TIBC, PSAT, FERR in the last 72 hours. No results found for: FOL, RBCF   No results for input(s): PH, PCO2, PO2 in the last 72 hours. No results for input(s): CPK, CKNDX, TROIQ in the last 72 hours.     No lab exists for component: CPKMB  Lab Results   Component Value Date/Time    Cholesterol, total 140 05/11/2021 04:35 AM    HDL Cholesterol 21 05/11/2021 04:35 AM    LDL, calculated 77.4 05/11/2021 04:35 AM    Triglyceride 208 (H) 05/11/2021 04:35 AM    CHOL/HDL Ratio 6.7 (H) 05/11/2021 04:35 AM     Lab Results   Component Value Date/Time    Glucose (POC) 120 (H) 05/11/2021 12:00 PM    Glucose (POC) 100 05/11/2021 06:04 AM    Glucose (POC) 87 05/11/2021 12:19 AM    Glucose (POC) 83 05/10/2021 06:53 PM    Glucose (POC) 84 05/10/2021 02:07 PM     Lab Results   Component Value Date/Time    Color YELLOW/STRAW 05/06/2021 05:42 PM    Appearance CLEAR 05/06/2021 05:42 PM    Specific gravity 1.008 05/06/2021 05:42 PM    Specific gravity 1.020 03/24/2021 09:30 PM    pH (UA) 5.0 05/06/2021 05:42 PM    Protein 30 (A) 05/06/2021 05:42 PM    Glucose Negative 05/06/2021 05:42 PM    Ketone Negative 05/06/2021 05:42 PM    Bilirubin Negative 05/06/2021 05:42 PM    Urobilinogen 0.2 05/06/2021 05:42 PM    Nitrites Negative 05/06/2021 05:42 PM    Leukocyte Esterase Negative 05/06/2021 05:42 PM    Epithelial cells FEW 05/06/2021 05:42 PM    Bacteria Negative 05/06/2021 05:42 PM    WBC 0-4 05/06/2021 05:42 PM    RBC 0-5 05/06/2021 05:42 PM         Signed:    Stephanie Solano MD

## 2021-06-04 NOTE — WOUND CARE
WOCN Note:  
 
Follow up wound care visit to assess buttocks and heels. 
   
Chart shows: 
Patient admitted on 5/6/21 with Cardiac Arrest 
Past medical history of anxiety, seizures, polysubstance abuse, endocarditis, history of MRSA sepsis, and embolic CVA Admitted from [facility] 
  
 
Assessment:  
Patient alert and oriented x4,  verbal, passy shannan valve in place to tracheostomy. Mobility: Patient independent with mobility in bed, able to lift heels off mattress independently. Continence: Continent of urine and stool Last Salvador Score: 17 Surface: Alfa mattress 
  
Bilateral heels skin intact and without erythema. Buttocks and sacrum with blanchable erythema. No skin breakdown Heels offloaded with pillows Wound Recommendations: 
  
Continue Venelex ointment to sacrum/buttocks TID 
  
PI Prevention: 
Turn/reposition approximately every 2 hours Offload heels with pillows at all times in bed. Pad bony prominences Keep HOB 30 degrees or less to decrease shearing and pressure unless medically contraindicated. If HOB is to be over 30 degrees, raise knees first then [facility] to prevent sliding Minimize layers of linen/pads under patient to optimize support surface to one flat sheet and one incontinence pad  
Assess under trach flange each shift to assess skin integrity 
  
Discussed with RNShannon 
  
Transition of Care: Plan to follow weekly and as needed while admitted to hospital. Plan is for patient to be discharged to [address] today 
  
Shelbie NIEVESN, RN, [address] Certified Wound and Ostomy Nurse 
office [phone] Can be reached through [facility] 
pager [phone] or call  to page

## 2021-06-04 NOTE — PROGRESS NOTES
RUR 26%    [facility] has accepted for IPR. RT to downsize trache from 6 to 4 later today. [facility] on Will Call. Rapid Covid negative on 6/2- per Alex Olveraison -OK. Discharge packet located on the chart- needing date change and Emt[place] completed. Weekend contact is [facility] [phone]. Patient will need new Authorization for [place] admission after today per [place] Liaison- new [place] will be started today 6/4 for possible weekend discharge. CM spoke with Dr. Carlos Fair. He would like to hold off on discharge today. The patient needs to have the trache downsized this pm and work with therapy. The patient may now prefer to go home at discharge. Dr. Carlos Fair plans to speak with the patient's mother. Will follow for transitions of care.      Thad Fair, [address]

## 2021-06-04 NOTE — PROGRESS NOTES
Bedside shift change report given to [address] [place] [address] (oncoming nurse) by Melissa Lundberg (offgoing nurse). Report included the following information SBAR, Kardex, MAR and Recent Results.

## 2021-06-04 NOTE — PROGRESS NOTES
Problem: Falls - Risk of  Goal: *Absence of Falls  Description: Document Onalee Gum Fall Risk and appropriate interventions in the flowsheet.   Outcome: Progressing Towards Goal  Note: Fall Risk Interventions:  Mobility Interventions: Communicate number of staff needed for ambulation/transfer, Patient to call before getting OOB, PT Consult for mobility concerns    Mentation Interventions: Adequate sleep, hydration, pain control, Evaluate medications/consider consulting pharmacy    Medication Interventions: Patient to call before getting OOB, Teach patient to arise slowly    Elimination Interventions: Bed/chair exit alarm, Call light in reach, Patient to call for help with toileting needs, Toileting schedule/hourly rounds              Problem: Patient Education: Go to Patient Education Activity  Goal: Patient/Family Education  Outcome: Progressing Towards Goal

## 2021-06-04 NOTE — PROGRESS NOTES
Problem: Self Care Deficits Care Plan (Adult)  Goal: *Acute Goals and Plan of Care (Insert Text)  Description:   FUNCTIONAL STATUS PRIOR TO ADMISSION: patient was independent prior to recent hospital admissions and has had a complicated medical course including NG tube and trach. Patient recently at [place] [address] for vent weaning. HOME SUPPORT: The patient lived alone with parents in area to provide assistance. 5/27/2021 stating wife assisting with in hospital stay. Occupational Therapy Goals  Revised 5/27/2021, revised 6/3/2021  1. Patient will perform brushing teeth using L UE as stabilizer with min assistance within 7 day(s). 2.  Patient will perform bathing anterior body sitting EOB with moderate assistance within 7 day(s). 3.  Patient will perform upper body dressing nirmal technique with moderate assistance within 7 days. 4.  Patient will open one ADL item with left UE as stabilizer with moderate assistance within 7 days. Met 6/3/2021, upgrade to open two ADL items with moderate assistance within 7 days. 5.  Patient will demonstrate [facility] transfer with moderate assistance within 7 days. Met 6/2/2021, upgrade to toilet transfer supervision within 7 days. 6. Patient will demonstrate upper extremity therapeutic exercise/activities self ROM (L to 90* shoulder flexion) with moderate assistance minutes within 7 day(s). Initiated 5/11/2021, Reviewed 5/18/21, continue all goals  1. Patient will perform 2 simple grooming tasks in supported sitting with minimal assistance/contact guard assist within 7 day(s). 2.  Patient will perform anterior neck to thigh bathing in supported sitting with moderate assistance within 7 day(s). 3.  Patient will tolerate sitting EOB in prep for ADLs with max A within 7 days. 4.  Patient will track to L of midline during ADLs/therapeutic activities with moderate cues within 7 days.    5.  Patient will participate in upper extremity therapeutic exercise/activities with moderate assistance  for 5 minutes within 7 day(s). Outcome: Progressing Towards Goal   OCCUPATIONAL THERAPY TREATMENT  Patient: Roxana Pitts (27 y.o. male)  Date: 6/4/2021  Diagnosis: Cardiac arrest [facility]) [I46.9] Cardiac arrest with pulseless electrical activity ([facility] [address]  Procedure(s) (LRB):  INSERT ICD DUAL (N/A)  Eval Icd Generator & Leads W Testing At Implant (N/A) 10 Days Post-Op  Precautions:  (Pacemaker precautions)  Chart, occupational therapy assessment, plan of care, and goals were reviewed. ASSESSMENT  Patient continues with skilled OT services and is progressing towards goals. Patient received in bed and ready to participate in OT. Participated in self care, bed mobility, and static standing along with active, functional use of left UE to assist in completing self care. Instruction in decreased compensatory use of right UE to mobilize left UE. Patient needed fluctuating assist with hand over hand assist, proximal support, and gravity eliminated support to use left UE for bathing. Instructed in nirmal technique for donning gown by actively lifting left UE enough to place gown under left UE with right UE. Participated in scapular pinches in standing with manual cues and CGA. Current Level of Function Impacting Discharge (ADLs): UE bathing with mod assist, LE bathing with  moderate assist and incorporation of left UE in bathing with assist, static standing exercise with CGS    Other factors to consider for discharge: long medical course         PLAN :  Patient continues to benefit from skilled intervention to address the above impairments. Continue treatment per established plan of care to address goals.     Recommend with staff: OOB or on EOB for self care and self feeding, assist for self care, toileting in bathroom with assist of 1    Recommend next OT session: POC, functional use of left UE    Recommendation for discharge: (in order for the patient to meet his/her long term goals)  Therapy 3 hours per day 5-7 days per week    This discharge recommendation:  Has been made in collaboration with the attending provider and/or case management    IF patient discharges home will need the following DME: shower chair       SUBJECTIVE:   Patient stated I'm sorry I didn't work with you this morning.     OBJECTIVE DATA SUMMARY:   Cognitive/Behavioral Status:  Neurologic State: Alert  Orientation Level: Oriented X4  Cognition: Decreased attention/concentration; Follows commands  Perception: Cues to attend to left side of body; Tactile;Verbal  Perseveration: No perseveration noted  Safety/Judgement: Awareness of environment    Functional Mobility and Transfers for ADLs:  Bed Mobility:  Supine to Sit: Contact guard assistance;Assist x1;Additional time  Sit to Supine: Supervision  Scooting: Minimum assistance (for scooting in supine)    Transfers:  Sit to Stand: Contact guard assistance;Assist x1          Balance:  Sitting: Intact; Without support  Sitting - Static: Good (unsupported)  Standing: Impaired; Without support  Standing - Static: Good  Standing - Dynamic : Fair    ADL Intervention:       Grooming  Position Performed: Long sitting on bed  Washing Face: Set-up    Upper Body Bathing  Bathing Assistance: Moderate assistance  Position Performed: Long sitting on bed  Cues: Physical assistance;Verbal cues provided    Lower Body Bathing  Lower Body : Moderate assistance  Position Performed: Long sitting on bed  Cues: Physical assistance;Verbal cues provided    Upper [address]: Moderate assistance; Compensatory technique training  Cues: Physical assistance; Tactile cues provided;Verbal cues provided              Cognitive Retraining  Organizing/Sequencing: Breaking task down  Attention to Task: Single task  Following Commands: Follows multi-step simple commands/directions  Safety/Judgement: Awareness of environment  Cues: Visual cues provided;Verbal cues provided; Tactile cues provided    Neuro Re education:   Functional use training for left UE, positioning of left UE without assist of right UE    Therapeutic exercise: scapular pinches x 1 set of 7 reps in standing with min manual cues and CGA      Activity Tolerance:   Fair    After treatment patient left in no apparent distress:   Call bell within reach and in bed with HOB elevated     COMMUNICATION/COLLABORATION:   The patients plan of care was discussed with: Physical therapist and Registered nurse.      ANTHONY Monahan  Time Calculation: 50 mins

## 2021-06-04 NOTE — PROGRESS NOTES
Attempted to downsize trach and pt asked to do it later today due to being tired. Will attempt to return to pt as schedule allows.

## 2021-06-04 NOTE — PROGRESS NOTES
Downsized trach to #4 Cuffless Shiley. Pt tolerated well, site looked good. Will continue to follow with capping trials.

## 2021-06-04 NOTE — PROGRESS NOTES
Problem: Mobility Impaired (Adult and Pediatric)  Goal: *Acute Goals and Plan of Care (Insert Text)  Description: FUNCTIONAL STATUS PRIOR TO ADMISSION: Patient was independent prior to March 2021. Pt has been hospitalized and recently at [facility]. Per mother, pt was ambulating with therapy at [facility]. HOME SUPPORT PRIOR TO ADMISSION: The patient lived alone prior to hospitalization. Physical Therapy Goals  Upgraded on 6/2/2021  1. Patient will move from supine to sit and sit to supine , scoot up and down, and roll side to side in bed with modified independence within 7 day(s). 2.  Patient will tolerate sitting up in chair three times daily for minimum of 30 minutes each session within 7 day(s). 3.  Patient will transfer from bed to chair and chair to bed with contact guard assistance using the least restrictive device within 7 day(s). 4.  Patient will perform sit to stand with contact guard assistance within 7 day(s). 5.  Patient will ambulate with contact guard assistance for 50 feet with the least restrictive device within 7 day(s). Revised 5/17/2021; goals revised as appropriate 5/24/21  1. Patient will move from supine to sit and sit to supine , scoot up and down, and roll side to side in bed with minimal assistance/contact guard assist within 7 day(s). MET 6/2  2. Patient will tolerate sitting EOB with contact guard assistance for approx 5 minutes within 7 day(s). MET 6/2  3. Patient will transfer from bed to chair and chair to bed with moderate assistance using the least restrictive device within 7 day(s). MET 6/2  4. Patient will perform sit to stand with moderate assistance within 7 day(s). MET 6/2  5. Patient will ambulate with maximal assistance for 5 feet with the least restrictive device within 7 day(s). MET 6/2    Initiated 5/9/2021  1.   Patient will move from supine to sit and sit to supine , scoot up and down, and roll side to side in bed with moderate assistance  within 7 day(s). GOAL MET 5/17/21  2. Patient will tolerate sitting EOB with moderate assistance for approx 3-5 minutes within 7 day(s). GOAL MET 5/17/21  3. Patient will transfer from bed to chair and chair to bed with maximal assistance using the least restrictive device within 7 day(s). 4.  Patient will perform sit to stand with maximal assistance within 7 day(s). 5.  Patient will ambulate with maximal assistance for 5 feet with the least restrictive device within 7 day(s). Outcome: Progressing Towards Goal   PHYSICAL THERAPY TREATMENT  Patient: Tad Dan (22 y.o. male)  Date: 6/4/2021  Diagnosis: Cardiac arrest [facility]) [I46.9] Cardiac arrest with pulseless electrical activity ([address]  Procedure(s) (LRB):  INSERT ICD DUAL (N/A)  Eval Icd Generator & Leads W Testing At Implant (N/A) 10 Days Post-Op  Precautions:  (Pacemaker precautions)  Chart, physical therapy assessment, plan of care and goals were reviewed. ASSESSMENT  Patient continues with skilled PT services and is progressing towards goals. Pt received in supine; reports poor sleep last night and general frustration with situation. Provided active listening and encouragement with good patient response. Reviewed rehab process and pt progress toward goals thus far, also benefit of IPR to facilitate continued motor recovery and increased function/ independence with mobility. Pt tolerated household distance gait training with L UE support and min A for balance due to narrow ABELINO, decreased motor control L LE, decreased stability with turning. Participated in balance training for dynamic standing with CGA/ min A while reaching for/ organizing objects on bedside table. Pt remains well below functional baseline with steady progress toward goals and good rehab potential. Will benefit from IPR with intensive multidisciplinary therapy and medical support to facilitate return to max level of functional independence.     Current Level of Function Impacting Discharge (mobility/balance): bed mob supervision, sit to stand CGA, amb min A, dynamic standing min A    Other factors to consider for discharge: complex medical course, trach         PLAN :  Patient continues to benefit from skilled intervention to address the above impairments. Continue treatment per established plan of care. to address goals. Recommendation for discharge: (in order for the patient to meet his/her long term goals)  Therapy 3 hours per day 5-7 days per week    This discharge recommendation:  Has been made in collaboration with the attending provider and/or case management    IF patient discharges home will need the following DME: to be determined (TBD)       SUBJECTIVE:   Patient stated I'm just tired, mentally and physically    OBJECTIVE DATA SUMMARY:   Critical Behavior:  Neurologic State: Alert  Orientation Level: Oriented X4  Cognition: Follows commands  Safety/Judgement: Awareness of environment  Functional Mobility Training:  Bed Mobility:     Supine to Sit: Supervision  Sit to Supine: Supervision  Scooting: Minimum assistance (for scooting in supine)        Transfers:  Sit to Stand: Contact guard assistance  Stand to Sit: Contact guard assistance                             Balance:  Sitting: Intact  Standing: Impaired  Standing - Static: Good  Standing - Dynamic : Fair  Ambulation/Gait Training:  Distance (ft): 50 Feet (ft) ( x 2)  Assistive Device: Gait belt (L UE support for GH joint protection)  Ambulation - Level of Assistance: Minimal assistance (for balance)        Gait Abnormalities: Decreased step clearance; Path deviations        Base of Support: Narrowed     Speed/Candice: Pace decreased (<100 feet/min)  Step Length: Left shortened;Right shortened  Swing Pattern: Left asymmetrical               Pain Rating:  No c/o pain during session    Activity Tolerance:   Fair; impacted by poor sleep last night    After treatment patient left in no apparent distress:   Call bell within reach, Bed / chair alarm activated, Side rails x 3, and sitting up in bed with HOB elevated    COMMUNICATION/COLLABORATION:   The patients plan of care was discussed with: Registered nurse.      Catalino Hartmann, PT   Time Calculation: 35 mins

## 2021-06-04 NOTE — PROGRESS NOTES
Bedside shift change report given to [address] [place] [address] (oncoming nurse) by Tea Hays RN (offgoing nurse). Report included the following information SBAR, Kardex, MAR and Recent Results.

## 2021-06-05 LAB
ANION GAP SERPL CALC-SCNC: 5 MMOL/L (ref 5–15)
BASOPHILS # BLD: 0.1 K/UL (ref 0–0.1)
BASOPHILS NFR BLD: 1 % (ref 0–1)
BUN SERPL-MCNC: 18 MG/DL (ref 6–20)
BUN/CREAT SERPL: 24 (ref 12–20)
CALCIUM SERPL-MCNC: 10.1 MG/DL (ref 8.5–10.1)
CHLORIDE SERPL-SCNC: 100 MMOL/L (ref 97–108)
CO2 SERPL-SCNC: 29 MMOL/L (ref 21–32)
CREAT SERPL-MCNC: 0.75 MG/DL (ref 0.7–1.3)
DIFFERENTIAL METHOD BLD: ABNORMAL
EOSINOPHIL # BLD: 0.6 K/UL (ref 0–0.4)
EOSINOPHIL NFR BLD: 7 % (ref 0–7)
ERYTHROCYTE [DISTWIDTH] IN BLOOD BY AUTOMATED COUNT: 15.5 % (ref 11.5–14.5)
GLUCOSE SERPL-MCNC: 111 MG/DL (ref 65–100)
HCT VFR BLD AUTO: 32.5 % (ref 36.6–50.3)
HGB BLD-MCNC: 10.2 G/DL (ref 12.1–17)
IMM GRANULOCYTES # BLD AUTO: 0 K/UL (ref 0–0.04)
IMM GRANULOCYTES NFR BLD AUTO: 0 % (ref 0–0.5)
LYMPHOCYTES # BLD: 1.3 K/UL (ref 0.8–3.5)
LYMPHOCYTES NFR BLD: 17 % (ref 12–49)
MCH RBC QN AUTO: 26.7 PG (ref 26–34)
MCHC RBC AUTO-ENTMCNC: 31.4 G/DL (ref 30–36.5)
MCV RBC AUTO: 85.1 FL (ref 80–99)
MONOCYTES # BLD: 0.9 K/UL (ref 0–1)
MONOCYTES NFR BLD: 12 % (ref 5–13)
NEUTS SEG # BLD: 5.1 K/UL (ref 1.8–8)
NEUTS SEG NFR BLD: 63 % (ref 32–75)
NRBC # BLD: 0 K/UL (ref 0–0.01)
NRBC BLD-RTO: 0 PER 100 WBC
PLATELET # BLD AUTO: 244 K/UL (ref 150–400)
PMV BLD AUTO: 10.5 FL (ref 8.9–12.9)
POTASSIUM SERPL-SCNC: 4.4 MMOL/L (ref 3.5–5.1)
RBC # BLD AUTO: 3.82 M/UL (ref 4.1–5.7)
SODIUM SERPL-SCNC: 134 MMOL/L (ref 136–145)
WBC # BLD AUTO: 8 K/UL (ref 4.1–11.1)

## 2021-06-05 PROCEDURE — 74011000250 HC RX REV CODE- 250: Performed by: INTERNAL MEDICINE

## 2021-06-05 PROCEDURE — 85025 COMPLETE CBC W/AUTO DIFF WBC: CPT

## 2021-06-05 PROCEDURE — 74011250637 HC RX REV CODE- 250/637: Performed by: INTERNAL MEDICINE

## 2021-06-05 PROCEDURE — 74011250636 HC RX REV CODE- 250/636: Performed by: HOSPITALIST

## 2021-06-05 PROCEDURE — 65660000000 HC RM CCU STEPDOWN

## 2021-06-05 PROCEDURE — 74011250637 HC RX REV CODE- 250/637: Performed by: HOSPITALIST

## 2021-06-05 PROCEDURE — 80048 BASIC METABOLIC PNL TOTAL CA: CPT

## 2021-06-05 PROCEDURE — 94760 N-INVAS EAR/PLS OXIMETRY 1: CPT

## 2021-06-05 PROCEDURE — 94640 AIRWAY INHALATION TREATMENT: CPT

## 2021-06-05 PROCEDURE — 74011250636 HC RX REV CODE- 250/636: Performed by: INTERNAL MEDICINE

## 2021-06-05 PROCEDURE — 36415 COLL VENOUS BLD VENIPUNCTURE: CPT

## 2021-06-05 RX ORDER — MIDODRINE HYDROCHLORIDE 2.5 MG/1
2.5 TABLET ORAL
Status: DISCONTINUED | OUTPATIENT
Start: 2021-06-05 | End: 2021-06-07

## 2021-06-05 RX ADMIN — SACUBITRIL AND VALSARTAN 1 TABLET: 24; 26 TABLET, FILM COATED ORAL at 09:47

## 2021-06-05 RX ADMIN — Medication 10 ML: at 23:00

## 2021-06-05 RX ADMIN — OXYCODONE HYDROCHLORIDE 7.5 MG: 5 TABLET ORAL at 09:47

## 2021-06-05 RX ADMIN — KETOROLAC TROMETHAMINE 15 MG: 30 INJECTION, SOLUTION INTRAMUSCULAR; INTRAVENOUS at 23:00

## 2021-06-05 RX ADMIN — Medication 10 ML: at 13:08

## 2021-06-05 RX ADMIN — Medication 10 ML: at 06:08

## 2021-06-05 RX ADMIN — SACUBITRIL AND VALSARTAN 1 TABLET: 24; 26 TABLET, FILM COATED ORAL at 22:46

## 2021-06-05 RX ADMIN — IVABRADINE 5 MG: 5 TABLET, FILM COATED ORAL at 18:46

## 2021-06-05 RX ADMIN — OXYCODONE HYDROCHLORIDE 7.5 MG: 5 TABLET ORAL at 22:46

## 2021-06-05 RX ADMIN — MIDODRINE HYDROCHLORIDE 2.5 MG: 2.5 TABLET ORAL at 09:47

## 2021-06-05 RX ADMIN — BUDESONIDE 500 MCG: 0.5 INHALANT RESPIRATORY (INHALATION) at 21:58

## 2021-06-05 RX ADMIN — Medication 3 MG: at 22:45

## 2021-06-05 RX ADMIN — HEPARIN SODIUM 5000 UNITS: 5000 INJECTION INTRAVENOUS; SUBCUTANEOUS at 22:45

## 2021-06-05 RX ADMIN — MIDODRINE HYDROCHLORIDE 2.5 MG: 2.5 TABLET ORAL at 13:08

## 2021-06-05 RX ADMIN — KETOROLAC TROMETHAMINE 15 MG: 30 INJECTION, SOLUTION INTRAMUSCULAR; INTRAVENOUS at 12:48

## 2021-06-05 RX ADMIN — Medication 1 CAPSULE: at 09:47

## 2021-06-05 RX ADMIN — LEVETIRACETAM 1500 MG: 500 TABLET ORAL at 18:46

## 2021-06-05 RX ADMIN — IVABRADINE 5 MG: 5 TABLET, FILM COATED ORAL at 09:48

## 2021-06-05 RX ADMIN — OXYCODONE HYDROCHLORIDE 7.5 MG: 5 TABLET ORAL at 18:46

## 2021-06-05 RX ADMIN — METOPROLOL SUCCINATE 12.5 MG: 25 TABLET, EXTENDED RELEASE ORAL at 09:47

## 2021-06-05 RX ADMIN — LEVETIRACETAM 1500 MG: 500 TABLET ORAL at 09:47

## 2021-06-05 RX ADMIN — MIDODRINE HYDROCHLORIDE 2.5 MG: 2.5 TABLET ORAL at 18:46

## 2021-06-05 RX ADMIN — CASTOR OIL AND BALSAM, PERU: 788; 87 OINTMENT TOPICAL at 22:46

## 2021-06-05 NOTE — PROGRESS NOTES
[facility] Adult  Hospitalist Group  Elmer Garcia MD          Admission summary and hospital course. Nelsy Guido is a 22 y.o. male with h/o Seizures and anxiety who presented to [facility] ED after a cardiac arrest at [facility] around 1500 5/6/2021. Two rounds of CPR and meds were given and patient was defibrillated x 1 before ROSC. Patient was hypoxic post arrest. Unknown down time prior to arrest.. Patient had recent hospitalization at the [facility] in March of this year. Initial hospitalization admission was at [facility] on 3/24 with AMS in setting of polysubstance and IVD use (cocaine, heroine, methamphetamines). He was found to have sepsis, MRSA bacteremia, MRSA endocarditis. Imaging and MRI also found R PCA infarct and several abscesses and right temporal and parietal lobes. Infarcts thought to be due to septic emboli and patient had left sided residual weakness. Patient was transferred to [facility] on 4/1/2021 for evaluation for valve surgery. After transfer he was found to have a New R MCA infarct also ,had a type 2 NSTEMI and tamponade requiring pericardiocentesis done prior to surgery. He did have cardiac arrest on day of surgery. He had had a bioprosthetic Aortic Valve Replacement and Aortic Root Abscess Debridement done on 4/16/2021. Trached and sent to [facility] on 4/29/2021. Was undergoing PT and vent weaning. Patient is transferred out of the ICU on 5/15. Subjective/HPI    Just waking up. He says he is tired,no new symptoms. SBP 88 now. Assessment and Plan:  V Fib Cardiac arrest - 5/6/21 on admission:   .  -Echo showed mild improvement  -Current cardiac regimen includes ivabradine, Toprol and Entresto. Spironolactone on hold.   -Vanco completed on 5/28  for endocarditis that was present on admission  -s/p ICD on 5/25   -Add dapagliflozin on dc as not on formulary as per Dr. Anthony Nath (4/30)    Systolic congestive heart failure/CM   -Recent Echo EF on 5/27 with LVEF of 25-30%   -Current GDMT includes Toprol, Entresto. Aldactone on hold  -Intermittent hypotension persisted,will add low dose midodrine to be able to continue cardiac medications       Acute on chronic  respiratory failure, resolved. Sine Mercury being downsized. Pneumothorax post ICD placement  -Trach collar in place, on room air  - on RA now for > 48 hours and maintaining airways without difficulty  - pulmonary following, plan to change trach size. Pulmonary following. ENT recommended downsizing and capping trial per respiratory therapist.  -Trach cannula down to 4 since 6/4    Bacterial pneumonia severe multilevel consolidation throughout both lungs consistent with severe multilobar PNA. -sputum culture E coli with ESBL   -Completed meropenem and vancomycin. ID were consulted and assisted with the management. MRSA bacteremia and aortic valve endocardititis, PTA.  -Status post bioprosthetic AVR and root abscess debridement at [facility] on 4/16/2021.   -Status post pericardiocentesis due to tamponade prior to surgery at [facility] as well.  -Per records from [facility], vancomycin was due to finish on 5/22/2021. ID here followed. Vancomycin stopped on 528/21. Pain management  -Contusion of chest wall, left side, chronic back pain . H/o drug abuse. Stop fentanyl , cont' oxycodone prn. Cautious with narcotics. -NSAIDs sparingly. 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 Infarcts were thought to be due to septic emboli. He had left sided residual weakness. cta 5/6/21:  multiple acute infarctions throughout the  right cerebral hemisphere, involving much of the occipital lobe, as well as much  of the frontal lobe. There is an additional superior right frontoparietal Infarct  Evaluated by neurology,  Cont' current anticonvulsants. No seizures on EEG.     Neurologist consulted                -PT/OT/SLP               - Stroke education              - SBP goal 100-160              Seizure DZ:  Continue keppra -1500 mg BID, valproate  discontinued. Severe muscle deconditioning  Severe protein-calorie malnutrition   Cerebral abscess (embolic)   H/o substance abuse    CODE STATUS: Full  DVT prophylaxis: SCDs, heparin  Disposition: He is a great candidate for inpatient rehab after downsizing tracheostomy and/or decannulation. PHYSICAL EXAMINATION:  Visit Vitals  BP (!) 88/49 (BP 1 Location: Right arm, BP Patient Position: At rest)   Pulse 79   Temp 98.6 °F (37 °C)   Resp 18   Ht 5' 11\" (1.803 m)   Wt 55.3 kg (122 lb)   SpO2 95%   BMI 17.02 kg/m²       General:   Pleasant gentleman, cachectic. Not in distress  HEENT:            No pallor or jaundice. Neck:                Tracheostomy tube on the anterior neck, no discharge. Lungs: No wheezing. Midline sternal surgical scar, no crackles. Tender left chest wall. Heart:              Regular  rhythm,normal rate   No edema  Abdomen:       Soft, non-tender. Not distended. Bowel sounds normal  Extremities:     No LE edema  Skin:                Not pale. Not Jaundiced  No rashes         Neurologic:      Alert, oriented X 3. Left upper extremity weakness 4 out of 5. Left lower extremity 5-/5      Labs:     No results for input(s): WBC, HGB, HCT, PLT, HGBEXT, HCTEXT, PLTEXT, HGBEXT, HCTEXT, PLTEXT in the last 72 hours. No results for input(s): NA, K, CL, CO2, BUN, CREA, GLU, CA, MG, PHOS, URICA in the last 72 hours. No results for input(s): ALT, AP, TBIL, TBILI, TP, ALB, GLOB, GGT, AML, LPSE in the last 72 hours. No lab exists for component: SGOT, GPT, AMYP, HLPSE  No results for input(s): INR, PTP, APTT, INREXT, INREXT in the last 72 hours. No results for input(s): FE, TIBC, PSAT, FERR in the last 72 hours. No results found for: FOL, RBCF   No results for input(s): PH, PCO2, PO2 in the last 72 hours.   No results for input(s): CPK, CKNDX, TROIQ in the last 72 hours. No lab exists for component: CPKMB  Lab Results   Component Value Date/Time    Cholesterol, total 140 05/11/2021 04:35 AM    HDL Cholesterol 21 05/11/2021 04:35 AM    LDL, calculated 77.4 05/11/2021 04:35 AM    Triglyceride 208 (H) 05/11/2021 04:35 AM    CHOL/HDL Ratio 6.7 (H) 05/11/2021 04:35 AM     Lab Results   Component Value Date/Time    Glucose (POC) 120 (H) 05/11/2021 12:00 PM    Glucose (POC) 100 05/11/2021 06:04 AM    Glucose (POC) 87 05/11/2021 12:19 AM    Glucose (POC) 83 05/10/2021 06:53 PM    Glucose (POC) 84 05/10/2021 02:07 PM     Lab Results   Component Value Date/Time    Color YELLOW/STRAW 05/06/2021 05:42 PM    Appearance CLEAR 05/06/2021 05:42 PM    Specific gravity 1.008 05/06/2021 05:42 PM    Specific gravity 1.020 03/24/2021 09:30 PM    pH (UA) 5.0 05/06/2021 05:42 PM    Protein 30 (A) 05/06/2021 05:42 PM    Glucose Negative 05/06/2021 05:42 PM    Ketone Negative 05/06/2021 05:42 PM    Bilirubin Negative 05/06/2021 05:42 PM    Urobilinogen 0.2 05/06/2021 05:42 PM    Nitrites Negative 05/06/2021 05:42 PM    Leukocyte Esterase Negative 05/06/2021 05:42 PM    Epithelial cells FEW 05/06/2021 05:42 PM    Bacteria Negative 05/06/2021 05:42 PM    WBC 0-4 05/06/2021 05:42 PM    RBC 0-5 05/06/2021 05:42 PM         Signed:    Luz Freedman MD

## 2021-06-05 NOTE — ROUTINE PROCESS
Bedside shift change report given to Fariha Hernandez (oncoming nurse) by Otto Saenz (offgoing nurse). Report included the following information SBAR, Kardex, Intake/Output, MAR and Recent Results.

## 2021-06-05 NOTE — PROGRESS NOTES
Problem: Falls - Risk of  Goal: *Absence of Falls  Description: Document [place] Fall Risk and appropriate interventions in the flowsheet.   Outcome: Progressing Towards Goal  Note: Fall Risk Interventions:  Mobility Interventions: Communicate number of staff needed for ambulation/transfer, Patient to call before getting OOB    Mentation Interventions: Adequate sleep, hydration, pain control, Evaluate medications/consider consulting pharmacy    Medication Interventions: Patient to call before getting OOB    Elimination Interventions: Bed/chair exit alarm, Call light in reach              Problem: Patient Education: Go to Patient Education Activity  Goal: Patient/Family Education  Outcome: Progressing Towards Goal

## 2021-06-06 PROCEDURE — 74011250636 HC RX REV CODE- 250/636: Performed by: HOSPITALIST

## 2021-06-06 PROCEDURE — 74011250637 HC RX REV CODE- 250/637: Performed by: INTERNAL MEDICINE

## 2021-06-06 PROCEDURE — 74011250637 HC RX REV CODE- 250/637: Performed by: HOSPITALIST

## 2021-06-06 PROCEDURE — 65660000000 HC RM CCU STEPDOWN

## 2021-06-06 RX ADMIN — SACUBITRIL AND VALSARTAN 1 TABLET: 24; 26 TABLET, FILM COATED ORAL at 23:03

## 2021-06-06 RX ADMIN — METOPROLOL SUCCINATE 12.5 MG: 25 TABLET, EXTENDED RELEASE ORAL at 09:55

## 2021-06-06 RX ADMIN — SACUBITRIL AND VALSARTAN 1 TABLET: 24; 26 TABLET, FILM COATED ORAL at 09:55

## 2021-06-06 RX ADMIN — MIDODRINE HYDROCHLORIDE 2.5 MG: 2.5 TABLET ORAL at 09:55

## 2021-06-06 RX ADMIN — KETOROLAC TROMETHAMINE 15 MG: 30 INJECTION, SOLUTION INTRAMUSCULAR; INTRAVENOUS at 09:54

## 2021-06-06 RX ADMIN — Medication 1 CAPSULE: at 09:56

## 2021-06-06 RX ADMIN — OXYCODONE HYDROCHLORIDE 7.5 MG: 5 TABLET ORAL at 17:33

## 2021-06-06 RX ADMIN — Medication 3 MG: at 23:03

## 2021-06-06 RX ADMIN — MIDODRINE HYDROCHLORIDE 2.5 MG: 2.5 TABLET ORAL at 13:18

## 2021-06-06 RX ADMIN — Medication 10 ML: at 13:23

## 2021-06-06 RX ADMIN — Medication 10 ML: at 23:12

## 2021-06-06 RX ADMIN — IVABRADINE 5 MG: 5 TABLET, FILM COATED ORAL at 09:54

## 2021-06-06 RX ADMIN — LEVETIRACETAM 1500 MG: 500 TABLET ORAL at 17:33

## 2021-06-06 RX ADMIN — OXYCODONE HYDROCHLORIDE 7.5 MG: 5 TABLET ORAL at 23:03

## 2021-06-06 RX ADMIN — CASTOR OIL AND BALSAM, PERU: 788; 87 OINTMENT TOPICAL at 23:12

## 2021-06-06 RX ADMIN — MIDODRINE HYDROCHLORIDE 2.5 MG: 2.5 TABLET ORAL at 17:33

## 2021-06-06 RX ADMIN — LEVETIRACETAM 1500 MG: 500 TABLET ORAL at 09:56

## 2021-06-06 RX ADMIN — IVABRADINE 5 MG: 5 TABLET, FILM COATED ORAL at 17:33

## 2021-06-06 RX ADMIN — OXYCODONE HYDROCHLORIDE 7.5 MG: 5 TABLET ORAL at 09:54

## 2021-06-06 RX ADMIN — Medication 10 ML: at 13:18

## 2021-06-06 NOTE — PROGRESS NOTES
Problem: Falls - Risk of  Goal: *Absence of Falls  Description: Document Travis Hele Fall Risk and appropriate interventions in the flowsheet. Outcome: Progressing Towards Goal  Note: Fall Risk Interventions:  Mobility Interventions: Communicate number of staff needed for ambulation/transfer, Patient to call before getting OOB    Mentation Interventions: Adequate sleep, hydration, pain control, Evaluate medications/consider consulting pharmacy    Medication Interventions: Patient to call before getting OOB    Elimination Interventions: Bed/chair exit alarm, Call light in reach              Problem: Patient Education: Go to Patient Education Activity  Goal: Patient/Family Education  Outcome: Progressing Towards Goal     Problem: Risk for Spread of Infection  Goal: Prevent transmission of infectious organism to others  Description: Prevent the transmission of infectious organisms to other patients, staff members, and visitors. Outcome: Progressing Towards Goal     Problem: Patient Education:  Go to Education Activity  Goal: Patient/Family Education  Outcome: Progressing Towards Goal     Problem: Breathing Pattern - Ineffective  Goal: *Absence of hypoxia  Outcome: Progressing Towards Goal  Goal: *Use of effective breathing techniques  Outcome: Progressing Towards Goal  Goal: *PALLIATIVE CARE:  Alleviation of Dyspnea  Outcome: Progressing Towards Goal     Problem: Patient Education: Go to Patient Education Activity  Goal: Patient/Family Education  Outcome: Progressing Towards Goal     Problem: Pressure Injury - Risk of  Goal: *Prevention of pressure injury  Description: Document Salvador Scale and appropriate interventions in the flowsheet.   Outcome: Progressing Towards Goal  Note: Pressure Injury Interventions:  Sensory Interventions: Assess changes in LOC    Moisture Interventions: Absorbent underpads    Activity Interventions: Increase time out of bed    Mobility Interventions: HOB 30 degrees or less    Nutrition Interventions: Document food/fluid/supplement intake    Friction and Shear Interventions: HOB 30 degrees or less                Problem: Patient Education: Go to Patient Education Activity  Goal: Patient/Family Education  Outcome: Progressing Towards Goal     Problem: Patient Education: Go to Patient Education Activity  Goal: Patient/Family Education  Outcome: Progressing Towards Goal     Problem: Patient Education: Go to Patient Education Activity  Goal: Patient/Family Education  Outcome: Progressing Towards Goal     Problem: Airway Clearance - Ineffective  Goal: *Patent airway  Outcome: Progressing Towards Goal  Goal: *Absence of airway secretions  Outcome: Progressing Towards Goal  Goal: *Able to cough effectively  Outcome: Progressing Towards Goal  Goal: *PALLIATIVE CARE:  Alleviation of secretions, cough and/or nasal congestion  Outcome: Progressing Towards Goal     Problem: Patient Education: Go to Patient Education Activity  Goal: Patient/Family Education  Outcome: Progressing Towards Goal     Problem: Pain  Goal: *Control of Pain  Outcome: Progressing Towards Goal  Goal: *PALLIATIVE CARE:  Alleviation of Pain  Outcome: Progressing Towards Goal     Problem: Patient Education: Go to Patient Education Activity  Goal: Patient/Family Education  Outcome: Progressing Towards Goal     Problem: Tobacco Use  Goal: *Tobacco use abstinence  Outcome: Progressing Towards Goal  Goal: *Knowledge of tobacco use cessation methods  Outcome: Progressing Towards Goal     Problem: Patient Education: Go to Patient Education Activity  Goal: Patient/Family Education  Outcome: Progressing Towards Goal     Problem: General Wound Care  Goal: *Non-infected wound: Improvement of existing wound, absence of infection, and maintenance of skin integrity  Outcome: Progressing Towards Goal  Goal: *Infected Wound: Prevention of further infection and promotion of healing  Description: Infection control procedures (eg: clean dressings, clean gloves, hand washing, precautions to isolate wound from contamination, sterile instruments used for wound debridement) should be implemented.   Outcome: Progressing Towards Goal  Goal: Interventions  Outcome: Progressing Towards Goal     Problem: Patient Education: Go to Patient Education Activity  Goal: Patient/Family Education  Outcome: Progressing Towards Goal     Problem: Anxiety  Goal: *Alleviation of anxiety  Outcome: Progressing Towards Goal  Goal: *Alleviation of anxiety (Palliative Care)  Outcome: Progressing Towards Goal     Problem: Patient Education: Go to Patient Education Activity  Goal: Patient/Family Education  Outcome: Progressing Towards Goal

## 2021-06-06 NOTE — PROGRESS NOTES
Problem: Falls - Risk of  Goal: *Absence of Falls  Description: Document Darwin [place] Fall Risk and appropriate interventions in the flowsheet.   Outcome: Progressing Towards Goal  Note: Fall Risk Interventions:  Mobility Interventions: Bed/chair exit alarm, Communicate number of staff needed for ambulation/transfer    Mentation Interventions: Adequate sleep, hydration, pain control, Bed/chair exit alarm    Medication Interventions: Bed/chair exit alarm, Patient to call before getting OOB    Elimination Interventions: Bed/chair exit alarm, Call light in reach, Toileting schedule/hourly rounds              Problem: Patient Education: Go to Patient Education Activity  Goal: Patient/Family Education  Outcome: Progressing Towards Goal

## 2021-06-06 NOTE — PROGRESS NOTES
Bedside shift change report given to MAJO (oncoming nurse) by Pat Oswald (offgoing nurse). Report included the following information SBAR, Kardex and MAR.

## 2021-06-06 NOTE — ROUTINE PROCESS
Bedside shift change report given to Rosario Barr (oncoming nurse) by Johnny Maurer (offgoing nurse). Report included the following information SBAR, Kardex, Intake/Output, MAR and Recent Results.

## 2021-06-06 NOTE — PROGRESS NOTES
[facility]  Francesca Noble MD          Admission summary and hospital course. Juana Cox is a 22 y.o. male with h/o Seizures and anxiety who presented to [facility] ED after a cardiac arrest at [facility] around 1500 5/6/2021. Two rounds of CPR and meds were given and patient was defibrillated x 1 before ROSC. Patient was hypoxic post arrest. Unknown down time prior to arrest.. Patient had recent hospitalization at the [facility] in March of this year. Initial hospitalization admission was at [facility] on 3/24 with AMS in setting of polysubstance and IVD use (cocaine, heroine, methamphetamines). He was found to have sepsis, MRSA bacteremia, MRSA endocarditis. Imaging and MRI also found R PCA infarct and several abscesses and right temporal and parietal lobes. Infarcts thought to be due to septic emboli and patient had left sided residual weakness. Patient was transferred to [facility] on 4/1/2021 for evaluation for valve surgery. After transfer he was found to have a New R MCA infarct also ,had a type 2 NSTEMI and tamponade requiring pericardiocentesis done prior to surgery. He did have cardiac arrest on day of surgery. He had had a bioprosthetic Aortic Valve Replacement and Aortic Root Abscess Debridement done on 4/16/2021. Trached and sent to [facility] on 4/29/2021. Was undergoing PT and vent weaning. Patient is transferred out of the ICU on 5/15. Subjective/HPI    Patient has no complaints. Tracheostomy tube downsized to 4 on Friday, he is doing well without any respiratory difficulty. Assessment and Plan:  V Fib Cardiac arrest - 5/6/21 on admission:   .  -Echo showed mild improvement  -Current cardiac regimen includes ivabradine, Toprol and Entresto. Spironolactone on hold.   -Vanco completed on 5/28  for endocarditis that was present on admission  -s/p ICD on 5/25   -Add dapagliflozin on dc as not on formulary as per Dr. Lawanda Person (1/69)    Systolic congestive heart failure/CM   -Recent Echo EF on 5/27 with LVEF of 25-30%   -Current GDMT includes Toprol, Entresto. Aldactone on hold  -Intermittent hypotension persisted,will add low dose midodrine to be able to continue cardiac medications       Acute on chronic  respiratory failure, resolved. [facility] being downsized. Pneumothorax post ICD placement  -Trach collar in place, on room air  - on RA now for > 48 hours and maintaining airways without difficulty  - pulmonary following, plan to change trach size. Pulmonary following. ENT recommended downsizing and capping trial per respiratory therapist.  -Trach cannula down to 4 since 6/4, could possibly decannulate on Monday    Bacterial pneumonia severe multilevel consolidation throughout both lungs consistent with severe multilobar PNA. -sputum culture E coli with ESBL   -Completed meropenem and vancomycin. ID were consulted and assisted with the management. MRSA bacteremia and aortic valve endocardititis, PTA.  -Status post bioprosthetic AVR and root abscess debridement at [facility] on 4/16/2021.   -Status post pericardiocentesis due to tamponade prior to surgery at [facility] as well.  -Per records from [address] [place] [address], vancomycin was due to finish on 5/22/2021. ID here followed. Vancomycin stopped on 528/21. Pain management  -Contusion of chest wall, left side, chronic back pain . H/o drug abuse. Stop fentanyl , cont' oxycodone prn. Cautious with narcotics. -NSAIDs sparingly. 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 Infarcts were thought to be due to septic emboli. He had left sided residual weakness. cta 5/6/21:  multiple acute infarctions throughout the  right cerebral hemisphere, involving much of the occipital lobe, as well as much  of the frontal lobe. There is an additional superior right frontoparietal Infarct  Evaluated by neurology,  Cont' current anticonvulsants.   No seizures on EEG. Neurologist consulted                -PT/OT/SLP               - Stroke education              - SBP goal 100-160              Seizure DZ:  Continue keppra -1500 mg BID, valproate  discontinued. Severe muscle deconditioning  Severe protein-calorie malnutrition   Cerebral abscess (embolic)   H/o substance abuse    CODE STATUS: Full  DVT prophylaxis: SCDs, heparin  Disposition: He is a great candidate for inpatient rehab after downsizing tracheostomy and/or decannulation. PHYSICAL EXAMINATION:  Visit Vitals  BP (!) 92/57 (BP 1 Location: Right upper arm, BP Patient Position: At rest)   Pulse 61   Temp 97.6 °F (36.4 °C)   Resp 18   Ht 5' 11\" (1.803 m)   Wt 55.3 kg (122 lb)   SpO2 96%   BMI 17.02 kg/m²       General:   Pleasant gentleman, cachectic. Not in distress  HEENT:            No pallor or jaundice. Neck:                Tracheostomy tube on the anterior neck, no discharge. Lungs: No wheezing. Midline sternal surgical scar, no crackles. Tender left chest wall. Heart:              Regular  rhythm,normal rate   No edema  Abdomen:       Soft, non-tender. Not distended. Bowel sounds normal  Extremities:     No LE edema  Skin:                Not pale. Not Jaundiced  No rashes         Neurologic:      Alert, oriented X 3. Left upper extremity weakness 4 out of 5. Left lower extremity 5-/5      Labs:     Recent Labs     06/05/21  1330   WBC 8.0   HGB 10.2*   HCT 32.5*        Recent Labs     06/05/21  1330   *   K 4.4      CO2 29   BUN 18   CREA 0.75   *   CA 10.1     No results for input(s): ALT, AP, TBIL, TBILI, TP, ALB, GLOB, GGT, AML, LPSE in the last 72 hours. No lab exists for component: SGOT, GPT, AMYP, HLPSE  No results for input(s): INR, PTP, APTT, INREXT, INREXT in the last 72 hours. No results for input(s): FE, TIBC, PSAT, FERR in the last 72 hours.    No results found for: FOL, RBCF   No results for input(s): PH, PCO2, PO2 in the last 72 hours. No results for input(s): CPK, CKNDX, TROIQ in the last 72 hours. No lab exists for component: CPKMB  Lab Results   Component Value Date/Time    Cholesterol, total 140 05/11/2021 04:35 AM    HDL Cholesterol 21 05/11/2021 04:35 AM    LDL, calculated 77.4 05/11/2021 04:35 AM    Triglyceride 208 (H) 05/11/2021 04:35 AM    CHOL/HDL Ratio 6.7 (H) 05/11/2021 04:35 AM     Lab Results   Component Value Date/Time    Glucose (POC) 120 (H) 05/11/2021 12:00 PM    Glucose (POC) 100 05/11/2021 06:04 AM    Glucose (POC) 87 05/11/2021 12:19 AM    Glucose (POC) 83 05/10/2021 06:53 PM    Glucose (POC) 84 05/10/2021 02:07 PM     Lab Results   Component Value Date/Time    Color YELLOW/STRAW 05/06/2021 05:42 PM    Appearance CLEAR 05/06/2021 05:42 PM    Specific gravity 1.008 05/06/2021 05:42 PM    Specific gravity 1.020 03/24/2021 09:30 PM    pH (UA) 5.0 05/06/2021 05:42 PM    Protein 30 (A) 05/06/2021 05:42 PM    Glucose Negative 05/06/2021 05:42 PM    Ketone Negative 05/06/2021 05:42 PM    Bilirubin Negative 05/06/2021 05:42 PM    Urobilinogen 0.2 05/06/2021 05:42 PM    Nitrites Negative 05/06/2021 05:42 PM    Leukocyte Esterase Negative 05/06/2021 05:42 PM    Epithelial cells FEW 05/06/2021 05:42 PM    Bacteria Negative 05/06/2021 05:42 PM    WBC 0-4 05/06/2021 05:42 PM    RBC 0-5 05/06/2021 05:42 PM         Signed:    Drew Moess MD

## 2021-06-07 ENCOUNTER — APPOINTMENT (OUTPATIENT)
Dept: GENERAL RADIOLOGY | Age: 26
DRG: 161 | End: 2021-06-07
Attending: HOSPITALIST
Payer: MEDICAID

## 2021-06-07 PROCEDURE — 74011250637 HC RX REV CODE- 250/637: Performed by: HOSPITALIST

## 2021-06-07 PROCEDURE — 65660000000 HC RM CCU STEPDOWN

## 2021-06-07 PROCEDURE — 74011250637 HC RX REV CODE- 250/637: Performed by: INTERNAL MEDICINE

## 2021-06-07 PROCEDURE — 97530 THERAPEUTIC ACTIVITIES: CPT

## 2021-06-07 PROCEDURE — 71045 X-RAY EXAM CHEST 1 VIEW: CPT

## 2021-06-07 PROCEDURE — 94760 N-INVAS EAR/PLS OXIMETRY 1: CPT

## 2021-06-07 PROCEDURE — 74011250636 HC RX REV CODE- 250/636: Performed by: HOSPITALIST

## 2021-06-07 PROCEDURE — 97116 GAIT TRAINING THERAPY: CPT

## 2021-06-07 RX ORDER — MIDODRINE HYDROCHLORIDE 5 MG/1
5 TABLET ORAL
Status: DISCONTINUED | OUTPATIENT
Start: 2021-06-07 | End: 2021-06-08 | Stop reason: HOSPADM

## 2021-06-07 RX ADMIN — LEVETIRACETAM 1500 MG: 500 TABLET ORAL at 22:03

## 2021-06-07 RX ADMIN — OXYCODONE HYDROCHLORIDE 7.5 MG: 5 TABLET ORAL at 12:51

## 2021-06-07 RX ADMIN — Medication 10 ML: at 07:13

## 2021-06-07 RX ADMIN — MIDODRINE HYDROCHLORIDE 5 MG: 5 TABLET ORAL at 22:03

## 2021-06-07 RX ADMIN — LEVETIRACETAM 1500 MG: 500 TABLET ORAL at 09:26

## 2021-06-07 RX ADMIN — IVABRADINE 5 MG: 5 TABLET, FILM COATED ORAL at 09:26

## 2021-06-07 RX ADMIN — Medication 1 CAPSULE: at 09:26

## 2021-06-07 RX ADMIN — Medication 3 MG: at 22:22

## 2021-06-07 RX ADMIN — KETOROLAC TROMETHAMINE 15 MG: 30 INJECTION, SOLUTION INTRAMUSCULAR; INTRAVENOUS at 07:13

## 2021-06-07 RX ADMIN — MIDODRINE HYDROCHLORIDE 2.5 MG: 2.5 TABLET ORAL at 07:13

## 2021-06-07 RX ADMIN — METOPROLOL SUCCINATE 12.5 MG: 25 TABLET, EXTENDED RELEASE ORAL at 09:35

## 2021-06-07 RX ADMIN — MIDODRINE HYDROCHLORIDE 5 MG: 5 TABLET ORAL at 12:51

## 2021-06-07 RX ADMIN — Medication 10 ML: at 22:16

## 2021-06-07 RX ADMIN — OXYCODONE HYDROCHLORIDE 7.5 MG: 5 TABLET ORAL at 07:13

## 2021-06-07 RX ADMIN — MICONAZOLE NITRATE: 20 CREAM TOPICAL at 22:13

## 2021-06-07 RX ADMIN — IVABRADINE 5 MG: 5 TABLET, FILM COATED ORAL at 22:03

## 2021-06-07 RX ADMIN — SACUBITRIL AND VALSARTAN 1 TABLET: 24; 26 TABLET, FILM COATED ORAL at 09:26

## 2021-06-07 RX ADMIN — SACUBITRIL AND VALSARTAN 1 TABLET: 24; 26 TABLET, FILM COATED ORAL at 22:22

## 2021-06-07 NOTE — PROGRESS NOTES
Problem: Mobility Impaired (Adult and Pediatric)  Goal: *Acute Goals and Plan of Care (Insert Text)  Description: FUNCTIONAL STATUS PRIOR TO ADMISSION: Patient was independent prior to March 2021. Pt has been hospitalized and recently at [facility]. Per mother, pt was ambulating with therapy at [facility]. HOME SUPPORT PRIOR TO ADMISSION: The patient lived alone prior to hospitalization. Physical Therapy Goals  Upgraded on 6/2/2021  1. Patient will move from supine to sit and sit to supine , scoot up and down, and roll side to side in bed with modified independence within 7 day(s). 2.  Patient will tolerate sitting up in chair three times daily for minimum of 30 minutes each session within 7 day(s). 3.  Patient will transfer from bed to chair and chair to bed with contact guard assistance using the least restrictive device within 7 day(s). 4.  Patient will perform sit to stand with contact guard assistance within 7 day(s). 5.  Patient will ambulate with contact guard assistance for 50 feet with the least restrictive device within 7 day(s). Revised 5/17/2021; goals revised as appropriate 5/24/21  1. Patient will move from supine to sit and sit to supine , scoot up and down, and roll side to side in bed with minimal assistance/contact guard assist within 7 day(s). MET 6/2  2. Patient will tolerate sitting EOB with contact guard assistance for approx 5 minutes within 7 day(s). MET 6/2  3. Patient will transfer from bed to chair and chair to bed with moderate assistance using the least restrictive device within 7 day(s). MET 6/2  4. Patient will perform sit to stand with moderate assistance within 7 day(s). MET 6/2  5. Patient will ambulate with maximal assistance for 5 feet with the least restrictive device within 7 day(s). MET 6/2    Initiated 5/9/2021  1.   Patient will move from supine to sit and sit to supine , scoot up and down, and roll side to side in bed with moderate assistance  within 7 day(s). GOAL MET 5/17/21  2. Patient will tolerate sitting EOB with moderate assistance for approx 3-5 minutes within 7 day(s). GOAL MET 5/17/21  3. Patient will transfer from bed to chair and chair to bed with maximal assistance using the least restrictive device within 7 day(s). 4.  Patient will perform sit to stand with maximal assistance within 7 day(s). 5.  Patient will ambulate with maximal assistance for 5 feet with the least restrictive device within 7 day(s). Outcome: Progressing Towards Goal    PHYSICAL THERAPY TREATMENT  Patient: Heather Rendon (84 y.o. male)  Date: 6/7/2021  Diagnosis: Cardiac arrest [facility]) [I46.9] Cardiac arrest with pulseless electrical activity ([facility] [address]  Procedure(s) (LRB):  INSERT ICD DUAL (N/A)  Eval Icd Generator & Leads W Testing At Implant (N/A) 13 Days Post-Op  Precautions:  (Pacemaker precautions)  Chart, physical therapy assessment, plan of care and goals were reviewed. ASSESSMENT  Patient continues with skilled PT services and is progressing towards goals with goal-directed interventions and discussion. Patient participated in gait training trials x 60 ft, twice, with incorporation of vertical head turns, 360* turns, and maintainable conversation. Patient required upmost minimal physical assistance for balance, foot placement correction 2* frequent tandem ABELINO with intermittent scissoring, and for decreased L LE motor control. During bed mobility and functional transfers, patient required initial review for L UE awareness in order to prevent mal-positioning. Patient remains well below his functional baseline with steady progress toward goals and good rehab potential. Based on patient's co-morbidities and extensive medical course, current fall-risk, L hemiparesis, impaired active ROM, strength, endurance, and balance, recommend intensive rehab.  Patient would benefit from the high intensity and repetition of tasks provided in this setting for neuroplastic-recovery. Patient would also benefit from continued interdisciplinary care (PT, OT, SLP) and the use of a Zero G, split-belt treadmill, Bioness, and Armeo in order to facilitate return to maximum level of functional independence. Current Level of Function Impacting Discharge (mobility/balance): Bed mob contact guard to supervision, sit to stand CGA, amb min A, dynamic standing min A     Other factors to consider for discharge: Complex medical course, trach (may de-cannulate)            PLAN :  Patient continues to benefit from skilled intervention to address the above impairments. Continue treatment per established plan of care. to address goals. Recommendation for discharge: (in order for the patient to meet his/her long term goals)  Therapy 3 hours per day 5-7 days per week    This discharge recommendation:  Has been made in collaboration with the attending provider and/or case management    IF patient discharges home will need the following DME: to be determined (TBD)       SUBJECTIVE:   Patient stated I am tired, but I will try again.     OBJECTIVE DATA SUMMARY:   Critical Behavior:  Neurologic State: Alert  Orientation Level: Oriented X4  Cognition: Follows commands  Safety/Judgement: Awareness of environment  Functional Mobility Training:  Bed Mobility:     Supine to Sit: Assist x1;Contact guard assistance; Additional time (Primarily for L UE awareness)  Sit to Supine: Contact guard assistance;Assist x1;Additional time  Scooting: Assist x1;Contact guard assistance        Transfers:  Sit to Stand: Assist x1;Contact guard assistance  Stand to Sit: Assist x1;Contact guard assistance    Balance:  Sitting: Intact; Without support  Sitting - Static: Good (unsupported)  Sitting - Dynamic: Good (unsupported)  Standing: Impaired; Without support  Standing - Static: Good (Following alteration of ABELINO)  Standing - Dynamic : Fair (near-tandem foot placement with interm.  scissoring)  Ambulation/Gait Training:  Distance (ft): 60 Feet (ft) (x 2)  Assistive Device: Gait belt (L UE support for [address] protection)  Ambulation - Level of Assistance: Assist x1;Minimal assistance (For balance )        Gait Abnormalities: Altered arm swing;Decreased step clearance; Path deviations;Scissoring        Base of Support: Narrowed  Stance: Left decreased  Speed/Candice: Pace decreased (<100 feet/min)  Step Length: Left shortened;Right shortened  Swing Pattern: Left asymmetrical    Pain Rating:  Trach cannula discomfort up to 9/10 - RN aware of same    Activity Tolerance:   Fair and requires rest breaks    After treatment patient left in no apparent distress:   Supine in bed, Call bell within reach, Bed / chair alarm activated, and Side rails x 3    COMMUNICATION/COLLABORATION:   The patients plan of care was discussed with: Registered nurse.      Amber Dolan PT, DPT   Time Calculation: 28 mins

## 2021-06-07 NOTE — PROGRESS NOTES
Problem: Patient Education: Go to Patient Education Activity  Goal: Patient/Family Education  Outcome: Progressing Towards Goal     Problem: Risk for Spread of Infection  Goal: Prevent transmission of infectious organism to others  Description: Prevent the transmission of infectious organisms to other patients, staff members, and visitors. Outcome: Progressing Towards Goal     Problem: Patient Education:  Go to Education Activity  Goal: Patient/Family Education  Outcome: Progressing Towards Goal     Problem: Breathing Pattern - Ineffective  Goal: *Absence of hypoxia  Outcome: Progressing Towards Goal  Goal: *Use of effective breathing techniques  Outcome: Progressing Towards Goal  Goal: *PALLIATIVE CARE:  Alleviation of Dyspnea  Outcome: Progressing Towards Goal     Problem: Patient Education: Go to Patient Education Activity  Goal: Patient/Family Education  Outcome: Progressing Towards Goal     Problem: Pressure Injury - Risk of  Goal: *Prevention of pressure injury  Description: Document Salvador Scale and appropriate interventions in the flowsheet.   Outcome: Progressing Towards Goal  Note: Pressure Injury Interventions:  Sensory Interventions: Float heels, Keep linens dry and wrinkle-free    Moisture Interventions: Absorbent underpads    Activity Interventions: Pressure redistribution bed/mattress(bed type)    Mobility Interventions: HOB 30 degrees or less    Nutrition Interventions: Document food/fluid/supplement intake    Friction and Shear Interventions: HOB 30 degrees or less                Problem: Patient Education: Go to Patient Education Activity  Goal: Patient/Family Education  Outcome: Progressing Towards Goal     Problem: Patient Education: Go to Patient Education Activity  Goal: Patient/Family Education  Outcome: Progressing Towards Goal     Problem: Patient Education: Go to Patient Education Activity  Goal: Patient/Family Education  Outcome: Progressing Towards Goal     Problem: Nutrition Deficit  Goal: *Optimize nutritional status  Outcome: Progressing Towards Goal     Problem: Airway Clearance - Ineffective  Goal: *Patent airway  Outcome: Progressing Towards Goal  Goal: *Absence of airway secretions  Outcome: Progressing Towards Goal  Goal: *Able to cough effectively  Outcome: Progressing Towards Goal  Goal: *PALLIATIVE CARE:  Alleviation of secretions, cough and/or nasal congestion  Outcome: Progressing Towards Goal     Problem: Patient Education: Go to Patient Education Activity  Goal: Patient/Family Education  Outcome: Progressing Towards Goal     Problem: Pain  Goal: *Control of Pain  Outcome: Progressing Towards Goal  Goal: *PALLIATIVE CARE:  Alleviation of Pain  Outcome: Progressing Towards Goal     Problem: Patient Education: Go to Patient Education Activity  Goal: Patient/Family Education  Outcome: Progressing Towards Goal     Problem: Tobacco Use  Goal: *Tobacco use abstinence  Outcome: Progressing Towards Goal  Goal: *Knowledge of tobacco use cessation methods  Outcome: Progressing Towards Goal     Problem: Patient Education: Go to Patient Education Activity  Goal: Patient/Family Education  Outcome: Progressing Towards Goal     Problem: General Wound Care  Goal: *Non-infected wound: Improvement of existing wound, absence of infection, and maintenance of skin integrity  Outcome: Progressing Towards Goal  Goal: *Infected Wound: Prevention of further infection and promotion of healing  Description: Infection control procedures (eg: clean dressings, clean gloves, hand washing, precautions to isolate wound from contamination, sterile instruments used for wound debridement) should be implemented.   Outcome: Progressing Towards Goal  Goal: Interventions  Outcome: Progressing Towards Goal     Problem: Patient Education: Go to Patient Education Activity  Goal: Patient/Family Education  Outcome: Progressing Towards Goal     Problem: Anxiety  Goal: *Alleviation of anxiety  Outcome: Progressing Towards Goal  Goal: *Alleviation of anxiety (Palliative Care)  Outcome: Progressing Towards Goal     Problem: Patient Education: Go to Patient Education Activity  Goal: Patient/Family Education  Outcome: Progressing Towards Goal

## 2021-06-07 NOTE — PROGRESS NOTES
Bedside and Verbal shift change report given to Hodan Negrete (oncoming nurse) by Kay Brown RN (offgoing nurse). Report included the following information SBAR, Kardex, Intake/Output, MAR, Recent Results and Med Rec Status.

## 2021-06-07 NOTE — PROGRESS NOTES
Bedside shift change report given to MAJO (oncoming nurse) by Emmanuel Canseco (offgoing nurse). Report included the following information SBAR, Kardex and MAR.

## 2021-06-07 NOTE — PROGRESS NOTES
[facility] Adult  Hospitalist Group  Deandre Griffin MD          Admission summary and hospital course. Thuy Armenta is a 22 y.o. male with h/o Seizures and anxiety who presented to [facility] ED after a cardiac arrest at [facility] around 1500 5/6/2021. Two rounds of CPR and meds were given and patient was defibrillated x 1 before ROSC. Patient was hypoxic post arrest. Unknown down time prior to arrest.. Patient had recent hospitalization at the [facility] in March of this year. Initial hospitalization admission was at [facility] on 3/24 with AMS in setting of polysubstance and IVD use (cocaine, heroine, methamphetamines). He was found to have sepsis, MRSA bacteremia, MRSA endocarditis. Imaging and MRI also found R PCA infarct and several abscesses and right temporal and parietal lobes. Infarcts thought to be due to septic emboli and patient had left sided residual weakness. Patient was transferred to [facility] on 4/1/2021 for evaluation for valve surgery. After transfer he was found to have a New R MCA infarct also ,had a type 2 NSTEMI and tamponade requiring pericardiocentesis done prior to surgery. He did have cardiac arrest on day of surgery. He had had a bioprosthetic Aortic Valve Replacement and Aortic Root Abscess Debridement done on 4/16/2021. Trached and sent to [facility] on 4/29/2021. Was undergoing PT and vent weaning. Patient is transferred out of the ICU on 5/15. Subjective/HPI    Just waking up, he has no complaints. Assessment and Plan:  V Fib Cardiac arrest - 5/6/21 on admission:   .  -Echo showed mild improvement  -Current cardiac regimen includes ivabradine, Toprol and Entresto. Spironolactone on hold.   -Vanco completed on 5/28  for endocarditis that was present on admission  -s/p ICD on 5/25   -Add dapagliflozin on dc as not on formulary as per Dr. Yashira Ledezma (7/51)    Systolic congestive heart failure/CM   -Recent Echo EF on 5/27 with LVEF of 25-30%   -Current GDMT includes Toprol, Entresto. Aldactone on hold  -Intermittent hypotension persisted and limiting GDMT therapy,increase midodrine to be able to continue cardiac medications       Acute on chronic  respiratory failure, resolved. Queenie Barone being downsized. Pneumothorax post ICD placement  -Trach collar in place, on room air  - on RA now for > 48 hours and maintaining airways without difficulty  - pulmonary following, plan to change trach size. Pulmonary following. ENT recommended downsizing and capping trial per respiratory therapist.  -Trach cannula down to 4 since 6/4, capped,could possibly de-cannulate today    Bacterial pneumonia severe multilevel consolidation throughout both lungs consistent with severe multilobar PNA. -sputum culture E coli with ESBL   -Completed meropenem and vancomycin. ID were consulted and assisted with the management. MRSA bacteremia and aortic valve endocardititis, PTA.  -Status post bioprosthetic AVR and root abscess debridement at [facility] on 4/16/2021.   -Status post pericardiocentesis due to tamponade prior to surgery at [facility] as well.  -Per records from [facility], vancomycin was due to finish on 5/22/2021. ID here followed. Vancomycin stopped on 528/21. Pain management  -Contusion of chest wall, left side, chronic back pain . H/o drug abuse. Stop fentanyl , cont' oxycodone prn. Cautious with narcotics. -NSAIDs sparingly. 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 Infarcts were thought to be due to septic emboli. He had left sided residual weakness. cta 5/6/21:  multiple acute infarctions throughout the  right cerebral hemisphere, involving much of the occipital lobe, as well as much  of the frontal lobe. There is an additional superior right frontoparietal Infarct  Evaluated by neurology,  Cont' current anticonvulsants. No seizures on EEG.     Neurologist consulted                -PT/OT/SLP             - Stroke education              - SBP goal 100-160              Seizure DZ:  Continue keppra -1500 mg BID, valproate  discontinued. Severe muscle deconditioning  Severe protein-calorie malnutrition   Cerebral abscess (embolic)   H/o substance abuse    CODE STATUS: Full  DVT prophylaxis: SCDs, heparin  Disposition: He is a great candidate for inpatient rehab after downsizing tracheostomy and/or decannulation.  -Trach canula is down to 4 since 6/4. He could possibly be de-canulated soon. Medically okay for d/c if rehab can take him with current trach canula size in place. PHYSICAL EXAMINATION:  Visit Vitals  BP (!) 91/57 (BP 1 Location: Right upper arm, BP Patient Position: At rest)   Pulse 64   Temp 98 °F (36.7 °C)   Resp 16   Ht 5' 11\" (1.803 m)   Wt 57 kg (125 lb 11.2 oz)   SpO2 96%   BMI 17.53 kg/m²       General:   Pleasant gentleman, cachectic. Not in distress  HEENT:            No pallor or jaundice. Neck:                Tracheostomy tube on the anterior neck, no discharge. Lungs: No wheezing. Midline sternal surgical scar, no crackles. Tender left chest wall. Heart:              Regular  rhythm,normal rate   No edema  Abdomen:       Soft, non-tender. Not distended. Bowel sounds normal  Extremities:     No LE edema  Skin:                Not pale. Not Jaundiced  No rashes         Neurologic:      Alert, oriented X 3. Left upper extremity weakness 4 out of 5. Left lower extremity 5-/5      Labs:     Recent Labs     06/05/21  1330   WBC 8.0   HGB 10.2*   HCT 32.5*        Recent Labs     06/05/21  1330   *   K 4.4      CO2 29   BUN 18   CREA 0.75   *   CA 10.1     No results for input(s): ALT, AP, TBIL, TBILI, TP, ALB, GLOB, GGT, AML, LPSE in the last 72 hours. No lab exists for component: SGOT, GPT, AMYP, HLPSE  No results for input(s): INR, PTP, APTT, INREXT, INREXT in the last 72 hours.    No results for input(s): FE, TIBC, PSAT, FERR in the last 72 hours. No results found for: FOL, RBCF   No results for input(s): PH, PCO2, PO2 in the last 72 hours. No results for input(s): CPK, CKNDX, TROIQ in the last 72 hours. No lab exists for component: CPKMB  Lab Results   Component Value Date/Time    Cholesterol, total 140 05/11/2021 04:35 AM    HDL Cholesterol 21 05/11/2021 04:35 AM    LDL, calculated 77.4 05/11/2021 04:35 AM    Triglyceride 208 (H) 05/11/2021 04:35 AM    CHOL/HDL Ratio 6.7 (H) 05/11/2021 04:35 AM     Lab Results   Component Value Date/Time    Glucose (POC) 120 (H) 05/11/2021 12:00 PM    Glucose (POC) 100 05/11/2021 06:04 AM    Glucose (POC) 87 05/11/2021 12:19 AM    Glucose (POC) 83 05/10/2021 06:53 PM    Glucose (POC) 84 05/10/2021 02:07 PM     Lab Results   Component Value Date/Time    Color YELLOW/STRAW 05/06/2021 05:42 PM    Appearance CLEAR 05/06/2021 05:42 PM    Specific gravity 1.008 05/06/2021 05:42 PM    Specific gravity 1.020 03/24/2021 09:30 PM    pH (UA) 5.0 05/06/2021 05:42 PM    Protein 30 (A) 05/06/2021 05:42 PM    Glucose Negative 05/06/2021 05:42 PM    Ketone Negative 05/06/2021 05:42 PM    Bilirubin Negative 05/06/2021 05:42 PM    Urobilinogen 0.2 05/06/2021 05:42 PM    Nitrites Negative 05/06/2021 05:42 PM    Leukocyte Esterase Negative 05/06/2021 05:42 PM    Epithelial cells FEW 05/06/2021 05:42 PM    Bacteria Negative 05/06/2021 05:42 PM    WBC 0-4 05/06/2021 05:42 PM    RBC 0-5 05/06/2021 05:42 PM         Signed:    Viral Bravo MD

## 2021-06-07 NOTE — PROGRESS NOTES
Transition of Care Plan  RUR 23%    Disposition  [facility] accepted by [facility]  Authorization pending from insurance   PT/OT needed today for new authorization   Contact  Pravin Soto  [phone]    Transportation  BLS   [facility] phone [phone]    COVID 19 PCR Negative 6/2/21 a new one is now  Required. CM asked nurse to get new COVID 19 PCR    Medical follow up   PCP     Contact  Mother Yovana Barriga  [phone]    CM noted that patient had trach downsized to 4 on Friday-6/4/21. Capped today. Could be decannulated soon. CM talked with patient and he is still in agreement with being discharged to [facility]-- A new authorization is needed. Cm talked with Pravin Soto and she confirmed that a new authorization will be started today-  Cm asked that PT/OT work with patient as new updated notes are needed for authorization. Cm will complete CM portion of [place] when patient is authorized and ready for discharge to [facility]. Envelope and ambulance form on chart-- dates will need to be updated. Cm will continue to follow and assist with completion of plan.

## 2021-06-08 VITALS
DIASTOLIC BLOOD PRESSURE: 62 MMHG | TEMPERATURE: 98.2 F | BODY MASS INDEX: 17.48 KG/M2 | HEART RATE: 76 BPM | WEIGHT: 124.9 LBS | OXYGEN SATURATION: 98 % | RESPIRATION RATE: 18 BRPM | SYSTOLIC BLOOD PRESSURE: 92 MMHG | HEIGHT: 71 IN

## 2021-06-08 LAB
ATRIAL RATE: 63 BPM
CALCULATED P AXIS, ECG09: 44 DEGREES
CALCULATED R AXIS, ECG10: 41 DEGREES
CALCULATED T AXIS, ECG11: 120 DEGREES
COVID-19 RAPID TEST, COVR: NOT DETECTED
DIAGNOSIS, 93000: NORMAL
P-R INTERVAL, ECG05: 200 MS
Q-T INTERVAL, ECG07: 440 MS
QRS DURATION, ECG06: 140 MS
QTC CALCULATION (BEZET), ECG08: 450 MS
SARS-COV-2, COV2: NORMAL
SOURCE, COVRS: NORMAL
VENTRICULAR RATE, ECG03: 63 BPM

## 2021-06-08 PROCEDURE — 93005 ELECTROCARDIOGRAM TRACING: CPT

## 2021-06-08 PROCEDURE — 74011250637 HC RX REV CODE- 250/637: Performed by: INTERNAL MEDICINE

## 2021-06-08 PROCEDURE — 94760 N-INVAS EAR/PLS OXIMETRY 1: CPT

## 2021-06-08 PROCEDURE — 74011250637 HC RX REV CODE- 250/637: Performed by: HOSPITALIST

## 2021-06-08 PROCEDURE — 87635 SARS-COV-2 COVID-19 AMP PRB: CPT

## 2021-06-08 RX ORDER — METOPROLOL SUCCINATE 25 MG/1
12.5 TABLET, EXTENDED RELEASE ORAL DAILY
Qty: 15 TABLET | Refills: 0 | Status: SHIPPED | OUTPATIENT
Start: 2021-06-09 | End: 2021-07-09

## 2021-06-08 RX ORDER — BUDESONIDE 0.5 MG/2ML
500 INHALANT ORAL 2 TIMES DAILY
Qty: 60 EACH | Refills: 0 | Status: SHIPPED | OUTPATIENT
Start: 2021-06-08 | End: 2021-07-08

## 2021-06-08 RX ORDER — TRAZODONE HYDROCHLORIDE 50 MG/1
50 TABLET ORAL
Qty: 30 TABLET | Refills: 0 | Status: SHIPPED | OUTPATIENT
Start: 2021-06-08 | End: 2021-07-08

## 2021-06-08 RX ORDER — DOXYLAMINE SUCCINATE 25 MG
TABLET ORAL 2 TIMES DAILY
Qty: 15 G | Refills: 0 | Status: SHIPPED | OUTPATIENT
Start: 2021-06-08

## 2021-06-08 RX ORDER — LIDOCAINE 4 G/100G
PATCH TOPICAL
Qty: 10 PATCH | Refills: 0 | Status: SHIPPED | OUTPATIENT
Start: 2021-06-08

## 2021-06-08 RX ORDER — OXYCODONE HYDROCHLORIDE 5 MG/1
7.5 TABLET ORAL
Qty: 12 TABLET | Refills: 0 | Status: SHIPPED | OUTPATIENT
Start: 2021-06-08 | End: 2021-06-11

## 2021-06-08 RX ORDER — MIDODRINE HYDROCHLORIDE 5 MG/1
5 TABLET ORAL
Qty: 90 TABLET | Refills: 0 | Status: SHIPPED | OUTPATIENT
Start: 2021-06-08 | End: 2021-07-08

## 2021-06-08 RX ORDER — LANOLIN ALCOHOL/MO/W.PET/CERES
3 CREAM (GRAM) TOPICAL
Qty: 30 TABLET | Refills: 0 | Status: SHIPPED | OUTPATIENT
Start: 2021-06-08 | End: 2021-07-08

## 2021-06-08 RX ORDER — LEVETIRACETAM 750 MG/1
1500 TABLET ORAL 2 TIMES DAILY
Qty: 120 TABLET | Refills: 0 | Status: SHIPPED | OUTPATIENT
Start: 2021-06-08 | End: 2021-07-08

## 2021-06-08 RX ORDER — POLYETHYLENE GLYCOL 3350 17 G/17G
17 POWDER, FOR SOLUTION ORAL DAILY
Qty: 30 PACKET | Refills: 0 | Status: SHIPPED | OUTPATIENT
Start: 2021-06-08 | End: 2021-07-08

## 2021-06-08 RX ADMIN — MIDODRINE HYDROCHLORIDE 5 MG: 5 TABLET ORAL at 08:30

## 2021-06-08 RX ADMIN — CASTOR OIL AND BALSAM, PERU: 788; 87 OINTMENT TOPICAL at 09:56

## 2021-06-08 RX ADMIN — SACUBITRIL AND VALSARTAN 1 TABLET: 24; 26 TABLET, FILM COATED ORAL at 09:53

## 2021-06-08 RX ADMIN — Medication 1 CAPSULE: at 09:53

## 2021-06-08 RX ADMIN — LEVETIRACETAM 1500 MG: 500 TABLET ORAL at 09:53

## 2021-06-08 RX ADMIN — OXYCODONE HYDROCHLORIDE 7.5 MG: 5 TABLET ORAL at 12:55

## 2021-06-08 RX ADMIN — IVABRADINE 5 MG: 5 TABLET, FILM COATED ORAL at 09:53

## 2021-06-08 RX ADMIN — MICONAZOLE NITRATE: 20 CREAM TOPICAL at 09:56

## 2021-06-08 RX ADMIN — MIDODRINE HYDROCHLORIDE 5 MG: 5 TABLET ORAL at 12:55

## 2021-06-08 RX ADMIN — METOPROLOL SUCCINATE 12.5 MG: 25 TABLET, EXTENDED RELEASE ORAL at 09:53

## 2021-06-08 NOTE — PROGRESS NOTES
Transition of Care Plan:    RUR: 23%    * Disposition- [address] transport at 3:30 [phone]  * Medicaid [employer] Auth for transport is [id number]  * EMTALA required- Accepting physician is Dr. Joce Castro to call report [phone]    Patient cleared for discharge this afternoon to [facility]. Patient will go with a #4 trach. Avtar Muhammad, Liaison with [facility] confirmed the room will be available after 3 PM today. Accepting physician is Dr. Eleuterio Thomas. Have notified General Cram, Mother. Avtar Muhammad advised she will also call the Mother to provide additional admission info. Care Management Interventions  PCP Verified by CM: No  Transition of Care Consult (CM Consult): Other ([facility])  MyChart Signup: No  Discharge Durable Medical Equipment: No  Health Maintenance Reviewed: Yes  Physical Therapy Consult: Yes  Occupational Therapy Consult: Yes  Speech Therapy Consult: Yes  Current Support Network: Other (Mother/Fiona Guerrero [phone], sister Aurora Amaya [phone], lived in [place] )  [address] for Transition of Care is Related to the Following Treatment Goals : [facility]  The Patient and/or Patient Representative was Provided with a Choice of Provider and Agrees with the Discharge Plan?: Yes  Name of the Patient Representative Who was Provided with a Choice of Provider and Agrees with the Discharge Plan:  Mother  Freedom of Choice List was Provided with Basic Dialogue that Supports the Patient's Individualized Plan of Care/Goals, Treatment Preferences and Shares the Quality Data Associated with the Providers?: Yes  Discharge Location  Discharge Placement: Rehab hospital/unit acute     Tania Shukla LCSW, ESVIN  Complex Care Coordinator/Rey  C: [phone]

## 2021-06-08 NOTE — PROGRESS NOTES
Bedside shift change report given to Mary (oncoming nurse) by Lauren Grace (offgoing nurse). Report included the following information SBAR, Kardex, Intake/Output, MAR and Recent Results.

## 2021-06-08 NOTE — PROGRESS NOTES
Bedside shift change report given to Rickey Aquino RN (oncoming nurse) by Pete Ching RN (offgoing nurse). Report included the following information SBAR and Kardex.

## 2021-06-08 NOTE — DISCHARGE INSTRUCTIONS
Pacemaker and ICD incision care and Discharge Instructions    Limitations and Precautions    Do not life the affected arm over your head for the next 2 weeks. If you are given a sling, only use it for 2-3 days. The sling is just a comfort measure and a remind to not lift your arm. Do not lift anything heavy for 2 weeks. Restriction of 10 lbs total weight. For example 1 six pack of 12 oz soda weighs 4 lbs. 1 gallon of milk weighs 8.5 lbs. Some swelling, redness, and pain are common with all incisions and normally will go away as the incision heals. If swelling, redness, or pain increases or if the area feels warm to touch contact a doctor. Also contact a doctor if the incision edges reopen or separate, if any drainage is noted or if you have a high fever , > 101 degrees. Caring for your Incision    · Surgical Glue was used over your incision  1. If a small dressing is over your incision, you may remove it one day after your procedure. 2. Surgical glue has been applied directly on the incision. This will be shiny in appearance. 3. 24 hours after your surgery, you may briefly wet your incision in the shower or bath. Do not soak or scrub your incision. After showering or bathing, gently blot your wound dry with a soft towel. 4. Do not scratch, rub, or pick at the adhesive film. This may loosen the film before your incision has healed. 5. Protect the incision from prolonged exposure to sunlight or tanning lamps while the film in place. 6. Do not apply liquid or ointment medications or any other product to your incision while the adhesive film is in place. These may loosen the film before your wound is healed. 7. The adhesive film will start to flake off in a week or two. Information about your Pacemaker/ICD    Use with Caution:  · You may safely use a cell phone on the side opposite from your device. · Keep a cell phone 6 inches from your device.   · Keep any small electrical devices such as an I-pod 6 inches from your device  · You CAN HAVE A MRI SCAN  · Do not use a TENS unit or magnetic therapy application near your device. Check with your nurse to be approved. · Use of lawn equipment or power tools; Keep all equipment at arms length. Other Information:  · Keep your ID card with you at all times. · You may use a microwave oven and any other household appliances. · You may walk through a metal detector. It will not harm your device; however, it may cause the security arm to sound  · You may have an X-ray or CAT Scan.  · If you undergo any type of procedure or surgery, notify the physician of your device. It may need to be adjusted prior to the procedure. · We recommend purchasing a Medic Alert bracelet or necklace    If you have further questions you may call your physician's office            Discharge SNF/Rehab Instructions/LTAC       PATIENT ID: Anival Modi  MRN: [id number]   YOB: 1995    DATE OF ADMISSION: 5/6/2021  5:22 PM    DATE OF DISCHARGE: 6/8/2021    PRIMARY CARE PROVIDER: None       ATTENDING PHYSICIAN: Jurgen King MD  DISCHARGING PROVIDER: Lemon Holter, MD     To contact this individual call [phone] and ask the  to page. If unavailable ask to be transferred the Adult Hospitalist Department.     CONSULTATIONS: IP CONSULT TO INTENSIVIST  IP CONSULT TO NEUROLOGY  IP CONSULT TO PODIATRY  IP CONSULT TO PODIATRY  IP CONSULT TO INFECTIOUS DISEASES  IP CONSULT TO NEUROLOGY  IP CONSULT TO OTOLARYNGOLOGY  IP CONSULT TO OTOLARYNGOLOGY  IP CONSULT TO OTOLARYNGOLOGY  IP CONSULT TO CARDIOLOGY  IP CONSULT TO PULMONOLOGY    PROCEDURES/SURGERIES: Procedure(s):  INSERT ICD DUAL  Eval [facility] COURSE:   This is a gentleman with recent extensive medical history but briefly;  Anival Modi is a 22 y.o. male with h/o Seizures and anxiety who presented to [facility] [place] ED after a cardiac arrest at [facility] around 1500 5/6/2021. Two rounds of CPR and meds were given and patient was defibrillated x 1 before ROSC. Patient was hypoxic post arrest. Unknown down time prior to arrest.. Patient had recent hospitalization at the [facility] in March of this year. Initial hospitalization admission was at [facility] on 3/24 with AMS in setting of polysubstance and IVD use (cocaine, heroine, methamphetamines). He was found to have sepsis, MRSA bacteremia, MRSA endocarditis. Imaging and MRI also found R PCA infarct and several abscesses and right temporal and parietal lobes. Infarcts thought to be due to septic emboli and patient had left sided residual weakness. Patient was transferred to [address] [place] [address] on 4/1/2021 for evaluation for valve surgery. After transfer he was found to have a New R MCA infarct also ,had a type 2 NSTEMI and tamponade requiring pericardiocentesis done prior to surgery. He did have cardiac arrest on day of surgery. He had had a bioprosthetic Aortic Valve Replacement and Aortic Root Abscess Debridement done on 4/16/2021. Trached and sent to [facility] on 4/29/2021. Was undergoing PT and vent weaning, got transferred out of the ICU on 5/15, remained stable on a cardiac monitor unit. DISCHARGE DIAGNOSES / PLAN:    V Fib Cardiac arrest - 5/6/21 on admission. He underwent AICD placement on 5/25. Systolic congestive heart failure/cardiomyopathy. Most recent echocardiogram on 5/27 showed an EF of 25-3%  -Echo showed mild improvement. He was definitely followed by cardiologist while inpatient.  -Current cardiac regimen includes ivabradine, Toprol and Entresto. Spironolactone had to be stopped due to hypotension. He is started on midodrine to enable continuation of the rest of cardiac regimen.   The midodrine was just increased to 5 mg 3 times daily from 2.5 mg 3 times daily, continue to titrate the midodrine and consider reintroducing Aldactone BP alone.      -Add dapagliflozin on dc as not on formulary as per Dr. Palmer Presley (5/25)          Acute on chronic  respiratory failure, resolved. Pulmonary followed patient. ENT were consulted and did the tracheostomy. Trach-cannula downsized to 4, patient should be ready for decannulation soon. Patient developed pneumothorax post AICD placement, resolved. Bacterial pneumonia severe multilevel consolidation throughout both lungs consistent with severe multilobar PNA. -sputum culture E coli with ESBL   -Completed meropenem and vancomycin. ID were consulted and assisted with the management. MRSA bacteremia and aortic valve endocardititis, PTA.  -Status post bioprosthetic AVR and root abscess debridement at [facility] on 4/16/2021.   -Status post pericardiocentesis due to tamponade prior to surgery at [facility] as well.  -Per records from [facility], vancomycin was due to finish on 5/22/2021. ID here followed. Vancomycin stopped on 528/21. Pain management, pain is coming mainly from the chest wall from CPR. Besides it suspected patient has high threshold due to his drug use history. He is currently using lidocaine patch on the left chest, as needed Tylenol and Roxicodone. Avoid or use NSAIDs sparingly due to his CHF. 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 Infarcts were thought to be due to septic emboli. He had left sided residual weakness. CTA on 5/6/21 revealed multiple acute infarctions throughout the right cerebral hemisphere, involving much of the occipital lobe, as well as much of the frontal lobe. There is an additional superior right frontoparietal Infarct. Patient was seen in consult by neurologist.  No seizures on EEG. Continue rehab. Seizure disorder. Continue keppra -1500 mg BID, valproate  discontinued per neurologist advice.         Severe muscle deconditioning  Severe protein-calorie malnutrition   Cerebral abscess (embolic)   H/o substance abuse    Sleep aid: Patient has been using melatonin every night along with as needed trazodone    Paronychia, Left Hallux ,podiatry did total toenail avulsion,the entire toenail plate was avulsed without incident at the bedside on 5/7/2021. PENDING TEST RESULTS:   At the time of discharge the following test results are still pending: None    FOLLOW UP APPOINTMENTS:    Follow-up Information    None          ADDITIONAL CARE RECOMMENDATIONS:     DIET: Regular Diet and Cardiac Diet      OXYGEN / BiPAP SETTINGS: Patient is on room air    ACTIVITY: Activity as tolerated and PT/OT Eval and Treat    WOUND CARE:   Lift sacral foam dressing and cleanse sacrum with remedy foaming cleanser, pat dry, apply Venelex ointment BID and reapply same sacral foam dressing       EQUIPMENT needed: To be determined upon discharge from rehab      DISCHARGE MEDICATIONS:   See Medication Reconciliation Form      NOTIFY YOUR PHYSICIAN FOR ANY OF THE FOLLOWING:   Fever over 101 degrees for 24 hours. Chest pain, shortness of breath, fever, chills, nausea, vomiting, diarrhea, change in mentation, falling, weakness, bleeding. Severe pain or pain not relieved by medications. Or, any other signs or symptoms that you may have questions about. DISPOSITION: Inpatient rehab, [facility]      PATIENT CONDITION AT DISCHARGE:     Functional status    Poor    x Deconditioned     Independent      Cognition    x Lucid     Forgetful     Dementia      Catheters/lines (plus indication)    Howe     PICC     PEG    x None      Code status    x Full code     DNR      PHYSICAL EXAMINATION AT DISCHARGE:  GENERAL:  Cachectic and chronically sick looking, not in distress. HEENT:  Mild pallor but no jaundice. With buccal mucosa. NECK: Capped tracheostomy tube in place. LUNGS:   Vesicular breath sounds bilaterally, no added sounds. HEART:   S1 and S2 well heard,RRR,  no murmur, click, rub or gallop.   AICD in place on the left chest wall.  ABDOMEB:   Soft, non-tender. Normoactive bowel sounds. No masses,  No organomegaly. EXTREMETIES:  Atraumatic, acyanotic, no edema  PULSES: 2+ and symmetric all extremities. SKIN:  No rashes or lesions  NEUROLOGY: Alert and oriented to PPT, CNII-XII intact. Motor and sensory exam grossly intact. CHRONIC MEDICAL DIAGNOSES:  Problem List as of 6/8/2021 Never Reviewed        Codes Class Noted - Resolved    Severe muscle deconditioning ICD-10-CM: R29.898  ICD-9-CM: 781.99 Acute 5/1/2021 - Present        S/P AVR (aortic valve replacement) and aortoplasty ICD-10-CM: Z95.2  ICD-9-CM: V43.3 Acute 4/16/2021 - Present        Abscess of aortic root ICD-10-CM: I51.89  ICD-9-CM: 429.89 Acute 4/16/2021 - Present        Contusion of chest wall ICD-10-CM: S20.219A  ICD-9-CM: 922.1 Acute 4/16/2021 - Present        Successful cardiopulmonary resuscitation ICD-10-CM: Z92.89  ICD-9-CM: V12.53 Acute 4/14/2021 - Present        Acute traumatic pain ICD-10-CM: G89.11  ICD-9-CM: 338.11 Acute 4/14/2021 - Present        Postoperative acute respiratory failure ([facility] [address] ICD-10-CM: O76.204  ICD-9-CM: 518.51 Acute 4/1/2021 - Present        Severe protein-calorie malnutrition ([facility] [address] ICD-10-CM: E43  ICD-9-CM: 262  5/11/2021 - Present        * (Principal) Cardiac arrest with pulseless electrical activity ([facility] [address] ICD-10-CM: I46.9  ICD-9-CM: 427.5  5/6/2021 - Present        Cerebral abscess (embolic) [id number]: G95.5  ICD-9-CM: 324.0  3/30/2021 - Present    Overview Signed 3/30/2021  4:51 PM by Oj Gibson MD     CT and MRI revealed evidence of a right posterior cerebellar artery infarct, 9 x 2.6 cm. He also had several areas of cerebral abscesses in the right temporal and parietal lobes measuring 2 x 1 cm, 1.3 x 0.9 cm and 0.6 x 5.5 mm with associated 3 mm midline shift.  CSF showed no organisms to date, but elevated WBC.     3/24/21 CT Normal noncontrast head CT  3/29/21 MRI Extensive multiple acute infarcts throughout the bilateral cerebral hemispheres with a large hemorrhagic right PCA territory infarction measuring up to 7 cm. Some of the additional smaller infarcts also demonstrated hemorrhage. Findings are highly suspicious for septic emboli  3/30/21 CTA Occlusion of the right P2/P3. Infarction within the right occipital and temporal lobes. Edema related to the right-sided small abscesses, better appreciated on MRI             Drug abuse, cocaine type [facility]) ICD-10-CM: F14.10  ICD-9-CM: 305.60  3/29/2021 - Present    Overview Signed 3/29/2021  8:34 PM by Parish Gonzalez MD     3/24/21 UDS + methamphetamines and cocaine             Endocarditis due to methicillin susceptible Staphylococcus aureus (MSSA) ICD-10-CM: I33.0, B95.61  ICD-9-CM: 421.0, 041.11  3/25/2021 - Present    Overview Addendum 3/30/2021  4:51 PM by Parish Gonzalez MD     3/24/21 ER Patient with a history of drug use anxiety and seizure not on medications for the past 4 months according to the sister presents for evaluation of altered mental status;  BC x2 + MRSA, WBC 11.6 P-67%, K 4.0 BUN 45, Creat 1.1, Trop I 3.89-> 13.60-> 10.52; UDS + cocaine and amphetamines; Lactic acid 4.2  3/24/21 Intubated      CT head negative      CT chest, neg PE, ? LV mass  3/25/21 ECHO EF 36%, LV 44/45, S/PW 8/9,  Mod AR  3/2/621 RAISSA EF 55%, Vegetations on AV 0.9cm LCC, small perforation 0.4cm LCC. No feg on MV/TV/PV, No thrombus in MELLY             Type 2 myocardial infarction [facility]) ICD-10-CM: I21. A1  ICD-9-CM: 410.90  3/24/2021 - Present    Overview Signed 3/29/2021  8:37 PM by Parish Gonzalez MD     Trop I 3.89-> 13.60-> 10.52; Signed:    Theresa Jones MD  6/8/2021  11:40 AM

## 2021-06-08 NOTE — PROGRESS NOTES
06/08/21 1221   Oxygen Therapy   O2 Sat (%) 97 %   Pulse via Oximetry 71 beats per minute   O2 Device None (Room air)   Pre-Treatment   Breathing Pattern Regular   Cough   (n/a)   Breath Sounds Bilateral Clear;Diminished   Left Breath Sounds Clear   Right Breath Sounds Clear;Diminished   Pulse 71   SpO2 97 %   Respirations 18   Patient de cannulated at this time per MD order. Guaze applied to patients stoma secured with skin tape. BBS (see flowsheet) No secretions produced post decannulation. Patient states he feels fine. RN Notified. This RT remained at bedside with patient for 20 minutes.

## 2021-06-08 NOTE — PROGRESS NOTES
Occupational Therapy: Attempted to 2 to work with patient. Patient in bed with eyes closed. States \"I'm sleeping\". Declines participation at this time. Will follow.   ANTHONY Banuelos/PATRICIA

## 2021-06-08 NOTE — PROGRESS NOTES
Problem: Falls - Risk of  Goal: *Absence of Falls  Description: Document Vikki Ace Fall Risk and appropriate interventions in the flowsheet. Outcome: Progressing Towards Goal  Note: Fall Risk Interventions:  Mobility Interventions: Communicate number of staff needed for ambulation/transfer    Mentation Interventions: Adequate sleep, hydration, pain control    Medication Interventions: Patient to call before getting OOB, Teach patient to arise slowly    Elimination Interventions: Call light in reach              Problem: Pressure Injury - Risk of  Goal: *Prevention of pressure injury  Description: Document Salvador Scale and appropriate interventions in the flowsheet. Outcome: Progressing Towards Goal  Note: Pressure Injury Interventions:  Sensory Interventions: Assess changes in LOC    Moisture Interventions: Absorbent underpads    Activity Interventions: Increase time out of bed, Pressure redistribution bed/mattress(bed type)    Mobility Interventions: Pressure redistribution bed/mattress (bed type), Turn and reposition approx.  every two hours(pillow and wedges)    Nutrition Interventions: Document food/fluid/supplement intake    Friction and Shear Interventions: Apply protective barrier, creams and emollients                Problem: Pain  Goal: *Control of Pain  Outcome: Progressing Towards Goal

## 2021-06-08 NOTE — DISCHARGE SUMMARY
Discharge Summary           PATIENT ID: Thuy Armenta  MRN: [id number]   YOB: 1995    DATE OF ADMISSION: 5/6/2021  5:22 PM    DATE OF DISCHARGE: 6/8/2021    PRIMARY CARE PROVIDER: None       ATTENDING PHYSICIAN: Steve Sanders MD  DISCHARGING PROVIDER: Deandre Griffin MD     To contact this individual call [phone] and ask the  to page. If unavailable ask to be transferred the Adult Hospitalist Department. CONSULTATIONS: IP CONSULT TO INTENSIVIST  IP CONSULT TO NEUROLOGY  IP CONSULT TO PODIATRY  IP CONSULT TO PODIATRY  IP CONSULT TO INFECTIOUS DISEASES  IP CONSULT TO NEUROLOGY  IP CONSULT TO OTOLARYNGOLOGY  IP CONSULT TO OTOLARYNGOLOGY  IP CONSULT TO OTOLARYNGOLOGY  IP CONSULT TO CARDIOLOGY  IP CONSULT TO PULMONOLOGY    PROCEDURES/SURGERIES: Procedure(s):  INSERT ICD DUAL  Eval [address] COURSE:   This is a gentleman with recent extensive medical history but briefly;  Thuy Armenta is a 22 y.o. male with h/o Seizures and anxiety who presented to [facility] ED after a cardiac arrest at [facility] around 1500 5/6/2021. Two rounds of CPR and meds were given and patient was defibrillated x 1 before ROSC. Patient was hypoxic post arrest. Unknown down time prior to arrest.. Patient had recent hospitalization at the [facility] in March of this year. Initial hospitalization admission was at [facility] on 3/24 with AMS in setting of polysubstance and IVD use (cocaine, heroine, methamphetamines). He was found to have sepsis, MRSA bacteremia, MRSA endocarditis. Imaging and MRI also found R PCA infarct and several abscesses and right temporal and parietal lobes. Infarcts thought to be due to septic emboli and patient had left sided residual weakness. Patient was transferred to [address] [place] [address] on 4/1/2021 for evaluation for valve surgery.  After transfer he was found to have a New R MCA infarct also ,had a type 2 NSTEMI and tamponade requiring pericardiocentesis done prior to surgery. He did have cardiac arrest on day of surgery. He had had a bioprosthetic Aortic Valve Replacement and Aortic Root Abscess Debridement done on 4/16/2021. Trached and sent to [facility] on 4/29/2021. Was undergoing PT and vent weaning, got transferred out of the ICU on 5/15, remained stable on a cardiac monitor unit. DISCHARGE DIAGNOSES / PLAN:    V Fib Cardiac arrest - 5/6/21 on admission. He underwent AICD placement on 5/25. Systolic congestive heart failure/cardiomyopathy. Most recent echocardiogram on 5/27 showed an EF of 25-3%  -Echo showed mild improvement. He was definitely followed by cardiologist while inpatient.  -Current cardiac regimen includes ivabradine, Toprol and Entresto. Spironolactone had to be stopped due to hypotension. He is started on midodrine to enable continuation of the rest of cardiac regimen. The midodrine was just increased to 5 mg 3 times daily from 2.5 mg 3 times daily, continue to titrate the midodrine and consider reintroducing Aldactone BP alone.      -Add dapagliflozin on dc as not on formulary as per Dr. Palmer Presley (5/25)          Acute on chronic  respiratory failure, resolved. Pulmonary followed patient. ENT were consulted and did the tracheostomy. Trach-cannula downsized to 4, patient should be ready for decannulation soon. Patient developed pneumothorax post AICD placement, resolved. Bacterial pneumonia severe multilevel consolidation throughout both lungs consistent with severe multilobar PNA. -sputum culture E coli with ESBL   -Completed meropenem and vancomycin. ID were consulted and assisted with the management.      MRSA bacteremia and aortic valve endocardititis, PTA.  -Status post bioprosthetic AVR and root abscess debridement at [facility] on 4/16/2021.   -Status post pericardiocentesis due to tamponade prior to surgery at [facility] as well.  -Per records from [facility], vancomycin was due to finish on 5/22/2021. ID here followed. Vancomycin stopped on 528/21. Pain management, pain is coming mainly from the chest wall from CPR. Besides it suspected patient has high threshold due to his drug use history. He is currently using lidocaine patch on the left chest, as needed Tylenol and Roxicodone. Avoid or use NSAIDs sparingly due to his CHF. 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 Infarcts were thought to be due to septic emboli. He had left sided residual weakness. CTA on 5/6/21 revealed multiple acute infarctions throughout the right cerebral hemisphere, involving much of the occipital lobe, as well as much of the frontal lobe. There is an additional superior right frontoparietal Infarct. Patient was seen in consult by neurologist.  No seizures on EEG. Continue rehab. Seizure disorder. Continue keppra -1500 mg BID, valproate  discontinued per neurologist advice. Severe muscle deconditioning  Severe protein-calorie malnutrition   Cerebral abscess (embolic)   H/o substance abuse    Sleep aid: Patient has been using melatonin every night along with as needed trazodone  Paronychia, Left Hallux ,podiatry did total toenail avulsion,the entire toenail plate was avulsed without incident at the bedside on 5/7/2021. PENDING TEST RESULTS:   At the time of discharge the following test results are still pending: None    FOLLOW UP APPOINTMENTS:    Follow-up Information    None          ADDITIONAL CARE RECOMMENDATIONS:     DIET: Regular Diet and Cardiac Diet      OXYGEN / BiPAP SETTINGS: Patient is on room air    ACTIVITY: Activity as tolerated and PT/OT Eval and Treat    WOUND CARE:   Lift sacral foam dressing and cleanse sacrum with remedy foaming cleanser, pat dry, apply Venelex ointment BID and reapply same sacral foam dressing       EQUIPMENT needed:  To be determined upon discharge from rehab      DISCHARGE MEDICATIONS:   See Medication Reconciliation Form      NOTIFY YOUR PHYSICIAN FOR ANY OF THE FOLLOWING:   Fever over 101 degrees for 24 hours. Chest pain, shortness of breath, fever, chills, nausea, vomiting, diarrhea, change in mentation, falling, weakness, bleeding. Severe pain or pain not relieved by medications. Or, any other signs or symptoms that you may have questions about. DISPOSITION: [facility]hab[facility]      PATIENT CONDITION AT DISCHARGE:     Functional status    Poor    x Deconditioned     Independent      Cognition    x Lucid     Forgetful     Dementia      Catheters/lines (plus indication)    Howe     PICC     PEG    x None      Code status    x Full code     DNR      PHYSICAL EXAMINATION AT DISCHARGE:  GENERAL:  Cachectic and chronically sick looking, not in distress. HEENT:  Mild pallor but no jaundice. With buccal mucosa. NECK: Capped tracheostomy tube in place. LUNGS:   Vesicular breath sounds bilaterally, no added sounds. HEART:   S1 and S2 well heard,RRR,  no murmur, click, rub or gallop. AICD in place on the left chest wall. ABDOMEB:   Soft, non-tender. Normoactive bowel sounds. No masses,  No organomegaly. EXTREMETIES:  Atraumatic, acyanotic, no edema  PULSES: 2+ and symmetric all extremities. SKIN:  No rashes or lesions  NEUROLOGY: Alert and oriented to PPT, CNII-XII intact. Motor and sensory exam grossly intact.           CHRONIC MEDICAL DIAGNOSES:  Problem List as of 6/8/2021 Never Reviewed        Codes Class Noted - Resolved    Severe muscle deconditioning ICD-10-CM: R29.898  ICD-9-CM: 781.99 Acute 5/1/2021 - Present        S/P AVR (aortic valve replacement) and aortoplasty ICD-10-CM: Z95.2  ICD-9-CM: V43.3 Acute 4/16/2021 - Present        Abscess of aortic root ICD-10-CM: I51.89  ICD-9-CM: 429.89 Acute 4/16/2021 - Present        Contusion of chest wall ICD-10-CM: S20.219A  ICD-9-CM: 922.1 Acute 4/16/2021 - Present        Successful cardiopulmonary resuscitation ICD-10-CM: Z92.89  ICD-9-CM: V12.53 Acute 4/14/2021 - Present        Acute traumatic pain ICD-10-CM: G89.11  ICD-9-CM: 338.11 Acute 4/14/2021 - Present        Postoperative acute respiratory failure ([facility] ICD-10-CM: A96.101  ICD-9-CM: 518.51 Acute 4/1/2021 - Present        Severe protein-calorie malnutrition ([facility] ICD-10-CM: E43  ICD-9-CM: 262  5/11/2021 - Present        * (Principal) Cardiac arrest with pulseless electrical activity ([facility] ICD-10-CM: I46.9  ICD-9-CM: 427.5  5/6/2021 - Present        Cerebral abscess (embolic) NQL-71-EJ: N06.3  ICD-9-CM: 324.0  3/30/2021 - Present    Overview Signed 3/30/2021  4:51 PM by Solange Carpenter MD     CT and MRI revealed evidence of a right posterior cerebellar artery infarct, 9 x 2.6 cm. He also had several areas of cerebral abscesses in the right temporal and parietal lobes measuring 2 x 1 cm, 1.3 x 0.9 cm and 0.6 x 5.5 mm with associated 3 mm midline shift. CSF showed no organisms to date, but elevated WBC.     3/24/21 CT Normal noncontrast head CT  3/29/21 MRI Extensive multiple acute infarcts throughout the bilateral cerebral hemispheres with a large hemorrhagic right PCA territory infarction measuring up to 7 cm. Some of the additional smaller infarcts also demonstrated hemorrhage. Findings are highly suspicious for septic emboli  3/30/21 CTA Occlusion of the right P2/P3. Infarction within the right occipital and temporal lobes.  Edema related to the right-sided small abscesses, better appreciated on MRI             Drug abuse, cocaine type [facility]) ICD-10-CM: F14.10  ICD-9-CM: 305.60  3/29/2021 - Present    Overview Signed 3/29/2021  8:34 PM by Solange Carpenter MD     3/24/21 UDS + methamphetamines and cocaine             Endocarditis due to methicillin susceptible Staphylococcus aureus (MSSA) ICD-10-CM: I33.0, B95.61  ICD-9-CM: 421.0, 041.11  3/25/2021 - Present    Overview Addendum 3/30/2021  4:51 PM by Solange Carpenter MD     3/24/21 ER Patient with a history of drug use anxiety and seizure not on medications for the past 4 months according to the sister presents for evaluation of altered mental status;  BC x2 + MRSA, WBC 11.6 P-67%, K 4.0 BUN 45, Creat 1.1, Trop I 3.89-> 13.60-> 10.52; UDS + cocaine and amphetamines; Lactic acid 4.2  3/24/21 Intubated      CT head negative      CT chest, neg PE, ? LV mass  3/25/21 ECHO EF 36%, LV 44/45, S/PW 8/9,  Mod AR  3/2/621 RAISSA EF 55%, Vegetations on AV 0.9cm LCC, small perforation 0.4cm LCC. No feg on MV/TV/PV, No thrombus in MELLY             Type 2 myocardial infarction [facility]) ICD-10-CM: I21. A1  ICD-9-CM: 410.90  3/24/2021 - Present    Overview Signed 3/29/2021  8:37 PM by Ciara Cuenca MD     Trop I 3.89-> 13.60-> 10.52;                   DISCHARGE MEDICATIONS:  Current Discharge Medication List      START taking these medications    Details   budesonide (PULMICORT) 0.5 mg/2 mL nbsp 2 mL by Nebulization route two (2) times a day for 30 days. Qty: 60 Each, Refills: 0  Start date: 6/8/2021, End date: 7/8/2021      ivabradine (CORLANOR) 5 mg tablet Take 1 Tablet by mouth two (2) times daily (with meals) for 30 days. Qty: 60 Tablet, Refills: 0  Start date: 6/8/2021, End date: 7/8/2021      L.acid,para-B. bifidum-S.therm (RISAQUAD) 8 billion cell cap cap 1 Capsule by Nasogastric route daily for 30 days. Qty: 30 Capsule, Refills: 0  Start date: 6/9/2021, End date: 7/9/2021      metoprolol succinate (TOPROL-XL) 25 mg XL tablet Take 0.5 Tablets by mouth daily for 30 days. Qty: 15 Tablet, Refills: 0  Start date: 6/9/2021, End date: 7/9/2021      midodrine (PROAMATINE) 5 mg tablet Take 1 Tablet by mouth three (3) times daily (with meals) for 30 days. Qty: 90 Tablet, Refills: 0  Start date: 6/8/2021, End date: 7/8/2021      polyethylene glycol (MIRALAX) 17 gram packet Take 1 Packet by mouth daily for 30 days.   Qty: 30 Packet, Refills: 0  Start date: 6/8/2021, End date: 7/8/2021      sacubitril-valsartan (ENTRESTO) 24 mg/26 mg tablet Take 1 Tablet by mouth every twelve (12) hours for 30 days. Qty: 60 Tablet, Refills: 0  Start date: 6/8/2021, End date: 7/8/2021      traZODone (DESYREL) 50 mg tablet Take 1 Tablet by mouth nightly as needed for Sleep for up to 30 days. Qty: 30 Tablet, Refills: 0  Start date: 6/8/2021, End date: 7/8/2021      melatonin 3 mg tablet Take 1 Tablet by mouth nightly for 30 days. Qty: 30 Tablet, Refills: 0  Start date: 6/8/2021, End date: 7/8/2021      lidocaine 4 % patch Left chest wall  Qty: 10 Patch, Refills: 0  Start date: 6/8/2021      miconazole (MICOTIN) 2 % topical cream Apply  to affected area two (2) times a day. Qty: 15 g, Refills: 0  Start date: 6/8/2021      oxyCODONE IR (ROXICODONE) 5 mg immediate release tablet Take 1.5 Tablets by mouth every four (4) hours as needed for Pain for up to 3 days. Max Daily Amount: 45 mg.  Qty: 12 Tablet, Refills: 0  Start date: 6/8/2021, End date: 6/11/2021    Associated Diagnoses: Cardiac arrest [facility]); Contusion of left chest wall, sequela         CONTINUE these medications which have CHANGED    Details   levETIRAcetam (KEPPRA) 750 mg tablet Take 2 Tablets by mouth two (2) times a day for 30 days. Qty: 120 Tablet, Refills: 0  Start date: 6/8/2021, End date: 7/8/2021         STOP taking these medications       divalproex DR (Depakote) 500 mg tablet Comments:   Reason for Stopping:               Greater than 30 minutes were spent with the patient on counseling and coordination of care    Signed:    Otis Piña MD  6/8/2021  11:53 AM

## 2021-06-08 NOTE — PROGRESS NOTES
[facility]  Alida Castellano MD          Admission summary and hospital course. Giancarlo Bolden is a 22 y.o. male with h/o Seizures and anxiety who presented to [facility] ED after a cardiac arrest at [facility] around 1500 5/6/2021. Two rounds of CPR and meds were given and patient was defibrillated x 1 before ROSC. Patient was hypoxic post arrest. Unknown down time prior to arrest.. Patient had recent hospitalization at the [facility] in March of this year. Initial hospitalization admission was at [facility] on 3/24 with AMS in setting of polysubstance and IVD use (cocaine, heroine, methamphetamines). He was found to have sepsis, MRSA bacteremia, MRSA endocarditis. Imaging and MRI also found R PCA infarct and several abscesses and right temporal and parietal lobes. Infarcts thought to be due to septic emboli and patient had left sided residual weakness. Patient was transferred to [facility] on 4/1/2021 for evaluation for valve surgery. After transfer he was found to have a New R MCA infarct also ,had a type 2 NSTEMI and tamponade requiring pericardiocentesis done prior to surgery. He did have cardiac arrest on day of surgery. He had had a bioprosthetic Aortic Valve Replacement and Aortic Root Abscess Debridement done on 4/16/2021. Trached and sent to [facility] on 4/29/2021. Was undergoing PT and vent weaning. Patient is transferred out of the ICU on 5/15. Subjective/HPI    De cannulated and doing fine. RRT just called for left sided chest pain. He has had this pain from the CPR. He is okay now. He tells me he has high threshold for opioids because of his drug use history ,the percocet he is getting is not enough. He has not needed iv pain medications for days. I encouraged him to keep the current dose as he is already approached discharge     Assessment and Plan:  V Fib Cardiac arrest - 5/6/21 on admission  Systolic congestive heart failure, cardiomyopathy. He underwent ICD placement on 5/25. Currently on GDMT medical therapy however hypotension limiting.    -Current cardiac regimen includes ivabradine, Toprol and Entresto. Spironolactone held due to hypotension. Increase midodrine to 5 mg 3 times daily and if blood pressure stabilizes, may reinitiate Aldactone. -LVEF on 5/2725 to 30%. -Add dapagliflozin on dc as not on formulary as per Dr. Pat Oswald (5/25)        Acute on chronic  respiratory failure, resolved. Decannulated on 6/8. Pneumothorax post ICD placement      Bacterial pneumonia severe multilevel consolidation throughout both lungs consistent with severe multilobar PNA. Sputum culture E coli with ESBL   -Completed meropenem and vancomycin. ID were consulted and assisted with the management. MRSA bacteremia and aortic valve endocardititis, PTA.  -Status post bioprosthetic AVR and root abscess debridement at [facility] on 4/16/2021.   -Status post pericardiocentesis due to tamponade prior to surgery at [facility] as well.  -Per records from [address] [place] [address], vancomycin was due to finish on 5/22/2021. ID here followed. Vancomycin stopped on 528/21. Pain management  -Contusion of chest wall, left side, chronic back pain . H/o drug abuse. Stop fentanyl , cont' oxycodone prn. Cautious with narcotics. -NSAIDs sparingly    H/o right MCA infarct and several brain abscesses in the right temporal and parietal lobes. Infarcts were thought to be due to septic emboli. He had left sided residual weakness. cta 5/6/21:  multiple acute infarctions throughout the  right cerebral hemisphere, involving much of the occipital lobe, as well as much  of the frontal lobe. There is an additional superior right frontoparietal Infarct  Evaluated by neurology,  Cont' current anticonvulsants. No seizures on EEG.     Neurologist consulted                -PT/OT/SLP               - Stroke education              - SBP goal 100-160              Seizure DZ:  Continue keppra -1500 mg BID, valproate  discontinued. Severe muscle deconditioning  Severe protein-calorie malnutrition   Cerebral abscess (embolic)   H/o substance abuse      CODE STATUS: Full  DVT prophylaxis: SCDs, heparin  Disposition: Inpatient rehab, pending insurance authorization. PHYSICAL EXAMINATION:  Visit Vitals  BP 99/65 (BP 1 Location: Right upper arm, BP Patient Position: At rest)   Pulse 69   Temp 98 °F (36.7 °C)   Resp 18   Ht 5' 11\" (1.803 m)   Wt 56.7 kg (124 lb 14.4 oz)   SpO2 98%   BMI 17.42 kg/m²       General:   Pleasant gentleman, cachectic. Not in distress  HEENT:            No pallor or jaundice. Neck:                Bandaged on the anterior neck were tracheostomy tube was  Lungs: No wheezing. Midline sternal surgical scar, no crackles. Tender left chest wall. Heart:              Regular  rhythm,normal rate   No edema  Abdomen:       Soft, non-tender. Not distended. Bowel sounds normal  Extremities:     No LE edema  Skin:                Not pale. Not Jaundiced  No rashes         Neurologic:      Alert, oriented X 3. Left upper extremity weakness 4 out of 5. Left lower extremity 5-/5      Labs:     No results for input(s): WBC, HGB, HCT, PLT, HGBEXT, HCTEXT, PLTEXT, HGBEXT, HCTEXT, PLTEXT in the last 72 hours. No results for input(s): NA, K, CL, CO2, BUN, CREA, GLU, CA, MG, PHOS, URICA in the last 72 hours. No results for input(s): ALT, AP, TBIL, TBILI, TP, ALB, GLOB, GGT, AML, LPSE in the last 72 hours. No lab exists for component: SGOT, GPT, AMYP, HLPSE  No results for input(s): INR, PTP, APTT, INREXT, INREXT in the last 72 hours. No results for input(s): FE, TIBC, PSAT, FERR in the last 72 hours. No results found for: FOL, RBCF   No results for input(s): PH, PCO2, PO2 in the last 72 hours. No results for input(s): CPK, CKNDX, TROIQ in the last 72 hours.     No lab exists for component: CPKMB  Lab Results   Component Value Date/Time    Cholesterol, total 140 05/11/2021 04:35 AM    HDL Cholesterol 21 05/11/2021 04:35 AM    LDL, calculated 77.4 05/11/2021 04:35 AM    Triglyceride 208 (H) 05/11/2021 04:35 AM    CHOL/HDL Ratio 6.7 (H) 05/11/2021 04:35 AM     Lab Results   Component Value Date/Time    Glucose (POC) 120 (H) 05/11/2021 12:00 PM    Glucose (POC) 100 05/11/2021 06:04 AM    Glucose (POC) 87 05/11/2021 12:19 AM    Glucose (POC) 83 05/10/2021 06:53 PM    Glucose (POC) 84 05/10/2021 02:07 PM     Lab Results   Component Value Date/Time    Color YELLOW/STRAW 05/06/2021 05:42 PM    Appearance CLEAR 05/06/2021 05:42 PM    Specific gravity 1.008 05/06/2021 05:42 PM    Specific gravity 1.020 03/24/2021 09:30 PM    pH (UA) 5.0 05/06/2021 05:42 PM    Protein 30 (A) 05/06/2021 05:42 PM    Glucose Negative 05/06/2021 05:42 PM    Ketone Negative 05/06/2021 05:42 PM    Bilirubin Negative 05/06/2021 05:42 PM    Urobilinogen 0.2 05/06/2021 05:42 PM    Nitrites Negative 05/06/2021 05:42 PM    Leukocyte Esterase Negative 05/06/2021 05:42 PM    Epithelial cells FEW 05/06/2021 05:42 PM    Bacteria Negative 05/06/2021 05:42 PM    WBC 0-4 05/06/2021 05:42 PM    RBC 0-5 05/06/2021 05:42 PM         Signed:    Alida Castellano MD

## 2021-06-09 NOTE — ADT AUTH CERT NOTES
Utilization Reviews 
 
  
Cardiology [address] Day 32 (6/6/2021) by Yumiko Guillen RN 
 
  
Review Entered Review Status 6/7/2021 14:42 Completed  
  
Criteria Review Care Day: 32 Care Date: 6/6/2021 Level of Care: Telemetry Guideline Day 3 Level Of Care   
(X) * Activity level acceptable 6/7/2021 14:42:25 EDT by Joss Summers   
  oob to chair Clinical Status   
(X) * Hemodynamic stability 6/7/2021 14:42:25 EDT by Janett Castro   
  99.1 60 99/56 18 97% room air   
(X) * Cardiovascular status acceptable 6/7/2021 14:42:25 EDT by Joss Summers   
  wnl   
(X) * Cardiac inflammation absent or controlled ( ) * General Discharge Criteria met Interventions   
(X) * Intake acceptable 6/7/2021 14:42:25 EDT by Joss Summers   
  regular diet   
( ) * No inpatient interventions needed * Milestone Additional Notes 6/6/2021 LOC IP TELEMETRY  
VS 99.1 60 99/56 18 97% room air LABS no labs/imaging MEDS Corlanor 5mg po bid Toradol 15mg iv q12 prn x1 Risaquad 1 cap po qday  
Keppra 1500mg po bid Melatonin 3mg po qhs Toprol 12.5mg po qday Roxicodone 7.5mg po q4 prn x3 Entresto 24-26mg 1 tab po q12 ATTENDING NOTE Patient has no complaints.  Tracheostomy tube downsized to 4 on Friday, he is doing well without any respiratory difficulty.  
   
Assessment and Plan:  
V Fib Cardiac arrest - 5/6/21 on admission:   .  
-Echo showed mild improvement  
-Current cardiac regimen includes ivabradine, Toprol and Entresto.  Spironolactone on hold.   
-Vanco completed on 5/28  for endocarditis that was present on admission  
-s/p ICD on 5/25   
-Add dapagliflozin on dc as not on formulary as per Dr. Lakhwinder Mckee (3/19)  
   
Systolic congestive heart failure/CM   
-Recent Echo EF on 5/27 with LVEF of 25-30%   
-Current GDMT includes Toprol, Entresto.  Aldactone on hold  
-Intermittent hypotension persisted,will add low dose midodrine to be able to continue cardiac medications   
   
   
Acute on chronic  respiratory failure, resolved. Garfield Patel being downsized. Pneumothorax post ICD placement  
-Trach collar in place, on room air  
- on RA now for > 48 hours and maintaining airways without difficulty - pulmonary following, plan to change trach size.  Pulmonary following.  ENT recommended downsizing and capping trial per respiratory therapist.  
-Trach cannula down to 4 since 6/4, could possibly decannulate on Monday  
   
Bacterial pneumonia severe multilevel consolidation throughout both lungs consistent with severe multilobar PNA. -sputum culture E coli with ESBL   
-Completed meropenem and vancomycin.  ID were consulted and assisted with the management.  
   
MRSA bacteremia and aortic valve endocardititis, PTA.  
-Status post bioprosthetic AVR and root abscess debridement at [facility] on 4/16/2021.   
-Status post pericardiocentesis due to tamponade prior to surgery at [facility] as well.  
-Per records from [facility], vancomycin was due to finish on 5/22/2021.  ID here followed.  Vancomycin stopped on 528/21.  
   
Pain management  
-Contusion of chest wall, left side, chronic back pain . H/o drug abuse.  Stop fentanyl , cont' oxycodone prn.  Cautious with narcotics. -NSAIDs sparingly.  
   
   
   
   
   
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 Infarcts were thought to be due to septic emboli. He had left sided residual weakness.   
  cta 5/6/21:  multiple acute infarctions throughout the  
right cerebral hemisphere, involving much of the occipital lobe, as well as much  
of the frontal lobe. There is an additional superior right frontoparietal Infarct Evaluated by neurology,  Cont' current anticonvulsants.  No seizures on EEG.    
Neurologist consulted   
             -PT/OT/SLP   
            - Stroke education  
            - SBP goal 100-160  
        
   
   
Seizure DZ:  
Continue keppra -1500 mg BID, valproate  discontinued.  
   
   
Severe muscle deconditioning Severe protein-calorie malnutrition Cerebral abscess (embolic)  
 H/o substance abuse  
   
CODE STATUS: Full DVT prophylaxis: SCDs, heparin Disposition: He is a great candidate for inpatient rehab after downsizing tracheostomy and/or decannulation.  
   
General:   Pleasant gentleman, cachectic.  Not in distress HEENT:            No pallor or jaundice.    
Neck:                Tracheostomy tube on the anterior neck, no discharge. Lungs:             No wheezing. Midline sternal surgical scar, no crackles.                         Tender left chest wall. Heart:              Regular  rhythm,normal rate   No edema Abdomen:       Soft, non-tender. Not distended.  Bowel sounds normal  
Extremities:     No LE edema Skin:                Not pale.  Not Jaundiced  No rashes   
      
Neurologic:      Alert, oriented X 3.  Left upper extremity weakness 4 out of 5.  Left lower extremity 5-/5  
  
  
Cardiology [facility] - Care Day 31 (6/5/2021) by Bo Banks RN 
 
  
Review Entered Review Status 6/7/2021 14:38 Completed  
  
Criteria Review Care Day: 31 Care Date: 6/5/2021 Level of Care: Telemetry Guideline Day 3 Level Of Care   
(X) * Activity level acceptable 6/7/2021 14:38:45 EDT by Dany Rodrigues working with PT Clinical Status   
(X) * Hemodynamic stability 6/7/2021 14:38:45 EDT by Janett Keyes   
  VS 98.4 68 90/51 18 97% room air   
(X) * Cardiovascular status acceptable   
(X) * Cardiac inflammation absent or controlled ( ) * General Discharge Criteria met Interventions   
(X) * Intake acceptable 6/7/2021 14:38:45 EDT by Shonna Dalton   
  regular diet   
( ) * No inpatient interventions needed * Milestone Additional Notes 6/5/2021 LOC IP TELEMETRY  
VS 98.4 68 90/51 18 97% room air LABS   
RBC: 3.82 (L) HGB: 10.2 (L) HCT: 32.5 (L) Sodium: 134 (L) Potassium: 4.4 Chloride: 100 CO2: 29 Anion gap: 5 Glucose: 111 (H) BUN: 18 Creatinine: 0.75  
BUN/Creatinine ratio: 24 (H) MEDS Heparin 5000 units sc q8 Corlanor 5mg po bid Toradol 15mg iv q12 prn x2 Risaquad 1 cap po qday  
Keppra 1500mg po bid Melatonin 3mg po qhs Toprol 12.5mg po qday Roxicodone 7.5mg po q4 prn x3 Entresto 24-26mg 1 tab po q12 ATTENDING NOTE Just waking up. He says he is tired,no new symptoms. SBP 88 now.  
   
Assessment and Plan:  
V Fib Cardiac arrest - 5/6/21 on admission:   .  
-Echo showed mild improvement  
-Current cardiac regimen includes ivabradine, Toprol and Entresto.  Spironolactone on hold. -Vanco completed on 5/28  for endocarditis that was present on admission  
-s/p ICD on 5/25   
-Add dapagliflozin on dc as not on formulary as per Dr. Romario Welch (3/85)  
   
Systolic congestive heart failure/CM   
-Recent Echo EF on 5/27 with LVEF of 25-30%   
-Current GDMT includes Toprol, Entresto.  Aldactone on hold  
-Intermittent hypotension persisted,will add low dose midodrine to be able to continue cardiac medications   
   
   
Acute on chronic  respiratory failure, resolved. Faylene Cheek being downsized. Pneumothorax post ICD placement  
-Trach collar in place, on room air  
- on RA now for > 48 hours and maintaining airways without difficulty - pulmonary following, plan to change trach size.  Pulmonary following.  ENT recommended downsizing and capping trial per respiratory therapist.  
-Trach cannula down to 4 since 6/4  
   
Bacterial pneumonia severe multilevel consolidation throughout both lungs consistent with severe multilobar PNA.    
-sputum culture E coli with ESBL   
-Completed meropenem and vancomycin.  ID were consulted and assisted with the management.  
   
MRSA bacteremia and aortic valve endocardititis, PTA.  
-Status post bioprosthetic AVR and root abscess debridement at [facility] on 4/16/2021.   
-Status post pericardiocentesis due to tamponade prior to surgery at [facility] as well.  
-Per records from [facility], vancomycin was due to finish on 5/22/2021.  ID here followed.  Vancomycin stopped on 528/21.  
   
Pain management  
-Contusion of chest wall, left side, chronic back pain . H/o drug abuse.  Stop fentanyl , cont' oxycodone prn.  Cautious with narcotics. -NSAIDs sparingly.  
   
   
   
   
   
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 Infarcts were thought to be due to septic emboli. He had left sided residual weakness.   
  cta 5/6/21:  multiple acute infarctions throughout the  
right cerebral hemisphere, involving much of the occipital lobe, as well as much  
of the frontal lobe. There is an additional superior right frontoparietal Infarct Evaluated by neurology,  Cont' current anticonvulsants.  No seizures on EEG.    
Neurologist consulted   
             -PT/OT/SLP   
            - Stroke education  
            - SBP goal 100-160  
        
   
   
Seizure DZ:  
Continue keppra -1500 mg BID, valproate  discontinued.  
   
   
Severe muscle deconditioning Severe protein-calorie malnutrition Cerebral abscess (embolic)  
 H/o substance abuse  
   
CODE STATUS: Full DVT prophylaxis: SCDs, heparin Disposition: He is a great candidate for inpatient rehab after downsizing tracheostomy and/or decannulation. General:   Pleasant gentleman, cachectic.  Not in distress HEENT:            No pallor or jaundice.    
Neck:                Tracheostomy tube on the anterior neck, no discharge. Lungs:             No wheezing. Midline sternal surgical scar, no crackles.                         Tender left chest wall. Heart:              Regular  rhythm,normal rate   No edema Abdomen:       Soft, non-tender. Not distended.  Bowel sounds normal  
Extremities:     No LE edema Skin:                Not pale.  Not Jaundiced  No rashes   
      
Neurologic:      Alert, oriented X 3.  Left upper extremity weakness 4 out of 5.  Left lower extremity 5-/5  
  
  
Cardiology [address] [facility] Day 30 (6/4/2021) by Usha Benito RN 
 
  
Review Entered Review Status 6/7/2021 14:34 Completed  
  
Criteria Review Care Day: 30 Care Date: 6/4/2021 Level of Care: Telemetry Guideline Day 3 Level Of Care   
(X) * Activity level acceptable 6/7/2021 14:34:54 EDT by Sadaf Carney   
  per PT note:  Participated in balance training for dynamic standing with CGA/ min A while reaching for/ organizing objects on bedside table. Clinical Status   
(X) * Hemodynamic stability 6/7/2021 14:34:54 EDT by Janett Stoner   
  98.5 69 91/61 18 97% ROOM AIR   
(X) * Cardiovascular status acceptable 6/7/2021 14:34:54 EDT by Sadaf Carney   
  regular rate rhythm   
(X) * Cardiac inflammation absent or controlled ( ) * General Discharge Criteria met Interventions   
(X) * Intake acceptable 6/7/2021 14:34:54 EDT by Sadaf Carney   
  regular diet   
( ) * No inpatient interventions needed * Milestone Additional Notes Patient is alert and oriented x4 Asked RT to downsize cannula to 4,but pt declined earlier He advised him to cooperate,we need to downsize or preferably de cannulate him for discharge   
   
Assessment and Plan:  
V Fib Cardiac arrest - 5/6/21 on admission:   .  
-Echo showed mild improvement  
-Current cardiac regimen includes ivabradine, Toprol and Entresto.  Spironolactone on hold. -Vanco completed on 5/28  for endocarditis that was present on admission  
-s/p ICD on 5/25   
-Add dapagliflozin on dc as not on formulary as per Dr. Steffanie Ayers (9/10)  
   
Systolic congestive heart failure/CM   
-Recent Echo EF on 5/27 with LVEF of 25-30%   
-Current GDMT includes Toprol, [place] on hold  
   
   
Acute on chronic  respiratory failure, resolved. Ivana Less being downsized.   
Pneumothorax post ICD placement  
-Trach collar in place, on room air  
- on RA now for > 48 hours and maintaining airways without difficulty - pulmonary following, plan to change trach size.  Pulmonary following.  ENT recommended downsizing and capping trial per respiratory therapist.  
   
Bacterial pneumonia severe multilevel consolidation throughout both lungs consistent with severe multilobar PNA. -sputum culture E coli with ESBL   
-Completed meropenem and vancomycin.  ID were consulted and assisted with the management.  
   
MRSA bacteremia and aortic valve endocardititis, PTA.  
-Status post bioprosthetic AVR and root abscess debridement at [address] [place] [address] on 4/16/2021.   
-Status post pericardiocentesis due to tamponade prior to surgery at [address] [place] [address] as well.  
-Per records from [facility], vancomycin was due to finish on 5/22/2021.  ID here followed.  Vancomycin stopped on 528/21.  
   
Pain management  
-Contusion of chest wall, left side, chronic back pain . H/o drug abuse.  Stop fentanyl , cont' oxycodone prn.  Cautious with narcotics. -NSAIDs sparingly.  
   
   
   
   
   
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 Infarcts were thought to be due to septic emboli. He had left sided residual weakness.   
  cta 5/6/21:  multiple acute infarctions throughout the  
right cerebral hemisphere, involving much of the occipital lobe, as well as much  
of the frontal lobe. There is an additional superior right frontoparietal Infarct Evaluated by neurology,  Cont' current anticonvulsants.  No seizures on EEG.    
Neurologist consulted   
             -PT/OT/SLP   
            - Stroke education  
            - SBP goal 100-160  
        
   
   
Seizure DZ:  
Continue keppra -1500 mg BID, valproate  discontinued.  
   
   
Severe muscle deconditioning Severe protein-calorie malnutrition Cerebral abscess (embolic)  
 H/o substance abuse  
   
CODE STATUS: Full DVT prophylaxis: SCDs, heparin Disposition: He is a great candidate for inpatient rehab after downsizing tracheostomy and/or decannulation. General:   Pleasant gentleman, cachectic.  Not in distress HEENT:            No pallor or jaundice.    
Neck:                Tracheostomy tube on the anterior neck, no discharge. Lungs:             No wheezing. Midline sternal surgical scar, no crackles.                         Tender left chest wall. Heart:              Regular  rhythm,normal rate   No edema Abdomen:       Soft, non-tender. Not distended.  Bowel sounds normal  
Extremities:     No LE edema Skin:                Not pale.  Not Jaundiced  No rashes   
      
Neurologic:      Alert, oriented X 3.  Left upper extremity weakness 4 out of 5.  Left lower extremity 5-/5

## (undated) DEVICE — INTRO SHTH HEMO 9FR 18G 13CM -- PRELUDE SNAP PEEL-AWAY

## (undated) DEVICE — PACEMAKER PACK: Brand: MEDLINE INDUSTRIES, INC.

## (undated) DEVICE — MICROPUNCTURE INTRODUCER SET SILHOUETTE TRANSITIONLESS WITH STAINLESS STEEL WIRE GUIDE: Brand: MICROPUNCTURE

## (undated) DEVICE — Device: Brand: PADPRO

## (undated) DEVICE — SUT SLK 0 30IN CT1 BLK --

## (undated) DEVICE — SUTURE PERMAHAND SZ 0 L18IN NONABSORBABLE BLK SILK BRAID A186H

## (undated) DEVICE — REM POLYHESIVE ADULT PATIENT RETURN ELECTRODE: Brand: VALLEYLAB

## (undated) DEVICE — SUTURE VCRL SZ 2-0 L36IN ABSRB UD L40MM CT 1/2 CIR J957H

## (undated) DEVICE — DRESSING ANTIMIC FOAM OPTIFOAM POSTOP ADH 4 X 6 IN